# Patient Record
Sex: MALE | Race: WHITE | Employment: OTHER | URBAN - METROPOLITAN AREA
[De-identification: names, ages, dates, MRNs, and addresses within clinical notes are randomized per-mention and may not be internally consistent; named-entity substitution may affect disease eponyms.]

---

## 2017-01-04 RX ORDER — ATORVASTATIN CALCIUM 10 MG/1
TABLET, FILM COATED ORAL
Qty: 90 TAB | Refills: 3 | Status: SHIPPED | OUTPATIENT
Start: 2017-01-04 | End: 2018-02-19 | Stop reason: SDUPTHER

## 2017-01-16 RX ORDER — ALPRAZOLAM 0.5 MG/1
TABLET ORAL
Qty: 60 TAB | Refills: 0 | OUTPATIENT
Start: 2017-01-16 | End: 2017-03-27 | Stop reason: SDUPTHER

## 2017-01-16 NOTE — TELEPHONE ENCOUNTER
Reviewed report generated by the [facility]. Does not demonstrate aberrancies or inconsistencies with regard to the prescribing of controlled medications to this patient by other providers.   Last filled 11/11/16

## 2017-01-25 ENCOUNTER — LAB ONLY (OUTPATIENT)
Dept: INTERNAL MEDICINE CLINIC | Age: 77
End: 2017-01-25

## 2017-01-25 ENCOUNTER — HOSPITAL ENCOUNTER (OUTPATIENT)
Dept: LAB | Age: 77
Discharge: HOME | End: 2017-01-25
Payer: MEDICARE

## 2017-01-25 DIAGNOSIS — R73.03 PRE-DIABETES: ICD-10-CM

## 2017-01-25 DIAGNOSIS — I10 ESSENTIAL HYPERTENSION: ICD-10-CM

## 2017-01-25 DIAGNOSIS — E55.9 VITAMIN D DEFICIENCY: ICD-10-CM

## 2017-01-25 DIAGNOSIS — E78.5 HYPERLIPIDEMIA, UNSPECIFIED HYPERLIPIDEMIA TYPE: ICD-10-CM

## 2017-01-25 DIAGNOSIS — I10 ESSENTIAL HYPERTENSION: Primary | ICD-10-CM

## 2017-01-25 LAB
25(OH)D3 SERPL-MCNC: 30.9 NG/ML (ref 30–100)
ALBUMIN SERPL BCP-MCNC: 4.2 G/DL (ref 3.4–5)
ALBUMIN/GLOB SERPL: 1.8 {RATIO} (ref 0.8–1.7)
ALP SERPL-CCNC: 97 U/L (ref 45–117)
ALT SERPL-CCNC: 41 U/L (ref 16–61)
ANION GAP BLD CALC-SCNC: 6 MMOL/L (ref 3–18)
AST SERPL W P-5'-P-CCNC: 23 U/L (ref 15–37)
BASOPHILS # BLD AUTO: 0 K/UL (ref 0–0.06)
BASOPHILS # BLD: 0 % (ref 0–2)
BILIRUB SERPL-MCNC: 0.4 MG/DL (ref 0.2–1)
BUN SERPL-MCNC: 17 MG/DL (ref 7–18)
BUN/CREAT SERPL: 22 (ref 12–20)
CALCIUM SERPL-MCNC: 8.5 MG/DL (ref 8.5–10.1)
CHLORIDE SERPL-SCNC: 104 MMOL/L (ref 100–108)
CHOLEST SERPL-MCNC: 143 MG/DL
CO2 SERPL-SCNC: 31 MMOL/L (ref 21–32)
CREAT SERPL-MCNC: 0.76 MG/DL (ref 0.6–1.3)
DIFFERENTIAL METHOD BLD: ABNORMAL
EOSINOPHIL # BLD: 0.3 K/UL (ref 0–0.4)
EOSINOPHIL NFR BLD: 5 % (ref 0–5)
ERYTHROCYTE [DISTWIDTH] IN BLOOD BY AUTOMATED COUNT: 13.5 % (ref 11.6–14.5)
GLOBULIN SER CALC-MCNC: 2.4 G/DL (ref 2–4)
GLUCOSE SERPL-MCNC: 103 MG/DL (ref 74–99)
HCT VFR BLD AUTO: 46.3 % (ref 36–48)
HDLC SERPL-MCNC: 59 MG/DL (ref 40–60)
HDLC SERPL: 2.4 {RATIO} (ref 0–5)
HGB BLD-MCNC: 14.9 G/DL (ref 13–16)
LDLC SERPL CALC-MCNC: 69.2 MG/DL (ref 0–100)
LIPID PROFILE,FLP: NORMAL
LYMPHOCYTES # BLD AUTO: 32 % (ref 21–52)
LYMPHOCYTES # BLD: 1.8 K/UL (ref 0.9–3.6)
MCH RBC QN AUTO: 32 PG (ref 24–34)
MCHC RBC AUTO-ENTMCNC: 32.2 G/DL (ref 31–37)
MCV RBC AUTO: 99.4 FL (ref 74–97)
MONOCYTES # BLD: 0.7 K/UL (ref 0.05–1.2)
MONOCYTES NFR BLD AUTO: 12 % (ref 3–10)
NEUTS SEG # BLD: 2.8 K/UL (ref 1.8–8)
NEUTS SEG NFR BLD AUTO: 51 % (ref 40–73)
PLATELET # BLD AUTO: 191 K/UL (ref 135–420)
PMV BLD AUTO: 10.4 FL (ref 9.2–11.8)
POTASSIUM SERPL-SCNC: 3.9 MMOL/L (ref 3.5–5.5)
PROT SERPL-MCNC: 6.6 G/DL (ref 6.4–8.2)
RBC # BLD AUTO: 4.66 M/UL (ref 4.7–5.5)
SODIUM SERPL-SCNC: 141 MMOL/L (ref 136–145)
TRIGL SERPL-MCNC: 74 MG/DL (ref ?–150)
TSH SERPL DL<=0.05 MIU/L-ACNC: 1.81 UIU/ML (ref 0.36–3.74)
VLDLC SERPL CALC-MCNC: 14.8 MG/DL
WBC # BLD AUTO: 5.6 K/UL (ref 4.6–13.2)

## 2017-01-25 PROCEDURE — 84443 ASSAY THYROID STIM HORMONE: CPT | Performed by: INTERNAL MEDICINE

## 2017-01-25 PROCEDURE — 80061 LIPID PANEL: CPT | Performed by: INTERNAL MEDICINE

## 2017-01-25 PROCEDURE — 36415 COLL VENOUS BLD VENIPUNCTURE: CPT | Performed by: INTERNAL MEDICINE

## 2017-01-25 PROCEDURE — 85025 COMPLETE CBC W/AUTO DIFF WBC: CPT | Performed by: INTERNAL MEDICINE

## 2017-01-25 PROCEDURE — 82306 VITAMIN D 25 HYDROXY: CPT | Performed by: INTERNAL MEDICINE

## 2017-01-25 PROCEDURE — 80053 COMPREHEN METABOLIC PANEL: CPT | Performed by: INTERNAL MEDICINE

## 2017-02-01 ENCOUNTER — OFFICE VISIT (OUTPATIENT)
Dept: INTERNAL MEDICINE CLINIC | Age: 77
End: 2017-02-01

## 2017-02-01 ENCOUNTER — TELEPHONE (OUTPATIENT)
Dept: INTERNAL MEDICINE CLINIC | Age: 77
End: 2017-02-01

## 2017-02-01 VITALS
OXYGEN SATURATION: 96 % | TEMPERATURE: 97.9 F | WEIGHT: 209 LBS | HEIGHT: 67 IN | HEART RATE: 88 BPM | SYSTOLIC BLOOD PRESSURE: 128 MMHG | DIASTOLIC BLOOD PRESSURE: 78 MMHG | BODY MASS INDEX: 32.8 KG/M2

## 2017-02-01 DIAGNOSIS — M15.9 PRIMARY OSTEOARTHRITIS INVOLVING MULTIPLE JOINTS: ICD-10-CM

## 2017-02-01 DIAGNOSIS — Z02.89 ENCOUNTER FOR OCCUPATIONAL HEALTH EXAMINATION FOR SURVEILLANCE OF EXPOSURE TO LEAD: ICD-10-CM

## 2017-02-01 DIAGNOSIS — M06.041 RHEUMATOID ARTHRITIS INVOLVING BOTH HANDS WITH NEGATIVE RHEUMATOID FACTOR (HCC): ICD-10-CM

## 2017-02-01 DIAGNOSIS — E78.5 HYPERLIPIDEMIA, UNSPECIFIED HYPERLIPIDEMIA TYPE: ICD-10-CM

## 2017-02-01 DIAGNOSIS — M11.20 CPDD (CALCIUM PYROPHOSPHATE DEPOSITION DISEASE): ICD-10-CM

## 2017-02-01 DIAGNOSIS — N13.8 BPH WITH OBSTRUCTION/LOWER URINARY TRACT SYMPTOMS: ICD-10-CM

## 2017-02-01 DIAGNOSIS — N40.1 BPH WITH OBSTRUCTION/LOWER URINARY TRACT SYMPTOMS: ICD-10-CM

## 2017-02-01 DIAGNOSIS — K21.9 GASTROESOPHAGEAL REFLUX DISEASE WITHOUT ESOPHAGITIS: ICD-10-CM

## 2017-02-01 DIAGNOSIS — I10 ESSENTIAL HYPERTENSION: Primary | ICD-10-CM

## 2017-02-01 DIAGNOSIS — F32.A DEPRESSION, UNSPECIFIED DEPRESSION TYPE: ICD-10-CM

## 2017-02-01 DIAGNOSIS — R73.09 ABNORMAL GLUCOSE: ICD-10-CM

## 2017-02-01 DIAGNOSIS — M06.042 RHEUMATOID ARTHRITIS INVOLVING BOTH HANDS WITH NEGATIVE RHEUMATOID FACTOR (HCC): ICD-10-CM

## 2017-02-01 DIAGNOSIS — Z71.89 ACP (ADVANCE CARE PLANNING): ICD-10-CM

## 2017-02-01 DIAGNOSIS — R73.03 PRE-DIABETES: ICD-10-CM

## 2017-02-01 DIAGNOSIS — Z00.00 MEDICARE ANNUAL WELLNESS VISIT, INITIAL: ICD-10-CM

## 2017-02-01 DIAGNOSIS — Z77.011 ENCOUNTER FOR OCCUPATIONAL HEALTH EXAMINATION FOR SURVEILLANCE OF EXPOSURE TO LEAD: ICD-10-CM

## 2017-02-01 RX ORDER — COLCHICINE 0.6 MG/1
0.6 CAPSULE ORAL EVERY OTHER DAY
COMMUNITY

## 2017-02-01 NOTE — PROGRESS NOTES
This is an Initial Medicare Annual Wellness Exam (AWV) (Performed 12 months after IPPE or effective date of Medicare Part B enrollment, Once in a lifetime)    I have reviewed the patient's medical history in detail and updated the computerized patient record. History     Past Medical History   Diagnosis Date    Arthritis     GERD (gastroesophageal reflux disease)     Hypertension     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 and other lower urinary tract symptoms (LUTS)     UTI (urinary tract infection)       Past Surgical History   Procedure Laterality Date    Hx heent       sinus, tonsillectomy    Hx hernia repair      Hx carpal tunnel release      Hx orthopaedic       lef knee surgery, removed cartilage.  Hx mohs procedure       bilateral      Current Outpatient Prescriptions   Medication Sig Dispense Refill    ALPRAZolam (XANAX) 0.5 mg tablet Take 1 tablet by mouth at bedtime as needed 60 Tab 0    atorvastatin (LIPITOR) 10 mg tablet TAKE 1 TABLET BY MOUTH ONCE DAILY 90 Tab 3    potassium chloride (K-DUR, KLOR-CON) 20 mEq tablet TAKE 1 TABLET BY MOUTH 2 TIMES A DAY 60 Tab 11    PARoxetine (PAXIL) 20 mg tablet TAKE 1 TABLET BY MOUTH ONCE DAILY 30 Tab 11    furosemide (LASIX) 40 mg tablet TAKE 1 TABLET BY MOUTH ONCE DAILY 30 Tab 5    omeprazole (PRILOSEC) 20 mg capsule TAKE 1 CAPSULE BY MOUTH ONCE DAILY 90 Cap 1    amLODIPine (NORVASC) 10 mg tablet TAKE ONE TABLET BY MOUTH ONCE DAILY 30 Tab 11    lisinopril (PRINIVIL, ZESTRIL) 40 mg tablet TAKE 1 TABLET BY MOUTH 2 TIMES A DAY 60 Tab 11    cholecalciferol, vitamin D3, (VITAMIN D3) 2,000 unit tab Take 2,000 Units by mouth daily.  diclofenac (VOLTAREN) 1 % topical gel Apply 4 g to affected area four (4) times daily.  LUMIGAN 0.01 % ophthalmic drops       hydroxychloroquine (PLAQUENIL) 200 mg tablet Take 200 mg by mouth two (2) times a day.  aspirin delayed-release 81 mg tablet Take  by mouth daily.       MULTIVITS/IRON FUM/FA/D3/LYCOP (MULTI FOR HIM PO) Take  by mouth.  colchicine (MITIGARE) 0.6 mg capsule Take 0.6 mg by mouth daily. Allergies   Allergen Reactions    Tetanus Toxoid, Adsorbed Anaphylaxis    Aldactone [Spironolactone] Not Reported This Time     Breast tenderness    Codeine Not Reported This Time     \"Drives crazy\"    Tetanus Vaccines And Toxoid Anaphylaxis     Other reaction(s): anaphylaxis/angioedema    Tape  [Adhesive] Rash     Family History   Problem Relation Age of Onset    Cancer Other     Cancer Mother     Heart Disease Father     Alcohol abuse Father      Social History   Substance Use Topics    Smoking status: Former Smoker    Smokeless tobacco: Former User    Alcohol use Yes      Comment: rarely     Patient Active Problem List   Diagnosis Code    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 and other lower urinary tract symptoms (LUTS) N40.1, N13.8    Hypertension I10    Generalized osteoarthrosis, involving multiple sites M15.9    Hyperlipidemia E78.5    Reflux K21.9    Pre-diabetes R73.03    Rheumatoid arthritis(714.0)     Vitamin D deficiency E55.9    Colon polyps K63.5         Depression Risk Factor Screening:     PHQ 2 / 9, over the last two weeks 2/1/2017   Little interest or pleasure in doing things Not at all   Feeling down, depressed or hopeless Not at all   Total Score PHQ 2 0     Alcohol Risk Factor Screening: On any occasion during the past 3 months, have you had more than 4 drinks containing alcohol? No    Do you average more than 14 drinks per week? No    Functional Ability and Level of Safety:     Hearing Loss   none    Activities of Daily Living   Self-care. Requires assistance with: no ADLs    Fall Risk     Fall Risk Assessment, last 12 mths 2/1/2017   Able to walk? Yes   Fall in past 12 months?  No     Abuse Screen   Patient is not abused    Review of Systems   A comprehensive review of systems was negative except for that written in the HPI. Physical Examination     No exam data present    Evaluation of Cognitive Function:  Mood/affect:  neutral  Appearance: age appropriate and within normal Limits  Family member/caregiver input: none    Exam: see office note    Patient Care Team:  Ciro Collins MD as PCP - General (Internal Medicine)  Jerri Camp MD as Physician (Urology)  Jagruti Lange, RN as Ambulatory Care Navigator (Internal Medicine)  Marianne Sanchez MD (Rheumatology)  HERMELINDA Solomon (Physician Assistant)  Trini Miller, RN as Ambulatory Care Navigator (Internal Medicine)  Alise Thakur MD (Gastroenterology)  Sonia Nava OD (Ophthalmology)    Advice/Referrals/Counseling   Education and counseling provided:  Are appropriate based on today's review and evaluation  End-of-Life planning (with patient's consent)  Influenza Vaccine  Colorectal cancer screening tests  Screening for glaucoma  Discussed diabetic diet    Assessment/Plan       ICD-10-CM ICD-9-CM    1. Essential hypertension I10 401.9 CBC WITH AUTOMATED DIFF      METABOLIC PANEL, BASIC      URINALYSIS W/MICROSCOPIC   2. Hyperlipidemia, unspecified hyperlipidemia type E78.5 272.4    3. Pre-diabetes R73.03 790.29 HEMOGLOBIN A1C WITH EAG   4. Abnormal glucose R73.09 790.29 HEMOGLOBIN A1C WITH EAG   5. 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 (HCC) M06.041 714.0     M06.042     6. Primary osteoarthritis involving multiple joints M15.0 715.09    7. CPDD (calcium pyrophosphate deposition disease) M11.20 275.49      712.20    8. BPH with obstruction/lower urinary tract symptoms N40.1 600.01     N13.8 599.69    9. Gastroesophageal reflux disease without esophagitis K21.9 530.81    10. Depression, unspecified depression type F32.9 311    11. Encounter for occupational health examination for surveillance of exposure to lead Z02.89 V70.5 LEAD, ADULT, WHOLE BLOOD    Z77.011 V15.86    12.  Medicare annual wellness visit, initial Z00.00 V70.0    13. ACP (advance care planning) Z71.89 V65.49      current treatment plan is effective, no change in therapy  lab results and schedule of future lab studies reviewed with patient  reviewed diet, exercise and weight control  cardiovascular risk and specific lipid/LDL goals reviewed  reviewed medications and side effects in detail  specific diabetic recommendations: low cholesterol diet, weight control and daily exercise discussed, annual eye examinations at Ophthalmology discussed, long term diabetic complications discussed and labs immediately prior to next visit.

## 2017-02-01 NOTE — PROGRESS NOTES
Advance Care Planning (ACP) Provider Note - Comprehensive     Date of ACP Conversation: 02/01/17  Persons included in Conversation:  patient  Length of ACP Conversation in minutes:  16 minutes    Authorized Decision Maker (if patient is incapable of making informed decisions): wife  This person is:  Healthcare Agent/Medical Power of  under Advance Directive    General ACP for ALL Patients with Decision Making Capacity:   Importance of advance care planning, including choosing a healthcare agent to communicate patient's healthcare decisions if patient lost the ability to make decisions, such as after a sudden illness or accident  Understanding of the healthcare agent role was assessed and information provided  Exploration of values, goals, and preferences if recovery is not expected, even with continued medical treatment in the event of: Imminent death  Severe, permanent brain injury  \"In these circumstances, what matters most to you? \"  Care focused more on comfort or quality of life. Review of Existing Advance Directive:  Patient does not have an advance directive completed. He expresses the desire to not have life prolonging procedures. For Serious or Chronic Illness:  Understanding of medical condition      Interventions Provided:  Recommended completion of Advance Directive form after review of ACP materials and conversation with prospective healthcare agent   Recommended communicating the plan and making copies for the healthcare agent, personal physician, and others as appropriate (e.g., health system)  Recommended review of completed ACP document annually or upon change in health status   Recommended to complete advance directive and return completed form to office to be copied and scanned into chart. Paperwork provided.

## 2017-02-01 NOTE — TELEPHONE ENCOUNTER
Please request eye exam notes from Dr. Ezequiel Rice. Patient has been seen in the last 6 months. Also, patient had a colonoscopy by Dr. Kolton Marroquin in 8/2015. However, note not clear regarding follow-up for polyp. Please inquire when he needs another one. Thank you.

## 2017-02-01 NOTE — PATIENT INSTRUCTIONS
DASH Diet: Care Instructions  Your Care Instructions  The DASH diet is an eating plan that can help lower your blood pressure. DASH stands for Dietary Approaches to Stop Hypertension. Hypertension is high blood pressure. The DASH diet focuses on eating foods that are high in calcium, potassium, and magnesium. These nutrients can lower blood pressure. The foods that are highest in these nutrients are fruits, vegetables, low-fat dairy products, nuts, seeds, and legumes. But taking calcium, potassium, and magnesium supplements instead of eating foods that are high in those nutrients does not have the same effect. The DASH diet also includes whole grains, fish, and poultry. The DASH diet is one of several lifestyle changes your doctor may recommend to lower your high blood pressure. Your doctor may also want you to decrease the amount of sodium in your diet. Lowering sodium while following the DASH diet can lower blood pressure even further than just the DASH diet alone. Follow-up care is a key part of your treatment and safety. Be sure to make and go to all appointments, and call your doctor if you are having problems. It's also a good idea to know your test results and keep a list of the medicines you take. How can you care for yourself at home? Following the DASH diet  · Eat 4 to 5 servings of fruit each day. A serving is 1 medium-sized piece of fruit, ½ cup chopped or canned fruit, 1/4 cup dried fruit, or 4 ounces (½ cup) of fruit juice. Choose fruit more often than fruit juice. · Eat 4 to 5 servings of vegetables each day. A serving is 1 cup of lettuce or raw leafy vegetables, ½ cup of chopped or cooked vegetables, or 4 ounces (½ cup) of vegetable juice. Choose vegetables more often than vegetable juice. · Get 2 to 3 servings of low-fat and fat-free dairy each day. A serving is 8 ounces of milk, 1 cup of yogurt, or 1 ½ ounces of cheese. · Eat 6 to 8 servings of grains each day.  A serving is 1 slice of bread, 1 ounce of dry cereal, or ½ cup of cooked rice, pasta, or cooked cereal. Try to choose whole-grain products as much as possible. · Limit lean meat, poultry, and fish to 2 servings each day. A serving is 3 ounces, about the size of a deck of cards. · Eat 4 to 5 servings of nuts, seeds, and legumes (cooked dried beans, lentils, and split peas) each week. A serving is 1/3 cup of nuts, 2 tablespoons of seeds, or ½ cup of cooked beans or peas. · Limit fats and oils to 2 to 3 servings each day. A serving is 1 teaspoon of vegetable oil or 2 tablespoons of salad dressing. · Limit sweets and added sugars to 5 servings or less a week. A serving is 1 tablespoon jelly or jam, ½ cup sorbet, or 1 cup of lemonade. · Eat less than 2,300 milligrams (mg) of sodium a day. If you limit your sodium to 1,500 mg a day, you can lower your blood pressure even more. Tips for success  · Start small. Do not try to make dramatic changes to your diet all at once. You might feel that you are missing out on your favorite foods and then be more likely to not follow the plan. Make small changes, and stick with them. Once those changes become habit, add a few more changes. · Try some of the following:  ¨ Make it a goal to eat a fruit or vegetable at every meal and at snacks. This will make it easy to get the recommended amount of fruits and vegetables each day. ¨ Try yogurt topped with fruit and nuts for a snack or healthy dessert. ¨ Add lettuce, tomato, cucumber, and onion to sandwiches. ¨ Combine a ready-made pizza crust with low-fat mozzarella cheese and lots of vegetable toppings. Try using tomatoes, squash, spinach, broccoli, carrots, cauliflower, and onions. ¨ Have a variety of cut-up vegetables with a low-fat dip as an appetizer instead of chips and dip. ¨ Sprinkle sunflower seeds or chopped almonds over salads. Or try adding chopped walnuts or almonds to cooked vegetables. ¨ Try some vegetarian meals using beans and peas. Add garbanzo or kidney beans to salads. Make burritos and tacos with mashed new beans or black beans. Where can you learn more? Go to http://regina-carmen.info/. Enter L526 in the search box to learn more about \"DASH Diet: Care Instructions. \"  Current as of: March 23, 2016  Content Version: 11.1  © 2006-2016 [employer] Care instructions adapted under license by [employer] (which disclaims liability or warranty for this information). If you have questions about a medical condition or this instruction, always ask your healthcare professional. [place] [address] any warranty or liability for your use of this information. Noninsulin Medicines for Type 2 Diabetes: Care Instructions  Your Care Instructions  There are different types of noninsulin medicines for diabetes. Each works in a different way to help you control your blood sugar. Some types help your body make insulin to lower your blood sugar. Others lower how much insulin your body needs. Some can slow how quickly your body digests sugars. And some can remove extra glucose through your urine. Some of these medicines are:  · Alpha-glucosidase inhibitors. These keep starches from breaking down. This means that they lower the amount of glucose absorbed when you eat. They do not help your body make more insulin. So they will not cause low blood sugar unless they are used with other medicines for diabetes. They include acarbose and miglitol. · DPP-4 inhibitors. These help your body increase the level of insulin after eating. They also help your body make less of a hormone that raises blood sugar. They include linagliptin, saxagliptin, and sitagliptin. · Incretin hormones (GLP-1 receptor agonists). Your body makes a protein that can raise your insulin level, lower your blood sugar, and make you less hungry. You can inject hormone medicines that work the same way as this protein.  They include exenatide and liraglutide. · Meglitinides. These help your body release insulin. They also help slow how your body digests sugars. In this way, they prevent your blood sugar from rising too fast after you eat. They include nateglinide and repaglinide. · Metformin. This lowers how much glucose your liver makes. And it helps you respond better to insulin. It also lowers the amount of stored sugar that your liver releases when you are not eating. · Sodium glucose co-transporter 2 inhibitors (SGLT2 inhibitors). These help to remove extra glucose through your urine. They may also help some people lose weight. They include canagliflozin, dapagliflozin, and empagliflozin. · Sulfonylureas. These help your body release more insulin. Some work for many hours and can cause low blood sugar if you don't eat as you expected. They include glipizide and glyburide. · Thiazolidinediones. These reduce the amount of blood glucose. They also help you respond better to insulin. They include pioglitazone and rosiglitazone. You may need to take more than one medicine for diabetes. Two or more medicines may work better to lower your blood sugar level than just one medicine. Follow-up care is a key part of your treatment and safety. Be sure to make and go to all appointments, and call your doctor if you are having problems. It's also a good idea to know your test results and keep a list of the medicines you take. How can you care for yourself at home? · Eat a healthy diet. Get some exercise each day. This may help you to reduce how much medicine you need. · Do not take other prescription or over-the-counter medicines, vitamins, herbal products, or supplements without talking to your doctor first. Some medicines for type 2 diabetes can cause problems with other medicines or supplements. · Tell your doctor if you plan to get pregnant. Some of these drugs are not safe for pregnant women. · Be safe with medicines.  Take your medicines exactly as prescribed. Meglitinides or sulfonylureas can cause your blood sugar to drop very low. Call your doctor if you think you are having a problem with your medicine. · Check your blood sugar levels often. You can use a glucose monitor. Keeping track can help you know how certain foods, activities, and medicines affect your blood sugar. And it can help you keep your blood sugar from getting so low that it's not safe. When should you call for help? Call 911 anytime you think you may need emergency care. For example, call if:  · You passed out (lost consciousness), or you suddenly become very sleepy or confused. (You may have very low blood sugar.)  · You have symptoms of high blood sugar, such as:  ¨ Blurred vision. ¨ Trouble staying awake or being woken up. ¨ Fast, deep breathing. ¨ Breath that smells fruity. ¨ Belly pain, not feeling hungry, and vomiting. ¨ Feeling confused. Call your doctor now or seek immediate medical care if:  · You are sick and cannot control your blood sugar. · You have been vomiting or have had diarrhea for more than 6 hours. · Your blood sugar stays higher than the level your doctor has set for you. · You have symptoms of low blood sugar, such as:  ¨ Sweating. ¨ Feeling nervous, shaky, and weak. ¨ Extreme hunger and slight nausea. ¨ Dizziness and headache. ¨ Blurred vision. ¨ Confusion. Watch closely for changes in your health, and be sure to contact your doctor if:  · You have a hard time knowing when your blood sugar is low. · You have trouble keeping your blood sugar in the target range. · You often have problems controlling your blood sugar. · You have symptoms of long-term diabetes problems, such as:  ¨ New vision changes. ¨ New pain, numbness, or tingling in your hands or feet. ¨ Skin problems. Where can you learn more? Go to http://regina-carmen.info/.   Enter H153 in the search box to learn more about \"Noninsulin Medicines for Type 2 Diabetes: Care Instructions. \"  Current as of: August 3, 2016  Content Version: 11.1  © 3160-5537 [employer]. Care instructions adapted under license by [employer] (which disclaims liability or warranty for this information). If you have questions about a medical condition or this instruction, always ask your healthcare professional. [place] [address] any warranty or liability for your use of this information.

## 2017-02-01 NOTE — MR AVS SNAPSHOT
Visit Information Date & Time Provider Department Dept. Phone Encounter #  
 2/1/2017  8:00 AM Yan Giles MD Internist of [address] [place] [address] [phone] Follow-up Instructions Return in about 6 months (around 8/1/2017), or if symptoms worsen or fail to improve. Your Appointments 7/18/2017  9:00 AM  
Office Visit with Anita Richard MD  
[facility] [address] [place] [address]) Appt Note: PSA  
 [address] [facility] [address] [address]  
[phone] [address] [id number]  
  
    
 8/3/2017  7:45 AM  
LAB with [facility] NURSE VISIT Internist of [facility] ([address] [place] [address]) Appt Note: labs per sarris [address], [address] [id number] [address] [place]  
  
   
 [address], [address]  
  
    
 8/10/2017  8:00 AM  
Office Visit with Yan Giles MD  
Internist of [address]) Appt Note: ov 6mos. sarris [address], [address] [address] [address] [place]  
  
   
 [address], [address] Upcoming Health Maintenance Date Due DTaP/Tdap/Td series (1 - Tdap) 1/16/1961 MEDICARE YEARLY EXAM 1/16/2007 GLAUCOMA SCREENING Q2Y 1/1/2017 COLONOSCOPY 8/10/2020 Allergies as of 2/1/2017  Review Complete On: 7/14/2016 By: Anita Richard MD  
  
 Severity Noted Reaction Type Reactions Tetanus Toxoid, Adsorbed High 07/09/2014    Anaphylaxis Aldactone [Spironolactone]    Not Reported This Time Breast tenderness Codeine    Not Reported This Time \"Drives crazy\" Tetanus Vaccines And Toxoid    Anaphylaxis Other reaction(s): anaphylaxis/angioedema Tape  [Adhesive]  07/09/2014    Rash Current Immunizations  Reviewed on 1/27/2016 Name Date Influenza Vaccine Split 10/16/2012, 10/4/2011 Influenza Vaccine Whole 1/15/2010 Pneumococcal Conjugate (PCV-13) 1/19/2015  8:07 AM  
 Pneumococcal Vaccine (Unspecified Type) 1/1/2008 Varicella Virus Vaccine 10/1/2013 Zoster 10/16/2012 Not reviewed this visit Vitals BP Pulse Temp Height(growth percentile) Weight(growth percentile) SpO2  
 128/78 88 97.9 °F (36.6 °C) (Oral) 5' 7\" (1.702 m) 209 lb (94.8 kg) 96% BMI Smoking Status 32.73 kg/m2 Former Smoker Vitals History BMI and BSA Data Body Mass Index Body Surface Area 32.73 kg/m 2 2.12 m 2 Preferred Pharmacy Pharmacy Name Phone [address], [address] [place] [address] [phone] Your Updated Medication List  
  
   
This list is accurate as of: 2/1/17  8:53 AM.  Always use your most recent med list.  
  
  
  
  
 ALPRAZolam 0.5 mg tablet Commonly known as:  Rebbeca Lawn Take 1 tablet by mouth at bedtime as needed  
  
 amLODIPine 10 mg tablet Commonly known as:  Derek Ruths TAKE ONE TABLET BY MOUTH ONCE DAILY  
  
 aspirin delayed-release 81 mg tablet Take  by mouth daily. atorvastatin 10 mg tablet Commonly known as:  LIPITOR  
TAKE 1 TABLET BY MOUTH ONCE DAILY  
  
 colchicine 0.6 mg capsule Commonly known as:  Lynann Rumps Take 0.6 mg by mouth daily. furosemide 40 mg tablet Commonly known as:  LASIX TAKE 1 TABLET BY MOUTH ONCE DAILY  
  
 hydroxychloroquine 200 mg tablet Commonly known as:  PLAQUENIL Take 200 mg by mouth two (2) times a day. lisinopril 40 mg tablet Commonly known as:  PRINIVIL, ZESTRIL  
TAKE 1 TABLET BY MOUTH 2 TIMES A DAY  
  
 LUMIGAN 0.01 % ophthalmic drops Generic drug:  bimatoprost  
  
 MULTI FOR HIM PO Take  by mouth. omeprazole 20 mg capsule Commonly known as:  PRILOSEC  
TAKE 1 CAPSULE BY MOUTH ONCE DAILY PARoxetine 20 mg tablet Commonly known as:  PAXIL TAKE 1 TABLET BY MOUTH ONCE DAILY potassium chloride 20 mEq tablet Commonly known as:  K-ANN MARIE, KLOR-CON  
TAKE 1 TABLET BY MOUTH 2 TIMES A DAY  
  
 VITAMIN D3 2,000 unit Tab Generic drug:  cholecalciferol (vitamin D3) Take 2,000 Units by mouth daily. VOLTAREN 1 % Gel Generic drug:  diclofenac Apply 4 g to affected area four (4) times daily. Follow-up Instructions Return in about 6 months (around 8/1/2017), or if symptoms worsen or fail to improve. Patient Instructions DASH Diet: Care Instructions Your Care Instructions The DASH diet is an eating plan that can help lower your blood pressure. DASH stands for Dietary Approaches to Stop Hypertension. Hypertension is high blood pressure. The DASH diet focuses on eating foods that are high in calcium, potassium, and magnesium. These nutrients can lower blood pressure. The foods that are highest in these nutrients are fruits, vegetables, low-fat dairy products, nuts, seeds, and legumes. But taking calcium, potassium, and magnesium supplements instead of eating foods that are high in those nutrients does not have the same effect. The DASH diet also includes whole grains, fish, and poultry. The DASH diet is one of several lifestyle changes your doctor may recommend to lower your high blood pressure. Your doctor may also want you to decrease the amount of sodium in your diet. Lowering sodium while following the DASH diet can lower blood pressure even further than just the DASH diet alone. Follow-up care is a key part of your treatment and safety. Be sure to make and go to all appointments, and call your doctor if you are having problems. It's also a good idea to know your test results and keep a list of the medicines you take. How can you care for yourself at home? Following the DASH diet · Eat 4 to 5 servings of fruit each day.  A serving is 1 medium-sized piece of fruit, ½ cup chopped or canned fruit, 1/4 cup dried fruit, or 4 ounces (½ cup) of fruit juice. Choose fruit more often than fruit juice. · Eat 4 to 5 servings of vegetables each day. A serving is 1 cup of lettuce or raw leafy vegetables, ½ cup of chopped or cooked vegetables, or 4 ounces (½ cup) of vegetable juice. Choose vegetables more often than vegetable juice. · Get 2 to 3 servings of low-fat and fat-free dairy each day. A serving is 8 ounces of milk, 1 cup of yogurt, or 1 ½ ounces of cheese. · Eat 6 to 8 servings of grains each day. A serving is 1 slice of bread, 1 ounce of dry cereal, or ½ cup of cooked rice, pasta, or cooked cereal. Try to choose whole-grain products as much as possible. · Limit lean meat, poultry, and fish to 2 servings each day. A serving is 3 ounces, about the size of a deck of cards. · Eat 4 to 5 servings of nuts, seeds, and legumes (cooked dried beans, lentils, and split peas) each week. A serving is 1/3 cup of nuts, 2 tablespoons of seeds, or ½ cup of cooked beans or peas. · Limit fats and oils to 2 to 3 servings each day. A serving is 1 teaspoon of vegetable oil or 2 tablespoons of salad dressing. · Limit sweets and added sugars to 5 servings or less a week. A serving is 1 tablespoon jelly or jam, ½ cup sorbet, or 1 cup of lemonade. · Eat less than 2,300 milligrams (mg) of sodium a day. If you limit your sodium to 1,500 mg a day, you can lower your blood pressure even more. Tips for success · Start small. Do not try to make dramatic changes to your diet all at once. You might feel that you are missing out on your favorite foods and then be more likely to not follow the plan. Make small changes, and stick with them. Once those changes become habit, add a few more changes. · Try some of the following: ¨ Make it a goal to eat a fruit or vegetable at every meal and at snacks. This will make it easy to get the recommended amount of fruits and vegetables each day. ¨ Try yogurt topped with fruit and nuts for a snack or healthy dessert. ¨ Add lettuce, tomato, cucumber, and onion to sandwiches. ¨ Combine a ready-made pizza crust with low-fat mozzarella cheese and lots of vegetable toppings. Try using tomatoes, squash, spinach, broccoli, carrots, cauliflower, and onions. ¨ Have a variety of cut-up vegetables with a low-fat dip as an appetizer instead of chips and dip. ¨ Sprinkle sunflower seeds or chopped almonds over salads. Or try adding chopped walnuts or almonds to cooked vegetables. ¨ Try some vegetarian meals using beans and peas. Add garbanzo or kidney beans to salads. Make burritos and tacos with mashed new beans or black beans. Where can you learn more? Go to http://regina-carmen.info/. Enter D814 in the search box to learn more about \"DASH Diet: Care Instructions. \" Current as of: March 23, 2016 Content Version: 11.1 © 4044-9603 [employer]. Care instructions adapted under license by [employer] (which disclaims liability or warranty for this information). If you have questions about a medical condition or this instruction, always ask your healthcare professional. [address] any warranty or liability for your use of this information. Noninsulin Medicines for Type 2 Diabetes: Care Instructions Your Care Instructions There are different types of noninsulin medicines for diabetes. Each works in a different way to help you control your blood sugar. Some types help your body make insulin to lower your blood sugar. Others lower how much insulin your body needs. Some can slow how quickly your body digests sugars. And some can remove extra glucose through your urine. Some of these medicines are: · Alpha-glucosidase inhibitors. These keep starches from breaking down. This means that they lower the amount of glucose absorbed when you eat. They do not help your body make more insulin.  So they will not cause low blood sugar unless they are used with other medicines for diabetes. They include acarbose and miglitol. · DPP-4 inhibitors. These help your body increase the level of insulin after eating. They also help your body make less of a hormone that raises blood sugar. They include linagliptin, saxagliptin, and sitagliptin. · Incretin hormones (GLP-1 receptor agonists). Your body makes a protein that can raise your insulin level, lower your blood sugar, and make you less hungry. You can inject hormone medicines that work the same way as this protein. They include exenatide and liraglutide. · Meglitinides. These help your body release insulin. They also help slow how your body digests sugars. In this way, they prevent your blood sugar from rising too fast after you eat. They include nateglinide and repaglinide. · Metformin. This lowers how much glucose your liver makes. And it helps you respond better to insulin. It also lowers the amount of stored sugar that your liver releases when you are not eating. · Sodium glucose co-transporter 2 inhibitors (SGLT2 inhibitors). These help to remove extra glucose through your urine. They may also help some people lose weight. They include canagliflozin, dapagliflozin, and empagliflozin. · Sulfonylureas. These help your body release more insulin. Some work for many hours and can cause low blood sugar if you don't eat as you expected. They include glipizide and glyburide. · Thiazolidinediones. These reduce the amount of blood glucose. They also help you respond better to insulin. They include pioglitazone and rosiglitazone. You may need to take more than one medicine for diabetes. Two or more medicines may work better to lower your blood sugar level than just one medicine. Follow-up care is a key part of your treatment and safety.  Be sure to make and go to all appointments, and call your doctor if you are having problems. It's also a good idea to know your test results and keep a list of the medicines you take. How can you care for yourself at home? · Eat a healthy diet. Get some exercise each day. This may help you to reduce how much medicine you need. · Do not take other prescription or over-the-counter medicines, vitamins, herbal products, or supplements without talking to your doctor first. Some medicines for type 2 diabetes can cause problems with other medicines or supplements. · Tell your doctor if you plan to get pregnant. Some of these drugs are not safe for pregnant women. · Be safe with medicines. Take your medicines exactly as prescribed. Meglitinides or sulfonylureas can cause your blood sugar to drop very low. Call your doctor if you think you are having a problem with your medicine. · Check your blood sugar levels often. You can use a glucose monitor. Keeping track can help you know how certain foods, activities, and medicines affect your blood sugar. And it can help you keep your blood sugar from getting so low that it's not safe. When should you call for help? Call 911 anytime you think you may need emergency care. For example, call if: 
· You passed out (lost consciousness), or you suddenly become very sleepy or confused. (You may have very low blood sugar.) · You have symptoms of high blood sugar, such as: ¨ Blurred vision. ¨ Trouble staying awake or being woken up. ¨ Fast, deep breathing. ¨ Breath that smells fruity. ¨ Belly pain, not feeling hungry, and vomiting. ¨ Feeling confused. Call your doctor now or seek immediate medical care if: 
· You are sick and cannot control your blood sugar. · You have been vomiting or have had diarrhea for more than 6 hours. · Your blood sugar stays higher than the level your doctor has set for you. · You have symptoms of low blood sugar, such as: ¨ Sweating. ¨ Feeling nervous, shaky, and weak. ¨ Extreme hunger and slight nausea. ¨ Dizziness and headache. ¨ Blurred vision. ¨ Confusion. Watch closely for changes in your health, and be sure to contact your doctor if: 
· You have a hard time knowing when your blood sugar is low. · You have trouble keeping your blood sugar in the target range. · You often have problems controlling your blood sugar. · You have symptoms of long-term diabetes problems, such as: ¨ New vision changes. ¨ New pain, numbness, or tingling in your hands or feet. ¨ Skin problems. Where can you learn more? Go to http://regina-carmen.info/. Enter H153 in the search box to learn more about \"Noninsulin Medicines for Type 2 Diabetes: Care Instructions. \" Current as of: August 3, 2016 Content Version: 11.1 © 5136-5830 [employer]. Care instructions adapted under license by [employer] (which disclaims liability or warranty for this information). If you have questions about a medical condition or this instruction, always ask your healthcare professional. [place] [address] any warranty or liability for your use of this information. Introducing [facility]! [employer] introduces [employer] patient portal. Now you can access parts of your medical record, email your doctor's office, and request medication refills online. 1. In your internet browser, go to [email or web address] [email or web address] [email or web address] 2. Click on the First Time User? Click Here link in the Sign In box. You will see the New Member Sign Up page. 3. Enter your [employer] Access Code exactly as it appears below. You will not need to use this code after youve completed the sign-up process. If you do not sign up before the expiration date, you must request a new code. · [employer] Access Code: [email or web address] Expires: 2/20/2017  4:35 PM 
 
4. Enter the last four digits of your Social Security Number (xxxx) and Date of Birth (mm/dd/yyyy) as indicated and click Submit.  You will be taken to the next sign-up page. 5. Create a [employer] ID. This will be your [employer] login ID and cannot be changed, so think of one that is secure and easy to remember. 6. Create a [employer] password. You can change your password at any time. 7. Enter your Password Reset Question and Answer. This can be used at a later time if you forget your password. 8. Enter your e-mail address. You will receive e-mail notification when new information is available in [address] 9. Click Sign Up. You can now view and download portions of your medical record. 10. Click the Download Summary menu link to download a portable copy of your medical information. If you have questions, please visit the Frequently Asked Questions section of the [employer] website. Remember, [employer] is NOT to be used for urgent needs. For medical emergencies, dial 911. Now available from your iPhone and Android! Please provide this summary of care documentation to your next provider. Your primary care clinician is listed as Francine Milian. If you have any questions after today's visit, please call [phone].

## 2017-02-05 PROBLEM — F32.A DEPRESSION: Status: ACTIVE | Noted: 2017-02-05

## 2017-02-05 PROBLEM — R73.09 ABNORMAL GLUCOSE: Status: ACTIVE | Noted: 2017-02-05

## 2017-02-05 NOTE — PROGRESS NOTES
HPI:   Selwyn Zacarias is a 78y.o. year old male who presents today for evaluation of hypertension, hyperlipidemia, prediabetes, rheumatoid arthritis, osteoarthritis, calcium pyrophosphate deposition disease, BPH, GERD, and depression. He reports that he is doing relatively well. He describes difficulty with bilateral hand pain, as well as pain in his upper back, after working for several hours bent over his workbench. He describes improvement in his symptoms with rest. He also reports difficulties with bilateral lower extremity swelling. He states that it will increase throughout the day and improve overnight. In 6/2016, he underwent lower extremity arterial and venous duplex scans, both of which were negative. He is otherwise without complaints. He has a history of hypertension, treated with amlodipine, lisinopril, lasix (+ potassium). He does not monitor his blood pressure at home, but report that it usually is 130/80 when presenting for office visits. He remains active, hunting and walking for exercise. He denies any chest pain, shortness of breath at rest or with exertion, lightheadedness, palpitations, or edema. He also has a history of hyperlipidemia, treated with moderate intensity atorvastatin. He has a history of prediabetes, with HbA1c ranging from 5.9-6.2 since 2012. He denies any polyuria, polydipsia, nocturia, or blurry vision, and has no history of retinopathy, neuropathy, or nephropathy. He has regular eye exams with Dr. Dilcia Dennison. He has a history of bilateral hand pain with morning stiffness for several years, and in 3/2014, he was noted to have a positive anti-CCP antibody level although NIMCO, rheumatoid factor, and ESR were negative. He was referred for evaluation to Dr. Adolfo Ferro, and was diagnosed with rheumatoid arthritis in 6/2014. He was treated with hydroxychloroquine, which has been partially helpful in controlling his symptoms.  Bilateral hand x-rays also showed evidence of primary osteoarthritis with osteophytes present on the second and third MCP joints. In 1/2017, he was also noted to have evidence of possible chondrocalcinosis on x-ray, and was started on low dose colchicine in addition to hydroxychloroquine for concomitant calcium pyrophosphate deposition disease. He uses compounded cream and Voltaren gel for pain control. He has a history of symptomatic BPH, with complaints of decreased stream, hesitancy, and dribbling. He has refused treatment with medication. He is followed by Dr. Mathew Eaton. He denies any dysuria, gross hematuria, or flank pain. He has a history of GERD, treated with daily omeprazole with good control of symptoms. He had a screening colonoscopy and 8/2015 by Dr. Wang Nguyen, showing a 3 mm sessile cecal polyp (pathology: intra-mucosal lymphoid aggregate), two 4 mm sessile polyps in the transverse colon (pathology: serrated adenomas), and a 1 cm lipoma in the transverse colon. Follow-up recommended for five years. He denies any abdominal pain, nausea, vomiting, melena, hematochezia, or change in bowel movements. He has a history of depression, which is well controlled with Paxil. Past Medical History   Diagnosis Date    BPH without obstruction/lower urinary tract symptoms      Refusing treatment with medictions or TURBT. Dr. Mathew Eaton.  CPDD (calcium pyrophosphate deposition disease)     Depression     GERD (gastroesophageal reflux disease)     Hyperlipidemia     Hypertension     Prediabetes     Primary osteoarthritis involving multiple joints 9/6/2011    Rheumatoid arthritis ([facility] [address] 2013     negative RF; elevated anti-CCP. Dr. Adolfo Ferro. Past Surgical History   Procedure Laterality Date    Hx heent       sinus, tonsillectomy    Hx hernia repair      Hx carpal tunnel release      Hx orthopaedic       lef knee surgery, removed cartilage.     Hx mohs procedure       bilateral      Current Outpatient Prescriptions   Medication Sig    ALPRAZolam (XANAX) 0.5 mg tablet Take 1 tablet by mouth at bedtime as needed    atorvastatin (LIPITOR) 10 mg tablet TAKE 1 TABLET BY MOUTH ONCE DAILY    potassium chloride (K-DUR, KLOR-CON) 20 mEq tablet TAKE 1 TABLET BY MOUTH 2 TIMES A DAY    PARoxetine (PAXIL) 20 mg tablet TAKE 1 TABLET BY MOUTH ONCE DAILY    furosemide (LASIX) 40 mg tablet TAKE 1 TABLET BY MOUTH ONCE DAILY    omeprazole (PRILOSEC) 20 mg capsule TAKE 1 CAPSULE BY MOUTH ONCE DAILY    amLODIPine (NORVASC) 10 mg tablet TAKE ONE TABLET BY MOUTH ONCE DAILY    lisinopril (PRINIVIL, ZESTRIL) 40 mg tablet TAKE 1 TABLET BY MOUTH 2 TIMES A DAY    cholecalciferol, vitamin D3, (VITAMIN D3) 2,000 unit tab Take 2,000 Units by mouth daily.  diclofenac (VOLTAREN) 1 % topical gel Apply 4 g to affected area four (4) times daily.  LUMIGAN 0.01 % ophthalmic drops     hydroxychloroquine (PLAQUENIL) 200 mg tablet Take 200 mg by mouth two (2) times a day.  aspirin delayed-release 81 mg tablet Take  by mouth daily.  MULTIVITS/IRON FUM/FA/D3/LYCOP (MULTI FOR HIM PO) Take  by mouth.  colchicine (MITIGARE) 0.6 mg capsule Take 0.6 mg by mouth daily. No current facility-administered medications for this visit. Allergies and Intolerances: Allergies   Allergen Reactions    Tetanus Toxoid, Adsorbed Anaphylaxis    Aldactone [Spironolactone] Not Reported This Time     Breast tenderness    Codeine Not Reported This Time     \"Drives crazy\"    Tetanus Vaccines And Toxoid Anaphylaxis     Other reaction(s): anaphylaxis/angioedema    Tape  [Adhesive] Rash     Family History: He has no family history of colon or prostate cancer. Family History   Problem Relation Age of Onset    Cancer Mother     Heart Disease Father     Alcohol abuse Father     Cancer Other      Social History:   He  reports that he has quit smoking. He has quit using smokeless tobacco. He smoked 2 ppd for 50 years, stopping in 1974. He is  with two adult children.  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 History   Alcohol Use    Yes     Comment: rarely     Immunization History:  Immunization History   Administered Date(s) Administered    Influenza Vaccine Split 10/04/2011, 10/16/2012    Influenza Vaccine Whole 01/15/2010    Pneumococcal Conjugate (PCV-13) 01/19/2015    Pneumococcal Vaccine (Unspecified Type) 01/01/2008    Varicella Virus Vaccine 10/01/2013    Zoster 10/16/2012       Review of Systems:   As above included in HPI. Otherwise 11 point review of systems negative including constitutional, skin, HENT, eyes, respiratory, cardiovascular, gastrointestinal, genitourinary, musculoskeletal, endocrine, hematologic, allergy, and neurologic. Physical:   Vitals:   BP: 128/78  HR: 88  WT: 209 lb (94.8 kg)  BMI:  32.73 kg/m2    Exam:   Patient appears in no apparent distress. Affect is appropriate. HEENT --Anicteric sclerae, tympanic membranes normal,  ear canals normal.  PERRL, EOMI, conjunctiva and lids normal.   Sinuses were nontender, turbinates normal, hearing normal.  Oropharynx without  erythema, normal tongue, oral mucosa and tonsils. No cervical lymphadenopathy. No thyromegaly, JVD, or bruits. Carotid pulses 2+ with normal upstroke. Lungs --Clear to auscultation. No wheezing or rales. Heart --Regular rate and rhythm, no murmurs, rubs, gallops, or clicks. Chest wall --Nontender to palpation. PMI normal.  Abdomen -- Soft and nontender, no hepatosplenomegaly or masses. Extremities -- Without cyanosis, clubbing, edema. 2+ pulses equally and bilaterally. Normal looking digits, ROM intact. Bilateral hands with arthritic changes (Heberdon's nodes in DIP joints and some changes in MCP joints).    Neuro -- CN 2-12 intact, strength 5/5 with intact soft touch in all extremities, cerebellar  exam finger to nose test normal, 2+DTRs  Derm  no obvious abnormalities noted, no rash    Review of Data:  Labs:  Hospital Outpatient Visit on 01/25/2017   Component Date Value Ref Range Status    WBC 01/25/2017 5.6  4.6 - 13.2 K/uL Final    RBC 01/25/2017 4.66* 4.70 - 5.50 M/uL Final    HGB 01/25/2017 14.9  13.0 - 16.0 g/dL Final    HCT 01/25/2017 46.3  36.0 - 48.0 % Final    MCV 01/25/2017 99.4* 74.0 - 97.0 FL Final    MCH 01/25/2017 32.0  24.0 - 34.0 PG Final    MCHC 01/25/2017 32.2  31.0 - 37.0 g/dL Final    RDW 01/25/2017 13.5  11.6 - 14.5 % Final    PLATELET 37/35/1780 301  135 - 420 K/uL Final    MPV 01/25/2017 10.4  9.2 - 11.8 FL Final    NEUTROPHILS 01/25/2017 51  40 - 73 % Final    LYMPHOCYTES 01/25/2017 32  21 - 52 % Final    MONOCYTES 01/25/2017 12* 3 - 10 % Final    EOSINOPHILS 01/25/2017 5  0 - 5 % Final    BASOPHILS 01/25/2017 0  0 - 2 % Final    ABS. NEUTROPHILS 01/25/2017 2.8  1.8 - 8.0 K/UL Final    ABS. LYMPHOCYTES 01/25/2017 1.8  0.9 - 3.6 K/UL Final    ABS. MONOCYTES 01/25/2017 0.7  0.05 - 1.2 K/UL Final    ABS. EOSINOPHILS 01/25/2017 0.3  0.0 - 0.4 K/UL Final    ABS.  BASOPHILS 01/25/2017 0.0  0.0 - 0.06 K/UL Final    DF 01/25/2017 AUTOMATED    Final    LIPID PROFILE 01/25/2017        Final    Cholesterol, total 01/25/2017 143  <200 MG/DL Final    Triglyceride 01/25/2017 74  <150 MG/DL Final    HDL Cholesterol 01/25/2017 59  40 - 60 MG/DL Final    LDL, calculated 01/25/2017 69.2  0 - 100 MG/DL Final    VLDL, calculated 01/25/2017 14.8  MG/DL Final    CHOL/HDL Ratio 01/25/2017 2.4  0 - 5.0   Final    Sodium 01/25/2017 141  136 - 145 mmol/L Final    Potassium 01/25/2017 3.9  3.5 - 5.5 mmol/L Final    Chloride 01/25/2017 104  100 - 108 mmol/L Final    CO2 01/25/2017 31  21 - 32 mmol/L Final    Anion gap 01/25/2017 6  3.0 - 18 mmol/L Final    Glucose 01/25/2017 103* 74 - 99 mg/dL Final    BUN 01/25/2017 17  7.0 - 18 MG/DL Final    Creatinine 01/25/2017 0.76  0.6 - 1.3 MG/DL Final    BUN/Creatinine ratio 01/25/2017 22* 12 - 20   Final    GFR est AA 01/25/2017 >60  >60 ml/min/1.73m2 Final    GFR est non-AA 2017 >60  >60 ml/min/1.73m2 Final    Calcium 2017 8.5  8.5 - 10.1 MG/DL Final    Bilirubin, total 2017 0.4  0.2 - 1.0 MG/DL Final    ALT (SGPT) 2017 41  16 - 61 U/L Final    AST (SGOT) 2017 23  15 - 37 U/L Final    Alk. phosphatase 2017 97  45 - 117 U/L Final    Protein, total 2017 6.6  6.4 - 8.2 g/dL Final    Albumin 2017 4.2  3.4 - 5.0 g/dL Final    Globulin 2017 2.4  2.0 - 4.0 g/dL Final    A-G Ratio 2017 1.8* 0.8 - 1.7   Final    TSH 2017 1.81  0.36 - 3.74 uIU/mL Final    Vitamin D 25-Hydroxy 2017 30.9  30 - 100 ng/mL Final     Health Maintenance:  Screening:    Colorectal: colonoscopy (2015) serrated adenomas. Dr. Santino Burrows. Due . Depression: on Paxil   DM (HbA1c/FPG):  (2017)   Hepatitis C: N/A   Falls: none   DEXA: within normal limits (2016)   PSA/MARTÍNEZ: PSA 2.1. As per Dr. Talat Bacon   Glaucoma: regular eye exams with Dr. Tiny Person (last )   Smokin pack year. Distant past.   Vitamin D: 30.9 (2017)   Medicare Wellness: today    Impression:  Patient Active Problem List   Diagnosis Code    BPH with obstruction/lower urinary tract symptoms N40.1, N13.8    Hypertension I10    Primary osteoarthritis involving multiple joints M15.0    Hyperlipidemia E78.5    GERD (gastroesophageal reflux disease) K21.9    Pre-diabetes R73.03    Rheumatoid arthritis(714.0)     Vitamin D deficiency E55.9    Colon polyps K63.5    CPDD (calcium pyrophosphate deposition disease) M11.20       Plan:  1. Hypertension. Well controlled on current regimen of lisinopril, amlodipine, and lasix. Renal function normal with creatinine 0.76 / eGFR >60. Continue to follow. 2. Hyperlipidemia. On moderate intensity dose atorvastatin with LDL 69, indicative of excellent control in this patient. Continue to follow. 3. Prediabetes. Well controlled on diet control alone.  No evidence of microvascular complications. On Ace-I and statin. Continue follow-up with Dr. Belinda Alvarez for annual eye exams. Will perform foot exam and obtain urine microalbumin/ creatinine ratio at next visit. Emphasized importance of lifestyle modifications, including diet, exercise, and weight loss. 4. Rheumatoid arthritis. On Plaquenil with some response. Has regular eye exams with Dr. Belinda Alvarez, next visit scheduled for 2/2017. Difficult to gauge as appears to have evidence of osteoarthritis and CPDD occurring concurrently. Voltaren gel for pain control. Patient reports having a difficult time using topical medication while at work. Could use gloves after applying, but has difficulty at times doing intricate work as a gunsmith while wearing gloves. Followed by Dr. Jamal Connell. 5. Primary osteoarthritis. Evident in bilateral hands and knees. Using Voltaren gel for pain. 6. CPDD. Colchicine started on 1/19/2017 to help address chondrocalcinosis on x-rays. Follow for response. 7. BPH. Does have lower urinary tract symptoms, but refusing medications or surgical interventions. Followed by Dr. Javi Vasquez. 8. GERD. Good control of symptoms with omeprazole. 9. Depression. Good control with Paxil. Uses Xanax at night to help with sleep. Follow. 10. 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 Ongoing secondary to work as a [employer]. Monitored annually, last 7/2016 without evidence of toxicity. Follow. 11. Health maintenance. Already received influenza vaccine. Unable to receive Tdap due to allergy (anaphylaxis to Td). Other immunizations up to date. Colonoscopy due 2020. Continue regular eye exams with Dr. Belinda Alvarez. Vitamin D level low normal. Continue maintenance dose supplement. In addition, an annual Medicare wellness visit was done today. Patient understands recommendations and agrees with plan. Follow-up in 6 months.

## 2017-02-20 RX ORDER — FUROSEMIDE 40 MG/1
TABLET ORAL
Qty: 30 TAB | Refills: 5 | Status: SHIPPED | OUTPATIENT
Start: 2017-02-20 | End: 2017-09-03 | Stop reason: SDUPTHER

## 2017-02-20 RX ORDER — OMEPRAZOLE 20 MG/1
CAPSULE, DELAYED RELEASE ORAL
Qty: 90 CAP | Refills: 1 | Status: SHIPPED | OUTPATIENT
Start: 2017-02-20 | End: 2017-08-17 | Stop reason: SDUPTHER

## 2017-03-28 RX ORDER — ALPRAZOLAM 0.5 MG/1
TABLET ORAL
Qty: 60 TAB | Refills: 0 | OUTPATIENT
Start: 2017-03-28 | End: 2017-05-24 | Stop reason: SDUPTHER

## 2017-05-24 RX ORDER — ALPRAZOLAM 0.5 MG/1
TABLET ORAL
Qty: 60 TAB | Refills: 0 | Status: SHIPPED | OUTPATIENT
Start: 2017-05-24 | End: 2017-07-31 | Stop reason: SDUPTHER

## 2017-05-25 ENCOUNTER — TELEPHONE (OUTPATIENT)
Dept: INTERNAL MEDICINE CLINIC | Age: 77
End: 2017-05-25

## 2017-07-10 RX ORDER — LISINOPRIL 40 MG/1
TABLET ORAL
Qty: 60 TAB | Refills: 11 | Status: SHIPPED | OUTPATIENT
Start: 2017-07-10 | End: 2017-08-10 | Stop reason: SDUPTHER

## 2017-07-17 ENCOUNTER — HOSPITAL ENCOUNTER (OUTPATIENT)
Dept: LAB | Age: 77
Discharge: HOME | End: 2017-07-17
Payer: MEDICARE

## 2017-07-17 ENCOUNTER — OFFICE VISIT (OUTPATIENT)
Dept: UROLOGY | Age: 77
End: 2017-07-17

## 2017-07-17 VITALS
WEIGHT: 211 LBS | SYSTOLIC BLOOD PRESSURE: 130 MMHG | DIASTOLIC BLOOD PRESSURE: 80 MMHG | HEART RATE: 81 BPM | HEIGHT: 67 IN | BODY MASS INDEX: 33.12 KG/M2 | OXYGEN SATURATION: 97 % | TEMPERATURE: 97.9 F

## 2017-07-17 DIAGNOSIS — N40.1 BENIGN NON-NODULAR PROSTATIC HYPERPLASIA WITH LOWER URINARY TRACT SYMPTOMS: Primary | ICD-10-CM

## 2017-07-17 DIAGNOSIS — N40.1 BENIGN NON-NODULAR PROSTATIC HYPERPLASIA WITH LOWER URINARY TRACT SYMPTOMS: ICD-10-CM

## 2017-07-17 LAB
BILIRUB UR QL STRIP: NEGATIVE
GLUCOSE UR-MCNC: NEGATIVE MG/DL
KETONES P FAST UR STRIP-MCNC: NEGATIVE MG/DL
PH UR STRIP: 7 [PH] (ref 4.6–8)
PROT UR QL STRIP: NEGATIVE MG/DL
PSA SERPL-MCNC: 2.7 NG/ML (ref 0–4)
SP GR UR STRIP: 1.01 (ref 1–1.03)
UA UROBILINOGEN AMB POC: NORMAL (ref 0.2–1)
URINALYSIS CLARITY POC: CLEAR
URINALYSIS COLOR POC: YELLOW
URINE BLOOD POC: NEGATIVE
URINE LEUKOCYTES POC: NORMAL
URINE NITRITES POC: NEGATIVE

## 2017-07-17 PROCEDURE — 84153 ASSAY OF PSA TOTAL: CPT | Performed by: UROLOGY

## 2017-07-17 RX ORDER — LATANOPROST 50 UG/ML
1 SOLUTION/ DROPS OPHTHALMIC
COMMUNITY
End: 2018-06-29

## 2017-07-17 RX ORDER — CYCLOSPORINE 0.5 MG/ML
1 EMULSION OPHTHALMIC
COMMUNITY

## 2017-07-17 NOTE — PROGRESS NOTES
Mr. Maria Fernanda Sahu has a reminder for a \"due or due soon\" health maintenance. I have asked that he contact his primary care provider for follow-up on this health maintenance. RBV per Dr. Izabel Pathak blood drawn in office today for PSA for BPH.

## 2017-07-17 NOTE — PROGRESS NOTES
Angela West 78 y.o. male     Mr. Chuckie Easley seen today for symptomatic BPH follow-up  Patient is voiding well and has no complaints regarding urination at this time-voiding with a solid stream good control and no nocturia  Patient declined alpha-blocker treatment several years ago because of concern regarding side effects      Large prostate median lobe on ultrasound imaging of the bladder last year-PVR at that time 48 cc     PSA 3.8 in January 2013  PSA 2.6 in 2011  PSA 2.3 in January 2014  PSA 2.7 in July 2014  PSA 1.9 on 10 July 2016  PSA 2.1 on 14 July 2017    PVR 48 cc in 2015   cc in 2017              prostate volume 67.7 cc     Review of Systems:    CNS: no seizure syncope headaches or dizziness no visual changes/no changes- on Paxil Rx  Respiratory: no wheezing or shortness of breath  Cardiovascular:hypertension-chest pain or palpitations  Intestinal:GERD  Urinary: mild BPH symptoms  Skeletal: or joint arthritis  Endocrine:no diabetes or thyroid disease  Other:    Allergies: Allergies   Allergen Reactions    Tetanus Toxoid, Adsorbed Anaphylaxis    Adhesive Tape-Silicones Rash    Aldactone [Spironolactone] Not Reported This Time     Breast tenderness    Codeine Not Reported This Time     \"Drives crazy\"    Tetanus Vaccines And Toxoid Anaphylaxis     Other reaction(s): anaphylaxis/angioedema    Tape  [Adhesive] Rash      Medications:    Current Outpatient Prescriptions   Medication Sig Dispense Refill    cycloSPORINE (RESTASIS) 0.05 % ophthalmic emulsion Administer 1 Drop to both eyes two (2) times a day.  latanoprost (XALATAN) 0.005 % ophthalmic solution Administer 1 Drop to both eyes nightly.       lisinopril (PRINIVIL, ZESTRIL) 40 mg tablet TAKE 1 TABLET BY MOUTH 2 TIMES A DAY 60 Tab 11    ALPRAZolam (XANAX) 0.5 mg tablet TAKE 1 TABLET BY MOUTH AT BEDTIME AS NEEDED 60 Tab 0    furosemide (LASIX) 40 mg tablet TAKE 1 TABLET BY MOUTH ONCE DAILY 30 Tab 5    omeprazole (PRILOSEC) 20 mg capsule TAKE 1 CAPSULE BY MOUTH ONCE DAILY 90 Cap 1    atorvastatin (LIPITOR) 10 mg tablet TAKE 1 TABLET BY MOUTH ONCE DAILY 90 Tab 3    potassium chloride (K-DUR, KLOR-CON) 20 mEq tablet TAKE 1 TABLET BY MOUTH 2 TIMES A DAY 60 Tab 11    PARoxetine (PAXIL) 20 mg tablet TAKE 1 TABLET BY MOUTH ONCE DAILY 30 Tab 11    amLODIPine (NORVASC) 10 mg tablet TAKE ONE TABLET BY MOUTH ONCE DAILY 30 Tab 11    cholecalciferol, vitamin D3, (VITAMIN D3) 2,000 unit tab Take 2,000 Units by mouth daily.  diclofenac (VOLTAREN) 1 % topical gel Apply 4 g to affected area four (4) times daily.  aspirin delayed-release 81 mg tablet Take  by mouth daily.  MULTIVITS/IRON FUM/FA/D3/LYCOP (MULTI FOR HIM PO) Take  by mouth.  colchicine (MITIGARE) 0.6 mg capsule Take 0.6 mg by mouth daily.  LUMIGAN 0.01 % ophthalmic drops       hydroxychloroquine (PLAQUENIL) 200 mg tablet Take 200 mg by mouth two (2) times a day. 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 Dr. Jessy Back.  CPDD (calcium pyrophosphate deposition disease)     Depression     GERD (gastroesophageal reflux disease)     Hyperlipidemia     Hypertension     Prediabetes     Primary osteoarthritis involving multiple joints 9/6/2011    Rheumatoid arthritis ([facility] [address] 2013    negative RF; elevated anti-CCP. Dr. Carrero Cancer. Past Surgical History:   Procedure Laterality Date    HX CARPAL TUNNEL RELEASE      HX HEENT      sinus, tonsillectomy    HX HERNIA REPAIR      HX MOHS PROCEDURES      bilateral     HX ORTHOPAEDIC      lef knee surgery, removed cartilage.      Family History   Problem Relation Age of Onset    Cancer Mother     Heart Disease Father     Alcohol abuse Father     Cancer Other         Physical Examination: Well-nourished mature male in no apparent distress    Prostate by MARTÍNEZ is large rounded smooth benign consistency nontender-no induration no nodularity  No rectal masses induration or tenderness    Urinalysis: Negative dipstick/nitrite negative    PVR today 120 cc                      prostate volume 67.7 cc    Impression: Asymptomatic BPH/         Plan: PSA today/watch for signs of silent prostatism based on patient's very large prostate with protruding median lobe found ultrasonically    rtc 1 yr PSA MARTÍNEZ PVR      More than 1/2 of this 15 minute visit was spent in counselling and coordination of care, as described above. Marquise Mann MD  -electronically signed-    PLEASE NOTE:  This document has been produced using voice recognition software. Unrecognized errors in transcription may be present.

## 2017-07-17 NOTE — MR AVS SNAPSHOT
Visit Information Date & Time Provider Department Dept. Phone Encounter #  
 7/17/2017  9:00 AM Rosario Diaz, Magalys Castanedavd. S.W [id number] Your Appointments 8/3/2017  7:45 AM  
LAB with C NURSE VISIT [facility] ([address] [place] [address] Road) Appt Note: labs per sarris [address], [address] [address] [address]  
  
   
 [address], [address]  
  
    
 8/10/2017  8:00 AM  
Office Visit with Esme Martinez MD  
Internist of [address]) Appt Note: ov 6mos. sarris [address], [address] [address] [address]  
  
   
 [address], [address] Upcoming Health Maintenance Date Due INFLUENZA AGE 9 TO ADULT 8/1/2017 MEDICARE YEARLY EXAM 2/2/2018 GLAUCOMA SCREENING Q2Y 8/15/2018 COLONOSCOPY 8/10/2020 DTaP/Tdap/Td series (2 - Td) 2/1/2027 Allergies as of 7/17/2017  Review Complete On: 7/17/2017 By: Braulio Dillard Severity Noted Reaction Type Reactions Tetanus Toxoid, Adsorbed High 07/09/2014    Anaphylaxis Adhesive Tape-silicones  30/80/5072    Rash Aldactone [Spironolactone]    Not Reported This Time Breast tenderness Codeine    Not Reported This Time \"Drives crazy\" Tetanus Vaccines And Toxoid    Anaphylaxis Other reaction(s): anaphylaxis/angioedema Tape  [Adhesive]  07/09/2014    Rash Current Immunizations  Reviewed on 1/27/2016 Name Date Influenza Vaccine Split 10/16/2012, 10/4/2011 Influenza Vaccine Whole 1/15/2010 Pneumococcal Conjugate (PCV-13) 1/19/2015  8:07 AM  
 Pneumococcal Vaccine (Unspecified Type) 1/1/2008 Varicella Virus Vaccine 10/1/2013 Zoster 10/16/2012 Not reviewed this visit You Were Diagnosed With   
  
 Codes Comments  Benign non-nodular prostatic hyperplasia with lower urinary tract symptoms    -  Primary ICD-10-CM: N40.1 ICD-9-CM: 600.91 Vitals BP Pulse Temp Height(growth percentile) Weight(growth percentile) SpO2  
 130/80 (BP 1 Location: Left arm, BP Patient Position: Sitting) 81 97.9 °F (36.6 °C) (Oral) 5' 7\" (1.702 m) 211 lb (95.7 kg) 97% BMI Smoking Status 33.05 kg/m2 Former Smoker Vitals History BMI and BSA Data Body Mass Index Body Surface Area 33.05 kg/m 2 2.13 m 2 Preferred Pharmacy Pharmacy Name Phone [address] [place] [address], [address] [place] [address] [phone] Your Updated Medication List  
  
   
This list is accurate as of: 7/17/17  9:28 AM.  Always use your most recent med list.  
  
  
  
  
 ALPRAZolam 0.5 mg tablet Commonly known as:  XANAX  
TAKE 1 TABLET BY MOUTH AT BEDTIME AS NEEDED  
  
 amLODIPine 10 mg tablet Commonly known as:  Reji Yi TAKE ONE TABLET BY MOUTH ONCE DAILY  
  
 aspirin delayed-release 81 mg tablet Take  by mouth daily. atorvastatin 10 mg tablet Commonly known as:  LIPITOR  
TAKE 1 TABLET BY MOUTH ONCE DAILY  
  
 colchicine 0.6 mg capsule Commonly known as:  Renee Kj Take 0.6 mg by mouth daily. furosemide 40 mg tablet Commonly known as:  LASIX TAKE 1 TABLET BY MOUTH ONCE DAILY  
  
 hydroxychloroquine 200 mg tablet Commonly known as:  PLAQUENIL Take 200 mg by mouth two (2) times a day. lisinopril 40 mg tablet Commonly known as:  PRINIVIL, ZESTRIL  
TAKE 1 TABLET BY MOUTH 2 TIMES A DAY  
  
 LUMIGAN 0.01 % ophthalmic drops Generic drug:  bimatoprost  
  
 MULTI FOR HIM PO Take  by mouth. omeprazole 20 mg capsule Commonly known as:  PRILOSEC  
TAKE 1 CAPSULE BY MOUTH ONCE DAILY PARoxetine 20 mg tablet Commonly known as:  PAXIL TAKE 1 TABLET BY MOUTH ONCE DAILY potassium chloride 20 mEq tablet Commonly known as:  K-DUR, KLOR-CON  
TAKE 1 TABLET BY MOUTH 2 TIMES A DAY  
  
 RESTASIS 0.05 % ophthalmic emulsion Generic drug:  cycloSPORINE Administer 1 Drop to both eyes two (2) times a day. VITAMIN D3 2,000 unit Tab Generic drug:  cholecalciferol (vitamin D3) Take 2,000 Units by mouth daily. VOLTAREN 1 % Gel Generic drug:  diclofenac Apply 4 g to affected area four (4) times daily. XALATAN 0.005 % ophthalmic solution Generic drug:  latanoprost  
Administer 1 Drop to both eyes nightly. We Performed the Following AMB POC URINALYSIS DIP STICK AUTO W/O MICRO [85474 CPT(R)] DC COLLECTION VENOUS BLOOD,VENIPUNCTURE [id number] CPT(R)] To-Do List   
 07/17/2017 Lab:  PROSTATE SPECIFIC AG (PSA) Patient Instructions Benign Prostatic Hyperplasia: Care Instructions Your Care Instructions Benign prostatic hyperplasia, or BPH, is an enlarged prostate gland. The prostate is a small gland that makes some of the fluid in semen. Prostate enlargement happens to almost all men as they age. It is usually not serious. BPH does not cause prostate cancer. As the prostate gets bigger, it may partly block the flow of urine. You may have a hard time getting a urine stream started or completely stopped. BPH can cause dribbling. You may have a weak urine stream, or you may have to urinate more often than you used to, especially at night. Most men find these problems easy to manage. You do not need treatment unless your symptoms bother you a lot or you have other problems, such as bladder infections or stones. In these cases, medicines may help. Surgery is not needed unless the urine flow is blocked or the symptoms do not get better with medicine. Follow-up care is a key part of your treatment and safety. Be sure to make and go to all appointments, and call your doctor if you are having problems. It's also a good idea to know your test results and keep a list of the medicines you take. How can you care for yourself at home? · Take plenty of time to urinate. Try to relax. · Try \"double voiding. \" Urinate as much you can, relax for a few moments, and then try to urinate again. · Sit on the toilet to urinate. · Read or think of other things while you are waiting. · Turn on a faucet, or try to picture running water. Some men find that this helps get their urine flowing. · If dribbling is a problem, wash your penis daily to avoid skin irritation and infection. · Avoid caffeine and alcohol. These drinks will increase how often you need to urinate. Spread your fluid intake throughout the day. If the urge to urinate often wakes you at night, limit your fluid intake in the evening. Urinate right before you go to bed. · Many over-the-counter cold and allergy medicines can make the symptoms of BPH worse. Avoid antihistamines, decongestants, and allergy pills, if you can. Read the warnings on the package. · If you take any prescription medicines, especially tranquilizers or antidepressants, ask your doctor or pharmacist whether they can cause urination problems. There may be other medicines you can use that do not cause urinary problems. · Be safe with medicines. Take your medicines exactly as prescribed. Call your doctor if you think you are having a problem with your medicine. When should you call for help? Call your doctor now or seek immediate medical care if: 
· You cannot urinate at all. · You have symptoms of a urinary infection. For example: ¨ You have blood or pus in your urine. ¨ You have pain in your back just below your rib cage. This is called flank pain. ¨ You have a fever, chills, or body aches. ¨ It hurts to urinate. ¨ You have groin or belly pain. Watch closely for changes in your health, and be sure to contact your doctor if: 
· It hurts when you ejaculate. · Your urinary problems get a lot worse or bother you a lot. Where can you learn more? Go to http://alla.info/. Enter O644 in the search box to learn more about \"Benign Prostatic Hyperplasia: Care Instructions. \" Current as of: March 14, 2017 Content Version: 11.3 © 4578-1901 [employer]. Care instructions adapted under license by [employer] (which disclaims liability or warranty for this information). If you have questions about a medical condition or this instruction, always ask your healthcare professional. [address] any warranty or liability for your use of this information. Introducing [facility]! [facility] introduces EarlyShares patient portal. Now you can access parts of your medical record, email your doctor's office, and request medication refills online. 1. In your internet browser, go to [email or web address] [employer]/Envie [employer]ises 2. Click on the First Time User? Click Here link in the Sign In box. You will see the New Member Sign Up page. 3. Enter your EarlyShares Access Code exactly as it appears below. You will not need to use this code after youve completed the sign-up process. If you do not sign up before the expiration date, you must request a new code. · EarlyShares Access Code: [facility] Expires: 10/15/2017  9:10 AM 
 
4. Enter the last four digits of your Social Security Number (xxxx) and Date of Birth (mm/dd/yyyy) as indicated and click Submit. You will be taken to the next sign-up page. 5. Create a EarlyShares ID. This will be your EarlyShares login ID and cannot be changed, so think of one that is secure and easy to remember. 6. Create a EarlyShares password. You can change your password at any time. 7. Enter your Password Reset Question and Answer. This can be used at a later time if you forget your password. 8. Enter your e-mail address. You will receive e-mail notification when new information is available in [address] 9. Click Sign Up. You can now view and download portions of your medical record. 10. Click the Download Summary menu link to download a portable copy of your medical information. If you have questions, please visit the Frequently Asked Questions section of the iLogon website. Remember, iLogon is NOT to be used for urgent needs. For medical emergencies, dial 911. Now available from your iPhone and Android! Please provide this summary of care documentation to your next provider. Your primary care clinician is listed as Cyndi Henson. If you have any questions after today's visit, please call [phone].

## 2017-07-17 NOTE — PATIENT INSTRUCTIONS
Benign Prostatic Hyperplasia: Care Instructions  Your Care Instructions    Benign prostatic hyperplasia, or BPH, is an enlarged prostate gland. The prostate is a small gland that makes some of the fluid in semen. Prostate enlargement happens to almost all men as they age. It is usually not serious. BPH does not cause prostate cancer. As the prostate gets bigger, it may partly block the flow of urine. You may have a hard time getting a urine stream started or completely stopped. BPH can cause dribbling. You may have a weak urine stream, or you may have to urinate more often than you used to, especially at night. Most men find these problems easy to manage. You do not need treatment unless your symptoms bother you a lot or you have other problems, such as bladder infections or stones. In these cases, medicines may help. Surgery is not needed unless the urine flow is blocked or the symptoms do not get better with medicine. Follow-up care is a key part of your treatment and safety. Be sure to make and go to all appointments, and call your doctor if you are having problems. It's also a good idea to know your test results and keep a list of the medicines you take. How can you care for yourself at home? · Take plenty of time to urinate. Try to relax. · Try \"double voiding. \" Urinate as much you can, relax for a few moments, and then try to urinate again. · Sit on the toilet to urinate. · Read or think of other things while you are waiting. · Turn on a faucet, or try to picture running water. Some men find that this helps get their urine flowing. · If dribbling is a problem, wash your penis daily to avoid skin irritation and infection. · Avoid caffeine and alcohol. These drinks will increase how often you need to urinate. Spread your fluid intake throughout the day. If the urge to urinate often wakes you at night, limit your fluid intake in the evening. Urinate right before you go to bed.   · Many over-the-counter cold and allergy medicines can make the symptoms of BPH worse. Avoid antihistamines, decongestants, and allergy pills, if you can. Read the warnings on the package. · If you take any prescription medicines, especially tranquilizers or antidepressants, ask your doctor or pharmacist whether they can cause urination problems. There may be other medicines you can use that do not cause urinary problems. · Be safe with medicines. Take your medicines exactly as prescribed. Call your doctor if you think you are having a problem with your medicine. When should you call for help? Call your doctor now or seek immediate medical care if:  · You cannot urinate at all. · You have symptoms of a urinary infection. For example:  ¨ You have blood or pus in your urine. ¨ You have pain in your back just below your rib cage. This is called flank pain. ¨ You have a fever, chills, or body aches. ¨ It hurts to urinate. ¨ You have groin or belly pain. Watch closely for changes in your health, and be sure to contact your doctor if:  · It hurts when you ejaculate. · Your urinary problems get a lot worse or bother you a lot. Where can you learn more? Go to http://regina-carmen.info/. Enter C235 in the search box to learn more about \"Benign Prostatic Hyperplasia: Care Instructions. \"  Current as of: March 14, 2017  Content Version: 11.3  © 3457-8866 [employer]. Care instructions adapted under license by [employer] (which disclaims liability or warranty for this information). If you have questions about a medical condition or this instruction, always ask your healthcare professional. [place] [address] any warranty or liability for your use of this information.

## 2017-07-31 RX ORDER — ALPRAZOLAM 0.5 MG/1
TABLET ORAL
Qty: 60 TAB | Refills: 0 | OUTPATIENT
Start: 2017-07-31 | End: 2018-01-12

## 2017-07-31 NOTE — TELEPHONE ENCOUNTER
Reviewed report generated by the [facility]. Does not demonstrate aberrancies or inconsistencies with regard to the prescribing of controlled medications to this patient by other providers. Last filled Xanax 5/25/2017 per .

## 2017-08-03 ENCOUNTER — HOSPITAL ENCOUNTER (OUTPATIENT)
Dept: LAB | Age: 77
Discharge: HOME | End: 2017-08-03
Payer: MEDICARE

## 2017-08-03 DIAGNOSIS — R73.03 PRE-DIABETES: ICD-10-CM

## 2017-08-03 DIAGNOSIS — R73.09 OTHER ABNORMAL GLUCOSE: ICD-10-CM

## 2017-08-03 DIAGNOSIS — R73.03 DIABETES MELLITUS, LATENT: ICD-10-CM

## 2017-08-03 DIAGNOSIS — I10 ESSENTIAL HYPERTENSION: ICD-10-CM

## 2017-08-03 DIAGNOSIS — Z77.011 ENCOUNTER FOR OCCUPATIONAL HEALTH EXAMINATION FOR SURVEILLANCE OF EXPOSURE TO LEAD: ICD-10-CM

## 2017-08-03 DIAGNOSIS — R73.09 ABNORMAL GLUCOSE: ICD-10-CM

## 2017-08-03 DIAGNOSIS — Z02.89 ENCOUNTER FOR OCCUPATIONAL HEALTH EXAMINATION FOR SURVEILLANCE OF EXPOSURE TO LEAD: ICD-10-CM

## 2017-08-03 LAB
ANION GAP BLD CALC-SCNC: 9 MMOL/L (ref 3–18)
APPEARANCE UR: CLEAR
BACTERIA URNS QL MICRO: ABNORMAL /HPF
BASOPHILS # BLD AUTO: 0 K/UL (ref 0–0.06)
BASOPHILS # BLD: 0 % (ref 0–2)
BILIRUB UR QL: NEGATIVE
BUN SERPL-MCNC: 15 MG/DL (ref 7–18)
BUN/CREAT SERPL: 19 (ref 12–20)
CALCIUM SERPL-MCNC: 8.9 MG/DL (ref 8.5–10.1)
CHLORIDE SERPL-SCNC: 106 MMOL/L (ref 100–108)
CO2 SERPL-SCNC: 28 MMOL/L (ref 21–32)
COLOR UR: YELLOW
CREAT SERPL-MCNC: 0.78 MG/DL (ref 0.6–1.3)
DIFFERENTIAL METHOD BLD: ABNORMAL
EOSINOPHIL # BLD: 0.4 K/UL (ref 0–0.4)
EOSINOPHIL NFR BLD: 5 % (ref 0–5)
EPITH CASTS URNS QL MICRO: ABNORMAL /LPF (ref 0–5)
ERYTHROCYTE [DISTWIDTH] IN BLOOD BY AUTOMATED COUNT: 13.5 % (ref 11.6–14.5)
GLUCOSE SERPL-MCNC: 99 MG/DL (ref 74–99)
GLUCOSE UR STRIP.AUTO-MCNC: NEGATIVE MG/DL
HCT VFR BLD AUTO: 44.9 % (ref 36–48)
HGB BLD-MCNC: 14.9 G/DL (ref 13–16)
HGB UR QL STRIP: NEGATIVE
KETONES UR QL STRIP.AUTO: NEGATIVE MG/DL
LEUKOCYTE ESTERASE UR QL STRIP.AUTO: ABNORMAL
LYMPHOCYTES # BLD AUTO: 20 % (ref 21–52)
LYMPHOCYTES # BLD: 1.5 K/UL (ref 0.9–3.6)
MCH RBC QN AUTO: 32.5 PG (ref 24–34)
MCHC RBC AUTO-ENTMCNC: 33.2 G/DL (ref 31–37)
MCV RBC AUTO: 98 FL (ref 74–97)
MONOCYTES # BLD: 0.9 K/UL (ref 0.05–1.2)
MONOCYTES NFR BLD AUTO: 12 % (ref 3–10)
NEUTS SEG # BLD: 4.9 K/UL (ref 1.8–8)
NEUTS SEG NFR BLD AUTO: 63 % (ref 40–73)
NITRITE UR QL STRIP.AUTO: NEGATIVE
PH UR STRIP: 6 [PH] (ref 5–8)
PLATELET # BLD AUTO: 189 K/UL (ref 135–420)
PMV BLD AUTO: 10.5 FL (ref 9.2–11.8)
POTASSIUM SERPL-SCNC: 3.8 MMOL/L (ref 3.5–5.5)
PROT UR STRIP-MCNC: NEGATIVE MG/DL
RBC # BLD AUTO: 4.58 M/UL (ref 4.7–5.5)
RBC #/AREA URNS HPF: ABNORMAL /HPF (ref 0–5)
SODIUM SERPL-SCNC: 143 MMOL/L (ref 136–145)
SP GR UR REFRACTOMETRY: 1.02 (ref 1–1.03)
UROBILINOGEN UR QL STRIP.AUTO: 1 EU/DL (ref 0.2–1)
WBC # BLD AUTO: 7.7 K/UL (ref 4.6–13.2)
WBC URNS QL MICRO: ABNORMAL /HPF (ref 0–4)

## 2017-08-03 PROCEDURE — 36415 COLL VENOUS BLD VENIPUNCTURE: CPT | Performed by: INTERNAL MEDICINE

## 2017-08-03 PROCEDURE — 80048 BASIC METABOLIC PNL TOTAL CA: CPT | Performed by: INTERNAL MEDICINE

## 2017-08-03 PROCEDURE — 83036 HEMOGLOBIN GLYCOSYLATED A1C: CPT | Performed by: INTERNAL MEDICINE

## 2017-08-03 PROCEDURE — 81001 URINALYSIS AUTO W/SCOPE: CPT | Performed by: INTERNAL MEDICINE

## 2017-08-03 PROCEDURE — 85025 COMPLETE CBC W/AUTO DIFF WBC: CPT | Performed by: INTERNAL MEDICINE

## 2017-08-04 DIAGNOSIS — Z13.9 ENCOUNTER FOR MEDICAL SCREENING EXAMINATION: Primary | ICD-10-CM

## 2017-08-04 RX ORDER — AMLODIPINE BESYLATE 10 MG/1
TABLET ORAL
Qty: 30 TAB | Refills: 11 | Status: SHIPPED | OUTPATIENT
Start: 2017-08-04 | End: 2018-09-17 | Stop reason: SDUPTHER

## 2017-08-07 ENCOUNTER — HOSPITAL ENCOUNTER (OUTPATIENT)
Dept: LAB | Age: 77
Discharge: HOME | End: 2017-08-07
Payer: MEDICARE

## 2017-08-07 DIAGNOSIS — Z13.9 ENCOUNTER FOR MEDICAL SCREENING EXAMINATION: ICD-10-CM

## 2017-08-07 PROCEDURE — 36415 COLL VENOUS BLD VENIPUNCTURE: CPT | Performed by: INTERNAL MEDICINE

## 2017-08-07 PROCEDURE — 83655 ASSAY OF LEAD: CPT | Performed by: INTERNAL MEDICINE

## 2017-08-08 LAB
HISPANIC, LDP2T: NORMAL
LEAD BLD-MCNC: 10 UG/DL (ref 0–19)
RACE, 017371: NORMAL
SPECIMEN SOURCE: NORMAL
TEST PURPOSE, LDP4T: NORMAL

## 2017-08-10 ENCOUNTER — OFFICE VISIT (OUTPATIENT)
Dept: INTERNAL MEDICINE CLINIC | Age: 77
End: 2017-08-10

## 2017-08-10 ENCOUNTER — TELEPHONE (OUTPATIENT)
Dept: INTERNAL MEDICINE CLINIC | Age: 77
End: 2017-08-10

## 2017-08-10 VITALS
DIASTOLIC BLOOD PRESSURE: 88 MMHG | WEIGHT: 209 LBS | TEMPERATURE: 97.8 F | HEIGHT: 67 IN | BODY MASS INDEX: 32.8 KG/M2 | HEART RATE: 83 BPM | SYSTOLIC BLOOD PRESSURE: 138 MMHG | RESPIRATION RATE: 14 BRPM | OXYGEN SATURATION: 98 %

## 2017-08-10 DIAGNOSIS — E78.5 HYPERLIPIDEMIA, UNSPECIFIED HYPERLIPIDEMIA TYPE: ICD-10-CM

## 2017-08-10 DIAGNOSIS — E55.9 VITAMIN D DEFICIENCY: ICD-10-CM

## 2017-08-10 DIAGNOSIS — K62.5 RECTAL BLEEDING: ICD-10-CM

## 2017-08-10 DIAGNOSIS — R73.09 ABNORMAL GLUCOSE: ICD-10-CM

## 2017-08-10 DIAGNOSIS — N40.1 BPH WITH OBSTRUCTION/LOWER URINARY TRACT SYMPTOMS: ICD-10-CM

## 2017-08-10 DIAGNOSIS — R73.03 PRE-DIABETES: ICD-10-CM

## 2017-08-10 DIAGNOSIS — K21.9 GASTROESOPHAGEAL REFLUX DISEASE WITHOUT ESOPHAGITIS: ICD-10-CM

## 2017-08-10 DIAGNOSIS — N13.8 BPH WITH OBSTRUCTION/LOWER URINARY TRACT SYMPTOMS: ICD-10-CM

## 2017-08-10 DIAGNOSIS — F32.A DEPRESSION, UNSPECIFIED DEPRESSION TYPE: ICD-10-CM

## 2017-08-10 DIAGNOSIS — M11.20 CPDD (CALCIUM PYROPHOSPHATE DEPOSITION DISEASE): ICD-10-CM

## 2017-08-10 DIAGNOSIS — M06.041 RHEUMATOID ARTHRITIS INVOLVING BOTH HANDS WITH NEGATIVE RHEUMATOID FACTOR (HCC): ICD-10-CM

## 2017-08-10 DIAGNOSIS — M06.042 RHEUMATOID ARTHRITIS INVOLVING BOTH HANDS WITH NEGATIVE RHEUMATOID FACTOR (HCC): ICD-10-CM

## 2017-08-10 DIAGNOSIS — I10 ESSENTIAL HYPERTENSION: Primary | ICD-10-CM

## 2017-08-10 LAB
EST. AVERAGE GLUCOSE BLD GHB EST-MCNC: 120 MG/DL
HBA1C MFR BLD: 5.8 % (ref 4.2–5.6)

## 2017-08-10 RX ORDER — POTASSIUM CHLORIDE 20 MEQ/1
20 TABLET, EXTENDED RELEASE ORAL DAILY
Qty: 90 TAB | Refills: 2 | Status: SHIPPED | OUTPATIENT
Start: 2017-08-10 | End: 2021-04-08

## 2017-08-10 RX ORDER — LISINOPRIL 40 MG/1
40 TABLET ORAL DAILY
Qty: 90 TAB | Refills: 2 | Status: SHIPPED | OUTPATIENT
Start: 2017-08-10 | End: 2018-06-20 | Stop reason: SDUPTHER

## 2017-08-10 NOTE — TELEPHONE ENCOUNTER
Patient was seen by Dr. Wayne Baca one month ago for rectal bleeding. Please request note. Thank you.

## 2017-08-10 NOTE — TELEPHONE ENCOUNTER
I printed off a note that was in Media, and put in your box. Think that may be the one you need. If not, let me know.

## 2017-08-10 NOTE — MR AVS SNAPSHOT
Visit Information Date & Time Provider Department Dept. Phone Encounter #  
 8/10/2017  8:00 AM Yeison Rubio MD Internist of [address] [place] [address] Road [phone] [id number] Follow-up Instructions Return in about 6 months (around 2/10/2018), or if symptoms worsen or fail to improve. Your Appointments 2/19/2018  7:45 AM  
LAB with [facility] NURSE VISIT Internist of [facility] ([facility]) Appt Note: 6 month labs [address], [address] [address] [address]  
  
   
 [address], [address] 7/16/2018  9:00 AM  
ULTRASOUND with Mar Henry MD  
[facility]) Appt Note: PVR/PSA  
 [address] [place] A [address] [address]  
[phone] [address] [facility] [address] Upcoming Health Maintenance Date Due INFLUENZA AGE 9 TO ADULT 8/1/2017 MEDICARE YEARLY EXAM 2/2/2018 GLAUCOMA SCREENING Q2Y 8/15/2018 COLONOSCOPY 8/10/2020 DTaP/Tdap/Td series (2 - Td) 2/1/2027 Allergies as of 8/10/2017  Review Complete On: 8/10/2017 By: Katia Gann Severity Noted Reaction Type Reactions Tetanus Toxoid, Adsorbed High 07/09/2014    Anaphylaxis Adhesive Tape-silicones  52/23/2249    Rash Aldactone [Spironolactone]    Not Reported This Time Breast tenderness Codeine    Not Reported This Time \"Drives crazy\" Tetanus Vaccines And Toxoid    Anaphylaxis Other reaction(s): anaphylaxis/angioedema Tape  [Adhesive]  07/09/2014    Rash Current Immunizations  Reviewed on 1/27/2016 Name Date Influenza Vaccine Split 10/16/2012, 10/4/2011 Influenza Vaccine Whole 1/15/2010 Pneumococcal Conjugate (PCV-13) 1/19/2015  8:07 AM  
 Varicella Virus Vaccine 10/1/2013 ZZZ-RETIRED (DO NOT USE) Pneumococcal Vaccine (Unspecified Type) 1/1/2008 Zoster 10/16/2012 Not reviewed this visit Vitals BP Pulse Temp Resp Height(growth percentile) Weight(growth percentile) 138/88 (BP 1 Location: Left arm, BP Patient Position: Sitting) 83 97.8 °F (36.6 °C) (Oral) 14 5' 7\" (1.702 m) 209 lb (94.8 kg) SpO2 BMI Smoking Status 98% 32.73 kg/m2 Former Smoker Vitals History BMI and BSA Data Body Mass Index Body Surface Area 32.73 kg/m 2 2.12 m 2 Preferred Pharmacy Pharmacy Name Phone [address] [place] [address], [address] [place] [address] [phone] Your Updated Medication List  
  
   
This list is accurate as of: 8/10/17  8:34 AM.  Always use your most recent med list.  
  
  
  
  
 ALPRAZolam 0.5 mg tablet Commonly known as:  XANAX  
TAKE 1 TABLET BY MOUTH AT BEDTIME AS NEEDED  
  
 amLODIPine 10 mg tablet Commonly known as:  Dario Zechariah TAKE 1 TABLET BY MOUTH ONCE DAILY  
  
 aspirin delayed-release 81 mg tablet Take  by mouth daily. atorvastatin 10 mg tablet Commonly known as:  LIPITOR  
TAKE 1 TABLET BY MOUTH ONCE DAILY  
  
 colchicine 0.6 mg capsule Commonly known as:  [place] Minks Take 0.6 mg by mouth daily. furosemide 40 mg tablet Commonly known as:  LASIX TAKE 1 TABLET BY MOUTH ONCE DAILY  
  
 hydroxychloroquine 200 mg tablet Commonly known as:  PLAQUENIL Take 200 mg by mouth two (2) times a day. lisinopril 40 mg tablet Commonly known as:  Melany Golder Take 1 Tab by mouth daily. LUMIGAN 0.01 % ophthalmic drops Generic drug:  bimatoprost  
  
 MULTI FOR HIM PO Take  by mouth. omeprazole 20 mg capsule Commonly known as:  PRILOSEC  
TAKE 1 CAPSULE BY MOUTH ONCE DAILY PARoxetine 20 mg tablet Commonly known as:  PAXIL TAKE 1 TABLET BY MOUTH ONCE DAILY potassium chloride 20 mEq tablet Commonly known as:  K-DUR, KLOR-CON Take 1 Tab by mouth daily. RESTASIS 0.05 % ophthalmic emulsion Generic drug:  cycloSPORINE  
 Administer 1 Drop to both eyes two (2) times a day. VITAMIN D3 2,000 unit Tab Generic drug:  cholecalciferol (vitamin D3) Take 2,000 Units by mouth daily. VOLTAREN 1 % Gel Generic drug:  diclofenac Apply 4 g to affected area four (4) times daily. XALATAN 0.005 % ophthalmic solution Generic drug:  latanoprost  
Administer 1 Drop to both eyes nightly. Prescriptions Sent to Pharmacy Refills  
 potassium chloride (K-DUR, KLOR-CON) 20 mEq tablet 2 Sig: Take 1 Tab by mouth daily. Class: Normal  
 Pharmacy: [id number] [place] [address], [address] [place] [address] Ph #: [phone] Route: Oral  
 lisinopril (PRINIVIL, ZESTRIL) 40 mg tablet 2 Sig: Take 1 Tab by mouth daily. Class: Normal  
 Pharmacy: [id number] [place] [address], [address] [place] [address] Ph #: [phone] Route: Oral  
  
Follow-up Instructions Return in about 6 months (around 2/10/2018), or if symptoms worsen or fail to improve. Patient Instructions Please monitor blood pressure daily. Call office if greater than 150/90. DASH Diet: Care Instructions Your Care Instructions The DASH diet is an eating plan that can help lower your blood pressure. DASH stands for Dietary Approaches to Stop Hypertension. Hypertension is high blood pressure. The DASH diet focuses on eating foods that are high in calcium, potassium, and magnesium. These nutrients can lower blood pressure. The foods that are highest in these nutrients are fruits, vegetables, low-fat dairy products, nuts, seeds, and legumes. But taking calcium, potassium, and magnesium supplements instead of eating foods that are high in those nutrients does not have the same effect. The DASH diet also includes whole grains, fish, and poultry. The DASH diet is one of several lifestyle changes your doctor may recommend to lower your high blood pressure.  Your doctor may also want you to decrease the amount of sodium in your diet. Lowering sodium while following the DASH diet can lower blood pressure even further than just the DASH diet alone. Follow-up care is a key part of your treatment and safety. Be sure to make and go to all appointments, and call your doctor if you are having problems. It's also a good idea to know your test results and keep a list of the medicines you take. How can you care for yourself at home? Following the DASH diet · Eat 4 to 5 servings of fruit each day. A serving is 1 medium-sized piece of fruit, ½ cup chopped or canned fruit, 1/4 cup dried fruit, or 4 ounces (½ cup) of fruit juice. Choose fruit more often than fruit juice. · Eat 4 to 5 servings of vegetables each day. A serving is 1 cup of lettuce or raw leafy vegetables, ½ cup of chopped or cooked vegetables, or 4 ounces (½ cup) of vegetable juice. Choose vegetables more often than vegetable juice. · Get 2 to 3 servings of low-fat and fat-free dairy each day. A serving is 8 ounces of milk, 1 cup of yogurt, or 1 ½ ounces of cheese. · Eat 6 to 8 servings of grains each day. A serving is 1 slice of bread, 1 ounce of dry cereal, or ½ cup of cooked rice, pasta, or cooked cereal. Try to choose whole-grain products as much as possible. · Limit lean meat, poultry, and fish to 2 servings each day. A serving is 3 ounces, about the size of a deck of cards. · Eat 4 to 5 servings of nuts, seeds, and legumes (cooked dried beans, lentils, and split peas) each week. A serving is 1/3 cup of nuts, 2 tablespoons of seeds, or ½ cup of cooked beans or peas. · Limit fats and oils to 2 to 3 servings each day. A serving is 1 teaspoon of vegetable oil or 2 tablespoons of salad dressing. · Limit sweets and added sugars to 5 servings or less a week. A serving is 1 tablespoon jelly or jam, ½ cup sorbet, or 1 cup of lemonade. · Eat less than 2,300 milligrams (mg) of sodium a day.  If you limit your sodium to 1,500 mg a day, you can lower your blood pressure even more. Tips for success · Start small. Do not try to make dramatic changes to your diet all at once. You might feel that you are missing out on your favorite foods and then be more likely to not follow the plan. Make small changes, and stick with them. Once those changes become habit, add a few more changes. · Try some of the following: ¨ Make it a goal to eat a fruit or vegetable at every meal and at snacks. This will make it easy to get the recommended amount of fruits and vegetables each day. ¨ Try yogurt topped with fruit and nuts for a snack or healthy dessert. ¨ Add lettuce, tomato, cucumber, and onion to sandwiches. ¨ Combine a ready-made pizza crust with low-fat mozzarella cheese and lots of vegetable toppings. Try using tomatoes, squash, spinach, broccoli, carrots, cauliflower, and onions. ¨ Have a variety of cut-up vegetables with a low-fat dip as an appetizer instead of chips and dip. ¨ Sprinkle sunflower seeds or chopped almonds over salads. Or try adding chopped walnuts or almonds to cooked vegetables. ¨ Try some vegetarian meals using beans and peas. Add garbanzo or kidney beans to salads. Make burritos and tacos with mashed new beans or black beans. Where can you learn more? Go to http://regina-carmen.info/. Enter F521 in the search box to learn more about \"DASH Diet: Care Instructions. \" Current as of: April 3, 2017 Content Version: 11.3 © 6083-5701 [email or web address]. Care instructions adapted under license by [employer] (which disclaims liability or warranty for this information). If you have questions about a medical condition or this instruction, always ask your healthcare professional. [address]  any warranty or liability for your use of this information. Introducing [facility]! [address] [facility] introduces [employer] patient portal. Now you can access parts of your medical record, email your doctor's office, and request medication refills online. 1. In your internet browser, go to [email or web address] Connectt 2. Click on the First Time User? Click Here link in the Sign In box. You will see the New Member Sign Up page. 3. Enter your [employer] Access Code exactly as it appears below. You will not need to use this code after youve completed the sign-up process. If you do not sign up before the expiration date, you must request a new code. · [employer] Access Code: [facility] Expires: 10/15/2017  9:10 AM 
 
4. Enter the last four digits of your Social Security Number (xxxx) and Date of Birth (mm/dd/yyyy) as indicated and click Submit. You will be taken to the next sign-up page. 5. Create a [employer] ID. This will be your [employer] login ID and cannot be changed, so think of one that is secure and easy to remember. 6. Create a [employer] password. You can change your password at any time. 7. Enter your Password Reset Question and Answer. This can be used at a later time if you forget your password. 8. Enter your e-mail address. You will receive e-mail notification when new information is available in [address] 9. Click Sign Up. You can now view and download portions of your medical record. 10. Click the Download Summary menu link to download a portable copy of your medical information. If you have questions, please visit the Frequently Asked Questions section of the [employer] website. Remember, [employer] is NOT to be used for urgent needs. For medical emergencies, dial 911. Now available from your iPhone and Android! Please provide this summary of care documentation to your next provider. Your primary care clinician is listed as aMyte Kennedy. If you have any questions after today's visit, please call [phone].

## 2017-08-10 NOTE — PATIENT INSTRUCTIONS
Please monitor blood pressure daily. Call office if greater than 150/90. DASH Diet: Care Instructions  Your Care Instructions  The DASH diet is an eating plan that can help lower your blood pressure. DASH stands for Dietary Approaches to Stop Hypertension. Hypertension is high blood pressure. The DASH diet focuses on eating foods that are high in calcium, potassium, and magnesium. These nutrients can lower blood pressure. The foods that are highest in these nutrients are fruits, vegetables, low-fat dairy products, nuts, seeds, and legumes. But taking calcium, potassium, and magnesium supplements instead of eating foods that are high in those nutrients does not have the same effect. The DASH diet also includes whole grains, fish, and poultry. The DASH diet is one of several lifestyle changes your doctor may recommend to lower your high blood pressure. Your doctor may also want you to decrease the amount of sodium in your diet. Lowering sodium while following the DASH diet can lower blood pressure even further than just the DASH diet alone. Follow-up care is a key part of your treatment and safety. Be sure to make and go to all appointments, and call your doctor if you are having problems. It's also a good idea to know your test results and keep a list of the medicines you take. How can you care for yourself at home? Following the DASH diet  · Eat 4 to 5 servings of fruit each day. A serving is 1 medium-sized piece of fruit, ½ cup chopped or canned fruit, 1/4 cup dried fruit, or 4 ounces (½ cup) of fruit juice. Choose fruit more often than fruit juice. · Eat 4 to 5 servings of vegetables each day. A serving is 1 cup of lettuce or raw leafy vegetables, ½ cup of chopped or cooked vegetables, or 4 ounces (½ cup) of vegetable juice. Choose vegetables more often than vegetable juice. · Get 2 to 3 servings of low-fat and fat-free dairy each day.  A serving is 8 ounces of milk, 1 cup of yogurt, or 1 ½ ounces of cheese. · Eat 6 to 8 servings of grains each day. A serving is 1 slice of bread, 1 ounce of dry cereal, or ½ cup of cooked rice, pasta, or cooked cereal. Try to choose whole-grain products as much as possible. · Limit lean meat, poultry, and fish to 2 servings each day. A serving is 3 ounces, about the size of a deck of cards. · Eat 4 to 5 servings of nuts, seeds, and legumes (cooked dried beans, lentils, and split peas) each week. A serving is 1/3 cup of nuts, 2 tablespoons of seeds, or ½ cup of cooked beans or peas. · Limit fats and oils to 2 to 3 servings each day. A serving is 1 teaspoon of vegetable oil or 2 tablespoons of salad dressing. · Limit sweets and added sugars to 5 servings or less a week. A serving is 1 tablespoon jelly or jam, ½ cup sorbet, or 1 cup of lemonade. · Eat less than 2,300 milligrams (mg) of sodium a day. If you limit your sodium to 1,500 mg a day, you can lower your blood pressure even more. Tips for success  · Start small. Do not try to make dramatic changes to your diet all at once. You might feel that you are missing out on your favorite foods and then be more likely to not follow the plan. Make small changes, and stick with them. Once those changes become habit, add a few more changes. · Try some of the following:  ¨ Make it a goal to eat a fruit or vegetable at every meal and at snacks. This will make it easy to get the recommended amount of fruits and vegetables each day. ¨ Try yogurt topped with fruit and nuts for a snack or healthy dessert. ¨ Add lettuce, tomato, cucumber, and onion to sandwiches. ¨ Combine a ready-made pizza crust with low-fat mozzarella cheese and lots of vegetable toppings. Try using tomatoes, squash, spinach, broccoli, carrots, cauliflower, and onions. ¨ Have a variety of cut-up vegetables with a low-fat dip as an appetizer instead of chips and dip. ¨ Sprinkle sunflower seeds or chopped almonds over salads.  Or try adding chopped walnuts or almonds to cooked vegetables. ¨ Try some vegetarian meals using beans and peas. Add garbanzo or kidney beans to salads. Make burritos and tacos with mashed new beans or black beans. Where can you learn more? Go to http://regina-carmen.info/. Enter M800 in the search box to learn more about \"DASH Diet: Care Instructions. \"  Current as of: April 3, 2017  Content Version: 11.3  © 1373-6514 [employer] Care instructions adapted under license by [employer] (which disclaims liability or warranty for this information). If you have questions about a medical condition or this instruction, always ask your healthcare professional. [address] any warranty or liability for your use of this information.

## 2017-08-10 NOTE — PROGRESS NOTES
1. Have you been to the ER, urgent care clinic or hospitalized since your last visit? NO.     2. Have you seen or consulted any other health care providers outside of the [address] [place] [address] since your last visit (Include any pap smears or colon screening)? NO      Do you have an Advanced Directive? NO    Would you like information on Advanced Directives?  NO

## 2017-08-11 ENCOUNTER — TELEPHONE (OUTPATIENT)
Dept: INTERNAL MEDICINE CLINIC | Age: 77
End: 2017-08-11

## 2017-08-13 PROBLEM — K62.5 RECTAL BLEEDING: Status: ACTIVE | Noted: 2017-08-13

## 2017-08-13 NOTE — PROGRESS NOTES
HPI:   Seble Brown is a 78y.o. year old male who presents today for evaluation of hypertension, hyperlipidemia, prediabetes, rheumatoid arthritis, osteoarthritis, calcium pyrophosphate deposition disease, BPH, GERD, and depression. He reports that he is doing relatively well. He describes persistent difficulty with bilateral hand pain, as well as pain in his upper back, after working for several hours bent over his workbench. He describes improvement in his symptoms with rest. He is otherwise without complaints. He has a history of hypertension, treated with amlodipine, lisinopril, lasix (+ potassium). He states that his wife has been monitoring his blood pressure every morning, and reports that it usually with -140, with occasional readings as high as 150. He remains active, hunting and walking for exercise. He denies any chest pain, shortness of breath at rest or with exertion, lightheadedness, or palpitations. He does report bilateral lower extremity swelling that it will increase throughout the day and improve overnight. . In 6/2016, he underwent lower extremity arterial and venous duplex scans, both of which were negative. He also has a history of hyperlipidemia, treated with moderate intensity atorvastatin. He has a history of prediabetes, with HbA1c ranging from 5.9-6.2 since 2012. He denies any polyuria, polydipsia, nocturia, or blurry vision, and has no history of retinopathy, neuropathy, or nephropathy. He has regular eye exams with Dr. Clare Diamond. He has a history of bilateral hand pain with morning stiffness for several years, and in 3/2014, he was noted to have a positive anti-CCP antibody level although NIMCO, rheumatoid factor, and ESR were negative. He was referred for evaluation to Dr. Luis Alfredo Barnard, and was diagnosed with rheumatoid arthritis in 6/2014. He was treated with hydroxychloroquine, which has been partially helpful in controlling his symptoms.  Bilateral hand x-rays also showed evidence of primary osteoarthritis with osteophytes present on the second and third MCP joints. In 1/2017, he was also noted to have evidence of possible chondrocalcinosis on x-ray, and was started on low dose colchicine in addition to hydroxychloroquine for concomitant calcium pyrophosphate deposition disease. He uses compounding cream and Voltaren gel for pain control. He has a history of symptomatic BPH, with complaints of decreased stream, hesitancy, and dribbling. He has refused treatment with medication. He is followed by Dr. Izabel Pathak. He denies any dysuria, gross hematuria, or flank pain. He has a history of GERD, treated with daily omeprazole with good control of symptoms. He had a screening colonoscopy and 8/2015 by Dr. Evangelist Dailey, showing a 3 mm sessile cecal polyp (pathology: intra-mucosal lymphoid aggregate), two 4 mm sessile polyps in the transverse colon (pathology: serrated adenomas), and a 1 cm lipoma in the transverse colon. Follow-up recommended for five years. He states that he was having difficulty with rectal bleeding one month ago and returned for evaluation with Dr. Evangelist Dailey who felt it was most likely secondary to hemorrhoidal source. She recommended use of Citrucel. He states that the bleeding has improved with initiation of this therapy. He denies any abdominal pain, nausea, vomiting, melena, or change in bowel movements. He has a history of depression, which is well controlled with Paxil. 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 Dr. Izabel Pathak.  CPDD (calcium pyrophosphate deposition disease)     Depression     GERD (gastroesophageal reflux disease)     Hyperlipidemia     Hypertension     Prediabetes     Primary osteoarthritis involving multiple joints 9/6/2011    Rheumatoid arthritis ([facility] [address] 2013    negative RF; elevated anti-CCP. Dr. Misael Westbrook.      Past Surgical History: Procedure Laterality Date    HX CARPAL TUNNEL RELEASE      HX HEENT      sinus, tonsillectomy    HX HERNIA REPAIR      HX MOHS PROCEDURES      bilateral     HX ORTHOPAEDIC      lef knee surgery, removed cartilage. Current Outpatient Prescriptions   Medication Sig    potassium chloride (K-DUR, KLOR-CON) 20 mEq tablet Take 1 Tab by mouth daily.  lisinopril (PRINIVIL, ZESTRIL) 40 mg tablet Take 1 Tab by mouth daily.  amLODIPine (NORVASC) 10 mg tablet TAKE 1 TABLET BY MOUTH ONCE DAILY    ALPRAZolam (XANAX) 0.5 mg tablet TAKE 1 TABLET BY MOUTH AT BEDTIME AS NEEDED    cycloSPORINE (RESTASIS) 0.05 % ophthalmic emulsion Administer 1 Drop to both eyes two (2) times a day.  latanoprost (XALATAN) 0.005 % ophthalmic solution Administer 1 Drop to both eyes nightly.  furosemide (LASIX) 40 mg tablet TAKE 1 TABLET BY MOUTH ONCE DAILY    omeprazole (PRILOSEC) 20 mg capsule TAKE 1 CAPSULE BY MOUTH ONCE DAILY    colchicine (MITIGARE) 0.6 mg capsule Take 0.6 mg by mouth daily.  atorvastatin (LIPITOR) 10 mg tablet TAKE 1 TABLET BY MOUTH ONCE DAILY    PARoxetine (PAXIL) 20 mg tablet TAKE 1 TABLET BY MOUTH ONCE DAILY    cholecalciferol, vitamin D3, (VITAMIN D3) 2,000 unit tab Take 2,000 Units by mouth daily.  diclofenac (VOLTAREN) 1 % topical gel Apply 4 g to affected area four (4) times daily.  LUMIGAN 0.01 % ophthalmic drops     hydroxychloroquine (PLAQUENIL) 200 mg tablet Take 200 mg by mouth two (2) times a day.  aspirin delayed-release 81 mg tablet Take  by mouth daily.  MULTIVITS/IRON FUM/FA/D3/LYCOP (MULTI FOR HIM PO) Take  by mouth. No current facility-administered medications for this visit. Allergies and Intolerances:    Allergies   Allergen Reactions    Tetanus Toxoid, Adsorbed Anaphylaxis    Adhesive Tape-Silicones Rash    Aldactone [Spironolactone] Not Reported This Time     Breast tenderness    Codeine Not Reported This Time     \"Drives crazy\"    Tetanus Vaccines And Toxoid Anaphylaxis     Other reaction(s): anaphylaxis/angioedema    Tape  [Adhesive] Rash     Family History: He has no family history of colon or prostate cancer. Family History   Problem Relation Age of Onset    Cancer Mother     Heart Disease Father     Alcohol abuse Father     Cancer Other      Social History:   He  reports that he has quit smoking. He has quit using smokeless tobacco. He smoked 2 ppd for 50 years, stopping in 1974. He is  with two adult children. 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 History   Alcohol Use    Yes     Comment: rarely     Immunization History:  Immunization History   Administered Date(s) Administered    Influenza Vaccine Split 10/04/2011, 10/16/2012    Influenza Vaccine Whole 01/15/2010    Pneumococcal Conjugate (PCV-13) 01/19/2015    Varicella Virus Vaccine 10/01/2013    ZZZ-RETIRED (DO NOT USE) Pneumococcal Vaccine (Unspecified Type) 01/01/2008    Zoster 10/16/2012       Review of Systems:   As above included in HPI. Otherwise 11 point review of systems negative including constitutional, skin, HENT, eyes, respiratory, cardiovascular, gastrointestinal, genitourinary, musculoskeletal, endocrine, hematologic, allergy, and neurologic. Physical:   Vitals:   BP: 138/88  HR: 83  WT: 209 lb (94.8 kg)  BMI:  32.73 kg/m2    Exam:   Pt appears well; alert and oriented x 3; appropriate affect. HEENT: PERRLA, anicteric, oropharynx clear, no JVD, adenopathy or thyromegaly. No carotid bruits or radiated murmur. Lungs: clear to auscultation, no wheezes, rhonchi, or rales. Heart: regular rate and rhythm. No murmur, rubs, gallops  Abdomen: soft, nontender, nondistended, normal bowel sounds, no hepatosplenomegaly or masses. Extremities: without edema. Pulses 1-2+ bilaterally.     Review of Data:  Labs:  Hospital Outpatient Visit on 08/07/2017   Component Date Value Ref Range Status    Race 08/07/2017     Corrected     08/07/2017 NON    Corrected    Sample source 08/07/2017 BLOOD    Final    Test purpose 08/07/2017 ROUTINE   Final    Lead, blood 08/07/2017 10  0 - 19 ug/dL Final   Hospital Outpatient Visit on 08/03/2017   Component Date Value Ref Range Status    WBC 08/03/2017 7.7  4.6 - 13.2 K/uL Final    RBC 08/03/2017 4.58* 4.70 - 5.50 M/uL Final    HGB 08/03/2017 14.9  13.0 - 16.0 g/dL Final    HCT 08/03/2017 44.9  36.0 - 48.0 % Final    MCV 08/03/2017 98.0* 74.0 - 97.0 FL Final    MCH 08/03/2017 32.5  24.0 - 34.0 PG Final    MCHC 08/03/2017 33.2  31.0 - 37.0 g/dL Final    RDW 08/03/2017 13.5  11.6 - 14.5 % Final    PLATELET 35/27/1558 499  135 - 420 K/uL Final    MPV 08/03/2017 10.5  9.2 - 11.8 FL Final    NEUTROPHILS 08/03/2017 63  40 - 73 % Final    LYMPHOCYTES 08/03/2017 20* 21 - 52 % Final    MONOCYTES 08/03/2017 12* 3 - 10 % Final    EOSINOPHILS 08/03/2017 5  0 - 5 % Final    BASOPHILS 08/03/2017 0  0 - 2 % Final    ABS. NEUTROPHILS 08/03/2017 4.9  1.8 - 8.0 K/UL Final    ABS. LYMPHOCYTES 08/03/2017 1.5  0.9 - 3.6 K/UL Final    ABS. MONOCYTES 08/03/2017 0.9  0.05 - 1.2 K/UL Final    ABS. EOSINOPHILS 08/03/2017 0.4  0.0 - 0.4 K/UL Final    ABS.  BASOPHILS 08/03/2017 0.0  0.0 - 0.06 K/UL Final    DF 08/03/2017 AUTOMATED    Final    Sodium 08/03/2017 143  136 - 145 mmol/L Final    Potassium 08/03/2017 3.8  3.5 - 5.5 mmol/L Final    Chloride 08/03/2017 106  100 - 108 mmol/L Final    CO2 08/03/2017 28  21 - 32 mmol/L Final    Anion gap 08/03/2017 9  3.0 - 18 mmol/L Final    Glucose 08/03/2017 99  74 - 99 mg/dL Final    BUN 08/03/2017 15  7.0 - 18 MG/DL Final    Creatinine 08/03/2017 0.78  0.6 - 1.3 MG/DL Final    BUN/Creatinine ratio 08/03/2017 19  12 - 20   Final    GFR est AA 08/03/2017 >60  >60 ml/min/1.73m2 Final    GFR est non-AA 08/03/2017 >60  >60 ml/min/1.73m2 Final    Calcium 08/03/2017 8.9  8.5 - 10.1 MG/DL Final    Color 08/03/2017 YELLOW    Final    Appearance 08/03/2017 CLEAR    Final    Specific gravity 08/03/2017 1.020  1.005 - 1.030   Final    pH (UA) 08/03/2017 6.0  5.0 - 8.0   Final    Protein 08/03/2017 NEGATIVE   NEG mg/dL Final    Glucose 08/03/2017 NEGATIVE   NEG mg/dL Final    Ketone 08/03/2017 NEGATIVE   NEG mg/dL Final    Bilirubin 08/03/2017 NEGATIVE   NEG   Final    Blood 08/03/2017 NEGATIVE   NEG   Final    Urobilinogen 08/03/2017 1.0  0.2 - 1.0 EU/dL Final    Nitrites 08/03/2017 NEGATIVE   NEG   Final    Leukocyte Esterase 08/03/2017 SMALL* NEG   Final    WBC 08/03/2017 11 to 20  0 - 4 /hpf Final    RBC 08/03/2017 NONE  0 - 5 /hpf Final    Epithelial cells 08/03/2017 FEW  0 - 5 /lpf Final    Bacteria 08/03/2017 FEW* NEG /hpf Final   Hospital Outpatient Visit on 08/03/2017   Component Date Value Ref Range Status    Hemoglobin A1c 08/03/2017 5.8* 4.2 - 5.6 % Final    Est. average glucose 08/03/2017 120  mg/dL Final   Hospital Outpatient Visit on 07/17/2017   Component Date Value Ref Range Status    Prostate Specific Ag 07/17/2017 2.7  0.0 - 4.0 ng/mL Final   Office Visit on 07/17/2017   Component Date Value Ref Range Status    Color (UA POC) 07/17/2017 Yellow   Final    Clarity (UA POC) 07/17/2017 Clear   Final    Glucose (UA POC) 07/17/2017 Negative  Negative Final    Bilirubin (UA POC) 07/17/2017 Negative  Negative Final    Ketones (UA POC) 07/17/2017 Negative  Negative Final    Specific gravity (UA POC) 07/17/2017 1.015  1.001 - 1.035 Final    Blood (UA POC) 07/17/2017 Negative  Negative Final    pH (UA POC) 07/17/2017 7.0  4.6 - 8.0 Final    Protein (UA POC) 07/17/2017 Negative  Negative mg/dL Final    Urobilinogen (UA POC) 07/17/2017 0.2 mg/dL  0.2 - 1 Final    Nitrites (UA POC) 07/17/2017 Negative  Negative Final    Leukocyte esterase (UA POC) 07/17/2017 Trace  Negative Final     Health Maintenance:  Screening:    Colorectal: colonoscopy (8/2015) serrated adenomas. Dr. Anthony Pan. Due . Depression: on Paxil   DM (HbA1c/FPG): HbA1c 5.8 (2017)   Hepatitis C: N/A   Falls: none   DEXA: within normal limits (2016)   PSA/MARTÍNEZ: PSA 2.1. As per Dr. Bob Soni   Glaucoma: regular eye exams with Dr. Alexis Cabral (last 2016)   Smokin pack year. Distant past.   Vitamin D: 30.9 (2017)   Medicare Wellness: 2017    Impression:  Patient Active Problem List   Diagnosis Code    BPH with obstruction/lower urinary tract symptoms N40.1, N13.8    Hypertension I10    Primary osteoarthritis involving multiple joints M15.0    Hyperlipidemia E78.5    GERD (gastroesophageal reflux disease) K21.9    Pre-diabetes R73.03    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 ([facility] [address] M06.041, M06.042    Vitamin D deficiency E55.9    Colon polyps K63.5    CPDD (calcium pyrophosphate deposition disease) M11.20    Abnormal glucose R73.09    Depression F32.9       Plan:  1. Hypertension. Well controlled on current regimen of lisinopril, amlodipine, and lasix. Instructed to continue to monitor blood pressure at home and notify office if >150/90. Renal function normal with creatinine 0.78 / eGFR >60. Continue to follow. 2. Hyperlipidemia. On moderate intensity dose atorvastatin with LDL 69 (2017), indicative of excellent control in this patient. Continue to follow. 3. Prediabetes. Well controlled on diet control alone. No evidence of microvascular complications. On Ace-I and statin. Continue follow-up with Dr. Alexis Cabral for annual eye exams. Emphasized importance of lifestyle modifications, including diet, exercise, and weight loss. 4. Rheumatoid arthritis. On Plaquenil with some response. Has regular eye exams with Dr. Alexis Cabral, next visit scheduled for 2017. Difficult to gauge as appears to have evidence of osteoarthritis and CPDD occurring concurrently. Voltaren gel for pain control. Patient reports having a difficult time using topical medication while at work.  Could use gloves after applying, but has difficulty at times doing intricate work as a gunsmith while wearing gloves. Suggested using Tylenol while at work to help with pain control. Followed by Dr. Tio Marley. 5. Primary osteoarthritis. Evident in bilateral hands and knees. Using Voltaren gel for pain. 6. CPDD. Colchicine started on 1/19/2017 to help address chondrocalcinosis on x-rays. Follow for response. 7. Rectal bleeding. Colonoscopy 8/2015. Evaluated by Dr. Kinsey Navas and considered to be hemorrhoidal in source. Known internal and external hemorrhoids. Improved with Citrucel. No evidence of anemia. Follow. 8.  BPH. Does have lower urinary tract symptoms, but refusing medications or surgical interventions. Followed by Dr. Sabra Frausto. 9. GERD. Good control of symptoms with omeprazole. 10. Depression. Good control with Paxil. Uses Xanax at night to help with sleep. Follow. 11. 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 Monitored annually, last 7/2017 without evidence of toxicity. Follow. 12. Health maintenance. Unable to receive Tdap due to allergy (anaphylaxis to Td). Other immunizations up to date. Colonoscopy due 2020. Continue regular eye exams with Dr. Nita Gilbert. Vitamin D level low normal. Continue maintenance dose supplement. Medicare wellness visit up to date. Patient understands recommendations and agrees with plan. Follow-up in 6 months.

## 2017-08-17 RX ORDER — OMEPRAZOLE 20 MG/1
CAPSULE, DELAYED RELEASE ORAL
Qty: 90 CAP | Refills: 1 | Status: SHIPPED | OUTPATIENT
Start: 2017-08-17 | End: 2018-02-07 | Stop reason: SDUPTHER

## 2017-08-21 NOTE — TELEPHONE ENCOUNTER
Patient calling says Dr. Ayala Silence was going to prescribe him a different med in place of xanax.  Said that  Could go ahead and send in the prescription that she was putting in place of xanax

## 2017-08-22 NOTE — TELEPHONE ENCOUNTER
He is using the Xanax at night to help with sleep. He should not just stop it. He should taper it slowly if planning to discontinue. May wish to take half a tablet daily for 5 days, and then may increase interval between doses, such as taking it every other day etc.     I don't recall what medication he is referring to. I would usually suggest melatonin 5 mg qhs for sleep. If he decides to continue Xanax, he may take 1/2 tablet of Xanax with melatonin. Please inquire if it was in reference to a sleep aid. If so, I will send melatonin to [facility]. Thanks.

## 2017-08-22 NOTE — TELEPHONE ENCOUNTER
Spoke to Patient and he stated that he has already tried Xanax and Melatonin together and he falls asleep but he is still waking up. Patient stated Braxton Danielle is  having a jittery withdrawals from xanax\". Patient doesn't remember what he can try but stated that Dr. Hans Rubio told him that there is better \"medications\" that he can try.

## 2017-08-28 NOTE — TELEPHONE ENCOUNTER
Please find out how he is doing and if he has completely stopped taking Xanax. Would need for him to wean from this completely before beginning a different medication. Also, please find out if he is still taking Paxil. Thanks.

## 2017-08-30 NOTE — TELEPHONE ENCOUNTER
Spoke with Patients spouse Mrs. Robbie Collado and she stated that he has completely stopped taking Xanax since 08/22/2017 and still having a hard time falling asleep and staying asleep. She also stated that he is still taking the Paxil.

## 2017-09-01 RX ORDER — RAMELTEON 8 MG/1
8 TABLET ORAL
Qty: 30 TAB | Refills: 5 | Status: SHIPPED | OUTPATIENT
Start: 2017-09-01 | End: 2018-06-29

## 2017-09-01 NOTE — TELEPHONE ENCOUNTER
Options limited by use of Paxil due to drug-drug interactions. Would recommend that he begin ramelteon 8 mg qHS to help with insomnia. If not effective, may need to consider changing anti-depressant medication. Script sent to [facility].

## 2017-09-03 RX ORDER — FUROSEMIDE 40 MG/1
TABLET ORAL
Qty: 30 TAB | Refills: 5 | Status: SHIPPED | OUTPATIENT
Start: 2017-09-03 | End: 2018-03-27 | Stop reason: SDUPTHER

## 2017-10-02 ENCOUNTER — HOSPITAL ENCOUNTER (OUTPATIENT)
Dept: LAB | Age: 77
Discharge: HOME | End: 2017-10-02
Payer: MEDICARE

## 2017-10-02 PROCEDURE — 88304 TISSUE EXAM BY PATHOLOGIST: CPT | Performed by: PLASTIC SURGERY

## 2017-10-02 PROCEDURE — 88305 TISSUE EXAM BY PATHOLOGIST: CPT | Performed by: PLASTIC SURGERY

## 2017-10-09 RX ORDER — PAROXETINE HYDROCHLORIDE 20 MG/1
20 TABLET, FILM COATED ORAL DAILY
Qty: 30 TAB | Refills: 11 | Status: SHIPPED | OUTPATIENT
Start: 2017-10-09 | End: 2018-10-31 | Stop reason: SDUPTHER

## 2017-10-09 NOTE — TELEPHONE ENCOUNTER
Last Visit: 08/10/2017 with MD Adela Simmons    Next Appointment: 02/20/2018 with MD [place]   Previous Refill Encounters: 09/14/2016 per MD Adela Simmons #[phone] with 11 refills     Requested Prescriptions     Pending Prescriptions Disp Refills    PARoxetine (PAXIL) 20 mg tablet 30 Tab 11     Sig: Take 1 Tab by mouth daily.

## 2018-01-12 ENCOUNTER — OFFICE VISIT (OUTPATIENT)
Dept: INTERNAL MEDICINE CLINIC | Age: 78
End: 2018-01-12

## 2018-01-12 VITALS
HEART RATE: 76 BPM | SYSTOLIC BLOOD PRESSURE: 158 MMHG | WEIGHT: 208.6 LBS | RESPIRATION RATE: 16 BRPM | BODY MASS INDEX: 32.74 KG/M2 | TEMPERATURE: 99 F | HEIGHT: 67 IN | DIASTOLIC BLOOD PRESSURE: 86 MMHG | OXYGEN SATURATION: 98 %

## 2018-01-12 DIAGNOSIS — E55.9 VITAMIN D DEFICIENCY: ICD-10-CM

## 2018-01-12 DIAGNOSIS — I10 ESSENTIAL HYPERTENSION: Primary | ICD-10-CM

## 2018-01-12 DIAGNOSIS — M06.042 RHEUMATOID ARTHRITIS INVOLVING BOTH HANDS WITH NEGATIVE RHEUMATOID FACTOR (HCC): ICD-10-CM

## 2018-01-12 DIAGNOSIS — N13.8 BPH WITH OBSTRUCTION/LOWER URINARY TRACT SYMPTOMS: ICD-10-CM

## 2018-01-12 DIAGNOSIS — F32.A DEPRESSION, UNSPECIFIED DEPRESSION TYPE: ICD-10-CM

## 2018-01-12 DIAGNOSIS — N40.1 BPH WITH OBSTRUCTION/LOWER URINARY TRACT SYMPTOMS: ICD-10-CM

## 2018-01-12 DIAGNOSIS — M06.041 RHEUMATOID ARTHRITIS INVOLVING BOTH HANDS WITH NEGATIVE RHEUMATOID FACTOR (HCC): ICD-10-CM

## 2018-01-12 DIAGNOSIS — E78.5 HYPERLIPIDEMIA, UNSPECIFIED HYPERLIPIDEMIA TYPE: ICD-10-CM

## 2018-01-12 DIAGNOSIS — R73.03 PRE-DIABETES: ICD-10-CM

## 2018-01-12 DIAGNOSIS — Z01.818 PREOPERATIVE CLEARANCE: ICD-10-CM

## 2018-01-12 DIAGNOSIS — M11.20 CPDD (CALCIUM PYROPHOSPHATE DEPOSITION DISEASE): ICD-10-CM

## 2018-01-12 NOTE — PROGRESS NOTES
1. Have you been to the ER, urgent care clinic or hospitalized since your last visit? NO.     2. Have you seen or consulted any other health care providers outside of the [address] [place] [address] since your last visit (Include any pap smears or colon screening)? NO      Do you have an Advanced Directive? NO    Would you like information on Advanced Directives?  NO

## 2018-01-12 NOTE — MR AVS SNAPSHOT
Visit Information Date & Time Provider Department Dept. Phone Encounter #  
 1/12/2018 10:30 AM Sonny Deluna [facility] [id number] Your Appointments 2/12/2018  8:05 AM  
LAB with [facility] NURSE VISIT [facility] ([facility] Appt Note: lab  
 [address], [address] [place] [address] [place]  
  
   
 [address], [address]  
  
    
 2/19/2018  7:45 AM  
LAB with [facility] NURSE VISIT [facility] ([facility] Appt Note: 6 month labs [address], [address] [place] [address]  
[phone]  
  
    
 2/20/2018  8:00 AM  
Office Visit with Yareli Kaur MD  
[facility] Appt Note: 6 month follow up  
 [address], [address] [place] [address] [place]  
  
   
 [address], [address] 7/16/2018  9:00 AM  
ULTRASOUND with Tim Nolasco MD  
[facility]) Appt Note: PVR/PSA  
 [address] [place] [address] [address]  
[phone] [address] [place] [address] Upcoming Health Maintenance Date Due  
 MEDICARE YEARLY EXAM 2/2/2018 GLAUCOMA SCREENING Q2Y 8/15/2018 COLONOSCOPY 8/10/2020 DTaP/Tdap/Td series (2 - Td) 2/1/2027 Allergies as of 1/12/2018  Review Complete On: 1/12/2018 By: Therese Ennis LPN Severity Noted Reaction Type Reactions Tetanus Toxoid, Adsorbed High 07/09/2014    Anaphylaxis Adhesive Tape-silicones  52/69/8529    Rash Aldactone [Spironolactone]    Not Reported This Time Breast tenderness Codeine    Not Reported This Time \"Drives crazy\" Tetanus Vaccines And Toxoid    Anaphylaxis Other reaction(s): anaphylaxis/angioedema Tape  [Adhesive]  07/09/2014    Rash Current Immunizations  Reviewed on 1/27/2016 Name Date Influenza High Dose Vaccine PF 9/30/2017 Influenza Vaccine Split 10/16/2012, 10/4/2011 Influenza Vaccine Whole 1/15/2010 Pneumococcal Conjugate (PCV-13) 1/19/2015  8:07 AM  
 Varicella Virus Vaccine 10/1/2013 ZZZ-RETIRED (DO NOT USE) Pneumococcal Vaccine (Unspecified Type) 1/1/2008 Zoster 10/16/2012 Not reviewed this visit Vitals BP Pulse Temp Resp Height(growth percentile) Weight(growth percentile) 158/86 (BP 1 Location: Left arm, BP Patient Position: Sitting) 76 99 °F (37.2 °C) (Oral) 16 5' 7\" (1.702 m) 208 lb 9.6 oz (94.6 kg) SpO2 BMI Smoking Status 98% 32.67 kg/m2 Former Smoker Vitals History BMI and BSA Data Body Mass Index Body Surface Area  
 32.67 kg/m 2 2.11 m 2 Preferred Pharmacy Pharmacy Name Phone [address] [place] [address], [address] [place] [address] [phone] Your Updated Medication List  
  
   
This list is accurate as of: 1/12/18 10:56 AM.  Always use your most recent med list.  
  
  
  
  
 ALPRAZolam 0.5 mg tablet Commonly known as:  XANAX  
TAKE 1 TABLET BY MOUTH AT BEDTIME AS NEEDED  
  
 amLODIPine 10 mg tablet Commonly known as:  Neida Hair TAKE 1 TABLET BY MOUTH ONCE DAILY  
  
 aspirin delayed-release 81 mg tablet Take  by mouth daily. atorvastatin 10 mg tablet Commonly known as:  LIPITOR  
TAKE 1 TABLET BY MOUTH ONCE DAILY  
  
 colchicine 0.6 mg capsule Commonly known as:  Tressia Standard Take 0.6 mg by mouth daily. furosemide 40 mg tablet Commonly known as:  LASIX TAKE 1 TABLET BY MOUTH ONCE DAILY  
  
 hydroxychloroquine 200 mg tablet Commonly known as:  PLAQUENIL Take 200 mg by mouth two (2) times a day. lisinopril 40 mg tablet Commonly known as:  [employer] Take 1 Tab by mouth daily. LUMIGAN 0.01 % ophthalmic drops Generic drug:  bimatoprost  
  
 MULTI FOR HIM PO Take  by mouth. omeprazole 20 mg capsule Commonly known as:  PRILOSEC  
TAKE 1 CAPSULE BY MOUTH ONCE DAILY PARoxetine 20 mg tablet Commonly known as:  PAXIL Take 1 Tab by mouth daily. potassium chloride 20 mEq tablet Commonly known as:  K-DUR, KLOR-CON Take 1 Tab by mouth daily. ramelteon 8 mg tablet Commonly known as:  ROZEREM Take 1 Tab by mouth nightly as needed for Sleep. RESTASIS 0.05 % ophthalmic emulsion Generic drug:  cycloSPORINE Administer 1 Drop to both eyes two (2) times a day. VITAMIN D3 2,000 unit Tab Generic drug:  cholecalciferol (vitamin D3) Take 2,000 Units by mouth daily. VOLTAREN 1 % Gel Generic drug:  diclofenac Apply 4 g to affected area four (4) times daily. XALATAN 0.005 % ophthalmic solution Generic drug:  latanoprost  
Administer 1 Drop to both eyes nightly. Introducing [facility]! Jeffery Dubois introduces [employer] patient portal. Now you can access parts of your medical record, email your doctor's office, and request medication refills online. 1. In your internet browser, go to [email or web address]. [employer] 2. Click on the First Time User? Click Here link in the Sign In box. You will see the New Member Sign Up page. 3. Enter your [employer] Access Code exactly as it appears below. You will not need to use this code after youve completed the sign-up process. If you do not sign up before the expiration date, you must request a new code. · [employer] Access Code: [email or web address] Expires: 4/12/2018 10:21 AM 
 
4. Enter the last four digits of your Social Security Number (xxxx) and Date of Birth (mm/dd/yyyy) as indicated and click Submit. You will be taken to the next sign-up page. 5. Create a [employer] ID. This will be your [employer] login ID and cannot be changed, so think of one that is secure and easy to remember. 6. Create a [employer] password. You can change your password at any time. 7. Enter your Password Reset Question and Answer. This can be used at a later time if you forget your password. 8. Enter your e-mail address. You will receive e-mail notification when new information is available in [address] 9. Click Sign Up. You can now view and download portions of your medical record. 10. Click the Download Summary menu link to download a portable copy of your medical information. If you have questions, please visit the Frequently Asked Questions section of the [employer] website. Remember, [employer] is NOT to be used for urgent needs. For medical emergencies, dial 911. Now available from your iPhone and Android! Please provide this summary of care documentation to your next provider. Your primary care clinician is listed as [facility]. If you have any questions after today's visit, please call [phone].

## 2018-01-12 NOTE — PROGRESS NOTES
HPI/History  Sophia Maldonado is a 78 y.o.  male who presents for med clearance for cataract removal with Dr. Everardo Sen on 1/18. HTN treated with lasix, lisinopril, and norvasc. Usually controlled. Slightly elevated here today but just took PTA. No cardiopulmonary sxs. Sodium intake is questionable. HLD and taking lipitor and on 81mg ASA without issue. PreDM being managed by diet and PCP monitoring. RA and CPDD and on plaquenil and colchicine. Followed by Dr. Jared Kimball. Pt reportedly stable. Hx of depression. Pt denies any issues. Looks to be on paxil but no longer using xanax. Vit D defic and on OTC D.    BPH reportedly stable. Followed by Dr. Reba Kumar with next appt in a few months. Otherwise, pt states he is doing well with no complaints. Patient Active Problem List   Diagnosis Code    BPH with obstruction/lower urinary tract symptoms N40.1, N13.8    Hypertension I10    Primary osteoarthritis involving multiple joints M15.0    Hyperlipidemia E78.5    GERD (gastroesophageal reflux disease) K21.9    Pre-diabetes R73.03    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 ([facility] [address] M06.041, M06.042    Vitamin D deficiency E55.9    Colon polyps K63.5    CPDD (calcium pyrophosphate deposition disease) M11.20    Abnormal glucose R73.09    Depression F32.9    Rectal bleeding K62.5     Past Medical History:   Diagnosis Date    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 Dr. Reba Kumar.  CPDD (calcium pyrophosphate deposition disease)     Depression     GERD (gastroesophageal reflux disease)     Hyperlipidemia     Hypertension     Prediabetes     Primary osteoarthritis involving multiple joints 9/6/2011    Rheumatoid arthritis ([facility] [address] 2013    negative RF; elevated anti-CCP. Dr. Jared Kimball.      Past Surgical History:   Procedure Laterality Date    HX CARPAL TUNNEL RELEASE      HX HEENT      sinus, tonsillectomy    HX HERNIA REPAIR      HX MOHS PROCEDURES      bilateral     HX ORTHOPAEDIC      lef knee surgery, removed cartilage. Social History     Social History    Marital status:      Spouse name: N/A    Number of children: N/A    Years of education: N/A     Occupational History    Not on file. Social History Main Topics    Smoking status: Former Smoker    Smokeless tobacco: Former User    Alcohol use Yes      Comment: rarely    Drug use: No    Sexual activity: Not on file     Other Topics Concern    Not on file     Social History Narrative     Family History   Problem Relation Age of Onset    Cancer Mother     Heart Disease Father     Alcohol abuse Father     Cancer Other      Current Outpatient Prescriptions   Medication Sig    PARoxetine (PAXIL) 20 mg tablet Take 1 Tab by mouth daily.  furosemide (LASIX) 40 mg tablet TAKE 1 TABLET BY MOUTH ONCE DAILY    omeprazole (PRILOSEC) 20 mg capsule TAKE 1 CAPSULE BY MOUTH ONCE DAILY    potassium chloride (K-DUR, KLOR-CON) 20 mEq tablet Take 1 Tab by mouth daily.  lisinopril (PRINIVIL, ZESTRIL) 40 mg tablet Take 1 Tab by mouth daily.  amLODIPine (NORVASC) 10 mg tablet TAKE 1 TABLET BY MOUTH ONCE DAILY    cycloSPORINE (RESTASIS) 0.05 % ophthalmic emulsion Administer 1 Drop to both eyes two (2) times a day.  latanoprost (XALATAN) 0.005 % ophthalmic solution Administer 1 Drop to both eyes nightly.  colchicine (MITIGARE) 0.6 mg capsule Take 0.6 mg by mouth daily.  atorvastatin (LIPITOR) 10 mg tablet TAKE 1 TABLET BY MOUTH ONCE DAILY    cholecalciferol, vitamin D3, (VITAMIN D3) 2,000 unit tab Take 2,000 Units by mouth daily.  diclofenac (VOLTAREN) 1 % topical gel Apply 4 g to affected area four (4) times daily.  LUMIGAN 0.01 % ophthalmic drops     hydroxychloroquine (PLAQUENIL) 200 mg tablet Take 200 mg by mouth two (2) times a day.  aspirin delayed-release 81 mg tablet Take  by mouth daily.     MULTIVITS/IRON FUM/FA/D3/LYCOP (MULTI FOR HIM PO) Take  by mouth.  ramelteon (ROZEREM) 8 mg tablet Take 1 Tab by mouth nightly as needed for Sleep. No current facility-administered medications for this visit. Allergies   Allergen Reactions    Tetanus Toxoid, Adsorbed Anaphylaxis    Adhesive Tape-Silicones Rash    Aldactone [Spironolactone] Not Reported This Time     Breast tenderness    Codeine Not Reported This Time     \"Drives crazy\"    Tetanus Vaccines And Toxoid Anaphylaxis     Other reaction(s): anaphylaxis/angioedema    Tape  [Adhesive] Rash       Review of Systems  Aside from those included in HPI, remainder of complete ROS negative. Physical Examination  Visit Vitals    /86 (BP 1 Location: Left arm, BP Patient Position: Sitting)    Pulse 76    Temp 99 °F (37.2 °C) (Oral)    Resp 16    Ht 5' 7\" (1.702 m)    Wt 208 lb 9.6 oz (94.6 kg)    SpO2 98%    BMI 32.67 kg/m2       General - Alert and in no acute distress. Pt appears well, comfortable, and in good spirits. Pleasant, engaging. Nontoxic. Not anxious, non-diaphoretic. Mental status - Appropriate mood, behavior, speech content, dress, and thought processes. Eyes - Pupils equal and reactive, extraocular movements intact. No erythema or discharge. Ears - Auditory canals and TMs unremarkable. Nose - No erythema. No rhinorrhea. Mouth - Mucous membranes moist. Pharynx without lesions, swelling, erythema, or exudate. Neck - Supple without rigidity. Lymph - No periauricular, perimandibular, or cervical tenderness or swelling. Pulm - No tachypnea, retractions, or cyanosis. Good respiratory effort. Clear to auscultation bilat. No appreciable wheezes, rales, or rhonchi. Cardiovascular - Normal rate, regular rhythm. No appreciable murmurs or gallops. Trace LE edema bilat, no signs of thrombosis; reportedly chronic and unchanged, improves overnight. Distal pulses intact. Abdomen - Active bowel sounds. Soft, nontender.  No guarding, rigidity, or rebound. Neuromuscular - No overt focal findings. Assessment and Plan  1. HTN - Slightly elevated today but meds taken just PTA. Discussed TLC including sodium restriction. Continue current regimen. Monitor. 2. HLD - TLC, continue current, and monitor. 3. PreDM - Managed with diet. PCP following. 4. RA; CPDD - Reportedly stable. Regimen above. Continue f/u with Dr. Chichi Epperson. 5. Depression - Doing well. Continue current. 6. Vit D defic - Continue supplementation. 7. BPH - Reportedly stable. Continue f/u with Dr. Jak Kyle. 8. Med clearance for cataract removal - HTN considerations discussed, he will take BP meds at least a few hours prior to procedure. Given BP control at time of procedure, pt appears medically stable to undergo procedure as planned. Further planning as warranted. Pt happily agrees with plan. PLEASE NOTE:   This document has been produced using voice recognition software. Unrecognized errors in transcription may be present.     Beth Armenta [employer] of [address] [place] [address]  [phone]  1/12/2018

## 2018-02-07 RX ORDER — OMEPRAZOLE 20 MG/1
CAPSULE, DELAYED RELEASE ORAL
Qty: 90 CAP | Refills: 1 | Status: SHIPPED | OUTPATIENT
Start: 2018-02-07 | End: 2018-08-22 | Stop reason: SDUPTHER

## 2018-02-12 ENCOUNTER — HOSPITAL ENCOUNTER (OUTPATIENT)
Dept: LAB | Age: 78
Discharge: HOME | End: 2018-02-12
Payer: MEDICARE

## 2018-02-12 DIAGNOSIS — E55.9 VITAMIN D DEFICIENCY: ICD-10-CM

## 2018-02-12 DIAGNOSIS — E78.5 HYPERLIPIDEMIA, UNSPECIFIED HYPERLIPIDEMIA TYPE: ICD-10-CM

## 2018-02-12 DIAGNOSIS — R73.03 PRE-DIABETES: ICD-10-CM

## 2018-02-12 DIAGNOSIS — I10 ESSENTIAL HYPERTENSION: ICD-10-CM

## 2018-02-12 DIAGNOSIS — R73.09 ABNORMAL GLUCOSE: ICD-10-CM

## 2018-02-12 LAB
ALBUMIN SERPL-MCNC: 3.9 G/DL (ref 3.4–5)
ALBUMIN/GLOB SERPL: 1.4 {RATIO} (ref 0.8–1.7)
ALP SERPL-CCNC: 95 U/L (ref 45–117)
ALT SERPL-CCNC: 28 U/L (ref 16–61)
ANION GAP SERPL CALC-SCNC: 12 MMOL/L (ref 3–18)
APPEARANCE UR: CLEAR
AST SERPL-CCNC: 17 U/L (ref 15–37)
BACTERIA URNS QL MICRO: NEGATIVE /HPF
BASOPHILS # BLD: 0 K/UL (ref 0–0.06)
BASOPHILS NFR BLD: 0 % (ref 0–2)
BILIRUB SERPL-MCNC: 0.4 MG/DL (ref 0.2–1)
BILIRUB UR QL: NEGATIVE
BUN SERPL-MCNC: 15 MG/DL (ref 7–18)
BUN/CREAT SERPL: 19 (ref 12–20)
CALCIUM SERPL-MCNC: 9.2 MG/DL (ref 8.5–10.1)
CHLORIDE SERPL-SCNC: 108 MMOL/L (ref 100–108)
CHOLEST SERPL-MCNC: 118 MG/DL
CO2 SERPL-SCNC: 25 MMOL/L (ref 21–32)
COLOR UR: YELLOW
CREAT SERPL-MCNC: 0.77 MG/DL (ref 0.6–1.3)
DIFFERENTIAL METHOD BLD: ABNORMAL
EOSINOPHIL # BLD: 0.3 K/UL (ref 0–0.4)
EOSINOPHIL NFR BLD: 3 % (ref 0–5)
EPITH CASTS URNS QL MICRO: NORMAL /LPF (ref 0–5)
ERYTHROCYTE [DISTWIDTH] IN BLOOD BY AUTOMATED COUNT: 13.3 % (ref 11.6–14.5)
GLOBULIN SER CALC-MCNC: 2.8 G/DL (ref 2–4)
GLUCOSE SERPL-MCNC: 116 MG/DL (ref 74–99)
GLUCOSE UR STRIP.AUTO-MCNC: NEGATIVE MG/DL
HCT VFR BLD AUTO: 46.3 % (ref 36–48)
HDLC SERPL-MCNC: 60 MG/DL (ref 40–60)
HDLC SERPL: 2 {RATIO} (ref 0–5)
HGB BLD-MCNC: 15.4 G/DL (ref 13–16)
HGB UR QL STRIP: NEGATIVE
KETONES UR QL STRIP.AUTO: NEGATIVE MG/DL
LDLC SERPL CALC-MCNC: 47 MG/DL (ref 0–100)
LEUKOCYTE ESTERASE UR QL STRIP.AUTO: NEGATIVE
LIPID PROFILE,FLP: NORMAL
LYMPHOCYTES # BLD: 1.7 K/UL (ref 0.9–3.6)
LYMPHOCYTES NFR BLD: 24 % (ref 21–52)
MCH RBC QN AUTO: 32.8 PG (ref 24–34)
MCHC RBC AUTO-ENTMCNC: 33.3 G/DL (ref 31–37)
MCV RBC AUTO: 98.5 FL (ref 74–97)
MONOCYTES # BLD: 0.9 K/UL (ref 0.05–1.2)
MONOCYTES NFR BLD: 12 % (ref 3–10)
NEUTS SEG # BLD: 4.5 K/UL (ref 1.8–8)
NEUTS SEG NFR BLD: 61 % (ref 40–73)
NITRITE UR QL STRIP.AUTO: NEGATIVE
PH UR STRIP: 5.5 [PH] (ref 5–8)
PLATELET # BLD AUTO: 204 K/UL (ref 135–420)
PMV BLD AUTO: 10.5 FL (ref 9.2–11.8)
POTASSIUM SERPL-SCNC: 3.6 MMOL/L (ref 3.5–5.5)
PROT SERPL-MCNC: 6.7 G/DL (ref 6.4–8.2)
PROT UR STRIP-MCNC: NEGATIVE MG/DL
RBC # BLD AUTO: 4.7 M/UL (ref 4.7–5.5)
RBC #/AREA URNS HPF: 0 /HPF (ref 0–5)
SODIUM SERPL-SCNC: 145 MMOL/L (ref 136–145)
SP GR UR REFRACTOMETRY: 1.02 (ref 1–1.03)
TRIGL SERPL-MCNC: 55 MG/DL (ref ?–150)
TSH SERPL DL<=0.05 MIU/L-ACNC: 1.97 UIU/ML (ref 0.36–3.74)
UROBILINOGEN UR QL STRIP.AUTO: 0.2 EU/DL (ref 0.2–1)
VLDLC SERPL CALC-MCNC: 11 MG/DL
WBC # BLD AUTO: 7.3 K/UL (ref 4.6–13.2)
WBC URNS QL MICRO: NORMAL /HPF (ref 0–4)

## 2018-02-12 PROCEDURE — 80061 LIPID PANEL: CPT | Performed by: INTERNAL MEDICINE

## 2018-02-12 PROCEDURE — 84443 ASSAY THYROID STIM HORMONE: CPT | Performed by: INTERNAL MEDICINE

## 2018-02-12 PROCEDURE — 82306 VITAMIN D 25 HYDROXY: CPT | Performed by: INTERNAL MEDICINE

## 2018-02-12 PROCEDURE — 81001 URINALYSIS AUTO W/SCOPE: CPT | Performed by: INTERNAL MEDICINE

## 2018-02-12 PROCEDURE — 80053 COMPREHEN METABOLIC PANEL: CPT | Performed by: INTERNAL MEDICINE

## 2018-02-12 PROCEDURE — 83036 HEMOGLOBIN GLYCOSYLATED A1C: CPT | Performed by: INTERNAL MEDICINE

## 2018-02-12 PROCEDURE — 36415 COLL VENOUS BLD VENIPUNCTURE: CPT | Performed by: INTERNAL MEDICINE

## 2018-02-12 PROCEDURE — 85025 COMPLETE CBC W/AUTO DIFF WBC: CPT | Performed by: INTERNAL MEDICINE

## 2018-02-13 LAB
25(OH)D3 SERPL-MCNC: 29.6 NG/ML (ref 30–100)
EST. AVERAGE GLUCOSE BLD GHB EST-MCNC: 126 MG/DL
HBA1C MFR BLD: 6 % (ref 4.2–5.6)

## 2018-02-19 RX ORDER — ATORVASTATIN CALCIUM 10 MG/1
TABLET, FILM COATED ORAL
Qty: 90 TAB | Refills: 3 | Status: SHIPPED | OUTPATIENT
Start: 2018-02-19 | End: 2019-04-02 | Stop reason: SDUPTHER

## 2018-02-20 ENCOUNTER — OFFICE VISIT (OUTPATIENT)
Dept: INTERNAL MEDICINE CLINIC | Age: 78
End: 2018-02-20

## 2018-02-20 VITALS
WEIGHT: 208.6 LBS | TEMPERATURE: 98.3 F | DIASTOLIC BLOOD PRESSURE: 88 MMHG | HEART RATE: 84 BPM | HEIGHT: 67 IN | RESPIRATION RATE: 14 BRPM | BODY MASS INDEX: 32.74 KG/M2 | SYSTOLIC BLOOD PRESSURE: 154 MMHG | OXYGEN SATURATION: 97 %

## 2018-02-20 DIAGNOSIS — M15.9 PRIMARY OSTEOARTHRITIS INVOLVING MULTIPLE JOINTS: ICD-10-CM

## 2018-02-20 DIAGNOSIS — E55.9 VITAMIN D INSUFFICIENCY: ICD-10-CM

## 2018-02-20 DIAGNOSIS — R73.09 ABNORMAL GLUCOSE: ICD-10-CM

## 2018-02-20 DIAGNOSIS — N13.8 BPH WITH OBSTRUCTION/LOWER URINARY TRACT SYMPTOMS: ICD-10-CM

## 2018-02-20 DIAGNOSIS — M06.041 RHEUMATOID ARTHRITIS INVOLVING BOTH HANDS WITH NEGATIVE RHEUMATOID FACTOR (HCC): ICD-10-CM

## 2018-02-20 DIAGNOSIS — E66.09 CLASS 1 OBESITY DUE TO EXCESS CALORIES WITH SERIOUS COMORBIDITY AND BODY MASS INDEX (BMI) OF 32.0 TO 32.9 IN ADULT: ICD-10-CM

## 2018-02-20 DIAGNOSIS — M11.20 CPDD (CALCIUM PYROPHOSPHATE DEPOSITION DISEASE): ICD-10-CM

## 2018-02-20 DIAGNOSIS — I10 ESSENTIAL HYPERTENSION: Primary | ICD-10-CM

## 2018-02-20 DIAGNOSIS — R73.03 PRE-DIABETES: ICD-10-CM

## 2018-02-20 DIAGNOSIS — M06.042 RHEUMATOID ARTHRITIS INVOLVING BOTH HANDS WITH NEGATIVE RHEUMATOID FACTOR (HCC): ICD-10-CM

## 2018-02-20 DIAGNOSIS — E78.5 HYPERLIPIDEMIA, UNSPECIFIED HYPERLIPIDEMIA TYPE: ICD-10-CM

## 2018-02-20 DIAGNOSIS — N40.1 BPH WITH OBSTRUCTION/LOWER URINARY TRACT SYMPTOMS: ICD-10-CM

## 2018-02-20 DIAGNOSIS — F32.A DEPRESSION, UNSPECIFIED DEPRESSION TYPE: ICD-10-CM

## 2018-02-20 DIAGNOSIS — Z00.00 MEDICARE ANNUAL WELLNESS VISIT, SUBSEQUENT: ICD-10-CM

## 2018-02-20 DIAGNOSIS — Z71.89 ACP (ADVANCE CARE PLANNING): ICD-10-CM

## 2018-02-20 DIAGNOSIS — K21.9 GASTROESOPHAGEAL REFLUX DISEASE WITHOUT ESOPHAGITIS: ICD-10-CM

## 2018-02-20 NOTE — PROGRESS NOTES
This is a Subsequent Medicare Annual Wellness Exam (AWV) (Performed 12 months after IPPE or effective date of Medicare Part B enrollment)    I have reviewed the patient's medical history in detail and updated the computerized patient record. History     Past Medical History:   Diagnosis Date    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  [facility] [address]  CPDD (calcium pyrophosphate deposition disease)     Depression     GERD (gastroesophageal reflux disease)     Hyperlipidemia     Hypertension     Prediabetes     Primary osteoarthritis involving multiple joints 9/6/2011    Rheumatoid arthritis ([facility] [address] 2013    negative RF; elevated anti-CCP. Dr. Hayde Christiasnon. Past Surgical History:   Procedure Laterality Date    HX CARPAL TUNNEL RELEASE      HX CATARACT REMOVAL Left 01/2018    HX HEENT      sinus, tonsillectomy    HX HERNIA REPAIR      HX MOHS PROCEDURES      bilateral     HX ORTHOPAEDIC      lef knee surgery, removed cartilage. Current Outpatient Prescriptions   Medication Sig Dispense Refill    atorvastatin (LIPITOR) 10 mg tablet TAKE 1 TABLET BY MOUTH ONCE DAILY 90 Tab 3    omeprazole (PRILOSEC) 20 mg capsule TAKE 1 CAPSULE BY MOUTH ONCE DAILY 90 Cap 1    PARoxetine (PAXIL) 20 mg tablet Take 1 Tab by mouth daily. 30 Tab 11    furosemide (LASIX) 40 mg tablet TAKE 1 TABLET BY MOUTH ONCE DAILY 30 Tab 5    ramelteon (ROZEREM) 8 mg tablet Take 1 Tab by mouth nightly as needed for Sleep. 30 Tab 5    potassium chloride (K-DUR, KLOR-CON) 20 mEq tablet Take 1 Tab by mouth daily. 90 Tab 2    lisinopril (PRINIVIL, ZESTRIL) 40 mg tablet Take 1 Tab by mouth daily. 90 Tab 2    amLODIPine (NORVASC) 10 mg tablet TAKE 1 TABLET BY MOUTH ONCE DAILY 30 Tab 11    cycloSPORINE (RESTASIS) 0.05 % ophthalmic emulsion Administer 1 Drop to both eyes two (2) times a day.       latanoprost (XALATAN) 0.005 % ophthalmic solution Administer 1 Drop to both eyes nightly.  colchicine (MITIGARE) 0.6 mg capsule Take 0.6 mg by mouth daily.  cholecalciferol, vitamin D3, (VITAMIN D3) 2,000 unit tab Take 2,000 Units by mouth daily.  diclofenac (VOLTAREN) 1 % topical gel Apply 4 g to affected area four (4) times daily.  LUMIGAN 0.01 % ophthalmic drops       hydroxychloroquine (PLAQUENIL) 200 mg tablet Take 200 mg by mouth two (2) times a day.  aspirin delayed-release 81 mg tablet Take  by mouth daily.  MULTIVITS/IRON FUM/FA/D3/LYCOP (MULTI FOR HIM PO) Take  by mouth.        Allergies   Allergen Reactions    Tetanus Toxoid, Adsorbed Anaphylaxis    Adhesive Tape-Silicones Rash    Aldactone [Spironolactone] Not Reported This Time     Breast tenderness    Codeine Not Reported This Time     \"Drives crazy\"    Tetanus Vaccines And Toxoid Anaphylaxis     Other reaction(s): anaphylaxis/angioedema    Tape  [Adhesive] Rash     Family History   Problem Relation Age of Onset    Cancer Mother     Heart Disease Father     Alcohol abuse Father     Cancer Other      Social History   Substance Use Topics    Smoking status: Former Smoker    Smokeless tobacco: Former User    Alcohol use Yes      Comment: rarely     Patient Active Problem List   Diagnosis Code    BPH with obstruction/lower urinary tract symptoms N40.1, N13.8    Hypertension I10    Primary osteoarthritis involving multiple joints M15.0    Hyperlipidemia E78.5    GERD (gastroesophageal reflux disease) K21.9    Pre-diabetes R73.03    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 (HCC) M06.041, M06.042    Vitamin D deficiency E55.9    Colon polyps K63.5    CPDD (calcium pyrophosphate deposition disease) M11.20    Abnormal glucose R73.09    Depression F32.9    Rectal bleeding K62.5    Class 1 obesity due to excess calories with serious comorbidity and body mass index (BMI) of 32.0 to 32.9 in adult E66.09, Q66.80       Depression Risk Factor Screening: PHQ over the last two weeks 2/1/2017   Little interest or pleasure in doing things Not at all   Feeling down, depressed or hopeless Not at all   Total Score PHQ 2 0     Alcohol Risk Factor Screening: You do not drink alcohol or very rarely. Functional Ability and Level of Safety:   Hearing Loss  Hearing is good. Activities of Daily Living  The home contains: no safety equipment. Patient does total self care    Fall Risk  Fall Risk Assessment, last 12 mths 2/20/2018   Able to walk? Yes   Fall in past 12 months? No       Abuse Screen  Patient is not abused    Cognitive Screening   Evaluation of Cognitive Function:  Has your family/caregiver stated any concerns about your memory: no  Normal    Patient Care Team   Patient Care Team:  Sohail Fritz MD as PCP - General (Internal Medicine)  Tanvi Hein MD as Physician (Urology)  Roxann Alejo, RN as Ambulatory Care Navigator (Internal Medicine)  Elsie Mcdowell MD (Rheumatology)  HERMELINDA Anderson (Physician Assistant)  Dread Casillas, RN as Ambulatory Care Navigator (Internal Medicine)  Major Roger MD (Gastroenterology)  Luz Esquivel OD (Ophthalmology)    Assessment/Plan   Education and counseling provided:  Are appropriate based on today's review and evaluation  End-of-Life planning (with patient's consent)  Influenza Vaccine  Colorectal cancer screening tests  Cardiovascular screening blood test  Screening for glaucoma  Diabetes screening test  Medical nutrition therapy for individuals with diabetes or renal disease    Diagnoses and all orders for this visit:    1. Essential hypertension  -     METABOLIC PANEL, BASIC; Future    2. Pre-diabetes  -     HEMOGLOBIN A1C WITH EAG; Future  -     METABOLIC PANEL, BASIC; Future    3. 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 ([facility] [address]    4. Primary osteoarthritis involving multiple joints    5. CPDD (calcium pyrophosphate deposition disease)    6.  Abnormal glucose  -     HEMOGLOBIN A1C WITH EAG; Future  -     METABOLIC PANEL, BASIC; Future    7. Hyperlipidemia, unspecified hyperlipidemia type  -     LIPID PANEL; Future    8. Depression, unspecified depression type    9. Class 1 obesity due to excess calories with serious comorbidity and body mass index (BMI) of 32.0 to 32.9 in adult    10. BPH with obstruction/lower urinary tract symptoms    11. Gastroesophageal reflux disease without esophagitis    12. Medicare annual wellness visit, subsequent    13. ACP (advance care planning)    14. Vitamin D insufficiency  -     VITAMIN D, 25 HYDROXY; Future      There are no preventive care reminders to display for this patient.

## 2018-02-20 NOTE — PROGRESS NOTES
Chief Complaint   Patient presents with    Hypertension     6 month follow up with lab review. Health Maintenance Due   Topic Date Due    MEDICARE YEARLY EXAM  02/02/2018     1. Have you been to the ER, urgent care clinic or hospitalized since your last visit? NO.     2. Have you seen or consulted any other health care providers outside of the [facility] since your last visit (Include any pap smears or colon screening)? YES, Patient had cataract removal of the left eye Jan. 2018 with Dr. Tanner Hashimoto. Do you have an Advanced Directive?  YES

## 2018-02-20 NOTE — MR AVS SNAPSHOT
[facility] 
 
 
 [address], [address] 
[phone] Patient: Teresa Richards MRN: [id number] UMD:9/94/[id number] Visit Information Date & Time Provider Department Dept. Phone Encounter #  
 2/20/2018  8:00 AM aMrgo Quiroz MD [facility] [phone] [id number] Follow-up Instructions Return in about 6 months (around 8/20/2018), or if symptoms worsen or fail to improve. Your Appointments 7/16/2018  9:00 AM  
ULTRASOUND with Aliza Vigil MD  
[facility] Appt Note: PVR/PSA  
 [address] [address]  
[phone] [phone]  
  
    
 8/15/2018  8:05 AM  
LAB with [facility] NURSE VISIT [facility] ([facility]) Appt Note: lab  
 [address], [address] [id number] [address] [place]  
  
   
 [address], [address]  
  
    
 8/22/2018  8:00 AM  
Office Visit with Margo Quiroz MD  
[facility]) Appt Note: 6 month f/u  
 [address] [facility], Suite [facility] [address] [address] [place]  
  
   
 [address], [address] Upcoming Health Maintenance Date Due  
 GLAUCOMA SCREENING Q2Y 8/15/2018 MEDICARE YEARLY EXAM 2/21/2019 COLONOSCOPY 8/10/2020 DTaP/Tdap/Td series (2 - Td) 2/1/2027 Allergies as of 2/20/2018  Review Complete On: 2/20/2018 By: Yarely Aggarwal Severity Noted Reaction Type Reactions Tetanus Toxoid, Adsorbed High 07/09/2014    Anaphylaxis Adhesive Tape-silicones  23/10/9340    Rash Aldactone [Spironolactone]    Not Reported This Time Breast tenderness Codeine    Not Reported This Time \"Drives crazy\" Tetanus Vaccines And Toxoid    Anaphylaxis Other reaction(s): anaphylaxis/angioedema Tape  [Adhesive]  07/09/2014    Rash Current Immunizations  Reviewed on 1/27/2016 Name Date Influenza High Dose Vaccine PF 9/30/2017 Influenza Vaccine Split 10/16/2012, 10/4/2011 Influenza Vaccine Whole 1/15/2010 Pneumococcal Conjugate (PCV-13) 1/19/2015  8:07 AM  
 Varicella Virus Vaccine 10/1/2013 ZZZ-RETIRED (DO NOT USE) Pneumococcal Vaccine (Unspecified Type) 1/1/2008 Zoster 10/16/2012 Not reviewed this visit Vitals BP Pulse Temp Resp Height(growth percentile) Weight(growth percentile) 154/88 (BP 1 Location: Left arm, BP Patient Position: Sitting) 84 98.3 °F (36.8 °C) (Oral) 14 5' 7\" (1.702 m) 208 lb 9.6 oz (94.6 kg) SpO2 BMI Smoking Status 97% 32.67 kg/m2 Former Smoker Vitals History BMI and BSA Data Body Mass Index Body Surface Area  
 32.67 kg/m 2 2.11 m 2 Preferred Pharmacy Pharmacy Name Phone [address] [place] [address], [address] [place] [address] [phone] Your Updated Medication List  
  
   
This list is accurate as of: 2/20/18  8:33 AM.  Always use your most recent med list. amLODIPine 10 mg tablet Commonly known as:  Claudell Kanwal TAKE 1 TABLET BY MOUTH ONCE DAILY  
  
 aspirin delayed-release 81 mg tablet Take  by mouth daily. atorvastatin 10 mg tablet Commonly known as:  LIPITOR  
TAKE 1 TABLET BY MOUTH ONCE DAILY  
  
 colchicine 0.6 mg capsule Commonly known as:  Welsh [place] Take 0.6 mg by mouth daily. furosemide 40 mg tablet Commonly known as:  LASIX TAKE 1 TABLET BY MOUTH ONCE DAILY  
  
 hydroxychloroquine 200 mg tablet Commonly known as:  PLAQUENIL Take 200 mg by mouth two (2) times a day. lisinopril 40 mg tablet Commonly known as:  Nguyễn Giang Take 1 Tab by mouth daily. LUMIGAN 0.01 % ophthalmic drops Generic drug:  bimatoprost  
  
 MULTI FOR HIM PO Take  by mouth. omeprazole 20 mg capsule Commonly known as:  PRILOSEC  
TAKE 1 CAPSULE BY MOUTH ONCE DAILY PARoxetine 20 mg tablet Commonly known as:  PAXIL Take 1 Tab by mouth daily. potassium chloride 20 mEq tablet Commonly known as:  K-DUR, KLOR-CON Take 1 Tab by mouth daily. ramelteon 8 mg tablet Commonly known as:  ROZEREM Take 1 Tab by mouth nightly as needed for Sleep. RESTASIS 0.05 % ophthalmic emulsion Generic drug:  cycloSPORINE Administer 1 Drop to both eyes two (2) times a day. VITAMIN D3 2,000 unit Tab Generic drug:  cholecalciferol (vitamin D3) Take 2,000 Units by mouth daily. VOLTAREN 1 % Gel Generic drug:  diclofenac Apply 4 g to affected area four (4) times daily. XALATAN 0.005 % ophthalmic solution Generic drug:  latanoprost  
Administer 1 Drop to both eyes nightly. Follow-up Instructions Return in about 6 months (around 8/20/2018), or if symptoms worsen or fail to improve. Introducing [facility]! Nomi Stokes introduces [employer] patient portal. Now you can access parts of your medical record, email your doctor's office, and request medication refills online. 1. In your internet browser, go to [email or web address] 2. Click on the First Time User? Click Here link in the Sign In box. You will see the New Member Sign Up page. 3. Enter your [employer] Access Code exactly as it appears below. You will not need to use this code after youve completed the sign-up process. If you do not sign up before the expiration date, you must request a new code. · [employer] Access Code: [email or web address] Expires: 4/12/2018 10:21 AM 
 
4. Enter the last four digits of your Social Security Number (xxxx) and Date of Birth (mm/dd/yyyy) as indicated and click Submit. You will be taken to the next sign-up page. 5. Create a Expert [employer] ID. This will be your [employer] login ID and cannot be changed, so think of one that is secure and easy to remember. 6. Create a [employer] password. You can change your password at any time. 7. Enter your Password Reset Question and Answer. This can be used at a later time if you forget your password. 8. Enter your e-mail address. You will receive e-mail notification when new information is available in [address] 9. Click Sign Up. You can now view and download portions of your medical record. 10. Click the Download Summary menu link to download a portable copy of your medical information. If you have questions, please visit the Frequently Asked Questions section of the [employer] website. Remember, [employer] is NOT to be used for urgent needs. For medical emergencies, dial 911. Now available from your iPhone and Android! Please provide this summary of care documentation to your next provider. Your primary care clinician is listed as Heidy Augustin. If you have any questions after today's visit, please call [phone].

## 2018-02-20 NOTE — PROGRESS NOTES
HPI:   Laura Bennett is a 70y.o. year old male who presents today for evaluation of hypertension, hyperlipidemia, prediabetes, rheumatoid arthritis, osteoarthritis, calcium pyrophosphate deposition disease, BPH, GERD, and depression. He reports that he is doing relatively well. He underwent left cataract surgery with Dr. Rojelio Connors on 1/18/2018 without difficulty, and is scheduled for right cataract surgery next month. He is without complaints. He has a history of hypertension, treated with amlodipine, lisinopril, lasix (+ potassium). He states that his wife has been monitoring his blood pressure intermittently in the evening, and reports that it usually with -140. He remains active, hunting and walking for exercise. He denies any chest pain, shortness of breath at rest or with exertion, lightheadedness, or palpitations. He does report bilateral lower extremity swelling that it will increase throughout the day and improve overnight. . In 6/2016, he underwent lower extremity arterial and venous duplex scans, both of which were negative. He also has a history of hyperlipidemia, treated with moderate intensity atorvastatin. He has a history of prediabetes, with HbA1c ranging from 5.9-6.2 since 2012. He denies any polyuria, polydipsia, nocturia, or blurry vision, and has no history of retinopathy, neuropathy, or nephropathy. He has regular eye exams with Dr. Pina Patrick. He has a history of bilateral hand pain with morning stiffness for several years, and in 3/2014, he was noted to have a positive anti-CCP antibody level although NIMCO, rheumatoid factor, and ESR were negative. He was referred for evaluation to Dr. Jj Freire, and was diagnosed with rheumatoid arthritis in 6/2014. He was treated with hydroxychloroquine, which has been partially helpful in controlling his symptoms. Bilateral hand x-rays also showed evidence of primary osteoarthritis with osteophytes present on the second and third MCP joints.  In 1/2017, he was also noted to have evidence of possible chondrocalcinosis on x-ray, and was started on low dose colchicine in addition to hydroxychloroquine for concomitant calcium pyrophosphate deposition disease. He uses compounding cream and Voltaren gel for pain control. He has a history of symptomatic BPH, with complaints of decreased stream, hesitancy, and dribbling. He has refused treatment with medication. He is followed by Dr. Elham Valenzuela. He denies any dysuria, gross hematuria, or flank pain. He has a history of GERD, treated with daily omeprazole with good control of symptoms. He had a screening colonoscopy and 8/2015 by Dr. Jessica Skinner, showing a 3 mm sessile cecal polyp (pathology: intra-mucosal lymphoid aggregate), two 4 mm sessile polyps in the transverse colon (pathology: serrated adenomas), and a 1 cm lipoma in the transverse colon. Follow-up recommended for five years. He states that he was having difficulty with rectal bleeding one month ago and returned for evaluation with Dr. Jessica Skinner who felt it was most likely secondary to hemorrhoidal source. She recommended use of Citrucel. He states that the bleeding has improved with initiation of this therapy. He denies any abdominal pain, nausea, vomiting, melena, or change in bowel movements. He has a history of depression, which is well controlled with Paxil. 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 Dr. Elham Valenzuela.  CPDD (calcium pyrophosphate deposition disease)     Depression     GERD (gastroesophageal reflux disease)     Hyperlipidemia     Hypertension     Prediabetes     Primary osteoarthritis involving multiple joints 9/6/2011    Rheumatoid arthritis ([facility] [address] 2013    negative RF; elevated anti-CCP. Dr. Karin Keating.      Past Surgical History:   Procedure Laterality Date    HX CARPAL TUNNEL RELEASE      HX CATARACT REMOVAL Left 01/2018    HX HEENT sinus, tonsillectomy    HX HERNIA REPAIR      HX MOHS PROCEDURES      bilateral     HX ORTHOPAEDIC      lef knee surgery, removed cartilage. Current Outpatient Prescriptions   Medication Sig    atorvastatin (LIPITOR) 10 mg tablet TAKE 1 TABLET BY MOUTH ONCE DAILY    omeprazole (PRILOSEC) 20 mg capsule TAKE 1 CAPSULE BY MOUTH ONCE DAILY    PARoxetine (PAXIL) 20 mg tablet Take 1 Tab by mouth daily.  furosemide (LASIX) 40 mg tablet TAKE 1 TABLET BY MOUTH ONCE DAILY    ramelteon (ROZEREM) 8 mg tablet Take 1 Tab by mouth nightly as needed for Sleep.  potassium chloride (K-DUR, KLOR-CON) 20 mEq tablet Take 1 Tab by mouth daily.  lisinopril (PRINIVIL, ZESTRIL) 40 mg tablet Take 1 Tab by mouth daily.  amLODIPine (NORVASC) 10 mg tablet TAKE 1 TABLET BY MOUTH ONCE DAILY    cycloSPORINE (RESTASIS) 0.05 % ophthalmic emulsion Administer 1 Drop to both eyes two (2) times a day.  latanoprost (XALATAN) 0.005 % ophthalmic solution Administer 1 Drop to both eyes nightly.  colchicine (MITIGARE) 0.6 mg capsule Take 0.6 mg by mouth daily.  cholecalciferol, vitamin D3, (VITAMIN D3) 2,000 unit tab Take 2,000 Units by mouth daily.  diclofenac (VOLTAREN) 1 % topical gel Apply 4 g to affected area four (4) times daily.  LUMIGAN 0.01 % ophthalmic drops     hydroxychloroquine (PLAQUENIL) 200 mg tablet Take 200 mg by mouth two (2) times a day.  aspirin delayed-release 81 mg tablet Take  by mouth daily.  MULTIVITS/IRON FUM/FA/D3/LYCOP (MULTI FOR HIM PO) Take  by mouth. No current facility-administered medications for this visit. Allergies and Intolerances:    Allergies   Allergen Reactions    Tetanus Toxoid, Adsorbed Anaphylaxis    Adhesive Tape-Silicones Rash    Aldactone [Spironolactone] Not Reported This Time     Breast tenderness    Codeine Not Reported This Time     \"Drives crazy\"    Tetanus Vaccines And Toxoid Anaphylaxis     Other reaction(s): anaphylaxis/angioedema    Tape [Adhesive] Rash     Family History: He has no family history of colon or prostate cancer. Family History   Problem Relation Age of Onset    Cancer Mother     Heart Disease Father     Alcohol abuse Father     Cancer Other      Social History:   He  reports that he has quit smoking. He has quit using smokeless tobacco. He smoked 2 ppd for 50 years, stopping in 1974. He is  with two adult children. 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 History   Alcohol Use    Yes     Comment: rarely     Immunization History:  Immunization History   Administered Date(s) Administered    Influenza High Dose Vaccine PF 09/30/2017    Influenza Vaccine Split 10/04/2011, 10/16/2012    Influenza Vaccine Whole 01/15/2010    Pneumococcal Conjugate (PCV-13) 01/19/2015    Varicella Virus Vaccine 10/01/2013    ZZZ-RETIRED (DO NOT USE) Pneumococcal Vaccine (Unspecified Type) 01/01/2008    Zoster 10/16/2012       Review of Systems:   As above included in HPI. Otherwise 11 point review of systems negative including constitutional, skin, HENT, eyes, respiratory, cardiovascular, gastrointestinal, genitourinary, musculoskeletal, endocrine, hematologic, allergy, and neurologic. Physical:   Vitals:   BP: 154/88  HR: 84  WT: 208 lb 9.6 oz (94.6 kg)  BMI:  32.67 kg/m2    Exam:   Patient appears in no apparent distress. Affect is appropriate. HEENT --Anicteric sclerae, tympanic membranes normal,  ear canals normal.  PERRL, EOMI, conjunctiva and lids normal.   Sinuses were nontender, turbinates normal, hearing normal.  Oropharynx without  erythema, normal tongue, oral mucosa and tonsils. No cervical lymphadenopathy. No thyromegaly, JVD, or bruits. Carotid pulses 2+ with normal upstroke. Lungs --Clear to auscultation. No wheezing or rales. Heart --Regular rate and rhythm, no murmurs, rubs, gallops, or clicks.   Chest wall --Nontender to palpation. PMI normal.  Abdomen -- Soft and nontender, no hepatosplenomegaly or masses. Extremities -- Without cyanosis, clubbing, edema. 2+ pulses equally and bilaterally. Normal looking digits, ROM intact  Neuro -- CN 2-12 intact, strength 5/5 with intact soft touch in all extremities  Derm  no obvious abnormalities noted, no rash      Review of Data:  Labs:  Hospital Outpatient Visit on 02/12/2018   Component Date Value Ref Range Status    WBC 02/12/2018 7.3  4.6 - 13.2 K/uL Final    RBC 02/12/2018 4.70  4.70 - 5.50 M/uL Final    HGB 02/12/2018 15.4  13.0 - 16.0 g/dL Final    HCT 02/12/2018 46.3  36.0 - 48.0 % Final    MCV 02/12/2018 98.5* 74.0 - 97.0 FL Final    MCH 02/12/2018 32.8  24.0 - 34.0 PG Final    MCHC 02/12/2018 33.3  31.0 - 37.0 g/dL Final    RDW 02/12/2018 13.3  11.6 - 14.5 % Final    PLATELET 03/33/4675 681  135 - 420 K/uL Final    MPV 02/12/2018 10.5  9.2 - 11.8 FL Final    NEUTROPHILS 02/12/2018 61  40 - 73 % Final    LYMPHOCYTES 02/12/2018 24  21 - 52 % Final    MONOCYTES 02/12/2018 12* 3 - 10 % Final    EOSINOPHILS 02/12/2018 3  0 - 5 % Final    BASOPHILS 02/12/2018 0  0 - 2 % Final    ABS. NEUTROPHILS 02/12/2018 4.5  1.8 - 8.0 K/UL Final    ABS. LYMPHOCYTES 02/12/2018 1.7  0.9 - 3.6 K/UL Final    ABS. MONOCYTES 02/12/2018 0.9  0.05 - 1.2 K/UL Final    ABS. EOSINOPHILS 02/12/2018 0.3  0.0 - 0.4 K/UL Final    ABS.  BASOPHILS 02/12/2018 0.0  0.0 - 0.06 K/UL Final    DF 02/12/2018 AUTOMATED    Final    Hemoglobin A1c 02/12/2018 6.0* 4.2 - 5.6 % Final    Est. average glucose 02/12/2018 126  mg/dL Final    LIPID PROFILE 02/12/2018        Final    Cholesterol, total 02/12/2018 118  <200 MG/DL Final    Triglyceride 02/12/2018 55  <150 MG/DL Final    HDL Cholesterol 02/12/2018 60  40 - 60 MG/DL Final    LDL, calculated 02/12/2018 47  0 - 100 MG/DL Final    VLDL, calculated 02/12/2018 11  MG/DL Final    CHOL/HDL Ratio 02/12/2018 2.0  0 - 5.0   Final    Sodium 02/12/2018 145  136 - 145 mmol/L Final    Potassium 02/12/2018 3.6  3.5 - 5.5 mmol/L Final    Chloride 02/12/2018 108  100 - 108 mmol/L Final    CO2 02/12/2018 25  21 - 32 mmol/L Final    Anion gap 02/12/2018 12  3.0 - 18 mmol/L Final    Glucose 02/12/2018 116* 74 - 99 mg/dL Final    BUN 02/12/2018 15  7.0 - 18 MG/DL Final    Creatinine 02/12/2018 0.77  0.6 - 1.3 MG/DL Final    BUN/Creatinine ratio 02/12/2018 19  12 - 20   Final    GFR est AA 02/12/2018 >60  >60 ml/min/1.73m2 Final    GFR est non-AA 02/12/2018 >60  >60 ml/min/1.73m2 Final    Calcium 02/12/2018 9.2  8.5 - 10.1 MG/DL Final    Bilirubin, total 02/12/2018 0.4  0.2 - 1.0 MG/DL Final    ALT (SGPT) 02/12/2018 28  16 - 61 U/L Final    AST (SGOT) 02/12/2018 17  15 - 37 U/L Final    Alk.  phosphatase 02/12/2018 95  45 - 117 U/L Final    Protein, total 02/12/2018 6.7  6.4 - 8.2 g/dL Final    Albumin 02/12/2018 3.9  3.4 - 5.0 g/dL Final    Globulin 02/12/2018 2.8  2.0 - 4.0 g/dL Final    A-G Ratio 02/12/2018 1.4  0.8 - 1.7   Final    Color 02/12/2018 YELLOW    Final    Appearance 02/12/2018 CLEAR    Final    Specific gravity 02/12/2018 1.020  1.005 - 1.030   Final    pH (UA) 02/12/2018 5.5  5.0 - 8.0   Final    Protein 02/12/2018 NEGATIVE   NEG mg/dL Final    Glucose 02/12/2018 NEGATIVE   NEG mg/dL Final    Ketone 02/12/2018 NEGATIVE   NEG mg/dL Final    Bilirubin 02/12/2018 NEGATIVE   NEG   Final    Blood 02/12/2018 NEGATIVE   NEG   Final    Urobilinogen 02/12/2018 0.2  0.2 - 1.0 EU/dL Final    Nitrites 02/12/2018 NEGATIVE   NEG   Final    Leukocyte Esterase 02/12/2018 NEGATIVE   NEG   Final    WBC 02/12/2018 0 to 1  0 - 4 /hpf Final    RBC 02/12/2018 0  0 - 5 /hpf Final    Epithelial cells 02/12/2018 FEW  0 - 5 /lpf Final    Bacteria 02/12/2018 NEGATIVE   NEG /hpf Final    TSH 02/12/2018 1.97  0.36 - 3.74 uIU/mL Final    Vitamin D 25-Hydroxy 02/12/2018 29.6* 30 - 100 ng/mL Final     Health Maintenance:  Screening:    Colorectal: colonoscopy (2015) serrated adenomas. Dr. Bonifacio Baer. Due . Depression: on Paxil   DM (HbA1c/FPG): HbA1c 6.0 (2018)   Hepatitis C: N/A   Falls: none   DEXA: within normal limits (2016)   PSA/MARTÍNEZ: PSA 2.1. As per Dr. Norma Haywood   Glaucoma: regular eye exams with Dr. Cisse/ Dr. Sana Hartmann (last 2018)   Smokin pack year. Distant past.   Vitamin D: 29.6 (2018)   Medicare Wellness: today    Impression:  Patient Active Problem List   Diagnosis Code    BPH with obstruction/lower urinary tract symptoms N40.1, N13.8    Hypertension I10    Primary osteoarthritis involving multiple joints M15.0    Hyperlipidemia E78.5    GERD (gastroesophageal reflux disease) K21.9    Pre-diabetes R73.03    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 ([facility] [address] M06.041, M06.042    Vitamin D deficiency E55.9    Colon polyps K63.5    CPDD (calcium pyrophosphate deposition disease) M11.20    Abnormal glucose R73.09    Depression F32.9    Rectal bleeding K62.5       Plan:  1. Hypertension. Blood pressure somewhat elevated today, but patient reports that he took medication one hour ago. Current regimen includes lisinopril, amlodipine, and lasix. Instructed patient to monitor and record his blood pressure every day for the next 2 weeks and deliver record of readings to our office for review. Renal function normal with creatinine 0.77 / eGFR >60. Continue to follow. 2. Hyperlipidemia. On moderate intensity dose atorvastatin with LDL47, indicative of excellent control in this patient. Continue to follow. 3. Prediabetes. Has been controlled on diet alone. HbA1c slightly increased to 6.0. No evidence of microvascular complications. On Ace-I and statin. Continue follow-up with Dr. Beatrice Manzo for annual eye exams. Emphasized importance of lifestyle modifications, including diet, exercise, and weight loss. 4. Rheumatoid arthritis. On Plaquenil with some response. Has regular eye exams with Dr. Beatriec Manzo. Difficult to gauge as appears to have evidence of osteoarthritis and CPDD occurring concurrently. Voltaren gel for pain control. Tylenol while at work to help with pain control as needed. Followed by Dr. Shira Mahoney. 5. Primary osteoarthritis. Evident in bilateral hands and knees. Using Voltaren gel for pain. 6. CPDD. Colchicine started on 1/19/2017 to help address chondrocalcinosis on x-rays. Follow for response. 7. Rectal bleeding. Colonoscopy 8/2015. Evaluated by Dr. Jon Marrero and considered to be hemorrhoidal in source. Known internal and external hemorrhoids. Improved with Citrucel. No evidence of anemia. Follow. 8.  BPH. Does have lower urinary tract symptoms, but refusing medications or surgical interventions. Followed by Dr. Rosanne Reyes. 9. GERD. Good control of symptoms with omeprazole. 10. Depression. Good control with Paxil. Uses Xanax at night to help with sleep. Follow. 11. 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 Monitored annually, last 7/2017 without evidence of toxicity. Follow. 12. Obesity. Emphasized importance of lifestyle modifications, including diet, exercise, and weight loss. Discussed that would help control blood sugar. Will readdress at next visit. 13. Insomnia. Using melatonin agonist, ramelteon, to replace Xanax. Had good response and weaned from Xanax. 14. Health maintenance. Unable to receive Tdap due to allergy (anaphylaxis to Td). Other immunizations up to date. Colonoscopy due 2020. Continue regular eye exams with Dr. Zuly Laurent. Vitamin D level low normal. Continue maintenance dose supplement. In addition, an annual Medicare wellness visit was done today. Patient understands recommendations and agrees with plan. Follow-up in 6 months.

## 2018-02-20 NOTE — PATIENT INSTRUCTIONS
Please monitor and record your blood pressure every morning for the next 2 weeks. Please deliver record of readings to our office for review. Medicare Wellness Visit, Male    The best way to improve and maintain good health is to have a healthy lifestyle by eating a well-balanced diet, exercising regularly, limiting alcohol and stopping smoking. Regular visits with your physician or non-physician health care provider also support your good health. Preventive screening tests can find health problems before they become diseases or illnesses. Preventive services such as immunizations prevent serious infections. All people over age 72 should have a Pneumovax and a Prevnar-13 shot to prevent potentially life threatening infections with the pneumococcus bacteria, a common cause of pneumonia. These are once in a lifetime unless you and your provider decide differently. All people over 65 should have a yearly influenza vaccine or \"flu\" shot. This does not prevent infection with cold viruses but has been proven to prevent hospitalization and death from influenza. Although Medicare part B \"regular Medicare\" currently only covers tetanus vaccination in the context of an injury, a tetanus vaccine (Tdap or Td) is recommended every 10 years. A shingles vaccine is recommended once in a lifetime after age 61. The Shingles vaccine is also not covered by Medicare part B. Note, however, that both the Shingles vaccine and Tdap/Td are generally covered by secondary carriers. Please check your coverage and out of pocket expenses. Consider contacting your local health department because it may stock these vaccines for a reasonable charge.     We currently have documentation of the following immunization history for you:  Immunization History   Administered Date(s) Administered    Influenza High Dose Vaccine PF 09/30/2017    Influenza Vaccine Split 10/04/2011, 10/16/2012    Influenza Vaccine Whole 01/15/2010    Pneumococcal Conjugate (PCV-13) 01/19/2015    Varicella Virus Vaccine 10/01/2013    ZZZ-RETIRED (DO NOT USE) Pneumococcal Vaccine (Unspecified Type) 01/01/2008    Zoster 10/16/2012       Screening for infection with Hepatitis C is recommended for anyone born between [birth year] through 1963. The table at the bottom of this document indicates the status of this and other screening services. Screening for diabetes mellitus with a blood sugar test (glucose) should be done at least every 3 years until age 79. You and your health care provider may decide whether to continue screening after age 79. The most recent blood glucose we have on file for you is:   Lab Results   Component Value Date/Time    Glucose 116 (H) 02/12/2018 08:17 AM       Glaucoma is a disease of the eye due to increased ocular pressure that can lead to blindness. People with risk factors for glaucoma ( race, diabetes, family history) should be screened at least every 2 years by an eye professional. This may be covered annually if indicated as determined by you and your doctor. Cardiovascular screening tests that check for elevated lipids or cholesterol (fatty part of blood) which can lead to heart disease and strokes should be done every 4-6 years through age 79. You and your health care provider may decide whether to continue screening after age 79.  The most recent lipid panel we have on file for you is:   Lab Results   Component Value Date/Time    Cholesterol, total 118 02/12/2018 08:17 AM    HDL Cholesterol 60 02/12/2018 08:17 AM    LDL, calculated 47 02/12/2018 08:17 AM    VLDL, calculated 11 02/12/2018 08:17 AM    Triglyceride 55 02/12/2018 08:17 AM    CHOL/HDL Ratio 2.0 02/12/2018 08:17 AM       Colorectal cancer screening that evaluates for blood or polyps in your colon for people with average risk should be done yearly as a stool test, every five years as a flexible sigmoidoscope or every 10 years as a colonoscopy up to age 76. You and your health care provider may decide whether to continue screening after age 76. Men up to age 76 may elect to screen for prostate cancer with a blood test called a PSA at certain intervals, depending on their personal and family history. This decision is between the patient and his provider. The most recent PSA values we have on file for you are:  Lab Results   Component Value Date/Time    Prostate Specific Ag 2.7 07/17/2017 09:00 AM    Prostate Specific Ag 2.1 07/14/2016 11:00 AM    Prostate Specific Ag 1.9 07/09/2015 09:35 AM    Prostate Specific Ag 2.7 07/09/2014 10:53 AM    Prostate Specific Ag 2.3 01/08/2014 04:42 PM    Prostate Specific Ag 1.8 10/26/2010 12:00 PM       If you have been a smoker or had family history of abdominal aortic aneurysms, you and your provider may decide to schedule an ultrasound test of your aorta. Our records show this was done on:  N/A    People who have smoked the equivalent of 1 pack per day for 30 years or more may benefit from screening for lung cancer with a yearly low dose CT scan until they have been non smokers for 15 years or competing health conditions render this unlikely to be beneficial. Our records show: N/A    Your Medicare Wellness Exam is recommended annually. Here is a list of your current Health Maintenance items with a due date:  Health Maintenance   Topic Date Due    GLAUCOMA SCREENING Q2Y  08/15/2018    MEDICARE YEARLY EXAM  02/21/2019    COLONOSCOPY  08/10/2020    DTaP/Tdap/Td series (2 - Td) 02/01/2027    ZOSTER VACCINE AGE 60>  Completed    Pneumococcal 65+ Low/Medium Risk  Completed    Influenza Age 5 to Adult  Completed          Learning About Diabetes Food Guidelines  Your Care Instructions    Meal planning is important to manage diabetes. It helps keep your blood sugar at a target level (which you set with your doctor). You don't have to eat special foods. You can eat what your family eats, including sweets once in a while.  But you do have to pay attention to how often you eat and how much you eat of certain foods. You may want to work with a dietitian or a certified diabetes educator (CDE) to help you plan meals and snacks. A dietitian or CDE can also help you lose weight if that is one of your goals. What should you know about eating carbs? Managing the amount of carbohydrate (carbs) you eat is an important part of healthy meals when you have diabetes. Carbohydrate is found in many foods. · Learn which foods have carbs. And learn the amounts of carbs in different foods. ¨ Bread, cereal, pasta, and rice have about 15 grams of carbs in a serving. A serving is 1 slice of bread (1 ounce), ½ cup of cooked cereal, or 1/3 cup of cooked pasta or rice. ¨ Fruits have 15 grams of carbs in a serving. A serving is 1 small fresh fruit, such as an apple or orange; ½ of a banana; ½ cup of cooked or canned fruit; ½ cup of fruit juice; 1 cup of melon or raspberries; or 2 tablespoons of dried fruit. ¨ Milk and no-sugar-added yogurt have 15 grams of carbs in a serving. A serving is 1 cup of milk or 2/3 cup of no-sugar-added yogurt. ¨ Starchy vegetables have 15 grams of carbs in a serving. A serving is ½ cup of mashed potatoes or sweet potato; 1 cup winter squash; ½ of a small baked potato; ½ cup of cooked beans; or ½ cup cooked corn or green peas. · Learn how much carbs to eat each day and at each meal. A dietitian or CDE can teach you how to keep track of the amount of carbs you eat. This is called carbohydrate counting. · If you are not sure how to count carbohydrate grams, use the Plate Method to plan meals. It is a good, quick way to make sure that you have a balanced meal. It also helps you spread carbs throughout the day. ¨ Divide your plate by types of foods. Put non-starchy vegetables on half the plate, meat or other protein food on one-quarter of the plate, and a grain or starchy vegetable in the final quarter of the plate.  To this you can add a small piece of fruit and 1 cup of milk or yogurt, depending on how many carbs you are supposed to eat at a meal.  · Try to eat about the same amount of carbs at each meal. Do not \"save up\" your daily allowance of carbs to eat at one meal.  · Proteins have very little or no carbs per serving. Examples of proteins are beef, chicken, turkey, fish, eggs, tofu, cheese, cottage cheese, and peanut butter. A serving size of meat is 3 ounces, which is about the size of a deck of cards. Examples of meat substitute serving sizes (equal to 1 ounce of meat) are 1/4 cup of cottage cheese, 1 egg, 1 tablespoon of peanut butter, and ½ cup of tofu. How can you eat out and still eat healthy? · Learn to estimate the serving sizes of foods that have carbohydrate. If you measure food at home, it will be easier to estimate the amount in a serving of restaurant food. · If the meal you order has too much carbohydrate (such as potatoes, corn, or baked beans), ask to have a low-carbohydrate food instead. Ask for a salad or green vegetables. · If you use insulin, check your blood sugar before and after eating out to help you plan how much to eat in the future. · If you eat more carbohydrate at a meal than you had planned, take a walk or do other exercise. This will help lower your blood sugar. What else should you know? · Limit saturated fat, such as the fat from meat and dairy products. This is a healthy choice because people who have diabetes are at higher risk of heart disease. So choose lean cuts of meat and nonfat or low-fat dairy products. Use olive or canola oil instead of butter or shortening when cooking. · Don't skip meals. Your blood sugar may drop too low if you skip meals and take insulin or certain medicines for diabetes. · Check with your doctor before you drink alcohol. Alcohol can cause your blood sugar to drop too low. Alcohol can also cause a bad reaction if you take certain diabetes medicines.   Follow-up care is a key part of your treatment and safety. Be sure to make and go to all appointments, and call your doctor if you are having problems. It's also a good idea to know your test results and keep a list of the medicines you take. Where can you learn more? Go to http://regina-carmen.info/. Enter L697 in the search box to learn more about \"Learning About Diabetes Food Guidelines. \"  Current as of: March 13, 2017  Content Version: 11.4  © 2259-6000 [employer]. Care instructions adapted under license by [employer] (which disclaims liability or warranty for this information). If you have questions about a medical condition or this instruction, always ask your healthcare professional. [address] any warranty or liability for your use of this information.

## 2018-02-20 NOTE — ACP (ADVANCE CARE PLANNING)
Advance Care Planning (ACP) Provider Note - Comprehensive     Date of ACP Conversation: 02/20/18  Persons included in Conversation:  patient  Length of ACP Conversation in minutes:  16 minutes    Authorized Decision Maker (if patient is incapable of making informed decisions): wife  This person is:  Healthcare Agent/Medical Power of  under Advance Directive       General ACP for ALL Patients with Decision Making Capacity:   Importance of advance care planning, including choosing a healthcare agent to communicate patient's healthcare decisions if patient lost the ability to make decisions, such as after a sudden illness or accident  Understanding of the healthcare agent role was assessed and information provided  Exploration of values, goals, and preferences if recovery is not expected, even with continued medical treatment in the event of: Imminent death  Severe, permanent brain injury  \"In these circumstances, what matters most to you? \"  Care focused more on comfort or quality of life. Review of Existing Advance Directive:  Patient has not completed an advance directive previously. He states that he would designate his wife as his healthcare agent. He expresses that he does not wish life prolonging procedures for end of life care. For Serious or Chronic Illness:  Understanding of medical condition      Interventions Provided:  Recommended completion of Advance Directive form after review of ACP materials and conversation with prospective healthcare agent   Recommended communicating the plan and making copies for the healthcare agent, personal physician, and others as appropriate (e.g., health system)  Recommended review of completed ACP document annually or upon change in health status   Recommended to complete advance directive and return completed form to office to be copied and scanned into chart. Paperwork provided and reviewed.

## 2018-03-27 RX ORDER — FUROSEMIDE 40 MG/1
TABLET ORAL
Qty: 30 TAB | Refills: 5 | Status: SHIPPED | OUTPATIENT
Start: 2018-03-27 | End: 2018-10-23 | Stop reason: SDUPTHER

## 2018-04-19 ENCOUNTER — TELEPHONE (OUTPATIENT)
Dept: INTERNAL MEDICINE CLINIC | Age: 78
End: 2018-04-19

## 2018-04-24 NOTE — TELEPHONE ENCOUNTER
Reviewed blood pressure readings dropped off by patient. For period 2/21-3/8/2018, blood pressure ranging 140-161/ 78-89. Patient being treated with lisinopril 40 mg daily, lasix 40 mg daily, and amlodipine 10 mg daily. Will begin hydralazine 25 mg tid. Please let the patient know that his blood pressure appears elevated on readings that were dropped off. Would recommend that he begin hydralazine 25 mg tid in addition to his other medicine and continue to monitor his blood pressure daily. Please instruct him to send readings in 2 weeks to the office for review. Script sent to [facility].

## 2018-04-25 RX ORDER — HYDRALAZINE HYDROCHLORIDE 25 MG/1
25 TABLET, FILM COATED ORAL 3 TIMES DAILY
Qty: 90 TAB | Refills: 5 | Status: SHIPPED | OUTPATIENT
Start: 2018-04-25 | End: 2019-06-24 | Stop reason: SDUPTHER

## 2018-04-26 NOTE — TELEPHONE ENCOUNTER
Spoke with patients wife and gave message below. She verbalized understanding with no additional concerns. She will have [facility] drop off blood pressure readings for Dr. Himanshu Johnson to review in 2 weeks.

## 2018-06-20 RX ORDER — LISINOPRIL 40 MG/1
TABLET ORAL
Qty: 90 TAB | Refills: 2 | Status: SHIPPED | OUTPATIENT
Start: 2018-06-20 | End: 2019-04-15 | Stop reason: SDUPTHER

## 2018-06-29 ENCOUNTER — OFFICE VISIT (OUTPATIENT)
Dept: INTERNAL MEDICINE CLINIC | Age: 78
End: 2018-06-29

## 2018-06-29 VITALS
SYSTOLIC BLOOD PRESSURE: 154 MMHG | TEMPERATURE: 98 F | DIASTOLIC BLOOD PRESSURE: 86 MMHG | HEART RATE: 89 BPM | WEIGHT: 207 LBS | BODY MASS INDEX: 32.49 KG/M2 | RESPIRATION RATE: 14 BRPM | HEIGHT: 67 IN | OXYGEN SATURATION: 97 %

## 2018-06-29 DIAGNOSIS — M06.041 RHEUMATOID ARTHRITIS INVOLVING BOTH HANDS WITH NEGATIVE RHEUMATOID FACTOR (HCC): ICD-10-CM

## 2018-06-29 DIAGNOSIS — N13.8 BPH WITH OBSTRUCTION/LOWER URINARY TRACT SYMPTOMS: ICD-10-CM

## 2018-06-29 DIAGNOSIS — M06.042 RHEUMATOID ARTHRITIS INVOLVING BOTH HANDS WITH NEGATIVE RHEUMATOID FACTOR (HCC): ICD-10-CM

## 2018-06-29 DIAGNOSIS — K21.9 GASTROESOPHAGEAL REFLUX DISEASE, ESOPHAGITIS PRESENCE NOT SPECIFIED: ICD-10-CM

## 2018-06-29 DIAGNOSIS — N40.1 BPH WITH OBSTRUCTION/LOWER URINARY TRACT SYMPTOMS: ICD-10-CM

## 2018-06-29 DIAGNOSIS — S69.92XA INJURY OF LEFT INDEX FINGER, INITIAL ENCOUNTER: ICD-10-CM

## 2018-06-29 DIAGNOSIS — Z01.818 PREOPERATIVE CLEARANCE: ICD-10-CM

## 2018-06-29 DIAGNOSIS — I10 ESSENTIAL HYPERTENSION: Primary | ICD-10-CM

## 2018-06-29 DIAGNOSIS — R73.03 PRE-DIABETES: ICD-10-CM

## 2018-06-29 DIAGNOSIS — E78.5 HYPERLIPIDEMIA, UNSPECIFIED HYPERLIPIDEMIA TYPE: ICD-10-CM

## 2018-06-29 DIAGNOSIS — E55.9 VITAMIN D DEFICIENCY: ICD-10-CM

## 2018-06-29 NOTE — PROGRESS NOTES
1. Have you been to the ER, urgent care clinic or hospitalized since your last visit? YES. [facility]- 6/20/18    2. Have you seen or consulted any other health care providers outside of the [facility] since your last visit (Include any pap smears or colon screening)? YES  Dr. Feliciano Sharma    Do you have an Advanced Directive?  YES

## 2018-06-29 NOTE — PROGRESS NOTES
HPI/History  Shay Jensen is a 70 y.o.  male who presents for surgical med clearance. Surgeon ordered preop labs (CBC, BMP) through [facility] but unavailable currently, pt reports numerous labs at [facility] since 6/20. Does not look that he has gotten preop EKG yet but will in the near future as well as the labs if needed. He will sort this with surgeon. 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 Plans to undergo fusion of left index interphalangeal joint with revision laceration on 7/7 with Dr. Han Haro. Denies signs of local or systemic infection. Reports pain is tolerable and only takes tylenol if ever needed which works well. HTN treated with lisinopril, hydralazine, lasix with K, and norvasc. BP somewhat elevated today. Took meds about 1-1.5hrs ago. Sometimes misses 2nd dose of hydralazine. Dr. aRine Ro is actively following. Denies any cardiopulmonary sxs. HLD and taking lipitor. Has not had ASA since ~6/20 and is aware to avoid ASA and NSAIDs prior to procedure. PreDM with A1c 6.0 in 2/2018. GERD controlled with prilosec and without alarm sxs. RA and on plaquenil. Vit D defic and taking supplements. BPH followed by Dr. Eric Silverman. Stable. Otherwise, pt states he is doing well without other sxs/complaints.     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 Dr. Uri Pryor.  CPDD (calcium pyrophosphate deposition disease)     Depression     GERD (gastroesophageal reflux disease)     Hyperlipidemia     Hypertension     Prediabetes     Primary osteoarthritis involving multiple joints 9/6/2011    Rheumatoid arthritis ([address] 2013    negative RF; elevated anti-CCP. Dr. Maame Weaver. Past Surgical History:   Procedure Laterality Date    HX CARPAL TUNNEL RELEASE      HX CATARACT REMOVAL Left 01/2018    HX HEENT      sinus, tonsillectomy    HX HERNIA REPAIR      HX MOHS PROCEDURES      bilateral     HX ORTHOPAEDIC      lef knee surgery, removed cartilage. Social History     Social History    Marital status:      Spouse name: N/A    Number of children: N/A    Years of education: N/A     Occupational History    Not on file. Social History Main Topics    Smoking status: Former Smoker    Smokeless tobacco: Former User    Alcohol use Yes      Comment: rarely    Drug use: No    Sexual activity: Not on file     Other Topics Concern    Not on file     Social History Narrative     Family History   Problem Relation Age of Onset    Cancer Mother     Heart Disease Father     Alcohol abuse Father     Cancer Other      Current Outpatient Prescriptions   Medication Sig    lisinopril (PRINIVIL, ZESTRIL) 40 mg tablet TAKE 1 TABLET BY MOUTH ONCE DAILY    hydrALAZINE (APRESOLINE) 25 mg tablet Take 1 Tab by mouth three (3) times daily.  furosemide (LASIX) 40 mg tablet TAKE 1 TABLET BY MOUTH ONCE DAILY    atorvastatin (LIPITOR) 10 mg tablet TAKE 1 TABLET BY MOUTH ONCE DAILY    omeprazole (PRILOSEC) 20 mg capsule TAKE 1 CAPSULE BY MOUTH ONCE DAILY    PARoxetine (PAXIL) 20 mg tablet Take 1 Tab by mouth daily.     potassium chloride (K-DUR, KLOR-CON) 20 mEq tablet Take 1 Tab by mouth daily.  amLODIPine (NORVASC) 10 mg tablet TAKE 1 TABLET BY MOUTH ONCE DAILY    cycloSPORINE (RESTASIS) 0.05 % ophthalmic emulsion Administer 1 Drop to both eyes two (2) times a day.  colchicine (MITIGARE) 0.6 mg capsule Take 0.6 mg by mouth daily.  cholecalciferol, vitamin D3, (VITAMIN D3) 2,000 unit tab Take 2,000 Units by mouth daily.  diclofenac (VOLTAREN) 1 % topical gel Apply 4 g to affected area four (4) times daily.  LUMIGAN 0.01 % ophthalmic drops     hydroxychloroquine (PLAQUENIL) 200 mg tablet Take 200 mg by mouth two (2) times a day.  aspirin delayed-release 81 mg tablet Take  by mouth daily.  MULTIVITS/IRON FUM/FA/D3/LYCOP (MULTI FOR HIM PO) Take  by mouth. No current facility-administered medications for this visit. Allergies   Allergen Reactions    Tetanus Toxoid, Adsorbed Anaphylaxis    Adhesive Tape-Silicones Rash    Aldactone [Spironolactone] Not Reported This Time     Breast tenderness    Codeine Not Reported This Time     \"Drives crazy\"    Tetanus Vaccines And Toxoid Anaphylaxis     Other reaction(s): anaphylaxis/angioedema    Tape  [Adhesive] Rash       Review of Systems  Aside from those included in HPI, remainder of complete ROS negative. Physical Examination  Visit Vitals    /86 (BP 1 Location: Right arm, BP Patient Position: Sitting)    Pulse 89    Temp 98 °F (36.7 °C) (Oral)    Resp 14    Ht 5' 7\" (1.702 m)    Wt 207 lb (93.9 kg)    SpO2 97%    BMI 32.42 kg/m2       General - Alert and in no acute distress. Pt appears well, comfortable, and in good spirits. Pleasant, engaging. Nontoxic. Not anxious, non-diaphoretic. Mental status - Appropriate mood, behavior, speech content, dress, and thought processes. Eyes - Pupils equal and reactive, extraocular movements intact. No erythema or discharge. Ears - Auditory canals and TMs unremarkable. Nose - No erythema. No rhinorrhea.   Mouth - Mucous membranes moist. Pharynx without lesions, swelling, erythema, or exudate. Neck - Supple without rigidity. Lymph - No periauricular, perimandibular, or cervical tenderness or swelling. Pulm - No tachypnea, retractions, or cyanosis. Good respiratory effort. Clear to auscultation bilat. No appreciable wheezes, rales, or rhonchi. Cardiovascular - Normal rate, regular rhythm. No appreciable murmurs or gallops. Abdomen - Obese. Nondistended. Active bowel sounds. Soft, nontender. No guarding, rigidity, or rebound. No appreciable masses. Extremities - No peripheral/LE edema. Distal pulses intact. Left index finger deformity noted, currently dressed/bandaged; no concerning findings in surrounding area. Neuromuscular - CN 2-12 intact. No overt focal findings or movement disorder noted. Assessment and Plan  1. HTN - BP elevated today, recently took meds and probably some situational component but has been elevated for last few times per records. He will work on adherence (specifically hydralazine). He can take another tab of hydralazine prior to the procedure. Otherwise, actively being followed by Dr. Radha Andre for this. He will continue his BP log and provide to her as discussed. Further adjustments at Dr. Radha Andre' discretion. 2. HLD - continue statin. He is already holding ASA currently. 3. PreDM - TLC and monitor. 4. GERD - continue prilosec and preventive measures. 5. RA - on plaquenil. F/u rheum. 6. Vit D defic - continue supplementation. 7. BPH - f/u uro. 8. Med clearance - Pt holding ASA and aware to avoid ASA/NSAIDs prior to procedure. He will sort out issues concerning labs and will get EKG. Given there are no significant abnormalities with preop testing, pt appears medically stable to undergo procedure as planned. Further planning as warranted. Pt happily agrees with plan. PLEASE NOTE:   This document has been produced using voice recognition software.  Unrecognized errors in transcription may be present.     Binu Mayfield [employer] [facility] of [address] [place] [address]  [phone]  6/29/2018

## 2018-06-29 NOTE — MR AVS SNAPSHOT
[address] [facility] 
 
 
 [address], [address] [facility] 706 [facility] 
[phone] Patient: Alexey Muñoz MRN: [id number] KLV:4/18/[birth year] Visit Information Date & Time Provider Department Dept. Phone Encounter #  
 6/29/2018  9:00 AM Yenny Begum, [address] [facility] [phone] [id number] Your Appointments 7/16/2018  9:00 AM  
ULTRASOUND with Filomena Arrieta MD  
[facility] [address] [place] [address]) Appt Note: PVR/PSA  
 [address] [facility] [address] [address]  
[phone] [address] [id number]  
  
    
 8/15/2018  8:05 AM  
LAB with [facility] NURSE VISIT [facility] ([address] [facility]) Appt Note: lab  
 [address] [address] [address] [place]  
  
   
 [address], 700 [facility]  
  
    
 8/22/2018  8:00 AM  
Office Visit with Rosemarie Pretty MD  
[facility] [address]) Appt Note: 6 month f/u  
 [address] [facility], [address] [address] [address] [place]  
  
   
 [address], 700 [facility] Upcoming Health Maintenance Date Due  
 GLAUCOMA SCREENING Q2Y 8/15/2018 Influenza Age 5 to Adult 8/1/2018 MEDICARE YEARLY EXAM 2/21/2019 COLONOSCOPY 8/10/2020 DTaP/Tdap/Td series (2 - Td) 2/1/2027 Allergies as of 6/29/2018  Review Complete On: 6/29/2018 By: Herbert Kaiser LPN Severity Noted Reaction Type Reactions Tetanus Toxoid, Adsorbed High 07/09/2014    Anaphylaxis Adhesive Tape-silicones  53/74/2885    Rash Aldactone [Spironolactone]    Not Reported This Time Breast tenderness Codeine    Not Reported This Time \"Drives crazy\" Tetanus Vaccines And Toxoid    Anaphylaxis Other reaction(s): anaphylaxis/angioedema Tape  [Adhesive]  07/09/2014    Rash Current Immunizations  Reviewed on 1/27/2016 Name Date Influenza High Dose Vaccine PF 9/30/2017 Influenza Vaccine Split 10/16/2012, 10/4/2011 Influenza Vaccine Whole 1/15/2010 Pneumococcal Conjugate (PCV-13) 1/19/2015  8:07 AM  
 Varicella Virus Vaccine 10/1/2013 ZZZ-RETIRED (DO NOT USE) Pneumococcal Vaccine (Unspecified Type) 1/1/2008 Zoster 10/16/2012 Not reviewed this visit Vitals BP Pulse Temp Resp Height(growth percentile) Weight(growth percentile) 154/86 (BP 1 Location: Right arm, BP Patient Position: Sitting) 89 98 °F (36.7 °C) (Oral) 14 5' 7\" (1.702 m) 207 lb (93.9 kg) SpO2 BMI Smoking Status 97% 32.42 kg/m2 Former Smoker Vitals History BMI and BSA Data Body Mass Index Body Surface Area  
 32.42 kg/m 2 2.11 m 2 Preferred Pharmacy Pharmacy Name Phone [address] [place] [address], [address] [place] [address] [phone] Your Updated Medication List  
  
   
This list is accurate as of 6/29/18  9:27 AM.  Always use your most recent med list. amLODIPine 10 mg tablet Commonly known as:  Brandon Lute TAKE 1 TABLET BY MOUTH ONCE DAILY  
  
 aspirin delayed-release 81 mg tablet Take  by mouth daily. atorvastatin 10 mg tablet Commonly known as:  LIPITOR  
TAKE 1 TABLET BY MOUTH ONCE DAILY  
  
 colchicine 0.6 mg capsule Commonly known as:  Sullivan Reason Take 0.6 mg by mouth daily. furosemide 40 mg tablet Commonly known as:  LASIX TAKE 1 TABLET BY MOUTH ONCE DAILY  
  
 hydrALAZINE 25 mg tablet Commonly known as:  APRESOLINE Take 1 Tab by mouth three (3) times daily. hydroxychloroquine 200 mg tablet Commonly known as:  PLAQUENIL Take 200 mg by mouth two (2) times a day. lisinopril 40 mg tablet Commonly known as:  PRINIVIL, ZESTRIL  
TAKE 1 TABLET BY MOUTH ONCE DAILY  
  
 LUMIGAN 0.01 % ophthalmic drops Generic drug:  bimatoprost  
  
 MULTI FOR HIM PO Take  by mouth. omeprazole 20 mg capsule Commonly known as:  PRILOSEC  
TAKE 1 CAPSULE BY MOUTH ONCE DAILY PARoxetine 20 mg tablet Commonly known as:  PAXIL Take 1 Tab by mouth daily. potassium chloride 20 mEq tablet Commonly known as:  K-DUR, KLOR-CON Take 1 Tab by mouth daily. ramelteon 8 mg tablet Commonly known as:  ROZEREM Take 1 Tab by mouth nightly as needed for Sleep. RESTASIS 0.05 % ophthalmic emulsion Generic drug:  cycloSPORINE Administer 1 Drop to both eyes two (2) times a day. VITAMIN D3 2,000 unit Tab Generic drug:  cholecalciferol (vitamin D3) Take 2,000 Units by mouth daily. VOLTAREN 1 % Gel Generic drug:  diclofenac Apply 4 g to affected area four (4) times daily. XALATAN 0.005 % ophthalmic solution Generic drug:  latanoprost  
Administer 1 Drop to both eyes nightly. Introducing [facility] & HEALTH SERVICES! Hany Menon introduces [employer] patient portal. Now you can access parts of your medical record, email your doctor's office, and request medication refills online. 1. In your internet browser, go to [email or web address] 2. Click on the First Time User? Click Here link in the Sign In box. You will see the New Member Sign Up page. 3. Enter your [employer] Access Code exactly as it appears below. You will not need to use this code after youve completed the sign-up process. If you do not sign up before the expiration date, you must request a new code. · [employer] Access Code: [email or web address] Expires: 9/27/2018  8:41 AM 
 
4. Enter the last four digits of your Social Security Number (xxxx) and Date of Birth (mm/dd/yyyy) as indicated and click Submit. You will be taken to the next sign-up page. 5. Create a ConceptoMedt ID. This will be your TimZon login ID and cannot be changed, so think of one that is secure and easy to remember. 6. Create a [employer] password. You can change your password at any time. 7. Enter your Password Reset Question and Answer. This can be used at a later time if you forget your password. 8. Enter your e-mail address. You will receive e-mail notification when new information is available in [address] 9. Click Sign Up. You can now view and download portions of your medical record. 10. Click the Download Summary menu link to download a portable copy of your medical information. If you have questions, please visit the Frequently Asked Questions section of the "[employer] website. Remember, "[employer] is NOT to be used for urgent needs. For medical emergencies, dial 911. Now available from your iPhone and Android! Please provide this summary of care documentation to your next provider. Your primary care clinician is listed as Orinda Habermann. If you have any questions after today's visit, please call [phone].

## 2018-07-16 ENCOUNTER — HOSPITAL ENCOUNTER (OUTPATIENT)
Dept: LAB | Age: 78
Discharge: HOME | End: 2018-07-16
Payer: MEDICARE

## 2018-07-16 ENCOUNTER — OFFICE VISIT (OUTPATIENT)
Dept: UROLOGY | Age: 78
End: 2018-07-16

## 2018-07-16 VITALS
DIASTOLIC BLOOD PRESSURE: 80 MMHG | SYSTOLIC BLOOD PRESSURE: 140 MMHG | WEIGHT: 207 LBS | OXYGEN SATURATION: 98 % | BODY MASS INDEX: 32.49 KG/M2 | HEIGHT: 67 IN | HEART RATE: 94 BPM | TEMPERATURE: 97.4 F

## 2018-07-16 DIAGNOSIS — N40.1 BENIGN PROSTATIC HYPERPLASIA WITH LOWER URINARY TRACT SYMPTOMS, SYMPTOM DETAILS UNSPECIFIED: ICD-10-CM

## 2018-07-16 DIAGNOSIS — N40.1 BENIGN PROSTATIC HYPERPLASIA WITH LOWER URINARY TRACT SYMPTOMS, SYMPTOM DETAILS UNSPECIFIED: Primary | ICD-10-CM

## 2018-07-16 LAB
BILIRUB UR QL STRIP: NEGATIVE
GLUCOSE UR-MCNC: NEGATIVE MG/DL
KETONES P FAST UR STRIP-MCNC: NEGATIVE MG/DL
PH UR STRIP: 5.5 [PH] (ref 4.6–8)
PROT UR QL STRIP: NEGATIVE
SP GR UR STRIP: 1.02 (ref 1–1.03)
UA UROBILINOGEN AMB POC: NORMAL (ref 0.2–1)
URINALYSIS CLARITY POC: CLEAR
URINALYSIS COLOR POC: YELLOW
URINE BLOOD POC: NEGATIVE
URINE LEUKOCYTES POC: NEGATIVE
URINE NITRITES POC: NEGATIVE

## 2018-07-16 PROCEDURE — 84153 ASSAY OF PSA TOTAL: CPT | Performed by: UROLOGY

## 2018-07-16 NOTE — MR AVS SNAPSHOT
[address] [facility] [address] [address] 
[phone] Patient: Sherlyn Ortiz MRN: [id number] TKP:7/50/2722 Visit Information Date & Time Provider Department Dept. Phone Encounter #  
 7/16/2018  9:00 AM Leslie Lemus, [address] [facility] [id number] Your Appointments 8/15/2018  8:05 AM  
LAB with [facility] NURSE VISIT [facility]) Appt Note: lab  
 [address], [address] [facility] [address] [place]  
  
   
 [address] [facility]  
  
    
 8/22/2018  8:00 AM  
Office Visit with Tiana Nino MD  
[facility] Appt Note: 6 month f/u  
 [address] [facility], [address] [facility] [address] [place]  
  
   
 [address] [facility]  
  
    
 7/22/2019  9:00 AM  
ULTRASOUND with Leslie Lemus MD  
[facility]) Appt Note: PVR/PSA  
 [address] [address]  
[phone] [address] [place] [address] Upcoming Health Maintenance Date Due  
 GLAUCOMA SCREENING Q2Y 8/15/2018 Influenza Age 5 to Adult 8/1/2018 MEDICARE YEARLY EXAM 2/21/2019 COLONOSCOPY 8/10/2020 DTaP/Tdap/Td series (2 - Td) 2/1/2027 Allergies as of 7/16/2018  Review Complete On: 7/16/2018 By: Leslie Lemus MD  
  
 Severity Noted Reaction Type Reactions Tetanus Toxoid, Adsorbed High 07/09/2014    Anaphylaxis Adhesive Tape-silicones  54/43/7946    Rash Aldactone [Spironolactone]    Not Reported This Time Breast tenderness Codeine    Not Reported This Time \"Drives crazy\" Tetanus Vaccines And Toxoid    Anaphylaxis Other reaction(s): anaphylaxis/angioedema Tape  [Adhesive]  07/09/2014    Rash Current Immunizations  Reviewed on 1/27/2016 Name Date Influenza High Dose Vaccine PF 9/30/2017 Influenza Vaccine Split 10/16/2012, 10/4/2011 Influenza Vaccine Whole 1/15/2010 Pneumococcal Conjugate (PCV-13) 1/19/2015  8:07 AM  
 Varicella Virus Vaccine 10/1/2013 ZZZ-RETIRED (DO NOT USE) Pneumococcal Vaccine (Unspecified Type) 1/1/2008 Zoster 10/16/2012 Not reviewed this visit You Were Diagnosed With   
  
 Codes Comments Benign prostatic hyperplasia with lower urinary tract symptoms, symptom details unspecified    -  Primary ICD-10-CM: N40.1 ICD-9-CM: 600.01 Vitals BP Pulse Temp Height(growth percentile) Weight(growth percentile) SpO2  
 140/80 (BP 1 Location: Left arm, BP Patient Position: Sitting) 94 97.4 °F (36.3 °C) (Oral) 5' 7\" (1.702 m) 207 lb (93.9 kg) 98% BMI Smoking Status 32.42 kg/m2 Former Smoker BMI and BSA Data Body Mass Index Body Surface Area  
 32.42 kg/m 2 2.11 m 2 Preferred Pharmacy Pharmacy Name Phone [address] [place] [address], [address] [place] [address] [phone] Your Updated Medication List  
  
   
This list is accurate as of 7/16/18  9:45 AM.  Always use your most recent med list. amLODIPine 10 mg tablet Commonly known as:  Yolie Pace TAKE 1 TABLET BY MOUTH ONCE DAILY  
  
 aspirin delayed-release 81 mg tablet Take  by mouth daily. atorvastatin 10 mg tablet Commonly known as:  LIPITOR  
TAKE 1 TABLET BY MOUTH ONCE DAILY  
  
 colchicine 0.6 mg capsule Commonly known as:  Jania Pencil Take 0.6 mg by mouth daily. furosemide 40 mg tablet Commonly known as:  LASIX TAKE 1 TABLET BY MOUTH ONCE DAILY  
  
 hydrALAZINE 25 mg tablet Commonly known as:  APRESOLINE Take 1 Tab by mouth three (3) times daily. hydroxychloroquine 200 mg tablet Commonly known as:  PLAQUENIL Take 200 mg by mouth two (2) times a day. lisinopril 40 mg tablet Commonly known as:  Merida Suleiman  
 TAKE 1 TABLET BY MOUTH ONCE DAILY  
  
 LUMIGAN 0.01 % ophthalmic drops Generic drug:  bimatoprost  
  
 MULTI FOR HIM PO Take  by mouth. omeprazole 20 mg capsule Commonly known as:  PRILOSEC  
TAKE 1 CAPSULE BY MOUTH ONCE DAILY PARoxetine 20 mg tablet Commonly known as:  PAXIL Take 1 Tab by mouth daily. potassium chloride 20 mEq tablet Commonly known as:  K-DUR, KLOR-CON Take 1 Tab by mouth daily. RESTASIS 0.05 % ophthalmic emulsion Generic drug:  cycloSPORINE Administer 1 Drop to both eyes two (2) times a day. VITAMIN D3 2,000 unit Tab Generic drug:  cholecalciferol (vitamin D3) Take 2,000 Units by mouth daily. VOLTAREN 1 % Gel Generic drug:  diclofenac Apply 4 g to affected area four (4) times daily. We Performed the Following AMB POC URINALYSIS DIP STICK AUTO W/O MICRO [85126 CPT(R)] COLLECTION VENOUS BLOOD,VENIPUNCTURE [id number] CPT(R)] Introducing [facility] & HEALTH SERVICES! Peggy Rankin introduces [employer] patient portal. Now you can access parts of your medical record, email your doctor's office, and request medication refills online. 1. In your internet browser, go to [email or web address] 2. Click on the First Time User? Click Here link in the Sign In box. You will see the New Member Sign Up page. 3. Enter your [employer] Access Code exactly as it appears below. You will not need to use this code after youve completed the sign-up process. If you do not sign up before the expiration date, you must request a new code. · [employer] Access Code: [email or web address] Expires: 9/27/2018  8:41 AM 
 
4. Enter the last four digits of your Social Security Number (xxxx) and Date of Birth (mm/dd/yyyy) as indicated and click Submit. You will be taken to the next sign-up page. 5. Create a BigBarnt ID. This will be your [employer] login ID and cannot be changed, so think of one that is secure and easy to remember. 6. Create a [employer] password. You can change your password at any time. 7. Enter your Password Reset Question and Answer. This can be used at a later time if you forget your password. 8. Enter your e-mail address. You will receive e-mail notification when new information is available in [address] 9. Click Sign Up. You can now view and download portions of your medical record. 10. Click the Download Summary menu link to download a portable copy of your medical information. If you have questions, please visit the Frequently Asked Questions section of the [employer] website. Remember, [employer] is NOT to be used for urgent needs. For medical emergencies, dial 911. Now available from your iPhone and Android! Please provide this summary of care documentation to your next provider. Your primary care clinician is listed as Yolanda Bishop. If you have any questions after today's visit, please call [phone].

## 2018-07-16 NOTE — PROGRESS NOTES
Carlee Shihay Robleros 70 y.o. male     Mr. Nan Mirza seen today for annual BPH follow-up-patient has mild obstructive and irritative voiding symptoms previously relieved with alpha-blocker therapy but that that treatment was discontinued because of intolerable side effects of the medication-now voiding with a solid stream experiencing nocturia once per night overall not unhappy with urination at this time  Patient is voiding well and has no complaints regarding urination at this time-voiding with a solid stream good control and no nocturia  Patient declined alpha-blocker treatment several years ago because of concern regarding side effects        Large prostate median lobe on ultrasound imaging of the bladder last year-PVR at that time 48 cc      PSA 3.8 in January 2013  PSA 2.6 in 2011  PSA 2.3 in January 2014  PSA 2.7 in July 2014  PSA 1.9 on 10 July 2016  PSA 2.1 on 14 July 2017     PVR 48 cc in 2015   cc in 2017              prostate volume 67.7 cc  PVR 12 cc in July 2018      Review of Systems:    CNS: no seizure syncope headaches or dizziness no visual changes/no changes- on Paxil Rx  Respiratory: no wheezing or shortness of breath  Cardiovascular:hypertension-chest pain or palpitations  Intestinal:GERD  Urinary: mild BPH symptoms  Skeletal: or joint arthritis  Endocrine:no diabetes or thyroid disease  Other:    Allergies:    Allergies   Allergen Reactions    Tetanus Toxoid, Adsorbed Anaphylaxis    Adhesive Tape-Silicones Rash    Aldactone [Spironolactone] Not Reported This Time     Breast tenderness    Codeine Not Reported This Time     \"Drives crazy\"    Tetanus Vaccines And Toxoid Anaphylaxis     Other reaction(s): anaphylaxis/angioedema    Tape  [Adhesive] Rash      Medications:    Current Outpatient Prescriptions   Medication Sig Dispense Refill    lisinopril (PRINIVIL, ZESTRIL) 40 mg tablet TAKE 1 TABLET BY MOUTH ONCE DAILY 90 Tab 2    hydrALAZINE (APRESOLINE) 25 mg tablet Take 1 Tab by mouth three (3) times daily. 90 Tab 5    furosemide (LASIX) 40 mg tablet TAKE 1 TABLET BY MOUTH ONCE DAILY 30 Tab 5    atorvastatin (LIPITOR) 10 mg tablet TAKE 1 TABLET BY MOUTH ONCE DAILY 90 Tab 3    omeprazole (PRILOSEC) 20 mg capsule TAKE 1 CAPSULE BY MOUTH ONCE DAILY 90 Cap 1    PARoxetine (PAXIL) 20 mg tablet Take 1 Tab by mouth daily. 30 Tab 11    potassium chloride (K-DUR, KLOR-CON) 20 mEq tablet Take 1 Tab by mouth daily. 90 Tab 2    amLODIPine (NORVASC) 10 mg tablet TAKE 1 TABLET BY MOUTH ONCE DAILY 30 Tab 11    cycloSPORINE (RESTASIS) 0.05 % ophthalmic emulsion Administer 1 Drop to both eyes two (2) times a day.  colchicine (MITIGARE) 0.6 mg capsule Take 0.6 mg by mouth daily.  cholecalciferol, vitamin D3, (VITAMIN D3) 2,000 unit tab Take 2,000 Units by mouth daily.  diclofenac (VOLTAREN) 1 % topical gel Apply 4 g to affected area four (4) times daily.  LUMIGAN 0.01 % ophthalmic drops       hydroxychloroquine (PLAQUENIL) 200 mg tablet Take 200 mg by mouth two (2) times a day.  aspirin delayed-release 81 mg tablet Take  by mouth daily.  MULTIVITS/IRON FUM/FA/D3/LYCOP (MULTI FOR HIM PO) Take  by mouth. 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 Dr. Marybeth Talbot.  CPDD (calcium pyrophosphate deposition disease)     Depression     GERD (gastroesophageal reflux disease)     Hyperlipidemia     Hypertension     Prediabetes     Primary osteoarthritis involving multiple joints 9/6/2011    Rheumatoid arthritis ([facility] [address] 2013    negative RF; elevated anti-CCP. Dr. Dawson Sadler. Past Surgical History:   Procedure Laterality Date    HX CARPAL TUNNEL RELEASE      HX CATARACT REMOVAL Left 01/2018    HX HEENT      sinus, tonsillectomy    HX HERNIA REPAIR      HX MOHS PROCEDURES      bilateral     HX ORTHOPAEDIC      lef knee surgery, removed cartilage.      Family History   Problem Relation Age of Onset    Cancer Mother     Heart Disease Father     Alcohol abuse Father     Cancer Other         Physical Examination: Nourished mature male in no apparent distress    Prostate by MARTÍNEZ is large rounded smooth benign consistency and nontender-no induration no nodularity  No rectal masses induration or tenderness     Urinaly negative dipstick/nitrite negative is:     Impression: Asymptomatic BPH        Plan: RTC 1 year MARTÍNEZ PSA    PSA today      More than 1/2 of this 15 minute visit was spent in counselling and coordination of care, as described above. Gayle Harper MD  -electronically signed-    PLEASE NOTE:  This document has been produced using voice recognition software. Unrecognized errors in transcription may be present.

## 2018-07-17 LAB — PSA SERPL-MCNC: 3.5 NG/ML (ref 0–4)

## 2018-08-01 ENCOUNTER — OFFICE VISIT (OUTPATIENT)
Dept: INTERNAL MEDICINE CLINIC | Age: 78
End: 2018-08-01

## 2018-08-01 ENCOUNTER — HOSPITAL ENCOUNTER (OUTPATIENT)
Dept: GENERAL RADIOLOGY | Age: 78
Discharge: HOME | End: 2018-08-01
Payer: MEDICARE

## 2018-08-01 ENCOUNTER — TELEPHONE (OUTPATIENT)
Dept: INTERNAL MEDICINE CLINIC | Age: 78
End: 2018-08-01

## 2018-08-01 VITALS
BODY MASS INDEX: 33.43 KG/M2 | DIASTOLIC BLOOD PRESSURE: 82 MMHG | HEIGHT: 67 IN | TEMPERATURE: 98 F | SYSTOLIC BLOOD PRESSURE: 130 MMHG | HEART RATE: 91 BPM | RESPIRATION RATE: 14 BRPM | WEIGHT: 213 LBS | OXYGEN SATURATION: 97 %

## 2018-08-01 DIAGNOSIS — S68.111A TRAUMATIC AMPUTATION OF LEFT INDEX FINGER: Primary | ICD-10-CM

## 2018-08-01 DIAGNOSIS — L08.9 FINGER INFECTION: ICD-10-CM

## 2018-08-01 DIAGNOSIS — S68.111A TRAUMATIC AMPUTATION OF LEFT INDEX FINGER: ICD-10-CM

## 2018-08-01 PROCEDURE — 73130 X-RAY EXAM OF HAND: CPT

## 2018-08-01 RX ORDER — AMOXICILLIN AND CLAVULANATE POTASSIUM 875; 125 MG/1; MG/1
1 TABLET, FILM COATED ORAL 2 TIMES DAILY
Qty: 28 TAB | Refills: 0 | Status: SHIPPED | OUTPATIENT
Start: 2018-08-01 | End: 2018-08-13 | Stop reason: ALTCHOICE

## 2018-08-01 RX ORDER — SULFAMETHOXAZOLE AND TRIMETHOPRIM 800; 160 MG/1; MG/1
1 TABLET ORAL 2 TIMES DAILY
Qty: 28 TAB | Refills: 0 | Status: SHIPPED | OUTPATIENT
Start: 2018-08-01 | End: 2018-08-15

## 2018-08-01 NOTE — MR AVS SNAPSHOT
Mitra Phyllis 
 
 
 [address], [address] [facility] 
[phone] Patient: Deanne Stephens MRN: [id number] DSX:8/30/[id number] Visit Information Date & Time Provider Department Dept. Phone Encounter #  
 8/1/2018  4:00 PM Meghana Ayalama [facility] [id number] Follow-up Instructions Routing History Follow-up and Disposition History Your Appointments 9/25/2018  1:30 PM  
Office Visit with Shivam Bishop MD  
[facility] [address] [place] [address]) Appt Note: pt rescheduled; follow up  
 [address] [facility], [address] [place] [address] [address] [facility] [address] [facility]  
  
   
 [address], [address] [facility]  
  
    
 7/22/2019  9:00 AM  
ULTRASOUND with Armond Anderson MD  
[facility] [address] [facility]) Appt Note: PVR/PSA  
 [address] [facility] Jeovanny A [address] [address]  
[phone] [address] [facility] [address] [place] [address] Upcoming Health Maintenance Date Due Influenza Age 5 to Adult 8/1/2018 GLAUCOMA SCREENING Q2Y 8/15/2018 MEDICARE YEARLY EXAM 2/21/2019 COLONOSCOPY 8/10/2020 DTaP/Tdap/Td series (2 - Td) 2/1/2027 Allergies as of 8/1/2018  Review Complete On: 8/1/2018 By: HERMELINDA Ayala Severity Noted Reaction Type Reactions Tetanus Toxoid, Adsorbed High 07/09/2014    Anaphylaxis Adhesive Tape-silicones  50/37/5670    Rash Aldactone [Spironolactone]    Not Reported This Time Breast tenderness Codeine    Not Reported This Time \"Drives crazy\" Tetanus Vaccines And Toxoid    Anaphylaxis Other reaction(s): anaphylaxis/angioedema Tape  [Adhesive]  07/09/2014    Rash Current Immunizations  Reviewed on 1/27/2016 Name Date Influenza High Dose Vaccine PF 9/30/2017 Influenza Vaccine Split 10/16/2012, 10/4/2011 Influenza Vaccine Whole 1/15/2010 Pneumococcal Conjugate (PCV-13) 1/19/2015  8:07 AM  
 Varicella Virus Vaccine 10/1/2013 ZZZ-RETIRED (DO NOT USE) Pneumococcal Vaccine (Unspecified Type) 1/1/2008 Zoster 10/16/2012 Not reviewed this visit You Were Diagnosed With   
  
 Codes Comments Traumatic amputation of left index finger    -  Primary ICD-10-CM: I41.191X ICD-9-CM: 325. 0 Finger infection     ICD-10-CM: L08.9 ICD-9-CM: 223. 9 Vitals BP Pulse Temp Resp Height(growth percentile) Weight(growth percentile) 130/82 (BP 1 Location: Right arm, BP Patient Position: Sitting) 91 98 °F (36.7 °C) (Oral) 14 5' 7\" (1.702 m) 213 lb (96.6 kg) SpO2 BMI Smoking Status 97% 33.36 kg/m2 Former Smoker Vitals History BMI and BSA Data Body Mass Index Body Surface Area  
 33.36 kg/m 2 2.14 m 2 Preferred Pharmacy Pharmacy Name Phone [address] [place] [address], [address] [place] [address] [phone] Your Updated Medication List  
  
   
This list is accurate as of 8/1/18 11:59 PM.  Always use your most recent med list. amLODIPine 10 mg tablet Commonly known as:  [place] Salle TAKE 1 TABLET BY MOUTH ONCE DAILY  
  
 amoxicillin-clavulanate 875-125 mg per tablet Commonly known as:  AUGMENTIN Take 1 Tab by mouth two (2) times a day for 14 days. aspirin delayed-release 81 mg tablet Take  by mouth daily. atorvastatin 10 mg tablet Commonly known as:  LIPITOR  
TAKE 1 TABLET BY MOUTH ONCE DAILY  
  
 colchicine 0.6 mg capsule Commonly known as:  Lorence Rishabh Take 0.6 mg by mouth daily. furosemide 40 mg tablet Commonly known as:  LASIX TAKE 1 TABLET BY MOUTH ONCE DAILY  
  
 hydrALAZINE 25 mg tablet Commonly known as:  APRESOLINE Take 1 Tab by mouth three (3) times daily. hydroxychloroquine 200 mg tablet Commonly known as:  PLAQUENIL Take 200 mg by mouth two (2) times a day. lisinopril 40 mg tablet Commonly known as:  PRINIVIL, ZESTRIL  
TAKE 1 TABLET BY MOUTH ONCE DAILY  
  
 LUMIGAN 0.01 % ophthalmic drops Generic drug:  bimatoprost  
  
 MULTI FOR HIM PO Take  by mouth. omeprazole 20 mg capsule Commonly known as:  PRILOSEC  
TAKE 1 CAPSULE BY MOUTH ONCE DAILY PARoxetine 20 mg tablet Commonly known as:  PAXIL Take 1 Tab by mouth daily. potassium chloride 20 mEq tablet Commonly known as:  K-DUR, KLOR-CON Take 1 Tab by mouth daily. RESTASIS 0.05 % ophthalmic emulsion Generic drug:  cycloSPORINE Administer 1 Drop to both eyes two (2) times a day. trimethoprim-sulfamethoxazole 160-800 mg per tablet Commonly known as:  BACTRIM DS, SEPTRA DS Take 1 Tab by mouth two (2) times a day for 14 days. VITAMIN D3 2,000 unit Tab Generic drug:  cholecalciferol (vitamin D3) Take 2,000 Units by mouth daily. VOLTAREN 1 % Gel Generic drug:  diclofenac Apply 4 g to affected area four (4) times daily. Prescriptions Sent to Pharmacy Refills  
 trimethoprim-sulfamethoxazole (BACTRIM DS, SEPTRA DS) 160-800 mg per tablet 0 Sig: Take 1 Tab by mouth two (2) times a day for 14 days. Class: Normal  
 Pharmacy: [address] [place] [address] Ph #: [phone] Route: Oral  
 amoxicillin-clavulanate (AUGMENTIN) 875-125 mg per tablet (Discontinued) 0 Sig: Take 1 Tab by mouth two (2) times a day for 14 days. Class: Normal  
 Pharmacy: [address] [place] [address] Ph #: [phone] Route: Oral  
 Reason for Discontinue: Alternate Therapy To-Do List   
 Around 08/01/2018 Imaging:  XR HAND LT MIN 3 V Introducing [facility]! Siria Alvarez introduces Tumblr patient portal. Now you can access parts of your medical record, email your doctor's office, and request medication refills online.    
 
1. In your internet browser, go to [email or web address] 2. Click on the First Time User? Click Here link in the Sign In box. You will see the New Member Sign Up page. 3. Enter your [employer] Access Code exactly as it appears below. You will not need to use this code after youve completed the sign-up process. If you do not sign up before the expiration date, you must request a new code. · [employer] Access Code: [email or web address] Expires: 9/27/2018  8:41 AM 
 
4. Enter the last four digits of your Social Security Number (xxxx) and Date of Birth (mm/dd/yyyy) as indicated and click Submit. You will be taken to the next sign-up page. 5. Create a [email or web address] ID. This will be your MarketGid login ID and cannot be changed, so think of one that is secure and easy to remember. 6. Create a [employer] password. You can change your password at any time. 7. Enter your Password Reset Question and Answer. This can be used at a later time if you forget your password. 8. Enter your e-mail address. You will receive e-mail notification when new information is available in [address] 9. Click Sign Up. You can now view and download portions of your medical record. 10. Click the Download Summary menu link to download a portable copy of your medical information. If you have questions, please visit the Frequently Asked Questions section of the [employer] website. Remember, [employer] is NOT to be used for urgent needs. For medical emergencies, dial 911. Now available from your iPhone and Android! Please provide this summary of care documentation to your next provider. Your primary care clinician is listed as Noemy Hammond. If you have any questions after today's visit, please call [phone].

## 2018-08-01 NOTE — PROGRESS NOTES
1. Have you been to the ER, urgent care clinic or hospitalized since your last visit? YES 
 
2. Have you seen or consulted any other health care providers outside of the [facility] since your last visit (Include any pap smears or colon screening)? YES Do you have an Advanced Directive?  YES

## 2018-08-01 NOTE — TELEPHONE ENCOUNTER
Mary Souza wants Mr. Madelyn Monte to see you in 2 weeks for ID consult. The only opening you have are your two same day appts at the end of the day. Do you want me to use those?

## 2018-08-01 NOTE — PROGRESS NOTES
HPI/History Sita Fitzpatrick is a 70 y.o.  male who presents for evaluation. Pt with traumatic amputation of left index finger. Underwent surgical repair in July. Undergoing PT and has noticed some swelling, erythema, and mild increasing tenderness/discomfort over the last few days. Was at PT today and was instructed to see PCP for concerns of infection as his surgeon is out of the office. He has an appt with surgeon on 8/3. Pt denies any involvement/issues with rest of hand/extremity. Denies any malaise, fevers, or other constitutional sxs. No other sxs/complaints. Pt hx as noted below. Patient Active Problem List  
Diagnosis Code  BPH with obstruction/lower urinary tract symptoms N40.1, N13.8  Hypertension I10  
 Primary osteoarthritis involving multiple joints M15.0  Hyperlipidemia E78.5  GERD (gastroesophageal reflux disease) K21.9  Pre-diabetes R73.03  
 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 ([facility] [address] M06.041, E69.310  Vitamin D deficiency E55.9  Colon polyps K63.5  CPDD (calcium pyrophosphate deposition disease) M11.20  Abnormal glucose R73.09  
 Depression F32.9  Rectal bleeding K62.5  Class 1 obesity due to excess calories with serious comorbidity and body mass index (BMI) of 32.0 to 32.9 in adult E66.09, Z68.32 Past Medical History:  
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 Dr. Leticia Aly.  CPDD (calcium pyrophosphate deposition disease)  Depression  GERD (gastroesophageal reflux disease)  Hyperlipidemia  Hypertension  Prediabetes  Primary osteoarthritis involving multiple joints 9/6/2011  Rheumatoid arthritis ([facility] [address] 2013  
 negative RF; elevated anti-CCP. Dr. Kae Grimm. Past Surgical History:  
Procedure Laterality Date  HX CARPAL TUNNEL RELEASE  HX CATARACT REMOVAL Left 01/2018  HX HEENT    
 sinus, tonsillectomy  HX HERNIA REPAIR    
 HX MOHS PROCEDURES    
 bilateral   
 HX ORTHOPAEDIC    
 lef knee surgery, removed cartilage. Social History Social History  Marital status:  Spouse name: N/A  
 Number of children: N/A  
 Years of education: N/A Occupational History  Not on file. Social History Main Topics  Smoking status: Former Smoker  Smokeless tobacco: Former User  Alcohol use Yes Comment: rarely  Drug use: No  
 Sexual activity: Not on file Other Topics Concern  Not on file Social History Narrative Family History Problem Relation Age of Onset  Cancer Mother  Heart Disease Father  Alcohol abuse Father  Cancer Other Current Outpatient Prescriptions Medication Sig  
 trimethoprim-sulfamethoxazole (BACTRIM DS, SEPTRA DS) 160-800 mg per tablet Take 1 Tab by mouth two (2) times a day for 14 days.  amoxicillin-clavulanate (AUGMENTIN) 875-125 mg per tablet Take 1 Tab by mouth two (2) times a day for 14 days.  lisinopril (PRINIVIL, ZESTRIL) 40 mg tablet TAKE 1 TABLET BY MOUTH ONCE DAILY  hydrALAZINE (APRESOLINE) 25 mg tablet Take 1 Tab by mouth three (3) times daily.  furosemide (LASIX) 40 mg tablet TAKE 1 TABLET BY MOUTH ONCE DAILY  atorvastatin (LIPITOR) 10 mg tablet TAKE 1 TABLET BY MOUTH ONCE DAILY  omeprazole (PRILOSEC) 20 mg capsule TAKE 1 CAPSULE BY MOUTH ONCE DAILY  PARoxetine (PAXIL) 20 mg tablet Take 1 Tab by mouth daily.  potassium chloride (K-DUR, KLOR-CON) 20 mEq tablet Take 1 Tab by mouth daily.  amLODIPine (NORVASC) 10 mg tablet TAKE 1 TABLET BY MOUTH ONCE DAILY  cycloSPORINE (RESTASIS) 0.05 % ophthalmic emulsion Administer 1 Drop to both eyes two (2) times a day.  cholecalciferol, vitamin D3, (VITAMIN D3) 2,000 unit tab Take 2,000 Units by mouth daily.  diclofenac (VOLTAREN) 1 % topical gel Apply 4 g to affected area four (4) times daily.   
 LUMIGAN 0.01 % ophthalmic drops  hydroxychloroquine (PLAQUENIL) 200 mg tablet Take 200 mg by mouth two (2) times a day.  aspirin delayed-release 81 mg tablet Take  by mouth daily.  MULTIVITS/IRON FUM/FA/D3/LYCOP (MULTI FOR HIM PO) Take  by mouth.  colchicine (MITIGARE) 0.6 mg capsule Take 0.6 mg by mouth daily. No current facility-administered medications for this visit. Allergies Allergen Reactions  Tetanus Toxoid, Adsorbed Anaphylaxis  Adhesive Tape-Silicones Rash  Aldactone [Spironolactone] Not Reported This Time Breast tenderness  Codeine Not Reported This Time \"Drives crazy\"  Tetanus Vaccines And Toxoid Anaphylaxis Other reaction(s): anaphylaxis/angioedema  Tape  [Adhesive] Rash Review of Systems Aside from those included in HPI, remainder of complete ROS negative. Physical Examination Visit Vitals  /82 (BP 1 Location: Right arm, BP Patient Position: Sitting)  Pulse 91  Temp 98 °F (36.7 °C) (Oral)  Resp 14  
 Ht 5' 7\" (1.702 m)  Wt 213 lb (96.6 kg)  SpO2 97%  BMI 33.36 kg/m2 General - Alert and in no acute distress. Pt appears well, comfortable, and in good spirits. Pleasant, engaging. Nontoxic. Not anxious, non-diaphoretic. Mental status - Appropriate mood, behavior, speech content, dress, and thought processes. Pulm - No tachypnea, retractions, or cyanosis. Good respiratory effort. Cardiovascular - Normal rate. Abnormal post surgical changes noted of left index finger. Pins in place. There is some moderate swelling of the digit which is reported to be increased recently. Incisions without dehiscence. No active drainage. Mild pinkish discoloration without marked erythema. No streaking. Minimal warmth if any. Mild tenderness. Possible minimal swelling of metacarpal heads but not reported as new. Other digits and remainder of hands/UE unremarkable. Moving other digits and remainder of index finger well.  Neurovascularly intact. Assessment and Plan 1. 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 Underwent repair. Recent concern for developing infection. Discussed with Dr. Christine Venegas (ID/IM) who examined pt as well. Will order XRs to help identify/ruleout osteomyelitis or other. Will place on bactrim and augmentin x 14d. Pt will keep appt with surgeon on 8/3 but will promptly visit ED if any concerns, worsening, fevers, or developments as discussed at great length. Pt will schedule f/u with Dr. Christine Venegas in ~2wks. Return sooner if needed. Further planning as warranted. Pt happily agrees with plan. More than 40 mins spent during visit with more than 50% discussing above issues, potential causes/contributing factors, eval/tx, plan, and questions. PLEASE NOTE:  
This document has been produced using voice recognition software. Unrecognized errors in transcription may be present. Sophia Orosco PA-C [facility] 
[phone] 
8/1/2018

## 2018-08-01 NOTE — TELEPHONE ENCOUNTER
Patient is at PT now for his left hand, his left pointer finger red and swollen, he made to see uriel to see if he needs an antibiotic. Use pharmacy on file if need be. Call Ezequiel Sykes back please.

## 2018-08-02 ENCOUNTER — TELEPHONE (OUTPATIENT)
Dept: INTERNAL MEDICINE CLINIC | Age: 78
End: 2018-08-02

## 2018-08-02 NOTE — TELEPHONE ENCOUNTER
Appt scheduled for 8/15/18 at 2:30 pm . Asked patient to call us back to confirm the appt time is OK.

## 2018-08-02 NOTE — TELEPHONE ENCOUNTER
Discussed case and results with Dr. Alyssa Flores and Dr. Judy Torres. XR results warrant no change in current plan. Keep appt with surgeon 8/3. Keep plan to see Dr. Judy Torres, which looks to be on 8/15, but unsure if pt has confirmed this appt (see tele note from 8/1 regarding this).

## 2018-08-10 ENCOUNTER — OFFICE VISIT (OUTPATIENT)
Dept: INTERNAL MEDICINE CLINIC | Age: 78
End: 2018-08-10

## 2018-08-10 ENCOUNTER — HOSPITAL ENCOUNTER (OUTPATIENT)
Dept: LAB | Age: 78
Discharge: HOME | End: 2018-08-10
Payer: MEDICARE

## 2018-08-10 VITALS
SYSTOLIC BLOOD PRESSURE: 104 MMHG | OXYGEN SATURATION: 93 % | DIASTOLIC BLOOD PRESSURE: 70 MMHG | TEMPERATURE: 97.9 F | HEART RATE: 105 BPM | HEIGHT: 67 IN | RESPIRATION RATE: 14 BRPM | BODY MASS INDEX: 32.8 KG/M2 | WEIGHT: 209 LBS

## 2018-08-10 DIAGNOSIS — R19.7 DIARRHEA, UNSPECIFIED TYPE: ICD-10-CM

## 2018-08-10 DIAGNOSIS — M54.2 NECK PAIN: ICD-10-CM

## 2018-08-10 DIAGNOSIS — R52 BODY ACHES: ICD-10-CM

## 2018-08-10 DIAGNOSIS — E55.9 VITAMIN D INSUFFICIENCY: ICD-10-CM

## 2018-08-10 DIAGNOSIS — R50.9 FEVER, UNSPECIFIED FEVER CAUSE: ICD-10-CM

## 2018-08-10 DIAGNOSIS — E78.5 HYPERLIPIDEMIA, UNSPECIFIED HYPERLIPIDEMIA TYPE: ICD-10-CM

## 2018-08-10 DIAGNOSIS — R73.03 PRE-DIABETES: ICD-10-CM

## 2018-08-10 DIAGNOSIS — R50.9 FEVER, UNSPECIFIED FEVER CAUSE: Primary | ICD-10-CM

## 2018-08-10 DIAGNOSIS — R51.9 NONINTRACTABLE HEADACHE, UNSPECIFIED CHRONICITY PATTERN, UNSPECIFIED HEADACHE TYPE: ICD-10-CM

## 2018-08-10 DIAGNOSIS — R73.09 ABNORMAL GLUCOSE: ICD-10-CM

## 2018-08-10 LAB
ALBUMIN SERPL-MCNC: 4.2 G/DL (ref 3.4–5)
ALBUMIN/GLOB SERPL: 1.4 {RATIO} (ref 0.8–1.7)
ALP SERPL-CCNC: 126 U/L (ref 45–117)
ALT SERPL-CCNC: 44 U/L (ref 16–61)
ANION GAP SERPL CALC-SCNC: 8 MMOL/L (ref 3–18)
AST SERPL-CCNC: 32 U/L (ref 15–37)
BASOPHILS # BLD: 0 K/UL (ref 0–0.1)
BASOPHILS NFR BLD: 0 % (ref 0–2)
BILIRUB SERPL-MCNC: 0.7 MG/DL (ref 0.2–1)
BUN SERPL-MCNC: 19 MG/DL (ref 7–18)
BUN/CREAT SERPL: 14 (ref 12–20)
CALCIUM SERPL-MCNC: 9.1 MG/DL (ref 8.5–10.1)
CHLORIDE SERPL-SCNC: 107 MMOL/L (ref 100–108)
CHOLEST SERPL-MCNC: 130 MG/DL
CO2 SERPL-SCNC: 25 MMOL/L (ref 21–32)
CREAT SERPL-MCNC: 1.34 MG/DL (ref 0.6–1.3)
DIFFERENTIAL METHOD BLD: ABNORMAL
EOSINOPHIL # BLD: 0 K/UL (ref 0–0.4)
EOSINOPHIL NFR BLD: 0 % (ref 0–5)
ERYTHROCYTE [DISTWIDTH] IN BLOOD BY AUTOMATED COUNT: 13.5 % (ref 11.6–14.5)
EST. AVERAGE GLUCOSE BLD GHB EST-MCNC: 114 MG/DL
GLOBULIN SER CALC-MCNC: 3.1 G/DL (ref 2–4)
GLUCOSE SERPL-MCNC: 113 MG/DL (ref 74–99)
HBA1C MFR BLD: 5.6 % (ref 4.2–5.6)
HCT VFR BLD AUTO: 45.2 % (ref 36–48)
HDLC SERPL-MCNC: 58 MG/DL (ref 40–60)
HDLC SERPL: 2.2 {RATIO} (ref 0–5)
HGB BLD-MCNC: 15.1 G/DL (ref 13–16)
LDLC SERPL CALC-MCNC: 63 MG/DL (ref 0–100)
LIPID PROFILE,FLP: NORMAL
LYMPHOCYTES # BLD: 1.1 K/UL (ref 0.9–3.6)
LYMPHOCYTES NFR BLD: 6 % (ref 21–52)
MCH RBC QN AUTO: 32.8 PG (ref 24–34)
MCHC RBC AUTO-ENTMCNC: 33.4 G/DL (ref 31–37)
MCV RBC AUTO: 98.3 FL (ref 74–97)
MONOCYTES # BLD: 0.7 K/UL (ref 0.05–1.2)
MONOCYTES NFR BLD: 4 % (ref 3–10)
NEUTS SEG # BLD: 15.5 K/UL (ref 1.8–8)
NEUTS SEG NFR BLD: 90 % (ref 40–73)
PLATELET # BLD AUTO: 195 K/UL (ref 135–420)
PMV BLD AUTO: 10.8 FL (ref 9.2–11.8)
POTASSIUM SERPL-SCNC: 3.6 MMOL/L (ref 3.5–5.5)
PROT SERPL-MCNC: 7.3 G/DL (ref 6.4–8.2)
RBC # BLD AUTO: 4.6 M/UL (ref 4.7–5.5)
SODIUM SERPL-SCNC: 140 MMOL/L (ref 136–145)
TRIGL SERPL-MCNC: 45 MG/DL (ref ?–150)
VLDLC SERPL CALC-MCNC: 9 MG/DL
WBC # BLD AUTO: 17.3 K/UL (ref 4.6–13.2)

## 2018-08-10 PROCEDURE — 87040 BLOOD CULTURE FOR BACTERIA: CPT | Performed by: NURSE PRACTITIONER

## 2018-08-10 PROCEDURE — 80053 COMPREHEN METABOLIC PANEL: CPT | Performed by: INTERNAL MEDICINE

## 2018-08-10 PROCEDURE — 82306 VITAMIN D 25 HYDROXY: CPT | Performed by: INTERNAL MEDICINE

## 2018-08-10 PROCEDURE — 80061 LIPID PANEL: CPT | Performed by: INTERNAL MEDICINE

## 2018-08-10 PROCEDURE — 83036 HEMOGLOBIN GLYCOSYLATED A1C: CPT | Performed by: INTERNAL MEDICINE

## 2018-08-10 PROCEDURE — 36415 COLL VENOUS BLD VENIPUNCTURE: CPT | Performed by: INTERNAL MEDICINE

## 2018-08-10 PROCEDURE — 85025 COMPLETE CBC W/AUTO DIFF WBC: CPT | Performed by: INTERNAL MEDICINE

## 2018-08-10 NOTE — PROGRESS NOTES
1. Have you been to the ER, urgent care clinic or hospitalized since your last visit? NO.     2. Have you seen or consulted any other health care providers outside of the [facility] since your last visit (Include any pap smears or colon screening)? NO      Do you have an Advanced Directive? NO    Would you like information on Advanced Directives?  NO

## 2018-08-10 NOTE — PROGRESS NOTES
Veronica Robbins is a 70 y.o.  male and presents with    Chief Complaint   Patient presents with    Cold Symptoms     Patient here for body aches, headaches, fever and diarrhea. Patient reports taking temp at home last night at 101.5. Patient took temp this morning and it was 99.2. Patient denies cough, congestion, runny nose, or sore throat. Patient reports neighbor told him he has the flu. x  1 day        Subjective:  HPI   Mr. Victor Hugo Meléndez presents today with complaints of body aches, headache, fever/chills of 101.5 last night and this morning of 99.2 x 1 day. He denies cp, dyspnea, GI side effects, cough, sore throat, dysuria. He reports using Ibuprofen for headache/neck pain and fever relief. He took Ibuprofen last night with 101.5 temperature and this morning with 99.2 temperature. He is with tachycardia and decrease in BP compared to history. He is without fever/chills in office today however Ibuprofen on board. He is with recent surgical repair to a traumatic amputation. He recently underwent surgical revision of left pointer finger on 7/7. He was seen by HERMELINDA Lopez on 8/1/18 and at that time he denies constitutional symptoms. At that time started Augmentin and Bactrim (x 7 days)    Had Dr. Yan Atkinson look at his finger as she has seen in the past. At that time he did report diarrhea x 5 episodes yesterday and x 3 today, of watery consistency. He denied abdominal pain/cramping, n/v, able to tolerate solids and liquids. Razia, nurse, walked by after he came out of bathroom and stated foul odor. Additional Concerns: none     ROS   Review of Systems   Constitutional: Positive for chills, fever and malaise/fatigue. Negative for diaphoresis. Respiratory: Negative. Cardiovascular: Negative. Gastrointestinal: Positive for diarrhea. Negative for abdominal pain, blood in stool, melena, nausea and vomiting. Genitourinary: Negative. Musculoskeletal: Positive for neck pain.    Skin: Negative for itching and rash. Neurological: Positive for headaches. Negative for weakness. Lightheaded       Allergies   Allergen Reactions    Tetanus Toxoid, Adsorbed Anaphylaxis    Adhesive Tape-Silicones Rash    Aldactone [Spironolactone] Not Reported This Time     Breast tenderness    Codeine Not Reported This Time     \"Drives crazy\"    Tetanus Vaccines And Toxoid Anaphylaxis     Other reaction(s): anaphylaxis/angioedema    Tape  [Adhesive] Rash       Current Outpatient Prescriptions   Medication Sig Dispense Refill    trimethoprim-sulfamethoxazole (BACTRIM DS, SEPTRA DS) 160-800 mg per tablet Take 1 Tab by mouth two (2) times a day for 14 days. 28 Tab 0    amoxicillin-clavulanate (AUGMENTIN) 875-125 mg per tablet Take 1 Tab by mouth two (2) times a day for 14 days. 28 Tab 0    lisinopril (PRINIVIL, ZESTRIL) 40 mg tablet TAKE 1 TABLET BY MOUTH ONCE DAILY 90 Tab 2    hydrALAZINE (APRESOLINE) 25 mg tablet Take 1 Tab by mouth three (3) times daily. 90 Tab 5    furosemide (LASIX) 40 mg tablet TAKE 1 TABLET BY MOUTH ONCE DAILY 30 Tab 5    atorvastatin (LIPITOR) 10 mg tablet TAKE 1 TABLET BY MOUTH ONCE DAILY 90 Tab 3    omeprazole (PRILOSEC) 20 mg capsule TAKE 1 CAPSULE BY MOUTH ONCE DAILY 90 Cap 1    PARoxetine (PAXIL) 20 mg tablet Take 1 Tab by mouth daily. 30 Tab 11    potassium chloride (K-DUR, KLOR-CON) 20 mEq tablet Take 1 Tab by mouth daily. 90 Tab 2    amLODIPine (NORVASC) 10 mg tablet TAKE 1 TABLET BY MOUTH ONCE DAILY 30 Tab 11    cycloSPORINE (RESTASIS) 0.05 % ophthalmic emulsion Administer 1 Drop to both eyes two (2) times a day.  colchicine (MITIGARE) 0.6 mg capsule Take 0.6 mg by mouth daily.  cholecalciferol, vitamin D3, (VITAMIN D3) 2,000 unit tab Take 2,000 Units by mouth daily.  diclofenac (VOLTAREN) 1 % topical gel Apply 4 g to affected area four (4) times daily.       LUMIGAN 0.01 % ophthalmic drops       hydroxychloroquine (PLAQUENIL) 200 mg tablet Take 200 mg by mouth two (2) times a day.  MULTIVITS/IRON FUM/FA/D3/LYCOP (MULTI FOR HIM PO) Take  by mouth.  aspirin delayed-release 81 mg tablet Take  by mouth daily. Social History     Social History    Marital status:      Spouse name: N/A    Number of children: N/A    Years of education: N/A     Occupational History    Not on file. Social History Main Topics    Smoking status: Former Smoker    Smokeless tobacco: Former User    Alcohol use Yes      Comment: rarely    Drug use: No    Sexual activity: Not on file     Other Topics Concern    Not on file     Social History Narrative       Past Medical History:   Diagnosis Date    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 Dr. Shelby Valencia.  CPDD (calcium pyrophosphate deposition disease)     Depression     GERD (gastroesophageal reflux disease)     Hyperlipidemia     Hypertension     Prediabetes     Primary osteoarthritis involving multiple joints 9/6/2011    Rheumatoid arthritis ([facility] [address] 2013    negative RF; elevated anti-CCP. Dr. Thaddeus Wang. Past Surgical History:   Procedure Laterality Date    HX CARPAL TUNNEL RELEASE      HX CATARACT REMOVAL Left 01/2018    HX HEENT      sinus, tonsillectomy    HX HERNIA REPAIR      HX MOHS PROCEDURES      bilateral     HX ORTHOPAEDIC      lef knee surgery, removed cartilage. Family History   Problem Relation Age of Onset    Cancer Mother     Heart Disease Father     Alcohol abuse Father     Cancer Other        Objective:  Vitals:    08/10/18 1450   BP: 104/70   Pulse: (!) 105   Resp: 14   Temp: 97.9 °F (36.6 °C)   TempSrc: Oral   SpO2: 93%   Weight: 209 lb (94.8 kg)   Height: 5' 7\" (1.702 m)   PainSc:   5   PainLoc: Head       LABS   None    TESTS  None    PE  Physical Exam   Constitutional: He is oriented to person, place, and time. He appears well-developed and well-nourished. No distress.    HENT:   Head: Normocephalic and atraumatic. Right Ear: External ear normal.   Left Ear: External ear normal.   Nose: Nose normal.   Mouth/Throat: Oropharynx is clear and moist. No oropharyngeal exudate. Eyes: Conjunctivae and EOM are normal. Pupils are equal, round, and reactive to light. Neck: Normal range of motion. Cardiovascular: Normal rate, regular rhythm, normal heart sounds and intact distal pulses. No murmur heard. Pulmonary/Chest: Effort normal and breath sounds normal. No respiratory distress. He has no wheezes. He has no rales. He exhibits no tenderness. Abdominal: Soft. Bowel sounds are normal. He exhibits no distension. There is no tenderness. Musculoskeletal: Normal range of motion. Lymphadenopathy:     He has no cervical adenopathy. Neurological: He is alert and oriented to person, place, and time. Skin: Skin is warm and dry. No rash noted. He is not diaphoretic. There is erythema. No pallor. Psychiatric: He has a normal mood and affect. His behavior is normal. Judgment normal.   Vitals reviewed. Assessment/Plan:    1. Fever, body aches, headache, watery diarrhea on x 2 PO antibiotics for recent traumatic amputation of left pointer finger- He prefers not to go to the ED. He did not contact Dr. Ankit Peraza? I had Dr. Perico Brink look at the finger as she has seen the surgical site in the past, she reports looks improved since last seen. We both stressed need to go to the ED if symptoms persist or worsen in any way as well as our 24 hour oncall and contact Dr. Ankit Peraza?, hand surgeon. Labs today as below, stool kit given and explained, also had routine labs drawn today. He is on Augmentin and Bactrim taken x 7 days currently. Scheduled to see Dr. Perico Brink on 8/15/18. Will contact him on Monday to check up. Lab review: orders written for new lab studies as appropriate; see orders    Today's Visit:   Diagnoses and all orders for this visit:    1.  Fever, unspecified fever cause  -     CULTURE, BLOOD; Future  -     CULTURE, BLOOD; Future  -     CBC WITH AUTOMATED DIFF; Future  -     METABOLIC PANEL, COMPREHENSIVE; Future  -     C. DIFFICILE/EPI PCR ([facility] Only); Future  -     CULTURE, STOOL; Future  -     OVA & PARASITES, STOOL; Future    2. Body aches  -     CULTURE, BLOOD; Future  -     CULTURE, BLOOD; Future  -     CBC WITH AUTOMATED DIFF; Future  -     METABOLIC PANEL, COMPREHENSIVE; Future    3. Nonintractable headache, unspecified chronicity pattern, unspecified headache type  -     CULTURE, BLOOD; Future  -     CULTURE, BLOOD; Future  -     CBC WITH AUTOMATED DIFF; Future  -     METABOLIC PANEL, COMPREHENSIVE; Future    4. Diarrhea, unspecified type  -     C. DIFFICILE/EPI PCR ([facility] Only); Future  -     CULTURE, STOOL; Future  -     OVA & PARASITES, STOOL; Future    5. Neck pain      Health Maintenance: Deferred to PCP. I have discussed the diagnosis with the patient and the intended plan as seen in the above orders. The patient has received an after-visit summary and questions were answered concerning future plans. I have discussed medication side effects and warnings with the patient as well. I have reviewed the plan of care with the patient, accepted their input and they are in agreement with the treatment goals. Follow-up Disposition: Not on File   More than 1/2 of this 25 minute visit was spent in counseling and coordination of care, as described above.     KENYA Freire  Internist of [address], [facility]  Phone: [phone]  Fax: [phone]

## 2018-08-11 LAB — 25(OH)D3 SERPL-MCNC: 33.7 NG/ML (ref 30–100)

## 2018-08-13 ENCOUNTER — TELEPHONE (OUTPATIENT)
Dept: INTERNAL MEDICINE CLINIC | Age: 78
End: 2018-08-13

## 2018-08-13 ENCOUNTER — HOSPITAL ENCOUNTER (OUTPATIENT)
Dept: LAB | Age: 78
Discharge: HOME | End: 2018-08-13
Payer: MEDICARE

## 2018-08-13 LAB
BACTERIA SPEC CULT: NORMAL
SERVICE CMNT-IMP: NORMAL

## 2018-08-13 PROCEDURE — 87177 OVA AND PARASITES SMEARS: CPT | Performed by: NURSE PRACTITIONER

## 2018-08-13 PROCEDURE — 87046 STOOL CULTR AEROBIC BACT EA: CPT | Performed by: NURSE PRACTITIONER

## 2018-08-13 PROCEDURE — 87493 C DIFF AMPLIFIED PROBE: CPT | Performed by: NURSE PRACTITIONER

## 2018-08-13 RX ORDER — METRONIDAZOLE 500 MG/1
500 TABLET ORAL 3 TIMES DAILY
Qty: 30 TAB | Refills: 0 | Status: SHIPPED | OUTPATIENT
Start: 2018-08-13 | End: 2018-08-23

## 2018-08-13 NOTE — TELEPHONE ENCOUNTER
He needs to reschedule his 8/22 appt. To se Dr. Tan Palma. Since it is for a physical-6 month f/u. When would you like to see him?       Please advise the patient-He was advised to give it 24-48 hours to process his request.

## 2018-08-13 NOTE — TELEPHONE ENCOUNTER
Spoke with Mr. Prieto Kegolden regarding his labs. He reports feels diarrhea is improved, stating more formed, but greenish, and denies cramping/abdominal pain. He does state 5-8 bouts yesterday. I did consult with Dr. Shelley Miller today as WBC 17.3, blood cultures to date are negative. She recommends to stop Augmentin and Bactrim for now and start Flagyl to cover for C-Dif. She will decide on further treatment on 8/15/18. Also I recommended increase in hydration as creatinine is elevated at 1.37, prior was normal range, most likely related to dehydration due to excess watery diarrhea. He verbalized understanding of plan of care.

## 2018-08-15 ENCOUNTER — HOSPITAL ENCOUNTER (OUTPATIENT)
Dept: LAB | Age: 78
Discharge: HOME | End: 2018-08-15
Payer: MEDICARE

## 2018-08-15 ENCOUNTER — OFFICE VISIT (OUTPATIENT)
Dept: INTERNAL MEDICINE CLINIC | Age: 78
End: 2018-08-15

## 2018-08-15 VITALS
DIASTOLIC BLOOD PRESSURE: 62 MMHG | SYSTOLIC BLOOD PRESSURE: 122 MMHG | BODY MASS INDEX: 32.8 KG/M2 | RESPIRATION RATE: 16 BRPM | TEMPERATURE: 98.1 F | OXYGEN SATURATION: 96 % | HEART RATE: 97 BPM | WEIGHT: 209 LBS | HEIGHT: 67 IN

## 2018-08-15 DIAGNOSIS — R19.7 DIARRHEA, UNSPECIFIED TYPE: ICD-10-CM

## 2018-08-15 DIAGNOSIS — I49.9 IRREGULAR HEART RATE: ICD-10-CM

## 2018-08-15 PROBLEM — I49.1 APC (ATRIAL PREMATURE CONTRACTIONS): Status: ACTIVE | Noted: 2018-08-15

## 2018-08-15 LAB
BACTERIA SPEC CULT: NORMAL
BACTERIA SPEC CULT: NORMAL
BASOPHILS # BLD: 0 K/UL (ref 0–0.1)
BASOPHILS NFR BLD: 0 % (ref 0–2)
CRP SERPL-MCNC: 0.9 MG/DL (ref 0–0.3)
DIFFERENTIAL METHOD BLD: ABNORMAL
EOSINOPHIL # BLD: 0.5 K/UL (ref 0–0.4)
EOSINOPHIL NFR BLD: 5 % (ref 0–5)
ERYTHROCYTE [DISTWIDTH] IN BLOOD BY AUTOMATED COUNT: 13.3 % (ref 11.6–14.5)
ERYTHROCYTE [SEDIMENTATION RATE] IN BLOOD: 6 MM/HR (ref 0–20)
HCT VFR BLD AUTO: 41.3 % (ref 36–48)
HGB BLD-MCNC: 14.2 G/DL (ref 13–16)
LYMPHOCYTES # BLD: 2.1 K/UL (ref 0.9–3.6)
LYMPHOCYTES NFR BLD: 25 % (ref 21–52)
MCH RBC QN AUTO: 33.1 PG (ref 24–34)
MCHC RBC AUTO-ENTMCNC: 34.4 G/DL (ref 31–37)
MCV RBC AUTO: 96.3 FL (ref 74–97)
MONOCYTES # BLD: 0.9 K/UL (ref 0.05–1.2)
MONOCYTES NFR BLD: 11 % (ref 3–10)
NEUTS SEG # BLD: 5.1 K/UL (ref 1.8–8)
NEUTS SEG NFR BLD: 59 % (ref 40–73)
PLATELET # BLD AUTO: 244 K/UL (ref 135–420)
PMV BLD AUTO: 10.6 FL (ref 9.2–11.8)
RBC # BLD AUTO: 4.29 M/UL (ref 4.7–5.5)
SERVICE CMNT-IMP: NORMAL
WBC # BLD AUTO: 8.6 K/UL (ref 4.6–13.2)

## 2018-08-15 PROCEDURE — 85652 RBC SED RATE AUTOMATED: CPT | Performed by: INTERNAL MEDICINE

## 2018-08-15 PROCEDURE — 86140 C-REACTIVE PROTEIN: CPT | Performed by: INTERNAL MEDICINE

## 2018-08-15 PROCEDURE — 85025 COMPLETE CBC W/AUTO DIFF WBC: CPT | Performed by: INTERNAL MEDICINE

## 2018-08-15 NOTE — MR AVS SNAPSHOT
Wydusty Nicky 
 
 
 [address], [address] [facility] 
[phone] Patient: Uli Perdomo MRN: [id number] Visit Information Date & Time Provider Department Dept. Phone Encounter #  
 8/15/2018  2:30 Gael Boyle MD [facility] [address] [place] [address] Road [phone] [id number] Follow-up Instructions Return in about 2 weeks (around 8/29/2018). Follow-up and Disposition History Your Appointments 9/25/2018  1:30 PM  
Office Visit with Rosy Lu MD  
[facility] Appt Note: pt rescheduled; follow up  
 [address] [facility], [address]  
  
   
 [address], [address]  
  
    
 7/22/2019  9:00 AM  
ULTRASOUND with Monica Laurent MD  
[facility] Appt Note: PVR/PSA  
 [address] [place] A [address] [address]  
[phone] [address] [facility] [address] Upcoming Health Maintenance Date Due Influenza Age 5 to Adult 8/1/2018 GLAUCOMA SCREENING Q2Y 8/15/2018 MEDICARE YEARLY EXAM 2/21/2019 COLONOSCOPY 8/10/2020 DTaP/Tdap/Td series (2 - Td) 2/1/2027 Allergies as of 8/15/2018  Review Complete On: 8/10/2018 By: Niki Salazar Severity Noted Reaction Type Reactions Tetanus Toxoid, Adsorbed High 07/09/2014    Anaphylaxis Adhesive Tape-silicones  28/96/0419    Rash Aldactone [Spironolactone]    Not Reported This Time Breast tenderness Codeine    Not Reported This Time \"Drives crazy\" Tetanus Vaccines And Toxoid    Anaphylaxis Other reaction(s): anaphylaxis/angioedema Tape  [Adhesive]  07/09/2014    Rash Current Immunizations  Reviewed on 1/27/2016 Name Date Influenza High Dose Vaccine PF 9/30/2017 Influenza Vaccine Split 10/16/2012, 10/4/2011 Influenza Vaccine Whole 1/15/2010 Pneumococcal Conjugate (PCV-13) 1/19/2015  8:07 AM  
 Varicella Virus Vaccine 10/1/2013 ZZZ-RETIRED (DO NOT USE) Pneumococcal Vaccine (Unspecified Type) 1/1/2008 Zoster 10/16/2012 Not reviewed this visit You Were Diagnosed With   
  
 Codes Comments Surgical wound infection, initial encounter    -  Primary ICD-10-CM: T81. 4XXA ICD-9-CM: 998.59 Irregular heart rate     ICD-10-CM: I49.9 ICD-9-CM: 427.9 Diarrhea, unspecified type     ICD-10-CM: R19.7 ICD-9-CM: 787.91 Vitals BP Pulse Temp Resp Height(growth percentile) Weight(growth percentile) 122/62 (BP 1 Location: Left arm, BP Patient Position: Sitting) 97 98.1 °F (36.7 °C) (Oral) 16 5' 7\" (1.702 m) 209 lb (94.8 kg) SpO2 BMI Smoking Status 96% 32.73 kg/m2 Former Smoker Vitals History BMI and BSA Data Body Mass Index Body Surface Area 32.73 kg/m 2 2.12 m 2 Preferred Pharmacy Pharmacy Name Phone [address], [address] [place] [address] [phone] Your Updated Medication List  
  
   
This list is accurate as of 8/15/18 11:59 PM.  Always use your most recent med list. amLODIPine 10 mg tablet Commonly known as:  Ramya Riley TAKE 1 TABLET BY MOUTH ONCE DAILY  
  
 aspirin delayed-release 81 mg tablet Take  by mouth daily. atorvastatin 10 mg tablet Commonly known as:  LIPITOR  
TAKE 1 TABLET BY MOUTH ONCE DAILY  
  
 colchicine 0.6 mg capsule Commonly known as:  Ignacia Gals Take 0.6 mg by mouth daily. furosemide 40 mg tablet Commonly known as:  LASIX TAKE 1 TABLET BY MOUTH ONCE DAILY  
  
 hydrALAZINE 25 mg tablet Commonly known as:  APRESOLINE Take 1 Tab by mouth three (3) times daily. hydroxychloroquine 200 mg tablet Commonly known as:  PLAQUENIL Take 200 mg by mouth two (2) times a day. lisinopril 40 mg tablet Commonly known as:  Severiano Dozier  
 TAKE 1 TABLET BY MOUTH ONCE DAILY  
  
 LUMIGAN 0.01 % ophthalmic drops Generic drug:  bimatoprost  
  
 metroNIDAZOLE 500 mg tablet Commonly known as:  FLAGYL Take 1 Tab by mouth three (3) times daily for 10 days. MULTI FOR HIM PO Take  by mouth. omeprazole 20 mg capsule Commonly known as:  PRILOSEC  
TAKE 1 CAPSULE BY MOUTH ONCE DAILY PARoxetine 20 mg tablet Commonly known as:  PAXIL Take 1 Tab by mouth daily. potassium chloride 20 mEq tablet Commonly known as:  K-DUR, KLOR-CON Take 1 Tab by mouth daily. RESTASIS 0.05 % ophthalmic emulsion Generic drug:  cycloSPORINE Administer 1 Drop to both eyes two (2) times a day. trimethoprim-sulfamethoxazole 160-800 mg per tablet Commonly known as:  BACTRIM DS, SEPTRA DS Take 1 Tab by mouth two (2) times a day for 14 days. VITAMIN D3 2,000 unit Tab Generic drug:  cholecalciferol (vitamin D3) Take 2,000 Units by mouth daily. VOLTAREN 1 % Gel Generic drug:  diclofenac Apply 4 g to affected area four (4) times daily. We Performed the Following AMB POC EKG ROUTINE W/ 12 LEADS, INTER & REP [88425 CPT(R)] Follow-up Instructions Return in about 2 weeks (around 8/29/2018). Introducing [facility]! [employer] introduces [facility] patient portal. Now you can access parts of your medical record, email your doctor's office, and request medication refills online. 1. In your internet browser, go to [email or web address] 2. Click on the First Time User? Click Here link in the Sign In box. You will see the New Member Sign Up page. 3. Enter your [facility] Access Code exactly as it appears below. You will not need to use this code after youve completed the sign-up process. If you do not sign up before the expiration date, you must request a new code. · [facility] Access Code: [email or web address] Expires: 9/27/2018  8:41 AM 
 
 4. Enter the last four digits of your Social Security Number (xxxx) and Date of Birth (mm/dd/yyyy) as indicated and click Submit. You will be taken to the next sign-up page. 5. Create a [employer] ID. This will be your [employer] login ID and cannot be changed, so think of one that is secure and easy to remember. 6. Create a [employer] password. You can change your password at any time. 7. Enter your Password Reset Question and Answer. This can be used at a later time if you forget your password. 8. Enter your e-mail address. You will receive e-mail notification when new information is available in [address] 9. Click Sign Up. You can now view and download portions of your medical record. 10. Click the Download Summary menu link to download a portable copy of your medical information. If you have questions, please visit the Frequently Asked Questions section of the [employer] website. Remember, [employer] is NOT to be used for urgent needs. For medical emergencies, dial 911. Now available from your iPhone and Android! Please provide this summary of care documentation to your next provider. Your primary care clinician is listed as Shivam Bishop. If you have any questions after today's visit, please call [phone].

## 2018-08-16 LAB
BACTERIA SPEC CULT: NORMAL
SERVICE CMNT-IMP: NORMAL
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
## 2018-08-22 LAB
O+P SPEC MICRO: NORMAL
O+P STL CONC: NORMAL
SPECIMEN SOURCE: NORMAL

## 2018-08-22 RX ORDER — OMEPRAZOLE 20 MG/1
CAPSULE, DELAYED RELEASE ORAL
Qty: 90 CAP | Refills: 1 | Status: SHIPPED | OUTPATIENT
Start: 2018-08-22 | End: 2019-02-21 | Stop reason: SDUPTHER

## 2018-08-30 ENCOUNTER — OFFICE VISIT (OUTPATIENT)
Dept: INTERNAL MEDICINE CLINIC | Age: 78
End: 2018-08-30

## 2018-08-30 VITALS
SYSTOLIC BLOOD PRESSURE: 124 MMHG | OXYGEN SATURATION: 97 % | WEIGHT: 209 LBS | HEART RATE: 94 BPM | BODY MASS INDEX: 32.8 KG/M2 | TEMPERATURE: 98.7 F | HEIGHT: 67 IN | RESPIRATION RATE: 17 BRPM | DIASTOLIC BLOOD PRESSURE: 82 MMHG
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
## 2018-08-30 NOTE — MR AVS SNAPSHOT
Ciara Strickland 
 
 
 [address], [address] [facility] 
[phone] Patient: Champ Maradiaga MRN: [id number] CYW:6/43/2031 Visit Information Date & Time Provider Department Dept. Phone Encounter #  
 8/30/2018  1:30 PM Rain Wyatt MD [facility] [address] [place], [place] [phone] Your Appointments 9/25/2018  1:30 PM  
Office Visit with Radha Rae MD  
[facility] Silvia Talbot) Appt Note: pt rescheduled; follow up  
 [address] [facility], [address] [place] [address] [address]  
  
   
 [address], [address]  
  
    
 7/22/2019  9:00 AM  
ULTRASOUND with Jina Bence, MD  
[facility] Silvia Talbot) Appt Note: PVR/PSA  
 [address] [facility] [address] A [address] [address]  
[phone] [address] [facility] [address] [facility] [address] Upcoming Health Maintenance Date Due Influenza Age 5 to Adult 8/1/2018 GLAUCOMA SCREENING Q2Y 8/15/2018 MEDICARE YEARLY EXAM 2/21/2019 COLONOSCOPY 8/10/2020 DTaP/Tdap/Td series (2 - Td) 2/1/2027 Allergies as of 8/30/2018  Review Complete On: 8/30/2018 By: Rain Wyatt MD  
  
 Severity Noted Reaction Type Reactions Tetanus Toxoid, Adsorbed High 07/09/2014    Anaphylaxis Adhesive Tape-silicones  46/64/1601    Rash Aldactone [Spironolactone]    Not Reported This Time Breast tenderness Codeine    Not Reported This Time \"Drives crazy\" Tetanus Vaccines And Toxoid    Anaphylaxis Other reaction(s): anaphylaxis/angioedema Tape  [Adhesive]  07/09/2014    Rash Current Immunizations  Reviewed on 1/27/2016 Name Date Influenza High Dose Vaccine PF 9/30/2017 Influenza Vaccine Split 10/16/2012, 10/4/2011 Influenza Vaccine Whole 1/15/2010  Pneumococcal Conjugate (PCV-13) 1/19/2015  8:07 AM  
 Varicella Virus Vaccine 10/1/2013 ZZZ-RETIRED (DO NOT USE) Pneumococcal Vaccine (Unspecified Type) 1/1/2008 Zoster 10/16/2012 Not reviewed this visit Vitals BP Pulse Temp Resp Height(growth percentile) Weight(growth percentile) 124/82 (BP 1 Location: Left arm, BP Patient Position: Sitting) 94 98.7 °F (37.1 °C) (Oral) 17 5' 7\" (1.702 m) 209 lb (94.8 kg) SpO2 BMI Smoking Status 97% 32.73 kg/m2 Former Smoker Vitals History BMI and BSA Data Body Mass Index Body Surface Area 32.73 kg/m 2 2.12 m 2 Preferred Pharmacy Pharmacy Name Phone [address] [place] [address], [address] [place] [address] [phone] Your Updated Medication List  
  
   
This list is accurate as of 8/30/18  2:07 PM.  Always use your most recent med list. amLODIPine 10 mg tablet Commonly known as:  [place] TAKE 1 TABLET BY MOUTH ONCE DAILY  
  
 aspirin delayed-release 81 mg tablet Take  by mouth daily. atorvastatin 10 mg tablet Commonly known as:  LIPITOR  
TAKE 1 TABLET BY MOUTH ONCE DAILY  
  
 colchicine 0.6 mg capsule Commonly known as:  Jodene Willie Take 0.6 mg by mouth daily. furosemide 40 mg tablet Commonly known as:  LASIX TAKE 1 TABLET BY MOUTH ONCE DAILY  
  
 hydrALAZINE 25 mg tablet Commonly known as:  APRESOLINE Take 1 Tab by mouth three (3) times daily. hydroxychloroquine 200 mg tablet Commonly known as:  PLAQUENIL Take 200 mg by mouth two (2) times a day. lisinopril 40 mg tablet Commonly known as:  PRINIVIL, ZESTRIL  
TAKE 1 TABLET BY MOUTH ONCE DAILY  
  
 LUMIGAN 0.01 % ophthalmic drops Generic drug:  bimatoprost  
  
 MULTI FOR HIM PO Take  by mouth. omeprazole 20 mg capsule Commonly known as:  PRILOSEC  
TAKE 1 CAPSULE BY MOUTH ONCE DAILY PARoxetine 20 mg tablet Commonly known as:  PAXIL Take 1 Tab by mouth daily. potassium chloride 20 mEq tablet Commonly known as:  K-DUR, KLOR-CON Take 1 Tab by mouth daily. RESTASIS 0.05 % ophthalmic emulsion Generic drug:  cycloSPORINE Administer 1 Drop to both eyes two (2) times a day. VITAMIN D3 2,000 unit Tab Generic drug:  cholecalciferol (vitamin D3) Take 2,000 Units by mouth daily. VOLTAREN 1 % Gel Generic drug:  diclofenac Apply 4 g to affected area four (4) times daily. Introducing [facility]! Vladimir Ledesma introduces [employer]Roll patient portal. Now you can access parts of your medical record, email your doctor's office, and request medication refills online. 1. In your internet browser, go to [email or web address]. "[employer] 2. Click on the First Time User? Click Here link in the Sign In box. You will see the New Member Sign Up page. 3. Enter your BrightRoll Access Code exactly as it appears below. You will not need to use this code after youve completed the sign-up process. If you do not sign up before the expiration date, you must request a new code. · BrightRoll Access Code: [email or web address] Expires: 9/27/2018  8:41 AM 
 
4. Enter the last four digits of your Social Security Number (xxxx) and Date of Birth (mm/dd/yyyy) as indicated and click Submit. You will be taken to the next sign-up page. 5. Create a BrightRoll ID. This will be your BrightRoll login ID and cannot be changed, so think of one that is secure and easy to remember. 6. Create a BrightRoll password. You can change your password at any time. 7. Enter your Password Reset Question and Answer. This can be used at a later time if you forget your password. 8. Enter your e-mail address. You will receive e-mail notification when new information is available in [address] 9. Click Sign Up. You can now view and download portions of your medical record. 10. Click the Download Summary menu link to download a portable copy of your medical information. If you have questions, please visit the Frequently Asked Questions section of the [employer] website. Remember, [employer] is NOT to be used for urgent needs. For medical emergencies, dial 911. Now available from your iPhone and Android! Please provide this summary of care documentation to your next provider. Your primary care clinician is listed as Kadie Ortiz. If you have any questions after today's visit, please call [phone].

## 2018-09-17 RX ORDER — AMLODIPINE BESYLATE 10 MG/1
TABLET ORAL
Qty: 30 TAB | Refills: 11 | Status: SHIPPED | OUTPATIENT
Start: 2018-09-17 | End: 2019-10-10 | Stop reason: SDUPTHER

## 2018-09-25 ENCOUNTER — HOSPITAL ENCOUNTER (OUTPATIENT)
Dept: LAB | Age: 78
Discharge: HOME | End: 2018-09-25
Payer: MEDICARE

## 2018-09-25 ENCOUNTER — OFFICE VISIT (OUTPATIENT)
Dept: INTERNAL MEDICINE CLINIC | Age: 78
End: 2018-09-25

## 2018-09-25 VITALS
HEIGHT: 67 IN | HEART RATE: 77 BPM | OXYGEN SATURATION: 96 % | RESPIRATION RATE: 16 BRPM | WEIGHT: 208 LBS | DIASTOLIC BLOOD PRESSURE: 86 MMHG | TEMPERATURE: 98.5 F | BODY MASS INDEX: 32.65 KG/M2 | SYSTOLIC BLOOD PRESSURE: 154 MMHG

## 2018-09-25 DIAGNOSIS — M11.20 CPDD (CALCIUM PYROPHOSPHATE DEPOSITION DISEASE): ICD-10-CM

## 2018-09-25 DIAGNOSIS — M06.042 RHEUMATOID ARTHRITIS INVOLVING BOTH HANDS WITH NEGATIVE RHEUMATOID FACTOR (HCC): ICD-10-CM

## 2018-09-25 DIAGNOSIS — M15.9 PRIMARY OSTEOARTHRITIS INVOLVING MULTIPLE JOINTS: ICD-10-CM

## 2018-09-25 DIAGNOSIS — I10 ESSENTIAL HYPERTENSION: ICD-10-CM

## 2018-09-25 DIAGNOSIS — Z23 ENCOUNTER FOR IMMUNIZATION: ICD-10-CM

## 2018-09-25 DIAGNOSIS — S68.111A: ICD-10-CM

## 2018-09-25 DIAGNOSIS — R73.09 ABNORMAL GLUCOSE: ICD-10-CM

## 2018-09-25 DIAGNOSIS — Z77.011 LEAD EXPOSURE RISK ASSESSMENT, HIGH RISK: ICD-10-CM

## 2018-09-25 DIAGNOSIS — R73.03 PRE-DIABETES: ICD-10-CM

## 2018-09-25 DIAGNOSIS — E78.5 HYPERLIPIDEMIA, UNSPECIFIED HYPERLIPIDEMIA TYPE: ICD-10-CM

## 2018-09-25 DIAGNOSIS — N13.8 BPH WITH OBSTRUCTION/LOWER URINARY TRACT SYMPTOMS: ICD-10-CM

## 2018-09-25 DIAGNOSIS — I10 ESSENTIAL HYPERTENSION: Primary | ICD-10-CM

## 2018-09-25 DIAGNOSIS — M06.041 RHEUMATOID ARTHRITIS INVOLVING BOTH HANDS WITH NEGATIVE RHEUMATOID FACTOR (HCC): ICD-10-CM

## 2018-09-25 DIAGNOSIS — N40.1 BPH WITH OBSTRUCTION/LOWER URINARY TRACT SYMPTOMS: ICD-10-CM

## 2018-09-25 DIAGNOSIS — K21.9 GASTROESOPHAGEAL REFLUX DISEASE, ESOPHAGITIS PRESENCE NOT SPECIFIED: ICD-10-CM

## 2018-09-25 DIAGNOSIS — E66.09 CLASS 1 OBESITY DUE TO EXCESS CALORIES WITH SERIOUS COMORBIDITY AND BODY MASS INDEX (BMI) OF 32.0 TO 32.9 IN ADULT: ICD-10-CM

## 2018-09-25 DIAGNOSIS — F32.A DEPRESSION, UNSPECIFIED DEPRESSION TYPE: ICD-10-CM

## 2018-09-25 DIAGNOSIS — B37.2 CANDIDAL INTERTRIGO: ICD-10-CM

## 2018-09-25 LAB
ALBUMIN SERPL-MCNC: 4.3 G/DL (ref 3.4–5)
ANION GAP SERPL CALC-SCNC: 8 MMOL/L (ref 3–18)
BUN SERPL-MCNC: 17 MG/DL (ref 7–18)
BUN/CREAT SERPL: 20 (ref 12–20)
CALCIUM SERPL-MCNC: 9.4 MG/DL (ref 8.5–10.1)
CHLORIDE SERPL-SCNC: 105 MMOL/L (ref 100–108)
CO2 SERPL-SCNC: 30 MMOL/L (ref 21–32)
CREAT SERPL-MCNC: 0.85 MG/DL (ref 0.6–1.3)
GLUCOSE SERPL-MCNC: 95 MG/DL (ref 74–99)
MAGNESIUM SERPL-MCNC: 2.4 MG/DL (ref 1.6–2.6)
PHOSPHATE SERPL-MCNC: 4.3 MG/DL (ref 2.5–4.9)
POTASSIUM SERPL-SCNC: 4.3 MMOL/L (ref 3.5–5.5)
SODIUM SERPL-SCNC: 143 MMOL/L (ref 136–145)

## 2018-09-25 PROCEDURE — 80069 RENAL FUNCTION PANEL: CPT | Performed by: INTERNAL MEDICINE

## 2018-09-25 PROCEDURE — 83735 ASSAY OF MAGNESIUM: CPT | Performed by: INTERNAL MEDICINE

## 2018-09-25 PROCEDURE — 36415 COLL VENOUS BLD VENIPUNCTURE: CPT | Performed by: INTERNAL MEDICINE

## 2018-09-25 PROCEDURE — 83655 ASSAY OF LEAD: CPT | Performed by: INTERNAL MEDICINE

## 2018-09-25 RX ORDER — NYSTATIN 100000 [USP'U]/G
POWDER TOPICAL 4 TIMES DAILY
Qty: 60 G | Refills: 1 | Status: SHIPPED | OUTPATIENT
Start: 2018-09-25 | End: 2019-09-25 | Stop reason: ALTCHOICE

## 2018-09-25 RX ORDER — NYSTATIN AND TRIAMCINOLONE ACETONIDE 100000; 1 [USP'U]/G; MG/G
CREAM TOPICAL 2 TIMES DAILY
Qty: 30 G | Refills: 2 | Status: SHIPPED | OUTPATIENT
Start: 2018-09-25 | End: 2019-09-25 | Stop reason: ALTCHOICE

## 2018-09-25 NOTE — PROGRESS NOTES
Chief Complaint Patient presents with  Hypertension 6 month follow up with labs. Health Maintenance Due Topic Date Due  Shingrix Vaccine Age 50> (1 of 2) 01/16/1992  Influenza Age 5 to Adult  08/01/2018  GLAUCOMA SCREENING Q2Y  08/15/2018 Patient given influenza vaccine, Adjuvanted FLUAD, in left deltoid, per verbal order from Dr. Simeon Ravi. Instructed patient to sit and wait 10-20 minutes before leaving the premises so that we can watch for any complications or adverse reactions. Patient given vaccine information statement handout before vaccine was given. Patient tolerated well without adverse reactions or complications. 1. Have you been to the ER, urgent care clinic or hospitalized since your last visit? NO.  
 
2. Have you seen or consulted any other health care providers outside of the [address] [place] [address] since your last visit (Include any pap smears or colon screening)? NO Do you have an Advanced Directive?  YES

## 2018-09-25 NOTE — PATIENT INSTRUCTIONS

## 2018-09-25 NOTE — MR AVS SNAPSHOT
[address] [facility] 
 
 
 [address], [address] [place] [address] [facility] 
[phone] Patient: Rosario Ac MRN: [id number] Visit Information Date & Time Provider Department Dept. Phone Encounter #  
 9/25/2018  1:30 PM Yan Giles MD [facility] [phone] [id number] Follow-up Instructions Return in about 3 months (around 12/25/2018), or if symptoms worsen or fail to improve. Your Appointments 1/10/2019  9:30 AM  
Office Visit with Yan Giles MD  
[facility]) Appt Note: 3 month follow up  
 [address], [address] [facility] [address]  
  
   
 [address], [address]  
  
    
 7/22/2019  9:00 AM  
ULTRASOUND with Anita Richard MD  
[facility] Appt Note: PVR/PSA  
 [address] [facility] [address]  
[phone] [address] [place] [address] Upcoming Health Maintenance Date Due Shingrix Vaccine Age 50> (1 of 2) 1/16/1992 Influenza Age 5 to Adult 8/1/2018 GLAUCOMA SCREENING Q2Y 8/15/2018 MEDICARE YEARLY EXAM 2/21/2019 COLONOSCOPY 8/10/2020 DTaP/Tdap/Td series (2 - Td) 2/1/2027 Allergies as of 9/25/2018  Review Complete On: 9/25/2018 By: Marah Kyle Severity Noted Reaction Type Reactions Tetanus Toxoid, Adsorbed High 07/09/2014    Anaphylaxis Adhesive Tape-silicones  36/32/1827    Rash Aldactone [Spironolactone]    Not Reported This Time Breast tenderness Codeine    Not Reported This Time \"Drives crazy\" Tetanus Vaccines And Toxoid    Anaphylaxis Other reaction(s): anaphylaxis/angioedema Tape  [Adhesive]  07/09/2014    Rash Current Immunizations  Reviewed on 9/25/2018 Name Date Influenza High Dose Vaccine PF 9/30/2017 Influenza Vaccine (Tri) Adjuvanted 9/25/2018  1:54 PM  
 Influenza Vaccine Split 10/16/2012, 10/4/2011 Influenza Vaccine Whole 1/15/2010 Pneumococcal Conjugate (PCV-13) 1/19/2015  8:07 AM  
 Varicella Virus Vaccine 10/1/2013 ZZZ-RETIRED (DO NOT USE) Pneumococcal Vaccine (Unspecified Type) 1/1/2008 Zoster 10/16/2012 Reviewed by Mildred Evans on 9/25/2018 at  2:46 PM  
You Were Diagnosed With   
  
 Codes Comments Encounter for immunization    -  Primary ICD-10-CM: L76 ICD-9-CM: V03.89 Essential hypertension     ICD-10-CM: I10 
ICD-9-CM: 401.9 Lead exposure risk assessment, high risk     ICD-10-CM: Z77.011 
ICD-9-CM: V15.86 Vitals BP Pulse Temp Resp Height(growth percentile) Weight(growth percentile) 154/86 (BP 1 Location: Left arm, BP Patient Position: Sitting) 77 98.5 °F (36.9 °C) (Oral) 16 5' 7\" (1.702 m) 208 lb (94.3 kg) SpO2 BMI Smoking Status 96% 32.58 kg/m2 Former Smoker Vitals History BMI and BSA Data Body Mass Index Body Surface Area 32.58 kg/m 2 2.11 m 2 Preferred Pharmacy Pharmacy Name Phone [address] [place] [address], [address] [place] [address] [phone] Your Updated Medication List  
  
   
This list is accurate as of 9/25/18  2:54 PM.  Always use your most recent med list. amLODIPine 10 mg tablet Commonly known as:  Oliverio Presser TAKE 1 TABLET BY MOUTH ONCE DAILY  
  
 aspirin delayed-release 81 mg tablet Take  by mouth daily. atorvastatin 10 mg tablet Commonly known as:  LIPITOR  
TAKE 1 TABLET BY MOUTH ONCE DAILY  
  
 colchicine 0.6 mg capsule Commonly known as:  Author Cashing Take 0.6 mg by mouth daily. furosemide 40 mg tablet Commonly known as:  LASIX TAKE 1 TABLET BY MOUTH ONCE DAILY  
  
 hydrALAZINE 25 mg tablet Commonly known as:  APRESOLINE Take 1 Tab by mouth three (3) times daily. hydroxychloroquine 200 mg tablet Commonly known as:  PLAQUENIL Take 200 mg by mouth two (2) times a day. lisinopril 40 mg tablet Commonly known as:  PRINIVIL, ZESTRIL  
TAKE 1 TABLET BY MOUTH ONCE DAILY  
  
 LUMIGAN 0.01 % ophthalmic drops Generic drug:  bimatoprost  
  
 MULTI FOR HIM PO Take  by mouth. nystatin powder Commonly known as:  MYCOSTATIN Apply  to affected area four (4) times daily. nystatin-triamcinolone topical cream  
Commonly known as:  MYCOLOG II Apply  to affected area two (2) times a day. omeprazole 20 mg capsule Commonly known as:  PRILOSEC  
TAKE 1 CAPSULE BY MOUTH ONCE DAILY PARoxetine 20 mg tablet Commonly known as:  PAXIL Take 1 Tab by mouth daily. potassium chloride 20 mEq tablet Commonly known as:  K-DUR, KLOR-CON Take 1 Tab by mouth daily. RESTASIS 0.05 % ophthalmic emulsion Generic drug:  cycloSPORINE Administer 1 Drop to both eyes two (2) times a day. VITAMIN D3 2,000 unit Tab Generic drug:  cholecalciferol (vitamin D3) Take 2,000 Units by mouth daily. VOLTAREN 1 % Gel Generic drug:  diclofenac Apply 4 g to affected area four (4) times daily. Prescriptions Sent to Pharmacy Refills  
 nystatin-triamcinolone (MYCOLOG II) topical cream 2 Sig: Apply  to affected area two (2) times a day. Class: Normal  
 Pharmacy: [address] [place] [address] Ph #: [phone] Route: Topical  
 nystatin (MYCOSTATIN) powder 1 Sig: Apply  to affected area four (4) times daily. Class: Normal  
 Pharmacy: [address] [place] [address] Ph #: [phone] Route: Topical  
  
We Performed the Following INFLUENZA VACCINE INACTIVATED (IIV), SUBUNIT, ADJUVANTED, IM [id number] CPT(R)] Follow-up Instructions Return in about 3 months (around 12/25/2018), or if symptoms worsen or fail to improve. Patient Instructions DASH Diet: Care Instructions Your Care Instructions The DASH diet is an eating plan that can help lower your blood pressure. DASH stands for Dietary Approaches to Stop Hypertension. Hypertension is high blood pressure. The DASH diet focuses on eating foods that are high in calcium, potassium, and magnesium. These nutrients can lower blood pressure. The foods that are highest in these nutrients are fruits, vegetables, low-fat dairy products, nuts, seeds, and legumes. But taking calcium, potassium, and magnesium supplements instead of eating foods that are high in those nutrients does not have the same effect. The DASH diet also includes whole grains, fish, and poultry. The DASH diet is one of several lifestyle changes your doctor may recommend to lower your high blood pressure. Your doctor may also want you to decrease the amount of sodium in your diet. Lowering sodium while following the DASH diet can lower blood pressure even further than just the DASH diet alone. Follow-up care is a key part of your treatment and safety. Be sure to make and go to all appointments, and call your doctor if you are having problems. It's also a good idea to know your test results and keep a list of the medicines you take. How can you care for yourself at home? Following the DASH diet · Eat 4 to 5 servings of fruit each day. A serving is 1 medium-sized piece of fruit, ½ cup chopped or canned fruit, 1/4 cup dried fruit, or 4 ounces (½ cup) of fruit juice. Choose fruit more often than fruit juice. · Eat 4 to 5 servings of vegetables each day. A serving is 1 cup of lettuce or raw leafy vegetables, ½ cup of chopped or cooked vegetables, or 4 ounces (½ cup) of vegetable juice. Choose vegetables more often than vegetable juice. · Get 2 to 3 servings of low-fat and fat-free dairy each day. A serving is 8 ounces of milk, 1 cup of yogurt, or 1 ½ ounces of cheese. · Eat 6 to 8 servings of grains each day. A serving is 1 slice of bread, 1 ounce of dry cereal, or ½ cup of cooked rice, pasta, or cooked cereal. Try to choose whole-grain products as much as possible. · Limit lean meat, poultry, and fish to 2 servings each day. A serving is 3 ounces, about the size of a deck of cards. · Eat 4 to 5 servings of nuts, seeds, and legumes (cooked dried beans, lentils, and split peas) each week. A serving is 1/3 cup of nuts, 2 tablespoons of seeds, or ½ cup of cooked beans or peas. · Limit fats and oils to 2 to 3 servings each day. A serving is 1 teaspoon of vegetable oil or 2 tablespoons of salad dressing. · Limit sweets and added sugars to 5 servings or less a week. A serving is 1 tablespoon jelly or jam, ½ cup sorbet, or 1 cup of lemonade. · Eat less than 2,300 milligrams (mg) of sodium a day. If you limit your sodium to 1,500 mg a day, you can lower your blood pressure even more. Tips for success · Start small. Do not try to make dramatic changes to your diet all at once. You might feel that you are missing out on your favorite foods and then be more likely to not follow the plan. Make small changes, and stick with them. Once those changes become habit, add a few more changes. · Try some of the following: ¨ Make it a goal to eat a fruit or vegetable at every meal and at snacks. This will make it easy to get the recommended amount of fruits and vegetables each day. ¨ Try yogurt topped with fruit and nuts for a snack or healthy dessert. ¨ Add lettuce, tomato, cucumber, and onion to sandwiches. ¨ Combine a ready-made pizza crust with low-fat mozzarella cheese and lots of vegetable toppings. Try using tomatoes, squash, spinach, broccoli, carrots, cauliflower, and onions. ¨ Have a variety of cut-up vegetables with a low-fat dip as an appetizer instead of chips and dip. ¨ Sprinkle sunflower seeds or chopped almonds over salads.  Or try adding chopped walnuts or almonds to cooked vegetables. ¨ Try some vegetarian meals using beans and peas. Add garbanzo or kidney beans to salads. Make burritos and tacos with mashed new beans or black beans. Where can you learn more? Go to http://regina-carmen.info/. Enter S563 in the search box to learn more about \"DASH Diet: Care Instructions. \" Current as of: December 6, 2017 Content Version: 11.7 © 3495-7252 [employer]. Care instructions adapted under license by [employer] (which disclaims liability or warranty for this information). If you have questions about a medical condition or this instruction, always ask your healthcare professional. [address] any warranty or liability for your use of this information. Introducing [facility]! [employer] introduces [employer] patient portal. Now you can access parts of your medical record, email your doctor's office, and request medication refills online. 1. In your internet browser, go to [email or web address] 2. Click on the First Time User? Click Here link in the Sign In box. You will see the New Member Sign Up page. 3. Enter your [employer] Access Code exactly as it appears below. You will not need to use this code after youve completed the sign-up process. If you do not sign up before the expiration date, you must request a new code. · [employer] Access Code: [email or web address] Expires: 9/27/2018  8:41 AM 
 
4. Enter the last four digits of your Social Security Number (xxxx) and Date of Birth (mm/dd/yyyy) as indicated and click Submit. You will be taken to the next sign-up page. 5. Create a [employer] ID. This will be your [employer] login ID and cannot be changed, so think of one that is secure and easy to remember. 6. Create a [employer] password. You can change your password at any time. 7. Enter your Password Reset Question and Answer.  This can be used at a later time if you forget your password. 8. Enter your e-mail address. You will receive e-mail notification when new information is available in [address] 9. Click Sign Up. You can now view and download portions of your medical record. 10. Click the Download Summary menu link to download a portable copy of your medical information. If you have questions, please visit the Frequently Asked Questions section of the [employer] website. Remember, [employer] is NOT to be used for urgent needs. For medical emergencies, dial 911. Now available from your iPhone and Android! Please provide this summary of care documentation to your next provider. Your primary care clinician is listed as Santiago Alvarado. If you have any questions after today's visit, please call [phone].

## 2018-09-28 ENCOUNTER — TELEPHONE (OUTPATIENT)
Dept: INTERNAL MEDICINE CLINIC | Age: 78
End: 2018-09-28

## 2018-09-28 LAB
HISPANIC, LDP2T: NO
LEAD BLD-MCNC: 8 UG/DL (ref 0–4)
RACE, 017371: ABNORMAL
SPECIMEN SOURCE: ABNORMAL
TEST PURPOSE, LDP4T: ABNORMAL

## 2018-09-28 NOTE — TELEPHONE ENCOUNTER
Reviewed blood work from visit. Please let the patient know that his kidney function has returned to normal (creatinine 0.85/ eGFR >60. Potassium and magnesium levels are normal. Also, his lead level is elevated at 8 ug/dl, but this is actually improved from the last several years where his level ranged from 10-15. Please see if he needs a copy of the level for his employment. Thanks.

## 2018-09-30 ENCOUNTER — TELEPHONE (OUTPATIENT)
Dept: INTERNAL MEDICINE CLINIC | Age: 78
End: 2018-09-30

## 2018-09-30 PROBLEM — B37.2 CANDIDAL INTERTRIGO: Status: ACTIVE | Noted: 2018-09-30

## 2018-09-30 PROBLEM — S68.111A: Status: ACTIVE | Noted: 2018-09-30
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
## 2018-10-05 ENCOUNTER — DOCUMENTATION ONLY (OUTPATIENT)
Dept: INTERNAL MEDICINE CLINIC | Age: 78
End: 2018-10-05

## 2018-10-29 ENCOUNTER — OFFICE VISIT (OUTPATIENT)
Dept: UROLOGY | Age: 78
End: 2018-10-29

## 2018-10-29 VITALS
HEART RATE: 79 BPM | OXYGEN SATURATION: 97 % | DIASTOLIC BLOOD PRESSURE: 80 MMHG | SYSTOLIC BLOOD PRESSURE: 140 MMHG | WEIGHT: 208 LBS | HEIGHT: 67 IN | BODY MASS INDEX: 32.65 KG/M2

## 2018-10-29 DIAGNOSIS — B35.6 TINEA CRURIS: ICD-10-CM

## 2018-10-29 DIAGNOSIS — N50.819 TESTICLE PAIN: Primary | ICD-10-CM

## 2018-10-29 DIAGNOSIS — A63.0 CONDYLOMA: ICD-10-CM

## 2018-10-29 LAB
BILIRUB UR QL STRIP: NEGATIVE
GLUCOSE UR-MCNC: NEGATIVE MG/DL
KETONES P FAST UR STRIP-MCNC: NEGATIVE MG/DL
PH UR STRIP: 5.5 [PH] (ref 4.6–8)
PROT UR QL STRIP: NORMAL
SP GR UR STRIP: 1.02 (ref 1–1.03)
UA UROBILINOGEN AMB POC: NORMAL (ref 0.2–1)
URINALYSIS CLARITY POC: CLEAR
URINALYSIS COLOR POC: YELLOW
URINE BLOOD POC: NEGATIVE
URINE LEUKOCYTES POC: NEGATIVE
URINE NITRITES POC: NEGATIVE

## 2018-10-29 NOTE — PROGRESS NOTES
Shady Griffin Brett 70 y.o. male     Mr. Rik Lozano seen today for groin rash and right scrotal skin lesion    Followed in neurology office for mild obstructive and irritative voiding symptoms previously relieved with alpha-blocker therapy but that that treatment was discontinued because of intolerable side effects of the medication-now voiding with a solid stream experiencing nocturia once per night overall not unhappy with urination at this time  Patient is voiding well and has no complaints regarding urination at this time-voiding with a solid stream good control and no nocturia  Patient declined alpha-blocker treatment several years ago because of concern regarding side effects        Large prostate median lobe on ultrasound imaging of the bladder last year-PVR at that time 48 cc      PSA 3.8 in January 2013  PSA 2.6 in 2011  PSA 2.3 in January 2014  PSA 2.7 in July 2014  PSA 1.9 on 10 July 2016  PSA 2.1 on 14 July 2017     PVR 48 cc in 2015   cc in 2017              prostate volume 67.7 cc  PVR 12 cc in July 2018      Review of Systems:    CNS: no seizure syncope headaches or dizziness no visual changes/no changes- on Paxil Rx  Respiratory: no wheezing or shortness of breath  Cardiovascular:hypertension-chest pain or palpitations  Intestinal:GERD  Urinary: mild BPH symptoms  Skeletal: or joint arthritis  Endocrine:no diabetes or thyroid disease  Other:      Allergies:    Allergies   Allergen Reactions    Tetanus Toxoid, Adsorbed Anaphylaxis    Adhesive Tape-Silicones Rash    Aldactone [Spironolactone] Not Reported This Time     Breast tenderness    Codeine Not Reported This Time     \"Drives crazy\"    Tape  [Adhesive] Rash    Tetanus Vaccines And Toxoid Anaphylaxis     Other reaction(s): anaphylaxis/angioedema      Medications:    Current Outpatient Medications   Medication Sig Dispense Refill    furosemide (LASIX) 40 mg tablet TAKE 1 TABLET BY MOUTH ONCE DAILY 90 Tab 1    nystatin-triamcinolone (MYCOLOG II) topical cream Apply  to affected area two (2) times a day. 30 g 2    nystatin (MYCOSTATIN) powder Apply  to affected area four (4) times daily. 60 g 1    amLODIPine (NORVASC) 10 mg tablet TAKE 1 TABLET BY MOUTH ONCE DAILY 30 Tab 11    omeprazole (PRILOSEC) 20 mg capsule TAKE 1 CAPSULE BY MOUTH ONCE DAILY 90 Cap 1    lisinopril (PRINIVIL, ZESTRIL) 40 mg tablet TAKE 1 TABLET BY MOUTH ONCE DAILY 90 Tab 2    hydrALAZINE (APRESOLINE) 25 mg tablet Take 1 Tab by mouth three (3) times daily. 90 Tab 5    atorvastatin (LIPITOR) 10 mg tablet TAKE 1 TABLET BY MOUTH ONCE DAILY 90 Tab 3    PARoxetine (PAXIL) 20 mg tablet Take 1 Tab by mouth daily. 30 Tab 11    potassium chloride (K-DUR, KLOR-CON) 20 mEq tablet Take 1 Tab by mouth daily. 90 Tab 2    cycloSPORINE (RESTASIS) 0.05 % ophthalmic emulsion Administer 1 Drop to both eyes two (2) times a day.  colchicine (MITIGARE) 0.6 mg capsule Take 0.6 mg by mouth daily.  cholecalciferol, vitamin D3, (VITAMIN D3) 2,000 unit tab Take 2,000 Units by mouth daily.  LUMIGAN 0.01 % ophthalmic drops       hydroxychloroquine (PLAQUENIL) 200 mg tablet Take 200 mg by mouth two (2) times a day.  aspirin delayed-release 81 mg tablet Take  by mouth daily.  MULTIVITS/IRON FUM/FA/D3/LYCOP (MULTI FOR HIM PO) Take  by mouth.  diclofenac (VOLTAREN) 1 % topical gel Apply 4 g to affected area four (4) times daily. 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 Dr. Robe Chaparro.  CPDD (calcium pyrophosphate deposition disease)     Depression     GERD (gastroesophageal reflux disease)     Hyperlipidemia     Hypertension     Prediabetes     Primary osteoarthritis involving multiple joints 9/6/2011    Rheumatoid arthritis ([facility] [address] 2013    negative RF; elevated anti-CCP. Dr. Driscoll Po.       Past Surgical History:   Procedure Laterality Date    HX AMPUTATION FINGER Left     HX CARPAL TUNNEL RELEASE      HX CATARACT REMOVAL Left 01/2018    HX CATARACT REMOVAL Bilateral 2018    HX HEENT      sinus, tonsillectomy    HX HERNIA REPAIR      HX MOHS PROCEDURES      bilateral     HX ORTHOPAEDIC      lef knee surgery, removed cartilage. Social History     Socioeconomic History    Marital status:      Spouse name: Not on file    Number of children: Not on file    Years of education: Not on file    Highest education level: Not on file   Social Needs    Financial resource strain: Not on file    Food insecurity - worry: Not on file    Food insecurity - inability: Not on file    Transportation needs - medical: Not on file   [employer] needs - non-medical: Not on file   Occupational History    Not on file   Tobacco Use    Smoking status: Former Smoker    Smokeless tobacco: Former User   Substance and Sexual Activity    Alcohol use: Yes     Comment: rarely    Drug use: No    Sexual activity: Not Currently   Other Topics Concern    Not on file   Social History Narrative    Not on file      Family History   Problem Relation Age of Onset    Cancer Mother     Heart Disease Father     Alcohol abuse Father     Cancer Other         Physical Examination: Well-nourished mature male in no apparent distress    5 mm wart lateral aspect right scrotal  Tinea cruris inguinal rash    Urinalysis: Negative dipstick/nitrite negative    Impression: Tinea cruris inguinal rash                        Scrotal condyloma      Plan: Tinactin cream 3 times daily/soap and water cleansing twice daily            RTC for excision of scrotal lesion          More than 1/2 of this 15 minute visit was spent in counselling and coordination of care, as described above. Maged Lopez MD  -electronically signed-    PLEASE NOTE:  This document has been produced using voice recognition software. Unrecognized errors in transcription may be present.

## 2018-10-29 NOTE — PROGRESS NOTES
Mr. Emmanuel Patton has a reminder for a \"due or due soon\" health maintenance. I have asked that he contact his primary care provider for follow-up on this health maintenance.

## 2018-10-29 NOTE — PATIENT INSTRUCTIONS
Testicular Pain: Care Instructions  Your Care Instructions    Pain in the testicles can be caused by many things. These include an injury to your testicles, an infection, and testicular torsion. Injuries and genital problems most often happen during sports or recreational activities, at work, or in a fall. Pain caused by an injury usually goes away quickly. There is usually no long-term harm to your testicles. Infections that may cause pain include:  · An infection of the testicles. This is called orchitis. · An abscess in the scrotum or testicles. · Some sexually transmitted infections (STIs). · A swelling of the tube attached to a testicle. This swelling is called epididymitis. It can cause pain and is sometimes caused by an infection. Testicular torsion happens when a testicle twists on the spermatic cord. This cuts off the blood supply to the testicle. This is a serious condition that requires surgery. Follow-up care is a key part of your treatment and safety. Be sure to make and go to all appointments, and call your doctor if you are having problems. It's also a good idea to know your test results and keep a list of the medicines you take. How can you care for yourself at home? · Rest and protect your testicles and groin. Stop, change, or take a break from any activity that may be causing your pain or soreness. · Put ice or a cold pack on the area for 10 to 20 minutes at a time. Put a thin cloth between the ice and your skin. · Wear briefs, not boxers. Briefs help support the injured area. You can use a jock strap if it helps relieve your pain. · If your doctor prescribed antibiotics, take them as directed. Do not stop taking them just because you feel better. You need to take the full course of antibiotics. · Ask your doctor if you can take an over-the-counter pain medicine, such as acetaminophen (Tylenol), ibuprofen (Advil, Motrin), or naproxen (Aleve). Be safe with medicines.  Read and follow all instructions on the label. · If the doctor gave you a prescription medicine for pain, take it as prescribed. When should you call for help? Call your doctor now or seek immediate medical care if:    · You have severe or increasing pain.     · You notice a change in how your testicles look or are positioned in your scrotum.     · You notice new or worse swelling in your scrotum.     · You have symptoms of a urinary problem, such as a urinary tract infection. These may include:  ? Pain or burning when you urinate. ? A frequent need to urinate without being able to pass much urine. ? Pain in the flank, which is just below the rib cage and above the waist on either side of the back. ? Blood in your urine. ? A fever.    Watch closely for changes in your health, and be sure to contact your doctor if:    · You do not get better as expected. Where can you learn more? Go to http://regina-carmen.info/. Enter O739 in the search box to learn more about \"Testicular Pain: Care Instructions. \"  Current as of: March 21, 2018  Content Version: 11.8  © 0822-0715 [employer]. Care instructions adapted under license by [employer] (which disclaims liability or warranty for this information). If you have questions about a medical condition or this instruction, always ask your healthcare professional. [address] any warranty or liability for your use of this information.

## 2018-10-31 RX ORDER — PAROXETINE HYDROCHLORIDE 20 MG/1
TABLET, FILM COATED ORAL
Qty: 30 TAB | Refills: 11 | Status: SHIPPED | OUTPATIENT
Start: 2018-10-31 | End: 2019-08-13 | Stop reason: ALTCHOICE

## 2019-01-10 ENCOUNTER — OFFICE VISIT (OUTPATIENT)
Dept: INTERNAL MEDICINE CLINIC | Age: 79
End: 2019-01-10

## 2019-01-10 ENCOUNTER — HOSPITAL ENCOUNTER (OUTPATIENT)
Dept: GENERAL RADIOLOGY | Age: 79
Discharge: HOME | End: 2019-01-10
Payer: MEDICARE

## 2019-01-10 VITALS
SYSTOLIC BLOOD PRESSURE: 136 MMHG | BODY MASS INDEX: 33.27 KG/M2 | WEIGHT: 212 LBS | HEART RATE: 87 BPM | TEMPERATURE: 98 F | OXYGEN SATURATION: 98 % | HEIGHT: 67 IN | DIASTOLIC BLOOD PRESSURE: 78 MMHG | RESPIRATION RATE: 16 BRPM

## 2019-01-10 DIAGNOSIS — M25.512 CHRONIC PAIN OF BOTH SHOULDERS: ICD-10-CM

## 2019-01-10 DIAGNOSIS — M25.511 CHRONIC PAIN OF BOTH SHOULDERS: ICD-10-CM

## 2019-01-10 DIAGNOSIS — I10 ESSENTIAL HYPERTENSION: ICD-10-CM

## 2019-01-10 DIAGNOSIS — K21.9 GASTROESOPHAGEAL REFLUX DISEASE, ESOPHAGITIS PRESENCE NOT SPECIFIED: ICD-10-CM

## 2019-01-10 DIAGNOSIS — G89.29 CHRONIC PAIN OF BOTH SHOULDERS: ICD-10-CM

## 2019-01-10 DIAGNOSIS — M11.20 CPDD (CALCIUM PYROPHOSPHATE DEPOSITION DISEASE): ICD-10-CM

## 2019-01-10 DIAGNOSIS — E55.9 VITAMIN D DEFICIENCY: ICD-10-CM

## 2019-01-10 DIAGNOSIS — M15.9 PRIMARY OSTEOARTHRITIS INVOLVING MULTIPLE JOINTS: ICD-10-CM

## 2019-01-10 DIAGNOSIS — G47.30 SLEEP APNEA, UNSPECIFIED TYPE: ICD-10-CM

## 2019-01-10 DIAGNOSIS — R40.0 HAS DAYTIME DROWSINESS: ICD-10-CM

## 2019-01-10 DIAGNOSIS — M54.2 NECK PAIN: ICD-10-CM

## 2019-01-10 DIAGNOSIS — M19.012 PRIMARY OSTEOARTHRITIS OF BOTH SHOULDERS: ICD-10-CM

## 2019-01-10 DIAGNOSIS — M19.011 PRIMARY OSTEOARTHRITIS OF BOTH SHOULDERS: ICD-10-CM

## 2019-01-10 DIAGNOSIS — M06.041 RHEUMATOID ARTHRITIS INVOLVING BOTH HANDS WITH NEGATIVE RHEUMATOID FACTOR (HCC): ICD-10-CM

## 2019-01-10 DIAGNOSIS — S68.111A: ICD-10-CM

## 2019-01-10 DIAGNOSIS — R06.83 SNORING: ICD-10-CM

## 2019-01-10 DIAGNOSIS — M06.042 RHEUMATOID ARTHRITIS INVOLVING BOTH HANDS WITH NEGATIVE RHEUMATOID FACTOR (HCC): ICD-10-CM

## 2019-01-10 DIAGNOSIS — B37.2 CANDIDAL INTERTRIGO: ICD-10-CM

## 2019-01-10 DIAGNOSIS — R73.03 PRE-DIABETES: ICD-10-CM

## 2019-01-10 DIAGNOSIS — R06.00 DYSPNEA, PAROXYSMAL NOCTURNAL: Primary | ICD-10-CM

## 2019-01-10 DIAGNOSIS — R73.09 ABNORMAL GLUCOSE: ICD-10-CM

## 2019-01-10 DIAGNOSIS — E78.5 HYPERLIPIDEMIA, UNSPECIFIED HYPERLIPIDEMIA TYPE: ICD-10-CM

## 2019-01-10 PROCEDURE — 72040 X-RAY EXAM NECK SPINE 2-3 VW: CPT

## 2019-01-10 PROCEDURE — 73030 X-RAY EXAM OF SHOULDER: CPT

## 2019-01-10 NOTE — PROGRESS NOTES
Chief Complaint Patient presents with  Hypertension 3 month follow up with lab results. Health Maintenance Due Topic Date Due  Shingrix Vaccine Age 50> (1 of 2) 01/16/1990 1. Have you been to the ER, urgent care clinic or hospitalized since your last visit? NO.  
 
2. Have you seen or consulted any other health care providers outside of the [address] [place] [address] since your last visit (Include any pap smears or colon screening)?  NO

## 2019-01-10 NOTE — PATIENT INSTRUCTIONS
DASH Diet: Care Instructions Your Care Instructions The DASH diet is an eating plan that can help lower your blood pressure. DASH stands for Dietary Approaches to Stop Hypertension. Hypertension is high blood pressure. The DASH diet focuses on eating foods that are high in calcium, potassium, and magnesium. These nutrients can lower blood pressure. The foods that are highest in these nutrients are fruits, vegetables, low-fat dairy products, nuts, seeds, and legumes. But taking calcium, potassium, and magnesium supplements instead of eating foods that are high in those nutrients does not have the same effect. The DASH diet also includes whole grains, fish, and poultry. The DASH diet is one of several lifestyle changes your doctor may recommend to lower your high blood pressure. Your doctor may also want you to decrease the amount of sodium in your diet. Lowering sodium while following the DASH diet can lower blood pressure even further than just the DASH diet alone. Follow-up care is a key part of your treatment and safety. Be sure to make and go to all appointments, and call your doctor if you are having problems. It's also a good idea to know your test results and keep a list of the medicines you take. How can you care for yourself at home? Following the DASH diet · Eat 4 to 5 servings of fruit each day. A serving is 1 medium-sized piece of fruit, ½ cup chopped or canned fruit, 1/4 cup dried fruit, or 4 ounces (½ cup) of fruit juice. Choose fruit more often than fruit juice. · Eat 4 to 5 servings of vegetables each day. A serving is 1 cup of lettuce or raw leafy vegetables, ½ cup of chopped or cooked vegetables, or 4 ounces (½ cup) of vegetable juice. Choose vegetables more often than vegetable juice. · Get 2 to 3 servings of low-fat and fat-free dairy each day. A serving is 8 ounces of milk, 1 cup of yogurt, or 1 ½ ounces of cheese. · Eat 6 to 8 servings of grains each day. A serving is 1 slice of bread, 1 ounce of dry cereal, or ½ cup of cooked rice, pasta, or cooked cereal. Try to choose whole-grain products as much as possible. · Limit lean meat, poultry, and fish to 2 servings each day. A serving is 3 ounces, about the size of a deck of cards. · Eat 4 to 5 servings of nuts, seeds, and legumes (cooked dried beans, lentils, and split peas) each week. A serving is 1/3 cup of nuts, 2 tablespoons of seeds, or ½ cup of cooked beans or peas. · Limit fats and oils to 2 to 3 servings each day. A serving is 1 teaspoon of vegetable oil or 2 tablespoons of salad dressing. · Limit sweets and added sugars to 5 servings or less a week. A serving is 1 tablespoon jelly or jam, ½ cup sorbet, or 1 cup of lemonade. · Eat less than 2,300 milligrams (mg) of sodium a day. If you limit your sodium to 1,500 mg a day, you can lower your blood pressure even more. Tips for success · Start small. Do not try to make dramatic changes to your diet all at once. You might feel that you are missing out on your favorite foods and then be more likely to not follow the plan. Make small changes, and stick with them. Once those changes become habit, add a few more changes. · Try some of the following: ? Make it a goal to eat a fruit or vegetable at every meal and at snacks. This will make it easy to get the recommended amount of fruits and vegetables each day. ? Try yogurt topped with fruit and nuts for a snack or healthy dessert. ? Add lettuce, tomato, cucumber, and onion to sandwiches. ? Combine a ready-made pizza crust with low-fat mozzarella cheese and lots of vegetable toppings. Try using tomatoes, squash, spinach, broccoli, carrots, cauliflower, and onions. ? Have a variety of cut-up vegetables with a low-fat dip as an appetizer instead of chips and dip. ? Sprinkle sunflower seeds or chopped almonds over salads.  Or try adding chopped walnuts or almonds to cooked vegetables. ? Try some vegetarian meals using beans and peas. Add garbanzo or kidney beans to salads. Make burritos and tacos with mashed new beans or black beans. Where can you learn more? Go to http://regina-carmen.info/. Enter N660 in the search box to learn more about \"DASH Diet: Care Instructions. \" Current as of: December 6, 2017 Content Version: 11.8 © 4883-0334 [employer]. Care instructions adapted under license by [employer] (which disclaims liability or warranty for this information). If you have questions about a medical condition or this instruction, always ask your healthcare professional. [address] any warranty or liability for your use of this information.

## 2019-01-11 NOTE — PROGRESS NOTES
Called and discussed results of shoulder x-rays with patient. Will proceed with referral to Dr. Mayda Casas for evaluation.

## 2019-01-11 NOTE — PROGRESS NOTES
Called and discussed result of cervical spine x-ray. Discussed that most of his pain is secondary to osteoarthritis and degenerative changes. Discussed proceeding with physical therapy, but wishing to hold off for now. He reports that he did schedule an appointment with Dr. Radha Hunt for follow-up next week.

## 2019-01-11 NOTE — PROGRESS NOTES
Called and discussed results of shoulder x-rays with patient. Will proceed with referral to Dr. Libra Durán for evaluation.

## 2019-01-14 NOTE — PROGRESS NOTES
HPI:  
Phoenix Carney is a 70y.o. year old male who presents today for evaluation of hypertension, hyperlipidemia, prediabetes, rheumatoid arthritis, osteoarthritis, calcium pyrophosphate deposition disease, BPH, GERD, and depression. 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 He states that he has returned to work following his accident. He states that he continues to have limited movement of his left index finger, but has completed physical therapy. He complains today of worsening neck and bilateral shoulder pain. He states that he was previously followed by Dr. Carlos Alberto Montano, and would occasionally receive cortisone injections into his shoulders. He states that the neck pain has become particularly bothersome, and he is having difficulty turning his head. He denies any upper extremity weakness or paresthesias. He is using ibuprofen 400 mg tid and Tylenol for pain. He also reports that he has been awakening from sleep at night gasping for air. He states that these episodes occur approximately 2-3 times per week, and he finds that he must sit up immediately and take deep breathes until resolved. He does admit to snoring and states that he does experience daytime drowsiness particularly when driving. He states that when on long trips, he must pull off the road and take a nap before resuming driving. He remains active, and denies any chest pain, shortness of breath with exertion, palpitations, lightheadedness, or orthopnea. He does develop some edema throughout the day that resolves overnight. He is otherwise without complaints and feeling generally well. On 6/20/2018, he suffered an accidental gun shot wound while working resulting in traumatic injury to his left index finger with near loss of the middle phalanx and fracture of the distal phalanx.  He was taken to [facility] and initially had irrigation and closure of the lacerations performed. He presented to the [facility] ED on 6/24/2018 with increased pain and swelling, and was started on doxycycline for a wound infection. On 7/7/2018, due to loss of stability and angulation of the index finger, he underwent stabilization with fusion of the proximal and distal phalanx with bone grafting by Dr. Bruna Espana. He did well and was started on physical therapy. On 8/1/2018, he presented to [address] [place] [address] for evaluation of increasing erythema, swelling and tenderness with concern for a possible infection. He underwent left hand x-ray (8/1/2018) showing severe soft tissue swelling of the left second finger worrisome for cellulitis, traumatic amputation of the middle phalanx with marked osteopenia and irregularity of the base of the distal phalanx and flattening and irregularity of the head of the proximal phalanx. Unclear if related to prior trauma/surgery or osteomyelitis with osseous destruction and no films available for comparison. He was started empirically on Bactrim and Augmentin for 14 days. On 8/10/2018, he presented for evaluation by GOMEZ Alcantar for complaints of fever, malaise, headache, fatigue, and diarrhea. Lab evaluation showed WBC 17 (90% neutrophils), creatinine 1.34/ eGFR 52, blood culture negative. Stool cultures (8/13/2018) routine, O/P, and C.diff negative. Bactrim and Augmentin were discontinued on 8/13/2018, and he was started on metronidazole for 10 days for treatment of presumed C.diff with improvement. He was evaluated by Dr. Chio Jimenez on 8/15/2018 and repeat WBC 8.6 (59% neutrophils), ESR 6, and CRP 0.9. He was seen by Dr. Chio Jimenez in follow-up on 8/30/2018 and left index finger wound noted to be significantly improved and no further difficulty with diarrhea. He has a history of hypertension, treated with amlodipine, lisinopril, lasix (+ potassium), and hydralazine was added in 4/2018. He states that his wife has been monitoring his blood pressure intermittently.  He denies any chest pain, shortness of breath at rest or with exertion, lightheadedness, or palpitations. He does report bilateral lower extremity swelling that it will increase throughout the day and improve overnight. . In 6/2016, he underwent lower extremity arterial and venous duplex scans, both of which were negative. He also has a history of hyperlipidemia, treated with moderate intensity atorvastatin. He has a history of prediabetes, with HbA1c ranging from 5.9-6.2 since 2012. He denies any polyuria, polydipsia, nocturia, or blurry vision, and has no history of retinopathy, neuropathy, or nephropathy. He has regular eye exams with Dr. Roque Bloom. He has a history of bilateral hand pain with morning stiffness for several years, and in 3/2014, he was noted to have a positive anti-CCP antibody level although NIMCO, rheumatoid factor, and ESR were negative. He was referred for evaluation to Dr. Arielle Samano, and was diagnosed with rheumatoid arthritis in 6/2014. He was treated with hydroxychloroquine, which has been partially helpful in controlling his symptoms. Bilateral hand x-rays also showed evidence of primary osteoarthritis with osteophytes present on the second and third MCP joints. In 1/2017, he was also noted to have evidence of possible chondrocalcinosis on x-ray, and was started on low dose colchicine in addition to hydroxychloroquine for concomitant calcium pyrophosphate deposition disease. He states that since starting on colchicine, he has had significant improvement in his hand pain. He also uses compounding cream and Voltaren gel for pain control. He has a history of symptomatic BPH, with complaints of decreased stream, hesitancy, and dribbling. He has refused treatment with medication. He is followed by Dr. Billie Jones. He denies any dysuria, gross hematuria, or flank pain.  
 
He has a history of GERD, treated with daily omeprazole with good control of symptoms. He had a screening colonoscopy and 8/2015 by Dr. Fabian Talbot, showing a 3 mm sessile cecal polyp (pathology: intra-mucosal lymphoid aggregate), two 4 mm sessile polyps in the transverse colon (pathology: serrated adenomas), and a 1 cm lipoma in the transverse colon. Follow-up recommended for five years. He was having difficulty with rectal bleeding in 1/2018 and returned for evaluation with Dr. Fabian Talbot who felt it was most likely secondary to a hemorrhoidal source. She recommended use of Citrucel. He states that the bleeding has improved with initiation of this therapy. He denies any abdominal pain, nausea, vomiting, melena, or change in bowel movements. He has a history of depression, which is well controlled with Paxil. Past Medical History:  
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 Dr. Edwige Branch.  CPDD (calcium pyrophosphate deposition disease)  Depression  GERD (gastroesophageal reflux disease)  Hyperlipidemia  Hypertension  Prediabetes  Primary osteoarthritis involving multiple joints 9/6/2011  Rheumatoid arthritis ([facility] [address] 2013  
 negative RF; elevated anti-CCP. Dr. Schuyler Perkins. Past Surgical History:  
Procedure Laterality Date  HX AMPUTATION FINGER Left  HX CARPAL TUNNEL RELEASE  HX CATARACT REMOVAL Left 01/2018  HX CATARACT REMOVAL Bilateral 2018  HX HEENT    
 sinus, tonsillectomy  HX HERNIA REPAIR    
 HX MOHS PROCEDURES    
 bilateral   
 HX ORTHOPAEDIC    
 lef knee surgery, removed cartilage. Current Outpatient Medications Medication Sig  PARoxetine (PAXIL) 20 mg tablet TAKE 1 TABLET BY MOUTH ONCE DAILY  furosemide (LASIX) 40 mg tablet TAKE 1 TABLET BY MOUTH ONCE DAILY  amLODIPine (NORVASC) 10 mg tablet TAKE 1 TABLET BY MOUTH ONCE DAILY  omeprazole (PRILOSEC) 20 mg capsule TAKE 1 CAPSULE BY MOUTH ONCE DAILY  lisinopril (PRINIVIL, ZESTRIL) 40 mg tablet TAKE 1 TABLET BY MOUTH ONCE DAILY  hydrALAZINE (APRESOLINE) 25 mg tablet Take 1 Tab by mouth three (3) times daily.  atorvastatin (LIPITOR) 10 mg tablet TAKE 1 TABLET BY MOUTH ONCE DAILY  potassium chloride (K-DUR, KLOR-CON) 20 mEq tablet Take 1 Tab by mouth daily.  cycloSPORINE (RESTASIS) 0.05 % ophthalmic emulsion Administer 1 Drop to both eyes two (2) times a day.  colchicine (MITIGARE) 0.6 mg capsule Take 0.6 mg by mouth daily.  cholecalciferol, vitamin D3, (VITAMIN D3) 2,000 unit tab Take 2,000 Units by mouth daily.  diclofenac (VOLTAREN) 1 % topical gel Apply 4 g to affected area four (4) times daily.  LUMIGAN 0.01 % ophthalmic drops  hydroxychloroquine (PLAQUENIL) 200 mg tablet Take 200 mg by mouth two (2) times a day.  aspirin delayed-release 81 mg tablet Take  by mouth daily.  MULTIVITS/IRON FUM/FA/D3/LYCOP (MULTI FOR HIM PO) Take  by mouth.  nystatin-triamcinolone (MYCOLOG II) topical cream Apply  to affected area two (2) times a day.  nystatin (MYCOSTATIN) powder Apply  to affected area four (4) times daily. No current facility-administered medications for this visit. Allergies and Intolerances: Allergies Allergen Reactions  Tetanus Toxoid, Adsorbed Anaphylaxis  Adhesive Tape-Silicones Rash  Aldactone [Spironolactone] Not Reported This Time Breast tenderness  Codeine Not Reported This Time \"Drives crazy\"  Tape  [Adhesive] Rash  Tetanus Vaccines And Toxoid Anaphylaxis Other reaction(s): anaphylaxis/angioedema Family History: He has no family history of colon or prostate cancer. Family History Problem Relation Age of Onset  Cancer Mother  Heart Disease Father  Alcohol abuse Father  Cancer Other Social History: He  reports that he has quit smoking.  He has quit using smokeless tobacco. He smoked 2 ppd for 50 years, stopping in 1974. He is  with two adult children. 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 Social History Substance and Sexual Activity Alcohol Use Yes Comment: rarely Immunization History: 
Immunization History Administered Date(s) Administered  Influenza High Dose Vaccine PF 09/30/2017  Influenza Vaccine (Tri) Adjuvanted 09/25/2018  Influenza Vaccine Split 10/04/2011, 10/16/2012  Influenza Vaccine Whole 01/15/2010  Pneumococcal Conjugate (PCV-13) 01/19/2015  Varicella Virus Vaccine 10/01/2013  ZZZ-RETIRED (DO NOT USE) Pneumococcal Vaccine (Unspecified Type) 01/01/2008  Zoster 10/16/2012 Review of Systems: As above included in HPI. Otherwise 11 point review of systems negative including constitutional, skin, HENT, eyes, respiratory, cardiovascular, gastrointestinal, genitourinary, musculoskeletal, endocrine, hematologic, allergy, and neurologic. Physical:  
Vitals:  
BP: 136/78 HR: 87 
WT: 212 lb (96.2 kg) BMI:  32.67 kg/m2 Exam:  
Patient appears in no apparent distress. Affect is appropriate. HEENT: PERRLA, anicteric, oropharynx clear, no JVD, adenopathy or thyromegaly. No carotid bruits or radiated murmur. Lungs: clear to auscultation, no wheezes, rhonchi, or rales. Heart: regular rate and rhythm. No murmur, rubs, gallops Abdomen: soft, nontender, nondistended, normal bowel sounds, no hepatosplenomegaly or masses. Extremities: without edema. Pulses 1-2+ bilaterally. Left index finger with well healed scar, s/p amputation of middle phalanx with shortening of finger and medial deviation. No tenderness to palpation over cervical spine; range of motion of neck and bilateral shoulders limited by pain; no erythema, warmth, or swelling noted. Muscle strength 5/5 throughout. Review of Data: 
Labs: No visits with results within 1 Month(s) from this visit. Latest known visit with results is:  
Office Visit on 10/29/2018 Component Date Value Ref Range Status  Color (UA POC) 10/29/2018 Yellow   Final  
 Clarity (UA POC) 10/29/2018 Clear   Final  
 Glucose (UA POC) 10/29/2018 Negative  Negative Final  
 Bilirubin (UA POC) 10/29/2018 Negative  Negative Final  
 Ketones (UA POC) 10/29/2018 Negative  Negative Final  
 Specific gravity (UA POC) 10/29/2018 1.025  1.001 - 1.035 Final  
 Blood (UA POC) 10/29/2018 Negative  Negative Final  
 pH (UA POC) 10/29/2018 5.5  4.6 - 8.0 Final  
 Protein (UA POC) 10/29/2018 1+  Negative Final  
 Urobilinogen (UA POC) 10/29/2018 0.2 mg/dL  0.2 - 1 Final  
 Nitrites (UA POC) 10/29/2018 Negative  Negative Final  
 Leukocyte esterase (UA POC) 10/29/2018 Negative  Negative Final  
 
EKG (1/10/2019) sinus rhythm at 70 bpm, nonspecific T wave abnormality; no change from 2018. Health Maintenance: 
Screening:  
 Colorectal: colonoscopy (2015) serrated adenomas. Dr. Wayne Baca. Due . Depression: on Paxil DM (HbA1c/FPG): HbA1c 5.6 (2018) Hepatitis C: N/A Falls: none DEXA: within normal limits (2016) PSA/MARTÍNEZ: PSA 2.1. As per Dr. Delmer Murillo Glaucoma: regular eye exams with Dr. Cisse/ Dr. Monica Kaur (last 2018) Smokin pack year. Distant past. 
 Vitamin D: 33.7 (2018) Medicare Wellness: 2018 Impression: 
Patient Active Problem List  
Diagnosis Code  BPH with obstruction/lower urinary tract symptoms N40.1, N13.8  Hypertension I10  
 Primary osteoarthritis involving multiple joints M15.0  Hyperlipidemia E78.5  GERD (gastroesophageal reflux disease) K21.9  Pre-diabetes R73.03  
 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 ([facility] [address] M06.041, U46.657  Vitamin D deficiency E55.9  Colon polyps K63.5  CPDD (calcium pyrophosphate deposition disease) M11.20  Abnormal glucose R73.09  
  Depression F32.9  Rectal bleeding K62.5  Class 1 obesity due to excess calories with serious comorbidity and body mass index (BMI) of 32.0 to 32.9 in adult E66.09, Z68.32  
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 Hypertension. Blood pressure generally well controlled on current regimen of lisinopril 40 mg daily, amlodipine 10 mg daily, lasix 40 mg daily (potassium 20 meq daily) and hydralazine 25 mg bid. Renal function normalized at last check with creatinine 0.85 / eGFR >60. Continue to follow. 2. Hyperlipidemia. On moderate intensity dose atorvastatin with LDL 63 (8/2018), indicative of excellent control in this patient. Continue to follow. 3. Prediabetes. Has been controlled on diet alone. HbA1c improved to 5.6 in 8/2018. No evidence of microvascular complications. On Ace-I and statin. Continue follow-up with Dr. Trinity Sullivan for annual eye exams. Emphasized importance of lifestyle modifications, including diet, exercise, and weight loss. 4. Rheumatoid arthritis. On Plaquenil. Has regular eye exams with Dr. Trinity Sullivan. Difficult to gauge response as appears to have evidence of osteoarthritis and CPPD occurring concurrently, but noted significant improvement since initiating colchicine. Voltaren gel for pain control. Tylenol while at work to help with pain control as needed. Followed by Dr. Amarjit Meyers and overdue for appointment. Will schedule. 5. Primary osteoarthritis. Evident in bilateral hands and knees. Using Voltaren gel for pain. 6. Neck and shoulder pain. Most likely secondary to osteoarthritis. Will obtain cervical and bilateral shoulder x-rays to evaluate. Discussed possible referral to orthopedics and will await x-ray results. Continue ibuprofen and Tylenol. Also suggested using Voltaren gel. 7. CPDD.  Colchicine started on 1/19/2017 to help address chondrocalcinosis on x-rays. Reports significant improvement. Now taking every other day with good control. 8. Paroxysmal nocturnal dyspnea. Patient awakening gasping for air several times per week. No overt evidence of heart failure and EKG without change from baseline. Patient with known snoring and daytime drowsiness, suggesting that episodes may be secondary to sleep apnea. Will refer to Dr. Sean Nieto for evaluation. 9. Rectal bleeding. Colonoscopy 8/2015. Evaluated by Dr. Deena Collins and considered to be hemorrhoidal in source. Known internal and external hemorrhoids. Improved with Citrucel. No evidence of anemia. Follow. 10. BPH. Does have lower urinary tract symptoms, but refusing medications or surgical interventions. Followed by Dr. Braden López. 11. GERD. Good control of symptoms with omeprazole. No issues today. 12. Depression. Good control with Paxil. No longer Xanax for sleep. Follow. 13. 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 Ongoing secondary to work as a [employer]. Monitored annually, last 9/2018 without evidence of toxicity. 14. Obesity. Emphasized importance of lifestyle modifications, including diet, exercise, and weight loss. Discussed that would help control blood sugar. Will readdress at next visit. 15. Insomnia. Using melatonin agonist, ramelteon, to replace Xanax. Had good response and weaned from Xanax. 16. Candidal intertrigo. Treated with Mycolog cream and discussed preventative measures with drying agents with improvement. Follow. 17. Health maintenance. Already received influenza vaccine. Unable to receive Tdap due to allergy (anaphylaxis to Td). Unable to give Shingrix vaccine due to arthritis and treatment with Plaquenil. Other immunizations up to date. Colonoscopy due 2020. Continue regular eye exams with Dr. Maki Hernandez. Will request record. Vitamin D level normal. Continue maintenance dose supplement. Medicare wellness visit up to date.  
 
Total time: 40 minutes spent with the patient in face-to-face consultation of which greater than 50% was spent on counseling, answering questions and/or coordination of care. Complex medical review and management performed. Patient understands recommendations and agrees with plan. Follow-up in 8 weeks. Addendum XR Results (most recent): 
Results from Hospital Encounter encounter on 01/10/19 XR SHOULDER RT AP/LAT MIN 2 V Narrative EXAMINATION: Right shoulder 2 views INDICATION: Right shoulder pain COMPARISON: None FINDINGS: Internal and external rotation frontal views of the right shoulder 
obtained. No acute fracture or subluxation identified. Moderate 
acromioclavicular degenerative change and mild glenohumeral degenerative 
changes. Mild acromiohumeral interval narrowing and spurring. Impression IMPRESSION: 
 
No acute radiographic findings. Degenerative changes as above. Secondary 
findings suggestive of rotator cuff pathology noted. EXAMINATION: Left shoulder 2 views 
  INDICATION: Left shoulder pain 
  
COMPARISON: None 
  
FINDINGS: Internal and external rotation frontal views of the left shoulder 
obtained. No acute fracture or subluxation identified. Moderate 
acromioclavicular and glenohumeral degenerative changes characterized by 
prominent osteophytes. Acromiohumeral interval spurring and narrowing. 
  
IMPRESSION: 
  
No acute radiographic findings. Degenerative changes and secondary findings of 
rotator cuff pathology as above. EXAMINATION: Cervical spine 4 views 
  INDICATION: Neck pain, rheumatoid arthritis 
  
COMPARISON: None 
  
FINDINGS: 4 views of the cervical spine obtained. C7-T1 not well evaluated on 
lateral view due to superimposed structures. Otherwise vertebral body heights 
preserved. Multilevel mild disc space loss and chronic appearing endplate 
changes. No focal listhesis. Cervical lordosis maintained. Multilevel advanced 
facet arthropathy. Atlantodental degenerative changes with interval maintained. No significant prevertebral soft tissue swelling. No acute fracture identified 
radiographically. 
  
IMPRESSION: 
  
No clearly acute findings. Advanced degenerative changes as above. Exam 
limitations described above. Reviewed x-ray results and discussed with patient. Will proceed with referral to Dr. Mayda Casas for evaluation of bilateral shoulder pain. Discussed that his cervical spine films show that most of his pain is secondary to osteoarthritis and degenerative changes. Discussed proceeding with physical therapy, but wishing to hold off for now. He reports that he did schedule an appointment with Dr. Alise Andre for follow-up next week.

## 2019-01-22 ENCOUNTER — OFFICE VISIT (OUTPATIENT)
Dept: ORTHOPEDIC SURGERY | Facility: CLINIC | Age: 79
End: 2019-01-22

## 2019-01-22 VITALS
WEIGHT: 212 LBS | TEMPERATURE: 96.8 F | RESPIRATION RATE: 18 BRPM | HEART RATE: 82 BPM | BODY MASS INDEX: 33.27 KG/M2 | OXYGEN SATURATION: 99 % | SYSTOLIC BLOOD PRESSURE: 162 MMHG | HEIGHT: 67 IN | DIASTOLIC BLOOD PRESSURE: 88 MMHG

## 2019-01-22 DIAGNOSIS — M75.101 RIGHT ROTATOR CUFF TEAR ARTHROPATHY: Primary | ICD-10-CM

## 2019-01-22 DIAGNOSIS — M12.811 RIGHT ROTATOR CUFF TEAR ARTHROPATHY: Primary | ICD-10-CM

## 2019-01-22 RX ORDER — TRIAMCINOLONE ACETONIDE 40 MG/ML
40 INJECTION, SUSPENSION INTRA-ARTICULAR; INTRAMUSCULAR ONCE
Qty: 1 ML | Refills: 0
Start: 2019-01-22 | End: 2019-01-22

## 2019-01-22 NOTE — PROGRESS NOTES
Patient: Radha Dockery                MRN: [id number]       SSN: [id number] YOB: 1940        AGE: 70 y.o. SEX: male Body mass index is 33.2 kg/m². PCP: Jackie Alvarenga MD 
01/22/19 Chief Complaint: Right shoulder pain HISTORY OF PRESENT ILLNESS:   Danielle Evangelista is a 70year-old male who comes to the office today for right shoulder pain. He recently had an increase in his pain while at work. He has had pain in his shoulder for some time. He has had a rotator cuff repair done over 20 years ago by Dr. Ella Boyle. He has been seeing Dr. Benjaman Kocher for shots in his shoulder, which have helped some in the past.  He says his right shoulder and his left shoulder are \"worn out\". The right shoulder is causing worsening pain after at work last week he fired a weapon as part of his job. He has had pain in the right shoulder ever since. It has been debilitating. He has difficulty with raising his arm above his head due to the pain. He also notices some catching and clicking at times. Past Medical History:  
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 Dr. Nancy Cano.  CPDD (calcium pyrophosphate deposition disease)  Depression  GERD (gastroesophageal reflux disease)  Hyperlipidemia  Hypertension  Prediabetes  Primary osteoarthritis involving multiple joints 9/6/2011  Rheumatoid arthritis ([facility] [address] 2013  
 negative RF; elevated anti-CCP. Dr. Hoang Nolasco. Family History Problem Relation Age of Onset  Cancer Mother  Heart Disease Father  Alcohol abuse Father  Cancer Other Current Outpatient Medications Medication Sig Dispense Refill  triamcinolone acetonide (KENALOG) 40 mg/mL injection 1 mL by Intra artICUlar route once for 1 dose.  1 mL 0  
 PARoxetine (PAXIL) 20 mg tablet TAKE 1 TABLET BY MOUTH ONCE DAILY 30 Tab 11  
  furosemide (LASIX) 40 mg tablet TAKE 1 TABLET BY MOUTH ONCE DAILY 90 Tab 1  
 amLODIPine (NORVASC) 10 mg tablet TAKE 1 TABLET BY MOUTH ONCE DAILY 30 Tab 11  
 omeprazole (PRILOSEC) 20 mg capsule TAKE 1 CAPSULE BY MOUTH ONCE DAILY 90 Cap 1  
 lisinopril (PRINIVIL, ZESTRIL) 40 mg tablet TAKE 1 TABLET BY MOUTH ONCE DAILY 90 Tab 2  
 hydrALAZINE (APRESOLINE) 25 mg tablet Take 1 Tab by mouth three (3) times daily. 90 Tab 5  
 atorvastatin (LIPITOR) 10 mg tablet TAKE 1 TABLET BY MOUTH ONCE DAILY 90 Tab 3  potassium chloride (K-DUR, KLOR-CON) 20 mEq tablet Take 1 Tab by mouth daily. 90 Tab 2  cycloSPORINE (RESTASIS) 0.05 % ophthalmic emulsion Administer 1 Drop to both eyes two (2) times a day.  colchicine (MITIGARE) 0.6 mg capsule Take 0.6 mg by mouth daily.  cholecalciferol, vitamin D3, (VITAMIN D3) 2,000 unit tab Take 2,000 Units by mouth daily.  diclofenac (VOLTAREN) 1 % topical gel Apply 4 g to affected area four (4) times daily.  LUMIGAN 0.01 % ophthalmic drops  hydroxychloroquine (PLAQUENIL) 200 mg tablet Take 200 mg by mouth two (2) times a day.  aspirin delayed-release 81 mg tablet Take  by mouth daily.  MULTIVITS/IRON FUM/FA/D3/LYCOP (MULTI FOR HIM PO) Take  by mouth.  nystatin-triamcinolone (MYCOLOG II) topical cream Apply  to affected area two (2) times a day. 30 g 2  
 nystatin (MYCOSTATIN) powder Apply  to affected area four (4) times daily. 60 g 1 Allergies Allergen Reactions  Tetanus Toxoid, Adsorbed Anaphylaxis  Adhesive Tape-Silicones Rash  Aldactone [Spironolactone] Not Reported This Time Breast tenderness  Codeine Not Reported This Time \"Drives crazy\"  Tape  [Adhesive] Rash  Tetanus Vaccines And Toxoid Anaphylaxis Other reaction(s): anaphylaxis/angioedema Past Surgical History:  
Procedure Laterality Date  HX AMPUTATION FINGER Left  HX CARPAL TUNNEL RELEASE  HX CATARACT REMOVAL Left 01/2018  HX CATARACT REMOVAL Bilateral 2018  HX HEENT    
 sinus, tonsillectomy  HX HERNIA REPAIR    
 HX MOHS PROCEDURES    
 bilateral   
 HX ORTHOPAEDIC    
 lef knee surgery, removed cartilage. Social History Socioeconomic History  Marital status:  Spouse name: Not on file  Number of children: Not on file  Years of education: Not on file  Highest education level: Not on file Social Needs  Financial resource strain: Not on file  Food insecurity - worry: Not on file  Food insecurity - inability: Not on file  Transportation needs - medical: Not on file  Transportation needs - non-medical: Not on file Occupational History  Not on file Tobacco Use  Smoking status: Former Smoker  Smokeless tobacco: Former User Substance and Sexual Activity  Alcohol use: Yes Comment: rarely  Drug use: No  
 Sexual activity: Not Currently Other Topics Concern  Not on file Social History Narrative  Not on file REVIEW OF SYSTEMS:   
 
CON: negative for recent weight loss/gain, fever, or chills EYE: negative for double or blurry vision ENT: negative for hoarseness RS:   negative for cough, URI, SOB 
CV:  negative for chest pain, palpitations GI:    negative for blood in stool, nausea/vomiting :  negative for blood in urine MS: As per HPI Other systems reviewed and noted below. PHYSICAL EXAMINATION: 
Visit Vitals /88 Pulse 82 Temp 96.8 °F (36 °C) (Oral) Resp 18 Ht 5' 7\" (1.702 m) Wt 212 lb (96.2 kg) SpO2 99% BMI 33.20 kg/m² Body mass index is 33.2 kg/m². GENERAL: Alert and oriented x3, in no acute distress, well-developed, well-nourished. HEENT: Normocephalic, atraumatic. RESP: Non labored breathing with equal chest rise on inspiration. CV: Well perfused extremities. No cyanosis or clubbing noted. ABDOMEN: Soft, non-tender, non-distended. PHYSICAL EXAMINATION:   Physical exam of the right shoulder with healed incisions both anteriorly and superolaterally. There is no ecchymosis, effusion, warmth or erythema. His shoulder range of motion is relatively preserved, but he has weakness and pain with supraspinatus and infraspinatus, as well as belly press testing. He is mildly tender over the [facility] joint. There is mild crepitus with shoulder range of motion. He is neurovascularly intact distally. IMAGING:   An x-ray of the right shoulder was reviewed in the office today. This shows rotator cuff arthropathy. An x-ray of the left shoulder was also reviewed, which also showed rotator cuff arthropathy. ASSESSMENT/PLAN:   Eulalia Irby is a 70year-old male with right shoulder rotator cuff arthropathy and an exacerbation of his underlying symptoms with an on the job injury last week. I discussed with him at length his treatment options. For relief today, he would like to try a cortisone injection, which I gave him in the office. Long term, his best option would be for a shoulder replacement, which would be a reverse shoulder replacement. I do think this would help with his pain and function. I discussed the risks and benefits and alternatives to surgery. He would like to give this some thought. I will see him back as needed if he decides to move forward with surgery.    
 
 
 
 
 
 
Electronically signed by: Ronak Cristina MD

## 2019-02-11 ENCOUNTER — APPOINTMENT (OUTPATIENT)
Dept: INTERNAL MEDICINE CLINIC | Age: 79
End: 2019-02-11

## 2019-02-11 ENCOUNTER — HOSPITAL ENCOUNTER (OUTPATIENT)
Dept: LAB | Age: 79
Discharge: HOME | End: 2019-02-11
Payer: MEDICARE

## 2019-02-11 DIAGNOSIS — E55.9 VITAMIN D DEFICIENCY: ICD-10-CM

## 2019-02-11 DIAGNOSIS — M06.042 RHEUMATOID ARTHRITIS INVOLVING BOTH HANDS WITH NEGATIVE RHEUMATOID FACTOR (HCC): ICD-10-CM

## 2019-02-11 DIAGNOSIS — R40.0 HAS DAYTIME DROWSINESS: ICD-10-CM

## 2019-02-11 DIAGNOSIS — R06.00 DYSPNEA, PAROXYSMAL NOCTURNAL: ICD-10-CM

## 2019-02-11 DIAGNOSIS — M19.012 PRIMARY OSTEOARTHRITIS OF BOTH SHOULDERS: ICD-10-CM

## 2019-02-11 DIAGNOSIS — R73.03 PRE-DIABETES: ICD-10-CM

## 2019-02-11 DIAGNOSIS — M15.9 PRIMARY OSTEOARTHRITIS INVOLVING MULTIPLE JOINTS: ICD-10-CM

## 2019-02-11 DIAGNOSIS — M54.2 NECK PAIN: ICD-10-CM

## 2019-02-11 DIAGNOSIS — M25.511 CHRONIC PAIN OF BOTH SHOULDERS: ICD-10-CM

## 2019-02-11 DIAGNOSIS — M25.512 CHRONIC PAIN OF BOTH SHOULDERS: ICD-10-CM

## 2019-02-11 DIAGNOSIS — M19.011 PRIMARY OSTEOARTHRITIS OF BOTH SHOULDERS: ICD-10-CM

## 2019-02-11 DIAGNOSIS — M06.041 RHEUMATOID ARTHRITIS INVOLVING BOTH HANDS WITH NEGATIVE RHEUMATOID FACTOR (HCC): ICD-10-CM

## 2019-02-11 DIAGNOSIS — I10 ESSENTIAL HYPERTENSION: ICD-10-CM

## 2019-02-11 DIAGNOSIS — K21.9 GASTROESOPHAGEAL REFLUX DISEASE, ESOPHAGITIS PRESENCE NOT SPECIFIED: ICD-10-CM

## 2019-02-11 DIAGNOSIS — G47.30 SLEEP APNEA, UNSPECIFIED TYPE: ICD-10-CM

## 2019-02-11 DIAGNOSIS — G89.29 CHRONIC PAIN OF BOTH SHOULDERS: ICD-10-CM

## 2019-02-11 DIAGNOSIS — E78.5 HYPERLIPIDEMIA, UNSPECIFIED HYPERLIPIDEMIA TYPE: ICD-10-CM

## 2019-02-11 DIAGNOSIS — S68.111A: ICD-10-CM

## 2019-02-11 DIAGNOSIS — R06.83 SNORING: ICD-10-CM

## 2019-02-11 DIAGNOSIS — B37.2 CANDIDAL INTERTRIGO: ICD-10-CM

## 2019-02-11 DIAGNOSIS — R73.09 ABNORMAL GLUCOSE: ICD-10-CM

## 2019-02-11 DIAGNOSIS — M11.20 CPDD (CALCIUM PYROPHOSPHATE DEPOSITION DISEASE): ICD-10-CM

## 2019-02-11 LAB
ALBUMIN SERPL-MCNC: 4.2 G/DL (ref 3.4–5)
ALBUMIN/GLOB SERPL: 1.6 {RATIO} (ref 0.8–1.7)
ALP SERPL-CCNC: 92 U/L (ref 45–117)
ALT SERPL-CCNC: 33 U/L (ref 16–61)
ANION GAP SERPL CALC-SCNC: 8 MMOL/L (ref 3–18)
APPEARANCE UR: CLEAR
AST SERPL-CCNC: 16 U/L (ref 15–37)
BACTERIA URNS QL MICRO: NEGATIVE /HPF
BASOPHILS # BLD: 0 K/UL (ref 0–0.1)
BASOPHILS NFR BLD: 0 % (ref 0–2)
BILIRUB SERPL-MCNC: 0.7 MG/DL (ref 0.2–1)
BILIRUB UR QL: NEGATIVE
BUN SERPL-MCNC: 16 MG/DL (ref 7–18)
BUN/CREAT SERPL: 18 (ref 12–20)
CALCIUM SERPL-MCNC: 9.3 MG/DL (ref 8.5–10.1)
CHLORIDE SERPL-SCNC: 106 MMOL/L (ref 100–108)
CHOLEST SERPL-MCNC: 142 MG/DL
CO2 SERPL-SCNC: 29 MMOL/L (ref 21–32)
COLOR UR: YELLOW
CREAT SERPL-MCNC: 0.87 MG/DL (ref 0.6–1.3)
DIFFERENTIAL METHOD BLD: ABNORMAL
EOSINOPHIL # BLD: 0.3 K/UL (ref 0–0.4)
EOSINOPHIL NFR BLD: 4 % (ref 0–5)
EPITH CASTS URNS QL MICRO: NORMAL /LPF (ref 0–5)
ERYTHROCYTE [DISTWIDTH] IN BLOOD BY AUTOMATED COUNT: 13.5 % (ref 11.6–14.5)
EST. AVERAGE GLUCOSE BLD GHB EST-MCNC: 120 MG/DL
GLOBULIN SER CALC-MCNC: 2.7 G/DL (ref 2–4)
GLUCOSE SERPL-MCNC: 107 MG/DL (ref 74–99)
GLUCOSE UR STRIP.AUTO-MCNC: NEGATIVE MG/DL
HBA1C MFR BLD: 5.8 % (ref 4.2–5.6)
HCT VFR BLD AUTO: 48.4 % (ref 36–48)
HDLC SERPL-MCNC: 69 MG/DL (ref 40–60)
HDLC SERPL: 2.1 {RATIO} (ref 0–5)
HGB BLD-MCNC: 16.1 G/DL (ref 13–16)
HGB UR QL STRIP: NEGATIVE
KETONES UR QL STRIP.AUTO: NEGATIVE MG/DL
LDLC SERPL CALC-MCNC: 63.8 MG/DL (ref 0–100)
LEUKOCYTE ESTERASE UR QL STRIP.AUTO: NEGATIVE
LIPID PROFILE,FLP: ABNORMAL
LYMPHOCYTES # BLD: 1.9 K/UL (ref 0.9–3.6)
LYMPHOCYTES NFR BLD: 25 % (ref 21–52)
MCH RBC QN AUTO: 33.7 PG (ref 24–34)
MCHC RBC AUTO-ENTMCNC: 33.3 G/DL (ref 31–37)
MCV RBC AUTO: 101.3 FL (ref 74–97)
MONOCYTES # BLD: 0.7 K/UL (ref 0.05–1.2)
MONOCYTES NFR BLD: 9 % (ref 3–10)
NEUTS SEG # BLD: 4.5 K/UL (ref 1.8–8)
NEUTS SEG NFR BLD: 62 % (ref 40–73)
NITRITE UR QL STRIP.AUTO: NEGATIVE
PH UR STRIP: 5 [PH] (ref 5–8)
PLATELET # BLD AUTO: 207 K/UL (ref 135–420)
PMV BLD AUTO: 10.4 FL (ref 9.2–11.8)
POTASSIUM SERPL-SCNC: 3.7 MMOL/L (ref 3.5–5.5)
PROT SERPL-MCNC: 6.9 G/DL (ref 6.4–8.2)
PROT UR STRIP-MCNC: NEGATIVE MG/DL
RBC # BLD AUTO: 4.78 M/UL (ref 4.7–5.5)
RBC #/AREA URNS HPF: 0 /HPF (ref 0–5)
SODIUM SERPL-SCNC: 143 MMOL/L (ref 136–145)
SP GR UR REFRACTOMETRY: 1.01 (ref 1–1.03)
TRIGL SERPL-MCNC: 46 MG/DL (ref ?–150)
TSH SERPL DL<=0.05 MIU/L-ACNC: 1.6 UIU/ML (ref 0.36–3.74)
UROBILINOGEN UR QL STRIP.AUTO: 0.2 EU/DL (ref 0.2–1)
VLDLC SERPL CALC-MCNC: 9.2 MG/DL
WBC # BLD AUTO: 7.4 K/UL (ref 4.6–13.2)
WBC URNS QL MICRO: NORMAL /HPF (ref 0–4)

## 2019-02-11 PROCEDURE — 84443 ASSAY THYROID STIM HORMONE: CPT

## 2019-02-11 PROCEDURE — 82306 VITAMIN D 25 HYDROXY: CPT

## 2019-02-11 PROCEDURE — 83036 HEMOGLOBIN GLYCOSYLATED A1C: CPT

## 2019-02-11 PROCEDURE — 36415 COLL VENOUS BLD VENIPUNCTURE: CPT

## 2019-02-11 PROCEDURE — 80053 COMPREHEN METABOLIC PANEL: CPT

## 2019-02-11 PROCEDURE — 82043 UR ALBUMIN QUANTITATIVE: CPT

## 2019-02-11 PROCEDURE — 85025 COMPLETE CBC W/AUTO DIFF WBC: CPT

## 2019-02-11 PROCEDURE — 80061 LIPID PANEL: CPT

## 2019-02-11 PROCEDURE — 81001 URINALYSIS AUTO W/SCOPE: CPT

## 2019-02-12 LAB
25(OH)D3 SERPL-MCNC: 43.5 NG/ML (ref 30–100)
CREAT UR-MCNC: <13 MG/DL (ref 30–125)
MICROALBUMIN UR-MCNC: <0.5 MG/DL (ref 0–3)
MICROALBUMIN/CREAT UR-RTO: ABNORMAL MG/G (ref 0–30)

## 2019-02-19 ENCOUNTER — OFFICE VISIT (OUTPATIENT)
Dept: INTERNAL MEDICINE CLINIC | Age: 79
End: 2019-02-19

## 2019-02-19 DIAGNOSIS — R40.0 HAS DAYTIME DROWSINESS: ICD-10-CM

## 2019-02-19 DIAGNOSIS — M15.9 PRIMARY OSTEOARTHRITIS INVOLVING MULTIPLE JOINTS: ICD-10-CM

## 2019-02-19 DIAGNOSIS — M06.041 RHEUMATOID ARTHRITIS INVOLVING BOTH HANDS WITH NEGATIVE RHEUMATOID FACTOR (HCC): ICD-10-CM

## 2019-02-19 DIAGNOSIS — R06.83 SNORING: ICD-10-CM

## 2019-02-19 DIAGNOSIS — Z71.89 ACP (ADVANCE CARE PLANNING): ICD-10-CM

## 2019-02-19 DIAGNOSIS — D75.89 MACROCYTOSIS WITHOUT ANEMIA: ICD-10-CM

## 2019-02-19 DIAGNOSIS — N40.1 BPH WITH OBSTRUCTION/LOWER URINARY TRACT SYMPTOMS: ICD-10-CM

## 2019-02-19 DIAGNOSIS — R73.03 PRE-DIABETES: ICD-10-CM

## 2019-02-19 DIAGNOSIS — M06.042 RHEUMATOID ARTHRITIS INVOLVING BOTH HANDS WITH NEGATIVE RHEUMATOID FACTOR (HCC): ICD-10-CM

## 2019-02-19 DIAGNOSIS — R73.09 ABNORMAL GLUCOSE: ICD-10-CM

## 2019-02-19 DIAGNOSIS — K21.9 GASTROESOPHAGEAL REFLUX DISEASE, ESOPHAGITIS PRESENCE NOT SPECIFIED: ICD-10-CM

## 2019-02-19 DIAGNOSIS — M11.20 CPDD (CALCIUM PYROPHOSPHATE DEPOSITION DISEASE): ICD-10-CM

## 2019-02-19 DIAGNOSIS — E78.5 HYPERLIPIDEMIA, UNSPECIFIED HYPERLIPIDEMIA TYPE: ICD-10-CM

## 2019-02-19 DIAGNOSIS — S68.111A: ICD-10-CM

## 2019-02-19 DIAGNOSIS — Z00.00 MEDICARE ANNUAL WELLNESS VISIT, SUBSEQUENT: ICD-10-CM

## 2019-02-19 DIAGNOSIS — I10 ESSENTIAL HYPERTENSION: Primary | ICD-10-CM

## 2019-02-19 DIAGNOSIS — N13.8 BPH WITH OBSTRUCTION/LOWER URINARY TRACT SYMPTOMS: ICD-10-CM

## 2019-02-19 DIAGNOSIS — E66.09 CLASS 1 OBESITY DUE TO EXCESS CALORIES WITH SERIOUS COMORBIDITY AND BODY MASS INDEX (BMI) OF 32.0 TO 32.9 IN ADULT: ICD-10-CM

## 2019-02-19 RX ORDER — CELECOXIB 200 MG/1
200 CAPSULE ORAL DAILY
Qty: 30 CAP | Refills: 2 | Status: SHIPPED | OUTPATIENT
Start: 2019-02-19 | End: 2019-03-01

## 2019-02-19 NOTE — PATIENT INSTRUCTIONS
Medicare Wellness Visit, Female The best way to improve and maintain good health is to have a healthy lifestyle by eating a well-balanced diet, exercising regularly, limiting alcohol and stopping smoking. Regular visits with your physician or non-physician health care provider also support your good health. Preventive screening tests can find health problems before they become diseases or illnesses. Preventive services such as immunizations prevent serious infections. All people over age 72 should have a Pneumovax and a Prevnar-13 shot to prevent potentially life threatening infections with the pneumococcus bacteria, a common cause of pneumonia. These are once in a lifetime unless you and your provider decide differently. All people over 65 should have a yearly influenza vaccine or \"flu\" shot. This does not prevent infection with cold viruses but has been proven to prevent hospitalization and death from influenza. Although Medicare part B \"regular Medicare\" currently only covers tetanus vaccination in the context of an injury, a tetanus vaccine (Tdap or Td) is recommended every 10 years. A shingles vaccine is recommended once in a lifetime after age 61. The Shingles vaccine is also not covered by Medicare part B. Note, however, that both the Shingles vaccine and Tdap/Td are generally covered by secondary carriers. Please check your coverage and out of pocket expenses. Consider contacting your local health department because it may stock these vaccines for a reasonable charge. We currently have documentation of the following immunization history for you: 
Immunization History Administered Date(s) Administered  Influenza High Dose Vaccine PF 09/30/2017  Influenza Vaccine (Tri) Adjuvanted 09/25/2018  Influenza Vaccine Split 10/04/2011, 10/16/2012  Influenza Vaccine Whole 01/15/2010  Pneumococcal Conjugate (PCV-13) 01/19/2015  Varicella Virus Vaccine 10/01/2013  ZZZ-RETIRED (DO NOT USE) Pneumococcal Vaccine (Unspecified Type) 01/01/2008  Zoster 10/16/2012 Screening for infection with Hepatitis C is recommended for anyone born between [birth year] through [address]. The table at the bottom of this document indicates the status of this and other preventive services. A bone mass density test (DEXA) to screen for osteoporosis or thinning of the bones should be done at least once after age 72 and may be done up to every 2 years as determined by you and your health care provider. The most recent DEXA we have on file for you is: DEXA Results (most recent): No results found for this or any previous visit. Screening for diabetes mellitus with a blood sugar test (glucose) should be done at least every 3 years until age 79. You and your health care provider may decide whether to continue screening after age 79. The most recent blood glucose we have on file for you is:  
Lab Results Component Value Date/Time Glucose 107 (H) 02/11/2019 09:48 AM  
 
 
 
Glaucoma is a disease of the eye due to increased ocular pressure that can lead to blindness. People with risk factors for glaucoma ( race, diabetes, family history) should be screened at least every 2 years by an eye professional.  
 
Cardiovascular screening tests that check for elevated lipids or cholesterol (fatty part of blood) which can lead to heart disease and strokes should be done every 4-6 years through age 79. You and your health care provider may decide whether to continue screening after age 79. The most recent lipid panel we have on file for you is:  
Lab Results Component Value Date/Time  Cholesterol, total 142 02/11/2019 09:48 AM  
 HDL Cholesterol 69 (H) 02/11/2019 09:48 AM  
 LDL, calculated 63.8 02/11/2019 09:48 AM  
 VLDL, calculated 9.2 02/11/2019 09:48 AM  
 Triglyceride 46 02/11/2019 09:48 AM  
 CHOL/HDL Ratio 2.1 02/11/2019 09:48 AM  
 
 
 Colorectal cancer screening that evaluates for blood or polyps in your colon for people with average risk should be done yearly as a stool test, every five years as a flexible sigmoidoscope or every 10 years as a colonoscopy up to age 76. You and your health care provider may decide whether to continue screening after age 76. Breast cancer screening with a mammogram is recommended at least once every 2 years  for women age 54-69. You and your health care provider may decide whether to continue screening after age 76. The most recent mammogram we have on file for you is: [facility] Results (most recent): No results found for this or any previous visit. Screening for cervical cancer with a pap smear is recommended for all women with a cervix until age 72. The frequency of this test is based on the details of her prior pap smear testing. You and your health care provider may decide whether to continue screening after age 72. People who have smoked the equivalent of 1 pack per day for 30 years or more may benefit from screening for lung cancer with a yearly low dose CT scan until they have been non smokers for 15 years or competing health conditions render this unlikely to be beneficial. Our records show: n/a Your Medicare Wellness Exam is recommended annually. Here is a list of your current Health Maintenance items with a due date: 
Health Maintenance Topic Date Due  Shingrix Vaccine Age 50> (1 of 2) 02/01/2021 (Originally 1/16/1992)  MEDICARE YEARLY EXAM  02/20/2020  GLAUCOMA SCREENING Q2Y  06/25/2020  COLONOSCOPY  08/10/2020  
 DTaP/Tdap/Td series (2 - Td) 02/01/2027  Pneumococcal 65+ Low/Medium Risk  Completed  Influenza Age 5 to Adult  Completed

## 2019-02-19 NOTE — ACP (ADVANCE CARE PLANNING)
Advance Care Planning (ACP) Provider Note - Comprehensive     Date of ACP Conversation: 02/19/19  Persons included in Conversation:  patient  Length of ACP Conversation in minutes:  16 minutes    Authorized Decision Maker (if patient is incapable of making informed decisions): wife and son  This person is:  Healthcare Agent/Medical Power of  under Advance Directive          General ACP for ALL Patients with Decision Making Capacity:   Importance of advance care planning, including choosing a healthcare agent to communicate patient's healthcare decisions if patient lost the ability to make decisions, such as after a sudden illness or accident  Understanding of the healthcare agent role was assessed and information provided  Exploration of values, goals, and preferences if recovery is not expected, even with continued medical treatment in the event of: Imminent death  Severe, permanent brain injury  \"In these circumstances, what matters most to you? \"  Care focused more on comfort or quality of life. Review of Existing Advance Directive:  Patient has not completed an advance directive previously. He states that he would designate his wife and son as his healthcare agent. He expresses that he does not wish life prolonging procedures for end of life care. For Serious or Chronic Illness:  Understanding of medical condition      Interventions Provided:  Recommended completion of Advance Directive form after review of ACP materials and conversation with prospective healthcare agent   Recommended communicating the plan and making copies for the healthcare agent, personal physician, and others as appropriate (e.g., health system)  Recommended review of completed ACP document annually or upon change in health status   Recommended to complete advance directive and return completed form to office to be copied and scanned into chart. Paperwork provided and reviewed.

## 2019-02-19 NOTE — PROGRESS NOTES
Chief Complaint Patient presents with  Hypertension  
  follow up  Labs  
  done 02-11-19 1. Have you been to the ER, urgent care clinic since your last visit? Hospitalized since your last visit? No 
 
2. Have you seen or consulted any other health care providers outside of the [address] [place] [address] since your last visit? Include any pap smears or colon screening. No 
 
Patient was given a copy of the Advanced Directive and understands to bring it in once completed. There are no preventive care reminders to display for this patient.

## 2019-02-19 NOTE — PROGRESS NOTES
This is the Subsequent Medicare Annual Wellness Exam, performed 12 months or more after the Initial AWV or the last Subsequent AWV I have reviewed the patient's medical history in detail and updated the computerized patient record. History Past Medical History:  
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 Dr. Jessy Back.  CPDD (calcium pyrophosphate deposition disease)  Depression  GERD (gastroesophageal reflux disease)  Hyperlipidemia  Hypertension  Prediabetes  Primary osteoarthritis involving multiple joints 9/6/2011  Rheumatoid arthritis ([facility] [address] 2013  
 negative RF; elevated anti-CCP. Dr. Carrero Cancer. Past Surgical History:  
Procedure Laterality Date  HX AMPUTATION FINGER Left  HX CARPAL TUNNEL RELEASE  HX CATARACT REMOVAL Left 01/2018  HX CATARACT REMOVAL Bilateral 2018  HX HEENT    
 sinus, tonsillectomy  HX HERNIA REPAIR    
 HX MOHS PROCEDURES    
 bilateral   
 HX ORTHOPAEDIC    
 lef knee surgery, removed cartilage. Current Outpatient Medications Medication Sig Dispense Refill  celecoxib (CELEBREX) 200 mg capsule Take 1 Cap by mouth daily. 30 Cap 2  
 PARoxetine (PAXIL) 20 mg tablet TAKE 1 TABLET BY MOUTH ONCE DAILY 30 Tab 11  
 furosemide (LASIX) 40 mg tablet TAKE 1 TABLET BY MOUTH ONCE DAILY 90 Tab 1  
 nystatin-triamcinolone (MYCOLOG II) topical cream Apply  to affected area two (2) times a day. 30 g 2  
 nystatin (MYCOSTATIN) powder Apply  to affected area four (4) times daily. 60 g 1  
 amLODIPine (NORVASC) 10 mg tablet TAKE 1 TABLET BY MOUTH ONCE DAILY 30 Tab 11  
 lisinopril (PRINIVIL, ZESTRIL) 40 mg tablet TAKE 1 TABLET BY MOUTH ONCE DAILY 90 Tab 2  
 hydrALAZINE (APRESOLINE) 25 mg tablet Take 1 Tab by mouth three (3) times daily.  90 Tab 5  
 atorvastatin (LIPITOR) 10 mg tablet TAKE 1 TABLET BY MOUTH ONCE DAILY 90 Tab 3  
  potassium chloride (K-DUR, KLOR-CON) 20 mEq tablet Take 1 Tab by mouth daily. 90 Tab 2  cycloSPORINE (RESTASIS) 0.05 % ophthalmic emulsion Administer 1 Drop to both eyes two (2) times a day.  colchicine (MITIGARE) 0.6 mg capsule Take 0.6 mg by mouth daily.  cholecalciferol, vitamin D3, (VITAMIN D3) 2,000 unit tab Take 2,000 Units by mouth daily.  diclofenac (VOLTAREN) 1 % topical gel Apply 4 g to affected area four (4) times daily.  LUMIGAN 0.01 % ophthalmic drops Administer 1 Drop to both eyes nightly.  hydroxychloroquine (PLAQUENIL) 200 mg tablet Take 200 mg by mouth two (2) times a day.  aspirin delayed-release 81 mg tablet Take 81 mg by mouth daily.  MULTIVITS/IRON FUM/FA/D3/LYCOP (MULTI FOR HIM PO) Take  by mouth daily.  omeprazole (PRILOSEC) 20 mg capsule TAKE 1 CAPSULE BY MOUTH ONCE DAILY 90 Cap 1 Allergies Allergen Reactions  Latex, Natural Rubber Swelling  Adhesive Tape-Silicones Rash and Itching  Aldactone [Spironolactone] Not Reported This Time Breast tenderness  Codeine Not Reported This Time \"Drives crazy\"  Tape [Adhesive] Rash  Tetanus Toxoid, Adsorbed Anaphylaxis  Tetanus Vaccines And Toxoid Anaphylaxis Other reaction(s): anaphylaxis/angioedema Family History Problem Relation Age of Onset  Cancer Mother  Heart Disease Father  Alcohol abuse Father  Cancer Other Social History Tobacco Use  Smoking status: Former Smoker Types: Cigarettes, Pipe, Cigars  Smokeless tobacco: Former User Types: Chew Substance Use Topics  Alcohol use: Yes Comment: rarely Patient Active Problem List  
Diagnosis Code  BPH with obstruction/lower urinary tract symptoms N40.1, N13.8  Hypertension I10  
 Primary osteoarthritis involving multiple joints M15.0  Hyperlipidemia E78.5  GERD (gastroesophageal reflux disease) K21.9  Pre-diabetes R73.03  
  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 ([facility] [address] M06.041, N20.401  Vitamin D deficiency E55.9  Colon polyps K63.5  CPDD (calcium pyrophosphate deposition disease) M11.20  Abnormal glucose R73.09  
 Depression F32.9  Rectal bleeding K62.5  Class 1 obesity due to excess calories with serious comorbidity and body mass index (BMI) of 32.0 to 32.9 in adult E66.09, Z68.32  
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  
 Has daytime drowsiness R40.0  Macrocytosis without anemia D75.89 Depression Risk Factor Screening:  
 
3 most recent PHQ Screens 2/19/2019 Little interest or pleasure in doing things Not at all Feeling down, depressed, irritable, or hopeless Not at all Total Score PHQ 2 0 Alcohol Risk Factor Screening: You do not drink alcohol or very rarely. Functional Ability and Level of Safety:  
Hearing Loss Hearing is good. Activities of Daily Living The home contains: no safety equipment. Patient does total self care Fall Risk Fall Risk Assessment, last 12 mths 2/19/2019 Able to walk? Yes Fall in past 12 months? No  
 
 
Abuse Screen Patient is not abused Cognitive Screening Evaluation of Cognitive Function: 
Has your family/caregiver stated any concerns about your memory: no 
Normal 
 
Patient Care Team  
Patient Care Team: 
Milagro Burk MD as PCP - General (Internal Medicine) Radha Kamara MD as Physician (Urology) Margi Dawson, RN as Ambulatory Care Navigator (Internal Medicine) Morenita Rubin MD (Rheumatology) HERMELINDA Zee (Physician Assistant) Johana Mcconnell, RN as Ambulatory Care Navigator (Internal Medicine) Jcarlos Gentile MD (Gastroenterology) Jack Zavala OD (Ophthalmology) Sangita Pantoja MD (Pulmonary Disease) Assessment/Plan Education and counseling provided: 
Are appropriate based on today's review and evaluation End-of-Life planning (with patient's consent) Influenza Vaccine Colorectal cancer screening tests Cardiovascular screening blood test 
Screening for glaucoma Diabetes screening test 
  
 
Diagnoses and all orders for this visit: 1. Essential hypertension 
-     CBC WITH AUTOMATED DIFF; Future 
-     HEMOGLOBIN A1C WITH EAG; Future -     LIPID PANEL; Future -     METABOLIC PANEL, COMPREHENSIVE; Future 2. Hyperlipidemia, unspecified hyperlipidemia type 
-     CBC WITH AUTOMATED DIFF; Future 
-     HEMOGLOBIN A1C WITH EAG; Future -     LIPID PANEL; Future -     METABOLIC PANEL, COMPREHENSIVE; Future 3. Abnormal glucose 
-     CBC WITH AUTOMATED DIFF; Future 
-     HEMOGLOBIN A1C WITH EAG; Future -     LIPID PANEL; Future -     METABOLIC PANEL, COMPREHENSIVE; Future 4. Pre-diabetes 
-     CBC WITH AUTOMATED DIFF; Future 
-     HEMOGLOBIN A1C WITH EAG; Future -     LIPID PANEL; Future -     METABOLIC PANEL, COMPREHENSIVE; Future 5. Primary osteoarthritis involving multiple joints 
-     CBC WITH AUTOMATED DIFF; Future 
-     HEMOGLOBIN A1C WITH EAG; Future -     LIPID PANEL; Future -     METABOLIC PANEL, COMPREHENSIVE; Future 6. 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 (HCC) 
-     CBC WITH AUTOMATED DIFF; Future 
-     HEMOGLOBIN A1C WITH EAG; Future -     LIPID PANEL; Future -     METABOLIC PANEL, COMPREHENSIVE; Future 7. 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 
-     CBC WITH AUTOMATED DIFF; Future 
-     HEMOGLOBIN A1C WITH EAG; Future -     LIPID PANEL; Future -     METABOLIC PANEL, COMPREHENSIVE; Future 8. CPDD (calcium pyrophosphate deposition disease) -     CBC WITH AUTOMATED DIFF; Future 
-     HEMOGLOBIN A1C WITH EAG; Future -     LIPID PANEL; Future -     METABOLIC PANEL, COMPREHENSIVE; Future 9. Gastroesophageal reflux disease, esophagitis presence not specified 
-     CBC WITH AUTOMATED DIFF; Future 
-     HEMOGLOBIN A1C WITH EAG; Future -     LIPID PANEL; Future -     METABOLIC PANEL, COMPREHENSIVE; Future 10. Snoring 
-     CBC WITH AUTOMATED DIFF; Future 
-     HEMOGLOBIN A1C WITH EAG; Future -     LIPID PANEL; Future -     METABOLIC PANEL, COMPREHENSIVE; Future 11. Has daytime drowsiness 
-     CBC WITH AUTOMATED DIFF; Future 
-     HEMOGLOBIN A1C WITH EAG; Future -     LIPID PANEL; Future -     METABOLIC PANEL, COMPREHENSIVE; Future 12. BPH with obstruction/lower urinary tract symptoms 
-     CBC WITH AUTOMATED DIFF; Future 
-     HEMOGLOBIN A1C WITH EAG; Future -     LIPID PANEL; Future -     METABOLIC PANEL, COMPREHENSIVE; Future 13. Macrocytosis without anemia 
-     CBC WITH AUTOMATED DIFF; Future 
-     HEMOGLOBIN A1C WITH EAG; Future -     LIPID PANEL; Future -     METABOLIC PANEL, COMPREHENSIVE; Future 14. Class 1 obesity due to excess calories with serious comorbidity and body mass index (BMI) of 32.0 to 32.9 in adult 
-     CBC WITH AUTOMATED DIFF; Future 
-     HEMOGLOBIN A1C WITH EAG; Future -     LIPID PANEL; Future -     METABOLIC PANEL, COMPREHENSIVE; Future 15. Medicare annual wellness visit, subsequent 16. ACP (advance care planning) Other orders 
-     celecoxib (CELEBREX) 200 mg capsule; Take 1 Cap by mouth daily. There are no preventive care reminders to display for this patient.

## 2019-02-21 RX ORDER — OMEPRAZOLE 20 MG/1
CAPSULE, DELAYED RELEASE ORAL
Qty: 90 CAP | Refills: 1 | Status: SHIPPED | OUTPATIENT
Start: 2019-02-21 | End: 2019-08-22 | Stop reason: SDUPTHER

## 2019-02-21 NOTE — TELEPHONE ENCOUNTER
Last Visit: 2/19/19  Next Appt: 8/27/19  Previous Refill Encounter: 8/22-90+1    Requested Prescriptions     Pending Prescriptions Disp Refills    omeprazole (PRILOSEC) 20 mg capsule 90 Cap 1     Sig: TAKE 1 CAPSULE BY MOUTH ONCE DAILY

## 2019-02-23 ENCOUNTER — TELEPHONE (OUTPATIENT)
Dept: INTERNAL MEDICINE CLINIC | Age: 79
End: 2019-02-23

## 2019-02-23 VITALS
DIASTOLIC BLOOD PRESSURE: 68 MMHG | RESPIRATION RATE: 12 BRPM | TEMPERATURE: 97.5 F | SYSTOLIC BLOOD PRESSURE: 124 MMHG | HEIGHT: 67 IN | WEIGHT: 210.6 LBS | BODY MASS INDEX: 33.06 KG/M2 | OXYGEN SATURATION: 99 % | HEART RATE: 80 BPM

## 2019-02-23 PROBLEM — R06.83 SNORING: Status: ACTIVE | Noted: 2019-02-23

## 2019-02-23 PROBLEM — D75.89 MACROCYTOSIS WITHOUT ANEMIA: Status: ACTIVE | Noted: 2019-02-23

## 2019-02-23 PROBLEM — R40.0 HAS DAYTIME DROWSINESS: Status: ACTIVE | Noted: 2019-02-23

## 2019-02-23 NOTE — TELEPHONE ENCOUNTER
Please call patient and inquire if he has noticed any improvement in his joint pains since starting Celebrex. Also, please ask if he has been contacted and scheduled with Dr. Masood Slaughter for evaluation for possible sleep apnea. Thanks.

## 2019-02-23 NOTE — TELEPHONE ENCOUNTER
Please request recent eye exam from Dr. Jack Cason . Patient reports being seen in 1/2019 . Thank you.

## 2019-02-23 NOTE — PROGRESS NOTES
HPI:  
Britt Dwyer is a 70y.o. year old male who presents today for a physical exam and reevaluation of bilateral shoulder and neck pain. He has a history of hypertension, hyperlipidemia, prediabetes, rheumatoid arthritis, osteoarthritis, calcium pyrophosphate deposition disease, BPH, GERD, and depression. 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 He was last seen on 1/10/2019 and was complaining of worsening neck and bilateral shoulder pain (R>L). He stated that he was previously followed by Dr. Renard Man, and would occasionally receive cortisone injections into his shoulders. He also described neck pain with difficulty turning his head. He denied any upper extremity weakness or paresthesias. He underwent imaging, and bilateral shoulder x-rays (1/10/2019) showed degenerative changes and secondary findings of rotator cuff pathology. Cervical spine x-rays (1/10/2019) showed advanced degenerative changes with multilevel facet arthropathy. He was evaluated by Dr. Markell Gallardo who gave him a cortisone injection in his right shoulder, and he reports some improvement today. He also recommended a reverse shoulder replacement, but he states that he is hesitant to proceed currently. He states that he is continuing to take ibuprofen 400 mg every 6 hours (qid) and Tylenol for pain. He states that his neck pain seems to have improved to his baseline level. He did see Dr. Gagan Cerna in follow-up who recommended using Voltaren gel. He is otherwise without new complaints. On 6/20/2018, he suffered an accidental gun shot wound while working resulting in traumatic injury to his left index finger with near loss of the middle phalanx and fracture of the distal phalanx. He was taken to [facility] and initially had irrigation and closure of the lacerations performed. He presented to the [facility] ED on 6/24/2018 with increased pain and swelling, and was started on doxycycline for a wound infection. On 7/7/2018, due to loss of stability and angulation of the index finger, he underwent stabilization with fusion of the proximal and distal phalanx with bone grafting by Dr. Zenon Helm. He did well and was started on physical therapy. On 8/1/2018, he presented to [address] [place] [address] for evaluation of increasing erythema, swelling and tenderness with concern for a possible infection. He underwent left hand x-ray (8/1/2018) showing severe soft tissue swelling of the left second finger worrisome for cellulitis, traumatic amputation of the middle phalanx with marked osteopenia and irregularity of the base of the distal phalanx and flattening and irregularity of the head of the proximal phalanx. Unclear if related to prior trauma/surgery or osteomyelitis with osseous destruction and no films available for comparison. He was started empirically on Bactrim and Augmentin for 14 days. On 8/10/2018, he presented for evaluation by GOMEZ Collado for complaints of fever, malaise, headache, fatigue, and diarrhea. Lab evaluation showed WBC 17 (90% neutrophils), creatinine 1.34/ eGFR 52, blood culture negative. Stool cultures (8/13/2018) routine, O/P, and C.diff negative. Bactrim and Augmentin were discontinued on 8/13/2018, and he was started on metronidazole for 10 days for treatment of presumed C.diff with improvement. He was evaluated by Dr. Katya Haddad on 8/15/2018 and repeat WBC 8.6 (59% neutrophils), ESR 6, and CRP 0.9. He was seen by Dr. Katya Haddad in follow-up on 8/30/2018 and left index finger wound noted to be significantly improved and no further difficulty with diarrhea. He has a history of hypertension, treated with amlodipine, lisinopril, lasix (+ potassium), and hydralazine was added in 4/2018. He states that his wife has been monitoring his blood pressure intermittently. He denies any chest pain, shortness of breath at rest or with exertion, lightheadedness, or palpitations.  He does report bilateral lower extremity swelling that it will increase throughout the day and improve overnight. . In 6/2016, he underwent lower extremity arterial and venous duplex scans, both of which were negative. He also has a history of hyperlipidemia, treated with moderate intensity atorvastatin. He has a history of prediabetes, with HbA1c ranging from 5.9-6.2 since 2012. He denies any polyuria, polydipsia, nocturia, or blurry vision, and has no history of retinopathy, neuropathy, or nephropathy. He has regular eye exams with Dr. Jackelyn Ojeda. He has a history of bilateral hand pain with morning stiffness for several years, and in 3/2014, he was noted to have a positive anti-CCP antibody level although NIMCO, rheumatoid factor, and ESR were negative. He was referred for evaluation to Dr. Estee Arellano, and was diagnosed with rheumatoid arthritis in 6/2014. He was treated with hydroxychloroquine, which has been partially helpful in controlling his symptoms. Bilateral hand x-rays also showed evidence of primary osteoarthritis with osteophytes present on the second and third MCP joints. In 1/2017, he was also noted to have evidence of possible chondrocalcinosis on x-ray, and was started on low dose colchicine in addition to hydroxychloroquine for concomitant calcium pyrophosphate deposition disease. He states that since starting on colchicine, he has had significant improvement in his hand pain. He also uses compounding cream and Voltaren gel for pain control. He has a history of symptomatic BPH, with complaints of decreased stream, hesitancy, and dribbling. He has refused treatment with medication. He is followed by Dr. Eric Silverman. He denies any dysuria, gross hematuria, or flank pain. He has a history of GERD, treated with daily omeprazole with good control of symptoms.  He had a screening colonoscopy and 8/2015 by Dr. Zechariah Chakraborty, showing a 3 mm sessile cecal polyp (pathology: intra-mucosal lymphoid aggregate), two 4 mm sessile polyps in the transverse colon (pathology: serrated adenomas), and a 1 cm lipoma in the transverse colon. Follow-up recommended for five years. He was having difficulty with rectal bleeding in 1/2018 and returned for evaluation with Dr. Rogerio Wilkes who felt it was most likely secondary to a hemorrhoidal source. She recommended use of Citrucel. He states that the bleeding has improved with initiation of this therapy. He denies any abdominal pain, nausea, vomiting, melena, or change in bowel movements. He has a history of depression, which is well controlled with Paxil. Past Medical History:  
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 Dr. Jalyn Ross.  CPDD (calcium pyrophosphate deposition disease)  Depression  GERD (gastroesophageal reflux disease)  Hyperlipidemia  Hypertension  Prediabetes  Primary osteoarthritis involving multiple joints 9/6/2011  Rheumatoid arthritis ([facility] [address] 2013  
 negative RF; elevated anti-CCP. Dr. Camilo Parmar. Past Surgical History:  
Procedure Laterality Date  HX AMPUTATION FINGER Left  HX CARPAL TUNNEL RELEASE  HX CATARACT REMOVAL Left 01/2018  HX CATARACT REMOVAL Bilateral 2018  HX HEENT    
 sinus, tonsillectomy  HX HERNIA REPAIR    
 HX MOHS PROCEDURES    
 bilateral   
 HX ORTHOPAEDIC    
 lef knee surgery, removed cartilage. Current Outpatient Medications Medication Sig  celecoxib (CELEBREX) 200 mg capsule Take 1 Cap by mouth daily.  PARoxetine (PAXIL) 20 mg tablet TAKE 1 TABLET BY MOUTH ONCE DAILY  furosemide (LASIX) 40 mg tablet TAKE 1 TABLET BY MOUTH ONCE DAILY  nystatin-triamcinolone (MYCOLOG II) topical cream Apply  to affected area two (2) times a day.  nystatin (MYCOSTATIN) powder Apply  to affected area four (4) times daily.  amLODIPine (NORVASC) 10 mg tablet TAKE 1 TABLET BY MOUTH ONCE DAILY  lisinopril (PRINIVIL, ZESTRIL) 40 mg tablet TAKE 1 TABLET BY MOUTH ONCE DAILY  hydrALAZINE (APRESOLINE) 25 mg tablet Take 1 Tab by mouth three (3) times daily.  atorvastatin (LIPITOR) 10 mg tablet TAKE 1 TABLET BY MOUTH ONCE DAILY  potassium chloride (K-DUR, KLOR-CON) 20 mEq tablet Take 1 Tab by mouth daily.  cycloSPORINE (RESTASIS) 0.05 % ophthalmic emulsion Administer 1 Drop to both eyes two (2) times a day.  colchicine (MITIGARE) 0.6 mg capsule Take 0.6 mg by mouth daily.  cholecalciferol, vitamin D3, (VITAMIN D3) 2,000 unit tab Take 2,000 Units by mouth daily.  diclofenac (VOLTAREN) 1 % topical gel Apply 4 g to affected area four (4) times daily.  LUMIGAN 0.01 % ophthalmic drops Administer 1 Drop to both eyes nightly.  hydroxychloroquine (PLAQUENIL) 200 mg tablet Take 200 mg by mouth two (2) times a day.  aspirin delayed-release 81 mg tablet Take 81 mg by mouth daily.  MULTIVITS/IRON FUM/FA/D3/LYCOP (MULTI FOR HIM PO) Take  by mouth daily.  omeprazole (PRILOSEC) 20 mg capsule TAKE 1 CAPSULE BY MOUTH ONCE DAILY No current facility-administered medications for this visit. Allergies and Intolerances: Allergies Allergen Reactions  Latex, Natural Rubber Swelling  Adhesive Tape-Silicones Rash and Itching  Aldactone [Spironolactone] Not Reported This Time Breast tenderness  Codeine Not Reported This Time \"Drives crazy\"  Tape [Adhesive] Rash  Tetanus Toxoid, Adsorbed Anaphylaxis  Tetanus Vaccines And Toxoid Anaphylaxis Other reaction(s): anaphylaxis/angioedema Family History: He has no family history of colon or prostate cancer. Family History Problem Relation Age of Onset  Cancer Mother  Heart Disease Father  Alcohol abuse Father  Cancer Other Social History: He  reports that he has quit smoking. His smoking use included cigarettes, pipe, and cigars.  He has quit using smokeless tobacco. His smokeless tobacco use included chew. He smoked 2 ppd for 50 years, stopping in 1974. He is  with two adult children. 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 Social History Substance and Sexual Activity Alcohol Use Yes Comment: rarely Immunization History: 
Immunization History Administered Date(s) Administered  Influenza High Dose Vaccine PF 09/30/2017  Influenza Vaccine (Tri) Adjuvanted 09/25/2018  Influenza Vaccine Split 10/04/2011, 10/16/2012  Influenza Vaccine Whole 01/15/2010  Pneumococcal Conjugate (PCV-13) 01/19/2015  Varicella Virus Vaccine 10/01/2013  ZZZ-RETIRED (DO NOT USE) Pneumococcal Vaccine (Unspecified Type) 01/01/2008  Zoster 10/16/2012 Review of Systems: As above included in HPI. Otherwise 11 point review of systems negative including constitutional, skin, HENT, eyes, respiratory, cardiovascular, gastrointestinal, genitourinary, musculoskeletal, endocrine, hematologic, allergy, and neurologic. Physical:  
Vitals:  
BP: 124/68 HR: 80 
WT: 210 lb 9.6 oz (95.5 kg) BMI:  32.67 kg/m2 Exam:  
Patient appears in no apparent distress. Affect is appropriate. HEENT --Anicteric sclerae, tympanic membranes normal,  ear canals normal. 
PERRL, EOMI, conjunctiva and lids normal.  
Sinuses were nontender, turbinates normal, hearing normal.  Oropharynx without 
erythema, normal tongue, oral mucosa and tonsils. No cervical lymphadenopathy. No thyromegaly, JVD, or bruits. Carotid pulses 2+ with normal upstroke. Lungs --Clear to auscultation. No wheezing or rales. Heart --Regular rate and rhythm, no murmurs, rubs, gallops, or clicks. Chest wall --Nontender to palpation. PMI normal. 
Abdomen -- Soft and nontender, no hepatosplenomegaly or masses. Prostate  -- no asymmetry, nodularity, tenderness or enlargement Rectal  -- normal tone, guaiac negative brown stool Extremities -- Without cyanosis, clubbing, edema. 2+ pulses equally and bilaterally. Left index finger with well healed scar, s/p amputation of middle phalanx with shortening of finger and medial deviation. Neuro -- CN 2-12 intact, strength 5/5 with intact soft touch in all extremities Derm  no obvious abnormalities noted, no rash Neck -- No tenderness to palpation over cervical spine; range of motion of neck and bilateral shoulders limited by pain; no erythema, warmth, or swelling noted. Muscle strength 5/5 throughout. Review of Data: 
Labs: Hospital Outpatient Visit on 02/11/2019 Component Date Value Ref Range Status  WBC 02/11/2019 7.4  4.6 - 13.2 K/uL Final  
 RBC 02/11/2019 4.78  4.70 - 5.50 M/uL Final  
 HGB 02/11/2019 16.1* 13.0 - 16.0 g/dL Final  
 HCT 02/11/2019 48.4* 36.0 - 48.0 % Final  
 MCV 02/11/2019 101.3* 74.0 - 97.0 FL Final  
 MCH 02/11/2019 33.7  24.0 - 34.0 PG Final  
 MCHC 02/11/2019 33.3  31.0 - 37.0 g/dL Final  
 RDW 02/11/2019 13.5  11.6 - 14.5 % Final  
 PLATELET 78/50/7704 292  135 - 420 K/uL Final  
 MPV 02/11/2019 10.4  9.2 - 11.8 FL Final  
 NEUTROPHILS 02/11/2019 62  40 - 73 % Final  
 LYMPHOCYTES 02/11/2019 25  21 - 52 % Final  
 MONOCYTES 02/11/2019 9  3 - 10 % Final  
 EOSINOPHILS 02/11/2019 4  0 - 5 % Final  
 BASOPHILS 02/11/2019 0  0 - 2 % Final  
 ABS. NEUTROPHILS 02/11/2019 4.5  1.8 - 8.0 K/UL Final  
 ABS. LYMPHOCYTES 02/11/2019 1.9  0.9 - 3.6 K/UL Final  
 ABS. MONOCYTES 02/11/2019 0.7  0.05 - 1.2 K/UL Final  
 ABS. EOSINOPHILS 02/11/2019 0.3  0.0 - 0.4 K/UL Final  
 ABS.  BASOPHILS 02/11/2019 0.0  0.0 - 0.1 K/UL Final  
 DF 02/11/2019 AUTOMATED    Final  
 Hemoglobin A1c 02/11/2019 5.8* 4.2 - 5.6 % Final  
 Est. average glucose 02/11/2019 120  mg/dL Final  
 LIPID PROFILE 02/11/2019        Final  
 Cholesterol, total 02/11/2019 142  <200 MG/DL Final  
 Triglyceride 02/11/2019 46  <150 MG/DL Final  
  HDL Cholesterol 02/11/2019 69* 40 - 60 MG/DL Final  
 LDL, calculated 02/11/2019 63.8  0 - 100 MG/DL Final  
 VLDL, calculated 02/11/2019 9.2  MG/DL Final  
 CHOL/HDL Ratio 02/11/2019 2.1  0 - 5.0   Final  
 Sodium 02/11/2019 143  136 - 145 mmol/L Final  
 Potassium 02/11/2019 3.7  3.5 - 5.5 mmol/L Final  
 Chloride 02/11/2019 106  100 - 108 mmol/L Final  
 CO2 02/11/2019 29  21 - 32 mmol/L Final  
 Anion gap 02/11/2019 8  3.0 - 18 mmol/L Final  
 Glucose 02/11/2019 107* 74 - 99 mg/dL Final  
 BUN 02/11/2019 16  7.0 - 18 MG/DL Final  
 Creatinine 02/11/2019 0.87  0.6 - 1.3 MG/DL Final  
 BUN/Creatinine ratio 02/11/2019 18  12 - 20   Final  
 GFR est AA 02/11/2019 >60  >60 ml/min/1.73m2 Final  
 GFR est non-AA 02/11/2019 >60  >60 ml/min/1.73m2 Final  
 Calcium 02/11/2019 9.3  8.5 - 10.1 MG/DL Final  
 Bilirubin, total 02/11/2019 0.7  0.2 - 1.0 MG/DL Final  
 ALT (SGPT) 02/11/2019 33  16 - 61 U/L Final  
 AST (SGOT) 02/11/2019 16  15 - 37 U/L Final  
 Alk.  phosphatase 02/11/2019 92  45 - 117 U/L Final  
 Protein, total 02/11/2019 6.9  6.4 - 8.2 g/dL Final  
 Albumin 02/11/2019 4.2  3.4 - 5.0 g/dL Final  
 Globulin 02/11/2019 2.7  2.0 - 4.0 g/dL Final  
 A-G Ratio 02/11/2019 1.6  0.8 - 1.7   Final  
 Microalbumin,urine random 02/11/2019 <0.50  0 - 3.0 MG/DL Final  
 Creatinine, urine 02/11/2019 <13.00* 30 - 125 mg/dL Final  
 Microalbumin/Creat ratio (mg/g cre* 02/11/2019 Cannot be calculated  0 - 30 mg/g Final  
 TSH 02/11/2019 1.60  0.36 - 3.74 uIU/mL Final  
 Color 02/11/2019 YELLOW    Final  
 Appearance 02/11/2019 CLEAR    Final  
 Specific gravity 02/11/2019 1.012  1.005 - 1.030   Final  
 pH (UA) 02/11/2019 5.0  5.0 - 8.0   Final  
 Protein 02/11/2019 NEGATIVE   NEG mg/dL Final  
 Glucose 02/11/2019 NEGATIVE   NEG mg/dL Final  
 Ketone 02/11/2019 NEGATIVE   NEG mg/dL Final  
 Bilirubin 02/11/2019 NEGATIVE   NEG   Final  
 Blood 02/11/2019 NEGATIVE   NEG   Final  
  Urobilinogen 2019 0.2  0.2 - 1.0 EU/dL Final  
 Nitrites 2019 NEGATIVE   NEG   Final  
 Leukocyte Esterase 2019 NEGATIVE   NEG   Final  
 WBC 2019 0 to 1  0 - 4 /hpf Final  
 RBC 2019 0  0 - 5 /hpf Final  
 Epithelial cells 2019 FEW  0 - 5 /lpf Final  
 Bacteria 2019 NEGATIVE   NEG /hpf Final  
 Vitamin D 25-Hydroxy 2019 43.5  30 - 100 ng/mL Final  
 
EKG (1/10/2019) sinus rhythm at 70 bpm, nonspecific T wave abnormality; no change from 2018. Health Maintenance: 
Screening:  
 Colorectal: colonoscopy (2015) serrated adenomas. Dr. Brendon Childress. Due . Depression: on Paxil DM (HbA1c/FPG): / HbA1c 5.8 (2019) Hepatitis C: N/A Falls: none DEXA: within normal limits (2016) PSA/MARTÍNEZ: PSA 2.1. As per Dr. Minda Hunter Glaucoma: regular eye exams with Dr. Cisse/ Dr. Guzman Rater (last 2019) Smokin pack year. Distant past. 
 Vitamin D: 43.5 (2019) Medicare Wellness: today Impression: 
Patient Active Problem List  
Diagnosis Code  BPH with obstruction/lower urinary tract symptoms N40.1, N13.8  Hypertension I10  
 Primary osteoarthritis involving multiple joints M15.0  Hyperlipidemia E78.5  GERD (gastroesophageal reflux disease) K21.9  Pre-diabetes R73.03  
 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 M06.041, X30.030  Vitamin D deficiency E55.9  Colon polyps K63.5  CPDD (calcium pyrophosphate deposition disease) M11.20  Abnormal glucose R73.09  
 Depression F32.9  Rectal bleeding K62.5  Class 1 obesity due to excess calories with serious comorbidity and body mass index (BMI) of 32.0 to 32.9 in adult E66.09, Z68.32  
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  
 Has daytime drowsiness R40.0  Macrocytosis without anemia D75.89 Plan: 1. Hypertension. Blood pressure well controlled on current regimen of lisinopril 40 mg daily, amlodipine 10 mg daily, lasix 40 mg daily (potassium 20 meq daily) and hydralazine 25 mg bid. Renal function normal with creatinine 0.87 / eGFR >60. Continue to follow. 2. Hyperlipidemia. On moderate intensity dose atorvastatin with LDL 63  And HDL 69, indicative of excellent control in this patient. Continue to follow. 3. Prediabetes. Has been controlled on diet alone. HbA1c remains controlled at 5.8. No evidence of microvascular complications. On Ace-I and statin. Continue follow-up with Dr. Lincoln Perez for annual eye exams. Emphasized importance of lifestyle modifications, including diet, exercise, and weight loss. 4. Rheumatoid arthritis. On Plaquenil. Has regular eye exams with Dr. Lincoln Perez. Difficult to gauge response as appears to have evidence of osteoarthritis and CPPD occurring concurrently, but noted significant improvement since initiating colchicine. Voltaren gel for pain control. Tylenol while at work to help with pain control as needed. Followed by Dr. Piyush Lew. 5. Primary osteoarthritis. Evident in bilateral hands and knees, bilateral shoulders, and cervical spine. Neck pain now returned to baseline. Using Voltaren gel for pain. 6. Shoulder pain (R>L). X-ray with evidence of osteoarthritis and rotator cuff pathology. Evaluated by Dr. Alessia Delgado and received cortisone injection to right shoulder with some improvement. Surgery for reverse shoulder replacement recommended and patient considering. Using ibuprofen 400 mg qid and Tylenol. Will prescribe Celebrex 200 mg daily to see if provides better pain control than intermittent ibuprofen. Patient will call with update next week. Also suggested using Voltaren gel. 7. CPDD. Colchicine started on 1/19/2017 to help address chondrocalcinosis on x-rays. Reports significant improvement. Now taking every other day with good control. 8. Paroxysmal nocturnal dyspnea. Patient awakening gasping for air several times per week. No overt evidence of heart failure and EKG without change from baseline. He states that these episodes occur approximately 2-3 times per week, and he finds that he must sit up immediately and take deep breathes until resolved. He does admit to snoring and states that he does experience daytime drowsiness particularly when driving. He states that when on long trips, he must pull off the road and take a nap before resuming driving. Current symptoms suggestive of sleep apnea. Referred to Dr. Wallace Closs for sleep evaluation. 9. Macrocytosis. Mild increase in Hb/Hct and evidence of macrocytosis on recent blood work. Patient describing classic symptoms of sleep apnea. Will reevaluate next visit and obtain additional blood work, including erythropoietin level, if persists. 10. Rectal bleeding. Colonoscopy 8/2015. Evaluated by Dr. Zechariah Chakraborty and considered to be hemorrhoidal in source. Known internal and external hemorrhoids. Improved with Citrucel. No evidence of anemia. Follow. 10. BPH. Does have lower urinary tract symptoms, but refusing medications or surgical interventions. Followed by Dr. Eric Silverman. 11. GERD. Good control of symptoms with omeprazole. No issues today. 12. Depression. Good control with Paxil. No longer Xanax for sleep. Follow. 13. 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 Monitored annually, last 9/2018 without evidence of toxicity. 14. Obesity. Emphasized importance of lifestyle modifications, including diet, exercise, and weight loss. Discussed that would help control blood sugar. Will readdress at next visit. 15. Insomnia. Using melatonin agonist, ramelteon, to replace Xanax. Had good response and weaned from Xanax. 16. Candidal intertrigo. Treated with Mycolog cream and discussed preventative measures with drying agents with improvement. Follow. 17. Health maintenance. Already received influenza vaccine. Unable to receive Tdap due to allergy (anaphylaxis to Td). Will address Shingrix vaccine with Dr. Chau Mcbride. Other immunizations up to date. Colonoscopy due 2020. Continue regular eye exams with Dr. Lien Sterling. Vitamin D level normal. Continue maintenance dose supplement. In addition, an annual Medicare wellness visit was done today. Patient understands recommendations and agrees with plan. Follow-up in 6 months.

## 2019-02-25 NOTE — TELEPHONE ENCOUNTER
Called and left a message on patient's cell phone to give us a call back regarding Dr. Antoinette Gifford message.

## 2019-02-26 NOTE — TELEPHONE ENCOUNTER
Spoke with patient, he stated that the Celebrex worked great initially, but now it's starting to wear off everyday at 12 noon like clockwork. He takes it at 7am and by 12 noon his pain has returned. Please advise on what he should do next? Also, he cancelled the evaluate for sleep apnea because he started using breathe right strips at night time and said they [facility] like a charm\" and he's no longer having any problems.

## 2019-02-28 NOTE — TELEPHONE ENCOUNTER
Called and spoke with patient. Reports finding Celebrex to be more effective than ibuprofen, but noticing increasing pain midday. Taking Tylenol at that point and finding it to be helpful when pain increases. He states that he is pleased with the change to Celebrex. Will increase Celebrex dosing to every 12 hours and advised to continue Tylenol for breakthrough pain. Script sent to [employer].

## 2019-03-01 RX ORDER — CELECOXIB 200 MG/1
200 CAPSULE ORAL 2 TIMES DAILY
Qty: 180 CAP | Refills: 1 | Status: SHIPPED | OUTPATIENT
Start: 2019-03-01 | End: 2019-09-25 | Stop reason: SDUPTHER

## 2019-03-06 ENCOUNTER — OFFICE VISIT (OUTPATIENT)
Dept: INTERNAL MEDICINE CLINIC | Age: 79
End: 2019-03-06

## 2019-03-06 VITALS
RESPIRATION RATE: 12 BRPM | HEART RATE: 86 BPM | HEIGHT: 67 IN | DIASTOLIC BLOOD PRESSURE: 68 MMHG | SYSTOLIC BLOOD PRESSURE: 116 MMHG | TEMPERATURE: 97.9 F | BODY MASS INDEX: 32.02 KG/M2 | WEIGHT: 204 LBS | OXYGEN SATURATION: 97 %

## 2019-03-06 DIAGNOSIS — J06.9 UPPER RESPIRATORY TRACT INFECTION, UNSPECIFIED TYPE: Primary | ICD-10-CM

## 2019-03-06 RX ORDER — AZITHROMYCIN 250 MG/1
TABLET, FILM COATED ORAL
Qty: 6 TAB | Refills: 0 | Status: SHIPPED | OUTPATIENT
Start: 2019-03-06 | End: 2019-03-11

## 2019-03-06 NOTE — PROGRESS NOTES
Health Maintenance Summary     Topic Due On Due Status Completed On Postpone Until Reason    IMMUNIZATION - DTaP/Tdap/Td May 3, 2008 Postponed May 2, 2008 Jul 5, 2017 Test ordered & Appt scheduled to perform    Immunization-Influenza Sep 1, 2017 Not Due             Patient is due for topics as listed above, he wishes to proceed at this time, order (s) placed and patient given information .       Siva Gregory 1940, is a 70 y.o. male, who is seen today for upper respiratory symptoms. 3 evenings ago he developed a sore throat and the next day had had congestion and coughing. He continues to have head congestion and coughing is bringing up some thick yellowish secretions at times. He has had no chills or fever. No symptoms in his ears but he notes that he feels lightheaded and off-balance last 2 mornings though little better now. 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 Dr. Uri Pryor.  CPDD (calcium pyrophosphate deposition disease)     Depression     GERD (gastroesophageal reflux disease)     Hyperlipidemia     Hypertension     Prediabetes     Primary osteoarthritis involving multiple joints 9/6/2011    [facility] 2013    negative RF; elevated anti-CCP. Dr. Maame Weaver. Current Outpatient Medications   Medication Sig Dispense Refill    celecoxib (CELEBREX) 200 mg capsule Take 1 Cap by mouth two (2) times a day. 180 Cap 1    omeprazole (PRILOSEC) 20 mg capsule TAKE 1 CAPSULE BY MOUTH ONCE DAILY 90 Cap 1    PARoxetine (PAXIL) 20 mg tablet TAKE 1 TABLET BY MOUTH ONCE DAILY 30 Tab 11    furosemide (LASIX) 40 mg tablet TAKE 1 TABLET BY MOUTH ONCE DAILY 90 Tab 1    nystatin-triamcinolone (MYCOLOG II) topical cream Apply  to affected area two (2) times a day. 30 g 2    nystatin (MYCOSTATIN) powder Apply  to affected area four (4) times daily. 60 g 1    amLODIPine (NORVASC) 10 mg tablet TAKE 1 TABLET BY MOUTH ONCE DAILY 30 Tab 11    lisinopril (PRINIVIL, ZESTRIL) 40 mg tablet TAKE 1 TABLET BY MOUTH ONCE DAILY 90 Tab 2    hydrALAZINE (APRESOLINE) 25 mg tablet Take 1 Tab by mouth three (3) times daily.  90 Tab 5    atorvastatin (LIPITOR) 10 mg tablet TAKE 1 TABLET BY MOUTH ONCE DAILY 90 Tab 3    potassium chloride (K-DUR, KLOR-CON) 20 mEq tablet Take 1 Tab by mouth daily. 90 Tab 2    cycloSPORINE (RESTASIS) 0.05 % ophthalmic emulsion Administer 1 Drop to both eyes two (2) times a day.  colchicine (MITIGARE) 0.6 mg capsule Take 0.6 mg by mouth daily.  cholecalciferol, vitamin D3, (VITAMIN D3) 2,000 unit tab Take 2,000 Units by mouth daily.  diclofenac (VOLTAREN) 1 % topical gel Apply 4 g to affected area four (4) times daily.  LUMIGAN 0.01 % ophthalmic drops Administer 1 Drop to both eyes nightly.  hydroxychloroquine (PLAQUENIL) 200 mg tablet Take 200 mg by mouth two (2) times a day.  aspirin delayed-release 81 mg tablet Take 81 mg by mouth daily.  MULTIVITS/IRON FUM/FA/D3/LYCOP (MULTI FOR HIM PO) Take  by mouth daily. Visit Vitals  /68 (BP 1 Location: Left arm, BP Patient Position: Sitting)   Pulse 86   Temp 97.9 °F (36.6 °C) (Oral)   Resp 12   Ht 5' 7\" (1.702 m)   Wt 204 lb (92.5 kg)   SpO2 97%   BMI 31.95 kg/m²     Nasal passages are clear. Sinuses are nontender. Neck reveals no adenopathy or thyromegaly. Lungs are clear to percussion. Good breath sounds with no wheezing or crackles. Assessment: Upper respiratory infection with some purulent secretions but also some off balance, probably inner ear related. His blood pressure is not too low. He will be treated with azithromycin and call if not improving. Kash Mesa MD FACP    Please note: This document has been produced using voice recognition software. Unrecognized errors in transcription may be present.

## 2019-03-12 ENCOUNTER — HOSPITAL ENCOUNTER (OUTPATIENT)
Dept: LAB | Age: 79
Discharge: HOME | End: 2019-03-12
Payer: MEDICARE

## 2019-03-12 PROCEDURE — 88305 TISSUE EXAM BY PATHOLOGIST: CPT

## 2019-04-02 RX ORDER — ATORVASTATIN CALCIUM 10 MG/1
TABLET, FILM COATED ORAL
Qty: 90 TAB | Refills: 3 | Status: SHIPPED | OUTPATIENT
Start: 2019-04-02 | End: 2020-04-24

## 2019-04-15 RX ORDER — LISINOPRIL 40 MG/1
40 TABLET ORAL DAILY
Qty: 90 TAB | Refills: 3 | Status: SHIPPED | OUTPATIENT
Start: 2019-04-15 | End: 2020-02-17

## 2019-04-15 NOTE — TELEPHONE ENCOUNTER
Last Visit: 3/6/19 with MD Marck Grajeda  Next Appointment: 8/27/19 with MD Stockton  Previous Refill Encounter(s): 6/20/18 #90 with 2 refills    Requested Prescriptions     Pending Prescriptions Disp Refills    lisinopril (PRINIVIL, ZESTRIL) 40 mg tablet 90 Tab 3     Sig: Take 1 Tab by mouth daily.

## 2019-05-06 RX ORDER — FUROSEMIDE 40 MG/1
40 TABLET ORAL DAILY
Qty: 90 TAB | Refills: 3 | Status: SHIPPED | OUTPATIENT
Start: 2019-05-06 | End: 2021-02-15 | Stop reason: ALTCHOICE

## 2019-05-06 NOTE — TELEPHONE ENCOUNTER
Last Visit: 3/6/19 with MD Joy Lombardo  Next Appointment: 8/27/19 with MD Stockton  Previous Refill Encounter(s): 10/23/18 #90 with 1 refill    Requested Prescriptions     Pending Prescriptions Disp Refills    furosemide (LASIX) 40 mg tablet 90 Tab 3     Sig: Take 1 Tab by mouth daily.

## 2019-06-17 ENCOUNTER — OFFICE VISIT (OUTPATIENT)
Dept: UROLOGY | Age: 79
End: 2019-06-17

## 2019-06-17 ENCOUNTER — HOSPITAL ENCOUNTER (OUTPATIENT)
Dept: LAB | Age: 79
Discharge: HOME | End: 2019-06-17
Payer: MEDICARE

## 2019-06-17 VITALS
OXYGEN SATURATION: 96 % | SYSTOLIC BLOOD PRESSURE: 117 MMHG | DIASTOLIC BLOOD PRESSURE: 64 MMHG | HEIGHT: 67 IN | HEART RATE: 91 BPM | WEIGHT: 205 LBS | BODY MASS INDEX: 32.18 KG/M2

## 2019-06-17 DIAGNOSIS — N50.89 TESTICLE SWELLING: ICD-10-CM

## 2019-06-17 DIAGNOSIS — N40.1 BPH WITH URINARY OBSTRUCTION: ICD-10-CM

## 2019-06-17 DIAGNOSIS — N13.8 BPH WITH URINARY OBSTRUCTION: ICD-10-CM

## 2019-06-17 DIAGNOSIS — N43.40 SPERMATOCELE: Primary | ICD-10-CM

## 2019-06-17 LAB
BILIRUB UR QL STRIP: NEGATIVE
GLUCOSE UR-MCNC: NEGATIVE MG/DL
KETONES P FAST UR STRIP-MCNC: NORMAL MG/DL
PH UR STRIP: 5.5 [PH] (ref 4.6–8)
PROT UR QL STRIP: NEGATIVE
PSA SERPL-MCNC: 4.1 NG/ML (ref 0–4)
SP GR UR STRIP: 1.03 (ref 1–1.03)
UA UROBILINOGEN AMB POC: NORMAL (ref 0.2–1)
URINALYSIS CLARITY POC: CLEAR
URINALYSIS COLOR POC: YELLOW
URINE BLOOD POC: NEGATIVE
URINE LEUKOCYTES POC: NORMAL
URINE NITRITES POC: NEGATIVE

## 2019-06-17 PROCEDURE — 84153 ASSAY OF PSA TOTAL: CPT

## 2019-06-17 NOTE — PROGRESS NOTES
Aditi Muñoz Brett 70 y.o. male     Mr. Roberto Shah seen today for evaluation of a mildly tender mass discovered in the right scrotum while showering several days ago  Patient has no irritative or obstructive voiding symptoms no history of  tract disease, trauma, or surgery  Patient is voiding well and has no complaints regarding urination at this time-voiding with a solid stream good control and no nocturia  Patient declined alpha-blocker treatment several years ago because of concern regarding side effects        Large prostate median lobe on ultrasound imaging of the bladder last year-PVR at that time 48 cc      PSA 3.8 in January 2013  PSA 2.6 in 2011  PSA 2.3 in January 2014  PSA 2.7 in July 2014  PSA 1.9 on 10 July 2016  PSA 2.1 on 14 July 2017  PSA 3.5 in July 2018     PVR 48 cc in 2015   cc in 2017              prostate volume 67.7 cc  PVR 12 cc in July 2018      Review of Systems:    CNS: no seizure syncope headaches or dizziness no visual changes/no changes- on Paxil Rx  Respiratory: no wheezing or shortness of breath  Cardiovascular:hypertension-chest pain or palpitations  Intestinal:GERD  Urinary: mild BPH symptoms  Skeletal: or joint arthritis  Endocrine:no diabetes or thyroid disease  Other:           Allergies: Allergies   Allergen Reactions    Latex, Natural Rubber Swelling    Adhesive Tape-Silicones Rash and Itching    Aldactone [Spironolactone] Not Reported This Time     Breast tenderness    Codeine Not Reported This Time     \"Drives crazy\"    Tape [Adhesive] Rash    Tetanus Toxoid, Adsorbed Anaphylaxis    Tetanus Vaccines And Toxoid Anaphylaxis     Other reaction(s): anaphylaxis/angioedema  Other reaction(s): anaphylaxis/angioedema      Medications:    Current Outpatient Medications   Medication Sig Dispense Refill    furosemide (LASIX) 40 mg tablet Take 1 Tab by mouth daily. 90 Tab 3    lisinopril (PRINIVIL, ZESTRIL) 40 mg tablet Take 1 Tab by mouth daily.  90 Tab 3    atorvastatin (LIPITOR) 10 mg tablet TAKE 1 TABLET BY MOUTH ONCE DAILY 90 Tab 3    celecoxib (CELEBREX) 200 mg capsule Take 1 Cap by mouth two (2) times a day. 180 Cap 1    omeprazole (PRILOSEC) 20 mg capsule TAKE 1 CAPSULE BY MOUTH ONCE DAILY 90 Cap 1    nystatin-triamcinolone (MYCOLOG II) topical cream Apply  to affected area two (2) times a day. 30 g 2    nystatin (MYCOSTATIN) powder Apply  to affected area four (4) times daily. 60 g 1    amLODIPine (NORVASC) 10 mg tablet TAKE 1 TABLET BY MOUTH ONCE DAILY 30 Tab 11    hydrALAZINE (APRESOLINE) 25 mg tablet Take 1 Tab by mouth three (3) times daily. 90 Tab 5    potassium chloride (K-DUR, KLOR-CON) 20 mEq tablet Take 1 Tab by mouth daily. 90 Tab 2    cycloSPORINE (RESTASIS) 0.05 % ophthalmic emulsion Administer 1 Drop to both eyes two (2) times a day.  colchicine (MITIGARE) 0.6 mg capsule Take 0.6 mg by mouth daily.  cholecalciferol, vitamin D3, (VITAMIN D3) 2,000 unit tab Take 2,000 Units by mouth daily.  LUMIGAN 0.01 % ophthalmic drops Administer 1 Drop to both eyes nightly.  hydroxychloroquine (PLAQUENIL) 200 mg tablet Take 200 mg by mouth two (2) times a day.  aspirin delayed-release 81 mg tablet Take 81 mg by mouth daily.  MULTIVITS/IRON FUM/FA/D3/LYCOP (MULTI FOR HIM PO) Take  by mouth daily.  PARoxetine (PAXIL) 20 mg tablet TAKE 1 TABLET BY MOUTH ONCE DAILY 30 Tab 11    diclofenac (VOLTAREN) 1 % topical gel Apply 4 g to affected area four (4) times daily. 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 Dr. Shirley Ng.  CPDD (calcium pyrophosphate deposition disease)     Depression     GERD (gastroesophageal reflux disease)     Hyperlipidemia     Hypertension     Prediabetes     Primary osteoarthritis involving multiple joints 9/6/2011    [facility] arthritis ([facility] 2013    negative RF; elevated anti-CCP. Dr. Marylee Handler. Past Surgical History:   Procedure Laterality Date    HX AMPUTATION FINGER Left     HX CARPAL TUNNEL RELEASE      HX CATARACT REMOVAL Left 01/2018    HX CATARACT REMOVAL Bilateral 2018    HX HEENT      sinus, tonsillectomy    HX HERNIA REPAIR      HX MOHS PROCEDURES      bilateral     HX ORTHOPAEDIC      lef knee surgery, removed cartilage.      Social History     Socioeconomic History    Marital status:      Spouse name: Not on file    Number of children: Not on file    Years of education: Not on file    Highest education level: Not on file   Occupational History    Not on file   Social Needs    Financial resource strain: Not on file    Food insecurity:     Worry: Not on file     Inability: Not on file    Transportation needs:     Medical: Not on file     Non-medical: Not on file   Tobacco Use    Smoking status: Former Smoker     Types: Cigarettes, Pipe, Cigars    Smokeless tobacco: Former User     Types: Chew   Substance and Sexual Activity    Alcohol use: Yes     Comment: rarely    Drug use: No    Sexual activity: Not Currently   Lifestyle    Physical activity:     Days per week: Not on file     Minutes per session: Not on file    Stress: Not on file   Relationships    Social connections:     Talks on phone: Not on file     Gets together: Not on file     Attends Anabaptist service: Not on file     Active member of club or organization: Not on file     Attends meetings of clubs or organizations: Not on file     Relationship status: Not on file    Intimate partner violence:     Fear of current or ex partner: Not on file     Emotionally abused: Not on file     Physically abused: Not on file     Forced sexual activity: Not on file   Other Topics Concern    Not on file   Social History Narrative    Not on file      Family History   Problem Relation Age of Onset    Cancer Mother     Heart Disease Father     Alcohol abuse Father     Cancer Other         Physical Examination: Well-nourished mature male in no apparent distress    Penis is normal  Prostate by MARTÍNEZ is large rounded smooth benign consistency and nontender-no induration no nodularity  2 x 3 x 3 cm cystic mildly tender mass superior aspect right epididymis  Normal testes by palpation bilaterally  Spermatic cords are normal bilaterally  No inguinal hernias on either side    Urinalysis: Negative dipstick/nitrite negative/heme-negative    Impression: Asymptomatic BPH                        2 x 3 x 3 cm right spermatocele        Plan: Scrotal ultrasound imaging    PSA today    Described and discussed indications for spermatocelectomy    RTC 3 weeks scrotal ultrasound imaging        More than 1/2 of this 25 minute visit was spent in counselling and coordination of care, as described above. Alice Henson MD  -electronically signed-    PLEASE NOTE:  This document has been produced using voice recognition software. Unrecognized errors in transcription may be present.

## 2019-06-17 NOTE — PATIENT INSTRUCTIONS
Learning About the Male Reproductive System  What does the male reproductive system do? The male reproductive system makes sperm and delivers it out of the body. It allows a man to have sex and father children. This system is made up of:  · The penis. · Two testicles (one on each side). This is where sperm is made. · The scrotum. This is a sac at the base of the penis that holds the testicles. · The epididymis, where the sperm mature. There is one on each side. · Two seminal vesicles and the prostate gland. They make the liquid semen that carries the sperm. · Two vas deferens tubes, one on each side of the body. They carry semen to the urethra. That's where it leaves the penis during sex. What problems can happen with the reproductive system? Problems may include:  · Injuries to the genitals. This could happen when you play sports, do other activities, or take a fall. · Infections of the testicle (orchitis), epididymis (epididymitis), prostate gland (prostatitis), or other areas. · Sexually transmitted infections (STIs). These include chlamydia, genital herpes, genital warts, and gonorrhea. · Sperm problems. They could cause you to be infertile. · Erection problems (impotence or erectile dysfunction). · Torsion of a testicle. This emergency happens when a testicle twists in the scrotum. The twisting can cut off its blood supply. · Cancer of the testicle or prostate gland. How can you prevent reproductive system problems? · If you play contact sports, make sure to wear a cup or other protection for your genitals. · Having one sex partner (who does not have STIs and does not have sex with anyone else) is a good way to avoid STIs. · Do not smoke. Smoking can lower your overall sexual health. If you need help quitting, talk to your doctor about stop-smoking programs and medicines. These can increase your chances of quitting for good. Where can you learn more?   Go to http://regina-carmen.info/. Enter T249 in the search box to learn more about \"Learning About the Male Reproductive System. \"  Current as of: September 26, 2018  Content Version: 11.9  © 8725-2626 [employer]. Care instructions adapted under license by [employer] (which disclaims liability or warranty for this information). If you have questions about a medical condition or this instruction, always ask your healthcare professional. [place] [address] any warranty or liability for your use of this information.

## 2019-06-17 NOTE — PROGRESS NOTES
Mr. Sedrick Rosas has a reminder for a \"due or due soon\" health maintenance. I have asked that he contact his primary care provider for follow-up on this health maintenance.

## 2019-06-18 NOTE — PROGRESS NOTES
PSA 4.1 on 17 June 2019        PSA 3.8 in January 2013  PSA 2.6 in 2011  PSA 2.3 in January 2014  PSA 2.7 in July 2014  PSA 1.9 on 10 July 2016  PSA 2.1 on 14 July 2017  PSA 3.5 in July 2018  PSA 4.1 on 17 June 2019      Impression elevated and rising PSA    N: Prostate biopsy is indicated and recommended      Gaudencio Limon MD

## 2019-06-19 ENCOUNTER — DOCUMENTATION ONLY (OUTPATIENT)
Dept: UROLOGY | Age: 79
End: 2019-06-19

## 2019-06-19 NOTE — PROGRESS NOTES
Left two vm messages to the patient to call so we can give him his PSA reading of 4.1. Patient has an appointment on July 8, 2019 for scrotal ultrasound. Left additional message to make sure to keep this appointment.

## 2019-06-24 RX ORDER — HYDRALAZINE HYDROCHLORIDE 25 MG/1
25 TABLET, FILM COATED ORAL 3 TIMES DAILY
Qty: 270 TAB | Refills: 2 | Status: SHIPPED | OUTPATIENT
Start: 2019-06-24 | End: 2019-09-30

## 2019-06-24 NOTE — TELEPHONE ENCOUNTER
Last Visit: 3/6/19 with MD Karen Morton  Next Appointment: 8/27/19 with MD Karen Morton  Previous Refill Encounter(s): 4/25/18 #90 with 5 refills    Requested Prescriptions     Pending Prescriptions Disp Refills    hydrALAZINE (APRESOLINE) 25 mg tablet 90 Tab 5     Sig: Take 1 Tab by mouth three (3) times daily.

## 2019-07-08 ENCOUNTER — TELEPHONE (OUTPATIENT)
Dept: UROLOGY | Age: 79
End: 2019-07-08

## 2019-07-08 ENCOUNTER — OFFICE VISIT (OUTPATIENT)
Dept: UROLOGY | Age: 79
End: 2019-07-08

## 2019-07-08 VITALS
OXYGEN SATURATION: 96 % | SYSTOLIC BLOOD PRESSURE: 133 MMHG | HEIGHT: 67 IN | HEART RATE: 90 BPM | BODY MASS INDEX: 31.71 KG/M2 | DIASTOLIC BLOOD PRESSURE: 79 MMHG | WEIGHT: 202 LBS

## 2019-07-08 DIAGNOSIS — N43.40 SPERMATOCELE: ICD-10-CM

## 2019-07-08 DIAGNOSIS — R97.20 ELEVATED PSA: ICD-10-CM

## 2019-07-08 DIAGNOSIS — N40.1 BPH ASSOCIATED WITH NOCTURIA: ICD-10-CM

## 2019-07-08 DIAGNOSIS — N50.819 TESTES PAIN: Primary | ICD-10-CM

## 2019-07-08 DIAGNOSIS — R35.1 BPH ASSOCIATED WITH NOCTURIA: ICD-10-CM

## 2019-07-08 LAB
BILIRUB UR QL STRIP: NEGATIVE
GLUCOSE UR-MCNC: NEGATIVE MG/DL
KETONES P FAST UR STRIP-MCNC: NEGATIVE MG/DL
PH UR STRIP: 7 [PH] (ref 4.6–8)
PROT UR QL STRIP: NORMAL
SP GR UR STRIP: 1.02 (ref 1–1.03)
UA UROBILINOGEN AMB POC: NORMAL (ref 0.2–1)
URINALYSIS CLARITY POC: CLEAR
URINALYSIS COLOR POC: YELLOW
URINE BLOOD POC: NEGATIVE
URINE LEUKOCYTES POC: NORMAL
URINE NITRITES POC: NEGATIVE

## 2019-07-08 RX ORDER — TAMSULOSIN HYDROCHLORIDE 0.4 MG/1
0.4 CAPSULE ORAL DAILY
Qty: 90 CAP | Refills: 3 | Status: SHIPPED | OUTPATIENT
Start: 2019-07-08 | End: 2020-06-26 | Stop reason: SDUPTHER

## 2019-07-08 NOTE — TELEPHONE ENCOUNTER
Patient notified of appointment with Dr. Vega Rodriguez on July 22, 2019 @ 1:00pm check in at 12:30pm.  Patient given address and expresses understanding.

## 2019-07-08 NOTE — PROGRESS NOTES
Mr. Dorien Dandy has a reminder for a \"due or due soon\" health maintenance. I have asked that he contact his primary care provider for follow-up on this health maintenance.

## 2019-07-08 NOTE — PATIENT INSTRUCTIONS
Prostate Cancer Screening: Care Instructions  Your Care Instructions    The prostate gland is an organ found just below a man's bladder. It is the size and shape of a walnut. It surrounds the tube that carries urine from the bladder out of the body through the penis. This tube is called the urethra. Prostate cancer is the abnormal growth of cells in the prostate. It is the second most common type of cancer in men. (Skin cancer is the most common.)  Most cases of prostate cancer occur in men older than 72. The disease runs in families. And it's more common in -American men. When it's found and treated early, prostate cancer may be cured. But it is not always treated. This is because prostate cancer may not shorten your life, especially if you are older and the cancer is growing slowly. Follow-up care is a key part of your treatment and safety. Be sure to make and go to all appointments, and call your doctor if you are having problems. It's also a good idea to know your test results and keep a list of the medicines you take. What are the screening tests for prostate cancer? The main screening test for prostate cancer is the prostate-specific antigen (PSA) test. This is a blood test that measures how much PSA is in your blood. A high level may mean that you have an enlargement, an infection, or cancer. Along with the PSA test, you may have a digital rectal exam. The digital (finger) rectal exam checks for anything abnormal in your prostate. To do the exam, the doctor puts a lubricated, gloved finger into your rectum. If these tests suggest cancer, you may need a prostate biopsy. How is prostate cancer diagnosed? In a biopsy, the doctor takes small tissue samples from your prostate gland. Another doctor then looks at the tissue under a microscope to see if there are cancer cells, signs of infection, or other problems. The results help diagnose prostate cancer.   What are the pros and cons of screening? Neither a PSA test nor a digital rectal exam can tell you for sure that you do or do not have cancer. But they can help you decide if you need more tests, such as a prostate biopsy. Screening tests may be useful because most men with prostate cancer don't have symptoms. It can be hard to know if you have cancer until it is more advanced. And then it's harder to treat. But having a PSA test can also cause harm. The test may show high levels of PSA that aren't caused by cancer. So you could have a prostate biopsy you didn't need. Or the PSA test might be normal when there is cancer, so a cancer might not be found early. The test can also find cancers that would never have caused a problem during your lifetime. So you might have treatment that was not needed. Prostate cancer usually develops late in life and grows slowly. For many men, it does not shorten their lives. Some experts advise screening only for men who are at high risk. Talk with your doctor to see if screening is right for you. Where can you learn more? Go to http://regina-carmen.info/. Enter R550 in the search box to learn more about \"Prostate Cancer Screening: Care Instructions. \"  Current as of: March 27, 2018  Content Version: 11.9  © 3165-8429 [employer]. Care instructions adapted under license by [employer] (which disclaims liability or warranty for this information). If you have questions about a medical condition or this instruction, always ask your healthcare professional. [place] [address] any warranty or liability for your use of this information.

## 2019-07-09 NOTE — PROGRESS NOTES
Sally Durán Brett 70 y.o. male     Mr. Wilmar Arthur seen today for scrotal ultrasound imaging assessing tender right scrotal mass    mildly tender mass discovered in the right scrotum while showering several days ago  Patient has no irritative or obstructive voiding symptoms no history of  tract disease, trauma, or surgery  Patient is voiding well and has no complaints regarding urination at this time-voiding with a solid stream good control and no nocturia  Patient declined alpha-blocker treatment several years ago because of concern regarding side effects        Large prostate median lobe on ultrasound imaging of the bladder last year-PVR at that time 48 cc      PSA 3.8 in January 2013  PSA 2.6 in 2011  PSA 2.3 in January 2014  PSA 2.7 in July 2014  PSA 1.9 on 10 July 2016  PSA 2.1 on 14 July 2017  PSA 3.5 in July 2018  PSA 4.1 in June 2019    PVR 48 cc in 2015   cc in 2017              prostate volume 67.7 cc  PVR 12 cc in July 2018      Review of Systems:    CNS: no seizure syncope headaches or dizziness no visual changes/no changes- on Paxil Rx  Respiratory: no wheezing or shortness of breath  Cardiovascular:hypertension-chest pain or palpitations  Intestinal:GERD  Urinary: mild BPH symptoms  Skeletal: or joint arthritis  Endocrine:no diabetes or thyroid disease  Other:      Allergies: Allergies   Allergen Reactions    Latex, Natural Rubber Swelling    Adhesive Tape-Silicones Rash and Itching    Aldactone [Spironolactone] Not Reported This Time     Breast tenderness    Codeine Not Reported This Time     \"Drives crazy\"    Tape [Adhesive] Rash    Tetanus Toxoid, Adsorbed Anaphylaxis    Tetanus Vaccines And Toxoid Anaphylaxis     Other reaction(s): anaphylaxis/angioedema  Other reaction(s): anaphylaxis/angioedema      Medications:    Current Outpatient Medications   Medication Sig Dispense Refill    tamsulosin (FLOMAX) 0.4 mg capsule Take 1 Cap by mouth daily.  Indications: enlarged prostate with urination problem 90 Cap 3    hydrALAZINE (APRESOLINE) 25 mg tablet Take 1 Tab by mouth three (3) times daily. 270 Tab 2    furosemide (LASIX) 40 mg tablet Take 1 Tab by mouth daily. 90 Tab 3    lisinopril (PRINIVIL, ZESTRIL) 40 mg tablet Take 1 Tab by mouth daily. 90 Tab 3    atorvastatin (LIPITOR) 10 mg tablet TAKE 1 TABLET BY MOUTH ONCE DAILY 90 Tab 3    celecoxib (CELEBREX) 200 mg capsule Take 1 Cap by mouth two (2) times a day. 180 Cap 1    omeprazole (PRILOSEC) 20 mg capsule TAKE 1 CAPSULE BY MOUTH ONCE DAILY 90 Cap 1    PARoxetine (PAXIL) 20 mg tablet TAKE 1 TABLET BY MOUTH ONCE DAILY 30 Tab 11    amLODIPine (NORVASC) 10 mg tablet TAKE 1 TABLET BY MOUTH ONCE DAILY 30 Tab 11    potassium chloride (K-DUR, KLOR-CON) 20 mEq tablet Take 1 Tab by mouth daily. 90 Tab 2    cycloSPORINE (RESTASIS) 0.05 % ophthalmic emulsion Administer 1 Drop to both eyes two (2) times a day.  colchicine (MITIGARE) 0.6 mg capsule Take 0.6 mg by mouth daily.  cholecalciferol, vitamin D3, (VITAMIN D3) 2,000 unit tab Take 2,000 Units by mouth daily.  diclofenac (VOLTAREN) 1 % topical gel Apply 4 g to affected area four (4) times daily.  LUMIGAN 0.01 % ophthalmic drops Administer 1 Drop to both eyes nightly.  hydroxychloroquine (PLAQUENIL) 200 mg tablet Take 200 mg by mouth two (2) times a day.  aspirin delayed-release 81 mg tablet Take 81 mg by mouth daily.  MULTIVITS/IRON FUM/FA/D3/LYCOP (MULTI FOR HIM PO) Take  by mouth daily.  nystatin-triamcinolone (MYCOLOG II) topical cream Apply  to affected area two (2) times a day. 30 g 2    nystatin (MYCOSTATIN) powder Apply  to affected area four (4) times daily. 60 g 1       Past Medical History:   Diagnosis Date    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 Dr. Teddy Harding.     CPDD (calcium pyrophosphate deposition disease)     Depression     GERD (gastroesophageal reflux disease)     Hyperlipidemia     Hypertension     Prediabetes     Primary osteoarthritis involving multiple joints 9/6/2011    Rheumatoid arthritis ([facility] [address] 2013    negative RF; elevated anti-CCP. Dr. Jb Vargas. Past Surgical History:   Procedure Laterality Date    HX AMPUTATION FINGER Left     HX CARPAL TUNNEL RELEASE      HX CATARACT REMOVAL Left 01/2018    HX CATARACT REMOVAL Bilateral 2018    HX HEENT      sinus, tonsillectomy    HX HERNIA REPAIR      HX MOHS PROCEDURES      bilateral     HX ORTHOPAEDIC      lef knee surgery, removed cartilage.      Social History     Socioeconomic History    Marital status:      Spouse name: Not on file    Number of children: Not on file    Years of education: Not on file    Highest education level: Not on file   Occupational History    Not on file   Social Needs    Financial resource strain: Not on file    Food insecurity:     Worry: Not on file     Inability: Not on file    Transportation needs:     Medical: Not on file     Non-medical: Not on file   Tobacco Use    Smoking status: Former Smoker     Types: Cigarettes, Pipe, Cigars    Smokeless tobacco: Former User     Types: Chew   Substance and Sexual Activity    Alcohol use: Yes     Comment: rarely    Drug use: No    Sexual activity: Not Currently   Lifestyle    Physical activity:     Days per week: Not on file     Minutes per session: Not on file    Stress: Not on file   Relationships    Social connections:     Talks on phone: Not on file     Gets together: Not on file     Attends Congregation service: Not on file     Active member of club or organization: Not on file     Attends meetings of clubs or organizations: Not on file     Relationship status: Not on file    Intimate partner violence:     Fear of current or ex partner: Not on file     Emotionally abused: Not on file     Physically abused: Not on file     Forced sexual activity: Not on file   Other Topics Concern    Not on file   Social History Narrative    Not on file      Family History   Problem Relation Age of Onset    Cancer Mother     Heart Disease Father     Alcohol abuse Father     Cancer Other         Physical Examination: Well-nourished mature male in no apparent distress  Penis is normal  Prostate by MART[facility]NEZ is large rounded smooth benign consistency and nontender-no induration no nodularity  2 x 3 x 3 cm cystic mildly tender mass superior aspect right epididymis  Normal testes by palpation bilaterally  Spermatic cords are normal bilaterally  No inguinal hernias on either side    Urinalysis: Negative nitrite/negative heme    Impression: Symptomatic right spermatocele                        Borderline PSA elevation        Plan: Continue Flomax 0.4 mg daily    Described discussed indications for an prospect of right epididymectomy can also perform transperineal prostate biopsy under same anesthesia/    RTC 3 months PSA testing-we will pursue prostate biopsy if PSA testing shows rising PSA        More than 1/2 of this 25 minute visit was spent in counselling and coordination of care, as described above. Lavonia Mortimer, MD  -electronically signed-    PLEASE NOTE:  This document has been produced using voice recognition software. Unrecognized errors in transcription may be present.

## 2019-08-05 ENCOUNTER — HOSPITAL ENCOUNTER (OUTPATIENT)
Age: 79
Discharge: HOME | End: 2019-08-05
Attending: ORTHOPAEDIC SURGERY
Payer: MEDICARE

## 2019-08-05 DIAGNOSIS — M47.816 LUMBAR SPONDYLOSIS: ICD-10-CM

## 2019-08-05 DIAGNOSIS — M54.31 RIGHT SIDED SCIATICA: ICD-10-CM

## 2019-08-05 PROCEDURE — 72148 MRI LUMBAR SPINE W/O DYE: CPT

## 2019-08-09 ENCOUNTER — TELEPHONE (OUTPATIENT)
Dept: INTERNAL MEDICINE CLINIC | Age: 79
End: 2019-08-09

## 2019-08-09 NOTE — TELEPHONE ENCOUNTER
Pt called at 7:25am with c/o orthopnea last night, intermittent CP, SOB, pleuritic CP off/on, and dizziness. Because of his orthopnea, he has been standing up and walking all night. Pt advised to go to the ED for urgent evaluation via EMS. CHF? PNA? CAD? Electrolyte/CBC abnormalities? Forwarding this message to .      Dr. Damián Saldana  [facility] [address], [address] [place] [address] Str.  Phone: [phone]  Fax: [phone]

## 2019-08-13 ENCOUNTER — OFFICE VISIT (OUTPATIENT)
Dept: INTERNAL MEDICINE CLINIC | Age: 79
End: 2019-08-13

## 2019-08-13 VITALS
DIASTOLIC BLOOD PRESSURE: 70 MMHG | BODY MASS INDEX: 30.77 KG/M2 | TEMPERATURE: 97.4 F | HEART RATE: 93 BPM | RESPIRATION RATE: 16 BRPM | HEIGHT: 68 IN | WEIGHT: 203 LBS | SYSTOLIC BLOOD PRESSURE: 142 MMHG | OXYGEN SATURATION: 98 %

## 2019-08-13 DIAGNOSIS — E66.09 CLASS 1 OBESITY DUE TO EXCESS CALORIES WITH SERIOUS COMORBIDITY AND BODY MASS INDEX (BMI) OF 32.0 TO 32.9 IN ADULT: ICD-10-CM

## 2019-08-13 DIAGNOSIS — I10 ESSENTIAL HYPERTENSION: ICD-10-CM

## 2019-08-13 DIAGNOSIS — M06.042 RHEUMATOID ARTHRITIS INVOLVING BOTH HANDS WITH NEGATIVE RHEUMATOID FACTOR (HCC): ICD-10-CM

## 2019-08-13 DIAGNOSIS — F33.1 MODERATE EPISODE OF RECURRENT MAJOR DEPRESSIVE DISORDER (HCC): ICD-10-CM

## 2019-08-13 DIAGNOSIS — M15.9 PRIMARY OSTEOARTHRITIS INVOLVING MULTIPLE JOINTS: ICD-10-CM

## 2019-08-13 DIAGNOSIS — E78.5 HYPERLIPIDEMIA, UNSPECIFIED HYPERLIPIDEMIA TYPE: ICD-10-CM

## 2019-08-13 DIAGNOSIS — N40.1 BPH WITH OBSTRUCTION/LOWER URINARY TRACT SYMPTOMS: ICD-10-CM

## 2019-08-13 DIAGNOSIS — M11.20 CPDD (CALCIUM PYROPHOSPHATE DEPOSITION DISEASE): ICD-10-CM

## 2019-08-13 DIAGNOSIS — D75.89 MACROCYTOSIS WITHOUT ANEMIA: ICD-10-CM

## 2019-08-13 DIAGNOSIS — G47.30 SLEEP APNEA, UNSPECIFIED TYPE: Primary | ICD-10-CM

## 2019-08-13 DIAGNOSIS — M47.816 LUMBAR FACET ARTHROPATHY: ICD-10-CM

## 2019-08-13 DIAGNOSIS — R73.03 PRE-DIABETES: ICD-10-CM

## 2019-08-13 DIAGNOSIS — R06.00 PND (PAROXYSMAL NOCTURNAL DYSPNEA): ICD-10-CM

## 2019-08-13 DIAGNOSIS — S68.111A: ICD-10-CM

## 2019-08-13 DIAGNOSIS — N13.8 BPH WITH OBSTRUCTION/LOWER URINARY TRACT SYMPTOMS: ICD-10-CM

## 2019-08-13 DIAGNOSIS — M06.041 RHEUMATOID ARTHRITIS INVOLVING BOTH HANDS WITH NEGATIVE RHEUMATOID FACTOR (HCC): ICD-10-CM

## 2019-08-13 DIAGNOSIS — M54.31 RIGHT SIDED SCIATICA: ICD-10-CM

## 2019-08-13 RX ORDER — TRAMADOL HYDROCHLORIDE 50 MG/1
TABLET ORAL
Refills: 0 | COMMUNITY
Start: 2019-07-31 | End: 2019-08-17

## 2019-08-13 RX ORDER — GABAPENTIN 100 MG/1
CAPSULE ORAL
Refills: 0 | COMMUNITY
Start: 2019-07-19 | End: 2019-09-04 | Stop reason: SDUPTHER

## 2019-08-13 RX ORDER — SERTRALINE HYDROCHLORIDE 50 MG/1
50 TABLET, FILM COATED ORAL DAILY
Qty: 90 TAB | Refills: 2 | Status: SHIPPED | OUTPATIENT
Start: 2019-08-13 | End: 2019-10-17

## 2019-08-13 NOTE — PROGRESS NOTES
"S: Blanca is a 24 y.o.  at 37w5d. She is doing well. Feels pressure on and off.     O:   Blood pressure (!) 104/52, pulse (!) 120, temperature 97.9 °F (36.6 °C), temperature source Core, resp. rate 18, height 5' 4" (1.626 m), weight 82 kg (180 lb 12.4 oz), last menstrual period 2016, SpO2 98 %, not currently breastfeeding.     FHT: Category 1, moderate variability, (+) accelerations, reactive for gestational age  [place]/[facility]: q3 mins  SVE: complete/+1      ASSESSMENT: 37w5d   Active Hospital Problems    Diagnosis  POA    *Normal pregnancy [Z34.90]  Not Applicable      Resolved Hospital Problems    Diagnosis Date Resolved POA   No resolved problems to display.       PLAN:  Continue Close Maternal/Fetal Monitoring  Set up to push.     Guerda Canas MD    " Sharath Alatorre presents today for   Chief Complaint   Patient presents with   [facility] ED Follow-up     seen at [facility] on 8/9/19 for chest pain/SOB              Depression Screening:  3 most recent PHQ Screens 2/19/2019   Little interest or pleasure in doing things Not at all   Feeling down, depressed, irritable, or hopeless Not at all   Total Score PHQ 2 0       Learning Assessment:  Learning Assessment 2/19/2019   PRIMARY LEARNER Patient   HIGHEST LEVEL OF EDUCATION - PRIMARY LEARNER  [address] [place] [address] PRIMARY LEARNER NONE   CO-LEARNER CAREGIVER No   PRIMARY LANGUAGE ENGLISH   LEARNER PREFERENCE PRIMARY DEMONSTRATION     -   ANSWERED BY patient   RELATIONSHIP SELF       Abuse Screening:  Abuse Screening Questionnaire 2/19/2019   Do you ever feel afraid of your partner? N   Are you in a relationship with someone who physically or mentally threatens you? N   Is it safe for you to go home? Y       Fall Risk  Fall Risk Assessment, last 12 mths 3/6/2019   Able to walk? Yes   Fall in past 12 months? No           Coordination of Care:  1. Have you been to the ER, urgent care clinic since your last visit? YES 8/9/19 [facility]  Hospitalized since your last visit? no    2. Have you seen or consulted any other health care providers outside of the [address] [place] [address] since your last visit? Include any pap smears or colon screening.  no

## 2019-08-17 PROBLEM — G47.30 SLEEP APNEA: Status: ACTIVE | Noted: 2019-08-17

## 2019-08-17 PROBLEM — M54.31 RIGHT SIDED SCIATICA: Status: ACTIVE | Noted: 2019-08-17

## 2019-08-17 PROBLEM — R06.00 PND (PAROXYSMAL NOCTURNAL DYSPNEA): Status: ACTIVE | Noted: 2019-08-17

## 2019-08-17 PROBLEM — M47.816 LUMBAR FACET ARTHROPATHY: Status: ACTIVE | Noted: 2019-08-17

## 2019-08-17 NOTE — PROGRESS NOTES
HPI:   Rozina Bishop is a 70y.o. year old male who presents today for post-ED follow-up. He has a history of hypertension, hyperlipidemia, prediabetes, rheumatoid arthritis, osteoarthritis, calcium pyrophosphate deposition disease, BPH, GERD, and depression. 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 He reports that he has been having increasing difficulty with right sided sciatica and has been seeing Dr. Sheree Quick. He states that he was given a Medrol dose pack, but reports no improvement. He states that he has been receiving physical therapy, and was also prescribed Norco and Tramadol, but he states that he finds them too sedating and of no benefit. Due to persistent symptoms, he states that he underwent a lumbar MRI (8/5/2019) showing degenerative changes most notable at L4-L5 resulting in moderate spinal canal stenosis as well as moderate to severe right greater than left foraminal stenoses; advanced facet arthropathy with small facet effusions and suspected small right facet joint ventral synovial cyst; notable degenerative changes also L3-L4; L1 mild anterior compression deformity, chronic appearing; overall general worsening when compared to prior study in 2007. He states that he has an appointment tomorrow with Dr. Sheree Quick to discuss, but remains frustrated that his pain is persisting. He also reports that he has been noticing an increase in depression symptoms and significant increase in anxiety over the last several weeks. He states that he just realized that he inadvertently stopped taking his Paxil about 3 weeks ago. He states that he thinks that his wife lost the prescription since the pharmacy stated that it was picked up and would not refill it early. He states that he is not sleeping well at night and even will get out of bed and go for a walk outside due to his restlessness and inability to sleep.  He states that when he does fall asleep, he has been having increased episodes of waking up gasping for air. He states that he must get out of bed and walk around until his breathing normalizes. He states that he is also afraid to go to sleep due to this issue. He was referred to Dr. Sally Ramos in 1/2019 for evaluation of possible sleep apnea, but never scheduled. On 8/9/2019, he had a particularly severe episode where after falling asleep, he awoke gasping for air and spent most of the night walking around due to fear that it would occur again. He denies any chest pain, shortness of breath at rest or with exertion, palpitations, lightheadedness, or orthopnea. He contacted the on call physician and was advised to go to the ED. He was evaluated at [facility] and testing included WBC 5.7, Hb 14.2/ Hct 41.6, creatinine 0.9/ eGFR>60, troponin I x 1 negative, NT-pro BNP 45, EKG sinus rhythm at 83 bpm and no ischemic changes, and chest x-ray without acute changes, but an ill defined 15 mm density in the lateral left mid zone was noted. He received lasix 40 mg IV and was discharged. He does report having some mild lower extremity edema, which is a chronic problem and has not worsened recently. He is otherwise without complaints. On 6/20/2018, he suffered an accidental gun shot wound while working resulting in traumatic injury to his left index finger with near loss of the middle phalanx and fracture of the distal phalanx. He was taken to [facility] and initially had irrigation and closure of the lacerations performed. He presented to the [facility] ED on 6/24/2018 with increased pain and swelling, and was started on doxycycline for a wound infection. On 7/7/2018, due to loss of stability and angulation of the index finger, he underwent stabilization with fusion of the proximal and distal phalanx with bone grafting by Dr. Stanley Martinez. He did well and was started on physical therapy.  On 8/1/2018, he presented to [address] [place] [address] for evaluation of increasing erythema, swelling and tenderness with concern for a possible infection. He underwent left hand x-ray (8/1/2018) showing severe soft tissue swelling of the left second finger worrisome for cellulitis, traumatic amputation of the middle phalanx with marked osteopenia and irregularity of the base of the distal phalanx and flattening and irregularity of the head of the proximal phalanx. Unclear if related to prior trauma/surgery or osteomyelitis with osseous destruction and no films available for comparison. He was started empirically on Bactrim and Augmentin for 14 days. On 8/10/2018, he presented for evaluation by GOMEZ Lopez for complaints of fever, malaise, headache, fatigue, and diarrhea. Lab evaluation showed WBC 17 (90% neutrophils), creatinine 1.34/ eGFR 52, blood culture negative. Stool cultures (8/13/2018) routine, O/P, and C.diff negative. Bactrim and Augmentin were discontinued on 8/13/2018, and he was started on metronidazole for 10 days for treatment of presumed C.diff with improvement. He was evaluated by Dr. Mike Alejandro on 8/15/2018 and repeat WBC 8.6 (59% neutrophils), ESR 6, and CRP 0.9. He was seen by Dr. Mike Alejandro in follow-up on 8/30/2018 and left index finger wound noted to be significantly improved and no further difficulty with diarrhea. He has a history of hypertension, treated with amlodipine, lisinopril, lasix (+ potassium), and hydralazine was added in 4/2018. He states that his wife has been monitoring his blood pressure intermittently. He denies any chest pain, shortness of breath at rest or with exertion, lightheadedness, or palpitations. He does report bilateral lower extremity swelling that it will increase throughout the day and improve overnight. . In 6/2016, he underwent lower extremity arterial and venous duplex scans, both of which were negative. He also has a history of hyperlipidemia, treated with moderate intensity atorvastatin. He has a history of prediabetes, with HbA1c ranging from 5.9-6.2 since 2012.  He denies any polyuria, polydipsia, nocturia, or blurry vision, and has no history of retinopathy, neuropathy, or nephropathy. He has regular eye exams with Dr. Debbi Jacobs. He has a history of bilateral hand pain with morning stiffness for several years, and in 3/2014, he was noted to have a positive anti-CCP antibody level although NIMCO, rheumatoid factor, and ESR were negative. He was referred for evaluation to Dr. Lanette Poe, and was diagnosed with rheumatoid arthritis in 6/2014. He was treated with hydroxychloroquine, which has been partially helpful in controlling his symptoms. Bilateral hand x-rays also showed evidence of primary osteoarthritis with osteophytes present on the second and third MCP joints. In 1/2017, he was also noted to have evidence of possible chondrocalcinosis on x-ray, and was started on low dose colchicine in addition to hydroxychloroquine for concomitant calcium pyrophosphate deposition disease. He states that since starting on colchicine, he has had significant improvement in his hand pain. He also uses compounding cream and Voltaren gel for pain control. In 1/2019, he was complaining of worsening neck and bilateral shoulder pain (R>L). He stated that he was previously followed by Dr. Tamara Hernandez, and would occasionally receive cortisone injections into his shoulders. He also described neck pain with difficulty turning his head. He denied any upper extremity weakness or paresthesias. He underwent imaging, and bilateral shoulder x-rays (1/10/2019) showed degenerative changes and secondary findings of rotator cuff pathology. Cervical spine x-rays (1/10/2019) showed advanced degenerative changes with multilevel facet arthropathy. He was evaluated by Dr. Татьяна Thakkar who gave him a cortisone injection in his right shoulder with some improvement. He also recommended a reverse shoulder replacement, but he remains hesitant to proceed. He was started on Celebrex 200 mg bid in 2/2019, and reports significant improvement.  He continues to also use Tylenol as needed for pain. He states that his neck pain seems to have improved to his baseline level. He has a history of symptomatic BPH, with complaints of decreased stream, hesitancy, and dribbling. He has refused treatment with medication. He is followed by Dr. Armando Dodson. He denies any dysuria, gross hematuria, or flank pain. He has a history of GERD, treated with daily omeprazole with good control of symptoms. He had a screening colonoscopy and 8/2015 by Dr. Richar Lee, showing a 3 mm sessile cecal polyp (pathology: intra-mucosal lymphoid aggregate), two 4 mm sessile polyps in the transverse colon (pathology: serrated adenomas), and a 1 cm lipoma in the transverse colon. Follow-up recommended for five years. He was having difficulty with rectal bleeding in 1/2018 and returned for evaluation with Dr. Richar Lee who felt it was most likely secondary to a hemorrhoidal source. She recommended use of Citrucel. He states that the bleeding has improved with initiation of this therapy. He denies any abdominal pain, nausea, vomiting, melena, or change in bowel movements. He has a history of depression, which had been well controlled with Paxil although medication inadvertently stopped as discussed. 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 Dr. Armando Dodson.  CPDD (calcium pyrophosphate deposition disease)     Depression     GERD (gastroesophageal reflux disease)     Hyperlipidemia     Hypertension     Prediabetes     Primary osteoarthritis involving multiple joints 9/6/2011    Rheumatoid arthritis ([facility] [address] 2013    negative RF; elevated anti-CCP. Dr. Alexandra Pendleton.      Past Surgical History:   Procedure Laterality Date    HX AMPUTATION FINGER Left     HX CARPAL TUNNEL RELEASE      HX CATARACT REMOVAL Left 01/2018    HX CATARACT REMOVAL Bilateral 2018    HX HEENT      sinus, tonsillectomy    HX HERNIA REPAIR      HX MOHS PROCEDURES      bilateral     HX ORTHOPAEDIC      lef knee surgery, removed cartilage. Current Outpatient Medications   Medication Sig    sertraline (ZOLOFT) 50 mg tablet Take 1 Tab by mouth daily.  tamsulosin (FLOMAX) 0.4 mg capsule Take 1 Cap by mouth daily. Indications: enlarged prostate with urination problem    hydrALAZINE (APRESOLINE) 25 mg tablet Take 1 Tab by mouth three (3) times daily.  furosemide (LASIX) 40 mg tablet Take 1 Tab by mouth daily.  lisinopril (PRINIVIL, ZESTRIL) 40 mg tablet Take 1 Tab by mouth daily.  atorvastatin (LIPITOR) 10 mg tablet TAKE 1 TABLET BY MOUTH ONCE DAILY    celecoxib (CELEBREX) 200 mg capsule Take 1 Cap by mouth two (2) times a day.  omeprazole (PRILOSEC) 20 mg capsule TAKE 1 CAPSULE BY MOUTH ONCE DAILY    amLODIPine (NORVASC) 10 mg tablet TAKE 1 TABLET BY MOUTH ONCE DAILY    potassium chloride (K-DUR, KLOR-CON) 20 mEq tablet Take 1 Tab by mouth daily.  cycloSPORINE (RESTASIS) 0.05 % ophthalmic emulsion Administer 1 Drop to both eyes two (2) times a day.  colchicine (MITIGARE) 0.6 mg capsule Take 0.6 mg by mouth every other day.  cholecalciferol, vitamin D3, (VITAMIN D3) 2,000 unit tab Take 2,000 Units by mouth daily.  diclofenac (VOLTAREN) 1 % topical gel Apply 4 g to affected area four (4) times daily.  LUMIGAN 0.01 % ophthalmic drops Administer 1 Drop to both eyes nightly.  hydroxychloroquine (PLAQUENIL) 200 mg tablet Take 400 mg by mouth daily.  aspirin delayed-release 81 mg tablet Take 81 mg by mouth daily.  MULTIVITS/IRON FUM/FA/D3/LYCOP (MULTI FOR HIM PO) Take  by mouth daily.  traMADol (ULTRAM) 50 mg tablet TAKE 1 TABLET BY MOUTH EVERY SIX HOURS FOR PAIN    gabapentin (NEURONTIN) 100 mg capsule TAKE 1 CAPSULE BY MOUTH 3 TIMES A DAY    nystatin-triamcinolone (MYCOLOG II) topical cream Apply  to affected area two (2) times a day.     nystatin (MYCOSTATIN) powder Apply  to affected area four (4) times daily. No current facility-administered medications for this visit. Allergies and Intolerances: Allergies   Allergen Reactions    Latex, Natural Rubber Swelling    Adhesive Tape-Silicones Rash and Itching    Aldactone [Spironolactone] Not Reported This Time     Breast tenderness    Codeine Not Reported This Time     \"Drives crazy\"    Tape [Adhesive] Rash    Tetanus Toxoid, Adsorbed Anaphylaxis    Tetanus Vaccines And Toxoid Anaphylaxis     Other reaction(s): anaphylaxis/angioedema  Other reaction(s): anaphylaxis/angioedema     Family History: He has no family history of colon or prostate cancer. Family History   Problem Relation Age of Onset    Cancer Mother     Heart Disease Father     Alcohol abuse Father     Cancer Other      Social History:   He  reports that he has quit smoking. His smoking use included cigarettes, pipe, and cigars. He has quit using smokeless tobacco.  His smokeless tobacco use included chew. He smoked 2 ppd for 50 years, stopping in 1974. He is  with two adult children. 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 Social History     Substance and Sexual Activity   Alcohol Use Yes    Comment: rarely     Immunization History:  Immunization History   Administered Date(s) Administered    (RETIRED) Pneumococcal Vaccine (Unspecified Type) 01/01/2008    Influenza High Dose Vaccine PF 09/30/2017    Influenza Vaccine (Tri) Adjuvanted 09/25/2018    Influenza Vaccine Split 10/04/2011, 10/16/2012    Influenza Vaccine Whole 01/15/2010    Pneumococcal Conjugate (PCV-13) 01/19/2015    Varicella Virus Vaccine 10/01/2013    Zoster 10/16/2012       Review of Systems:   As above included in HPI.   Otherwise 11 point review of systems negative including constitutional, skin, HENT, eyes, respiratory, cardiovascular, gastrointestinal, genitourinary, musculoskeletal, endocrine, hematologic, allergy, and neurologic. Physical:   Vitals:   BP: 142/70   HR: 93  WT: 203 lb (92.1 kg)  BMI:  30.87 kg/m2  O2: 98%    Exam:   Patient appears in no apparent distress. Affect is appropriate. HEENT: PERRLA, anicteric, oropharynx clear, no JVD, adenopathy or thyromegaly. No carotid bruits or radiated murmur. Lungs: clear to auscultation, no wheezes, rhonchi, or rales. Heart: regular rate and rhythm. No murmur, rubs, gallops  Abdomen: soft, nontender, nondistended, normal bowel sounds, no hepatosplenomegaly or masses. Extremities: with trace edema. Pulses 1-2+ bilaterally.       Review of Data:  Labs:  Admission on 08/09/2019, Discharged on 08/09/2019   Component Date Value Ref Range Status    Ventricular Rate 08/09/2019 83  BPM Final    Atrial Rate 08/09/2019 83  BPM Final    P-R Interval 08/09/2019 162  ms Final    QRS Duration 08/09/2019 108  ms Final    Q-T Interval 08/09/2019 372  ms Final    QTC Calculation (Bezet) 08/09/2019 437  ms Final    Calculated P Axis 08/09/2019 70  degrees Final    Calculated R Axis 08/09/2019 -50  degrees Final    Calculated T Axis 08/09/2019 72  degrees Final    Diagnosis 08/09/2019    Final                    Value:Sinus rhythm with premature atrial complexes  Left axis deviation  Septal infarct , age undetermined  Abnormal ECG    Confirmed by Sai Hansen M.D., Rosey Quiver ([id number]) on 8/10/2019 2:12:20 PM      WBC 08/09/2019 5.7  4.0 - 11.0 1000/mm3 Final    RBC 08/09/2019 4.24  3.80 - 5.70 M/uL Final    HGB 08/09/2019 14.2  12.4 - 17.2 gm/dl Final    HCT 08/09/2019 41.6  37.0 - 50.0 % Final    MCV 08/09/2019 98.1* 80.0 - 98.0 fL Final    MCH 08/09/2019 33.5  23.0 - 34.6 pg Final    MCHC 08/09/2019 34.1  30.0 - 36.0 gm/dl Final    PLATELET 94/14/6046 232  140 - 450 1000/mm3 Final    MPV 08/09/2019 9.7  6.0 - 10.0 fL Final    RDW-SD 08/09/2019 46.9* 35.1 - 43.9   Final    NRBC 08/09/2019 0  0 - 0   Final    IMMATURE GRANULOCYTES 08/09/2019 0.2  0.0 - 3.0 % Final    NEUTROPHILS 2019 62.3  34 - 64 % Final    LYMPHOCYTES 2019 22.0* 28 - 48 % Final    MONOCYTES 2019 10.8  1 - 13 % Final    EOSINOPHILS 2019 4.2  0 - 5 % Final    BASOPHILS 2019 0.5  0 - 3 % Final    Sodium 2019 140  136 - 145 mEq/L Final    Potassium 2019 3.9  3.5 - 5.1 mEq/L Final    Chloride 2019 107  98 - 107 mEq/L Final    CO2 2019 24  21 - 32 mEq/L Final    Glucose 2019 106  74 - 106 mg/dl Final    BUN 2019 19  7 - 25 mg/dl Final    Creatinine 2019 0.9  0.6 - 1.3 mg/dl Final    GFR est AA 2019 >60    Final    GFR est non-AA 2019 >60    Final    Calcium 2019 9.3  8.5 - 10.1 mg/dl Final    Anion gap 2019 8  5 - 15 mmol/L Final    Troponin-I 2019 <0.015  0.000 - 0.045 ng/ml Final    NT pro-BNP 2019 45.0  0.0 - 450.0 pg/ml Final     Health Maintenance:  Screening:    Colorectal: colonoscopy (2015) serrated adenomas. Dr. Alejandra Sanabria. Due 2020. Depression: on Paxil   DM (HbA1c/FPG): / HbA1c 5.8 (2019)   Hepatitis C: N/A   Falls: none   DEXA: within normal limits (2016)   PSA/MARTÍNEZ: PSA 2.1. As per Dr. Polly Cao   Glaucoma: regular eye exams with Dr. Cisse/ Dr. Huong Foster (last 2019)   Smokin pack year.  Distant past.   Vitamin D: 43.5 (2019)   Medicare Wellness: 2019    Impression:  Patient Active Problem List   Diagnosis Code    BPH with obstruction/lower urinary tract symptoms N40.1, N13.8    Hypertension I10    Primary osteoarthritis involving multiple joints M15.0    Hyperlipidemia E78.5    GERD (gastroesophageal reflux disease) K21.9    Pre-diabetes R73.03    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 (HCC) M06.041, M06.042    Vitamin D deficiency E55.9    Colon polyps K63.5    CPDD (calcium pyrophosphate deposition disease) M11.20    Abnormal glucose R73.09    Depression F32.9    Rectal bleeding K62.5    Class 1 obesity due to excess calories with serious comorbidity and body mass index (BMI) of 32.0 to 32.9 in adult E66.09, Z68.32    APC (atrial premature contractions) I49.1    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 Paroxysmal nocturnal dyspnea. Patient reported in 1/2019 awakening gasping for air several times per week. No overt evidence of heart failure and EKG was without change from baseline at that time. He reported that he would need to sit up immediately and take deep breathes until resolved. He also admitted to snoring and experiencing significant daytime drowsiness particularly when driving. He reported that when he was on long trips, he would need to pull off the road and take a nap before he could resume driving. Given high suspicion for sleep apnea, he was referred to Dr. Daiana Mullins for evaluation, but he never scheduled. Now with worsening symptoms over the last several weeks, possibly exacerbated by the inadvertent abrupt cessation of Paxil during this time. Severe episode on 8/9/2019 prompted ED evaluation. EKG without change, troponin negative and NT-pro BNP normal. Discussed at length with patient today and agreeable now to schedule with Dr. Daiana Mullins. Referral again placed and provided with phone number to schedule. Will also proceed with echocardiogram to evaluate pulmonary pressures given chronic lower extremity edema and no prior echocardiogram. However, normal NT-pro BNP reassuring that not secondary to heart failure. Will continue to follow. 2. Depression. Prior reasonable control with Paxil and weaned from benzodiazepine use for anxiety and insomnia.  However, will change to Zoloft 50 mg daily so that may begin therapy immediately and also to see if will offer better control of symptoms. 3. Right sided sciatica. Unresponsive to Medrol dose pack, physical therapy, and unwilling to take narcotics as not effective. Lumbar MRI with multilevel degenerative changes and facet arthropathy with R>L facet stenosis at L4-L5 which may be responsible for symptoms. May benefit from [facility]. Patient currently being followed by Dr. Jef Vitale, but is getting frustrated with lack of improvement. Advised to follow-up with already scheduled appointment tomorrow to review MRI, and if wishing to change providers, to contact the office. 4. Abnormal chest x-ray. Ill defined density in left mid zone noted on chest x-ray in ED on 8/9/2019. Will discuss with patient at upcoming visit and proceed with chest CT scan to further evaluate. Other issues:   1. Hypertension. Blood pressure well controlled on current regimen of lisinopril 40 mg daily, amlodipine 10 mg daily, lasix 40 mg daily (potassium 20 meq daily) and hydralazine 25 mg bid. Renal function normal with creatinine 0.9/ eGFR >60. Continue to follow. 2. Hyperlipidemia. On moderate intensity dose atorvastatin with LDL 63 and HDL 69, indicative of excellent control in this patient. Continue to follow. 3. Prediabetes. Has been controlled on diet alone. HbA1c remains controlled at 5.8. No evidence of microvascular complications. On Ace-I and statin. Continue follow-up with Dr. Caesar Blas for annual eye exams. Emphasized importance of lifestyle modifications, including diet, exercise, and weight loss. To be reassessed at next visit. 4. Rheumatoid arthritis. On Plaquenil. Has regular eye exams with Dr. Caesar Blas. Difficult to gauge response as appears to have evidence of osteoarthritis and CPPD occurring concurrently, but noted significant improvement since initiating colchicine. Voltaren gel for pain control. Tylenol while at work to help with pain control as needed. Followed by Dr. Edin Morgan. 5. Primary osteoarthritis.   Evident in bilateral hands and knees, bilateral shoulders, and cervical spine. Neck pain now returned to baseline. Using Voltaren gel for pain. 6. Shoulder pain (R>L). X-ray with evidence of osteoarthritis and rotator cuff pathology. Evaluated by Dr. Consuelo Shah and received cortisone injection to right shoulder with some improvement. Surgery for reverse shoulder replacement recommended. Changed from ibuprofen 400 mg qid and Tylenol to Celebrex 200 mg bid at last visit with significant improvement. Also using Voltaren gel. 7. CPDD. Colchicine started on 1/19/2017 to help address chondrocalcinosis on x-rays. Reports significant improvement. Now taking every other day with good control. 8. Macrocytosis. Mild increase in Hb/Hct and evidence of macrocytosis on recent blood work. Patient describing classic symptoms of sleep apnea. Will reevaluate next visit and obtain additional blood work, including erythropoietin level, if persists. 9. Rectal bleeding. Colonoscopy 8/2015. Evaluated by Dr. Jamie Christensen and considered to be hemorrhoidal in source. Known internal and external hemorrhoids. Improved with Citrucel. No evidence of anemia. Follow. 10. BPH. Does have lower urinary tract symptoms, but refusing medications or surgical interventions. Followed by Dr. Germania Funez. 11. GERD. Good control of symptoms with omeprazole. No issues today. 12. 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 Monitored annually, last 9/2018 without evidence of toxicity. 13. Obesity. Emphasized importance of lifestyle modifications, including diet, exercise, and weight loss. Discussed that would help control blood sugar. Will readdress at next visit. 14. Insomnia. Using melatonin agonist, ramelteon, to replace Xanax. Had good response and weaned from Xanax. 15. Candidal intertrigo. Treated with Mycolog cream and discussed preventative measures with drying agents with improvement. Follow. 16. Health maintenance. Unable to receive Tdap due to allergy (anaphylaxis to Td). Will address Shingrix vaccine at next visit. Other immunizations up to date. Colonoscopy due 2020. Continue regular eye exams with Dr. Lilia Montemayor. Vitamin D level normal. Continue maintenance dose supplement. Medicare wellness visit up to date. Total time: 40 minutes spent with the patient in face-to-face consultation of which greater than 50% was spent on counseling, answering questions and/or coordination of care. Complex medical review and management performed. Patient understands recommendations and agrees with plan. Follow-up as previously scheduled in 2 weeks.

## 2019-08-20 ENCOUNTER — APPOINTMENT (OUTPATIENT)
Dept: INTERNAL MEDICINE CLINIC | Age: 79
End: 2019-08-20

## 2019-08-20 ENCOUNTER — HOSPITAL ENCOUNTER (OUTPATIENT)
Dept: LAB | Age: 79
Discharge: HOME | End: 2019-08-20
Payer: MEDICARE

## 2019-08-20 DIAGNOSIS — R73.09 ABNORMAL GLUCOSE: ICD-10-CM

## 2019-08-20 DIAGNOSIS — M06.041 RHEUMATOID ARTHRITIS INVOLVING BOTH HANDS WITH NEGATIVE RHEUMATOID FACTOR (HCC): ICD-10-CM

## 2019-08-20 DIAGNOSIS — M15.9 PRIMARY OSTEOARTHRITIS INVOLVING MULTIPLE JOINTS: ICD-10-CM

## 2019-08-20 DIAGNOSIS — S68.111A: ICD-10-CM

## 2019-08-20 DIAGNOSIS — D75.89 MACROCYTOSIS WITHOUT ANEMIA: ICD-10-CM

## 2019-08-20 DIAGNOSIS — R73.03 PRE-DIABETES: ICD-10-CM

## 2019-08-20 DIAGNOSIS — N40.1 BPH WITH OBSTRUCTION/LOWER URINARY TRACT SYMPTOMS: ICD-10-CM

## 2019-08-20 DIAGNOSIS — I10 ESSENTIAL HYPERTENSION: ICD-10-CM

## 2019-08-20 DIAGNOSIS — E78.5 HYPERLIPIDEMIA, UNSPECIFIED HYPERLIPIDEMIA TYPE: ICD-10-CM

## 2019-08-20 DIAGNOSIS — E66.09 CLASS 1 OBESITY DUE TO EXCESS CALORIES WITH SERIOUS COMORBIDITY AND BODY MASS INDEX (BMI) OF 32.0 TO 32.9 IN ADULT: ICD-10-CM

## 2019-08-20 DIAGNOSIS — R40.0 HAS DAYTIME DROWSINESS: ICD-10-CM

## 2019-08-20 DIAGNOSIS — M06.042 RHEUMATOID ARTHRITIS INVOLVING BOTH HANDS WITH NEGATIVE RHEUMATOID FACTOR (HCC): ICD-10-CM

## 2019-08-20 DIAGNOSIS — K21.9 GASTROESOPHAGEAL REFLUX DISEASE, ESOPHAGITIS PRESENCE NOT SPECIFIED: ICD-10-CM

## 2019-08-20 DIAGNOSIS — M11.20 CPDD (CALCIUM PYROPHOSPHATE DEPOSITION DISEASE): ICD-10-CM

## 2019-08-20 DIAGNOSIS — N13.8 BPH WITH OBSTRUCTION/LOWER URINARY TRACT SYMPTOMS: ICD-10-CM

## 2019-08-20 DIAGNOSIS — R06.83 SNORING: ICD-10-CM

## 2019-08-20 LAB
ALBUMIN SERPL-MCNC: 3.8 G/DL (ref 3.4–5)
ALBUMIN/GLOB SERPL: 1.6 {RATIO} (ref 0.8–1.7)
ALP SERPL-CCNC: 88 U/L (ref 45–117)
ALT SERPL-CCNC: 28 U/L (ref 16–61)
ANION GAP SERPL CALC-SCNC: 9 MMOL/L (ref 3–18)
AST SERPL-CCNC: 10 U/L (ref 10–38)
BASOPHILS # BLD: 0 K/UL (ref 0–0.1)
BASOPHILS NFR BLD: 0 % (ref 0–2)
BILIRUB SERPL-MCNC: 0.3 MG/DL (ref 0.2–1)
BUN SERPL-MCNC: 20 MG/DL (ref 7–18)
BUN/CREAT SERPL: 23 (ref 12–20)
CALCIUM SERPL-MCNC: 8.9 MG/DL (ref 8.5–10.1)
CHLORIDE SERPL-SCNC: 106 MMOL/L (ref 100–111)
CO2 SERPL-SCNC: 27 MMOL/L (ref 21–32)
CREAT SERPL-MCNC: 0.87 MG/DL (ref 0.6–1.3)
DIFFERENTIAL METHOD BLD: ABNORMAL
EOSINOPHIL # BLD: 0.2 K/UL (ref 0–0.4)
EOSINOPHIL NFR BLD: 3 % (ref 0–5)
ERYTHROCYTE [DISTWIDTH] IN BLOOD BY AUTOMATED COUNT: 13.7 % (ref 11.6–14.5)
GLOBULIN SER CALC-MCNC: 2.4 G/DL (ref 2–4)
GLUCOSE SERPL-MCNC: 96 MG/DL (ref 74–99)
HCT VFR BLD AUTO: 44.2 % (ref 36–48)
HGB BLD-MCNC: 14.7 G/DL (ref 13–16)
LYMPHOCYTES # BLD: 2 K/UL (ref 0.9–3.6)
LYMPHOCYTES NFR BLD: 27 % (ref 21–52)
MCH RBC QN AUTO: 33 PG (ref 24–34)
MCHC RBC AUTO-ENTMCNC: 33.3 G/DL (ref 31–37)
MCV RBC AUTO: 99.3 FL (ref 74–97)
MONOCYTES # BLD: 0.8 K/UL (ref 0.05–1.2)
MONOCYTES NFR BLD: 10 % (ref 3–10)
NEUTS SEG # BLD: 4.4 K/UL (ref 1.8–8)
NEUTS SEG NFR BLD: 60 % (ref 40–73)
PLATELET # BLD AUTO: 239 K/UL (ref 135–420)
PMV BLD AUTO: 10.5 FL (ref 9.2–11.8)
POTASSIUM SERPL-SCNC: 3.6 MMOL/L (ref 3.5–5.5)
PROT SERPL-MCNC: 6.2 G/DL (ref 6.4–8.2)
RBC # BLD AUTO: 4.45 M/UL (ref 4.7–5.5)
SODIUM SERPL-SCNC: 142 MMOL/L (ref 136–145)
WBC # BLD AUTO: 7.3 K/UL (ref 4.6–13.2)

## 2019-08-20 PROCEDURE — 85025 COMPLETE CBC W/AUTO DIFF WBC: CPT

## 2019-08-20 PROCEDURE — 36415 COLL VENOUS BLD VENIPUNCTURE: CPT

## 2019-08-20 PROCEDURE — 80061 LIPID PANEL: CPT

## 2019-08-20 PROCEDURE — 80053 COMPREHEN METABOLIC PANEL: CPT

## 2019-08-20 PROCEDURE — 83036 HEMOGLOBIN GLYCOSYLATED A1C: CPT

## 2019-08-21 LAB
CHOLEST SERPL-MCNC: 131 MG/DL
EST. AVERAGE GLUCOSE BLD GHB EST-MCNC: 120 MG/DL
HBA1C MFR BLD: 5.8 % (ref 4.2–5.6)
HDLC SERPL-MCNC: 65 MG/DL (ref 40–60)
HDLC SERPL: 2 {RATIO} (ref 0–5)
LDLC SERPL CALC-MCNC: 58.6 MG/DL (ref 0–100)
LIPID PROFILE,FLP: ABNORMAL
TRIGL SERPL-MCNC: 37 MG/DL (ref ?–150)
VLDLC SERPL CALC-MCNC: 7.4 MG/DL

## 2019-08-22 RX ORDER — OMEPRAZOLE 20 MG/1
CAPSULE, DELAYED RELEASE ORAL
Qty: 90 CAP | Refills: 1 | Status: SHIPPED | OUTPATIENT
Start: 2019-08-22 | End: 2019-11-25 | Stop reason: SDUPTHER

## 2019-08-26 ENCOUNTER — HOSPITAL ENCOUNTER (OUTPATIENT)
Dept: NON INVASIVE DIAGNOSTICS | Age: 79
Discharge: HOME | End: 2019-08-26
Attending: INTERNAL MEDICINE
Payer: MEDICARE

## 2019-08-26 VITALS
WEIGHT: 203 LBS | SYSTOLIC BLOOD PRESSURE: 142 MMHG | DIASTOLIC BLOOD PRESSURE: 70 MMHG | HEIGHT: 68 IN | BODY MASS INDEX: 30.77 KG/M2

## 2019-08-26 DIAGNOSIS — R06.00 PND (PAROXYSMAL NOCTURNAL DYSPNEA): ICD-10-CM

## 2019-08-26 DIAGNOSIS — G47.30 SLEEP APNEA, UNSPECIFIED TYPE: ICD-10-CM

## 2019-08-26 LAB
ECHO AO ROOT DIAM: 2.97 CM
ECHO LA AREA 4C: 22 CM2
ECHO LA VOL 2C: 32.2 ML (ref 18–58)
ECHO LA VOL 4C: 59.63 ML (ref 18–58)
ECHO LA VOL BP: 50.32 ML (ref 18–58)
ECHO LA VOL/BSA BIPLANE: 24.46 ML/M2 (ref 16–28)
ECHO LA VOLUME INDEX A2C: 15.65 ML/M2 (ref 16–28)
ECHO LA VOLUME INDEX A4C: 28.99 ML/M2 (ref 16–28)
ECHO LV E' LATERAL VELOCITY: 8.66 CM/S
ECHO LV E' SEPTAL VELOCITY: 7.85 CM/S
ECHO LV INTERNAL DIMENSION DIASTOLIC: 4.73 CM (ref 4.2–5.9)
ECHO LV INTERNAL DIMENSION SYSTOLIC: 2.79 CM
ECHO LV IVSD: 0.72 CM (ref 0.6–1)
ECHO LV MASS 2D: 160.2 G (ref 88–224)
ECHO LV MASS INDEX 2D: 77.9 G/M2 (ref 49–115)
ECHO LV POSTERIOR WALL DIASTOLIC: 1.04 CM (ref 0.6–1)
ECHO LVOT DIAM: 1.89 CM
ECHO LVOT PEAK GRADIENT: 4.9 MMHG
ECHO LVOT PEAK VELOCITY: 111.19 CM/S
ECHO LVOT VTI: 19.72 CM
ECHO MV A VELOCITY: 121.74 CM/S
ECHO MV E DECELERATION TIME (DT): 233.5 MS
ECHO MV E VELOCITY: 74.1 CM/S
ECHO MV E/A RATIO: 0.61
ECHO MV E/E' LATERAL: 8.56
ECHO MV E/E' RATIO (AVERAGED): 9
ECHO MV E/E' SEPTAL: 9.44
ECHO RV TAPSE: 2.26 CM (ref 1.5–2)

## 2019-08-26 PROCEDURE — 93306 TTE W/DOPPLER COMPLETE: CPT

## 2019-08-27 ENCOUNTER — OFFICE VISIT (OUTPATIENT)
Dept: INTERNAL MEDICINE CLINIC | Age: 79
End: 2019-08-27

## 2019-08-27 VITALS
BODY MASS INDEX: 32.62 KG/M2 | HEART RATE: 52 BPM | HEIGHT: 66 IN | TEMPERATURE: 98 F | OXYGEN SATURATION: 94 % | SYSTOLIC BLOOD PRESSURE: 138 MMHG | DIASTOLIC BLOOD PRESSURE: 68 MMHG | WEIGHT: 203 LBS | RESPIRATION RATE: 16 BRPM

## 2019-08-27 DIAGNOSIS — N13.8 BPH WITH OBSTRUCTION/LOWER URINARY TRACT SYMPTOMS: ICD-10-CM

## 2019-08-27 DIAGNOSIS — D75.89 MACROCYTOSIS WITHOUT ANEMIA: ICD-10-CM

## 2019-08-27 DIAGNOSIS — N40.1 BPH WITH OBSTRUCTION/LOWER URINARY TRACT SYMPTOMS: ICD-10-CM

## 2019-08-27 DIAGNOSIS — S68.111A: ICD-10-CM

## 2019-08-27 DIAGNOSIS — R91.1 LUNG NODULE SEEN ON IMAGING STUDY: ICD-10-CM

## 2019-08-27 DIAGNOSIS — R06.00 PND (PAROXYSMAL NOCTURNAL DYSPNEA): Primary | ICD-10-CM

## 2019-08-27 DIAGNOSIS — E78.5 HYPERLIPIDEMIA, UNSPECIFIED HYPERLIPIDEMIA TYPE: ICD-10-CM

## 2019-08-27 DIAGNOSIS — G47.30 SLEEP APNEA, UNSPECIFIED TYPE: ICD-10-CM

## 2019-08-27 DIAGNOSIS — M11.20 CPDD (CALCIUM PYROPHOSPHATE DEPOSITION DISEASE): ICD-10-CM

## 2019-08-27 DIAGNOSIS — I10 ESSENTIAL HYPERTENSION: ICD-10-CM

## 2019-08-27 DIAGNOSIS — M54.41 ACUTE RIGHT-SIDED LOW BACK PAIN WITH RIGHT-SIDED SCIATICA: ICD-10-CM

## 2019-08-27 DIAGNOSIS — R93.89 ABNORMAL CHEST X-RAY: ICD-10-CM

## 2019-08-27 DIAGNOSIS — M06.041 RHEUMATOID ARTHRITIS INVOLVING BOTH HANDS WITH NEGATIVE RHEUMATOID FACTOR (HCC): ICD-10-CM

## 2019-08-27 DIAGNOSIS — F33.1 MODERATE EPISODE OF RECURRENT MAJOR DEPRESSIVE DISORDER (HCC): ICD-10-CM

## 2019-08-27 DIAGNOSIS — M47.816 LUMBAR FACET ARTHROPATHY: ICD-10-CM

## 2019-08-27 DIAGNOSIS — E66.09 CLASS 1 OBESITY DUE TO EXCESS CALORIES WITH SERIOUS COMORBIDITY AND BODY MASS INDEX (BMI) OF 32.0 TO 32.9 IN ADULT: ICD-10-CM

## 2019-08-27 DIAGNOSIS — M15.9 PRIMARY OSTEOARTHRITIS INVOLVING MULTIPLE JOINTS: ICD-10-CM

## 2019-08-27 DIAGNOSIS — K21.9 GASTROESOPHAGEAL REFLUX DISEASE, ESOPHAGITIS PRESENCE NOT SPECIFIED: ICD-10-CM

## 2019-08-27 DIAGNOSIS — M06.042 RHEUMATOID ARTHRITIS INVOLVING BOTH HANDS WITH NEGATIVE RHEUMATOID FACTOR (HCC): ICD-10-CM

## 2019-08-27 DIAGNOSIS — R73.03 PRE-DIABETES: ICD-10-CM

## 2019-08-27 DIAGNOSIS — R73.09 ABNORMAL GLUCOSE: ICD-10-CM

## 2019-08-27 DIAGNOSIS — I49.1 APC (ATRIAL PREMATURE CONTRACTIONS): ICD-10-CM

## 2019-08-27 NOTE — PATIENT INSTRUCTIONS
Sciatica: Care Instructions  Your Care Instructions    Sciatica (say \"pvx-EV-rz-kuh\") is an irritation of one of the sciatic nerves, which come from the spinal cord in the lower back. The sciatic nerves and their branches extend down through the buttock to the foot. Sciatica can develop when an injured disc in the back presses against a spinal nerve root. Its main symptom is pain, numbness, or weakness that is often worse in the leg or foot than in the back. Sciatica often will improve and go away with time. Early treatment usually includes medicines and exercises to relieve pain. Follow-up care is a key part of your treatment and safety. Be sure to make and go to all appointments, and call your doctor if you are having problems. It's also a good idea to know your test results and keep a list of the medicines you take. How can you care for yourself at home? · Take pain medicines exactly as directed. ? If the doctor gave you a prescription medicine for pain, take it as prescribed. ? If you are not taking a prescription pain medicine, ask your doctor if you can take an over-the-counter medicine. · Use heat or ice to relieve pain. ? To apply heat, put a warm water bottle, heating pad set on low, or warm cloth on your back. Do not go to sleep with a heating pad on your skin. ? To use ice, put ice or a cold pack on the area for 10 to 20 minutes at a time. Put a thin cloth between the ice and your skin. · Avoid sitting if possible, unless it feels better than standing. · Alternate lying down with short walks. Increase your walking distance as you are able to without making your symptoms worse. · Do not do anything that makes your symptoms worse. When should you call for help? Call 911 anytime you think you may need emergency care.  For example, call if:    · You are unable to move a leg at all.   [facility] your doctor now or seek immediate medical care if:    · You have new or worse symptoms in your legs or buttocks. Symptoms may include:  ? Numbness or tingling. ? Weakness. ? Pain.     · You lose bladder or bowel control.    Watch closely for changes in your health, and be sure to contact your doctor if:    · You are not getting better as expected. Where can you learn more? Go to http://regina-carmen.info/. Enter [phone] in the search box to learn more about \"Sciatica: Care Instructions. \"  Current as of: September 20, 2018  Content Version: 12.1  © 4560-0277 [employer]. Care instructions adapted under license by [employer] (which disclaims liability or warranty for this information). If you have questions about a medical condition or this instruction, always ask your healthcare professional. [address] any warranty or liability for your use of this information. Sciatica: Exercises  Introduction  Here are some examples of typical rehabilitation exercises for your condition. Start each exercise slowly. Ease off the exercise if you start to have pain. Your doctor or physical therapist will tell you when you can start these exercises and which ones will work best for you. When you are not being active, find a comfortable position for rest. Some people are comfortable on the floor or a medium-firm bed with a small pillow under their head and another under their knees. Some people prefer to lie on their side with a pillow between their knees. Don't stay in one position for too long. Take short walks (10 to 20 minutes) every 2 to 3 hours. Avoid slopes, hills, and stairs until you feel better. Walk only distances you can manage without pain, especially leg pain. How to do the exercises  Back stretches    1. Get down on your hands and knees on the floor. 2. Relax your head and allow it to droop. Round your back up toward the ceiling until you feel a nice stretch in your upper, middle, and lower back.  Hold this stretch for as long as it feels comfortable, or about 15 to 30 seconds. 3. Return to the starting position with a flat back while you are on your hands and knees. 4. Let your back sway by pressing your stomach toward the floor. Lift your buttocks toward the ceiling. 5. Hold this position for 15 to 30 seconds. 6. Repeat 2 to 4 times. Follow-up care is a key part of your treatment and safety. Be sure to make and go to all appointments, and call your doctor if you are having problems. It's also a good idea to know your test results and keep a list of the medicines you take. Where can you learn more? Go to http://regina-carmen.info/. Enter T402 in the search box to learn more about \"Sciatica: Exercises. \"  Current as of: September 20, 2018  Content Version: 12.1  © 6246-3720 [employer]. Care instructions adapted under license by [employer] (which disclaims liability or warranty for this information). If you have questions about a medical condition or this instruction, always ask your healthcare professional. [address] any warranty or liability for your use of this information.

## 2019-08-27 NOTE — PROGRESS NOTES
Chief Complaint   Patient presents with    Hypertension     6 month follow up with lab results     Health Maintenance Due   Topic Date Due    Pneumococcal 65+ years (2 of 2 - PPSV23) 01/19/2016    Influenza Age 5 to Adult  08/01/2019     1. Have you been to the ER, urgent care clinic or hospitalized since your last visit? YES. [facility] for withdrawal 2-3 weeks ago. 2. Have you seen or consulted any other health care providers outside of the [address] [place] [address] since your last visit (Include any pap smears or colon screening)? NO      Do you have an Advanced Directive? NO    Would you like information on Advanced Directives? NO    Learning Assessment 2/19/2019   PRIMARY LEARNER Patient   HIGHEST LEVEL OF EDUCATION - PRIMARY LEARNER  SOME COLLEGE   BARRIERS PRIMARY LEARNER NONE   CO-LEARNER CAREGIVER No   PRIMARY LANGUAGE ENGLISH   LEARNER PREFERENCE PRIMARY DEMONSTRATION     -   ANSWERED BY patient   RELATIONSHIP SELF     Abuse Screening Questionnaire 2/19/2019   Do you ever feel afraid of your partner? N   Are you in a relationship with someone who physically or mentally threatens you? N   Is it safe for you to go home?  Y     3 most recent PHQ Screens 8/27/2019   Little interest or pleasure in doing things Nearly every day   Feeling down, depressed, irritable, or hopeless Nearly every day   Total Score PHQ 2 6   Trouble falling or staying asleep, or sleeping too much Nearly every day   Feeling tired or having little energy Nearly every day   Poor appetite, weight loss, or overeating Not at all   Feeling bad about yourself - or that you are a failure or have let yourself or your family down Not at all   Trouble concentrating on things such as school, work, reading, or watching TV Several days   Moving or speaking so slowly that other people could have noticed; or the opposite being so fidgety that others notice Not at all   Thoughts of being better off dead, or hurting yourself in some way Not at all   PHQ 9 Score 13   How difficult have these problems made it for you to do your work, take care of your home and get along with others Somewhat difficult       Fall Risk Assessment, last 12 mths 3/6/2019   Able to walk? Yes   Fall in past 12 months?  No

## 2019-08-31 ENCOUNTER — HOSPITAL ENCOUNTER (OUTPATIENT)
Dept: CT IMAGING | Age: 79
Discharge: HOME | End: 2019-08-31
Attending: INTERNAL MEDICINE
Payer: MEDICARE

## 2019-08-31 DIAGNOSIS — R93.89 ABNORMAL CHEST X-RAY: ICD-10-CM

## 2019-08-31 DIAGNOSIS — R91.1 LUNG NODULE SEEN ON IMAGING STUDY: ICD-10-CM

## 2019-08-31 PROCEDURE — 71260 CT THORAX DX C+: CPT

## 2019-08-31 PROCEDURE — 74011636320 HC RX REV CODE- 636/320: Performed by: INTERNAL MEDICINE

## 2019-08-31 RX ADMIN — IOPAMIDOL 80 ML: 612 INJECTION, SOLUTION INTRAVENOUS at 10:59

## 2019-08-31 NOTE — PROGRESS NOTES
HPI:   Elizabeth Junior is a 70y.o. year old male who presents today for a routine visit and for evaluation of hypertension, hyperlipidemia, prediabetes, rheumatoid arthritis, osteoarthritis, calcium pyrophosphate deposition disease, BPH, GERD, and depression. 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 He was last seen on 8/13/2019 and reported that he had been seeing Dr. Sheila Landa for increasing difficulty with right sided sciatica. He reported being treated with a Medrol dose pack, physical therapy, and was also prescribed Norco and Tramadol, but he states that he finds them too sedating and of no benefit. Due to persistent symptoms, he underwent a lumbar MRI (8/5/2019) which showed degenerative changes most notable at L4-L5 resulting in moderate spinal canal stenosis as well as moderate to severe right greater than left foraminal stenoses; advanced facet arthropathy with small facet effusions and suspected small right facet joint ventral synovial cyst; notable degenerative changes also L3-L4; L1 mild anterior compression deformity, chronic appearing; overall general worsening when compared to prior study in 2007. He states today that he continues to have significant pain, and has been taking ibuprofen 600 mg every 4-6 hours to help control it. He is frustrated that his pain persists. He was started on Zoloft to help control his symptoms of depression and anxiety, after inadvertently stopping Paxil, and reports improvement today. He states that continues to have episodes while sleeping of waking up gasping for air forcing him to get out of bed and walk around until his breathing normalizes.   On 8/9/2019, he had a particularly severe episode and was evaluated at [facility], and testing included WBC 5.7, Hb 14.2/ Hct 41.6, creatinine 0.9/ eGFR>60, troponin I x 1 negative, NT-pro BNP 45, EKG sinus rhythm at 83 bpm and no ischemic changes, and chest x-ray without acute changes, but an ill defined 15 mm density in the lateral left mid zone was noted. He received lasix 40 mg IV and was discharged. He states that he is now scheduled for evaluation by Dr. Steven Simms on 9/12/2019. He is otherwise without new complaints. On 6/20/2018, he suffered an accidental gun shot wound while working resulting in traumatic injury to his left index finger with near loss of the middle phalanx and fracture of the distal phalanx. He was taken to [facility] and initially had irrigation and closure of the lacerations performed. He presented to the [facility] ED on 6/24/2018 with increased pain and swelling, and was started on doxycycline for a wound infection. On 7/7/2018, due to loss of stability and angulation of the index finger, he underwent stabilization with fusion of the proximal and distal phalanx with bone grafting by Dr. Zuly Zurita. He did well and was started on physical therapy. On 8/1/2018, he presented to [address] [place] [address] for evaluation of increasing erythema, swelling and tenderness with concern for a possible infection. He underwent left hand x-ray (8/1/2018) showing severe soft tissue swelling of the left second finger worrisome for cellulitis, traumatic amputation of the middle phalanx with marked osteopenia and irregularity of the base of the distal phalanx and flattening and irregularity of the head of the proximal phalanx. Unclear if related to prior trauma/surgery or osteomyelitis with osseous destruction and no films available for comparison. He was started empirically on Bactrim and Augmentin for 14 days. On 8/10/2018, he presented for evaluation by GOMEZ Weston for complaints of fever, malaise, headache, fatigue, and diarrhea. Lab evaluation showed WBC 17 (90% neutrophils), creatinine 1.34/ eGFR 52, blood culture negative. Stool cultures (8/13/2018) routine, O/P, and C.diff negative.  Bactrim and Augmentin were discontinued on 8/13/2018, and he was started on metronidazole for 10 days for treatment of presumed C.diff with improvement. He was evaluated by Dr. Skye Mccarty on 8/15/2018 and repeat WBC 8.6 (59% neutrophils), ESR 6, and CRP 0.9. He was seen by Dr. Skye Mccarty in follow-up on 8/30/2018 and left index finger wound noted to be significantly improved and no further difficulty with diarrhea. He has a history of hypertension, treated with amlodipine, lisinopril, lasix (+ potassium), and hydralazine was added in 4/2018. He states that his wife has been monitoring his blood pressure intermittently. He denies any chest pain, shortness of breath at rest or with exertion, lightheadedness, or palpitations. He does report bilateral lower extremity swelling that it will increase throughout the day and improve overnight. . In 6/2016, he underwent lower extremity arterial and venous duplex scans, both of which were negative. He also has a history of hyperlipidemia, treated with moderate intensity atorvastatin. He has a history of prediabetes, with HbA1c ranging from 5.9-6.2 since 2012. He denies any polyuria, polydipsia, nocturia, or blurry vision, and has no history of retinopathy, neuropathy, or nephropathy. He has regular eye exams with Dr. Ezequiel Ortega. He has a history of bilateral hand pain with morning stiffness for several years, and in 3/2014, he was noted to have a positive anti-CCP antibody level although NIMCO, rheumatoid factor, and ESR were negative. He was referred for evaluation to Dr. Donna Serra, and was diagnosed with rheumatoid arthritis in 6/2014. He was treated with hydroxychloroquine, which has been partially helpful in controlling his symptoms. Bilateral hand x-rays also showed evidence of primary osteoarthritis with osteophytes present on the second and third MCP joints. In 1/2017, he was also noted to have evidence of possible chondrocalcinosis on x-ray, and was started on low dose colchicine in addition to hydroxychloroquine for concomitant calcium pyrophosphate deposition disease.  He states that since starting on colchicine, he has had significant improvement in his hand pain. He also uses compounding cream and Voltaren gel for pain control. In 1/2019, he was complaining of worsening neck and bilateral shoulder pain (R>L). He stated that he was previously followed by Dr. Catalino Meeahn, and would occasionally receive cortisone injections into his shoulders. He also described neck pain with difficulty turning his head. He denied any upper extremity weakness or paresthesias. He underwent imaging, and bilateral shoulder x-rays (1/10/2019) showed degenerative changes and secondary findings of rotator cuff pathology. Cervical spine x-rays (1/10/2019) showed advanced degenerative changes with multilevel facet arthropathy. He was evaluated by Dr. Shelli Kehr who gave him a cortisone injection in his right shoulder with some improvement. He also recommended a reverse shoulder replacement, but he remains hesitant to proceed. He was started on Celebrex 200 mg bid in 2/2019, and reports significant improvement. He continues to also use Tylenol as needed for pain. He states that his neck pain seems to have improved to his baseline level. He has a history of symptomatic BPH, with complaints of decreased stream, hesitancy, and dribbling. He has refused treatment with medication. He is followed by Dr. Aleksey Duarte. He denies any dysuria, gross hematuria, or flank pain. He has a history of GERD, treated with daily omeprazole with good control of symptoms. He had a screening colonoscopy and 8/2015 by Dr. Regina Malone, showing a 3 mm sessile cecal polyp (pathology: intra-mucosal lymphoid aggregate), two 4 mm sessile polyps in the transverse colon (pathology: serrated adenomas), and a 1 cm lipoma in the transverse colon. Follow-up recommended for five years. He was having difficulty with rectal bleeding in 1/2018 and returned for evaluation with Dr. Regina Malone who felt it was most likely secondary to a hemorrhoidal source. She recommended use of Citrucel.  He states that the bleeding has improved with initiation of this therapy. He denies any abdominal pain, nausea, vomiting, melena, or change in bowel movements. He has a history of depression and anxiety, which had been well controlled with Paxil although inadvertently stopped. Now on Zoloft with good control of symptoms. 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 Dr. Abby Engel.  CPDD (calcium pyrophosphate deposition disease)     Depression     GERD (gastroesophageal reflux disease)     Hyperlipidemia     Hypertension     Prediabetes     Primary osteoarthritis involving multiple joints 9/6/2011    Rheumatoid arthritis ([facility] [address] 2013    negative RF; elevated anti-CCP. Dr. Johana Birch. Past Surgical History:   Procedure Laterality Date    HX AMPUTATION FINGER Left     HX CARPAL TUNNEL RELEASE      HX CATARACT REMOVAL Left 01/2018    HX CATARACT REMOVAL Bilateral 2018    HX HEENT      sinus, tonsillectomy    HX HERNIA REPAIR      HX MOHS PROCEDURES      bilateral     HX ORTHOPAEDIC      lef knee surgery, removed cartilage. Current Outpatient Medications   Medication Sig    omeprazole (PRILOSEC) 20 mg capsule TAKE 1 CAPSULE BY MOUTH ONCE DAILY    sertraline (ZOLOFT) 50 mg tablet Take 1 Tab by mouth daily.  tamsulosin (FLOMAX) 0.4 mg capsule Take 1 Cap by mouth daily. Indications: enlarged prostate with urination problem    hydrALAZINE (APRESOLINE) 25 mg tablet Take 1 Tab by mouth three (3) times daily. (Patient taking differently: Take 25 mg by mouth two (2) times a day.)    furosemide (LASIX) 40 mg tablet Take 1 Tab by mouth daily.  lisinopril (PRINIVIL, ZESTRIL) 40 mg tablet Take 1 Tab by mouth daily.  atorvastatin (LIPITOR) 10 mg tablet TAKE 1 TABLET BY MOUTH ONCE DAILY    celecoxib (CELEBREX) 200 mg capsule Take 1 Cap by mouth two (2) times a day.     amLODIPine (NORVASC) 10 mg tablet TAKE 1 TABLET BY MOUTH ONCE DAILY    potassium chloride (K-DUR, KLOR-CON) 20 mEq tablet Take 1 Tab by mouth daily.  cycloSPORINE (RESTASIS) 0.05 % ophthalmic emulsion Administer 1 Drop to both eyes two (2) times a day.  colchicine (MITIGARE) 0.6 mg capsule Take 0.6 mg by mouth every other day.  LUMIGAN 0.01 % ophthalmic drops Administer 1 Drop to both eyes nightly.  hydroxychloroquine (PLAQUENIL) 200 mg tablet Take 400 mg by mouth daily.  aspirin delayed-release 81 mg tablet Take 81 mg by mouth daily.  MULTIVITS/IRON FUM/FA/D3/LYCOP (MULTI FOR HIM PO) Take  by mouth daily.  gabapentin (NEURONTIN) 100 mg capsule TAKE 1 CAPSULE BY MOUTH 3 TIMES A DAY    nystatin-triamcinolone (MYCOLOG II) topical cream Apply  to affected area two (2) times a day.  nystatin (MYCOSTATIN) powder Apply  to affected area four (4) times daily.  cholecalciferol, vitamin D3, (VITAMIN D3) 2,000 unit tab Take 2,000 Units by mouth daily.  diclofenac (VOLTAREN) 1 % topical gel Apply 4 g to affected area four (4) times daily. No current facility-administered medications for this visit. Allergies and Intolerances: Allergies   Allergen Reactions    Latex, Natural Rubber Swelling    Adhesive Tape-Silicones Rash and Itching    Aldactone [Spironolactone] Not Reported This Time     Breast tenderness    Codeine Not Reported This Time     \"Drives crazy\"    Tape [Adhesive] Rash    Tetanus Toxoid, Adsorbed Anaphylaxis    Tetanus Vaccines And Toxoid Anaphylaxis     Other reaction(s): anaphylaxis/angioedema  Other reaction(s): anaphylaxis/angioedema     Family History: He has no family history of colon or prostate cancer. Family History   Problem Relation Age of Onset    Cancer Mother     Heart Disease Father     Alcohol abuse Father     Cancer Other      Social History:   He  reports that he has quit smoking. His smoking use included cigarettes, pipe, and cigars.  He has quit using smokeless tobacco.  His smokeless tobacco use included chew. He smoked 2 ppd for 50 years, stopping in 1974. He is  with two adult children. 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 Social History     Substance and Sexual Activity   Alcohol Use Yes    Comment: rarely     Immunization History:  Immunization History   Administered Date(s) Administered    (RETIRED) Pneumococcal Vaccine (Unspecified Type) 01/01/2008    Influenza High Dose Vaccine PF 09/30/2017    Influenza Vaccine (Tri) Adjuvanted 09/25/2018    Influenza Vaccine Split 10/04/2011, 10/16/2012    Influenza Vaccine Whole 01/15/2010    Pneumococcal Conjugate (PCV-13) 01/19/2015    Pneumococcal Polysaccharide (PPSV-23) 01/01/2008    Varicella Virus Vaccine 10/01/2013    Zoster 10/16/2012       Review of Systems:   As above included in HPI. Otherwise 11 point review of systems negative including constitutional, skin, HENT, eyes, respiratory, cardiovascular, gastrointestinal, genitourinary, musculoskeletal, endocrine, hematologic, allergy, and neurologic. Physical:   Vitals:   BP: 138/68   HR: (!) 52  WT: 203 lb (92.1 kg)  BMI:  33.02 kg/m2  O2: 94%    Exam:   Patient appears in no apparent distress. Affect is appropriate. HEENT: PERRLA, anicteric, oropharynx clear, no JVD, adenopathy or thyromegaly. No carotid bruits or radiated murmur. Lungs: clear to auscultation, no wheezes, rhonchi, or rales. Heart: regular rate and rhythm. No murmur, rubs, gallops  Abdomen: soft, nontender, nondistended, normal bowel sounds, no hepatosplenomegaly or masses. Extremities: with trace edema. Pulses 1-2+ bilaterally.       Review of Data:  Labs:  Hospital Outpatient Visit on 08/26/2019   Component Date Value Ref Range Status    LA Volume 08/26/2019 50.32  18 - 58 mL Final    LV E' Lateral Velocity 08/26/2019 8.66  cm/s Final    LV E' Septal Velocity 08/26/2019 7.85  cm/s Final    Tapse 08/26/2019 2.26* 1.5 - 2.0 cm Final    Ao Root D 08/26/2019 2.97  cm Final    LVIDd 08/26/2019 4.73  4.2 - 5.9 cm Final    LVPWd 08/26/2019 1.04* 0.6 - 1.0 cm Final    LVIDs 08/26/2019 2.79  cm Final    IVSd 08/26/2019 0.72  0.6 - 1.0 cm Final    LVOT d 08/26/2019 1.89  cm Final    LVOT Peak Velocity 08/26/2019 111.19  cm/s Final    LVOT Peak Gradient 08/26/2019 4.9  mmHg Final    LVOT VTI 08/26/2019 19.72  cm Final    MV A Eliot 08/26/2019 121.74  cm/s Final    MV E Eliot 08/26/2019 74.10  cm/s Final    MV E/A 08/26/2019 0.61   Final    LA Vol 4C 08/26/2019 59.63* 18 - 58 mL Final    LA Vol 2C 08/26/2019 32.20  18 - 58 mL Final    LA Area 4C 08/26/2019 22.0  cm2 Final    LV Mass AL 08/26/2019 160.2  88 - 224 g Final    LV Mass AL Index 08/26/2019 77.9  49 - 115 g/m2 Final    E/E' lateral 08/26/2019 8.56   Final    E/E' septal 08/26/2019 9.44   Final    E/E' ratio (averaged) 08/26/2019 9.00   Final    Mitral Valve E Wave Deceleration T* 08/26/2019 233.5  ms Final    LA Vol Index 08/26/2019 24.46  16 - 28 ml/m2 Final    LA Vol Index 08/26/2019 15.65  16 - 28 ml/m2 Final    LA Vol Index 08/26/2019 28.99  16 - 28 ml/m2 Final   Hospital Outpatient Visit on 08/20/2019   Component Date Value Ref Range Status    WBC 08/20/2019 7.3  4.6 - 13.2 K/uL Final    RBC 08/20/2019 4.45* 4.70 - 5.50 M/uL Final    HGB 08/20/2019 14.7  13.0 - 16.0 g/dL Final    HCT 08/20/2019 44.2  36.0 - 48.0 % Final    MCV 08/20/2019 99.3* 74.0 - 97.0 FL Final    MCH 08/20/2019 33.0  24.0 - 34.0 PG Final    MCHC 08/20/2019 33.3  31.0 - 37.0 g/dL Final    RDW 08/20/2019 13.7  11.6 - 14.5 % Final    PLATELET 20/33/3715 459  135 - 420 K/uL Final    MPV 08/20/2019 10.5  9.2 - 11.8 FL Final    NEUTROPHILS 08/20/2019 60  40 - 73 % Final    LYMPHOCYTES 08/20/2019 27  21 - 52 % Final    MONOCYTES 08/20/2019 10  3 - 10 % Final    EOSINOPHILS 08/20/2019 3  0 - 5 % Final    BASOPHILS 08/20/2019 0  0 - 2 % Final    ABS. NEUTROPHILS 08/20/2019 4.4  1.8 - 8.0 K/UL Final    ABS. LYMPHOCYTES 08/20/2019 2.0  0.9 - 3.6 K/UL Final    ABS. MONOCYTES 08/20/2019 0.8  0.05 - 1.2 K/UL Final    ABS. EOSINOPHILS 08/20/2019 0.2  0.0 - 0.4 K/UL Final    ABS. BASOPHILS 08/20/2019 0.0  0.0 - 0.1 K/UL Final    DF 08/20/2019 AUTOMATED    Final    Hemoglobin A1c 08/20/2019 5.8* 4.2 - 5.6 % Final    Est. average glucose 08/20/2019 120  mg/dL Final    LIPID PROFILE 08/20/2019        Final    Cholesterol, total 08/20/2019 131  <200 MG/DL Final    Triglyceride 08/20/2019 37  <150 MG/DL Final    HDL Cholesterol 08/20/2019 65* 40 - 60 MG/DL Final    LDL, calculated 08/20/2019 58.6  0 - 100 MG/DL Final    VLDL, calculated 08/20/2019 7.4  MG/DL Final    CHOL/HDL Ratio 08/20/2019 2.0  0 - 5.0   Final    Sodium 08/20/2019 142  136 - 145 mmol/L Final    Potassium 08/20/2019 3.6  3.5 - 5.5 mmol/L Final    Chloride 08/20/2019 106  100 - 111 mmol/L Final    CO2 08/20/2019 27  21 - 32 mmol/L Final    Anion gap 08/20/2019 9  3.0 - 18 mmol/L Final    Glucose 08/20/2019 96  74 - 99 mg/dL Final    BUN 08/20/2019 20* 7.0 - 18 MG/DL Final    Creatinine 08/20/2019 0.87  0.6 - 1.3 MG/DL Final    BUN/Creatinine ratio 08/20/2019 23* 12 - 20   Final    GFR est AA 08/20/2019 >60  >60 ml/min/1.73m2 Final    GFR est non-AA 08/20/2019 >60  >60 ml/min/1.73m2 Final    Calcium 08/20/2019 8.9  8.5 - 10.1 MG/DL Final    Bilirubin, total 08/20/2019 0.3  0.2 - 1.0 MG/DL Final    ALT (SGPT) 08/20/2019 28  16 - 61 U/L Final    AST (SGOT) 08/20/2019 10  10 - 38 U/L Final    Alk.  phosphatase 08/20/2019 88  45 - 117 U/L Final    Protein, total 08/20/2019 6.2* 6.4 - 8.2 g/dL Final    Albumin 08/20/2019 3.8  3.4 - 5.0 g/dL Final    Globulin 08/20/2019 2.4  2.0 - 4.0 g/dL Final    A-G Ratio 08/20/2019 1.6  0.8 - 1.7   Final   Admission on 08/09/2019, Discharged on 08/09/2019   Component Date Value Ref Range Status    Ventricular Rate 08/09/2019 83  BPM Final    Atrial Rate 08/09/2019 83  BPM Final    P-R Interval 08/09/2019 162  ms Final    QRS Duration 08/09/2019 108  ms Final    Q-T Interval 08/09/2019 372  ms Final    QTC Calculation (Bezet) 08/09/2019 437  ms Final    Calculated P Axis 08/09/2019 70  degrees Final    Calculated R Axis 08/09/2019 -50  degrees Final    Calculated T Axis 08/09/2019 72  degrees Final    Diagnosis 08/09/2019    Final                    Value:Sinus rhythm with premature atrial complexes  Left axis deviation  Septal infarct , age undetermined  Abnormal ECG    Confirmed by Sai Hansen M.D., Rosey Quiver ([id number]) on 8/10/2019 2:12:20 PM      WBC 08/09/2019 5.7  4.0 - 11.0 1000/mm3 Final    RBC 08/09/2019 4.24  3.80 - 5.70 M/uL Final    HGB 08/09/2019 14.2  12.4 - 17.2 gm/dl Final    HCT 08/09/2019 41.6  37.0 - 50.0 % Final    MCV 08/09/2019 98.1* 80.0 - 98.0 fL Final    MCH 08/09/2019 33.5  23.0 - 34.6 pg Final    MCHC 08/09/2019 34.1  30.0 - 36.0 gm/dl Final    PLATELET 96/34/9772 895  140 - 450 1000/mm3 Final    MPV 08/09/2019 9.7  6.0 - 10.0 fL Final    RDW-SD 08/09/2019 46.9* 35.1 - 43.9   Final    NRBC 08/09/2019 0  0 - 0   Final    IMMATURE GRANULOCYTES 08/09/2019 0.2  0.0 - 3.0 % Final    NEUTROPHILS 08/09/2019 62.3  34 - 64 % Final    LYMPHOCYTES 08/09/2019 22.0* 28 - 48 % Final    MONOCYTES 08/09/2019 10.8  1 - 13 % Final    EOSINOPHILS 08/09/2019 4.2  0 - 5 % Final    BASOPHILS 08/09/2019 0.5  0 - 3 % Final    Sodium 08/09/2019 140  136 - 145 mEq/L Final    Potassium 08/09/2019 3.9  3.5 - 5.1 mEq/L Final    Chloride 08/09/2019 107  98 - 107 mEq/L Final    CO2 08/09/2019 24  21 - 32 mEq/L Final    Glucose 08/09/2019 106  74 - 106 mg/dl Final    BUN 08/09/2019 19  7 - 25 mg/dl Final    Creatinine 08/09/2019 0.9  0.6 - 1.3 mg/dl Final    GFR est AA 08/09/2019 >60    Final    GFR est non-AA 08/09/2019 >60    Final    Calcium 08/09/2019 9.3  8.5 - 10.1 mg/dl Final    Anion gap 08/09/2019 8  5 - 15 mmol/L Final    Troponin-I 2019 <0.015  0.000 - 0.045 ng/ml Final    NT pro-BNP 2019 45.0  0.0 - 450.0 pg/ml Final     Health Maintenance:  Screening:    Colorectal: colonoscopy (2015) serrated adenomas. Dr. Mabel Jimenez. Due 2020. Depression: on Paxil   DM (HbA1c/FPG): / HbA1c 5.8 (2019)   Hepatitis C: N/A   Falls: none   DEXA: within normal limits (2016)   PSA/MARTÍNEZ: PSA 2.1. As per Dr. Felipe Rachel   Glaucoma: regular eye exams with Dr. Cisse/ Dr. Fanny Fuchs (last 2019)   Smokin pack year. Distant past.   Vitamin D: 43.5 (2019)   Medicare Wellness: 2019    Impression:  Patient Active Problem List   Diagnosis Code    BPH with obstruction/lower urinary tract symptoms N40.1, N13.8    Hypertension I10    Primary osteoarthritis involving multiple joints M15.0    Hyperlipidemia E78.5    GERD (gastroesophageal reflux disease) K21.9    Pre-diabetes R73.03    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 (HCC) M06.041, M06.042    Vitamin D deficiency E55.9    Colon polyps K63.5    CPDD (calcium pyrophosphate deposition disease) M11.20    Abnormal glucose R73.09    Depression F32.9    Rectal bleeding K62.5    Class 1 obesity due to excess calories with serious comorbidity and body mass index (BMI) of 32.0 to 32.9 in adult E66.09, Z68.32    APC (atrial premature contractions) I49.1    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 Paroxysmal nocturnal dyspnea. Patient reported in 2019 awakening gasping for air several times per week. No overt evidence of heart failure and EKG was without change from baseline at that time. He reported that he would need to sit up immediately and take deep breathes until resolved.  He also admitted to snoring and experiencing significant daytime drowsiness particularly when driving. He reported that when he was on long trips, he would need to pull off the road and take a nap before he could resume driving. Given high suspicion for sleep apnea, he was referred to Dr. Jl Nelson for evaluation, but he never scheduled. Presented with worsening symptoms over several weeks, possibly exacerbated by the inadvertent abrupt cessation of Paxil during this time. Severe episode on 8/9/2019 prompted ED evaluation. EKG without change, troponin negative and NT-pro BNP normal. Now scheduled with Dr. Jl Nelson. Echocardiogram (8/26/2019) with normal LV size and function (EF 56-60%), no RWMA, normal valves. Will continue to follow. 2. Depression. Prior reasonable control with Paxil and weaned from benzodiazepine use for anxiety and insomnia. Now on Zoloft 50 mg daily with improvement after inadvertently stopping Paxil. Will address again at next visit and determine if dose needs adjustment. 3. Right sided sciatica. Unresponsive to Medrol dose pack, physical therapy, and unwilling to take narcotics as not effective. Lumbar MRI with multilevel degenerative changes and facet arthropathy with R>L facet stenosis at L4-L5 which may be responsible for symptoms. May benefit from [facility]. Has been following with Dr. Brenda Yousif, but wishing to change providers given lack of improvement. Will place referral to Dr. Ankur Mullen for evaluation. Advised patient to stop taking ibuprofen while on Celebrex, and to use Tylenol instead. Also has a prescription for gabapentin as prescribed by Dr. Brenda Yousif but has not yet taken. Advised to begin 100 mg tid to help with sciatica pain. Follow. 4. Abnormal chest x-ray. Ill defined density in left mid zone noted on chest x-ray in ED on 8/9/2019. Agreeable to proceed with chest CT scan to further evaluate. Will place order. 5. Hypertension.  Blood pressure well controlled on current regimen of lisinopril 40 mg daily, amlodipine 10 mg daily, lasix 40 mg daily (potassium 20 meq daily) and hydralazine 25 mg bid. Renal function normal with creatinine 0.87/ eGFR >60. Normal echocardiogram (8/2019). Continue to follow. 6. Hyperlipidemia. On moderate intensity dose atorvastatin with LDL 58 and HDL 65, indicative of excellent control in this patient. Continue to follow. 7. Prediabetes. Has been controlled on diet alone. HbA1c remains controlled at 5.8. No evidence of microvascular complications. On Ace-I and statin. Continue follow-up with Dr. Ezequiel Ortega for annual eye exams. Emphasized importance of lifestyle modifications, including diet, exercise, and weight loss. To be reassessed at next visit. 8. Rheumatoid arthritis. On Plaquenil. Has regular eye exams with Dr. Ezequiel Ortega. Difficult to gauge response as appears to have evidence of osteoarthritis and CPPD occurring concurrently, but noted significant improvement since initiating colchicine. Voltaren gel for pain control. Tylenol while at work to help with pain control as needed. Followed by Dr. Donna Serra. 9. Primary osteoarthritis. Evident in bilateral hands and knees, bilateral shoulders, and cervical spine. Neck pain now returned to baseline. Shoulder pain (R>L). X-ray with evidence of osteoarthritis and rotator cuff pathology. Evaluated by Dr. Marie Parker and received cortisone injection to right shoulder with some improvement. Surgery for reverse shoulder replacement recommended. Changed from ibuprofen 400 mg qid and Tylenol to Celebrex 200 mg bid with significant improvement. Also using Voltaren gel. 10. CPPD. Colchicine started on 1/19/2017 to help address chondrocalcinosis on x-rays. Reports significant improvement. Now taking every other day with good control. 11. Macrocytosis. Mild evidence of macrocytosis on recent blood work. Hb/Hct normalized today. Will check B12 and folate at next blood draw. 12. Rectal bleeding. Colonoscopy 8/2015.  Evaluated by Dr. Claudine Tamayo and considered to be hemorrhoidal in source. Known internal and external hemorrhoids. Improved with Citrucel. No evidence of anemia. Follow. 13. BPH. Does have lower urinary tract symptoms, but refusing medications or surgical interventions. Followed by Dr. Vickie Jo. 14. GERD. Good control of symptoms with omeprazole. No issues today. 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 Monitored annually, last 9/2018 without evidence of toxicity. 16. Obesity. Emphasized importance of lifestyle modifications, including diet, exercise, and weight loss. Discussed that would help control blood sugar. Will readdress at next visit. 17. Insomnia. Using melatonin agonist, ramelteon, to replace Xanax. Had good response and weaned from Xanax. 18. Health maintenance. Unable to receive Tdap due to allergy (anaphylaxis to Td). Will address Shingrix vaccine at next visit. Other immunizations up to date. Colonoscopy due 2020. Continue regular eye exams with Dr. Zoe Mckenzie. Vitamin D level normal. Continue maintenance dose supplement. Medicare wellness visit up to date. Will discuss aspirin use at next visit. Patient understands recommendations and agrees with plan. Follow-up in 4 weeks.

## 2019-09-04 ENCOUNTER — OFFICE VISIT (OUTPATIENT)
Dept: ORTHOPEDIC SURGERY | Age: 79
End: 2019-09-04

## 2019-09-04 VITALS
DIASTOLIC BLOOD PRESSURE: 74 MMHG | RESPIRATION RATE: 18 BRPM | BODY MASS INDEX: 33.08 KG/M2 | WEIGHT: 203.4 LBS | HEART RATE: 63 BPM | OXYGEN SATURATION: 97 % | TEMPERATURE: 97.9 F | SYSTOLIC BLOOD PRESSURE: 134 MMHG

## 2019-09-04 DIAGNOSIS — M48.061 SPINAL STENOSIS OF LUMBAR REGION WITH RADICULOPATHY: Primary | ICD-10-CM

## 2019-09-04 DIAGNOSIS — M54.16 SPINAL STENOSIS OF LUMBAR REGION WITH RADICULOPATHY: Primary | ICD-10-CM

## 2019-09-04 RX ORDER — GABAPENTIN 100 MG/1
200 CAPSULE ORAL 3 TIMES DAILY
Qty: 180 CAP | Refills: 1 | Status: SHIPPED | OUTPATIENT
Start: 2019-09-04 | End: 2019-11-06 | Stop reason: ALTCHOICE

## 2019-09-04 NOTE — PROGRESS NOTES
[address]  [address], [address]  [place], Formerly named [facility] [address]  Phone: [phone]  Fax: [phone]        Evaristo Tay  : 1940  PCP: Devera Moritz, MD      NEW PATIENT      ASSESSMENT AND PLAN     Diagnoses and all orders for this visit:    1. Spinal stenosis of lumbar region with radiculopathy  -     gabapentin (NEURONTIN) 100 mg capsule; Take 2 Caps by mouth three (3) times daily. Max Daily Amount: 600 mg.       1. 78yo w/ RA, still works at [employer], 2.5 months of radicular symptoms unresponsive to meds/PT/injections. 2. Increase Gabapentin to 200mg TID  3. Discussed life style modification, PT, medication, spinal injection, and surgery as treatment options   4. Please note that Gabapentin is a controlled substance in [place] as of 19. This is not a narcotic medication, but has some abuse and addiction potential, especially when combined with opioids. 5. Given information on lumbar stenosis and deciding on surgery    F/U 1 month with Dr. Leatha Swann for surgical eval      CHIEF COMPLAINT  Clementina Betts is seen today in consultation at the request of Dr. Lizeth Van for complaints of RLE pain. HISTORY OF PRESENT ILLNESS  Clementina Betts is a 70 y.o. male. Today pt c/o RLE pain of 10 weeks duration. Pt denies any specific incident or injury that caused their pain. Pt had MDP, no benefit. Had PT through Dr. January Rice office. Pt tried Norco and Tramadol with somnolence, no benefit. Pt c/o low back pain into R buttock, thigh, groin, and lower leg. He states he woke up with this pain, did nothing to prompt his pain. Pt reports having an epidural injection with Dr. Sharron Valentino with no benefit. Pt states he has relief with laying flat. He notes he needs to lift RLE into his truck at times. He favors his RLE while walking most the time. Location of pain: low back R>L  Does pain radiate into extremities: RLE to ankle, sharp.  R groin  Does patient have weakness: RLE drags some   Pt denies saddle paresthesias. Medications pt is on: Gabapentin 100mg TID with benefit, feels it wear off. Denies grogginess. Tylenol PRN. Celebrex 200mg BID. Voltaren gel PRN. Denies persistent fevers, chills, weight changes, neurogenic bowel or bladder symptoms. Treatments patient has tried:  Physical therapy:Yes no benefit  Doing HEP: Unknown  Non-opioid medications: Yes Failed Norco, Tramadol, prednisone  Spinal injections: Yes TOBY no benefit  Spinal surgery- No.   Last L MRI 2019: mod stenosis L4-5 R>L. Cyst on R at L4-5.  reviewed. PMHx of BPH, CPDD, GERD, RA, sleep apnea. Pt works as a [employer] at [employer] in [place]. Works 50hr weeks as part-time. Has freedom to work when he pleases. Wife had lumbar fusion. ED 8/9/19 for SOB. Pain Assessment  9/4/2019   Location of Pain Back; Other (comment)   Pain Location Comment Sciatica   Location Modifiers Right   Severity of Pain 6   Quality of Pain Sharp   Duration of Pain Persistent   Frequency of Pain Constant   Aggravating Factors Bending;Standing; Other (Comment)   Aggravating Factors Comment sitting too long   Limiting Behavior -   Relieving Factors Other (Comment)   Relieving Factors Comment meds help   Result of Injury -         MRI Results (most recent):  Results from Hospital Encounter encounter on 08/05/19   MRI LUMB SPINE WO CONT    Narrative EXAMINATION: MRI lumbar spine without contrast    INDICATION: Right-sided sciatica    COMPARISON: 6/18/2007    TECHNIQUE: Sagittal and axial multiecho MRI sequences of the lumbar spine  performed without contrast.    FINDINGS:    General: L1 mild anterior compression deformity, chronic appearing. Otherwise  vertebral body heights preserved. No significant disc space loss. Multilevel  mild chronic appearing endplate changes. No focal listhesis. Lumbar lordosis  maintained. Conus ends at level L1. No abnormal signal in the distal cord.  No  abnormal epidural collection identified. No suspicious marrow signal.    Miscellaneous: No incidental acute or suspicious findings identified outside of  the lumbar spine region. Levels:    T12-L1: No significant disc disease or stenosis. Mild facet arthropathy. L1-L2: No significant degenerative change or stenosis. L2-L3: Very minimal disc bulge. Mild facet arthropathy. Spinal canal patent. Mild left greater than right foraminal stenoses. L3-L4: Mild eccentric to the left disc bulge. Mild facet arthropathy. Minimal  spinal canal narrowing. Moderate left greater than right foraminal stenoses. L4-L5: Mild disc bulge. Moderate facet arthropathy with small facet joint  effusions and a probable small right ventral facet synovial cyst.. Moderate  spinal canal stenosis with possible abutment of the crossing L5 nerves. Severe  right and moderate left foraminal stenoses. L5-S1: Tiny posterior central disc protrusion. Moderate facet arthropathy. Spinal canal patent. Minimal foraminal narrowing. Imaged sacrum: Unremarkable. Impression IMPRESSION:    Degenerative changes most notable at L4-L5 resulting in moderate spinal canal  stenosis as well as moderate to severe right greater than left foraminal  stenoses. -Advanced facet arthropathy with small facet effusions and suspected small right  facet joint ventral synovial cyst.    Notable degenerative changes also L3-L4. See level by level details above. Compared to 2007 MRI, degenerative changes  have generally worsened. Chronic mild L1 compression deformity. 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 Dr. Aleksey Duarte.     CPDD (calcium pyrophosphate deposition disease)     Depression     GERD (gastroesophageal reflux disease)     Hyperlipidemia     Hypertension     Prediabetes     Primary osteoarthritis involving multiple joints 9/6/2011    Rheumatoid arthritis ([facility] [address] 2013    negative RF; elevated anti-CCP. Dr. Shruti Sanchez. Past Surgical History:   Procedure Laterality Date    HX AMPUTATION FINGER Left     HX CARPAL TUNNEL RELEASE      HX CATARACT REMOVAL Left 01/2018    HX CATARACT REMOVAL Bilateral 2018    HX HEENT      sinus, tonsillectomy    HX HERNIA REPAIR      HX MOHS PROCEDURES      bilateral     HX ORTHOPAEDIC      lef knee surgery, removed cartilage. MEDICATIONS      Current Outpatient Medications   Medication Sig Dispense Refill    gabapentin (NEURONTIN) 100 mg capsule Take 2 Caps by mouth three (3) times daily. Max Daily Amount: 600 mg. 180 Cap 1    omeprazole (PRILOSEC) 20 mg capsule TAKE 1 CAPSULE BY MOUTH ONCE DAILY 90 Cap 1    sertraline (ZOLOFT) 50 mg tablet Take 1 Tab by mouth daily. 90 Tab 2    tamsulosin (FLOMAX) 0.4 mg capsule Take 1 Cap by mouth daily. Indications: enlarged prostate with urination problem 90 Cap 3    hydrALAZINE (APRESOLINE) 25 mg tablet Take 1 Tab by mouth three (3) times daily. (Patient taking differently: Take 25 mg by mouth two (2) times a day.) 270 Tab 2    furosemide (LASIX) 40 mg tablet Take 1 Tab by mouth daily. 90 Tab 3    lisinopril (PRINIVIL, ZESTRIL) 40 mg tablet Take 1 Tab by mouth daily. 90 Tab 3    atorvastatin (LIPITOR) 10 mg tablet TAKE 1 TABLET BY MOUTH ONCE DAILY 90 Tab 3    celecoxib (CELEBREX) 200 mg capsule Take 1 Cap by mouth two (2) times a day. 180 Cap 1    amLODIPine (NORVASC) 10 mg tablet TAKE 1 TABLET BY MOUTH ONCE DAILY 30 Tab 11    potassium chloride (K-DUR, KLOR-CON) 20 mEq tablet Take 1 Tab by mouth daily. 90 Tab 2    cycloSPORINE (RESTASIS) 0.05 % ophthalmic emulsion Administer 1 Drop to both eyes two (2) times a day.  colchicine (MITIGARE) 0.6 mg capsule Take 0.6 mg by mouth every other day.  cholecalciferol, vitamin D3, (VITAMIN D3) 2,000 unit tab Take 2,000 Units by mouth daily.       diclofenac (VOLTAREN) 1 % topical gel Apply 4 g to affected area four (4) times daily.  LUMIGAN 0.01 % ophthalmic drops Administer 1 Drop to both eyes nightly.  hydroxychloroquine (PLAQUENIL) 200 mg tablet Take 400 mg by mouth daily.  aspirin delayed-release 81 mg tablet Take 81 mg by mouth daily.  MULTIVITS/IRON FUM/FA/D3/LYCOP (MULTI FOR HIM PO) Take  by mouth daily.  nystatin-triamcinolone (MYCOLOG II) topical cream Apply  to affected area two (2) times a day. 30 g 2    nystatin (MYCOSTATIN) powder Apply  to affected area four (4) times daily.  60 g 1       ALLERGIES    Allergies   Allergen Reactions    Latex, Natural Rubber Swelling    Adhesive Tape-Silicones Rash and Itching    Aldactone [Spironolactone] Not Reported This Time     Breast tenderness    Codeine Not Reported This Time     \"Drives crazy\"    Tape [Adhesive] Rash    Tetanus Toxoid, Adsorbed Anaphylaxis    Tetanus Vaccines And Toxoid Anaphylaxis     Other reaction(s): anaphylaxis/angioedema  Other reaction(s): anaphylaxis/angioedema          SOCIAL HISTORY    Social History     Socioeconomic History    Marital status:      Spouse name: Not on file    Number of children: Not on file    Years of education: Not on file    Highest education level: Not on file   Occupational History    Not on file   Social Needs    Financial resource strain: Not on file    Food insecurity:     Worry: Not on file     Inability: Not on file    Transportation needs:     Medical: Not on file     Non-medical: Not on file   Tobacco Use    Smoking status: Former Smoker     Types: Cigarettes, Pipe, Cigars    Smokeless tobacco: Former User     Types: Chew   Substance and Sexual Activity    Alcohol use: Yes     Comment: rarely    Drug use: No    Sexual activity: Not Currently   Lifestyle    Physical activity:     Days per week: Not on file     Minutes per session: Not on file    Stress: Not on file   Relationships    Social connections:     Talks on phone: Not on file Gets together: Not on file     Attends Yazdanism service: Not on file     Active member of club or organization: Not on file     Attends meetings of clubs or organizations: Not on file     Relationship status: Not on file    Intimate partner violence:     Fear of current or ex partner: Not on file     Emotionally abused: Not on file     Physically abused: Not on file     Forced sexual activity: Not on file   Other Topics Concern    Not on file   Social History Narrative    Not on file       FAMILY HISTORY    Family History   Problem Relation Age of Onset    Cancer Mother     Heart Disease Father     Alcohol abuse Father     Cancer Other          REVIEW OF SYSTEMS  Review of Systems   Constitutional: Negative for chills, fever and weight loss. Respiratory: Negative for shortness of breath. Cardiovascular: Negative for chest pain. Gastrointestinal: Negative for constipation. Negative for fecal incontinence   Genitourinary: Negative for dysuria. Negative for urinary incontinence   Musculoskeletal:        Per HPI   Skin: Negative for rash. Neurological: Negative for dizziness, tingling, tremors, focal weakness and headaches. Endo/Heme/Allergies: Does not bruise/bleed easily. Psychiatric/Behavioral: The patient does not have insomnia. PHYSICAL EXAMINATION  Visit Vitals  /74   Pulse 63   Temp 97.9 °F (36.6 °C)   Resp 18   Wt 203 lb 6.4 oz (92.3 kg)   SpO2 97%   BMI 33.08 kg/m²          Accompanied by self. Constitutional:  Well developed, well nourished, in no acute distress. Psychiatric: Affect and mood are appropriate. Integumentary: No rashes or abrasions noted on exposed areas. Cardiovascular/Peripheral Vascular: No peripheral edema is noted BLE. SPINE/MUSCULOSKELETAL EXAM      Lumbar spine:  No rash, ecchymosis, or gross obliquity. No fasciculations. No focal atrophy is noted. Tenderness to palpation R sciatic notch. SI joints non-tender.  Trochanters non tender. MOTOR:         Hip Flex  Quads Hamstrings Ankle DF EHL Ankle PF   Right +4/5 +4/5 +4/5 +4/5 +4/5 +4/5   Left +4/5 +4/5 +4/5 +4/5 +4/5 +4/5     Straight Leg raise negative. Good ROM of hips. Ambulation without assistive device. FWB. Written by Courtney Riddle, as dictated by Jordy Martínez MD.    I, Dr. Jordy Martínez MD, confirm that all documentation is accurate. Mr. Artemio Santos may have a reminder for a \"due or due soon\" health maintenance. I have asked that he contact his primary care provider for follow-up on this health maintenance.

## 2019-09-04 NOTE — PROGRESS NOTES
Castro Connelly presents today for   Chief Complaint   Patient presents with    Back Pain     Right Sciatica Pain       Is someone accompanying this pt? No    Is the patient using any DME equipment during OV? No    Depression Screening:  3 most recent PHQ Screens 9/4/2019   Little interest or pleasure in doing things Not at all   Feeling down, depressed, irritable, or hopeless Not at all   Total Score PHQ 2 0   Trouble falling or staying asleep, or sleeping too much -   Feeling tired or having little energy -   Poor appetite, weight loss, or overeating -   Feeling bad about yourself - or that you are a failure or have let yourself or your family down -   Trouble concentrating on things such as school, work, reading, or watching TV -   Moving or speaking so slowly that other people could have noticed; or the opposite being so fidgety that others notice -   Thoughts of being better off dead, or hurting yourself in some way -   PHQ 9 Score -   How difficult have these problems made it for you to do your work, take care of your home and get along with others -       Learning Assessment:  Learning Assessment 9/4/2019   PRIMARY LEARNER Patient   HIGHEST LEVEL OF EDUCATION - PRIMARY LEARNER  -   BARRIERS PRIMARY LEARNER -   CO-LEARNER CAREGIVER -   PRIMARY LANGUAGE ENGLISH   LEARNER PREFERENCE PRIMARY DEMONSTRATION     -   ANSWERED BY Patient   RELATIONSHIP SELF           Fall Risk  Fall Risk Assessment, last 12 mths 9/4/2019   Able to walk? Yes   Fall in past 12 months? No       Coordination of Care:  1. Have you been to the ER, urgent care clinic since your last visit? No  Hospitalized since your last visit? No    2. Have you seen or consulted any other health care providers outside of the [address] [place] [address] since your last visit? No Include any pap smears or colon screening.  No    Last  Checked Today

## 2019-09-04 NOTE — PATIENT INSTRUCTIONS
Lumbar Spinal Stenosis: Care Instructions  Your Care Instructions    Stenosis in the spine is a narrowing of the canal that is around the spinal cord and nerve roots in your back. It can happen as part of aging. Sometimes bone and other tissue grow into this canal and press on the nerves that branch out from the spinal cord. This can cause pain, numbness, and weakness. When it happens in the lower part of your back, it is called lumbar spinal stenosis. It can cause problems in the legs, feet, and rear end (buttocks). You may be able to get relief from the symptoms of spinal stenosis by taking pain medicine. Your doctor may suggest physical therapy and exercises to keep your spine strong and flexible. Some people try steroid shots to reduce swelling. If pain and numbness in your legs are still so bad that you cannot do your normal activities, you may need surgery. Follow-up care is a key part of your treatment and safety. Be sure to make and go to all appointments, and call your doctor if you are having problems. It's also a good idea to know your test results and keep a list of the medicines you take. How can you care for yourself at home? · Take an over-the-counter pain medicine. Nonsteroidal anti-inflammatory drugs (NSAIDs) such as ibuprofen or naproxen seem to work best. But if you can't take NSAIDs, you can try acetaminophen. Be safe with medicines. Read and follow all instructions on the label. · Do not take two or more pain medicines at the same time unless the doctor told you to. Many pain medicines have acetaminophen, which is Tylenol. Too much acetaminophen (Tylenol) can be harmful. · Stay at a healthy weight. Being overweight puts extra strain on your spine. · Change positions often when you sit or stand. This can ease pain. It may also reduce pressure on the spinal cord and its nerves. · Avoid doing things that make your symptoms worse.  Walking downhill and standing for a long time may cause pain.  · Stretch and strengthen your back muscles as your doctor or physical therapist recommends. If your doctor says it is okay to do them, these exercises may help. ? Lie on your back with your knees bent. Gently pull one bent knee to your chest. Put that foot back on the floor, and then pull the other knee to your chest.  ? Do pelvic tilts. Lie on your back with your knees bent. Tighten your stomach muscles. Pull your belly button (navel) in and up toward your ribs. You should feel like your back is pressing to the floor and your hips and pelvis are slightly lifting off the floor. Hold for 6 seconds while breathing smoothly. ? Stand with your back flat against a wall. Slowly slide down until your knees are slightly bent. Hold for 10 seconds, then slide back up the wall. · Remove or change anything in your house that may cause you to fall. Keep walkways clear of clutter, electrical cords, and throw rugs. When should you call for help? Call 911 anytime you think you may need emergency care. For example, call if:    · You are unable to move a leg at all.   [facility] your doctor now or seek immediate medical care if:    · You have new or worse symptoms in your legs, belly, or buttocks. Symptoms may include:  ? Numbness or tingling. ? Weakness. ? Pain.     · You lose bladder or bowel control.    Watch closely for changes in your health, and be sure to contact your doctor if:    · You have a fever, lose weight, or don't feel well.     · You are not getting better as expected. Where can you learn more? Go to http://regina-carmen.info/. Juanjose Yates in the search box to learn more about \"Lumbar Spinal Stenosis: Care Instructions. \"  Current as of: September 20, 2018  Content Version: 12.1  © 0676-2636 [employer]. Care instructions adapted under license by [employer] (which disclaims liability or warranty for this information).  If you have questions about a medical condition or this instruction, always ask your healthcare professional. [address] any warranty or liability for your use of this information. Deciding About Surgery for Lumbar Spinal Stenosis  How can you decide about surgery for lumbar spinal stenosis? What is lumbar spinal stenosis? Lumbar spinal stenosis is the narrowing of the spinal canal in the lower back. This occurs when bone and other tissues grow inside the openings in the spinal bones. This can squeeze the nerves that branch out from the spinal cord. The squeezing can cause pain, numbness, or weakness. It happens most often in the legs, feet, or buttocks. You may choose to have surgery to ease your symptoms. Or you may try other treatments instead. These include medicines, exercise, and physical therapy. What are key points about this decision? · If your symptoms are mild to moderate, then medicine, physical therapy, and exercise may be all you need. · You may want surgery if you have tried other treatment for a while and your symptoms are still so bad that you can't do your normal activities. · Your symptoms may come back after a few years. You may need surgery again. · Surgery will most likely help leg pain. But it may not help back pain as much. Why might you choose to have surgery? · Surgery can relieve pain. It can also improve how well you can walk. · You have tried other treatment for a while and your symptoms are still so bad that you can't do your normal activities. · Without surgery, your symptoms may still bother you. If symptoms are very painful, they most often will not improve on their own. · Your doctor may advise surgery if you can't control your bladder or bowels as well as you used to. Surgery is also an option if you notice sudden changes in how well you can walk or you are more clumsy than before. Why might you choose not to have surgery?   · You may try other treatments to help your symptoms. These include medicine, exercise, and physical therapy. These other treatments work best for people with mild to moderate symptoms. · Risks from surgery include nerve injury and tissue tears. And you could have chronic pain, trouble passing urine, and a spine that is not stable. · All surgery has some risks. These include bleeding, infection, and risks from anesthesia. · You may not be able to return to all of your normal activities for many months. · Your symptoms may come back in a few years. You may need surgery again. Your decision  Thinking about the facts and your feelings can help you make a decision that is right for you. Be sure you understand the benefits and risks of your options, and think about what else you need to do before you make the decision. Where can you learn more? Go to http://regina-carmen.info/. Enter V241 in the search box to learn more about \"Deciding About Surgery for Lumbar Spinal Stenosis. \"  Current as of: September 20, 2018  Content Version: 12.1  © 7672-8882 [employer]. Care instructions adapted under license by [employer] (which disclaims liability or warranty for this information). If you have questions about a medical condition or this instruction, always ask your healthcare professional. [place] [address] any warranty or liability for your use of this information.

## 2019-09-06 ENCOUNTER — TELEPHONE (OUTPATIENT)
Dept: INTERNAL MEDICINE CLINIC | Age: 79
End: 2019-09-06

## 2019-09-06 NOTE — TELEPHONE ENCOUNTER
CT Results (most recent):  Results from Hospital Encounter encounter on 08/31/19   CT CHEST W CONT    Narrative CT CHEST WITH ENHANCEMENT    INDICATION: Lung nodule seen on imaging study, abnormal chest x-ray. TECHNIQUE: CT images obtained from the thoracic inlet to the level of the  diaphragm following uneventful administration of 80 mL Isovue 300 nonionic  intravenous contrast. Axial, coronal and sagittal reformats were obtained. All CT scans at this facility are performed using dose optimization technique as  appropriate to the performed exam, to include automated exposure control,  adjustment of the mA and/or kV according to patient's size (Including  appropriate matching for site-specific examinations), or use of iterative  reconstruction technique. COMPARISON: CT chest 2/28/2007. CHEST FINDINGS:     Thyroid/Base Of Neck:  Unremarkable  . Lungs:   No acute infiltrates are evident. .   No mass lesions are seen. .  Trachea and central bronchi are clear. .  Stable 4 mm chronic pleural based nodule left posterior right upper lobe (17). Also stable 2 mm nodule posterior right upper lobe (21). Stable 2-3 mm nodule  subpleural left upper lobe (14) and 2 mm nodule anterior inferior left upper  lobe (23)    Pleural Spaces: There is no pneumothorax or pleural fluid evident. No pleural plaques are seen    Lymph Nodes:   Axillae: No enlargement. Mediastinum / Janina: No enlargement. Mediastinum, Great Vessels And Heart: The heart is normal in size. No pericardial effusion. No aortic aneurysm. Moderate calcified plaques in the aortic arch and descending aorta. .    Abdomen Structures Included: The included portions of the liver and spleen are unremarkable. .    Osseous Structures:   No destructive osseous process. Degenerative changes in the shoulders. Thoracic  spondylosis. Impression IMPRESSION:     1. No evidence of pulmonary mass.  Several stable tiny bilateral pulmonary  nodules as discussed.  -The reported abnormal chest x-ray is not available for direct comparison. 2. Atherosclerosis. 3. Degenerative changes in the spine and shoulders. Reviewed chest CT scan results. Please let the patient know that several tiny bilateral nodules were seen, but they were stable when compared to a prior chest CT scan from 2007. Because of this stability over such a long time, they are not of concern. Otherwise, no abnormal nodules or masses are present.

## 2019-09-23 ENCOUNTER — OFFICE VISIT (OUTPATIENT)
Dept: UROLOGY | Age: 79
End: 2019-09-23

## 2019-09-23 VITALS
SYSTOLIC BLOOD PRESSURE: 135 MMHG | HEART RATE: 78 BPM | DIASTOLIC BLOOD PRESSURE: 78 MMHG | WEIGHT: 208 LBS | BODY MASS INDEX: 33.43 KG/M2 | HEIGHT: 66 IN | OXYGEN SATURATION: 96 %

## 2019-09-23 DIAGNOSIS — R97.20 ELEVATED PSA: ICD-10-CM

## 2019-09-23 DIAGNOSIS — N43.41 SPERMATOCELE OF EPIDIDYMIS, SINGLE: Primary | ICD-10-CM

## 2019-09-23 LAB
BILIRUB UR QL STRIP: NEGATIVE
GLUCOSE UR-MCNC: NEGATIVE MG/DL
KETONES P FAST UR STRIP-MCNC: NEGATIVE MG/DL
PH UR STRIP: 6 [PH] (ref 4.6–8)
PROT UR QL STRIP: NEGATIVE
SP GR UR STRIP: 1.01 (ref 1–1.03)
UA UROBILINOGEN AMB POC: NORMAL (ref 0.2–1)
URINALYSIS CLARITY POC: CLEAR
URINALYSIS COLOR POC: YELLOW
URINE BLOOD POC: NEGATIVE
URINE LEUKOCYTES POC: NEGATIVE
URINE NITRITES POC: NEGATIVE

## 2019-09-23 RX ORDER — IBUPROFEN 600 MG/1
TABLET ORAL
COMMUNITY
Start: 2018-07-07 | End: 2019-09-25 | Stop reason: ALTCHOICE

## 2019-09-23 NOTE — PATIENT INSTRUCTIONS
Prostate Cancer Screening: Care Instructions  Your Care Instructions    The prostate gland is an organ found just below a man's bladder. It is the size and shape of a walnut. It surrounds the tube that carries urine from the bladder out of the body through the penis. This tube is called the urethra. Prostate cancer is the abnormal growth of cells in the prostate. It is the second most common type of cancer in men. (Skin cancer is the most common.)  Most cases of prostate cancer occur in men older than 72. The disease runs in families. And it's more common in -American men. When it's found and treated early, prostate cancer may be cured. But it is not always treated. This is because prostate cancer may not shorten your life, especially if you are older and the cancer is growing slowly. Follow-up care is a key part of your treatment and safety. Be sure to make and go to all appointments, and call your doctor if you are having problems. It's also a good idea to know your test results and keep a list of the medicines you take. What are the screening tests for prostate cancer? The main screening test for prostate cancer is the prostate-specific antigen (PSA) test. This is a blood test that measures how much PSA is in your blood. A high level may mean that you have an enlargement, an infection, or cancer. Along with the PSA test, you may have a digital rectal exam. The digital (finger) rectal exam checks for anything abnormal in your prostate. To do the exam, the doctor puts a lubricated, gloved finger into your rectum. If these tests suggest cancer, you may need a prostate biopsy. How is prostate cancer diagnosed? In a biopsy, the doctor takes small tissue samples from your prostate gland. Another doctor then looks at the tissue under a microscope to see if there are cancer cells, signs of infection, or other problems. The results help diagnose prostate cancer.   What are the pros and cons of screening? Neither a PSA test nor a digital rectal exam can tell you for sure that you do or do not have cancer. But they can help you decide if you need more tests, such as a prostate biopsy. Screening tests may be useful because most men with prostate cancer don't have symptoms. It can be hard to know if you have cancer until it is more advanced. And then it's harder to treat. But having a PSA test can also cause harm. The test may show high levels of PSA that aren't caused by cancer. So you could have a prostate biopsy you didn't need. Or the PSA test might be normal when there is cancer, so a cancer might not be found early. The test can also find cancers that would never have caused a problem during your lifetime. So you might have treatment that was not needed. Prostate cancer usually develops late in life and grows slowly. For many men, it does not shorten their lives. Some experts advise screening only for men who are at high risk. Talk with your doctor to see if screening is right for you. Where can you learn more? Go to http://regina-carmen.info/. Enter R550 in the search box to learn more about \"Prostate Cancer Screening: Care Instructions. \"  Current as of: December 19, 2018  Content Version: 12.2  © 3844-5596 [email or web address]. Care instructions adapted under license by [employer] (which disclaims liability or warranty for this information). If you have questions about a medical condition or this instruction, always ask your healthcare professional. [place] [address] any warranty or liability for your use of this information.

## 2019-09-23 NOTE — PROGRESS NOTES
Mr. Payal Arana has a reminder for a \"due or due soon\" health maintenance. I have asked that he contact his primary care provider for follow-up on this health maintenance.

## 2019-09-24 ENCOUNTER — HOSPITAL ENCOUNTER (OUTPATIENT)
Dept: LAB | Age: 79
Discharge: HOME | End: 2019-09-24
Payer: MEDICARE

## 2019-09-24 DIAGNOSIS — R97.20 ELEVATED PSA: ICD-10-CM

## 2019-09-24 LAB — PSA SERPL-MCNC: 3.3 NG/ML (ref 0–4)

## 2019-09-24 PROCEDURE — 84153 ASSAY OF PSA TOTAL: CPT

## 2019-09-24 PROCEDURE — 36415 COLL VENOUS BLD VENIPUNCTURE: CPT

## 2019-09-24 NOTE — PROGRESS NOTES
Didi Dayudent Brett 70 y.o. male     MrLuca Huff seen today for symptomatic BPH follow-up with history of right spermatocele and undulating PSA elevation  Patient's clinical status is tenuous with severely symptomatic spinal stenosis with intractable left sciatica-orthopedic evaluation pending      mildly tender mass discovered in the right scrotum while showering several days ago  Patient has no irritative or obstructive voiding symptoms no history of  tract disease, trauma, or surgery  Patient is voiding well and has no complaints regarding urination at this time-voiding with a solid stream good control and no nocturia  Patient declined alpha-blocker treatment several years ago because of concern regarding side effects        Large prostate median lobe on ultrasound imaging of the bladder last year-PVR at that time 48 cc      PSA 3.8 in January 2013  PSA 2.6 in 2011  PSA 2.3 in January 2014  PSA 2.7 in July 2014  PSA 1.9 on 10 July 2016  PSA 2.1 on 14 July 2017  PSA 3.5 in July 2018  PSA 4.1 in June 2019     PVR 48 cc in 2015   cc in 2017              prostate volume 67.7 cc  PVR 12 cc in July 2018  PVR 61 cc in September 2019      Review of Systems:    CNS: no seizure syncope headaches or dizziness no visual changes/no changes- on Paxil Rx  Respiratory: no wheezing or shortness of breath  Cardiovascular:hypertension-chest pain or palpitations  Intestinal:GERD  Urinary: mild BPH symptoms  Skeletal: or joint arthritis  Endocrine:no diabetes or thyroid disease  Other:     Allergies:    Allergies   Allergen Reactions    Latex, Natural Rubber Swelling    Adhesive Tape-Silicones Rash and Itching    Aldactone [Spironolactone] Not Reported This Time     Breast tenderness    Codeine Not Reported This Time     \"Drives crazy\"    Tape [Adhesive] Rash    Tetanus Toxoid, Adsorbed Anaphylaxis    Tetanus Vaccines And Toxoid Anaphylaxis     Other reaction(s): anaphylaxis/angioedema  Other reaction(s): anaphylaxis/angioedema      Medications:    Current Outpatient Medications   Medication Sig Dispense Refill    ibuprofen (MOTRIN) 600 mg tablet Take 600 mg by mouth every 12 hours scheduled for 3 days, then take 600 mg by mouth every 12 hours as needed for pain.  gabapentin (NEURONTIN) 100 mg capsule Take 2 Caps by mouth three (3) times daily. Max Daily Amount: 600 mg. 180 Cap 1    omeprazole (PRILOSEC) 20 mg capsule TAKE 1 CAPSULE BY MOUTH ONCE DAILY 90 Cap 1    sertraline (ZOLOFT) 50 mg tablet Take 1 Tab by mouth daily. 90 Tab 2    tamsulosin (FLOMAX) 0.4 mg capsule Take 1 Cap by mouth daily. Indications: enlarged prostate with urination problem 90 Cap 3    hydrALAZINE (APRESOLINE) 25 mg tablet Take 1 Tab by mouth three (3) times daily. (Patient taking differently: Take 25 mg by mouth two (2) times a day.) 270 Tab 2    furosemide (LASIX) 40 mg tablet Take 1 Tab by mouth daily. 90 Tab 3    lisinopril (PRINIVIL, ZESTRIL) 40 mg tablet Take 1 Tab by mouth daily. 90 Tab 3    atorvastatin (LIPITOR) 10 mg tablet TAKE 1 TABLET BY MOUTH ONCE DAILY 90 Tab 3    celecoxib (CELEBREX) 200 mg capsule Take 1 Cap by mouth two (2) times a day. 180 Cap 1    amLODIPine (NORVASC) 10 mg tablet TAKE 1 TABLET BY MOUTH ONCE DAILY 30 Tab 11    potassium chloride (K-DUR, KLOR-CON) 20 mEq tablet Take 1 Tab by mouth daily. 90 Tab 2    cycloSPORINE (RESTASIS) 0.05 % ophthalmic emulsion Administer 1 Drop to both eyes two (2) times a day.  colchicine (MITIGARE) 0.6 mg capsule Take 0.6 mg by mouth every other day.  cholecalciferol, vitamin D3, (VITAMIN D3) 2,000 unit tab Take 2,000 Units by mouth daily.  LUMIGAN 0.01 % ophthalmic drops Administer 1 Drop to both eyes nightly.  hydroxychloroquine (PLAQUENIL) 200 mg tablet Take 400 mg by mouth daily.  aspirin delayed-release 81 mg tablet Take 81 mg by mouth daily.  MULTIVITS/IRON FUM/FA/D3/LYCOP (MULTI FOR HIM PO) Take  by mouth daily.       nystatin-triamcinolone (MYCOLOG II) topical cream Apply  to affected area two (2) times a day. 30 g 2    nystatin (MYCOSTATIN) powder Apply  to affected area four (4) times daily. 60 g 1    diclofenac (VOLTAREN) 1 % topical gel Apply 4 g to affected area four (4) times daily. 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 Dr. Wes Sharma.  CPDD (calcium pyrophosphate deposition disease)     Depression     GERD (gastroesophageal reflux disease)     Hyperlipidemia     Hypertension     Prediabetes     Primary osteoarthritis involving multiple joints 9/6/2011    Rheumatoid arthritis ([facility] [address] 2013    negative RF; elevated anti-CCP. Dr. Lalo Wilkins. Past Surgical History:   Procedure Laterality Date    HX AMPUTATION FINGER Left     HX CARPAL TUNNEL RELEASE      HX CATARACT REMOVAL Left 01/2018    HX CATARACT REMOVAL Bilateral 2018    HX HEENT      sinus, tonsillectomy    HX HERNIA REPAIR      HX MOHS PROCEDURES      bilateral     HX ORTHOPAEDIC      lef knee surgery, removed cartilage.      Social History     Socioeconomic History    Marital status:      Spouse name: Not on file    Number of children: Not on file    Years of education: Not on file    Highest education level: Not on file   Occupational History    Not on file   Social Needs    Financial resource strain: Not on file    Food insecurity:     Worry: Not on file     Inability: Not on file    Transportation needs:     Medical: Not on file     Non-medical: Not on file   Tobacco Use    Smoking status: Former Smoker     Types: Cigarettes, Pipe, Cigars    Smokeless tobacco: Former User     Types: Chew   Substance and Sexual Activity    Alcohol use: Yes     Comment: rarely    Drug use: No    Sexual activity: Not Currently   Lifestyle    Physical activity:     Days per week: Not on file     Minutes per session: Not on file    Stress: Not on file   Relationships    Social connections:     Talks on phone: Not on file     Gets together: Not on file     Attends Mu-ism service: Not on file     Active member of club or organization: Not on file     Attends meetings of clubs or organizations: Not on file     Relationship status: Not on file    Intimate partner violence:     Fear of current or ex partner: Not on file     Emotionally abused: Not on file     Physically abused: Not on file     Forced sexual activity: Not on file   Other Topics Concern    Not on file   Social History Narrative    Not on file      Family History   Problem Relation Age of Onset    Cancer Mother     Heart Disease Father     Alcohol abuse Father     Cancer Other         Physical Examination: Well-nourished mature male in no apparent distress    3 cm fluctuant right spermatocele nontender      Urinalysis: Negative dipstick/nitrite negative/heme-negative    PVR today 61 cc      Impression: Intermittently symptomatic right spermatocele                        Symptomatic BPH                        Elevated PSA      Plan: PSA testing today    Right spermatocelectomy is indicated but patient's intractable back pain with sciatica will take precedence over this issue  Elevated PSA with borderline prostate biopsy indications-again, back pain issue takes precedence at this time  Described discussed prospect of right spermatocelectomy with transperineal prostate biopsy under same anesthesia at some time in the future following successful resolution of back pain issue    Continue Flomax 0.4 mg daily    RTC 6 months      More than 1/2 of this 25 minute visit was spent in counselling and coordination of care, as described above. Edelmira Thakkar MD  -electronically signed-    PLEASE NOTE:  This document has been produced using voice recognition software. Unrecognized errors in transcription may be present.

## 2019-09-25 ENCOUNTER — OFFICE VISIT (OUTPATIENT)
Dept: INTERNAL MEDICINE CLINIC | Age: 79
End: 2019-09-25

## 2019-09-25 DIAGNOSIS — M11.20 CPDD (CALCIUM PYROPHOSPHATE DEPOSITION DISEASE): ICD-10-CM

## 2019-09-25 DIAGNOSIS — M06.041 RHEUMATOID ARTHRITIS INVOLVING BOTH HANDS WITH NEGATIVE RHEUMATOID FACTOR (HCC): ICD-10-CM

## 2019-09-25 DIAGNOSIS — M54.31 RIGHT SIDED SCIATICA: ICD-10-CM

## 2019-09-25 DIAGNOSIS — Z23 ENCOUNTER FOR IMMUNIZATION: ICD-10-CM

## 2019-09-25 DIAGNOSIS — E66.09 CLASS 1 OBESITY DUE TO EXCESS CALORIES WITH SERIOUS COMORBIDITY AND BODY MASS INDEX (BMI) OF 32.0 TO 32.9 IN ADULT: ICD-10-CM

## 2019-09-25 DIAGNOSIS — G47.30 SLEEP APNEA, UNSPECIFIED TYPE: ICD-10-CM

## 2019-09-25 DIAGNOSIS — I10 ESSENTIAL HYPERTENSION: ICD-10-CM

## 2019-09-25 DIAGNOSIS — R06.00 PND (PAROXYSMAL NOCTURNAL DYSPNEA): Primary | ICD-10-CM

## 2019-09-25 DIAGNOSIS — F33.1 MODERATE EPISODE OF RECURRENT MAJOR DEPRESSIVE DISORDER (HCC): ICD-10-CM

## 2019-09-25 DIAGNOSIS — R68.89 OTHER GENERAL SYMPTOMS AND SIGNS: ICD-10-CM

## 2019-09-25 DIAGNOSIS — S68.111A: ICD-10-CM

## 2019-09-25 DIAGNOSIS — R73.03 PRE-DIABETES: ICD-10-CM

## 2019-09-25 DIAGNOSIS — M06.042 RHEUMATOID ARTHRITIS INVOLVING BOTH HANDS WITH NEGATIVE RHEUMATOID FACTOR (HCC): ICD-10-CM

## 2019-09-25 DIAGNOSIS — M47.816 LUMBAR FACET ARTHROPATHY: ICD-10-CM

## 2019-09-25 DIAGNOSIS — K21.9 GASTROESOPHAGEAL REFLUX DISEASE, ESOPHAGITIS PRESENCE NOT SPECIFIED: ICD-10-CM

## 2019-09-25 DIAGNOSIS — M15.9 PRIMARY OSTEOARTHRITIS INVOLVING MULTIPLE JOINTS: ICD-10-CM

## 2019-09-25 DIAGNOSIS — E78.5 HYPERLIPIDEMIA, UNSPECIFIED HYPERLIPIDEMIA TYPE: ICD-10-CM

## 2019-09-25 RX ORDER — CELECOXIB 200 MG/1
200 CAPSULE ORAL 2 TIMES DAILY
Qty: 180 CAP | Refills: 1 | Status: SHIPPED | OUTPATIENT
Start: 2019-09-25 | End: 2020-03-02

## 2019-09-25 NOTE — PROGRESS NOTES
Chief Complaint   Patient presents with    Hypertension     follow up ROOM 5       1. Have you been to the ER, urgent care clinic since your last visit? Hospitalized since your last visit? No    2. Have you seen or consulted any other health care providers outside of the [address] [place] [address] since your last visit? Include any pap smears or colon screening. No    Patient was given a copy of the Advanced Directive and understands to bring it in once completed.

## 2019-09-25 NOTE — PROGRESS NOTES
PSA 3.3 on 23 September 2019    PSA 3.8 in January 2013  PSA 2.6 in 2011  PSA 2.3 in January 2014  PSA 2.7 in July 2014  PSA 1.9 on 10 July 2016  PSA 2.1 on 14 July 2017  PSA 3.5 in July 2018  PSA 4.1 in June 2019  PSA 3.3 on 23 September 2019      Impression normal PSA level    Don Elena MD

## 2019-09-25 NOTE — PROGRESS NOTES
Patient given influenza vaccine, FLUAD, in left deltoid, per verbal order from Dr. Renee Garduno with read back. Instructed patient to sit and wait 10-20 minutes before leaving the premises so that we can watch for any complications or adverse reactions. Patient given vaccine information statement handout before vaccine was given. Patient tolerated well without adverse reactions or complications.

## 2019-09-25 NOTE — PATIENT INSTRUCTIONS
Increase Zoloft to 75 mg daily. Vaccine Information Statement    Influenza (Flu) Vaccine (Inactivated or Recombinant): What You Need to Know    Many Vaccine Information Statements are available in Citizen of [place] and other languages. See www.immunize.org/vis  Hojas de información sobre vacunas están disponibles en español y en muchos otros idiomas. Visite www.immunize.org/vis    1. Why get vaccinated? Influenza vaccine can prevent influenza (flu). Flu is a contagious disease that spreads around the [facility] every year, usually between October and May. Anyone can get the flu, but it is more dangerous for some people. Infants and young children, people 72years of age and older, pregnant women, and people with certain health conditions or a weakened immune system are at greatest risk of flu complications. Pneumonia, bronchitis, sinus infections and ear infections are examples of flu-related complications. If you have a medical condition, such as heart disease, cancer or diabetes, flu can make it worse. Flu can cause fever and chills, sore throat, muscle aches, fatigue, cough, headache, and runny or stuffy nose. Some people may have vomiting and diarrhea, though this is more common in children than adults. Each year thousands of people in the [facility] die from flu, and many more are hospitalized. Flu vaccine prevents millions of illnesses and flu-related visits to the doctor each year. 2. Influenza vaccines     CDC recommends everyone 10months of age and older get vaccinated every flu season. Children 6 months through 6years of age may need 2 doses during a single flu season. Everyone else needs only 1 dose each flu season. It takes about 2 weeks for protection to develop after vaccination. There are many flu viruses, and they are always changing. Each year a new flu vaccine is made to protect against three or four viruses that are likely to cause disease in the upcoming flu season. Even when the vaccine doesnt exactly match these viruses, it may still provide some protection. Influenza vaccine does not cause flu. Influenza vaccine may be given at the same time as other vaccines. 3. Talk with your health care provider    Tell your vaccine provider if the person getting the vaccine:   Has had an allergic reaction after a previous dose of influenza vaccine, or has any severe, life-threatening allergies.  Has ever had Guillain-Barré Syndrome (also called GBS). In some cases, your health care provider may decide to postpone influenza vaccination to a future visit. People with minor illnesses, such as a cold, may be vaccinated. People who are moderately or severely ill should usually wait until they recover before getting influenza vaccine. Your health care provider can give you more information. 4. Risks of a reaction     Soreness, redness, and swelling where shot is given, fever, muscle aches, and headache can happen after influenza vaccine.  There may be a very small increased risk of Guillain-Barré Syndrome (GBS) after inactivated influenza vaccine (the flu shot). [place] children who get the flu shot along with pneumococcal vaccine (PCV13), and/or DTaP vaccine at the same time might be slightly more likely to have a seizure caused by fever. Tell your health care provider if a child who is getting flu vaccine has ever had a seizure. People sometimes faint after medical procedures, including vaccination. Tell your provider if you feel dizzy or have vision changes or ringing in the ears. As with any medicine, there is a very remote chance of a vaccine causing a severe allergic reaction, other serious injury, or death. 5. What if there is a serious problem? An allergic reaction could occur after the vaccinated person leaves the clinic.  If you see signs of a severe allergic reaction (hives, swelling of the face and throat, difficulty breathing, a fast heartbeat, dizziness, or weakness), call 9-1-1 and get the person to the nearest hospital.    For other signs that concern you, call your health care provider. Adverse reactions should be reported to the Vaccine Adverse Event Reporting System (VAERS). Your health care provider will usually file this report, or you can do it yourself. Visit the VAERS website at www.vaers. hhs.gov or call 2-569.903.1077. VAERS is only for reporting reactions, and VAERS staff do not give medical advice. 6. The National Vaccine Injury Compensation Program    The [facility] Vaccine Injury Compensation Program (VICP) is a federal program that was created to compensate people who may have been injured by certain vaccines. Visit the VICP website at www.hrsa.gov/vaccinecompensation or call 9-597.794.2862 to learn about the program and about filing a claim. There is a time limit to file a claim for compensation. 7. How can I learn more?  Ask your health care provider.  Call your local or state health department.  Contact the Centers for Disease Control and Prevention (CDC):  - Call 2-672.776.2714 (1-800-CDC-INFO) or  - Visit CDCs influenza website at www.cdc.gov/flu    Vaccine Information Statement (Interim)  Inactivated Influenza Vaccine   8/15/2019  [address] PEGGY Gonzales [id number]   Department of Health and Human Services  Centers for Disease Control and Prevention    Office Use Only

## 2019-09-30 VITALS
OXYGEN SATURATION: 97 % | HEART RATE: 88 BPM | TEMPERATURE: 97.2 F | HEIGHT: 66 IN | DIASTOLIC BLOOD PRESSURE: 68 MMHG | WEIGHT: 210 LBS | RESPIRATION RATE: 14 BRPM | BODY MASS INDEX: 33.75 KG/M2 | SYSTOLIC BLOOD PRESSURE: 128 MMHG

## 2019-09-30 RX ORDER — HYDRALAZINE HYDROCHLORIDE 25 MG/1
25 TABLET, FILM COATED ORAL 2 TIMES DAILY
Qty: 1 TAB | Refills: 0
Start: 2019-09-30 | End: 2020-07-18

## 2019-09-30 NOTE — PROGRESS NOTES
HPI:   Arvind Panda is a 70y.o. year old male who presents today for a routine visit and for evaluation of hypertension, hyperlipidemia, prediabetes, rheumatoid arthritis, osteoarthritis, calcium pyrophosphate deposition disease, BPH, GERD, and depression. 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 On 8/13/2019, he reported that he had been seeing Dr. Shanti Stephens for increasing difficulty with right sided sciatica. He reported being treated with a Medrol dose pack, physical therapy, and spinal injections without improvement, and was also prescribed Norco and Tramadol, but he stated that he found them too sedating and of no benefit. Due to persistent symptoms, he underwent a lumbar MRI (8/5/2019) which showed degenerative changes most notable at L4-L5 resulting in moderate spinal canal stenosis as well as moderate to severe right greater than left foraminal stenoses; advanced facet arthropathy with small facet effusions and suspected small right facet joint ventral synovial cyst; notable degenerative changes also L3-L4; L1 mild anterior compression deformity, chronic appearing; overall general worsening when compared to prior study in 2007. He was having continued significant pain, and was started on gabapentin 100 mg tid. He was referred to Dr. Micah Taylor and she increased his dose of gabapentin to 200 mg tid. She also referred him to Dr. Cristela Aguayo for evaluation given his lack of response to conservative management. He stats that he has an upcoming appointment on 10/11/2019. He states that he continues to have right leg pain, but is noting some improvement with gabapentin. He also reports that he has noted improvement in his depression and anxiety symptoms since starting Zoloft, but is continuing to experience anxiety particularly at night which is interfering with sleep. He is otherwise without new complaints.      On 8/9/2019, he had an episode of waking up gasping for air forcing him to get out of bed and walk around until his breathing normalized. He has been having frequent similar episodes over that last year, but had been resistent to evaluation for sleep apnea. However, he reports that this episode was especially severe. When his symptoms persisted, he was evaluated at [facility], and testing included WBC 5.7, Hb 14.2/ Hct 41.6, creatinine 0.9/ eGFR>60, troponin I x 1 negative, NT-pro BNP 45, EKG sinus rhythm at 83 bpm and no ischemic changes, and chest x-ray without acute changes, but an ill defined 15 mm density in the lateral left mid zone was noted. He received lasix 40 mg IV and was discharged. He was referred to Dr. Jo Miller for probable sleep apnea, and is scheduled for a home sleep study on 10/25/2019. He had an echocardiogram (8/26/2019) showing normal LV function (EF 56-60%), no RWMA, unable to estimate RVSP due to inadequate TR, but TV/PV appear normal.     On 6/20/2018, he suffered an accidental gun shot wound while working resulting in traumatic injury to his left index finger with near loss of the middle phalanx and fracture of the distal phalanx. He was taken to [facility] and initially had irrigation and closure of the lacerations performed. He presented to the [facility] ED on 6/24/2018 with increased pain and swelling, and was started on doxycycline for a wound infection. On 7/7/2018, due to loss of stability and angulation of the index finger, he underwent stabilization with fusion of the proximal and distal phalanx with bone grafting by Dr. Magnolia Glass. He did well and was started on physical therapy. On 8/1/2018, he presented to [address] [place] [address] for evaluation of increasing erythema, swelling and tenderness with concern for a possible infection.  He underwent left hand x-ray (8/1/2018) showing severe soft tissue swelling of the left second finger worrisome for cellulitis, traumatic amputation of the middle phalanx with marked osteopenia and irregularity of the base of the distal phalanx and flattening and irregularity of the head of the proximal phalanx. Unclear if related to prior trauma/surgery or osteomyelitis with osseous destruction and no films available for comparison. He was started empirically on Bactrim and Augmentin for 14 days. On 8/10/2018, he presented for evaluation by GOMEZ Jenkins for complaints of fever, malaise, headache, fatigue, and diarrhea. Lab evaluation showed WBC 17 (90% neutrophils), creatinine 1.34/ eGFR 52, blood culture negative. Stool cultures (8/13/2018) routine, O/P, and C.diff negative. Bactrim and Augmentin were discontinued on 8/13/2018, and he was started on metronidazole for 10 days for treatment of presumed C.diff with improvement. He was evaluated by Dr. Isaías Connors on 8/15/2018 and repeat WBC 8.6 (59% neutrophils), ESR 6, and CRP 0.9. He was seen by Dr. Isaías Connors in follow-up on 8/30/2018 and left index finger wound noted to be significantly improved and no further difficulty with diarrhea. He has a history of hypertension, treated with amlodipine, lisinopril, lasix (+ potassium), and hydralazine was added in 4/2018. He states that his wife has been monitoring his blood pressure intermittently. He denies any chest pain, shortness of breath at rest or with exertion, lightheadedness, or palpitations. He does report bilateral lower extremity swelling that it will increase throughout the day and improve overnight. . In 6/2016, he underwent lower extremity arterial and venous duplex scans, both of which were negative. He also has a history of hyperlipidemia, treated with moderate intensity atorvastatin. He has a history of prediabetes, with HbA1c ranging from 5.9-6.2 since 2012. He denies any polyuria, polydipsia, nocturia, or blurry vision, and has no history of retinopathy, neuropathy, or nephropathy. He has regular eye exams with Dr. Paula Hamilton.     He has a history of bilateral hand pain with morning stiffness for several years, and in 3/2014, he was noted to have a positive anti-CCP antibody level although NIMCO, rheumatoid factor, and ESR were negative. He was referred for evaluation to Dr. Dustin Johnson, and was diagnosed with rheumatoid arthritis in 6/2014. He was treated with hydroxychloroquine, which has been partially helpful in controlling his symptoms. Bilateral hand x-rays also showed evidence of primary osteoarthritis with osteophytes present on the second and third MCP joints. In 1/2017, he was also noted to have evidence of possible chondrocalcinosis on x-ray, and was started on low dose colchicine in addition to hydroxychloroquine for concomitant calcium pyrophosphate deposition disease. He states that since starting on colchicine, he has had significant improvement in his hand pain. He also uses compounding cream and Voltaren gel for pain control. In 1/2019, he was complaining of worsening neck and bilateral shoulder pain (R>L). He stated that he was previously followed by Dr. Rojelio Monroy, and would occasionally receive cortisone injections into his shoulders. He also described neck pain with difficulty turning his head. He denied any upper extremity weakness or paresthesias. He underwent imaging, and bilateral shoulder x-rays (1/10/2019) showed degenerative changes and secondary findings of rotator cuff pathology. Cervical spine x-rays (1/10/2019) showed advanced degenerative changes with multilevel facet arthropathy. He was evaluated by Dr. Abbie Croft who gave him a cortisone injection in his right shoulder with some improvement. He also recommended a reverse shoulder replacement, but he remains hesitant to proceed. He was started on Celebrex 200 mg bid in 2/2019, and reports significant improvement. He continues to also use Tylenol as needed for pain. He states that his neck pain seems to have improved to his baseline level. He has a history of symptomatic BPH, with complaints of decreased stream, hesitancy, and dribbling. He has refused treatment with medication.  He is followed by Dr. Cherelle Thompson. He denies any dysuria, gross hematuria, or flank pain. He has a history of GERD, treated with daily omeprazole with good control of symptoms. He had a screening colonoscopy and 8/2015 by Dr. Amador Wood, showing a 3 mm sessile cecal polyp (pathology: intra-mucosal lymphoid aggregate), two 4 mm sessile polyps in the transverse colon (pathology: serrated adenomas), and a 1 cm lipoma in the transverse colon. Follow-up recommended for five years. He was having difficulty with rectal bleeding in 1/2018 and returned for evaluation with Dr. Amador Wood who felt it was most likely secondary to a hemorrhoidal source. She recommended use of Citrucel. He states that the bleeding has improved with initiation of this therapy. He denies any abdominal pain, nausea, vomiting, melena, or change in bowel movements. He has a history of depression and anxiety, which had been well controlled with Paxil although inadvertently stopped. Now on Zoloft with some increasing symptoms as discussed. 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 Dr. Cherelle Thompson.  CPDD (calcium pyrophosphate deposition disease)     Depression     GERD (gastroesophageal reflux disease)     Hyperlipidemia     Hypertension     Prediabetes     Primary osteoarthritis involving multiple joints 9/6/2011    Rheumatoid arthritis ([facility] [address] 2013    negative RF; elevated anti-CCP. Dr. Dustin Johnson. Past Surgical History:   Procedure Laterality Date    HX AMPUTATION FINGER Left     HX CARPAL TUNNEL RELEASE      HX CATARACT REMOVAL Left 01/2018    HX CATARACT REMOVAL Bilateral 2018    HX HEENT      sinus, tonsillectomy    HX HERNIA REPAIR      HX MOHS PROCEDURES      bilateral     HX ORTHOPAEDIC      lef knee surgery, removed cartilage.      Current Outpatient Medications   Medication Sig    hydrALAZINE (APRESOLINE) 25 mg tablet Take 1 Tab by mouth two (2) times a day.  celecoxib (CELEBREX) 200 mg capsule Take 1 Cap by mouth two (2) times a day.  gabapentin (NEURONTIN) 100 mg capsule Take 2 Caps by mouth three (3) times daily. Max Daily Amount: 600 mg.    omeprazole (PRILOSEC) 20 mg capsule TAKE 1 CAPSULE BY MOUTH ONCE DAILY    sertraline (ZOLOFT) 50 mg tablet Take 1 Tab by mouth daily.  tamsulosin (FLOMAX) 0.4 mg capsule Take 1 Cap by mouth daily. Indications: enlarged prostate with urination problem    furosemide (LASIX) 40 mg tablet Take 1 Tab by mouth daily.  lisinopril (PRINIVIL, ZESTRIL) 40 mg tablet Take 1 Tab by mouth daily.  atorvastatin (LIPITOR) 10 mg tablet TAKE 1 TABLET BY MOUTH ONCE DAILY    amLODIPine (NORVASC) 10 mg tablet TAKE 1 TABLET BY MOUTH ONCE DAILY    potassium chloride (K-DUR, KLOR-CON) 20 mEq tablet Take 1 Tab by mouth daily.  cycloSPORINE (RESTASIS) 0.05 % ophthalmic emulsion Administer 1 Drop to both eyes two (2) times a day.  colchicine (MITIGARE) 0.6 mg capsule Take 0.6 mg by mouth every other day.  cholecalciferol, vitamin D3, (VITAMIN D3) 2,000 unit tab Take 2,000 Units by mouth daily.  diclofenac (VOLTAREN) 1 % topical gel Apply 4 g to affected area four (4) times daily.  LUMIGAN 0.01 % ophthalmic drops Administer 1 Drop to both eyes nightly.  hydroxychloroquine (PLAQUENIL) 200 mg tablet Take 400 mg by mouth daily.  MULTIVITS/IRON FUM/FA/D3/LYCOP (MULTI FOR HIM PO) Take  by mouth daily. No current facility-administered medications for this visit. Allergies and Intolerances:    Allergies   Allergen Reactions    Latex, Natural Rubber Swelling    Adhesive Tape-Silicones Rash and Itching    Aldactone [Spironolactone] Not Reported This Time     Breast tenderness    Codeine Not Reported This Time     \"Drives crazy\"    Tape [Adhesive] Rash    Tetanus Toxoid, Adsorbed Anaphylaxis    Tetanus Vaccines And Toxoid Anaphylaxis     Other reaction(s): anaphylaxis/angioedema  Other reaction(s): anaphylaxis/angioedema     Family History: He has no family history of colon or prostate cancer. Family History   Problem Relation Age of Onset    Cancer Mother     Heart Disease Father     Alcohol abuse Father     Cancer Other      Social History:   He  reports that he has quit smoking. His smoking use included cigars, pipe, and cigarettes. He quit after 12.00 years of use. He has quit using smokeless tobacco.  His smokeless tobacco use included chew. He smoked 2 ppd for 50 years, stopping in 1974. He is  with two adult children. He previously worked on high voltage electric lines, and now works as a [employer] with significant occupational lead exposure. Social History     Substance and Sexual Activity   Alcohol Use Yes    Comment: rarely     Immunization History:  Immunization History   Administered Date(s) Administered    (RETIRED) Pneumococcal Vaccine (Unspecified Type) 01/01/2008    Influenza High Dose Vaccine PF 09/30/2017    Influenza Vaccine (Tri) Adjuvanted 09/25/2018, 09/25/2019    Influenza Vaccine Split 10/04/2011, 10/16/2012    Influenza Vaccine Whole 01/15/2010    Pneumococcal Conjugate (PCV-13) 01/19/2015    Pneumococcal Polysaccharide (PPSV-23) 01/01/2008    Varicella Virus Vaccine 10/01/2013    Zoster 10/16/2012       Review of Systems:   As above included in HPI. Otherwise 11 point review of systems negative including constitutional, skin, HENT, eyes, respiratory, cardiovascular, gastrointestinal, genitourinary, musculoskeletal, endocrine, hematologic, allergy, and neurologic. Physical:   Vitals:   BP: 128/68   HR: 88  WT: 210 lb (95.3 kg)  BMI:  33.02 kg/m2  O2: 94%    Exam:   Patient appears in no apparent distress. Affect is appropriate. HEENT: PERRLA, anicteric, oropharynx clear, no JVD, adenopathy or thyromegaly. No carotid bruits or radiated murmur. Lungs: clear to auscultation, no wheezes, rhonchi, or rales. Heart: regular rate and rhythm.  No murmur, rubs, gallops  Abdomen: soft, nontender, nondistended, normal bowel sounds, no hepatosplenomegaly or masses. Extremities: with trace edema. Pulses 1-2+ bilaterally.       Review of Data:  Labs:  Hospital Outpatient Visit on 09/24/2019   Component Date Value Ref Range Status    Prostate Specific Ag 09/24/2019 3.3  0.0 - 4.0 ng/mL Final   Office Visit on 09/23/2019   Component Date Value Ref Range Status    Color (UA POC) 09/23/2019 Yellow   Final    Clarity (UA POC) 09/23/2019 Clear   Final    Glucose (UA POC) 09/23/2019 Negative  Negative Final    Bilirubin (UA POC) 09/23/2019 Negative  Negative Final    Ketones (UA POC) 09/23/2019 Negative  Negative Final    Specific gravity (UA POC) 09/23/2019 1.010  1.001 - 1.035 Final    Blood (UA POC) 09/23/2019 Negative  Negative Final    pH (UA POC) 09/23/2019 6.0  4.6 - 8.0 Final    Protein (UA POC) 09/23/2019 Negative  Negative Final    Urobilinogen (UA POC) 09/23/2019 0.2 mg/dL  0.2 - 1 Final    Nitrites (UA POC) 09/23/2019 Negative  Negative Final    Leukocyte esterase (UA POC) 09/23/2019 Negative  Negative Final   Hospital Outpatient Visit on 08/26/2019   Component Date Value Ref Range Status    LA Volume 08/26/2019 50.32  18 - 58 mL Final    LV E' Lateral Velocity 08/26/2019 8.66  cm/s Final    LV E' Septal Velocity 08/26/2019 7.85  cm/s Final    Tapse 08/26/2019 2.26* 1.5 - 2.0 cm Final    Ao Root D 08/26/2019 2.97  cm Final    LVIDd 08/26/2019 4.73  4.2 - 5.9 cm Final    LVPWd 08/26/2019 1.04* 0.6 - 1.0 cm Final    LVIDs 08/26/2019 2.79  cm Final    IVSd 08/26/2019 0.72  0.6 - 1.0 cm Final    LVOT d 08/26/2019 1.89  cm Final    LVOT Peak Velocity 08/26/2019 111.19  cm/s Final    LVOT Peak Gradient 08/26/2019 4.9  mmHg Final    LVOT VTI 08/26/2019 19.72  cm Final    MV A Eliot 08/26/2019 121.74  cm/s Final    MV E Eliot 08/26/2019 74.10  cm/s Final    MV E/A 08/26/2019 0.61   Final    LA Vol 4C 08/26/2019 59.63* 18 - 58 mL Final    LA Vol 2C 2019 32.20  18 - 58 mL Final    LA Area 4C 2019 22.0  cm2 Final    LV Mass AL 2019 160.2  88 - 224 g Final    LV Mass AL Index 2019 77.9  49 - 115 g/m2 Final    E/E' lateral 2019 8.56   Final    E/E' septal 2019 9.44   Final    E/E' ratio (averaged) 2019 9.00   Final    Mitral Valve E Wave Deceleration T* 2019 233.5  ms Final    LA Vol Index 2019 24.46  16 - 28 ml/m2 Final    LA Vol Index 2019 15.65  16 - 28 ml/m2 Final    LA Vol Index 2019 28.99  16 - 28 ml/m2 Final     Health Maintenance:  Screening:    Colorectal: colonoscopy (2015) serrated adenomas. Dr. Martha Ochoa. Due 2020. Depression: on Paxil   DM (HbA1c/FPG): / HbA1c 5.8 (2019)   Hepatitis C: N/A   Falls: none   DEXA: within normal limits (2016)   PSA/MARTÍNEZ: PSA 2.1. As per Dr. Rianna Crockett   Glaucoma: regular eye exams with Dr. Cisse/ Dr. Nan Randall (last 2019)   Smokin pack year.  Distant past.   Vitamin D: 43.5 (2019)   Medicare Wellness: 2019    Impression:  Patient Active Problem List   Diagnosis Code    BPH with obstruction/lower urinary tract symptoms N40.1, N13.8    Hypertension I10    Primary osteoarthritis involving multiple joints M15.0    Hyperlipidemia E78.5    GERD (gastroesophageal reflux disease) K21.9    Pre-diabetes R73.03    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 (HCC) M06.041, M06.042    Vitamin D deficiency E55.9    Colon polyps K63.5    CPDD (calcium pyrophosphate deposition disease) M11.20    Abnormal glucose R73.09    Depression F32.9    Rectal bleeding K62.5    Class 1 obesity due to excess calories with serious comorbidity and body mass index (BMI) of 32.0 to 32.9 in adult E66.09, Z68.32    APC (atrial premature contractions) I49.1    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 Paroxysmal nocturnal dyspnea. Patient reported in 1/2019 awakening gasping for air several times per week. No overt evidence of heart failure and EKG was without change from baseline at that time. He reported that he would need to sit up immediately and take deep breathes until resolved. He also admitted to snoring and experiencing significant daytime drowsiness particularly when driving. He reported that when he was on long trips, he would need to pull off the road and take a nap before he could resume driving. Given high suspicion for sleep apnea, he was referred to Dr. Jose Manuel Beaver for evaluation, but he never scheduled. Presented with worsening symptoms over several weeks, possibly exacerbated by the inadvertent abrupt cessation of Paxil during this time. Severe episode on 8/9/2019 prompted ED evaluation. EKG without change, troponin negative and NT-pro BNP normal. Echocardiogram (8/26/2019) with normal LV size and function (EF 56-60%), no RWMA, normal valves. Evaluated by Dr. Jose Manuel Beaver and scheduled for home sleep study on 10/25/2019. Will continue to follow. 2. Depression. Prior reasonable control with Paxil and weaned from benzodiazepine use for anxiety and insomnia. On Zoloft 50 mg daily with improvement after inadvertently stopping Paxil. Describing some persistent anxiety particularly at night, and advised to increase Zoloft to 75 mg daily. Will reevaluate at next visit. 3. Right sided sciatica. Unresponsive to Medrol dose pack, physical therapy, steroid injections, and unwilling to take narcotics as not effective. Lumbar MRI with multilevel degenerative changes and facet arthropathy with R>L facet stenosis at L4-L5 which may be responsible for symptoms. Started on gabapentin 100 mg tid.  Had been following with Dr. Parker Joe, but given lack of improvement, referred to Dr. Antonio Pearl for evaluation. She recommended increasing gabapentin to 200 mg tid, and given his lack of response to conservative measures, she referred him to Dr. Emperatriz Colby for evaluation. Upcoming appointment on 10/11/2019.  4. Abnormal chest x-ray. Ill defined density in left mid zone noted on chest x-ray in ED on 8/9/2019. Underwent chest CT scan (8/31/2019) which showed several tiny bilateral pulmonary nodules which were stable when compared with prior chest CT scan from 2007. No further evaluation needed. 5. Hypertension. Blood pressure well controlled on current regimen of lisinopril 40 mg daily, amlodipine 10 mg daily, lasix 40 mg daily (potassium 20 meq daily) and hydralazine 25 mg bid. Renal function normal with creatinine 0.87/ eGFR >60. Normal echocardiogram (8/2019). Continue to follow. 6. Hyperlipidemia. On moderate intensity dose atorvastatin with LDL 58 and HDL 65, indicative of excellent control in this patient. Continue to follow. 7. Prediabetes. Has been controlled on diet alone. HbA1c remains controlled at 5.8. No evidence of microvascular complications. On Ace-I and statin. Continue follow-up with Dr. Maldonado Failing for annual eye exams. Emphasized importance of lifestyle modifications, including diet, exercise, and weight loss. To be reassessed at next visit. 8. Rheumatoid arthritis. On Plaquenil. Has regular eye exams with Dr. Maldonado Failing. Difficult to gauge response as appears to have evidence of osteoarthritis and CPPD occurring concurrently, but noted significant improvement since initiating colchicine. Voltaren gel for pain control. Tylenol while at work to help with pain control as needed. Followed by Dr. Tierra Najera. 9. Primary osteoarthritis. Evident in bilateral hands and knees, bilateral shoulders, and cervical spine. Neck pain now returned to baseline. Shoulder pain (R>L). X-ray with evidence of osteoarthritis and rotator cuff pathology.  Evaluated by Dr. Tu Duarte and received cortisone injection to right shoulder with some improvement. Surgery for reverse shoulder replacement recommended. Changed from ibuprofen 400 mg qid and Tylenol to Celebrex 200 mg bid with significant improvement. Also using Voltaren gel. 10. CPPD. Colchicine started on 1/19/2017 to help address chondrocalcinosis on x-rays. Reports significant improvement. Now taking every other day with good control. 11. Macrocytosis. Mild evidence of macrocytosis on recent blood work. Hb/Hct normalized today. Will check B12 and folate at next blood draw. 12. Rectal bleeding. Colonoscopy 8/2015. Evaluated by Dr. Tacos Ross and considered to be hemorrhoidal in source. Known internal and external hemorrhoids. Improved with Citrucel. No evidence of anemia. Follow. 13. BPH. Does have lower urinary tract symptoms, but refusing medications or surgical interventions. Followed by Dr. Tio Murguia. 14. GERD. Good control of symptoms with omeprazole. No issues today. 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 Ongoing secondary to work as a [employer]. Monitored annually, last 9/2018 without evidence of toxicity. 16. Obesity. Emphasized importance of lifestyle modifications, including diet, exercise, and weight loss. Discussed that would help control blood sugar. Will readdress at next visit. 17. Insomnia. Using melatonin agonist, ramelteon, to replace Xanax. Had good response and weaned from Xanax. 18. Health maintenance. Will give influenza vaccine today. Unable to receive Tdap due to allergy (anaphylaxis to Td). Will address Shingrix vaccine at next visit. Other immunizations up to date. Colonoscopy due 2020. Continue regular eye exams with Dr. Natanael Mccullough. Vitamin D level normal. Continue maintenance dose supplement. Medicare wellness visit up to date. Discussed recommendations regarding aspirin use and primary prevention, particularly for age >74, and patient wishing to discontinue. Patient understands recommendations and agrees with plan. Follow-up in 8 weeks.

## 2019-10-11 ENCOUNTER — OFFICE VISIT (OUTPATIENT)
Dept: ORTHOPEDIC SURGERY | Age: 79
End: 2019-10-11

## 2019-10-11 ENCOUNTER — TELEPHONE (OUTPATIENT)
Dept: INTERNAL MEDICINE CLINIC | Age: 79
End: 2019-10-11

## 2019-10-11 VITALS
SYSTOLIC BLOOD PRESSURE: 123 MMHG | BODY MASS INDEX: 33.2 KG/M2 | WEIGHT: 206.6 LBS | OXYGEN SATURATION: 98 % | DIASTOLIC BLOOD PRESSURE: 73 MMHG | TEMPERATURE: 98.2 F | HEIGHT: 66 IN | HEART RATE: 80 BPM | RESPIRATION RATE: 16 BRPM

## 2019-10-11 DIAGNOSIS — M71.38 SYNOVIAL CYST OF LUMBAR SPINE: Primary | ICD-10-CM

## 2019-10-11 DIAGNOSIS — M48.062 LUMBAR STENOSIS WITH NEUROGENIC CLAUDICATION: ICD-10-CM

## 2019-10-11 NOTE — TELEPHONE ENCOUNTER
Pt needs back surgery (laminectomy) ASAP with Dr Brendan Rogers and no appts available. Please advise Chani at Dr Shivam Casas office with appt date/time at [phone].

## 2019-10-11 NOTE — TELEPHONE ENCOUNTER
Please find out time frame for surgery. If needed, I could add him on Tuesday 10/15/2019 at 4:30 pm since I am off for the rest of next week.  Otherwise, 10/22/2019 at 4 pm.

## 2019-10-11 NOTE — PROGRESS NOTES
[address] [facility] [facility]   Pre-Surgical Worksheet    Patient: Yuli López                         MRN: [id number]     Age:  70 y.o.,      Sex: male    YOB: 1940           SIMÓN: October 11, 2019  PCP: Charisse Call MD    Allergies   Allergen Reactions    Latex, Natural Rubber Swelling    Adhesive Tape-Silicones Rash and Itching    Aldactone [Spironolactone] Not Reported This Time     Breast tenderness    Codeine Not Reported This Time     \"Drives crazy\"    Tape [Adhesive] Rash    Tetanus Toxoid, Adsorbed Anaphylaxis    Tetanus Vaccines And Toxoid Anaphylaxis     Other reaction(s): anaphylaxis/angioedema  Other reaction(s): anaphylaxis/angioedema         ICD-10-CM ICD-9-CM    1. Synovial cyst of lumbar spine M71.38 727.40    2. Lumbar stenosis with neurogenic claudication M48.062 724.03 AMB POC XRAY, SPINE, LUMBOSACRAL; 4+       Surgery: L4/L5 Right Lami. Pain Assessment   Pain Assessment  10/11/2019   Location of Pain Back;Leg   Pain Location Comment -   Location Modifiers Right   Severity of Pain 9   Quality of Pain Throbbing; Jerelyn Spotted; Aching;Burning; Other (Comment)   Quality of Pain Comment numbnes, tingling, weakness, stabbing   Duration of Pain Persistent   Frequency of Pain Constant   Aggravating Factors Bending;Standing;Walking;Stretching;Stairs;Straightening;Exercise;Kneeling;Squatting   Aggravating Factors Comment -   Limiting Behavior Some   Relieving Factors Other (Comment)   Relieving Factors Comment Gabapenitn   Result of Injury No       Visit Vitals  /73 (BP 1 Location: Right arm, BP Patient Position: Sitting)   Pulse 80   Temp 98.2 °F (36.8 °C) (Oral)   Resp 16   Ht 5' 5.75\" (1.67 m)   Wt 206 lb 9.6 oz (93.7 kg)   SpO2 98%   BMI 33.60 kg/m²       ADL Limits: Patient stated that his pain is constant. Limiting his household capabilities. Spine Surgery?: No:  When ? [facility] Where? .    Spinal Injections?: Yes  When ? [facility] Where? .    Physical Therapy?: Yes  When over the years. Where? .    NSAID's?: Yes    Pain Medications?: No:   Type: ? [facility]: NO, Where: ?    Current Outpatient Medications   Medication Sig    amLODIPine (NORVASC) 10 mg tablet TAKE 1 TABLET BY MOUTH ONCE DAILY    hydrALAZINE (APRESOLINE) 25 mg tablet Take 1 Tab by mouth two (2) times a day.  celecoxib (CELEBREX) 200 mg capsule Take 1 Cap by mouth two (2) times a day.  gabapentin (NEURONTIN) 100 mg capsule Take 2 Caps by mouth three (3) times daily. Max Daily Amount: 600 mg.    omeprazole (PRILOSEC) 20 mg capsule TAKE 1 CAPSULE BY MOUTH ONCE DAILY    sertraline (ZOLOFT) 50 mg tablet Take 1 Tab by mouth daily.  tamsulosin (FLOMAX) 0.4 mg capsule Take 1 Cap by mouth daily. Indications: enlarged prostate with urination problem    furosemide (LASIX) 40 mg tablet Take 1 Tab by mouth daily.  lisinopril (PRINIVIL, ZESTRIL) 40 mg tablet Take 1 Tab by mouth daily.  atorvastatin (LIPITOR) 10 mg tablet TAKE 1 TABLET BY MOUTH ONCE DAILY    potassium chloride (K-DUR, KLOR-CON) 20 mEq tablet Take 1 Tab by mouth daily.  cycloSPORINE (RESTASIS) 0.05 % ophthalmic emulsion Administer 1 Drop to both eyes two (2) times a day.  colchicine (MITIGARE) 0.6 mg capsule Take 0.6 mg by mouth every other day.  cholecalciferol, vitamin D3, (VITAMIN D3) 2,000 unit tab Take 2,000 Units by mouth daily.  LUMIGAN 0.01 % ophthalmic drops Administer 1 Drop to both eyes nightly.  hydroxychloroquine (PLAQUENIL) 200 mg tablet Take 400 mg by mouth daily.  MULTIVITS/IRON FUM/FA/D3/LYCOP (MULTI FOR HIM PO) Take  by mouth daily.  diclofenac (VOLTAREN) 1 % topical gel Apply 4 g to affected area four (4) times daily. No current facility-administered medications for this visit. 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 Dr. Fox Villa.     CPDD (calcium pyrophosphate deposition disease)  Depression     GERD (gastroesophageal reflux disease)     Hyperlipidemia     Hypertension     Prediabetes     Primary osteoarthritis involving multiple joints 9/6/2011    Rheumatoid arthritis ([facility] [address] 2013    negative RF; elevated anti-CCP. Dr. Gracie Bhat. Past Surgical History:   Procedure Laterality Date    HX AMPUTATION FINGER Left     HX CARPAL TUNNEL RELEASE      HX CATARACT REMOVAL Left 01/2018    HX CATARACT REMOVAL Bilateral 2018    HX HEENT      sinus, tonsillectomy    HX HERNIA REPAIR      HX MOHS PROCEDURES      bilateral     HX ORTHOPAEDIC      lef knee surgery, removed cartilage.        Social History     Socioeconomic History    Marital status:      Spouse name: Not on file    Number of children: Not on file    Years of education: Not on file    Highest education level: Not on file   Tobacco Use    Smoking status: Former Smoker     Years: 12.00     Types: Cigars, Pipe, Cigarettes    Smokeless tobacco: Former User     Types: Chew   Substance and Sexual Activity    Alcohol use: Yes     Comment: rarely    Drug use: No    Sexual activity: Not Currently

## 2019-10-11 NOTE — PROGRESS NOTES
[address]  [address], [address]  [address]  Phone: [phone]  Fax: [phone]  INITIAL CONSULTATION  Patient: Jodie Guallpa                MRN: [id number]       SSN: [id number]  YOB: 1940        AGE: 70 y.o. SEX: male  Body mass index is 33.6 kg/m². PCP: Rell Bryan MD  10/11/19    Chief Complaint   Patient presents with    Back Pain     SC per Dr. Telly Nicolas Leg Pain         HISTORY OF PRESENT ILLNESS, RADIOGRAPHS, and PLAN:         HISTORY OF PRESENT ILLNESS:  Mr. Deneen Rivera is seen today at the request of Dr. Cally Palumbo, Dr. Drake Naylor, and Dr. Kathy Ervin. Mr. Deneen Rivera is a pleasant, 68-year-old male who still works as a gunsmith. He has been having a progressive pain syndrome in his right leg, a radiculopathy down to his foot with activity and motion. it is a burning, searing pain. It is worse with activity. It is better when just rests after a while. It resolved for a day or two with selective injection. Gabapentin has improved it. Physical therapy was of no help. He denies bowel or bladder dysfunction, fever, chills, or night sweats, weight loss or weight gain. PHYSICAL EXAMINATION:  His physical exam demonstrates normal strength, sensation, and reflexes. RADIOGRAPHS:  MRI demonstrates L4-5 synovitis with synovial cyst formation and synovitis bilaterally and lateral recess foraminal stenosis. ASSESSMENT/PLAN: I discussed the matter at length with him. I believe he is having radiculopathy from spinal stenosis at L4-5. He has no gross instability looking at his pictures and has minimal back pain. it is primarily buttock and leg pain. Given his age and his isolated radiculopathy, my recommendation would be a right-sided hemilaminotomy, medial facetectomy to decompress his neural elements there.   I explained to him that he does have significant arthritis contralaterally, but I would leave it alone because he is asymptomatic. I want to try to avoid having to consider him a candidate for a fusion. He is otherwise functional.  He is eager to try to get back in time for hunting season. He is literally writhing around in the chair as I am talking to him. We discussed the risks, benefits, complications, and alternatives at length and in detail. He wishes to proceed. We will proceed with a lumbar decompression once the appropriate approvals and clearances take place. cc: Micheline Funk M.D. 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 Dr. Nestor Jensen.  CPDD (calcium pyrophosphate deposition disease)     Depression     GERD (gastroesophageal reflux disease)     Hyperlipidemia     Hypertension     Prediabetes     Primary osteoarthritis involving multiple joints 9/6/2011    Rheumatoid arthritis ([facility] [address] 2013    negative RF; elevated anti-CCP. Dr. Nicole Daniels. Family History   Problem Relation Age of Onset    Cancer Mother     Heart Disease Father     Alcohol abuse Father     Cancer Other        Current Outpatient Medications   Medication Sig Dispense Refill    amLODIPine (NORVASC) 10 mg tablet TAKE 1 TABLET BY MOUTH ONCE DAILY 90 Tab 3    hydrALAZINE (APRESOLINE) 25 mg tablet Take 1 Tab by mouth two (2) times a day. 1 Tab 0    celecoxib (CELEBREX) 200 mg capsule Take 1 Cap by mouth two (2) times a day. 180 Cap 1    gabapentin (NEURONTIN) 100 mg capsule Take 2 Caps by mouth three (3) times daily. Max Daily Amount: 600 mg. 180 Cap 1    omeprazole (PRILOSEC) 20 mg capsule TAKE 1 CAPSULE BY MOUTH ONCE DAILY 90 Cap 1    sertraline (ZOLOFT) 50 mg tablet Take 1 Tab by mouth daily. 90 Tab 2    tamsulosin (FLOMAX) 0.4 mg capsule Take 1 Cap by mouth daily. Indications: enlarged prostate with urination problem 90 Cap 3    furosemide (LASIX) 40 mg tablet Take 1 Tab by mouth daily.  90 Tab 3  lisinopril (PRINIVIL, ZESTRIL) 40 mg tablet Take 1 Tab by mouth daily. 90 Tab 3    atorvastatin (LIPITOR) 10 mg tablet TAKE 1 TABLET BY MOUTH ONCE DAILY 90 Tab 3    potassium chloride (K-DUR, KLOR-CON) 20 mEq tablet Take 1 Tab by mouth daily. 90 Tab 2    cycloSPORINE (RESTASIS) 0.05 % ophthalmic emulsion Administer 1 Drop to both eyes two (2) times a day.  colchicine (MITIGARE) 0.6 mg capsule Take 0.6 mg by mouth every other day.  cholecalciferol, vitamin D3, (VITAMIN D3) 2,000 unit tab Take 2,000 Units by mouth daily.  LUMIGAN 0.01 % ophthalmic drops Administer 1 Drop to both eyes nightly.  hydroxychloroquine (PLAQUENIL) 200 mg tablet Take 400 mg by mouth daily.  MULTIVITS/IRON FUM/FA/D3/LYCOP (MULTI FOR HIM PO) Take  by mouth daily.  diclofenac (VOLTAREN) 1 % topical gel Apply 4 g to affected area four (4) times daily. Allergies   Allergen Reactions    Latex, Natural Rubber Swelling    Adhesive Tape-Silicones Rash and Itching    Aldactone [Spironolactone] Not Reported This Time     Breast tenderness    Codeine Not Reported This Time     \"Drives crazy\"    Tape [Adhesive] Rash    Tetanus Toxoid, Adsorbed Anaphylaxis    Tetanus Vaccines And Toxoid Anaphylaxis     Other reaction(s): anaphylaxis/angioedema  Other reaction(s): anaphylaxis/angioedema       Past Surgical History:   Procedure Laterality Date    HX AMPUTATION FINGER Left     HX CARPAL TUNNEL RELEASE      HX CATARACT REMOVAL Left 01/2018    HX CATARACT REMOVAL Bilateral 2018    HX HEENT      sinus, tonsillectomy    HX HERNIA REPAIR      HX MOHS PROCEDURES      bilateral     HX ORTHOPAEDIC      lef knee surgery, removed cartilage. 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 Dr. Randa Moncada.     CPDD (calcium pyrophosphate deposition disease)     Depression     GERD (gastroesophageal reflux disease)  Hyperlipidemia     Hypertension     Prediabetes     Primary osteoarthritis involving multiple joints 9/6/2011    Rheumatoid arthritis ([facility] [address] 2013    negative RF; elevated anti-CCP. Dr. Jan Alcaraz. Social History     Socioeconomic History    Marital status:      Spouse name: Not on file    Number of children: Not on file    Years of education: Not on file    Highest education level: Not on file   Occupational History    Not on file   Social Needs    Financial resource strain: Not on file    Food insecurity:     Worry: Not on file     Inability: Not on file    Transportation needs:     Medical: Not on file     Non-medical: Not on file   Tobacco Use    Smoking status: Former Smoker     Years: 12.00     Types: Cigars, Pipe, Cigarettes    Smokeless tobacco: Former User     Types: Chew   Substance and Sexual Activity    Alcohol use: Yes     Comment: rarely    Drug use: No    Sexual activity: Not Currently   Lifestyle    Physical activity:     Days per week: Not on file     Minutes per session: Not on file    Stress: Not on file   Relationships    Social connections:     Talks on phone: Not on file     Gets together: Not on file     Attends [employer] service: Not on file     Active member of club or organization: Not on file     Attends meetings of clubs or organizations: Not on file     Relationship status: Not on file    Intimate partner violence:     Fear of current or ex partner: Not on file     Emotionally abused: Not on file     Physically abused: Not on file     Forced sexual activity: Not on file   Other Topics Concern    Not on file   Social History Narrative    Not on file           REVIEW OF SYSTEMS:   CONSTITUTIONAL SYMPTOMS:  Negative. EYES:  Negative. EARS, NOSE, THROAT AND MOUTH:  Negative. CARDIOVASCULAR:  Negative. RESPIRATORY:  Negative. GENITOURINARY: Per HPI. GASTROINTESTINAL:  Per HPI. INTEGUMENTARY (SKIN AND/OR BREAST):  Negative.    MUSCULOSKELETAL: Per HPI.   ENDOCRINE/RHEUMATOLOGIC:  Negative. NEUROLOGICAL:  Per HPI. HEMATOLOGIC/LYMPHATIC:  Negative. ALLERGIC/IMMUNOLOGIC:  Negative. PSYCHIATRIC:  Negative. PHYSICAL EXAMINATION:   Visit Vitals  /73 (BP 1 Location: Right arm, BP Patient Position: Sitting)   Pulse 80   Temp 98.2 °F (36.8 °C) (Oral)   Resp 16   Ht 5' 5.75\" (1.67 m)   Wt 206 lb 9.6 oz (93.7 kg)   SpO2 98%   BMI 33.60 kg/m²    PAIN SCALE: 9/10    CONSTITUTIONAL: The patient is in no apparent distress and is alert and oriented x 3. HEENT: Normocephalic. Hearing grossly intact. NECK: Supple and symmetric. no tenderness, or masses were felt. RESPIRATORY: No labored breathing. CARDIOVASCULAR: The carotid pulses were normal. Peripheral pulses were 2+. CHEST: Normal AP diameter and normal contour without any kyphoscoliosis. LYMPHATIC: No lymphadenopathy was appreciated in the neck, axillae or groin. SKIN:  Negative for scars, rashes, lesions, or ulcers on the right upper, right lower, left upper, left lower and trunk. NEUROLOGICAL: Alert and oriented x 3. Ambulation without assistive device. FWB. EXTREMITIES:  See musculoskeletal.  MUSCULOSKELETAL:   Head and Neck:  Negative for misalignment, asymmetry, crepitation, defects, tenderness masses or effusions.  Left Upper Extremity: Inspection, percussion and palpation performed. Mcwilliamss sign is negative.  Right Upper Extremity: Inspection, percussion and palpation performed. Mcwilliamss sign is negative.  Spine, Ribs and Pelvis: Low back pain with R buttock pain. Inspection, percussion and palpation performed. Negative for misalignment, asymmetry, crepitation, defects, tenderness masses or effusions.  Left Lower Extremity: Inspection, percussion and palpation performed. Negative straight leg raise.  Right Lower Extremity: Radicular pain. Occasional numbness. Inspection, percussion and palpation performed. Negative straight leg raise.       SPINE EXAM: Cervical spine: Neck is midline. Normal muscle tone. No focal atrophy is noted. Lumbar spine: No rash, ecchymosis, or gross obliquity. No focal atrophy is noted. ASSESSMENT    ICD-10-CM ICD-9-CM    1. Synovial cyst of lumbar spine M71.38 727.40    2. Lumbar stenosis with neurogenic claudication M48.062 724.03        Written by Pankaj Belle, as dictated by Artur Anthony MD.    I, Dr. Artur Anthony MD, confirm that all documentation is accurate.

## 2019-10-15 ENCOUNTER — OFFICE VISIT (OUTPATIENT)
Dept: INTERNAL MEDICINE CLINIC | Age: 79
End: 2019-10-15

## 2019-10-15 VITALS
BODY MASS INDEX: 33.59 KG/M2 | HEIGHT: 66 IN | OXYGEN SATURATION: 98 % | WEIGHT: 209 LBS | HEART RATE: 89 BPM | RESPIRATION RATE: 16 BRPM | DIASTOLIC BLOOD PRESSURE: 62 MMHG | TEMPERATURE: 97.9 F | SYSTOLIC BLOOD PRESSURE: 104 MMHG

## 2019-10-15 DIAGNOSIS — M06.042 RHEUMATOID ARTHRITIS INVOLVING BOTH HANDS WITH NEGATIVE RHEUMATOID FACTOR (HCC): ICD-10-CM

## 2019-10-15 DIAGNOSIS — I10 ESSENTIAL HYPERTENSION: ICD-10-CM

## 2019-10-15 DIAGNOSIS — Z01.818 PREOPERATIVE EVALUATION TO RULE OUT SURGICAL CONTRAINDICATION: Primary | ICD-10-CM

## 2019-10-15 DIAGNOSIS — E66.09 CLASS 1 OBESITY DUE TO EXCESS CALORIES WITH SERIOUS COMORBIDITY AND BODY MASS INDEX (BMI) OF 33.0 TO 33.9 IN ADULT: ICD-10-CM

## 2019-10-15 DIAGNOSIS — F33.0 MILD EPISODE OF RECURRENT MAJOR DEPRESSIVE DISORDER (HCC): ICD-10-CM

## 2019-10-15 DIAGNOSIS — I49.1 APC (ATRIAL PREMATURE CONTRACTIONS): ICD-10-CM

## 2019-10-15 DIAGNOSIS — G47.30 SLEEP APNEA, UNSPECIFIED TYPE: ICD-10-CM

## 2019-10-15 DIAGNOSIS — M47.816 LUMBAR FACET ARTHROPATHY: ICD-10-CM

## 2019-10-15 DIAGNOSIS — E78.5 HYPERLIPIDEMIA, UNSPECIFIED HYPERLIPIDEMIA TYPE: ICD-10-CM

## 2019-10-15 DIAGNOSIS — M54.31 RIGHT SIDED SCIATICA: ICD-10-CM

## 2019-10-15 DIAGNOSIS — M06.041 RHEUMATOID ARTHRITIS INVOLVING BOTH HANDS WITH NEGATIVE RHEUMATOID FACTOR (HCC): ICD-10-CM

## 2019-10-15 DIAGNOSIS — M11.20 CPDD (CALCIUM PYROPHOSPHATE DEPOSITION DISEASE): ICD-10-CM

## 2019-10-15 DIAGNOSIS — Z51.89 ENCOUNTER FOR OTHER SPECIFIED AFTERCARE: ICD-10-CM

## 2019-10-15 DIAGNOSIS — R73.03 PRE-DIABETES: ICD-10-CM

## 2019-10-15 DIAGNOSIS — M15.9 PRIMARY OSTEOARTHRITIS INVOLVING MULTIPLE JOINTS: ICD-10-CM

## 2019-10-15 NOTE — PROGRESS NOTES
Ganesh Palomino presents today for   Chief Complaint   Patient presents with    Pre-op Exam     med clearance Dr. Ander Hurtado              Depression Screening:  3 most recent [facility] 36 Screens 9/4/2019   Little interest or pleasure in doing things Not at all   Feeling down, depressed, irritable, or hopeless Not at all   Total Score PHQ 2 0   Trouble falling or staying asleep, or sleeping too much -   Feeling tired or having little energy -   Poor appetite, weight loss, or overeating -   Feeling bad about yourself - or that you are a failure or have let yourself or your family down -   Trouble concentrating on things such as school, work, reading, or watching TV -   Moving or speaking so slowly that other people could have noticed; or the opposite being so fidgety that others notice -   Thoughts of being better off dead, or hurting yourself in some way -   PHQ 9 Score -   How difficult have these problems made it for you to do your work, take care of your home and get along with others -       Learning Assessment:  Learning Assessment 9/4/2019   PRIMARY LEARNER Patient   HIGHEST LEVEL OF EDUCATION - PRIMARY LEARNER  -   BARRIERS PRIMARY LEARNER -   CO-LEARNER CAREGIVER -   PRIMARY LANGUAGE ENGLISH   LEARNER PREFERENCE PRIMARY DEMONSTRATION     -   ANSWERED BY Patient   RELATIONSHIP SELF       Abuse Screening:  Abuse Screening Questionnaire 8/27/2019   Do you ever feel afraid of your partner? N   Are you in a relationship with someone who physically or mentally threatens you? N   Is it safe for you to go home? Y       Fall Risk  Fall Risk Assessment, last 12 mths 9/4/2019   Able to walk? Yes   Fall in past 12 months? No           Coordination of Care:  1. Have you been to the ER, urgent care clinic since your last visit? Hospitalized since your last visit? No    2. Have you seen or consulted any other health care providers outside of the [address] [place] [address] since your last visit? Include any pap smears or colon screening.  no

## 2019-10-16 ENCOUNTER — HOSPITAL ENCOUNTER (OUTPATIENT)
Dept: LAB | Age: 79
Discharge: HOME | End: 2019-10-16
Payer: MEDICARE

## 2019-10-16 DIAGNOSIS — Z51.89 ENCOUNTER FOR OTHER SPECIFIED AFTERCARE: ICD-10-CM

## 2019-10-16 DIAGNOSIS — Z01.818 PREOPERATIVE EVALUATION TO RULE OUT SURGICAL CONTRAINDICATION: ICD-10-CM

## 2019-10-16 LAB
ALBUMIN SERPL-MCNC: 4 G/DL (ref 3.4–5)
ALBUMIN/GLOB SERPL: 1.6 {RATIO} (ref 0.8–1.7)
ALP SERPL-CCNC: 101 U/L (ref 45–117)
ALT SERPL-CCNC: 30 U/L (ref 16–61)
ANION GAP SERPL CALC-SCNC: 3 MMOL/L (ref 3–18)
APPEARANCE UR: CLEAR
APTT PPP: 27.6 SEC (ref 23–36.4)
AST SERPL-CCNC: 19 U/L (ref 10–38)
BACTERIA URNS QL MICRO: ABNORMAL /HPF
BASOPHILS # BLD: 0 K/UL (ref 0–0.1)
BASOPHILS NFR BLD: 1 % (ref 0–2)
BILIRUB SERPL-MCNC: 0.8 MG/DL (ref 0.2–1)
BILIRUB UR QL: NEGATIVE
BUN SERPL-MCNC: 14 MG/DL (ref 7–18)
BUN/CREAT SERPL: 17 (ref 12–20)
CALCIUM SERPL-MCNC: 9.3 MG/DL (ref 8.5–10.1)
CHLORIDE SERPL-SCNC: 110 MMOL/L (ref 100–111)
CO2 SERPL-SCNC: 31 MMOL/L (ref 21–32)
COLOR UR: YELLOW
CREAT SERPL-MCNC: 0.81 MG/DL (ref 0.6–1.3)
DIFFERENTIAL METHOD BLD: ABNORMAL
EOSINOPHIL # BLD: 0.5 K/UL (ref 0–0.4)
EOSINOPHIL NFR BLD: 8 % (ref 0–5)
EPITH CASTS URNS QL MICRO: NEGATIVE /LPF (ref 0–5)
ERYTHROCYTE [DISTWIDTH] IN BLOOD BY AUTOMATED COUNT: 13.3 % (ref 11.6–14.5)
GLOBULIN SER CALC-MCNC: 2.5 G/DL (ref 2–4)
GLUCOSE SERPL-MCNC: 110 MG/DL (ref 74–99)
GLUCOSE UR STRIP.AUTO-MCNC: NEGATIVE MG/DL
HCT VFR BLD AUTO: 41.5 % (ref 36–48)
HGB BLD-MCNC: 14.1 G/DL (ref 13–16)
HGB UR QL STRIP: NEGATIVE
INR PPP: 1 (ref 0.8–1.2)
KETONES UR QL STRIP.AUTO: NEGATIVE MG/DL
LEUKOCYTE ESTERASE UR QL STRIP.AUTO: NEGATIVE
LYMPHOCYTES # BLD: 1.5 K/UL (ref 0.9–3.6)
LYMPHOCYTES NFR BLD: 27 % (ref 21–52)
MCH RBC QN AUTO: 33.3 PG (ref 24–34)
MCHC RBC AUTO-ENTMCNC: 34 G/DL (ref 31–37)
MCV RBC AUTO: 97.9 FL (ref 74–97)
MONOCYTES # BLD: 0.8 K/UL (ref 0.05–1.2)
MONOCYTES NFR BLD: 14 % (ref 3–10)
NEUTS SEG # BLD: 2.8 K/UL (ref 1.8–8)
NEUTS SEG NFR BLD: 50 % (ref 40–73)
NITRITE UR QL STRIP.AUTO: NEGATIVE
PH UR STRIP: 6.5 [PH] (ref 5–8)
PLATELET # BLD AUTO: 178 K/UL (ref 135–420)
PMV BLD AUTO: 10.9 FL (ref 9.2–11.8)
POTASSIUM SERPL-SCNC: 4.2 MMOL/L (ref 3.5–5.5)
PROT SERPL-MCNC: 6.5 G/DL (ref 6.4–8.2)
PROT UR STRIP-MCNC: NEGATIVE MG/DL
PROTHROMBIN TIME: 13.2 SEC (ref 11.5–15.2)
RBC # BLD AUTO: 4.24 M/UL (ref 4.7–5.5)
RBC #/AREA URNS HPF: NEGATIVE /HPF (ref 0–5)
SODIUM SERPL-SCNC: 144 MMOL/L (ref 136–145)
SP GR UR REFRACTOMETRY: 1.01 (ref 1–1.03)
UROBILINOGEN UR QL STRIP.AUTO: 1 EU/DL (ref 0.2–1)
WBC # BLD AUTO: 5.6 K/UL (ref 4.6–13.2)
WBC URNS QL MICRO: ABNORMAL /HPF (ref 0–4)

## 2019-10-16 PROCEDURE — 85610 PROTHROMBIN TIME: CPT

## 2019-10-16 PROCEDURE — 85730 THROMBOPLASTIN TIME PARTIAL: CPT

## 2019-10-16 PROCEDURE — 36415 COLL VENOUS BLD VENIPUNCTURE: CPT

## 2019-10-16 PROCEDURE — 81001 URINALYSIS AUTO W/SCOPE: CPT

## 2019-10-16 PROCEDURE — 80053 COMPREHEN METABOLIC PANEL: CPT

## 2019-10-16 PROCEDURE — 85025 COMPLETE CBC W/AUTO DIFF WBC: CPT

## 2019-10-17 ENCOUNTER — TELEPHONE (OUTPATIENT)
Dept: INTERNAL MEDICINE CLINIC | Age: 79
End: 2019-10-17

## 2019-10-17 RX ORDER — SERTRALINE HYDROCHLORIDE 50 MG/1
75 TABLET, FILM COATED ORAL DAILY
Qty: 1 TAB | Refills: 0
Start: 2019-10-17 | End: 2020-03-01

## 2019-10-17 NOTE — PROGRESS NOTES
HPI:   Leah Lazcano is a 70y.o. year old male who presents today for preoperative evaluation. He has a history of hypertension, hyperlipidemia, prediabetes, rheumatoid arthritis, osteoarthritis, calcium pyrophosphate deposition disease, BPH, GERD, and depression. 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 On 8/13/2019, he reported that he had been seeing Dr. Conrado Nguyen for increasing difficulty with right sided sciatica. He reported being treated with a Medrol dose pack, physical therapy, and spinal injections without improvement, and was also prescribed Norco and Tramadol, but he stated that he found them too sedating and of no benefit. Due to persistent symptoms, he underwent a lumbar MRI (8/5/2019) which showed degenerative changes most notable at L4-L5 resulting in moderate spinal canal stenosis as well as moderate to severe right greater than left foraminal stenoses; advanced facet arthropathy with small facet effusions and suspected small right facet joint ventral synovial cyst; notable degenerative changes also L3-L4; L1 mild anterior compression deformity, chronic appearing; overall general worsening when compared to prior study in 2007. He was having continued significant pain, and was started on gabapentin 100 mg tid. He was referred to Dr. Mejia Lombardi and she increased his dose of gabapentin to 200 mg tid. She also referred him to Dr. Mukund Pineda for evaluation given his lack of response to conservative management. He recommended proceeding with decompression by performing a L4/5 right-sided hemilaminotomy and medial facetectomy. He states that he continues significant right leg pain, with any ambulation or standing. He is noting some improvement with gabapentin. He also reports that he has moved up his home sleep study and is scheduled to perform it tonight. He states that he continues to remain active, although limited by his right leg pain.  He denies any chest pain, shortness of breath at rest or with exertion, palpitations, lightheadedness, orthopnea, or edema. He states that he is no longer having episodes of PND, and feels that they were related to anxiety. He states that they have improved since increasing Zoloft. He is otherwise without new complaints. On 8/9/2019, he had an episode of waking up gasping for air forcing him to get out of bed and walk around until his breathing normalized. He has been having frequent similar episodes over that last year, but had been resistent to evaluation for sleep apnea. However, he reports that this episode was especially severe. When his symptoms persisted, he was evaluated at [facility], and testing included WBC 5.7, Hb 14.2/ Hct 41.6, creatinine 0.9/ eGFR>60, troponin I x 1 negative, NT-pro BNP 45, EKG sinus rhythm at 83 bpm and no ischemic changes, and chest x-ray without acute changes, but an ill defined 15 mm density in the lateral left mid zone was noted. He received lasix 40 mg IV and was discharged. He was referred to Dr. Heath Kennedy for probable sleep apnea, and is scheduled for a home sleep study on 10/25/2019. He had an echocardiogram (8/26/2019) showing normal LV function (EF 56-60%), no RWMA, unable to estimate RVSP due to inadequate TR, but TV/PV appear normal.     On 6/20/2018, he suffered an accidental gun shot wound while working resulting in traumatic injury to his left index finger with near loss of the middle phalanx and fracture of the distal phalanx. He was taken to [facility] and initially had irrigation and closure of the lacerations performed. He presented to the [facility] ED on 6/24/2018 with increased pain and swelling, and was started on doxycycline for a wound infection. On 7/7/2018, due to loss of stability and angulation of the index finger, he underwent stabilization with fusion of the proximal and distal phalanx with bone grafting by Dr. Brooklyn Hammond. He did well and was started on physical therapy.  On 8/1/2018, he presented to [facility] for evaluation of increasing erythema, swelling and tenderness with concern for a possible infection. He underwent left hand x-ray (8/1/2018) showing severe soft tissue swelling of the left second finger worrisome for cellulitis, traumatic amputation of the middle phalanx with marked osteopenia and irregularity of the base of the distal phalanx and flattening and irregularity of the head of the proximal phalanx. Unclear if related to prior trauma/surgery or osteomyelitis with osseous destruction and no films available for comparison. He was started empirically on Bactrim and Augmentin for 14 days. On 8/10/2018, he presented for evaluation by [facility] Gutierrez for complaints of fever, malaise, headache, fatigue, and diarrhea. Lab evaluation showed WBC 17 (90% neutrophils), creatinine 1.34/ eGFR 52, blood culture negative. Stool cultures (8/13/2018) routine, O/P, and C.diff negative. Bactrim and Augmentin were discontinued on 8/13/2018, and he was started on metronidazole for 10 days for treatment of presumed C.diff with improvement. He was evaluated by Dr. Gale Khan on 8/15/2018 and repeat WBC 8.6 (59% neutrophils), ESR 6, and CRP 0.9. He was seen by Dr. Gale Khan in follow-up on 8/30/2018 and left index finger wound noted to be significantly improved and no further difficulty with diarrhea. He has a history of hypertension, treated with amlodipine, lisinopril, lasix (+ potassium), and hydralazine was added in 4/2018. He states that his wife has been monitoring his blood pressure intermittently. He denies any chest pain, shortness of breath at rest or with exertion, lightheadedness, or palpitations. He does report bilateral lower extremity swelling that it will increase throughout the day and improve overnight. . In 6/2016, he underwent lower extremity arterial and venous duplex scans, both of which were negative. He also has a history of hyperlipidemia, treated with moderate intensity atorvastatin.     He has a history of prediabetes, with HbA1c ranging from 5.9-6.2 since 2012. He denies any polyuria, polydipsia, nocturia, or blurry vision, and has no history of retinopathy, neuropathy, or nephropathy. He has regular eye exams with Dr. Gregg Haddad. He has a history of bilateral hand pain with morning stiffness for several years, and in 3/2014, he was noted to have a positive anti-CCP antibody level although NIMCO, rheumatoid factor, and ESR were negative. He was referred for evaluation to Dr. Deepa Mir, and was diagnosed with rheumatoid arthritis in 6/2014. He was treated with hydroxychloroquine, which has been partially helpful in controlling his symptoms. Bilateral hand x-rays also showed evidence of primary osteoarthritis with osteophytes present on the second and third MCP joints. In 1/2017, he was also noted to have evidence of possible chondrocalcinosis on x-ray, and was started on low dose colchicine in addition to hydroxychloroquine for concomitant calcium pyrophosphate deposition disease. He states that since starting on colchicine, he has had significant improvement in his hand pain. He also uses compounding cream and Voltaren gel for pain control. In 1/2019, he was complaining of worsening neck and bilateral shoulder pain (R>L). He stated that he was previously followed by Dr. Amira Llanos, and would occasionally receive cortisone injections into his shoulders. He also described neck pain with difficulty turning his head. He denied any upper extremity weakness or paresthesias. He underwent imaging, and bilateral shoulder x-rays (1/10/2019) showed degenerative changes and secondary findings of rotator cuff pathology. Cervical spine x-rays (1/10/2019) showed advanced degenerative changes with multilevel facet arthropathy. He was evaluated by Dr. Kobe Sherwood who gave him a cortisone injection in his right shoulder with some improvement. He also recommended a reverse shoulder replacement, but he remains hesitant to proceed.  He was started on Celebrex 200 mg bid in 2/2019, and reports significant improvement. He continues to also use Tylenol as needed for pain. He states that his neck pain seems to have improved to his baseline level. He has a history of symptomatic BPH, with complaints of decreased stream, hesitancy, and dribbling. He has refused treatment with medication. He is followed by Dr. Jamie Vasquez. He denies any dysuria, gross hematuria, or flank pain. He has a history of GERD, treated with daily omeprazole with good control of symptoms. He had a screening colonoscopy and 8/2015 by Dr. Marnie Posey, showing a 3 mm sessile cecal polyp (pathology: intra-mucosal lymphoid aggregate), two 4 mm sessile polyps in the transverse colon (pathology: serrated adenomas), and a 1 cm lipoma in the transverse colon. Follow-up recommended for five years. He was having difficulty with rectal bleeding in 1/2018 and returned for evaluation with Dr. Marnie Posey who felt it was most likely secondary to a hemorrhoidal source. She recommended use of Citrucel. He states that the bleeding has improved with initiation of this therapy. He denies any abdominal pain, nausea, vomiting, melena, or change in bowel movements. He has a history of depression and anxiety, which had been well controlled with Paxil although inadvertently stopped. Now on Zoloft with some increasing symptoms as discussed. 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 Dr. Jamie Vasquez.  CPDD (calcium pyrophosphate deposition disease)     Depression     GERD (gastroesophageal reflux disease)     Hyperlipidemia     Hypertension     Prediabetes     Primary osteoarthritis involving multiple joints 9/6/2011    Rheumatoid arthritis ([facility] [address] 2013    negative RF; elevated anti-CCP. Dr. Tierra Najera.      Past Surgical History:   Procedure Laterality Date    HX AMPUTATION FINGER Left     HX CARPAL TUNNEL RELEASE      HX CATARACT REMOVAL Left 01/2018    HX CATARACT REMOVAL Bilateral 2018    HX HEENT      sinus, tonsillectomy    HX HERNIA REPAIR      HX MOHS PROCEDURES      bilateral     HX ORTHOPAEDIC      lef knee surgery, removed cartilage. Current Outpatient Medications   Medication Sig    sertraline (ZOLOFT) 50 mg tablet Take 1.5 Tabs by mouth daily.  amLODIPine (NORVASC) 10 mg tablet TAKE 1 TABLET BY MOUTH ONCE DAILY    hydrALAZINE (APRESOLINE) 25 mg tablet Take 1 Tab by mouth two (2) times a day.  celecoxib (CELEBREX) 200 mg capsule Take 1 Cap by mouth two (2) times a day.  gabapentin (NEURONTIN) 100 mg capsule Take 2 Caps by mouth three (3) times daily. Max Daily Amount: 600 mg.    omeprazole (PRILOSEC) 20 mg capsule TAKE 1 CAPSULE BY MOUTH ONCE DAILY    tamsulosin (FLOMAX) 0.4 mg capsule Take 1 Cap by mouth daily. Indications: enlarged prostate with urination problem    furosemide (LASIX) 40 mg tablet Take 1 Tab by mouth daily.  lisinopril (PRINIVIL, ZESTRIL) 40 mg tablet Take 1 Tab by mouth daily.  atorvastatin (LIPITOR) 10 mg tablet TAKE 1 TABLET BY MOUTH ONCE DAILY    potassium chloride (K-DUR, KLOR-CON) 20 mEq tablet Take 1 Tab by mouth daily.  cycloSPORINE (RESTASIS) 0.05 % ophthalmic emulsion Administer 1 Drop to both eyes two (2) times a day.  colchicine (MITIGARE) 0.6 mg capsule Take 0.6 mg by mouth every other day.  cholecalciferol, vitamin D3, (VITAMIN D3) 2,000 unit tab Take 2,000 Units by mouth daily.  diclofenac (VOLTAREN) 1 % topical gel Apply 4 g to affected area four (4) times daily.  LUMIGAN 0.01 % ophthalmic drops Administer 1 Drop to both eyes nightly.  hydroxychloroquine (PLAQUENIL) 200 mg tablet Take 400 mg by mouth daily.  MULTIVITS/IRON FUM/FA/D3/LYCOP (MULTI FOR HIM PO) Take  by mouth daily. No current facility-administered medications for this visit. Allergies and Intolerances:    Allergies   Allergen Reactions    Latex, Natural Rubber Swelling    Adhesive Tape-Silicones Rash and Itching    Aldactone [Spironolactone] Not Reported This Time     Breast tenderness    Codeine Not Reported This Time     \"Drives crazy\"    Tape [Adhesive] Rash    Tetanus Toxoid, Adsorbed Anaphylaxis    Tetanus Vaccines And Toxoid Anaphylaxis     Other reaction(s): anaphylaxis/angioedema  Other reaction(s): anaphylaxis/angioedema     Family History: He has no family history of colon or prostate cancer. Family History   Problem Relation Age of Onset    Cancer Mother     Heart Disease Father     Alcohol abuse Father     Cancer Other      Social History:   He  reports that he has quit smoking. His smoking use included cigars, pipe, and cigarettes. He quit after 12.00 years of use. He has quit using smokeless tobacco.  His smokeless tobacco use included chew. He smoked 2 ppd for 50 years, stopping in 1974. He is  with two adult children. 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 Social History     Substance and Sexual Activity   Alcohol Use Yes    Comment: rarely     Immunization History:  Immunization History   Administered Date(s) Administered    (RETIRED) Pneumococcal Vaccine (Unspecified Type) 01/01/2008    Influenza High Dose Vaccine PF 09/30/2017    Influenza Vaccine (Tri) Adjuvanted 09/25/2018, 09/25/2019    Influenza Vaccine Split 10/04/2011, 10/16/2012    Influenza Vaccine Whole 01/15/2010    Pneumococcal Conjugate (PCV-13) 01/19/2015    Pneumococcal Polysaccharide (PPSV-23) 01/01/2008    Varicella Virus Vaccine 10/01/2013    Zoster 10/16/2012       Review of Systems:   As above included in HPI. Otherwise 11 point review of systems negative including constitutional, skin, HENT, eyes, respiratory, cardiovascular, gastrointestinal, genitourinary, musculoskeletal, endocrine, hematologic, allergy, and neurologic.     Physical:   Vitals: BP: 104/62   HR: 89  WT: 209 lb (94.8 kg)  BMI:  33.02 kg/m2  O2: 98%    Exam:   Patient appears in no apparent distress. Does show discomfort due to pain in his right leg with any movement. Affect is appropriate. HEENT: PERRLA, anicteric, oropharynx clear, no JVD, adenopathy or thyromegaly. No carotid bruits or radiated murmur. Lungs: clear to auscultation, no wheezes, rhonchi, or rales. Heart: regular rate and rhythm. No murmur, rubs, gallops  Abdomen: soft, nontender, nondistended, normal bowel sounds, no hepatosplenomegaly or masses. Extremities: with trace edema. Pulses 1-2+ bilaterally. Review of Data:  Labs:    Hospital Outpatient Visit on 09/24/2019   Component Date Value Ref Range Status    Prostate Specific Ag 09/24/2019 3.3  0.0 - 4.0 ng/mL Final   Office Visit on 09/23/2019   Component Date Value Ref Range Status    Color (UA POC) 09/23/2019 Yellow   Final    Clarity (UA POC) 09/23/2019 Clear   Final    Glucose (UA POC) 09/23/2019 Negative  Negative Final    Bilirubin (UA POC) 09/23/2019 Negative  Negative Final    Ketones (UA POC) 09/23/2019 Negative  Negative Final    Specific gravity (UA POC) 09/23/2019 1.010  1.001 - 1.035 Final    Blood (UA POC) 09/23/2019 Negative  Negative Final    pH (UA POC) 09/23/2019 6.0  4.6 - 8.0 Final    Protein (UA POC) 09/23/2019 Negative  Negative Final    Urobilinogen (UA POC) 09/23/2019 0.2 mg/dL  0.2 - 1 Final    Nitrites (UA POC) 09/23/2019 Negative  Negative Final    Leukocyte esterase (UA POC) 09/23/2019 Negative  Negative Final     EKG (10/15/2019) sinus rhythm at 78 bpm, normal intervals, poor R wave progression throughout anterior leads likely due to body habitus; no change when compared to prior EKGs from 8/9/2019 and 1/10/2019. Health Maintenance:  Screening:    Colorectal: colonoscopy (8/2015) serrated adenomas. Dr. Norberta Dakin. Due 8/2020.    Depression: on Paxil   DM (HbA1c/FPG): / HbA1c 5.8 (8/2019)   Hepatitis C: N/A   Falls: none   DEXA: within normal limits (2016)   PSA/MARTÍNEZ: PSA 2.1. As per Dr. Jeanette Garcia   Glaucoma: regular eye exams with Dr. Cisse/ Dr. Trever Robles (last 2019)   Smokin pack year. Distant past.   Vitamin D: 43.5 (2019)   Medicare Wellness: 2019    Impression:  Patient Active Problem List   Diagnosis Code    BPH with obstruction/lower urinary tract symptoms N40.1, N13.8    Hypertension I10    Primary osteoarthritis involving multiple joints M15.0    Hyperlipidemia E78.5    GERD (gastroesophageal reflux disease) K21.9    Pre-diabetes R73.03    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 (HCC) M06.041, M06.042    Vitamin D deficiency E55.9    Colon polyps K63.5    CPDD (calcium pyrophosphate deposition disease) M11.20    Abnormal glucose R73.09    Depression F32.9    Rectal bleeding K62.5    Class 1 obesity due to excess calories with serious comorbidity and body mass index (BMI) of 32.0 to 32.9 in adult E66.09, Z68.32    APC (atrial premature contractions) I49.1    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 Right sided sciatica. Unresponsive to Medrol dose pack, physical therapy, steroid injections, and unwilling to take narcotics as not effective. Lumbar MRI with multilevel degenerative changes and facet arthropathy with R>L facet stenosis at L4-L5 likely responsible for symptoms. Had been following with Dr. Shikha Osorio, but given lack of improvement, started on gabapentin 100 mg tid and referred to Dr. Matteo Hartmann for evaluation. She recommended increasing gabapentin to 200 mg tid, and given his lack of response to conservative measures, referred him to Dr. ADKINS [facility] for evaluation.  He recommended decompression with L4/5 right-sided hemilaminotomy and medial facetectomy. Presenting for preoperative evaluation, and EKG performed today and shows no change from baseline. Will obtain preoperative labs today to complete evaluation. 2. Paroxysmal nocturnal dyspnea. Patient reported in 1/2019 awakening gasping for air several times per week. No overt evidence of heart failure and EKG was without change from baseline at that time. He reported that he would need to sit up immediately and take deep breathes until resolved. He also admitted to snoring and experiencing significant daytime drowsiness particularly when driving. He reported that when he was on long trips, he would need to pull off the road and take a nap before he could resume driving. Given high suspicion for sleep apnea, he was referred to Dr. Oneyda Quinonez for evaluation, but he never scheduled. Presented with worsening symptoms over several weeks, possibly exacerbated by the inadvertent abrupt cessation of Paxil during this time. Severe episode on 8/9/2019 prompted ED evaluation. EKG without change, troponin negative and NT-pro BNP normal. Echocardiogram (8/26/2019) with normal LV size and function (EF 56-60%), no RWMA, normal valves. Evaluated by Dr. Oneyda Quinonez and scheduled for home sleep study. States has moved up study and will be performing tonight. Will continue to follow. 3. Depression. Prior reasonable control with Paxil and weaned from benzodiazepine use for anxiety and insomnia. On Zoloft 50 mg daily with improvement after inadvertently stopping Paxil. Describing some persistent anxiety particularly at night, and advised to increase Zoloft to 75 mg daily. Reports today that he has noted improvement. 4. Abnormal chest x-ray. Ill defined density in left mid zone noted on chest x-ray in ED on 8/9/2019. Underwent chest CT scan (8/31/2019) which showed several tiny bilateral pulmonary nodules which were stable when compared with prior chest CT scan from 2007.  No further evaluation needed. 5. Hypertension. Blood pressure well controlled on current regimen of lisinopril 40 mg daily, amlodipine 10 mg daily, lasix 40 mg daily (potassium 20 meq daily) and hydralazine 25 mg bid. Renal function normal with creatinine 0.87/ eGFR >60. Normal echocardiogram (8/2019). Continue to follow. 6. Hyperlipidemia. On moderate intensity dose atorvastatin with LDL 58 and HDL 65, indicative of excellent control in this patient. Continue to follow. 7. Prediabetes. Has been controlled on diet alone. HbA1c remains controlled at 5.8. No evidence of microvascular complications. On Ace-I and statin. Continue follow-up with Dr. Ventura Schneider for annual eye exams. Emphasized importance of lifestyle modifications, including diet, exercise, and weight loss. To be reassessed at next visit. 8. Rheumatoid arthritis. On Plaquenil. Has regular eye exams with Dr. Ventura Schneider. Difficult to gauge response as appears to have evidence of osteoarthritis and CPPD occurring concurrently, but noted significant improvement since initiating colchicine. Voltaren gel for pain control. Tylenol while at work to help with pain control as needed. Followed by Dr. Fawn Boroke. 9. Primary osteoarthritis. Evident in bilateral hands and knees, bilateral shoulders, and cervical spine. Neck pain now returned to baseline. Shoulder pain (R>L). X-ray with evidence of osteoarthritis and rotator cuff pathology. Evaluated by Dr. Sonny Santiago and received cortisone injection to right shoulder with some improvement. Surgery for reverse shoulder replacement recommended. Changed from ibuprofen 400 mg qid and Tylenol to Celebrex 200 mg bid with significant improvement. Also using Voltaren gel. 10. CPPD. Colchicine started on 1/19/2017 to help address chondrocalcinosis on x-rays. Reports significant improvement. Now taking every other day with good control. 11. Macrocytosis. Mild evidence of macrocytosis on recent blood work. Hb/Hct normalized today.  Will check B12 and folate at next blood draw. 12. Rectal bleeding. Colonoscopy 8/2015. Evaluated by Dr. Niall Banda and considered to be hemorrhoidal in source. Known internal and external hemorrhoids. Improved with Citrucel. No evidence of anemia. Follow. 13. BPH. Does have lower urinary tract symptoms, but refusing medications or surgical interventions. Followed by Dr. Valentina Dennison. 14. GERD. Good control of symptoms with omeprazole. No issues today. 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 Ongoing secondary to work as a [employer] Monitored annually. 16. Obesity. Emphasized importance of lifestyle modifications, including diet, exercise, and weight loss. Discussed that would help control blood sugar. Will readdress at next visit. 17. Insomnia. Using melatonin agonist, ramelteon, to replace Xanax. Had good response and weaned from Xanax. 18. Health maintenance. Already received influenza vaccine. Unable to receive Tdap due to allergy (anaphylaxis to Td). Will address Shingrix vaccine at next visit. Other immunizations up to date. Colonoscopy due 2020. Continue regular eye exams with Dr. Howard Mcbride. Vitamin D level normal. Continue maintenance dose supplement. Medicare wellness visit up to date. Discussed recommendations regarding aspirin use and primary prevention, particularly for age >74, and patient agreed to discontinue. Total time: 40 minutes spent with the patient in face-to-face consultation of which greater than 50% was spent on counseling, answering questions and/or coordination of care. Complex medical review and management performed. Patient understands recommendations and agrees with plan. Follow-up as previously scheduled.       Addendum    10/16/2019 11:42 AM - [place]     Component Value Flag Ref Range Units Status   WBC 5.6   4.6 - 13.2 K/uL Final   RBC 4.24  Low   4.70 - 5.50 M/uL Final   HGB 14.1   13.0 - 16.0 g/dL Final   HCT 41.5   36.0 - 48.0 % Final   MCV 97.9  High   74.0 - 97.0 FL Final   MCH 33.3   24.0 - 34.0 PG Final   MCHC 34.0   31.0 - 37.0 g/dL Final   RDW 13.3   11.6 - 14.5 % Final   PLATELET 461   110 - 420 K/uL Final   MPV 10.9   9.2 - 11.8 FL Final   NEUTROPHILS 50   40 - 73 % Final   LYMPHOCYTES 27   21 - 52 % Final   MONOCYTES 14  High   3 - 10 % Final   EOSINOPHILS 8  High   0 - 5 % Final   BASOPHILS 1   0 - 2 % Final   ABS. NEUTROPHILS 2.8   1.8 - 8.0 K/UL Final   ABS. LYMPHOCYTES 1.5   0.9 - 3.6 K/UL Final   ABS. MONOCYTES 0.8   0.05 - 1.2 K/UL Final   ABS. EOSINOPHILS 0.5  High   0.0 - 0.4 K/UL Final   ABS. BASOPHILS 0.0   0.0 - 0.1 K/UL Final   DF AUTOMATED      Final     10/16/2019 11:49 AM - [place], Lab In [employer]     Component Value Flag Ref Range Units Status   Sodium 144   136 - 145 mmol/L Final   Potassium 4.2   3.5 - 5.5 mmol/L Final   Chloride 110   100 - 111 mmol/L Final   CO2 31   21 - 32 mmol/L Final   Anion gap 3   3.0 - 18 mmol/L Final   Glucose 110  High   74 - 99 mg/dL Final   BUN 14   7.0 - 18 MG/DL Final   Creatinine 0.81   0.6 - 1.3 MG/DL Final   BUN/Creatinine ratio 17   12 - 20   Final   GFR est AA >60   >60 ml/min/1.73m2 Final   GFR est non-AA >60   >60 ml/min/1.73m2 Final   Comment:   (NOTE)   Estimated GFR is calculated using the Modification of Diet in Renal   Disease (MDRD) Study equation, reported for both  Americans   (GFRAA) and non- Americans (GFRNA), and normalized to 1.73m2   body surface area. The physician must decide which value applies to   the patient. The MDRD study equation should only be used in   individuals age 25 or older. It has not been validated for the   following: pregnant women, patients with serious comorbid conditions,   or on certain medications, or persons with extremes of body size,   muscle mass, or nutritional status. Calcium 9.3   8.5 - 10.1 MG/DL Final   Bilirubin, total 0.8   0.2 - 1.0 MG/DL Final   ALT (SGPT) 30   16 - 61 U/L Final   AST (SGOT) 19   10 - 38 U/L Final   Alk.  phosphatase 101   45 - 117 U/L Final   Protein, total 6.5 6.4 - 8.2 g/dL Final   Albumin 4.0   3.4 - 5.0 g/dL Final   Globulin 2.5   2.0 - 4.0 g/dL Final   A-G Ratio 1.6   0.8 - 1.7   Final     10/16/2019 12:01 PM - [place], Lab In [employer]     Component Value Flag Ref Range Units Status   Color YELLOW      Final   Appearance CLEAR      Final   Specific gravity 1.011   1.005 - 1.030   Final   pH (UA) 6.5   5.0 - 8.0   Final   Protein NEGATIVE    NEG mg/dL Final   Glucose NEGATIVE    NEG mg/dL Final   Ketone NEGATIVE    NEG mg/dL Final   Bilirubin NEGATIVE    NEG   Final   Blood NEGATIVE    NEG   Final   Urobilinogen 1.0   0.2 - 1.0 EU/dL Final   Nitrites NEGATIVE    NEG   Final   Leukocyte Esterase NEGATIVE    NEG   Final   WBC 2 to 4   0 - 4 /hpf Final   RBC NEGATIVE    0 - 5 /hpf Final   Epithelial cells NEGATIVE    0 - 5 /lpf Final   Bacteria FEW  Abnormal   NEG /hpf Final     10/16/2019 11:59 AM - [place], Lab In [employer]     Component Value Flag Ref Range Units Status   Prothrombin time 13.2   11.5 - 15.2 sec Final   INR 1.0   0.8 - 1.2   Final   Comment:              INR Therapeutic Ranges          (on stable oral anticoagulant):      INDICATION                INR   DVT/PE/Atrial Fib          2.0-3.0   MI/Mechanical Heart Valve  2.5-3.5      Component Value Flag Ref Range Units Status   aPTT 27.6   23.0 - 36.4 SEC Final         Preoperative risk stratification:  Patient is being scheduled to undergo L4/5 right-sided hemilaminotomy and medial facetectomy. He currently has no symptoms suggestive of angina or decompensated heart failure. He maintains an active lifestyle and has no functional limitations. Preoperative labs and ECG are stable and unchanged from baseline. He is currently clinically stable and without absolute medical contraindication for surgery. He is considered low risk (0.2%) for a jewels-procedural cardiac event. Surgery may proceed as planned at the discretion of Dr. Mukund Pineda.

## 2019-10-21 ENCOUNTER — TELEPHONE (OUTPATIENT)
Dept: INTERNAL MEDICINE CLINIC | Age: 79
End: 2019-10-21

## 2019-10-21 NOTE — TELEPHONE ENCOUNTER
Called and spoke to Deepa Clarke from Dr. Sylvie Albarado office and informed her of Dr. Ezequiel Chaparro' message and she verbalized understanding also stating that she will let anesthesia know.

## 2019-10-21 NOTE — TELEPHONE ENCOUNTER
Dr. Josefina Sykes office calling says they got the surgery clearance. Anesthesiologist says the urine shows bacteria but pt is not on antibiotic. Is this ok? Does pt need med or is it ok?

## 2019-10-21 NOTE — TELEPHONE ENCOUNTER
Urinalysis is not consistent with an infection, and patient is asymptomatic. No need for antibiotics.

## 2019-10-21 NOTE — H&P
Pre-Admission History and Physical    Patient: Zena Pickard   MRN: [id number]   SSN: [id number]   YOB: 1940   Age: 70 y.o. Sex: male     Patient scheduled for: L4/5 R Lami. Date of surgery: 10/23/19. [facility] Surgeon: Erik Sullivan MD    HPI:  Zena Pickard is a 70 y.o. 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 He has been having a progressive pain syndrome in his right leg, a radiculopathy down to his foot with activity and motion. it is a burning, searing pain. It is worse with activity. It is better when just rests after a while. It resolved for a day or two with selective injection. Gabapentin has improved it. Physical therapy was of no help. He denies bowel or bladder dysfunction, fever, chills, or night sweats, weight loss or weight gain. He reports a pain level of 9/10. MRI demonstrates L4-5 synovitis with synovial cyst formation and synovitis bilaterally and lateral recess foraminal stenosis. . Pain has impacted the patient's functional ability to perform household chores and work and he is being admitted for surgical intervention. 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 Dr. Randa Moncada.  CPDD (calcium pyrophosphate deposition disease)     Depression     GERD (gastroesophageal reflux disease)     Hyperlipidemia     Hypertension     Prediabetes     Primary osteoarthritis involving multiple joints 9/6/2011    Rheumatoid arthritis ([facility] [address] 2013    negative RF; elevated anti-CCP. Dr. Therese Yao.      Social History     Socioeconomic History    Marital status:      Spouse name: Not on file    Number of children: Not on file    Years of education: Not on file    Highest education level: Not on file   Tobacco Use    Smoking status: Former Smoker     Years: 12.00     Types: Cigars, Pipe, Cigarettes    Smokeless tobacco: Former User     Types: Chew   Substance and Sexual Activity    Alcohol use: Yes     Comment: rarely    Drug use: No    Sexual activity: Not Currently     Past Surgical History:   Procedure Laterality Date    HX AMPUTATION FINGER Left     index    HX CARPAL TUNNEL RELEASE Bilateral     HX CATARACT REMOVAL Left 01/2018    HX CATARACT REMOVAL Bilateral 2018    HX COLONOSCOPY      HX HEENT      sinus, tonsillectomy    HX HERNIA REPAIR      HX MOHS PROCEDURES      bilateral     HX ORTHOPAEDIC      lef knee surgery, removed cartilage.  HX ROTATOR CUFF REPAIR Right      Family History   Problem Relation Age of Onset    Cancer Mother     Heart Disease Father     Alcohol abuse Father     Cancer Other      Allergies   Allergen Reactions    Latex, Natural Rubber Swelling    Adhesive Tape-Silicones Rash and Itching    Aldactone [Spironolactone] Not Reported This Time     Breast tenderness    Codeine Not Reported This Time     \"Drives crazy\"    Tape [Adhesive] Rash    Tetanus Toxoid, Adsorbed Anaphylaxis    Tetanus Vaccines And Toxoid Anaphylaxis     Other reaction(s): anaphylaxis/angioedema  Other reaction(s): anaphylaxis/angioedema     Current Outpatient Medications   Medication Sig Dispense Refill    sertraline (ZOLOFT) 50 mg tablet Take 1.5 Tabs by mouth daily. 1 Tab 0    amLODIPine (NORVASC) 10 mg tablet TAKE 1 TABLET BY MOUTH ONCE DAILY 90 Tab 3    hydrALAZINE (APRESOLINE) 25 mg tablet Take 1 Tab by mouth two (2) times a day. 1 Tab 0    gabapentin (NEURONTIN) 100 mg capsule Take 2 Caps by mouth three (3) times daily. Max Daily Amount: 600 mg. 180 Cap 1    omeprazole (PRILOSEC) 20 mg capsule TAKE 1 CAPSULE BY MOUTH ONCE DAILY 90 Cap 1    tamsulosin (FLOMAX) 0.4 mg capsule Take 1 Cap by mouth daily. Indications: enlarged prostate with urination problem 90 Cap 3    furosemide (LASIX) 40 mg tablet Take 1 Tab by mouth daily.  90 Tab 3    lisinopril (PRINIVIL, ZESTRIL) 40 mg tablet Take 1 Tab by mouth daily. 90 Tab 3    atorvastatin (LIPITOR) 10 mg tablet TAKE 1 TABLET BY MOUTH ONCE DAILY 90 Tab 3    potassium chloride (K-DUR, KLOR-CON) 20 mEq tablet Take 1 Tab by mouth daily. 90 Tab 2    cycloSPORINE (RESTASIS) 0.05 % ophthalmic emulsion Administer 1 Drop to both eyes two (2) times a day.  colchicine (MITIGARE) 0.6 mg capsule Take 0.6 mg by mouth every other day.  cholecalciferol, vitamin D3, (VITAMIN D3) 2,000 unit tab Take 2,000 Units by mouth daily.  diclofenac (VOLTAREN) 1 % topical gel Apply 4 g to affected area four (4) times daily.  hydroxychloroquine (PLAQUENIL) 200 mg tablet Take 400 mg by mouth daily.  MULTIVITS/IRON FUM/FA/D3/LYCOP (MULTI FOR HIM PO) Take  by mouth daily.  celecoxib (CELEBREX) 200 mg capsule Take 1 Cap by mouth two (2) times a day. 180 Cap 1    LUMIGAN 0.01 % ophthalmic drops Administer 1 Drop to both eyes nightly. ROS:  Denies chills, fever,night sweats,  bowel or bladder dysfunction, unexplained weight loss/weight gain, chest pain, sob or anxiety. Physical Examination    Gen: Well developed, well nourished 70 y.o. male Visit Vitals  /73 (BP 1 Location: Right arm, BP Patient Position: Sitting)   Pulse 80   Temp 98.2 °F (36.8 °C) (Oral)   Resp 16   Ht 5' 5.75\" (1.67 m)   Wt 206 lb 9.6 oz (93.7 kg)   SpO2 98%   BMI 33.60 kg/m²    PAIN SCALE: 9/10     CONSTITUTIONAL: The patient is in no apparent distress and is alert and oriented x 3. HEENT: Normocephalic. Hearing grossly intact. NECK: Supple and symmetric. no tenderness, or masses were felt. RESPIRATORY: No labored breathing. CARDIOVASCULAR: The carotid pulses were normal. Peripheral pulses were 2+. CHEST: Normal AP diameter and normal contour without any kyphoscoliosis. LYMPHATIC: No lymphadenopathy was appreciated in the neck, axillae or groin. SKIN:  Negative for scars, rashes, lesions, or ulcers on the right upper, right lower, left upper, left lower and trunk. NEUROLOGICAL: Alert and oriented x 3. Ambulation without assistive device. FWB. EXTREMITIES:  See musculoskeletal.  MUSCULOSKELETAL:  · Head and Neck:  Negative for misalignment, asymmetry, crepitation, defects, tenderness masses or effusions. · Left Upper Extremity: Inspection, percussion and palpation performed. Mcwilliamss sign is negative. · Right Upper Extremity: Inspection, percussion and palpation performed. Mcwilliamss sign is negative. · Spine, Ribs and Pelvis: Low back pain with R buttock pain. Inspection, percussion and palpation performed. Negative for misalignment, asymmetry, crepitation, defects, tenderness masses or effusions. · Left Lower Extremity: Inspection, percussion and palpation performed. Negative straight leg raise. · Right Lower Extremity: Radicular pain. Occasional numbness. Inspection, percussion and palpation performed. Negative straight leg raise.        SPINE EXAM:      Cervical spine: Neck is midline. Normal muscle tone. No focal atrophy is noted.     Lumbar spine: No rash, ecchymosis, or gross obliquity. No focal atrophy is noted. Assessment and Plan    Due to the pt's persistent symptoms unrelieved by conservative measure Juan Antonio Lee is being [facility] to undergo surgical intervention. The post-operative plan of care consists of physical therapy, home health and a 2 week f/u office visit. We are pending medical clearance by Dr. Doyle Brooks. The risks, benefits, complications and alternatives to surgery have been discussed in detail with the patient. The patient understands and agrees to proceed.      Eulalio Alejandra NP-BC dictating for Jessenia White MD

## 2019-10-22 ENCOUNTER — ANESTHESIA EVENT (OUTPATIENT)
Dept: SURGERY | Age: 79
DRG: 517 | End: 2019-10-22
Payer: MEDICARE

## 2019-10-23 ENCOUNTER — ANESTHESIA (OUTPATIENT)
Dept: SURGERY | Age: 79
DRG: 517 | End: 2019-10-23
Payer: MEDICARE

## 2019-10-23 ENCOUNTER — HOSPITAL ENCOUNTER (INPATIENT)
Age: 79
LOS: 1 days | Discharge: HOME | DRG: 517 | End: 2019-10-24
Attending: ORTHOPAEDIC SURGERY | Admitting: ORTHOPAEDIC SURGERY
Payer: MEDICARE

## 2019-10-23 ENCOUNTER — APPOINTMENT (OUTPATIENT)
Dept: GENERAL RADIOLOGY | Age: 79
DRG: 517 | End: 2019-10-23
Attending: ORTHOPAEDIC SURGERY
Payer: MEDICARE

## 2019-10-23 DIAGNOSIS — Z98.890 S/P LUMBAR LAMINECTOMY: Primary | ICD-10-CM

## 2019-10-23 PROBLEM — M71.30 SYNOVIAL CYST: Status: ACTIVE | Noted: 2019-10-23

## 2019-10-23 PROBLEM — M48.00 SPINAL STENOSIS: Status: ACTIVE | Noted: 2019-10-23

## 2019-10-23 PROBLEM — M71.38 SYNOVIAL CYST OF LUMBAR FACET JOINT: Status: ACTIVE | Noted: 2019-10-23

## 2019-10-23 PROCEDURE — 74011000250 HC RX REV CODE- 250: Performed by: ORTHOPAEDIC SURGERY

## 2019-10-23 PROCEDURE — 97116 GAIT TRAINING THERAPY: CPT

## 2019-10-23 PROCEDURE — 77030004402 HC BUR NEUR STRY -C: Performed by: ORTHOPAEDIC SURGERY

## 2019-10-23 PROCEDURE — 77030040361 HC SLV COMPR DVT MDII -B: Performed by: ORTHOPAEDIC SURGERY

## 2019-10-23 PROCEDURE — 77030018836 HC SOL IRR NACL ICUM -A: Performed by: ORTHOPAEDIC SURGERY

## 2019-10-23 PROCEDURE — 74011250636 HC RX REV CODE- 250/636: Performed by: NURSE ANESTHETIST, CERTIFIED REGISTERED

## 2019-10-23 PROCEDURE — 77030030163 HC BN WAX J&J -A: Performed by: ORTHOPAEDIC SURGERY

## 2019-10-23 PROCEDURE — 74011000250 HC RX REV CODE- 250: Performed by: NURSE ANESTHETIST, CERTIFIED REGISTERED

## 2019-10-23 PROCEDURE — 77030019908 HC STETH ESOPH SIMS -A: Performed by: ANESTHESIOLOGY

## 2019-10-23 PROCEDURE — 77030020061 HC IV BLD WRMR ADMIN SET 3M -B: Performed by: ANESTHESIOLOGY

## 2019-10-23 PROCEDURE — 77030037134 HC WRAP COMPR BACK THER SOLM -B: Performed by: ORTHOPAEDIC SURGERY

## 2019-10-23 PROCEDURE — 74011000272 HC RX REV CODE- 272: Performed by: ORTHOPAEDIC SURGERY

## 2019-10-23 PROCEDURE — 74011250637 HC RX REV CODE- 250/637: Performed by: ORTHOPAEDIC SURGERY

## 2019-10-23 PROCEDURE — 77030027138 HC INCENT SPIROMETER -A

## 2019-10-23 PROCEDURE — 77030026438 HC STYL ET INTUB CARD -A: Performed by: ANESTHESIOLOGY

## 2019-10-23 PROCEDURE — 77030003029 HC SUT VCRL J&J -B: Performed by: ORTHOPAEDIC SURGERY

## 2019-10-23 PROCEDURE — 97161 PT EVAL LOW COMPLEX 20 MIN: CPT

## 2019-10-23 PROCEDURE — 77030040830 HC CATH URETH FOL MDII -A

## 2019-10-23 PROCEDURE — 77030013079 HC BLNKT BAIR HGGR 3M -A: Performed by: ANESTHESIOLOGY

## 2019-10-23 PROCEDURE — 76010000153 HC OR TIME 1.5 TO 2 HR: Performed by: ORTHOPAEDIC SURGERY

## 2019-10-23 PROCEDURE — 77030002933 HC SUT MCRYL J&J -A: Performed by: ORTHOPAEDIC SURGERY

## 2019-10-23 PROCEDURE — 74011250637 HC RX REV CODE- 250/637: Performed by: NURSE ANESTHETIST, CERTIFIED REGISTERED

## 2019-10-23 PROCEDURE — 01NB0ZZ RELEASE LUMBAR NERVE, OPEN APPROACH: ICD-10-PCS | Performed by: ORTHOPAEDIC SURGERY

## 2019-10-23 PROCEDURE — 77030012893: Performed by: ORTHOPAEDIC SURGERY

## 2019-10-23 PROCEDURE — 76060000034 HC ANESTHESIA 1.5 TO 2 HR: Performed by: ORTHOPAEDIC SURGERY

## 2019-10-23 PROCEDURE — 77030027138 HC INCENT SPIROMETER -A: Performed by: ORTHOPAEDIC SURGERY

## 2019-10-23 PROCEDURE — 77030008684 HC TU ET CUF COVD -B: Performed by: ANESTHESIOLOGY

## 2019-10-23 PROCEDURE — 74011250636 HC RX REV CODE- 250/636: Performed by: ANESTHESIOLOGY

## 2019-10-23 PROCEDURE — 77030040922 HC BLNKT HYPOTHRM STRY -A: Performed by: ORTHOPAEDIC SURGERY

## 2019-10-23 PROCEDURE — 74011250636 HC RX REV CODE- 250/636: Performed by: NURSE PRACTITIONER

## 2019-10-23 PROCEDURE — 74011250636 HC RX REV CODE- 250/636: Performed by: ORTHOPAEDIC SURGERY

## 2019-10-23 PROCEDURE — 76210000016 HC OR PH I REC 1 TO 1.5 HR: Performed by: ORTHOPAEDIC SURGERY

## 2019-10-23 PROCEDURE — 65270000029 HC RM PRIVATE

## 2019-10-23 RX ORDER — DIPHENHYDRAMINE HYDROCHLORIDE 50 MG/ML
12.5 INJECTION, SOLUTION INTRAMUSCULAR; INTRAVENOUS
Status: DISCONTINUED | OUTPATIENT
Start: 2019-10-23 | End: 2019-10-24 | Stop reason: HOSPADM

## 2019-10-23 RX ORDER — FENTANYL CITRATE 50 UG/ML
50 INJECTION, SOLUTION INTRAMUSCULAR; INTRAVENOUS AS NEEDED
Status: DISCONTINUED | OUTPATIENT
Start: 2019-10-23 | End: 2019-10-23 | Stop reason: HOSPADM

## 2019-10-23 RX ORDER — GLYCOPYRROLATE 0.2 MG/ML
INJECTION INTRAMUSCULAR; INTRAVENOUS AS NEEDED
Status: DISCONTINUED | OUTPATIENT
Start: 2019-10-23 | End: 2019-10-23 | Stop reason: HOSPADM

## 2019-10-23 RX ORDER — KETAMINE HYDROCHLORIDE 50 MG/ML
INJECTION, SOLUTION INTRAMUSCULAR; INTRAVENOUS AS NEEDED
Status: DISCONTINUED | OUTPATIENT
Start: 2019-10-23 | End: 2019-10-23 | Stop reason: HOSPADM

## 2019-10-23 RX ORDER — DIPHENHYDRAMINE HYDROCHLORIDE 50 MG/ML
12.5 INJECTION, SOLUTION INTRAMUSCULAR; INTRAVENOUS
Status: DISCONTINUED | OUTPATIENT
Start: 2019-10-23 | End: 2019-10-23 | Stop reason: HOSPADM

## 2019-10-23 RX ORDER — FENTANYL CITRATE 50 UG/ML
INJECTION, SOLUTION INTRAMUSCULAR; INTRAVENOUS AS NEEDED
Status: DISCONTINUED | OUTPATIENT
Start: 2019-10-23 | End: 2019-10-23 | Stop reason: HOSPADM

## 2019-10-23 RX ORDER — SODIUM CHLORIDE, SODIUM LACTATE, POTASSIUM CHLORIDE, CALCIUM CHLORIDE 600; 310; 30; 20 MG/100ML; MG/100ML; MG/100ML; MG/100ML
INJECTION, SOLUTION INTRAVENOUS
Status: DISCONTINUED | OUTPATIENT
Start: 2019-10-23 | End: 2019-10-23 | Stop reason: HOSPADM

## 2019-10-23 RX ORDER — NALOXONE HYDROCHLORIDE 0.4 MG/ML
0.4 INJECTION, SOLUTION INTRAMUSCULAR; INTRAVENOUS; SUBCUTANEOUS AS NEEDED
Status: DISCONTINUED | OUTPATIENT
Start: 2019-10-23 | End: 2019-10-24 | Stop reason: HOSPADM

## 2019-10-23 RX ORDER — ONDANSETRON 2 MG/ML
4 INJECTION INTRAMUSCULAR; INTRAVENOUS
Status: DISCONTINUED | OUTPATIENT
Start: 2019-10-23 | End: 2019-10-24 | Stop reason: HOSPADM

## 2019-10-23 RX ORDER — NALOXONE HYDROCHLORIDE 0.4 MG/ML
0.1 INJECTION, SOLUTION INTRAMUSCULAR; INTRAVENOUS; SUBCUTANEOUS AS NEEDED
Status: DISCONTINUED | OUTPATIENT
Start: 2019-10-23 | End: 2019-10-23 | Stop reason: HOSPADM

## 2019-10-23 RX ORDER — SODIUM CHLORIDE, SODIUM LACTATE, POTASSIUM CHLORIDE, CALCIUM CHLORIDE 600; 310; 30; 20 MG/100ML; MG/100ML; MG/100ML; MG/100ML
50 INJECTION, SOLUTION INTRAVENOUS CONTINUOUS
Status: DISCONTINUED | OUTPATIENT
Start: 2019-10-23 | End: 2019-10-23 | Stop reason: HOSPADM

## 2019-10-23 RX ORDER — VANCOMYCIN HYDROCHLORIDE 1 G/20ML
INJECTION, POWDER, LYOPHILIZED, FOR SOLUTION INTRAVENOUS AS NEEDED
Status: DISCONTINUED | OUTPATIENT
Start: 2019-10-23 | End: 2019-10-23 | Stop reason: HOSPADM

## 2019-10-23 RX ORDER — PROPOFOL 10 MG/ML
INJECTION, EMULSION INTRAVENOUS AS NEEDED
Status: DISCONTINUED | OUTPATIENT
Start: 2019-10-23 | End: 2019-10-23 | Stop reason: HOSPADM

## 2019-10-23 RX ORDER — CEFAZOLIN SODIUM 2 G/50ML
2 SOLUTION INTRAVENOUS EVERY 8 HOURS
Status: DISCONTINUED | OUTPATIENT
Start: 2019-10-23 | End: 2019-10-24 | Stop reason: HOSPADM

## 2019-10-23 RX ORDER — SODIUM CHLORIDE 0.9 % (FLUSH) 0.9 %
5-40 SYRINGE (ML) INJECTION EVERY 8 HOURS
Status: DISCONTINUED | OUTPATIENT
Start: 2019-10-23 | End: 2019-10-23 | Stop reason: HOSPADM

## 2019-10-23 RX ORDER — SUCCINYLCHOLINE CHLORIDE 20 MG/ML
INJECTION INTRAMUSCULAR; INTRAVENOUS AS NEEDED
Status: DISCONTINUED | OUTPATIENT
Start: 2019-10-23 | End: 2019-10-23 | Stop reason: HOSPADM

## 2019-10-23 RX ORDER — LIDOCAINE HYDROCHLORIDE 20 MG/ML
INJECTION, SOLUTION EPIDURAL; INFILTRATION; INTRACAUDAL; PERINEURAL AS NEEDED
Status: DISCONTINUED | OUTPATIENT
Start: 2019-10-23 | End: 2019-10-23 | Stop reason: HOSPADM

## 2019-10-23 RX ORDER — AMLODIPINE BESYLATE 10 MG/1
10 TABLET ORAL DAILY
Status: DISCONTINUED | OUTPATIENT
Start: 2019-10-24 | End: 2019-10-24 | Stop reason: HOSPADM

## 2019-10-23 RX ORDER — EPHEDRINE SULFATE/0.9% NACL/PF 50 MG/5 ML
SYRINGE (ML) INTRAVENOUS AS NEEDED
Status: DISCONTINUED | OUTPATIENT
Start: 2019-10-23 | End: 2019-10-23 | Stop reason: HOSPADM

## 2019-10-23 RX ORDER — HYDROMORPHONE HYDROCHLORIDE 2 MG/ML
0.5 INJECTION, SOLUTION INTRAMUSCULAR; INTRAVENOUS; SUBCUTANEOUS
Status: DISCONTINUED | OUTPATIENT
Start: 2019-10-23 | End: 2019-10-23 | Stop reason: HOSPADM

## 2019-10-23 RX ORDER — TAMSULOSIN HYDROCHLORIDE 0.4 MG/1
0.4 CAPSULE ORAL ONCE
Status: COMPLETED | OUTPATIENT
Start: 2019-10-23 | End: 2019-10-23

## 2019-10-23 RX ORDER — SODIUM CHLORIDE 0.9 % (FLUSH) 0.9 %
5-40 SYRINGE (ML) INJECTION AS NEEDED
Status: DISCONTINUED | OUTPATIENT
Start: 2019-10-23 | End: 2019-10-23 | Stop reason: HOSPADM

## 2019-10-23 RX ORDER — NEOSTIGMINE METHYLSULFATE 1 MG/ML
INJECTION INTRAVENOUS AS NEEDED
Status: DISCONTINUED | OUTPATIENT
Start: 2019-10-23 | End: 2019-10-23 | Stop reason: HOSPADM

## 2019-10-23 RX ORDER — ACETAMINOPHEN 500 MG
1000 TABLET ORAL EVERY 6 HOURS
Status: DISCONTINUED | OUTPATIENT
Start: 2019-10-23 | End: 2019-10-24 | Stop reason: HOSPADM

## 2019-10-23 RX ORDER — HYDROXYCHLOROQUINE SULFATE 200 MG/1
400 TABLET, FILM COATED ORAL DAILY
Status: DISCONTINUED | OUTPATIENT
Start: 2019-10-24 | End: 2019-10-24 | Stop reason: HOSPADM

## 2019-10-23 RX ORDER — OXYCODONE HYDROCHLORIDE 5 MG/1
5-10 TABLET ORAL
Status: DISCONTINUED | OUTPATIENT
Start: 2019-10-23 | End: 2019-10-24 | Stop reason: HOSPADM

## 2019-10-23 RX ORDER — CELECOXIB 400 MG/1
400 CAPSULE ORAL ONCE
Status: COMPLETED | OUTPATIENT
Start: 2019-10-23 | End: 2019-10-23

## 2019-10-23 RX ORDER — ROCURONIUM BROMIDE 10 MG/ML
INJECTION, SOLUTION INTRAVENOUS AS NEEDED
Status: DISCONTINUED | OUTPATIENT
Start: 2019-10-23 | End: 2019-10-23 | Stop reason: HOSPADM

## 2019-10-23 RX ORDER — PANTOPRAZOLE SODIUM 40 MG/1
40 TABLET, DELAYED RELEASE ORAL
Status: DISCONTINUED | OUTPATIENT
Start: 2019-10-23 | End: 2019-10-24 | Stop reason: HOSPADM

## 2019-10-23 RX ORDER — TAMSULOSIN HYDROCHLORIDE 0.4 MG/1
0.4 CAPSULE ORAL DAILY
Status: DISCONTINUED | OUTPATIENT
Start: 2019-10-24 | End: 2019-10-24 | Stop reason: HOSPADM

## 2019-10-23 RX ORDER — CEFAZOLIN SODIUM 2 G/50ML
2 SOLUTION INTRAVENOUS ONCE
Status: COMPLETED | OUTPATIENT
Start: 2019-10-23 | End: 2019-10-23

## 2019-10-23 RX ORDER — SODIUM CHLORIDE 0.9 % (FLUSH) 0.9 %
5-40 SYRINGE (ML) INJECTION AS NEEDED
Status: DISCONTINUED | OUTPATIENT
Start: 2019-10-23 | End: 2019-10-24 | Stop reason: HOSPADM

## 2019-10-23 RX ORDER — SODIUM CHLORIDE, SODIUM LACTATE, POTASSIUM CHLORIDE, CALCIUM CHLORIDE 600; 310; 30; 20 MG/100ML; MG/100ML; MG/100ML; MG/100ML
75 INJECTION, SOLUTION INTRAVENOUS CONTINUOUS
Status: DISCONTINUED | OUTPATIENT
Start: 2019-10-23 | End: 2019-10-23 | Stop reason: HOSPADM

## 2019-10-23 RX ORDER — PREGABALIN 75 MG/1
75 CAPSULE ORAL ONCE
Status: COMPLETED | OUTPATIENT
Start: 2019-10-23 | End: 2019-10-23

## 2019-10-23 RX ORDER — GABAPENTIN 100 MG/1
200 CAPSULE ORAL 3 TIMES DAILY
Status: DISCONTINUED | OUTPATIENT
Start: 2019-10-23 | End: 2019-10-24 | Stop reason: HOSPADM

## 2019-10-23 RX ORDER — LORAZEPAM 2 MG/ML
1 INJECTION INTRAMUSCULAR
Status: DISCONTINUED | OUTPATIENT
Start: 2019-10-23 | End: 2019-10-24 | Stop reason: HOSPADM

## 2019-10-23 RX ORDER — LISINOPRIL 40 MG/1
40 TABLET ORAL DAILY
Status: DISCONTINUED | OUTPATIENT
Start: 2019-10-24 | End: 2019-10-24 | Stop reason: HOSPADM

## 2019-10-23 RX ORDER — CYCLOSPORINE 0.5 MG/ML
1 EMULSION OPHTHALMIC
Status: DISCONTINUED | OUTPATIENT
Start: 2019-10-23 | End: 2019-10-24 | Stop reason: HOSPADM

## 2019-10-23 RX ORDER — SODIUM CHLORIDE 0.9 % (FLUSH) 0.9 %
5-40 SYRINGE (ML) INJECTION EVERY 8 HOURS
Status: DISCONTINUED | OUTPATIENT
Start: 2019-10-23 | End: 2019-10-24 | Stop reason: HOSPADM

## 2019-10-23 RX ORDER — LIDOCAINE HYDROCHLORIDE 10 MG/ML
0.1 INJECTION, SOLUTION EPIDURAL; INFILTRATION; INTRACAUDAL; PERINEURAL AS NEEDED
Status: DISCONTINUED | OUTPATIENT
Start: 2019-10-23 | End: 2019-10-23 | Stop reason: HOSPADM

## 2019-10-23 RX ORDER — FAMOTIDINE 20 MG/1
20 TABLET, FILM COATED ORAL ONCE
Status: COMPLETED | OUTPATIENT
Start: 2019-10-23 | End: 2019-10-23

## 2019-10-23 RX ORDER — ONDANSETRON 2 MG/ML
4 INJECTION INTRAMUSCULAR; INTRAVENOUS ONCE
Status: COMPLETED | OUTPATIENT
Start: 2019-10-23 | End: 2019-10-23

## 2019-10-23 RX ORDER — ATORVASTATIN CALCIUM 10 MG/1
10 TABLET, FILM COATED ORAL DAILY
Status: DISCONTINUED | OUTPATIENT
Start: 2019-10-24 | End: 2019-10-24 | Stop reason: HOSPADM

## 2019-10-23 RX ORDER — DIPHENHYDRAMINE HCL 25 MG
25 CAPSULE ORAL
Status: DISCONTINUED | OUTPATIENT
Start: 2019-10-23 | End: 2019-10-24 | Stop reason: HOSPADM

## 2019-10-23 RX ORDER — HYDROMORPHONE HYDROCHLORIDE 1 MG/ML
1 INJECTION, SOLUTION INTRAMUSCULAR; INTRAVENOUS; SUBCUTANEOUS
Status: DISCONTINUED | OUTPATIENT
Start: 2019-10-23 | End: 2019-10-24 | Stop reason: HOSPADM

## 2019-10-23 RX ORDER — HYDRALAZINE HYDROCHLORIDE 25 MG/1
25 TABLET, FILM COATED ORAL 3 TIMES DAILY
Status: DISCONTINUED | OUTPATIENT
Start: 2019-10-23 | End: 2019-10-24 | Stop reason: HOSPADM

## 2019-10-23 RX ORDER — BUPIVACAINE HYDROCHLORIDE 5 MG/ML
INJECTION, SOLUTION EPIDURAL; INTRACAUDAL AS NEEDED
Status: DISCONTINUED | OUTPATIENT
Start: 2019-10-23 | End: 2019-10-23 | Stop reason: HOSPADM

## 2019-10-23 RX ORDER — SODIUM CHLORIDE 9 MG/ML
75 INJECTION, SOLUTION INTRAVENOUS ONCE
Status: DISPENSED | OUTPATIENT
Start: 2019-10-23 | End: 2019-10-24

## 2019-10-23 RX ORDER — DIAZEPAM 5 MG/1
5 TABLET ORAL
Status: DISCONTINUED | OUTPATIENT
Start: 2019-10-23 | End: 2019-10-24 | Stop reason: HOSPADM

## 2019-10-23 RX ORDER — ONDANSETRON 2 MG/ML
INJECTION INTRAMUSCULAR; INTRAVENOUS AS NEEDED
Status: DISCONTINUED | OUTPATIENT
Start: 2019-10-23 | End: 2019-10-23 | Stop reason: HOSPADM

## 2019-10-23 RX ADMIN — SUCCINYLCHOLINE CHLORIDE 160 MG: 20 INJECTION, SOLUTION INTRAMUSCULAR; INTRAVENOUS at 12:33

## 2019-10-23 RX ADMIN — CYCLOSPORINE 1 DROP: 0.5 EMULSION OPHTHALMIC at 21:03

## 2019-10-23 RX ADMIN — HYDRALAZINE HYDROCHLORIDE 25 MG: 25 TABLET, FILM COATED ORAL at 21:02

## 2019-10-23 RX ADMIN — ROCURONIUM BROMIDE 5 MG: 10 INJECTION, SOLUTION INTRAVENOUS at 12:33

## 2019-10-23 RX ADMIN — PROPOFOL 150 MG: 10 INJECTION, EMULSION INTRAVENOUS at 12:33

## 2019-10-23 RX ADMIN — SODIUM CHLORIDE, SODIUM LACTATE, POTASSIUM CHLORIDE, AND CALCIUM CHLORIDE 75 ML/HR: 600; 310; 30; 20 INJECTION, SOLUTION INTRAVENOUS at 10:47

## 2019-10-23 RX ADMIN — GLYCOPYRROLATE 0.2 MG: 0.2 INJECTION INTRAMUSCULAR; INTRAVENOUS at 13:41

## 2019-10-23 RX ADMIN — SODIUM CHLORIDE, SODIUM LACTATE, POTASSIUM CHLORIDE, AND CALCIUM CHLORIDE: 600; 310; 30; 20 INJECTION, SOLUTION INTRAVENOUS at 12:24

## 2019-10-23 RX ADMIN — KETAMINE HYDROCHLORIDE 50 MG: 50 INJECTION INTRAMUSCULAR; INTRAVENOUS at 12:47

## 2019-10-23 RX ADMIN — CEFAZOLIN 2 G: 10 INJECTION, POWDER, FOR SOLUTION INTRAVENOUS at 20:02

## 2019-10-23 RX ADMIN — FENTANYL CITRATE 100 MCG: 50 INJECTION INTRAMUSCULAR; INTRAVENOUS at 12:28

## 2019-10-23 RX ADMIN — Medication 10 MG: at 12:49

## 2019-10-23 RX ADMIN — NEOSTIGMINE METHYLSULFATE 2 MG: 1 INJECTION, SOLUTION INTRAVENOUS at 13:41

## 2019-10-23 RX ADMIN — PANTOPRAZOLE SODIUM 40 MG: 40 TABLET, DELAYED RELEASE ORAL at 16:58

## 2019-10-23 RX ADMIN — GABAPENTIN 200 MG: 100 CAPSULE ORAL at 21:02

## 2019-10-23 RX ADMIN — ROCURONIUM BROMIDE 15 MG: 10 INJECTION, SOLUTION INTRAVENOUS at 12:46

## 2019-10-23 RX ADMIN — PREGABALIN 75 MG: 75 CAPSULE ORAL at 10:47

## 2019-10-23 RX ADMIN — ACETAMINOPHEN 1000 MG: 500 TABLET ORAL at 16:58

## 2019-10-23 RX ADMIN — LIDOCAINE HYDROCHLORIDE 100 MG: 20 INJECTION, SOLUTION EPIDURAL; INFILTRATION; INTRACAUDAL; PERINEURAL at 12:33

## 2019-10-23 RX ADMIN — CEFAZOLIN 2 G: 10 INJECTION, POWDER, FOR SOLUTION INTRAVENOUS at 12:39

## 2019-10-23 RX ADMIN — TAMSULOSIN HYDROCHLORIDE 0.4 MG: 0.4 CAPSULE ORAL at 17:13

## 2019-10-23 RX ADMIN — ONDANSETRON 4 MG: 2 INJECTION INTRAMUSCULAR; INTRAVENOUS at 14:55

## 2019-10-23 RX ADMIN — Medication 10 ML: at 21:04

## 2019-10-23 RX ADMIN — ONDANSETRON 4 MG: 2 INJECTION INTRAMUSCULAR; INTRAVENOUS at 13:41

## 2019-10-23 RX ADMIN — FAMOTIDINE 20 MG: 20 TABLET, FILM COATED ORAL at 10:47

## 2019-10-23 RX ADMIN — HYDROMORPHONE HYDROCHLORIDE 0.5 MG: 2 INJECTION, SOLUTION INTRAMUSCULAR; INTRAVENOUS; SUBCUTANEOUS at 14:45

## 2019-10-23 RX ADMIN — GABAPENTIN 200 MG: 100 CAPSULE ORAL at 16:58

## 2019-10-23 RX ADMIN — ACETAMINOPHEN 1000 MG: 500 TABLET ORAL at 23:32

## 2019-10-23 RX ADMIN — CELECOXIB 400 MG: 400 CAPSULE ORAL at 10:47

## 2019-10-23 RX ADMIN — Medication 20 MG: at 12:52

## 2019-10-23 RX ADMIN — HYDRALAZINE HYDROCHLORIDE 25 MG: 25 TABLET, FILM COATED ORAL at 16:58

## 2019-10-23 NOTE — INTERVAL H&P NOTE
H&P Update: 
Hannah Chris was seen and examined. History and physical has been reviewed. The patient has been examined.  There have been no significant clinical changes since the completion of the originally dated History and Physical.

## 2019-10-23 NOTE — PROGRESS NOTES
Bedside shift change report given to *** (oncoming nurse) by Karlee Adams (offgoing nurse). Report included the following information SBAR, Kardex, Procedure Summary, Intake/Output and MAR.

## 2019-10-23 NOTE — BRIEF OP NOTE
BRIEF OPERATIVE NOTE    Date of Procedure: 10/23/2019   Preoperative Diagnosis: SYNOVIAL CYST, SPINAL STENOSIS  Postoperative Diagnosis: SYNOVIAL CYST, SPINAL STENOSIS    Procedure(s):  L4/5 RIGHT SPINE LUMBAR LAMINECTOMY/ C-ARM  Surgeon(s) and Role:     * Trisha Gray MD - Primary         Surgical Assistant: 0    Surgical Staff:  Circ-1: Danielle Giraldo RN  Circ-2: Monica Esquivel RN  Radiology Technician: Noelle Wan  Scrub Tech-1: Jessica Montelongo  Surg Asst-1: Mikey Rai  Event Time In Time Out   Incision Start 1249    Incision Close       Anesthesia: General   Estimated Blood Loss: 10  Specimens: * No specimens in log *   Findings: senosis   Complications: 0  Implants: * No implants in log *

## 2019-10-23 NOTE — ANESTHESIA POSTPROCEDURE EVALUATION
Procedure(s):  L4/5 RIGHT SPINE LUMBAR LAMINECTOMY/ C-ARM. general    Anesthesia Post Evaluation      Multimodal analgesia: multimodal analgesia used between 6 hours prior to anesthesia start to PACU discharge  Patient location during evaluation: bedside  Patient participation: complete - patient participated  Level of consciousness: awake  Pain score: 2  Pain management: adequate  Airway patency: patent  Anesthetic complications: no  Cardiovascular status: stable  Respiratory status: acceptable  Hydration status: acceptable  Post anesthesia nausea and vomiting:  controlled      Vitals Value Taken Time   /75 10/23/2019  3:03 PM   Temp 37 °C (98.6 °F) 10/23/2019  2:06 PM   Pulse 70 10/23/2019  3:10 PM   Resp 12 10/23/2019  3:10 PM   SpO2 99 % 10/23/2019  3:10 PM   Vitals shown include unvalidated device data.

## 2019-10-23 NOTE — PROGRESS NOTES
Problem: Mobility Impaired (Adult and Pediatric)  Goal: *Acute Goals and Plan of Care (Insert Text)  Description  Physical Therapy Goals   Initiated 10/23/2019 and to be accomplished within 7 days. 1.  Patient will complete all bed mobility with independence in order to prepare for EOB/OOB activity. 2.  Patient will perform sit <> stand with independence in order to prepare for OOB/gait activity. 3.  Patient will perform bed to chair transfers with independence in order to promote mobility and encourage seated activity to progress towards their prior level of function. 4.  Patient will ambulate 300 feet with independence using LRAD in order to prepare for safe negotiation of their environment. 5.  Patient will ascend/descend 20 steps with S handrail use with independence in order to prepare for safe exit/entry into their home environment. PLOF: Patient lives with family in a 2 story home with 20 steps to the 2nd floor. Independent for mobility       Outcome: Progressing Towards Goal   PHYSICAL THERAPY EVALUATION    Patient: Maco Patricia (70 y.o. 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 Supervision/SBA for supine > sit with instruction in log roll to reduce pain with bed mobility. Patient demo'ing good seated/standing balance with activity. Supervision for sit > stand with walker for support. Supervision for ambulation x 150 ft; mimimally increased trunk sway with initial gait which improves with time/distance. Patient returned to room. Patient independent with toileting/pericare. Patient returned to bed with supervision; able to ambulate back to bed without AD and steady gait.  Left supine in bed with all needs. Patient presenting with deficits in: Bed Mobility, Transfers, Gait and Stairs    Patient educated on role of PT, PT POC, bed mobility, transfers, gait, and safety with mobility as indicated. Recommend d/c home likely without need for skilled PT. Patient will benefit from skilled intervention to address the above impairments. Patient's rehabilitation potential is considered to be Excellent  Factors which may influence rehabilitation potential include:   ? None noted  ? Mental ability/status  ? Medical condition  ? Home/family situation and support systems  ? Safety awareness  ? Pain tolerance/management  ? Other:      PLAN :  Recommendations and Planned Interventions:   ?           Bed Mobility Training             ? Neuromuscular Re-Education  ? Transfer Training                   ? Orthotic/Prosthetic Training  ? Gait Training                          ? Modalities  ? Therapeutic Exercises           ? Edema Management/Control  ? Therapeutic Activities            ? Family Training/Education  ? Patient Education  ? Other (comment):    Frequency/Duration: Patient will be followed by physical therapy 1 - 2 more visits to address goals. Discharge Recommendations: None  Further Equipment Recommendations for Discharge: N/A     SUBJECTIVE:   Patient stated I had pain but it didn't limit me. 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 Dr. Maryam Hastings. CPDD (calcium pyrophosphate deposition disease)     Depression     GERD (gastroesophageal reflux disease)     Hyperlipidemia     Hypertension     Prediabetes     Primary osteoarthritis involving multiple joints 9/6/2011    Rheumatoid arthritis ([facility] [address] 2013    negative RF; elevated anti-CCP. Dr. Gracie Bhat. Past Surgical History:   Procedure Laterality Date    HX AMPUTATION FINGER Left     index    HX CARPAL TUNNEL RELEASE Bilateral     HX CATARACT REMOVAL Left 01/2018    HX CATARACT REMOVAL Bilateral 2018    HX COLONOSCOPY      HX HEENT      sinus, tonsillectomy    HX HERNIA REPAIR      HX MOHS PROCEDURES      bilateral     HX ORTHOPAEDIC      lef knee surgery, removed cartilage. HX ROTATOR CUFF REPAIR Right      Barriers to Learning/Limitations: None  Compensate with: N/A  Home Situation:  Home Situation  Home Environment: Private residence  # Steps to Enter: 0  One/Two Story Residence: Two story  # of Interior Steps: 21  Interior Rails: Both  Living Alone: No  Support Systems: Spouse/Significant Other/Partner, Family member(s)  Patient Expects to be Discharged to[de-identified] Private residence  Current DME Used/Available at Home: None  Critical Behavior:  Neurologic State: Alert  Orientation Level: Oriented X4  Cognition: Follows commands     Psychosocial  Patient Behaviors: Calm; Cooperative  Purposeful Interaction: Yes    Strength:    Strength: Within functional limits    Tone & Sensation:   Tone: Normal    Range Of Motion:  AROM: Within functional limits    Functional Mobility:  Bed Mobility:     Supine to Sit: Supervision  Sit to Supine: Supervision     Transfers:  Sit to Stand: Supervision  Stand to Sit: Supervision    Balance:   Sitting: Intact  Standing: Intact; With support; Without support  Ambulation/Gait Training:  Distance (ft): 150 Feet (ft)  Assistive Device: Walker  Ambulation - Level of Assistance: Supervision     Gait Description (WDL): Exceptions to WDL    Pain:  Incisional back pain  Pain level pre-treatment: 5/10   Pain level post-treatment: 5/10   Pain Intervention(s) : Rest, ice    Activity Tolerance:   Good    Please refer to the flowsheet for vital signs taken during this treatment. After treatment:   ?         Patient left in no apparent distress sitting up in chair  ?          Patient left in no apparent distress in bed  ? Call bell left within reach  ? Nursing notified  ? Caregiver present  ? Bed alarm activated  ? SCDs applied    COMMUNICATION/EDUCATION:   ?         Role of Physical Therapy in the acute care setting. ?         Fall prevention education was provided and the patient/caregiver indicated understanding. ? Patient/family have participated as able in goal setting and plan of care. ?         Patient/family agree to work toward stated goals and plan of care. ?         Patient understands intent and goals of therapy, but is neutral about his/her participation. ? Patient is unable to participate in goal setting/plan of care: ongoing with therapy staff. ?         Other:     Thank you for this referral.  Liz Burns   Time Calculation: 24 mins      Eval Complexity: History: MEDIUM  Complexity : 1-2 comorbidities / personal factors will impact the outcome/ POC Exam:MEDIUM Complexity : 3 Standardized tests and measures addressing body structure, function, activity limitation and / or participation in recreation  Presentation: LOW Complexity : Stable, uncomplicated  Clinical Decision Making:Low Complexity Supervision for mobility  Overall Complexity:LOW

## 2019-10-23 NOTE — ANESTHESIA PREPROCEDURE EVALUATION
Relevant Problems   No relevant active problems       Anesthetic History   No history of anesthetic complications            Review of Systems / Medical History  Patient summary reviewed and pertinent labs reviewed    Pulmonary        Sleep apnea: No treatment           Neuro/Psych         Psychiatric history (Depression)     Cardiovascular    Hypertension: well controlled              Exercise tolerance: >4 METS     GI/Hepatic/Renal     GERD: well controlled           Endo/Other        Arthritis (Rheumatoid )     Other Findings              Physical Exam    Airway  Mallampati: II  TM Distance: 4 - 6 cm  Neck ROM: normal range of motion   Mouth opening: Normal     Cardiovascular  Regular rate and rhythm,  S1 and S2 normal,  no murmur, click, rub, or gallop             Dental  No notable dental hx       Pulmonary  Breath sounds clear to auscultation               Abdominal  GI exam deferred       Other Findings            Anesthetic Plan    ASA: 2  Anesthesia type: general          Induction: Intravenous  Anesthetic plan and risks discussed with: Patient

## 2019-10-23 NOTE — PERIOP NOTES
TRANSFER - OUT REPORT:    Verbal report given to [facility] RN on Juliann Brewster  being transferred to  for routine post - op       Report consisted of patients Situation, Background, Assessment and   Recommendations(SBAR). Information from the following report(s) SBAR, Kardex and MAR was reviewed with the receiving nurse. Lines:   Peripheral IV 10/23/19 Left Hand (Active)   Site Assessment Clean, dry, & intact 10/23/2019  2:06 PM   Phlebitis Assessment 0 10/23/2019  2:06 PM   Infiltration Assessment 0 10/23/2019  2:06 PM   Dressing Status Clean, dry, & intact 10/23/2019  2:06 PM   Dressing Type Transparent;Tape 10/23/2019  2:06 PM   Hub Color/Line Status Pink 10/23/2019  2:06 PM   Alcohol Cap Used No 10/23/2019 10:43 AM        Opportunity for questions and clarification was provided.       Patient transported with:   O2 @ 3 liters  Registered Nurse  [facility]

## 2019-10-23 NOTE — PROGRESS NOTES
1713: Pt given Flomax for urinary retention. Pt reports feeling very full and needing to urinate but not able to.     1835: Pt still unable to void and feeling discomfort due to full bladder. Pt straight cathed for 850mL of urine.

## 2019-10-23 NOTE — PROGRESS NOTES
OR Today  R L4/5 Lami  Hx: HTN    VSS, LS clear, Apical pulse regular  Dressing clean, dry and intact  UA: Due to void  PT:due to ambulate  Neuro  Intact reports RLE pain is resolved. Just came up from PACU, doing well. Moving all extremities. Good strength to BLE.      Harmony Saini, NP

## 2019-10-24 VITALS
HEIGHT: 68 IN | SYSTOLIC BLOOD PRESSURE: 146 MMHG | TEMPERATURE: 97.5 F | WEIGHT: 200 LBS | RESPIRATION RATE: 18 BRPM | DIASTOLIC BLOOD PRESSURE: 73 MMHG | HEART RATE: 65 BPM | OXYGEN SATURATION: 97 % | BODY MASS INDEX: 30.31 KG/M2

## 2019-10-24 LAB
APPEARANCE UR: CLEAR
BACTERIA URNS QL MICRO: ABNORMAL /HPF
BILIRUB UR QL: NEGATIVE
COLOR UR: YELLOW
EPITH CASTS URNS QL MICRO: ABNORMAL /LPF (ref 0–5)
GLUCOSE UR STRIP.AUTO-MCNC: NEGATIVE MG/DL
HGB UR QL STRIP: ABNORMAL
KETONES UR QL STRIP.AUTO: NEGATIVE MG/DL
LEUKOCYTE ESTERASE UR QL STRIP.AUTO: ABNORMAL
NITRITE UR QL STRIP.AUTO: NEGATIVE
PH UR STRIP: 6.5 [PH] (ref 5–8)
PROT UR STRIP-MCNC: NEGATIVE MG/DL
RBC #/AREA URNS HPF: ABNORMAL /HPF (ref 0–5)
SP GR UR REFRACTOMETRY: 1 (ref 1–1.03)
UROBILINOGEN UR QL STRIP.AUTO: 0.2 EU/DL (ref 0.2–1)
WBC URNS QL MICRO: ABNORMAL /HPF (ref 0–4)

## 2019-10-24 PROCEDURE — 87086 URINE CULTURE/COLONY COUNT: CPT

## 2019-10-24 PROCEDURE — 74011250637 HC RX REV CODE- 250/637: Performed by: ORTHOPAEDIC SURGERY

## 2019-10-24 PROCEDURE — 74011250636 HC RX REV CODE- 250/636: Performed by: ORTHOPAEDIC SURGERY

## 2019-10-24 PROCEDURE — 77030035230

## 2019-10-24 PROCEDURE — 81001 URINALYSIS AUTO W/SCOPE: CPT

## 2019-10-24 PROCEDURE — 97165 OT EVAL LOW COMPLEX 30 MIN: CPT

## 2019-10-24 PROCEDURE — 97530 THERAPEUTIC ACTIVITIES: CPT

## 2019-10-24 PROCEDURE — 97116 GAIT TRAINING THERAPY: CPT

## 2019-10-24 RX ORDER — TRAMADOL HYDROCHLORIDE 50 MG/1
50 TABLET ORAL
Qty: 20 TAB | Refills: 0 | Status: SHIPPED | OUTPATIENT
Start: 2019-10-24 | End: 2019-10-27

## 2019-10-24 RX ADMIN — LISINOPRIL 40 MG: 40 TABLET ORAL at 08:52

## 2019-10-24 RX ADMIN — ACETAMINOPHEN 1000 MG: 500 TABLET ORAL at 05:55

## 2019-10-24 RX ADMIN — Medication 10 ML: at 06:00

## 2019-10-24 RX ADMIN — TAMSULOSIN HYDROCHLORIDE 0.4 MG: 0.4 CAPSULE ORAL at 08:53

## 2019-10-24 RX ADMIN — HYDROXYCHLOROQUINE SULFATE 400 MG: 200 TABLET, FILM COATED ORAL at 08:52

## 2019-10-24 RX ADMIN — GABAPENTIN 200 MG: 100 CAPSULE ORAL at 08:53

## 2019-10-24 RX ADMIN — HYDRALAZINE HYDROCHLORIDE 25 MG: 25 TABLET, FILM COATED ORAL at 08:52

## 2019-10-24 RX ADMIN — CEFAZOLIN 2 G: 10 INJECTION, POWDER, FOR SOLUTION INTRAVENOUS at 04:41

## 2019-10-24 RX ADMIN — SERTRALINE HYDROCHLORIDE 75 MG: 50 TABLET ORAL at 08:52

## 2019-10-24 RX ADMIN — AMLODIPINE BESYLATE 10 MG: 10 TABLET ORAL at 08:53

## 2019-10-24 RX ADMIN — ATORVASTATIN CALCIUM 10 MG: 10 TABLET, FILM COATED ORAL at 08:52

## 2019-10-24 RX ADMIN — PANTOPRAZOLE SODIUM 40 MG: 40 TABLET, DELAYED RELEASE ORAL at 08:53

## 2019-10-24 NOTE — PROGRESS NOTES
Problem: Mobility Impaired (Adult and Pediatric)  Goal: *Acute Goals and Plan of Care (Insert Text)  Description  Physical Therapy Goals   Initiated 10/23/2019 and to be accomplished within 7 days. 1.  Patient will complete all bed mobility with independence in order to prepare for EOB/OOB activity. 2.  Patient will perform sit <> stand with independence in order to prepare for OOB/gait activity. 3.  Patient will perform bed to chair transfers with independence in order to promote mobility and encourage seated activity to progress towards their prior level of function. 4.  Patient will ambulate 300 feet with independence using LRAD in order to prepare for safe negotiation of their environment. 5.  Patient will ascend/descend 20 steps with S handrail use with independence in order to prepare for safe exit/entry into their home environment. PLOF: Patient lives with family in a 2 story home with 20 steps to the 2nd floor. Independent for mobility       Outcome: Progressing Towards Goal     PHYSICAL THERAPY TREATMENT    Patient: Davied Armenta (17 y.o. 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 Pt voiced good recall of precautions from previous OT tx and was able to maintain t/o tx w/ some cueing, especially during bed mobility utilizing log roll technique. Pt was able to display good balance and stability t/o tx in sitting and standing w/o support, even during sharp turns. Pt amb w/o AD for a total of 300' this visit only displaying decreased pace. Pt able to safely perform 8 steps at a SBA to sup level using both hand rails.  Pt returned to room to EOB, deferring sitting in recliner at this time, provided further education on importance to change of position and upright sitting posture, importance of maintaining precautions for healing and to mitigate pain, and left in room w/ all needs in reach. From a PT standpoint, pt is safe for home mobility and would benefit from home health PT for safety assessment and to ensure of maintenance of precautions. Pt left in room w/ all needs in reach and nurse notified. Progression toward goals: good   ? Improving appropriately and progressing toward goals  ? Improving slowly and progressing toward goals  ? Not making progress toward goals and plan of care will be adjusted     PLAN:  Patient continues to benefit from skilled intervention to address the above impairments. Continue treatment per established plan of care. Discharge Recommendations:  Home Health  Further Equipment Recommendations for Discharge:  none      SUBJECTIVE:   Patient stated I don't need that walker.     OBJECTIVE DATA SUMMARY:   Critical Behavior:  Neurologic State: Alert  Orientation Level: Oriented X4  Cognition: Follows commands, Appropriate decision making  Safety/Judgement: Fall prevention, Awareness of environment  Functional Mobility Training:  Bed Mobility:  Supine to Sit: Supervision  Sit to Supine: Supervision  Scooting: Supervision  Transfers:  Sit to Stand: Supervision  Stand to Sit: Supervision  Balance:  Sitting: Intact  Standing: Intact; Without support  Standing - Static: Good  Standing - Dynamic : Good   Range Of Motion:   AROM: Within functional limits  Ambulation/Gait Training:  Distance (ft): 300 Feet (ft)  Assistive Device: (none)  Ambulation - Level of Assistance: Stand-by assistance;Supervision  Gait Abnormalities: Decreased step clearance  Base of Support: Center of gravity altered  Speed/Ronda: Pace decreased (<100 feet/min)  Step Length: Right shortened;Left shortened  Stairs:  Number of Stairs Trained: 8  Stairs - Level of Assistance: Stand-by assistance;Supervision  Rail Use: Both      Pain:  Pain level pre-treatment: 0/10  Pain level post-treatment: 0/10       Activity Tolerance:   Good   Please refer to the flowsheet for vital signs taken during this treatment. After treatment:   ? Patient left in no apparent distress sitting up in chair  ? Patient left in no apparent distress in bed  ? Call bell left within reach  ? Nursing notified  ? Caregiver present  ? Bed alarm activated  ? SCDs applied      COMMUNICATION/EDUCATION:   ?         Role of Physical Therapy in the acute care setting. ?         Fall prevention education was provided and the patient/caregiver indicated understanding. ? Patient/family have participated as able in working toward goals and plan of care. ?         Patient/family agree to work toward stated goals and plan of care. ?         Patient understands intent and goals of therapy, but is neutral about his/her participation. ? Patient is unable to participate in stated goals/plan of care: ongoing with therapy staff.   ?         Other:        Molly Arambula, EVELIN   Time Calculation: 23 mins

## 2019-10-24 NOTE — OP NOTES
[address] [place] [address]   OPERATIVE REPORT    Name:  Radha King  MR#:   [id number]  :  1940  ACCOUNT #:  [de-identified]  DATE OF SERVICE:  10/23/2019    PREOPERATIVE DIAGNOSES:  Spinal stenosis, synovial cyst, L4-5 on right. POSTOPERATIVE DIAGNOSES:  Spinal stenosis, synovial cyst, L4-5 on right. PROCEDURE PERFORMED:  Right L4-5 hemilaminectomy, medial facetectomy, resection of synovial cyst.    SURGEON:  Jose Washington MD.    ASSISTANT:  None. ANESTHESIA:  General endotracheal.    COMPLICATIONS:  None. SPECIMENS REMOVED:  None. IMPLANTS:  None. ESTIMATED BLOOD LOSS:  5 to 10 mL. FINDINGS:  The patient had severe spinal stenosis, inflammatory changes, and synovial cyst causing compression to the L5 root on the right. PROCEDURE:  Following induction of general endotracheal anesthesia, the patient was turned to prone position on a spinal frame. The patient was prepped and draped in usual fashion. Midline incision was made. A paramedian incision was made at L4-5 on the right and C-arm image verified our surgical level. A hemilaminotomy was done with resection of anterior ligamentum flavum. Slow progressive decompression, the dura was defined and this large inflammatory ligamentous and synovial mass was resected along with the overlying medial facet. The nerve root was clearly irritable. This progressed to decompression from pedicle to pedicle. The mass was resected and the nerve root freed and mobilized. The worst area of stenosis was in the midportion of the facet. Following a completion, one could palpate out the foramina along the lateral recess without stenosis, the disk space itself felt firm and relatively flat and I did not do a discectomy. Wound was copiously irrigated. A pledgeted Gelfoam and thrombin placed over the laminectomy site. Vancomycin powder instilled for infection prophylaxis.   Lumbodorsal fascia was closed with #1 Vicryl, subcutaneous tissue was closed with 2-0 Vicryl, skin closed with a 4-0 Monocryl subcuticular suture and Dermabond. A sterile occlusive dressing was placed upon the wound. All counts were correct.       Luis Alberto Armstrong MD      [id number]  D:  10/23/2019 14:01  T:  10/23/2019 23:36  JOB #:  [id number]  CC:

## 2019-10-24 NOTE — HOME CARE
Rounded on this ACO pt - discussed HC - left brochure with pt - he is d/c today- no d/c needs -  [facility] will be available if needed - XIOMY Villalobos RN

## 2019-10-24 NOTE — PROGRESS NOTES
conducted an initial consultation and Spiritual Assessment for Li Ballesteros, who is a 70 y. o.,male. Patients Primary Language is: [place]. According to the patients EMR Caodaism Affiliation is: [facility].     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 [address] 07/28/2014    Hypertension 09/06/2011    Primary osteoarthritis involving multiple joints 09/06/2011    Hyperlipidemia 09/06/2011    GERD (gastroesophageal reflux disease) 09/06/2011    Pre-diabetes 09/06/2011    BPH with obstruction/lower urinary tract symptoms         The  provided the following Interventions:  Initiated a relationship of care and support. Patient was standing by his bed upon my visit. Explored issues of cinda, belief, spirituality and Orthodox/ritual needs while hospitalized. Listened empathically as patient shared that Kamila Mccormick is well with me. I'm ready to go home tomorrow. \" His wife will be picking him up and bringing him home.   Provided information about Spiritual Care Services. Offered assurance of continued prayers on patient's behalf. Chart reviewed. The following outcomes where achieved:  Patient shared limited information about both his medical narrative and spiritual journey/beliefs.  confirmed Patient's Mandaeism Affiliation with Damian tradition. Patient processed feeling about current hospitalization. Patient expressed gratitude for 's visit. Assessment:  Patient denies any spiritual needs or any concerns at the moment. Patient does not have any Hinduism/cultural needs that will affect patients preferences in health care. There are no spiritual or Hinduism issues which require intervention at this time. Plan:  Chaplains will continue to follow and will provide pastoral care on an as needed/requested basis.  recommends bedside caregivers page  on duty if patient shows signs of acute spiritual or emotional distress.     [address] [facility], [place]   [phone]

## 2019-10-24 NOTE — PROGRESS NOTES
Patient refused home health, educated on benefits. Pt has caregiver at home for wife, who will help him out as well. Reason for Admission:  Synovial cyst of lumbar facet joint [M71.38]  Spinal stenosis [M48.00]  Synovial cyst [M71.30]                 RRAT Score:    18            Plan for utilizing home health:    REFUSED                      Likelihood of Readmission:   Moderate                         Do you (patient/family) have any concerns for transition/discharge?  no    Transition of Care Plan:       Initial assessment completed with patient. Cognitive status of patient: oriented to time, place, person and situation. Face sheet information confirmed:  yes. The patient designates wife and caregiver to participate in his discharge plan and to receive any needed information. This patient lives in a single family home with wife and wife's caregiver. Patient is able to navigate steps as needed. Prior to hospitalization, patient was considered to be independent with ADLs/IADLS : yes . Patient has a current ACP document on file: yes  The wife's caregiver will be available to transport patient home upon discharge. The patient uses NO medical equipment available in the home. Patient is not currently active with home health. Patient has not stayed in a skilled nursing facility or rehab. This patient is on dialysis :no       Currently, the discharge plan is Home. The patient states that he can obtain his medications from the pharmacy, and take his medications as directed. Patient's current insurance is Medicare and [address] [place] [address] Management Interventions  PCP Verified by CM:  Yes  Palliative Care Criteria Met (RRAT>21 & CHF Dx)?: No  Mode of Transport at Discharge: Self  Transition of Care Consult (CM Consult): Discharge Planning  Physical Therapy Consult: Yes  Occupational Therapy Consult: Yes  Current Support Network: Lives with Spouse, Has Personal Caregivers  Confirm Follow Up Transport: Self  Plan discussed with Pt/Family/Caregiver: Yes  Discharge Location  Discharge Placement: Home        RAVEN Bello  Case Management  [phone]

## 2019-10-24 NOTE — ROUTINE PROCESS
2000 Patient in bed AAOx4, watching tv. Patient denies pain at this time,dressing to back dry and clean ,neuro intact move all extremities, denies numbness or tingling. Patient ambulate to bathroom. 2330 Patient attempted to void several times but unsuccesful, bladder scanned 750 ml. [address] cath. 0030 Patient ST cath 1150 ml of yellow urine. Tolerated procedure. 0600 Patient voiding small amounts of urine x3, post void bladder scanned 650, patient refused hidalgo cath atthis time.

## 2019-10-24 NOTE — DISCHARGE INSTRUCTIONS
Post-Operative Discharge Instructions [facility]  [phone]      At your 2-week post-operative visit we will remove any skin staples or sutures, if present. (Appointment was made at the time you scheduled your surgery)    Call office or physician on-call for any of the following:  · Fever greater than 100.5  · Uncontrollable chills  · Drainage from incision  · Pain not controlled by pain medication  · New pain  · New weakness or progressive weakness  · Food sticking in throat or excessive coughing when swallowing    Stop all anti-inflammatories (arthritis medication) such as Ibuprofen, Motrin, Aleve, Voltaren, Relafen, Naproxen, Arthrotec, etc. for 12 weeks ONLY if you had a neck or back Fusion. You may take Tylenol or acetaminophen over the counter. Take pain medication as ordered. Use ice to your back to help with pain. May remove MAYCO stockings when discharged from the hospital.    May shower on the third day after surgery. Leave dressing on while you shower; the dressing is waterproof as long as the edges are sealed. Change dressing after 1 week, or sooner if saturated with drainage. Replace with a gauze dressing. No driving until your first post-operative visit. If you must drive, do not take pain medications that may alter your abilities. Lumbar braces and neck collars are for comfort only. Walking is the best therapy after back surgery. Avoid extremes of bending, twisting, or lifting. All post-operative surgical patients should function within the range of the pain. \"If it hurts - do not do it. \"    Follow your back precautions: No bending, lifting or twisting. Make sure to log roll in and out of bed. Best of luck with your back recovery. [address] YOU for choosing the [facility] for you Spine Surgery.

## 2019-10-24 NOTE — PROGRESS NOTES
vss afeb  Neuro intact  Leg pain gone  Had retention last night  Hx of prostate issues - needs turp  On flomax  Has voided since cath last night  Will dc home  Fu with me  Sees Dr Drake Delgado of urology. Spoke to Drake Delgado- rec.  Leg bag with fu on Monday with urologist

## 2019-10-24 NOTE — PROGRESS NOTES
Problem: Pain  Goal: *Control of Pain  Outcome: Progressing Towards Goal  Goal: *PALLIATIVE CARE:  Alleviation of Pain  Outcome: Progressing Towards Goal     Problem: Patient Education: Go to Patient Education Activity  Goal: Patient/Family Education  Outcome: Progressing Towards Goal     Problem: Patient Education: Go to Patient Education Activity  Goal: Patient/Family Education  Outcome: Progressing Towards Goal     Problem: Falls - Risk of  Goal: *Absence of Falls  Description  Document [place] Fall Risk and appropriate interventions in the flowsheet.   Outcome: Progressing Towards Goal  Note:   Fall Risk Interventions:  Mobility Interventions: Patient to call before getting OOB                             Problem: Patient Education: Go to Patient Education Activity  Goal: Patient/Family Education  Outcome: Progressing Towards Goal

## 2019-10-24 NOTE — PROGRESS NOTES
Problem: Self Care Deficits Care Plan (Adult)  Goal: *Acute Goals and Plan of Care (Insert Text)  Outcome: Resolved/Met     OCCUPATIONAL THERAPY EVALUATION/DISCHARGE    Patient: Steve Mitchell (44 y.o. 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 ASSESSMENT AND RECOMMENDATIONS:  Upon entering the room, the pt was supine in bed, alert, and agreeable to participate in OT evaluation with min encouragement. Patient required education on the role of OT and the evaluation process after surgery as pt initially stating \"I'm good, I can walk and do everything\". Back precautions reviewed and handout given to patient; patient  verbalized understanding but needed reinforcement during LBD. Patient performed bed mobility with Supervision rolling to L side for comfort and was able to sit EOB for objective assessment with G balance. Patient able to doff BLE socks mod (I) but was Supervision for donning as he began to bend forward and needed reinforcement to tailor sit instead. Patient educated on AE (adaptive equipment) for LBD such as reacher, sock aid; patient declined. Patient agreeable and appreciative of long handled sponge given for bathing. The pt presents with good static standing and fair+ dynamic standing balance, however will defer to PT for functional balance and functional mobility tasks. At this time, pt with no deficits that impede pt function with self-care, functional mobility, or functional transfers in preporation for ADL tasks. Patient is safe to d/c home with family supervision. No further skilled OT needed. OT to d/c from caseload. Skilled occupational therapy is not indicated at this time.   Discharge Recommendations: None  Further Equipment Recommendations for Discharge: N/A      SUBJECTIVE:   Patient stated I dont have to logroll    OBJECTIVE DATA SUMMARY:     Past Medical History:   Diagnosis Date    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 Dr. Paul Murphy. CPDD (calcium pyrophosphate deposition disease)     Depression     GERD (gastroesophageal reflux disease)     Hyperlipidemia     Hypertension     Prediabetes     Primary osteoarthritis involving multiple joints 9/6/2011    Rheumatoid arthritis ([facility] [address] 2013    negative RF; elevated anti-CCP. Dr. Catalina Foote. Past Surgical History:   Procedure Laterality Date    HX AMPUTATION FINGER Left     index    HX CARPAL TUNNEL RELEASE Bilateral     HX CATARACT REMOVAL Left 01/2018    HX CATARACT REMOVAL Bilateral 2018    HX COLONOSCOPY      HX HEENT      sinus, tonsillectomy    HX HERNIA REPAIR      HX MOHS PROCEDURES      bilateral     HX ORTHOPAEDIC      lef knee surgery, removed cartilage. HX ROTATOR CUFF REPAIR Right      Barriers to Learning/Limitations: None  Compensate with: visual, verbal, tactile, kinesthetic cues/model    Home Situation:   Home Situation  Home Environment: Private residence  # Steps to Enter: 0  One/Two Story Residence: Two story  # of Interior Steps: 21  Interior Rails: Both  Living Alone: No  Support Systems: Spouse/Significant Other/Partner, Family member(s)  Patient Expects to be Discharged to[de-identified] Private residence  Current DME Used/Available at Home: None  Tub or Shower Type: Tub/Shower combination  ? Right hand dominant   ? Left hand dominant    Cognitive/Behavioral Status:  Neurologic State: Alert  Orientation Level: Oriented X4  Cognition: Follows commands;Poor safety awareness(precautions)  Safety/Judgement: Fall prevention; Awareness of environment    Skin: Intact  Edema: None noted    Vision/Perceptual:    Acuity: Within Defined Limits      Coordination: BUE  Fine Motor Skills-Upper: Left Intact; Right Intact    Gross Motor Skills-Upper: Left Intact; Right Intact    Balance:  Sitting: Intact  Standing: Impaired; Without support  Standing - Static: Good  Standing - Dynamic : Fair    Strength: BUE  Strength: Within functional limits    Tone & Sensation: BUE  Tone: Normal  Sensation: Intact    Range of Motion: BUE  AROM: Within functional limits      Functional Mobility and Transfers for ADLs:  Bed Mobility:  Supine to Sit: Supervision  Sit to Supine: Supervision  Scooting: Supervision    Transfers:  Sit to Stand: Supervision  Stand to Sit: Supervision      ADL Assessment:  Feeding: Independent    Oral Facial Hygiene/Grooming: Independent    Bathing: Modified independent; Additional time    Upper Body Dressing: Independent    Lower Body Dressing: Additional time;Supervision    Toileting: Independent(cath)      ADL Intervention:  Upper Body Dressing Assistance  Dressing Assistance: Independent  Shirt simulation with hospital gown: Independent    Lower Body Dressing Assistance  Dressing Assistance: Supervision  Socks: Supervision  Position Performed: Seated edge of bed    Cognitive Retraining  Safety/Judgement: Fall prevention; Awareness of environment    Pain:  Pain level pre-treatment: 2/10, catheter   Pain level post-treatment: 2/10, catheter   Pain Intervention(s): Medication (see MAR); Response to intervention: Nurse notified, See doc flow    Activity Tolerance:   Patient demonstrated good activity tolerance during OT evaluation. Please refer to the flowsheet for vital signs taken during this treatment. After treatment:   ?  Patient left in no apparent distress sitting up in chair  ? Patient left in no apparent distress in bed  ? Call bell left within reach  ? Nursing notified  ? Caregiver present  ? Bed alarm activated    COMMUNICATION/EDUCATION:   ?      Role of Occupational Therapy in the acute care setting  ?       Home safety education was provided and the patient/caregiver indicated understanding. ? Patient/family have participated as able and agree with findings and recommendations. ?      Patient is unable to participate in plan of care at this time. Thank you for this referral.  Melissa Sumner OTR/L  Time Calculation: 10 mins      Eval Complexity: History: LOW Complexity : Brief history review ; Examination: LOW Complexity : 1-3 performance deficits relating to physical, cognitive , or psychosocial skils that result in activity limitations and / or participation restrictions ;    Decision Making:LOW Complexity : No comorbidities that affect functional and no verbal or physical assistance needed to complete eval tasks

## 2019-10-24 NOTE — PROGRESS NOTES
Face to Face Encounter    Patients Name: Landy West  YOB: 1940    Primary Diagnosis: s/p lami    Date of Face to Face:   10/24/2019                                   Face to Face Encounter findings are related to primary reason for home care:   yes    1. I certify that the patient needs intermittent skilled nursing care, physical therapy and/or speech therapy. I will not be following this patient in the Community and Dr. Stefany Robbins will be responsible for signing the [address] 2. Initial Orders for Care: Must be completed only if Face to Face MD will not be signing the [address] Physical Therapy    3. I certify that this patient is homebound for the following reason(s): Requires considerable and taxing effort to leave the home     4. I certify that this patient is under my care and that I, or a nurse practitioner or [facility] working with me, had a Face-to-Face Encounter that meets the physician Face-to-Face Encounter requirements. Document the physical findings from the Face-to-Face Encounter that support the need for skilled services:  Has wound that requires skilled nursing assessment and treatment  and Has new diagnosis that requires skilled nursing teaching and intervention     Sergey Gonsalves NP  10/24/2019

## 2019-10-24 NOTE — DISCHARGE SUMMARY
Discharge  Summary     Patient: Caitlyn Barros MRN: [id number]  SSN: [id number]    YOB: 1940  Age: 70 y.o.   Sex: male       Admit Date: 10/23/2019    Discharge Date: 10/24/2019      Admission Diagnoses: Synovial cyst of lumbar facet joint [M71.38]  Spinal stenosis [M48.00]  Synovial cyst [M71.30]    Discharge Diagnoses:   Problem List as of 10/24/2019 Date Reviewed: 10/17/2019          Codes Class Noted - Resolved    Synovial cyst of lumbar facet joint ICD-10-CM: M71.38  ICD-9-CM: 727.40  10/23/2019 - Present        Spinal stenosis ICD-10-CM: M48.00  ICD-9-CM: 724.00  10/23/2019 - Present        Synovial cyst ICD-10-CM: M71.30  ICD-9-CM: 727.40  10/23/2019 - Present        Sleep apnea ICD-10-CM: G47.30  ICD-9-CM: 780.57  8/17/2019 - Present        PND (paroxysmal nocturnal dyspnea) ICD-10-CM: R06.00  ICD-9-CM: 786.09  8/17/2019 - Present        Right sided sciatica ICD-10-CM: M54.31  ICD-9-CM: 724.3  8/17/2019 - Present        Lumbar facet arthropathy ICD-10-CM: M47.816  ICD-9-CM: 721.3  8/17/2019 - Present        Macrocytosis without anemia ICD-10-CM: D75.89  ICD-9-CM: 289.89  2/23/2019 - Present        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 [id number]: A03.872W  ICD-9-CM: 886.1  9/30/2018 - Present        Candidal intertrigo ICD-10-CM: B37.2  ICD-9-CM: 112.3  9/30/2018 - Present        APC (atrial premature contractions) ICD-10-CM: I49.1  ICD-9-CM: 427.61  8/15/2018 - Present        Class 1 obesity due to excess calories with serious comorbidity and body mass index (BMI) of 33.0 to 33.9 in adult ICD-10-CM: E66.09, Z68.33  ICD-9-CM: 278.00, V85.33  2/20/2018 - Present        Rectal bleeding ICD-10-CM: K62.5  ICD-9-CM: 569.3  8/13/2017 - Present        Abnormal glucose ICD-10-CM: R73.09  ICD-9-CM: 790.29  2/5/2017 - Present        Depression ICD-10-CM: F32.9  ICD-9-CM: 213  2/5/2017 - Present        CPDD (calcium pyrophosphate deposition disease) ICD-10-CM: M11.20  ICD-9-CM: 275.49, 712.20  Unknown - Present        Colon polyps ICD-10-CM: K63.5  ICD-9-CM: 211.3  1/27/2016 - Present        Vitamin D deficiency ICD-10-CM: E55.9  ICD-9-CM: 268.9  1/19/2015 - Present        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 ([facility] ICD-10-CM: M06.041, M06.042  ICD-9-CM: 714.0  7/28/2014 - Present        Hypertension ICD-10-CM: I10  ICD-9-CM: 401.9  9/6/2011 - Present        Primary osteoarthritis involving multiple joints ICD-10-CM: M15.0  ICD-9-CM: 715.09  9/6/2011 - Present        Hyperlipidemia ICD-10-CM: E78.5  ICD-9-CM: 272.4  9/6/2011 - Present        GERD (gastroesophageal reflux disease) ICD-10-CM: K21.9  ICD-9-CM: 530.81  9/6/2011 - Present        Pre-diabetes ICD-10-CM: R73.03  ICD-9-CM: 790.29  9/6/2011 - Present        BPH with obstruction/lower urinary tract symptoms ICD-10-CM: N40.1, N13.8  ICD-9-CM: 600.01, 599.69  Unknown - Present        RESOLVED: Positive anti-CCP test ICD-10-CM: R76.8  ICD-9-CM: 795.79  3/19/2014 - 1/19/2015               Discharge Condition: Good  Procedure: R L4/5 [facility] Course: Normal hospital course for this procedure. Tolerated surgical intervention well. VSS Throughout. Neuro intact. Hx of prostate issues - needs turp. On flomax  Has voided since cath last night. Sees Dr Rosalia Gregory of urology. Spoke to Rsoalia Gregory- rec. Leg bag with fu on Monday with urologist Incision dry and intact, tolerating PO intake. Ambulatory. Disposition: home    Discharge Medications:   Current Discharge Medication List      START taking these medications    Details   traMADol (ULTRAM) 50 mg tablet Take 1 Tab by mouth every six (6) hours as needed for Pain for up to 3 days. Max Daily Amount: 200 mg. Qty: 20 Tab, Refills: 0    Associated Diagnoses: S/P lumbar laminectomy         CONTINUE these medications which have NOT CHANGED    Details   sertraline (ZOLOFT) 50 mg tablet Take 1.5 Tabs by mouth daily.   Qty: 1 Tab, Refills: 0 amLODIPine (NORVASC) 10 mg tablet TAKE 1 TABLET BY MOUTH ONCE DAILY  Qty: 90 Tab, Refills: 3      hydrALAZINE (APRESOLINE) 25 mg tablet Take 1 Tab by mouth two (2) times a day. Qty: 1 Tab, Refills: 0      gabapentin (NEURONTIN) 100 mg capsule Take 2 Caps by mouth three (3) times daily. Max Daily Amount: 600 mg. Qty: 180 Cap, Refills: 1    Associated Diagnoses: Spinal stenosis of lumbar region with radiculopathy      omeprazole (PRILOSEC) 20 mg capsule TAKE 1 CAPSULE BY MOUTH ONCE DAILY  Qty: 90 Cap, Refills: 1      tamsulosin (FLOMAX) 0.4 mg capsule Take 1 Cap by mouth daily. Indications: enlarged prostate with urination problem  Qty: 90 Cap, Refills: 3    Associated Diagnoses: BPH associated with nocturia      furosemide (LASIX) 40 mg tablet Take 1 Tab by mouth daily. Qty: 90 Tab, Refills: 3      lisinopril (PRINIVIL, ZESTRIL) 40 mg tablet Take 1 Tab by mouth daily. Qty: 90 Tab, Refills: 3      atorvastatin (LIPITOR) 10 mg tablet TAKE 1 TABLET BY MOUTH ONCE DAILY  Qty: 90 Tab, Refills: 3      potassium chloride (K-DUR, KLOR-CON) 20 mEq tablet Take 1 Tab by mouth daily. Qty: 90 Tab, Refills: 2      cycloSPORINE (RESTASIS) 0.05 % ophthalmic emulsion Administer 1 Drop to both eyes nightly. colchicine (MITIGARE) 0.6 mg capsule Take 0.6 mg by mouth every other day. cholecalciferol, vitamin D3, (VITAMIN D3) 2,000 unit tab Take 2,000 Units by mouth daily. diclofenac (VOLTAREN) 1 % topical gel Apply 4 g to affected area four (4) times daily. LUMIGAN 0.01 % ophthalmic drops Administer 1 Drop to both eyes nightly. hydroxychloroquine (PLAQUENIL) 200 mg tablet Take 400 mg by mouth daily. MULTIVITS/IRON FUM/FA/D3/LYCOP (MULTI FOR HIM PO) Take  by mouth daily. celecoxib (CELEBREX) 200 mg capsule Take 1 Cap by mouth two (2) times a day.   Qty: 180 Cap, Refills: 1               Follow-up Appointments   Procedures    FOLLOW UP VISIT Appointment in: Two Weeks     Standing Status: Standing     Number of Occurrences:   1     Order Specific Question:   Appointment in     Answer:    Two Weeks       Signed By: Chichi Salvador NP     October 24, 2019

## 2019-10-24 NOTE — PROGRESS NOTES
Problem: Pain  Goal: *Control of Pain  Outcome: Progressing Towards Goal  Goal: *PALLIATIVE CARE:  Alleviation of Pain  Outcome: Progressing Towards Goal     Problem: Patient Education: Go to Patient Education Activity  Goal: Patient/Family Education  Outcome: Progressing Towards Goal     Problem: Patient Education: Go to Patient Education Activity  Goal: Patient/Family Education  Outcome: Progressing Towards Goal     Problem: Falls - Risk of  Goal: *Absence of Falls  Description  Document Jr Marcio Fall Risk and appropriate interventions in the flowsheet.   Outcome: Progressing Towards Goal  Note:   Fall Risk Interventions:  Mobility Interventions: Patient to call before getting OOB                             Problem: Patient Education: Go to Patient Education Activity  Goal: Patient/Family Education  Outcome: Progressing Towards Goal     Problem: Infection - Risk of, Surgical Site Infection  Goal: *Absence of surgical site infection signs and symptoms  Outcome: Progressing Towards Goal     Problem: Patient Education: Go to Patient Education Activity  Goal: Patient/Family Education  Outcome: Progressing Towards Goal

## 2019-10-25 ENCOUNTER — PATIENT OUTREACH (OUTPATIENT)
Dept: INTERNAL MEDICINE CLINIC | Age: 79
End: 2019-10-25

## 2019-10-25 RX ORDER — MINERAL OIL
30 OIL (ML) ORAL DAILY PRN
COMMUNITY

## 2019-10-25 NOTE — PROGRESS NOTES
Hospital Discharge Follow-Up      Date/Time:  10/25/2019 4:36 PM    Patient was admitted to [facility] on 10/23/19 and discharged on 10/24/19 for L4/5 right lumbar laminectomy sp synovial cyst of lumbar facet joint. The physician discharge summary was available at the time of outreach. Patient was contacted within 1 business days of discharge. Top Challenges reviewed with the provider      Advance Care Planning:   Does patient have an Advance Directive:  not on file        Method of communication with provider :none    Inpatient RRAT score: 25  Was this a readmission? no   Patient stated reason for the readmission: N/A    Care Transition Nurse (CTN) contacted the patient by telephone to perform post hospital discharge assessment. Verified name and  with patient as identifiers. Provided introduction to self, and explanation of the CTN role. Patient reported 5/10 back pain. Applying ice pack. Reported numbness/tingling that was ging down his leg is gone. Deleon draining yellow urine. Reported it leaks a little when sometimes when standing or sitting up. No leaks when laying down. Patient received hospital discharge instructions. CTN reviewed discharge instructions and red flags with patient who verbalized understanding. Patient given an opportunity to ask questions and does not have any further questions or concerns at this time. The patient agrees to contact the PCP office for questions related to their healthcare. CTN provided contact information for future reference.     Disease Specific:   N/A    Patients top risk factors for readmission:  None at this time    Home Health orders at discharge: patient refused  [address]: N/A  Date of initial visit: [facility] ordered at discharge: none  [facility] received: N/A    Medication(s):   New Medications at Discharge: Ultram  Changed Medications at Discharge: None  Discontinued Medications at Discharge: None    Medication reconciliation was performed with patient, who verbalizes understanding of administration of home medications. There were no barriers to obtaining medications identified at this time. Referral to Pharm D needed: no     Current Outpatient Medications   Medication Sig    mineral oil liquid Take 30 mL by mouth daily as needed for Constipation.  traMADol (ULTRAM) 50 mg tablet Take 1 Tab by mouth every six (6) hours as needed for Pain for up to 3 days. Max Daily Amount: 200 mg.  sertraline (ZOLOFT) 50 mg tablet Take 1.5 Tabs by mouth daily.  amLODIPine (NORVASC) 10 mg tablet TAKE 1 TABLET BY MOUTH ONCE DAILY    hydrALAZINE (APRESOLINE) 25 mg tablet Take 1 Tab by mouth two (2) times a day. (Patient taking differently: Take 25 mg by mouth three (3) times daily.)    celecoxib (CELEBREX) 200 mg capsule Take 1 Cap by mouth two (2) times a day.  gabapentin (NEURONTIN) 100 mg capsule Take 2 Caps by mouth three (3) times daily. Max Daily Amount: 600 mg.    omeprazole (PRILOSEC) 20 mg capsule TAKE 1 CAPSULE BY MOUTH ONCE DAILY    tamsulosin (FLOMAX) 0.4 mg capsule Take 1 Cap by mouth daily. Indications: enlarged prostate with urination problem    furosemide (LASIX) 40 mg tablet Take 1 Tab by mouth daily.  lisinopril (PRINIVIL, ZESTRIL) 40 mg tablet Take 1 Tab by mouth daily.  atorvastatin (LIPITOR) 10 mg tablet TAKE 1 TABLET BY MOUTH ONCE DAILY    potassium chloride (K-DUR, KLOR-CON) 20 mEq tablet Take 1 Tab by mouth daily.  cycloSPORINE (RESTASIS) 0.05 % ophthalmic emulsion Administer 1 Drop to both eyes nightly.  colchicine (MITIGARE) 0.6 mg capsule Take 0.6 mg by mouth every other day.  cholecalciferol, vitamin D3, (VITAMIN D3) 2,000 unit tab Take 2,000 Units by mouth daily.  diclofenac (VOLTAREN) 1 % topical gel Apply 4 g to affected area four (4) times daily.     LUMIGAN 0.01 % ophthalmic drops Administer 1 Drop to both eyes nightly.  hydroxychloroquine (PLAQUENIL) 200 mg tablet Take 400 mg by mouth daily.  MULTIVITS/IRON FUM/FA/D3/LYCOP (MULTI FOR HIM PO) Take  by mouth daily. No current facility-administered medications for this visit. There are no discontinued medications. [facility] follow up appointment(s):   Future Appointments   Date Time Provider Kaleigh Madelin   10/28/2019  2:15 PM Salvador Small MD [address] [place] [address]   11/6/2019 10:50 AM Dedrick Chaves  [address]   11/11/2019  8:30 AM [facility] NURSE VISIT [facility] GLENN SCHED   11/19/2019  2:30 PM Emil Roth MD [address]   12/19/2019 10:00 AM Salvador Small MD [address] [place] [address]      Non-[facility] follow up appointment(s): None  [facility]:  out of service area       Goals      Attends follow-up appointments as directed. Ensure provider appt is scheduled within 7 days post-discharge; 10/25: Reviewed upcoming appts with patient. Confirm patient attended post-discharge provider appt   Complete post-visit call to confirm attendance and update care needs            Prevent complications post hospitalization.       Plan of Care:   CTN will monitor X 4 weeks   Review/educate common or potential \"red flags\" of condition worsening; 10/25: Educated on s/s to monitor and report: redness, warmth at site(s), swelling, bleeding or pus-like drainage, chills, fever (> 100.5), low body temperature (<97.2), nausea/vomiting that prevents eating/drinking/taking medications, SOB, chest pain/pressure, palpitations, increased respirations, decreased urine output  Coordinate and maintain acceptable pain management; 10/25: Patient reported 5/10 pain; goal <5   Discuss and provide resources for ACP

## 2019-10-26 LAB
BACTERIA SPEC CULT: NORMAL
SERVICE CMNT-IMP: NORMAL

## 2019-10-28 ENCOUNTER — OFFICE VISIT (OUTPATIENT)
Dept: UROLOGY | Age: 79
End: 2019-10-28

## 2019-10-28 VITALS
WEIGHT: 205 LBS | OXYGEN SATURATION: 97 % | HEART RATE: 94 BPM | BODY MASS INDEX: 31.07 KG/M2 | SYSTOLIC BLOOD PRESSURE: 120 MMHG | HEIGHT: 68 IN | DIASTOLIC BLOOD PRESSURE: 60 MMHG

## 2019-10-28 DIAGNOSIS — N13.8 BPH WITH OBSTRUCTION/LOWER URINARY TRACT SYMPTOMS: Primary | ICD-10-CM

## 2019-10-28 DIAGNOSIS — R33.9 URINARY RETENTION: ICD-10-CM

## 2019-10-28 DIAGNOSIS — N40.1 BPH WITH OBSTRUCTION/LOWER URINARY TRACT SYMPTOMS: Primary | ICD-10-CM

## 2019-10-28 NOTE — PATIENT INSTRUCTIONS
Urinary Retention: Care Instructions  Your Care Instructions    Urinary retention means that you aren't able to urinate. In men, it is often caused by a blockage of the urinary tract from an enlarged prostate gland. In men and women, it can also be caused by an infection or nerve damage. Or it may be a side effect of a medicine. The doctor may have drained the urine from your bladder. If you still have problems passing urine, you may need to use a catheter at home. This is used to empty your bladder until the problem can be fixed. Your doctor may put a catheter in your bladder before you go home. If so, he or she will tell you when to come back to have the catheter removed. The doctor has checked you closely. But problems can develop later. If you notice any problems or new symptoms, get medical treatment right away. Follow-up care is a key part of your treatment and safety. Be sure to make and go to all appointments, and call your doctor if you are having problems. It's also a good idea to know your test results and keep a list of the medicines you take. How can you care for yourself at home? · Take your medicines exactly as prescribed. Call your doctor if you think you are having a problem with your medicine. You will get more details on the specific medicines your doctor prescribes. · Check with your doctor before you use any over-the-counter medicines. Many cold and allergy medicines, for example, can make this problem worse. Make sure your doctor knows all of the medicines, vitamins, supplements, and herbal remedies you take. · Spread out through the day the amount of fluid you drink. Do not drink a lot at bedtime. · Avoid alcohol and caffeine. · If you have been given a catheter, or if one is already in place, follow the instructions you were given. Always wash your hands before and after you handle the catheter. When should you call for help?   Call your doctor now or seek immediate medical care if:    · You cannot urinate at all, or it is getting harder to urinate.     · You have symptoms of a urinary tract infection. These may include:  ? Pain or burning when you urinate. ? A frequent need to urinate without being able to pass much urine. ? Pain in the flank, which is just below the rib cage and above the waist on either side of the back. ? Blood in your urine. ? A fever.    Watch closely for changes in your health, and be sure to contact your doctor if:    · You have any problems with your catheter.     · You do not get better as expected. Where can you learn more? Go to http://regina-carmen.info/. Enter M244 in the search box to learn more about \"Urinary Retention: Care Instructions. \"  Current as of: December 19, 2018  Content Version: 12.2  © 9919-3094 [employer]. Care instructions adapted under license by [employer] (which disclaims liability or warranty for this information). If you have questions about a medical condition or this instruction, always ask your healthcare professional. [place] [address] any warranty or liability for your use of this information.

## 2019-10-28 NOTE — PROGRESS NOTES
Mr. Brothers Click has a reminder for a \"due or due soon\" health maintenance. I have asked that he contact his primary care provider for follow-up on this health maintenance.

## 2019-10-29 ENCOUNTER — OFFICE VISIT (OUTPATIENT)
Dept: UROLOGY | Age: 79
End: 2019-10-29

## 2019-10-29 VITALS
DIASTOLIC BLOOD PRESSURE: 61 MMHG | BODY MASS INDEX: 31.17 KG/M2 | HEIGHT: 68 IN | SYSTOLIC BLOOD PRESSURE: 117 MMHG | HEART RATE: 88 BPM | OXYGEN SATURATION: 95 %

## 2019-10-29 DIAGNOSIS — N13.8 BPH WITH OBSTRUCTION/LOWER URINARY TRACT SYMPTOMS: ICD-10-CM

## 2019-10-29 DIAGNOSIS — N40.1 BPH WITH OBSTRUCTION/LOWER URINARY TRACT SYMPTOMS: ICD-10-CM

## 2019-10-29 DIAGNOSIS — R33.9 RETENTION OF URINE: Primary | ICD-10-CM

## 2019-10-29 LAB
BILIRUB UR QL STRIP: NEGATIVE
GLUCOSE UR-MCNC: NEGATIVE MG/DL
KETONES P FAST UR STRIP-MCNC: NEGATIVE MG/DL
PH UR STRIP: 5.5 [PH] (ref 4.6–8)
PROT UR QL STRIP: NORMAL
SP GR UR STRIP: 1.02 (ref 1–1.03)
UA UROBILINOGEN AMB POC: NORMAL (ref 0.2–1)
URINALYSIS CLARITY POC: CLEAR
URINALYSIS COLOR POC: YELLOW
URINE BLOOD POC: NORMAL
URINE LEUKOCYTES POC: NORMAL
URINE NITRITES POC: NEGATIVE

## 2019-10-29 NOTE — PROGRESS NOTES
MrLuca Chaparro has a reminder for a \"due or due soon\" health maintenance. I have asked that he contact his primary care provider for follow-up on this health maintenance.

## 2019-10-29 NOTE — PROGRESS NOTES
Citlali West 70 y.o. male     Mr. Corina Diana seen today for follow-up urinary retention developing postoperatively following L4 disc surgery on 24 October 2019-discharged with Deleon catheter in place  Patient has history of symptomatic BPH on alpha-blocker Rx with Flomax   symptomatic BPH follow-up with history of right spermatocele and undulating PSA elevation  Patient's clinical status is tenuous with severely symptomatic spinal stenosis with intractable left sciatica-orthopedic evaluation pending        mildly tender mass discovered in the right scrotum while showering several days ago  Patient has no irritative or obstructive voiding symptoms no history of  tract disease, trauma, or surgery  Patient is voiding well and has no complaints regarding urination at this time-voiding with a solid stream good control and no nocturia  Patient declined alpha-blocker treatment several years ago because of concern regarding side effects        Large prostate median lobe on ultrasound imaging of the bladder last year-PVR at that time 48 cc      PSA 3.8 in January 2013  PSA 2.6 in 2011  PSA 2.3 in January 2014  PSA 2.7 in July 2014  PSA 1.9 on 10 July 2016  PSA 2.1 on 14 July 2017  PSA 3.5 in July 2018  PSA 4.1 in June 2019  PSA 3.3 on 23 September 2019      PVR 48 cc in 2015   cc in 2017              prostate volume 67.7 cc  PVR 12 cc in July 2018  PVR 61 cc in September 2019      Review of Systems:    CNS: no seizure syncope headaches or dizziness no visual changes/no changes- on Paxil Rx  Respiratory: no wheezing or shortness of breath  Cardiovascular:hypertension-chest pain or palpitations  Intestinal:GERD  Urinary: mild BPH symptoms  Skeletal: or joint arthritis  Endocrine:no diabetes or thyroid disease  Other:    Allergies:    Allergies   Allergen Reactions    Latex, Natural Rubber Swelling    Adhesive Tape-Silicones Rash and Itching    Aldactone [Spironolactone] Not Reported This Time     Breast tenderness    Codeine Not Reported This Time     \"Drives crazy\"    Tape [Adhesive] Rash    Tetanus Toxoid, Adsorbed Anaphylaxis    Tetanus Vaccines And Toxoid Anaphylaxis     Other reaction(s): anaphylaxis/angioedema  Other reaction(s): anaphylaxis/angioedema      Medications:    Current Outpatient Medications   Medication Sig Dispense Refill    mineral oil liquid Take 30 mL by mouth daily as needed for Constipation.  sertraline (ZOLOFT) 50 mg tablet Take 1.5 Tabs by mouth daily. 1 Tab 0    amLODIPine (NORVASC) 10 mg tablet TAKE 1 TABLET BY MOUTH ONCE DAILY 90 Tab 3    hydrALAZINE (APRESOLINE) 25 mg tablet Take 1 Tab by mouth two (2) times a day. (Patient taking differently: Take 25 mg by mouth three (3) times daily. ) 1 Tab 0    celecoxib (CELEBREX) 200 mg capsule Take 1 Cap by mouth two (2) times a day. 180 Cap 1    gabapentin (NEURONTIN) 100 mg capsule Take 2 Caps by mouth three (3) times daily. Max Daily Amount: 600 mg. 180 Cap 1    omeprazole (PRILOSEC) 20 mg capsule TAKE 1 CAPSULE BY MOUTH ONCE DAILY 90 Cap 1    tamsulosin (FLOMAX) 0.4 mg capsule Take 1 Cap by mouth daily. Indications: enlarged prostate with urination problem 90 Cap 3    furosemide (LASIX) 40 mg tablet Take 1 Tab by mouth daily. 90 Tab 3    lisinopril (PRINIVIL, ZESTRIL) 40 mg tablet Take 1 Tab by mouth daily. 90 Tab 3    atorvastatin (LIPITOR) 10 mg tablet TAKE 1 TABLET BY MOUTH ONCE DAILY 90 Tab 3    potassium chloride (K-DUR, KLOR-CON) 20 mEq tablet Take 1 Tab by mouth daily. 90 Tab 2    cycloSPORINE (RESTASIS) 0.05 % ophthalmic emulsion Administer 1 Drop to both eyes nightly.  colchicine (MITIGARE) 0.6 mg capsule Take 0.6 mg by mouth every other day.  cholecalciferol, vitamin D3, (VITAMIN D3) 2,000 unit tab Take 2,000 Units by mouth daily.  diclofenac (VOLTAREN) 1 % topical gel Apply 4 g to affected area four (4) times daily.  LUMIGAN 0.01 % ophthalmic drops Administer 1 Drop to both eyes nightly.  hydroxychloroquine (PLAQUENIL) 200 mg tablet Take 400 mg by mouth daily.  MULTIVITS/IRON FUM/FA/D3/LYCOP (MULTI FOR HIM PO) Take  by mouth daily. 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 Dr. Tio Murguia.  CPDD (calcium pyrophosphate deposition disease)     Depression     GERD (gastroesophageal reflux disease)     Hyperlipidemia     Hypertension     Prediabetes     Primary osteoarthritis involving multiple joints 9/6/2011    Rheumatoid arthritis ([facility] [address] 2013    negative RF; elevated anti-CCP. Dr. Dony Mayberry. Past Surgical History:   Procedure Laterality Date    HX AMPUTATION FINGER Left     index    HX CARPAL TUNNEL RELEASE Bilateral     HX CATARACT REMOVAL Left 01/2018    HX CATARACT REMOVAL Bilateral 2018    HX COLONOSCOPY      HX HEENT      sinus, tonsillectomy    HX HERNIA REPAIR      HX MOHS PROCEDURES      bilateral     HX ORTHOPAEDIC      lef knee surgery, removed cartilage.     HX ROTATOR CUFF REPAIR Right      Social History     Socioeconomic History    Marital status:      Spouse name: Not on file    Number of children: Not on file    Years of education: Not on file    Highest education level: Not on file   Occupational History    Not on file   Social Needs    Financial resource strain: Not on file    Food insecurity:     Worry: Not on file     Inability: Not on file    Transportation needs:     Medical: Not on file     Non-medical: Not on file   Tobacco Use    Smoking status: Former Smoker     Years: 12.00     Types: Cigars, Pipe, Cigarettes    Smokeless tobacco: Former User     Types: Chew   Substance and Sexual Activity    Alcohol use: Yes     Comment: rarely    Drug use: No    Sexual activity: Not Currently   Lifestyle    Physical activity:     Days per week: Not on file     Minutes per session: Not on file    Stress: Not on file   Relationships    Social connections:     Talks on phone: Not on file     Gets together: Not on file     Attends Islam service: Not on file     Active member of club or organization: Not on file     Attends meetings of clubs or organizations: Not on file     Relationship status: Not on file    Intimate partner violence:     Fear of current or ex partner: Not on file     Emotionally abused: Not on file     Physically abused: Not on file     Forced sexual activity: Not on file   Other Topics Concern    Not on file   Social History Narrative    Not on file      Family History   Problem Relation Age of Onset    Cancer Mother     Heart Disease Father     Alcohol abuse Father     Cancer Other         Physical Examination: Well-nourished mature male with Deleon catheter indwelling draining karen clear urine into leg bag    For first time in several years patient is not experiencing back pain-left sciatica has resolved following surgery 4 days ago    Mid lumbar surgical incision inspected at patient's request today wound is clean dry and shows no signs of inflammation    Patient was instructed today in technique of Deleon catheter removal and was advised to remove Deleon catheter upon awakening tomorrow morning and come back to the office late in the afternoon for PVR assessment of voiding efficiency-continue Flomax 0.4 mg daily      Impression: Symptomatic BPH with mila urinary retention developing postoperatively following lumbar disks disc surgery    Plan: Voiding trial tomorrow-PVR      More than 1/2 of this 15 minute visit was spent in counselling and coordination of care, as described above. Glen Bravo MD  -electronically signed-    PLEASE NOTE:  This document has been produced using voice recognition software. Unrecognized errors in transcription may be present.

## 2019-10-29 NOTE — PATIENT INSTRUCTIONS
Urinary Retention: Care Instructions  Your Care Instructions    Urinary retention means that you aren't able to urinate. In men, it is often caused by a blockage of the urinary tract from an enlarged prostate gland. In men and women, it can also be caused by an infection or nerve damage. Or it may be a side effect of a medicine. The doctor may have drained the urine from your bladder. If you still have problems passing urine, you may need to use a catheter at home. This is used to empty your bladder until the problem can be fixed. Your doctor may put a catheter in your bladder before you go home. If so, he or she will tell you when to come back to have the catheter removed. The doctor has checked you closely. But problems can develop later. If you notice any problems or new symptoms, get medical treatment right away. Follow-up care is a key part of your treatment and safety. Be sure to make and go to all appointments, and call your doctor if you are having problems. It's also a good idea to know your test results and keep a list of the medicines you take. How can you care for yourself at home? · Take your medicines exactly as prescribed. Call your doctor if you think you are having a problem with your medicine. You will get more details on the specific medicines your doctor prescribes. · Check with your doctor before you use any over-the-counter medicines. Many cold and allergy medicines, for example, can make this problem worse. Make sure your doctor knows all of the medicines, vitamins, supplements, and herbal remedies you take. · Spread out through the day the amount of fluid you drink. Do not drink a lot at bedtime. · Avoid alcohol and caffeine. · If you have been given a catheter, or if one is already in place, follow the instructions you were given. Always wash your hands before and after you handle the catheter. When should you call for help?   Call your doctor now or seek immediate medical care if:    · You cannot urinate at all, or it is getting harder to urinate.     · You have symptoms of a urinary tract infection. These may include:  ? Pain or burning when you urinate. ? A frequent need to urinate without being able to pass much urine. ? Pain in the flank, which is just below the rib cage and above the waist on either side of the back. ? Blood in your urine. ? A fever.    Watch closely for changes in your health, and be sure to contact your doctor if:    · You have any problems with your catheter.     · You do not get better as expected. Where can you learn more? Go to http://regina-carmen.info/. Enter M244 in the search box to learn more about \"Urinary Retention: Care Instructions. \"  Current as of: December 19, 2018  Content Version: 12.2  © 7740-6775 [employer]. Care instructions adapted under license by [employer] (which disclaims liability or warranty for this information). If you have questions about a medical condition or this instruction, always ask your healthcare professional. [place] [address] any warranty or liability for your use of this information.

## 2019-10-30 NOTE — PROGRESS NOTES
Regina Yonis West 70 y.o. male     Mr. Aurelio Littlejohn seen today for follow-up urinary retention occurring after lumbar disc surgery-patient removed Deleon catheter 8 hours ago and is voided spontaneously several times since then-here today for PVR assessment  urinary retention developing postoperatively following L4 disc surgery on 24 October 2019-discharged with Deleon catheter in place  Patient has history of symptomatic BPH on alpha-blocker Rx with Flomax   symptomatic BPH follow-up with history of right spermatocele and undulating PSA elevation  Patient's clinical status is tenuous with severely symptomatic spinal stenosis with intractable left sciatica-orthopedic evaluation pending        mildly tender mass discovered in the right scrotum while showering several days ago  Patient has no irritative or obstructive voiding symptoms no history of  tract disease, trauma, or surgery  Patient is voiding well and has no complaints regarding urination at this time-voiding with a solid stream good control and no nocturia  Patient declined alpha-blocker treatment several years ago because of concern regarding side effects        Large prostate median lobe on ultrasound imaging of the bladder last year-PVR at that time 48 cc      PSA 3.8 in January 2013  PSA 2.6 in 2011  PSA 2.3 in January 2014  PSA 2.7 in July 2014  PSA 1.9 on 10 July 2016  PSA 2.1 on 14 July 2017  PSA 3.5 in July 2018  PSA 4.1 in June 2019  PSA 3.3 on 23 September 2019        PVR 48 cc in 2015   cc in 2017              prostate volume 67.7 cc  PVR 12 cc in July 2018  PVR 61 cc in September 2019  PVR 62 cc in October 2019      Review of Systems:    CNS: no seizure syncope headaches or dizziness no visual changes/no changes- on Paxil Rx  Respiratory: no wheezing or shortness of breath  Cardiovascular:hypertension-chest pain or palpitations  Intestinal:GERD  Urinary: mild BPH symptoms  Skeletal: or joint arthritis  Endocrine:no diabetes or thyroid disease  Other:       Allergies: Allergies   Allergen Reactions    Latex, Natural Rubber Swelling    Adhesive Tape-Silicones Rash and Itching    Aldactone [Spironolactone] Not Reported This Time     Breast tenderness    Codeine Not Reported This Time     \"Drives crazy\"    Tape [Adhesive] Rash    Tetanus Toxoid, Adsorbed Anaphylaxis    Tetanus Vaccines And Toxoid Anaphylaxis     Other reaction(s): anaphylaxis/angioedema  Other reaction(s): anaphylaxis/angioedema  Other reaction(s): anesthetic shock      Medications:    Current Outpatient Medications   Medication Sig Dispense Refill    mineral oil liquid Take 30 mL by mouth daily as needed for Constipation.  sertraline (ZOLOFT) 50 mg tablet Take 1.5 Tabs by mouth daily. 1 Tab 0    amLODIPine (NORVASC) 10 mg tablet TAKE 1 TABLET BY MOUTH ONCE DAILY 90 Tab 3    hydrALAZINE (APRESOLINE) 25 mg tablet Take 1 Tab by mouth two (2) times a day. (Patient taking differently: Take 25 mg by mouth three (3) times daily. ) 1 Tab 0    celecoxib (CELEBREX) 200 mg capsule Take 1 Cap by mouth two (2) times a day. 180 Cap 1    gabapentin (NEURONTIN) 100 mg capsule Take 2 Caps by mouth three (3) times daily. Max Daily Amount: 600 mg. 180 Cap 1    omeprazole (PRILOSEC) 20 mg capsule TAKE 1 CAPSULE BY MOUTH ONCE DAILY 90 Cap 1    tamsulosin (FLOMAX) 0.4 mg capsule Take 1 Cap by mouth daily. Indications: enlarged prostate with urination problem 90 Cap 3    furosemide (LASIX) 40 mg tablet Take 1 Tab by mouth daily. 90 Tab 3    lisinopril (PRINIVIL, ZESTRIL) 40 mg tablet Take 1 Tab by mouth daily. 90 Tab 3    atorvastatin (LIPITOR) 10 mg tablet TAKE 1 TABLET BY MOUTH ONCE DAILY 90 Tab 3    potassium chloride (K-DUR, KLOR-CON) 20 mEq tablet Take 1 Tab by mouth daily. 90 Tab 2    cycloSPORINE (RESTASIS) 0.05 % ophthalmic emulsion Administer 1 Drop to both eyes nightly.  colchicine (MITIGARE) 0.6 mg capsule Take 0.6 mg by mouth every other day.       cholecalciferol, vitamin D3, (VITAMIN D3) 2,000 unit tab Take 2,000 Units by mouth daily.  diclofenac (VOLTAREN) 1 % topical gel Apply 4 g to affected area four (4) times daily.  LUMIGAN 0.01 % ophthalmic drops Administer 1 Drop to both eyes nightly.  hydroxychloroquine (PLAQUENIL) 200 mg tablet Take 400 mg by mouth daily.  MULTIVITS/IRON FUM/FA/D3/LYCOP (MULTI FOR HIM PO) Take  by mouth daily. 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 Dr. Abbe Saucedo.  CPDD (calcium pyrophosphate deposition disease)     Depression     GERD (gastroesophageal reflux disease)     Hyperlipidemia     Hypertension     Prediabetes     Primary osteoarthritis involving multiple joints 9/6/2011    [facility]    negative RF; elevated anti-CCP. Dr. Roscoe Marin. Past Surgical History:   Procedure Laterality Date    HX AMPUTATION FINGER Left     index    HX CARPAL TUNNEL RELEASE Bilateral     HX CATARACT REMOVAL Left 01/2018    HX CATARACT REMOVAL Bilateral 2018    HX COLONOSCOPY      HX HEENT      sinus, tonsillectomy    HX HERNIA REPAIR      HX MOHS PROCEDURES      bilateral     HX ORTHOPAEDIC      lef knee surgery, removed cartilage.     HX ROTATOR CUFF REPAIR Right      Social History     Socioeconomic History    Marital status:      Spouse name: Not on file    Number of children: Not on file    Years of education: Not on file    Highest education level: Not on file   Occupational History    Not on file   Social Needs    Financial resource strain: Not on file    Food insecurity:     Worry: Not on file     Inability: Not on file    Transportation needs:     Medical: Not on file     Non-medical: Not on file   Tobacco Use    Smoking status: Former Smoker     Years: 12.00     Types: Cigars, Pipe, Cigarettes    Smokeless tobacco: Former User     Types: Chew Substance and Sexual Activity    Alcohol use: Yes     Comment: rarely    Drug use: No    Sexual activity: Not Currently   Lifestyle    Physical activity:     Days per week: Not on file     Minutes per session: Not on file    Stress: Not on file   Relationships    Social connections:     Talks on phone: Not on file     Gets together: Not on file     Attends Sikhism service: Not on file     Active member of club or organization: Not on file     Attends meetings of clubs or organizations: Not on file     Relationship status: Not on file    Intimate partner violence:     Fear of current or ex partner: Not on file     Emotionally abused: Not on file     Physically abused: Not on file     Forced sexual activity: Not on file   Other Topics Concern    Not on file   Social History Narrative    Not on file      Family History   Problem Relation Age of Onset    Cancer Mother     Heart Disease Father     Alcohol abuse Father     Cancer Other         Physical Examination: Nourished mature male in no apparent distress    Urinalysis: Negative dipstick/nitrite negative/heme-negative    PVR today 62 cc      Impression: Resolved postoperative urinary retention                        Manic BPH on alpha-blocker Rx        Plan: Continue Flomax 0.4 mg daily    TC 1 year PSA MARTÍNEZ PVR      More than 1/2 of this 15 minute visit was spent in counselling and coordination of care, as described above. Tiffanie Childress MD  -electronically signed-    PLEASE NOTE:  This document has been produced using voice recognition software. Unrecognized errors in transcription may be present.

## 2019-10-31 ENCOUNTER — OFFICE VISIT (OUTPATIENT)
Dept: ORTHOPEDIC SURGERY | Age: 79
End: 2019-10-31

## 2019-10-31 VITALS
SYSTOLIC BLOOD PRESSURE: 144 MMHG | TEMPERATURE: 98.3 F | BODY MASS INDEX: 30.31 KG/M2 | HEIGHT: 68 IN | OXYGEN SATURATION: 94 % | WEIGHT: 200 LBS | DIASTOLIC BLOOD PRESSURE: 86 MMHG | HEART RATE: 73 BPM

## 2019-10-31 DIAGNOSIS — Z98.890 S/P LUMBAR LAMINECTOMY: Primary | ICD-10-CM

## 2019-10-31 RX ORDER — METHYLPREDNISOLONE 4 MG/1
TABLET ORAL
Qty: 1 DOSE PACK | Refills: 0 | Status: SHIPPED | OUTPATIENT
Start: 2019-10-31 | End: 2019-11-12 | Stop reason: ALTCHOICE

## 2019-10-31 NOTE — PROGRESS NOTES
Chief complaint/History of Present Illness:  Chief Complaint   Patient presents with    Back Pain    Surgical Follow-up     1 week     HPI  Olinda Silva is a  70 y.o.  male      HISTORY OF PRESENT ILLNESS:  The patient comes in today just a little over one week out from his right L4-5 laminectomy. Prior to surgery, he had right leg pain. He states when he woke up from surgery that leg pain was gone. He was very happy with that until Tuesday night. He went to get out of bed and had a sharp, shooting pain in his right leg again. He rates it as a 4-5/10. He states it is not as bad as it was prior to surgery, but it is still hurting quite a bit. He had gotten off the Tramadol and was only taking Tylenol for any discomfort. He has been on Gabapentin 100 mg two tablets three times a day. He is not currently taking that. He denies fever and bowel and bladder dysfunction. He denies any injuries or falls with this recurrence of pain. PHYSICAL EXAM:  Mr. Maryam London is a 82-year-old male. He is alert and oriented. He has a normal mood and affect. He has a full weightbearing, non-antalgic gait using no assistive device. He has 4/5 strength of the bilateral lower extremities and negative straight leg raise. His posterior lumbar incision is healing nicely. The edge is well-approximated. There is no erythema, warmth, drainage, or signs of infection. ASSESSMENT/PLAN:  This is a patient a little over one week out from his right L4-5 laminectomy with some recurrent, right radicular pain. I am going to give him a Medrol Dosepak to take with food. He knows not to take it with other anti-inflammatory medications. He has an appointment next week for his two-week postop appointment. We will see him back at that time. Review of systems:    Past Medical History:   Diagnosis Date    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 Dr. Ceferino Mohamud.  CPDD (calcium pyrophosphate deposition disease)     Depression     GERD (gastroesophageal reflux disease)     Hyperlipidemia     Hypertension     Prediabetes     Primary osteoarthritis involving multiple joints 9/6/2011    Rheumatoid arthritis ([facility] [address] 2013    negative RF; elevated anti-CCP. Dr. Ray Saleh. Past Surgical History:   Procedure Laterality Date    HX AMPUTATION FINGER Left     index    HX CARPAL TUNNEL RELEASE Bilateral     HX CATARACT REMOVAL Left 01/2018    HX CATARACT REMOVAL Bilateral 2018    HX COLONOSCOPY      HX HEENT      sinus, tonsillectomy    HX HERNIA REPAIR      HX MOHS PROCEDURES      bilateral     HX ORTHOPAEDIC      lef knee surgery, removed cartilage.     HX ROTATOR CUFF REPAIR Right      Social History     Socioeconomic History    Marital status:      Spouse name: Not on file    Number of children: Not on file    Years of education: Not on file    Highest education level: Not on file   Occupational History    Not on file   Social Needs    Financial resource strain: Not on file    Food insecurity:     Worry: Not on file     Inability: Not on file    Transportation needs:     Medical: Not on file     Non-medical: Not on file   Tobacco Use    Smoking status: Former Smoker     Years: 12.00     Types: Cigars, Pipe, Cigarettes    Smokeless tobacco: Former User     Types: Chew   Substance and Sexual Activity    Alcohol use: Yes     Comment: rarely    Drug use: No    Sexual activity: Not Currently   Lifestyle    Physical activity:     Days per week: Not on file     Minutes per session: Not on file    Stress: Not on file   Relationships    Social connections:     Talks on phone: Not on file     Gets together: Not on file     Attends Jainism service: Not on file     Active member of club or organization: Not on file     Attends meetings of clubs or organizations: Not on file     Relationship status: Not on file    Intimate partner violence:     Fear of current or ex partner: Not on file     Emotionally abused: Not on file     Physically abused: Not on file     Forced sexual activity: Not on file   Other Topics Concern    Not on file   Social History Narrative    Not on file     Family History   Problem Relation Age of Onset    Cancer Mother     Heart Disease Father     Alcohol abuse Father     Cancer Other        Physical Exam:  Visit Vitals  /86 (BP 1 Location: Left arm, BP Patient Position: Sitting)   Pulse 73   Temp 98.3 °F (36.8 °C) (Oral)   Ht 5' 8\" (1.727 m)   Wt 200 lb (90.7 kg)   SpO2 94%   BMI 30.41 kg/m²     Pain Scale: 5/10    ent without side effects        has been . reviewed and is appropriate          Diagnoses and all orders for this visit:    1. S/P lumbar laminectomy  -     methylPREDNISolone (MEDROL, DEA,) 4 mg tablet; Per dose pack instructions            Follow-up and Dispositions    · Return for at 2 week post op appt next week.              We have informed Izabela Suazo Brett to notify us for immediate appointment if he has any worsening neurogical symptoms or if an emergency situation presents, then call [phone]

## 2019-11-06 ENCOUNTER — OFFICE VISIT (OUTPATIENT)
Dept: ORTHOPEDIC SURGERY | Age: 79
End: 2019-11-06

## 2019-11-06 VITALS
OXYGEN SATURATION: 98 % | SYSTOLIC BLOOD PRESSURE: 125 MMHG | HEART RATE: 85 BPM | DIASTOLIC BLOOD PRESSURE: 67 MMHG | TEMPERATURE: 98.1 F | BODY MASS INDEX: 30.37 KG/M2 | WEIGHT: 200.4 LBS | HEIGHT: 68 IN

## 2019-11-06 DIAGNOSIS — M48.062 LUMBAR STENOSIS WITH NEUROGENIC CLAUDICATION: Primary | ICD-10-CM

## 2019-11-06 DIAGNOSIS — Z98.890 S/P LUMBAR LAMINECTOMY: ICD-10-CM

## 2019-11-06 RX ORDER — GABAPENTIN 300 MG/1
300 CAPSULE ORAL 3 TIMES DAILY
Qty: 90 CAP | Refills: 1 | Status: SHIPPED | OUTPATIENT
Start: 2019-11-06 | End: 2019-12-23 | Stop reason: SDUPTHER

## 2019-11-06 NOTE — LETTER
NOTIFICATION RETURN TO WORK / SCHOOL 
 
11/6/2019 10:45 AM 
 
Mr. Mayte Henderson [facility] [id number] [facility] [id number] To Whom It May Concern: 
 
Mayte Henderson is currently under the care of [address] [place] [address]. He will return to work/school on: 11/07/19 With the following restrictions; 15lb lifting limit, no bending or twisting. If there are questions or concerns please have the patient contact our office. Sincerely, Kathrene Simmonds, NP

## 2019-11-06 NOTE — PATIENT INSTRUCTIONS
Gabapentin (By mouth)   Gabapentin (miguel-a-PEN-tin)  Treats seizures and pain caused by shingles. Brand Name(s): ACTIVE-PAC with Gabapentin, Convenience Luca, Cyclo/Azra 10/300 Pack, FusePaq Fanatrex, Azra-V, Gralise, [address] Pack, Neurontin, SmartRx Azra Kit   There may be other brand names for this medicine. When This Medicine Should Not Be Used: This medicine is not right for everyone. Do not use it if you had an allergic reaction to gabapentin. How to Use This Medicine:   Capsule, Liquid, Tablet  · Take your medicine as directed. Your dose may need to be changed several times to find what works best for you. If you have epilepsy, do not allow more than 12 hours to pass between doses. · Capsule: Swallow the capsule whole with plenty of water. Do not open, crush, or chew it. · Gralise® tablet: Swallow the tablet whole . Do not crush, break, or chew it. · Neurontin® tablet: If you break a tablet into 2 pieces, use the second half as your next dose. If you don't use it within 28 days, throw it away. · Measure the oral liquid medicine with a marked measuring spoon, oral syringe, or medicine cup. · This medicine should come with a Medication Guide. Ask your pharmacist for a copy if you do not have one. · Missed dose: Take a dose as soon as you remember. If it is almost time for your next dose, wait until then and take a regular dose. Do not take extra medicine to make up for a missed dose. · Store the medicine in a closed container at room temperature, away from heat, moisture, and direct light. Store the Neurontin® oral liquid in the refrigerator. Do not freeze. Drugs and Foods to Avoid:   Ask your doctor or pharmacist before using any other medicine, including over-the-counter medicines, vitamins, and herbal products. · Some medicines can affect how gabapentin works.  Tell your doctor if you also use any of the following:   ¨ Hydrocodone  ¨ Morphine  · If you take an antacid, wait at least 2 hours before you take gabapentin. · Tell your doctor if you use anything else that makes you sleepy. Some examples are allergy medicine, narcotic pain medicine, and alcohol. Warnings While Using This Medicine:   · Tell your doctor if you are pregnant or breastfeeding, or if you have kidney problems or are receiving dialysis. Tell your doctor if you have a history of depression or mental health problems. · This medicine may increase depression or thoughts of suicide. Tell your doctor right away if you start to feel more depressed or think about hurting yourself. · This medicine may cause a serious allergic reaction called multiorgan hypersensitivity, which can damage organs and be life-threatening. · Do not stop using this medicine suddenly. Your doctor will need to slowly decrease your dose before you stop it completely. If you take this medicine to prevent seizures, your seizures may return or occur more often if you stop this medicine suddenly. · This medicine may make you dizzy or drowsy. Do not drive or do anything else that could be dangerous until you know how this medicine affects you. · Tell any doctor or dentist who treats you that you are using this medicine. This medicine may affect certain medical test results. · Your doctor will check your progress and the effects of this medicine at regular visits. Keep all appointments. · Keep all medicine out of the reach of children. Never share your medicine with anyone.   Possible Side Effects While Using This Medicine:   Call your doctor right away if you notice any of these side effects:  · Allergic reaction: Itching or hives, swelling in your face or hands, swelling or tingling in your mouth or throat, chest tightness, trouble breathing  · Behavior problems, aggression, restlessness, trouble concentrating, moodiness (especially in children)  · Blistering, peeling, red skin rash  · Change in how much or how often you urinate, bloody or cloudy urine,  · Chest pain, fast heartbeat, trouble breathing  · Dark urine or pale stools, nausea, vomiting, loss of appetite, stomach pain, yellow skin or eyes  · Fever, rash, swollen or tender glands in the neck, armpit, or groin  · Problems with coordination, shakiness, unsteadiness  · Rapid weight gain, swelling in your hands, ankles, or feet  · Unusual moods or behaviors, thoughts of hurting yourself, feeling depressed  If you notice these less serious side effects, talk with your doctor:   · Dizziness, drowsiness, sleepiness, tiredness  If you notice other side effects that you think are caused by this medicine, tell your doctor. Call your doctor for medical advice about side effects. You may report side effects to FDA at 0-965-FDA-7408  © 2017 [facility] Information is for End User's use only and may not be sold, redistributed or otherwise used for commercial purposes. The above information is an  only. It is not intended as medical advice for individual conditions or treatments. Talk to your doctor, nurse or pharmacist before following any medical regimen to see if it is safe and effective for you.

## 2019-11-06 NOTE — PROGRESS NOTES
[address]  [address], [address]  [place], [address] [place] [address] Street  Phone: [phone]  Fax: [phone]  PROGRESS NOTE-Post Op  Patient: Hannah Chris                MRN: [id number]       SSN: [id number]  YOB: 1940        AGE: 70 y.o. SEX: male  Body mass index is 30.47 kg/m². PCP: Latoya Casey MD  11/06/19    No chief complaint on file. HISTORY OF PRESENT ILLNESS:  Hannah Chris is a 70 y.o. male with history of back and RLE pain who had R L4/5 lami- resection of synovial cyst surgery 2 weeks ago for synovial cyst. He was seen last week for a nerve flare and was given a MDP. Pain prior to surgery was in the L5 distribution from back to foot and described as aching, burning and numbing. Today, pain in RLE has has mildly improved since surgery. His pain is now some myofascial back pain and residual RLE pain, he feels like its almost as bad as before surgery, just not as constant. Patient denies any fevers, wound drainage, bladder/bowel dysfunction, new onset weakness, new neuropathic pain, or other neurological deficits. Pt desires to continue with evaluation of back pain and postoperative care. Current Medications: Gabapentin 200mg TID,  Has Celebrex 200mg at home    ASSESSMENT   Diagnoses and all orders for this visit:    1. Lumbar stenosis with neurogenic claudication  -     gabapentin (NEURONTIN) 300 mg capsule; Take 1 Cap by mouth three (3) times daily. Max Daily Amount: 900 mg. Indications: Neuropathic Pain    2. S/P lumbar laminectomy  -     gabapentin (NEURONTIN) 300 mg capsule; Take 1 Cap by mouth three (3) times daily. Max Daily Amount: 900 mg. Indications: Neuropathic Pain         IMPRESSION AND PLAN:  This is a pt who had a lumbar decompression surgery 2 weeks ago. He is doing well, he continues to have leg pain. He has a nerve flare.  I explained the nature of the healing process and that we just have to give it more time. His incision is healing nicely. > Pt was given information on gabapentin post-operative and wound care.  > Reviewed activity   > Increase Gabapentin, may resume Celebrex  > Mr. Brigida Sherman has a reminder for a \"due or due soon\" health maintenance. I have asked that he contact his primary care provider, Nadeem Hamm MD, for follow-up on this health maintenance.  > We have informed the patient to notify us for immediate appointment if he has any worsening neurogical symptoms or if an emergency situation presents, then call 911  >  demonstrated consistency with prescribing.   > Pt will follow-up in 4 wks. SUBJECTIVE  Work: Gun-smith    Pain Scale: 7/10    Pain Assessment  11/6/2019   Location of Pain Back;Leg   Pain Location Comment -   Location Modifiers Right   Severity of Pain 7   Quality of Pain Other (Comment)   Quality of Pain Comment shooting   Duration of Pain A few minutes   Frequency of Pain Intermittent   Aggravating Factors Other (Comment)   Aggravating Factors Comment getting in and out of bed   Limiting Behavior Yes   Relieving Factors Ice;NSAID   Relieving Factors Comment -   Result of Injury -           REVIEW OF SYSTEMS  Constitutional: Negative for fever, chills, or weight change. Respiratory: Negative for cough or shortness of breath. Cardiovascular: Negative for chest pain or palpitations. Gastrointestinal: Negative for incontinence, acid reflux, change in bowel habits, or constipation. Genitourinary: Negative for incontinence, dysuria and flank pain. Musculoskeletal: Positive for back and RLE pain. See HPI. Skin: Negative for rash. Neurological:RLE L5 radiculopathy. See HPI. Endo/Heme/Allergies: Negative. Psychiatric/Behavioral: Negative.       PHYSICAL EXAMINATION  Visit Vitals  /67 (BP 1 Location: Left arm, BP Patient Position: Sitting)   Pulse 85   Temp 98.1 °F (36.7 °C) (Oral)   Ht 5' 8\" (1.727 m)   Wt 200 lb 6.4 oz (90.9 kg)   SpO2 98%   BMI 30.47 kg/m²         Accompanied by Self. Constitutional:  Well developed, well nourished, in no acute distress. Psychiatric: Affect and mood are appropriate. Integumentary: No rashes or abrasions noted on exposed areas. Wound: mid low back Incision healing well, no drainage, no erythema, no hernia, no seroma, no swelling, no dehiscence, incision well approximated. Cardiovascular/Peripheral Vascular: +2 radial & pedal pulses. No peripheral edema is noted. Lymphatic:  No evidence of lymphedema. No cervical lymphadenopathy. SPINE/MUSCULOSKELETAL EXAM    Lumbar spine:  No rash, ecchymosis, or gross obliquity. No fasciculations. No focal atrophy is noted. Range of motion is intact with flexion, extension, turning right, turning left . Tenderness to palpation R LB. No tenderness to palpation at the sciatic notch. Sensation grossly intact to light touch. Straight leg raise neg      MOTOR:     Hip Flex Quads Hamstrings Ankle DF EHL Ankle PF   Right 5/5 5/5 5/5 5/5 5/5 5/5   Left 5/5 5/5 5/5 5/5 5/5 5/5       normal gait and station    Ambulation without assistive device. FWB. 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 Dr. Cherelle Thompson.  CPDD (calcium pyrophosphate deposition disease)     Depression     GERD (gastroesophageal reflux disease)     Hyperlipidemia     Hypertension     Prediabetes     Primary osteoarthritis involving multiple joints 9/6/2011    Rheumatoid arthritis ([facility] [address] 2013    negative RF; elevated anti-CCP. Dr. Dustin Johnson.        Past Surgical History:   Procedure Laterality Date    HX AMPUTATION FINGER Left     index    HX CARPAL TUNNEL RELEASE Bilateral     HX CATARACT REMOVAL Left 01/2018    HX CATARACT REMOVAL Bilateral 2018    HX COLONOSCOPY      HX HEENT      sinus, tonsillectomy    HX HERNIA REPAIR      HX MOHS PROCEDURES      bilateral     HX ORTHOPAEDIC      lef knee surgery, removed cartilage.  HX ROTATOR CUFF REPAIR Right    . MEDICATIONS      Current Outpatient Medications   Medication Sig Dispense Refill    gabapentin (NEURONTIN) 300 mg capsule Take 1 Cap by mouth three (3) times daily. Max Daily Amount: 900 mg. Indications: Neuropathic Pain 90 Cap 1    methylPREDNISolone (MEDROL, DEA,) 4 mg tablet Per dose pack instructions 1 Dose Pack 0    mineral oil liquid Take 30 mL by mouth daily as needed for Constipation.  sertraline (ZOLOFT) 50 mg tablet Take 1.5 Tabs by mouth daily. 1 Tab 0    amLODIPine (NORVASC) 10 mg tablet TAKE 1 TABLET BY MOUTH ONCE DAILY 90 Tab 3    hydrALAZINE (APRESOLINE) 25 mg tablet Take 1 Tab by mouth two (2) times a day. (Patient taking differently: Take 25 mg by mouth three (3) times daily. ) 1 Tab 0    omeprazole (PRILOSEC) 20 mg capsule TAKE 1 CAPSULE BY MOUTH ONCE DAILY 90 Cap 1    tamsulosin (FLOMAX) 0.4 mg capsule Take 1 Cap by mouth daily. Indications: enlarged prostate with urination problem 90 Cap 3    furosemide (LASIX) 40 mg tablet Take 1 Tab by mouth daily. 90 Tab 3    lisinopril (PRINIVIL, ZESTRIL) 40 mg tablet Take 1 Tab by mouth daily. 90 Tab 3    atorvastatin (LIPITOR) 10 mg tablet TAKE 1 TABLET BY MOUTH ONCE DAILY 90 Tab 3    potassium chloride (K-DUR, KLOR-CON) 20 mEq tablet Take 1 Tab by mouth daily. 90 Tab 2    cycloSPORINE (RESTASIS) 0.05 % ophthalmic emulsion Administer 1 Drop to both eyes nightly.  colchicine (MITIGARE) 0.6 mg capsule Take 0.6 mg by mouth every other day.  cholecalciferol, vitamin D3, (VITAMIN D3) 2,000 unit tab Take 2,000 Units by mouth daily.  diclofenac (VOLTAREN) 1 % topical gel Apply 4 g to affected area four (4) times daily.  LUMIGAN 0.01 % ophthalmic drops Administer 1 Drop to both eyes nightly.  hydroxychloroquine (PLAQUENIL) 200 mg tablet Take 400 mg by mouth daily.       MULTIVITS/IRON FUM/FA/D3/LYCOP (MULTI FOR HIM PO) Take  by mouth daily.  celecoxib (CELEBREX) 200 mg capsule Take 1 Cap by mouth two (2) times a day.  (Patient not taking: Reported on 11/6/2019) 180 Cap 1        ALLERGIES    Allergies   Allergen Reactions    Latex, Natural Rubber Swelling    Adhesive Tape-Silicones Rash and Itching    Aldactone [Spironolactone] Not Reported This Time     Breast tenderness    Codeine Not Reported This Time     \"Drives crazy\"    Tape [Adhesive] Rash    Tetanus Toxoid, Adsorbed Anaphylaxis    Tetanus Vaccines And Toxoid Anaphylaxis     Other reaction(s): anaphylaxis/angioedema  Other reaction(s): anaphylaxis/angioedema  Other reaction(s): anesthetic shock          SOCIAL HISTORY    Social History     Socioeconomic History    Marital status:      Spouse name: Not on file    Number of children: Not on file    Years of education: Not on file    Highest education level: Not on file   Occupational History    Not on file   Social Needs    Financial resource strain: Not on file    Food insecurity:     Worry: Not on file     Inability: Not on file    Transportation needs:     Medical: Not on file     Non-medical: Not on file   Tobacco Use    Smoking status: Former Smoker     Years: 12.00     Types: Cigars, Pipe, Cigarettes    Smokeless tobacco: Former User     Types: Chew   Substance and Sexual Activity    Alcohol use: Yes     Comment: rarely    Drug use: No    Sexual activity: Not Currently   Lifestyle    Physical activity:     Days per week: Not on file     Minutes per session: Not on file    Stress: Not on file   Relationships    Social connections:     Talks on phone: Not on file     Gets together: Not on file     Attends [employer] service: Not on file     Active member of club or organization: Not on file     Attends meetings of clubs or organizations: Not on file     Relationship status: Not on file    Intimate partner violence:     Fear of current or ex partner: Not on file     Emotionally abused: Not on file Physically abused: Not on file     Forced sexual activity: Not on file   Other Topics Concern    Not on file   Social History Narrative    Not on file       FAMILY HISTORY    Family History   Problem Relation Age of Onset    Cancer Mother     Heart Disease Father     Alcohol abuse Father     Cancer Other          Dawit Marin NP

## 2019-11-11 ENCOUNTER — HOSPITAL ENCOUNTER (OUTPATIENT)
Dept: LAB | Age: 79
Discharge: HOME | End: 2019-11-11
Payer: MEDICARE

## 2019-11-11 ENCOUNTER — APPOINTMENT (OUTPATIENT)
Dept: INTERNAL MEDICINE CLINIC | Age: 79
End: 2019-11-11

## 2019-11-11 DIAGNOSIS — R73.03 PRE-DIABETES: ICD-10-CM

## 2019-11-11 DIAGNOSIS — E78.5 HYPERLIPIDEMIA, UNSPECIFIED HYPERLIPIDEMIA TYPE: ICD-10-CM

## 2019-11-11 DIAGNOSIS — R06.00 PND (PAROXYSMAL NOCTURNAL DYSPNEA): ICD-10-CM

## 2019-11-11 DIAGNOSIS — M06.041 RHEUMATOID ARTHRITIS INVOLVING BOTH HANDS WITH NEGATIVE RHEUMATOID FACTOR (HCC): ICD-10-CM

## 2019-11-11 DIAGNOSIS — K21.9 GASTROESOPHAGEAL REFLUX DISEASE, ESOPHAGITIS PRESENCE NOT SPECIFIED: ICD-10-CM

## 2019-11-11 DIAGNOSIS — R68.89 OTHER GENERAL SYMPTOMS AND SIGNS: ICD-10-CM

## 2019-11-11 DIAGNOSIS — E66.09 CLASS 1 OBESITY DUE TO EXCESS CALORIES WITH SERIOUS COMORBIDITY AND BODY MASS INDEX (BMI) OF 32.0 TO 32.9 IN ADULT: ICD-10-CM

## 2019-11-11 DIAGNOSIS — M15.9 PRIMARY OSTEOARTHRITIS INVOLVING MULTIPLE JOINTS: ICD-10-CM

## 2019-11-11 DIAGNOSIS — G47.30 SLEEP APNEA, UNSPECIFIED TYPE: ICD-10-CM

## 2019-11-11 DIAGNOSIS — F33.1 MODERATE EPISODE OF RECURRENT MAJOR DEPRESSIVE DISORDER (HCC): ICD-10-CM

## 2019-11-11 DIAGNOSIS — Z23 ENCOUNTER FOR IMMUNIZATION: ICD-10-CM

## 2019-11-11 DIAGNOSIS — M06.042 RHEUMATOID ARTHRITIS INVOLVING BOTH HANDS WITH NEGATIVE RHEUMATOID FACTOR (HCC): ICD-10-CM

## 2019-11-11 DIAGNOSIS — M47.816 LUMBAR FACET ARTHROPATHY: ICD-10-CM

## 2019-11-11 DIAGNOSIS — I10 ESSENTIAL HYPERTENSION: ICD-10-CM

## 2019-11-11 DIAGNOSIS — M11.20 CPDD (CALCIUM PYROPHOSPHATE DEPOSITION DISEASE): ICD-10-CM

## 2019-11-11 DIAGNOSIS — M54.31 RIGHT SIDED SCIATICA: ICD-10-CM

## 2019-11-11 DIAGNOSIS — S68.111A: ICD-10-CM

## 2019-11-11 LAB
ALBUMIN SERPL-MCNC: 3.9 G/DL (ref 3.4–5)
ALBUMIN/GLOB SERPL: 1.6 {RATIO} (ref 0.8–1.7)
ALP SERPL-CCNC: 109 U/L (ref 45–117)
ALT SERPL-CCNC: 27 U/L (ref 16–61)
ANION GAP SERPL CALC-SCNC: 5 MMOL/L (ref 3–18)
AST SERPL-CCNC: 17 U/L (ref 10–38)
BASOPHILS # BLD: 0 K/UL (ref 0–0.1)
BASOPHILS NFR BLD: 0 % (ref 0–2)
BILIRUB SERPL-MCNC: 0.7 MG/DL (ref 0.2–1)
BUN SERPL-MCNC: 13 MG/DL (ref 7–18)
BUN/CREAT SERPL: 18 (ref 12–20)
CALCIUM SERPL-MCNC: 9.3 MG/DL (ref 8.5–10.1)
CHLORIDE SERPL-SCNC: 107 MMOL/L (ref 100–111)
CO2 SERPL-SCNC: 30 MMOL/L (ref 21–32)
CREAT SERPL-MCNC: 0.73 MG/DL (ref 0.6–1.3)
DIFFERENTIAL METHOD BLD: ABNORMAL
EOSINOPHIL # BLD: 0.3 K/UL (ref 0–0.4)
EOSINOPHIL NFR BLD: 7 % (ref 0–5)
ERYTHROCYTE [DISTWIDTH] IN BLOOD BY AUTOMATED COUNT: 13 % (ref 11.6–14.5)
EST. AVERAGE GLUCOSE BLD GHB EST-MCNC: 111 MG/DL
GLOBULIN SER CALC-MCNC: 2.5 G/DL (ref 2–4)
GLUCOSE SERPL-MCNC: 107 MG/DL (ref 74–99)
HBA1C MFR BLD: 5.5 % (ref 4.2–5.6)
HCT VFR BLD AUTO: 45.2 % (ref 36–48)
HGB BLD-MCNC: 14.6 G/DL (ref 13–16)
LYMPHOCYTES # BLD: 1.3 K/UL (ref 0.9–3.6)
LYMPHOCYTES NFR BLD: 26 % (ref 21–52)
MCH RBC QN AUTO: 33 PG (ref 24–34)
MCHC RBC AUTO-ENTMCNC: 32.3 G/DL (ref 31–37)
MCV RBC AUTO: 102.3 FL (ref 74–97)
MONOCYTES # BLD: 0.7 K/UL (ref 0.05–1.2)
MONOCYTES NFR BLD: 14 % (ref 3–10)
NEUTS SEG # BLD: 2.7 K/UL (ref 1.8–8)
NEUTS SEG NFR BLD: 53 % (ref 40–73)
PLATELET # BLD AUTO: 229 K/UL (ref 135–420)
PMV BLD AUTO: 10.3 FL (ref 9.2–11.8)
POTASSIUM SERPL-SCNC: 3.7 MMOL/L (ref 3.5–5.5)
PROT SERPL-MCNC: 6.4 G/DL (ref 6.4–8.2)
RBC # BLD AUTO: 4.42 M/UL (ref 4.7–5.5)
SODIUM SERPL-SCNC: 142 MMOL/L (ref 136–145)
WBC # BLD AUTO: 5.1 K/UL (ref 4.6–13.2)

## 2019-11-11 PROCEDURE — 80053 COMPREHEN METABOLIC PANEL: CPT

## 2019-11-11 PROCEDURE — 83036 HEMOGLOBIN GLYCOSYLATED A1C: CPT

## 2019-11-11 PROCEDURE — 36415 COLL VENOUS BLD VENIPUNCTURE: CPT

## 2019-11-11 PROCEDURE — 85025 COMPLETE CBC W/AUTO DIFF WBC: CPT

## 2019-11-11 PROCEDURE — 80061 LIPID PANEL: CPT

## 2019-11-11 PROCEDURE — 82607 VITAMIN B-12: CPT

## 2019-11-12 ENCOUNTER — PATIENT OUTREACH (OUTPATIENT)
Dept: FAMILY MEDICINE CLINIC | Age: 79
End: 2019-11-12

## 2019-11-12 LAB
CHOLEST SERPL-MCNC: 148 MG/DL
FOLATE SERPL-MCNC: 17.2 NG/ML (ref 3.1–17.5)
HDLC SERPL-MCNC: 54 MG/DL (ref 40–60)
HDLC SERPL: 2.7 {RATIO} (ref 0–5)
LDLC SERPL CALC-MCNC: 77.4 MG/DL (ref 0–100)
LIPID PROFILE,FLP: NORMAL
TRIGL SERPL-MCNC: 83 MG/DL (ref ?–150)
VIT B12 SERPL-MCNC: 381 PG/ML (ref 211–911)
VLDLC SERPL CALC-MCNC: 16.6 MG/DL

## 2019-11-12 NOTE — PROGRESS NOTES
Hospital Discharge Follow-Up      Date/Time:  2019     10:47 AM      Patient was admitted to [facility] on 10/23/19 and discharged on 10/24/19 for L4/5 right lumbar laminectomy sp synovial cyst of lumbar facet joint. The physician discharge summary was available at the time of outreach. Patient was contacted within 1 business days of discharge. Top Challenges reviewed with the provider   1. Post operative Urinary retention - resolved patient reports voiding normally. 2. Postoperative pain - patient was placed on Gabapentin 300 mg 3 times a day at postoperative ortho appointment. For leg pain. Patient states that this new regimen makes the pain bareable  Advance Care Planning:   Does patient have an Advance Directive:  not on file        Method of communication with provider :none    Inpatient RRAT score: 25  Was this a readmission? no   Patient stated reason for the readmission: N/A    Care Transition Nurse (CTN) contacted the patient by telephone to perform post hospital discharge assessment. Verified name and  with patient as identifiers. Provided introduction to self, and explanation of the CTN role. Patient received hospital discharge instructions. CTN reviewed discharge instructions and red flags with patient who verbalized understanding. Patient given an opportunity to ask questions and does not have any further questions or concerns at this time. The patient agrees to contact the PCP office for questions related to their healthcare. CTN provided contact information for future reference.     Disease Specific:   N/A    Patients top risk factors for readmission:  None at this time    Home Health orders at discharge: patient refused  [address]: N/A  Date of initial visit: [address] [facility] [address] ordered at discharge: none  [address] received: N/A    Medication(s):   New Medications at Discharge: Ultram  Changed Medications at Discharge: None  Discontinued Medications at Discharge: None    Medication reconciliation was performed with patient, who verbalizes understanding of administration of home medications. There were no barriers to obtaining medications identified at this time. Referral to Pharm D needed: no     Current Outpatient Medications   Medication Sig    gabapentin (NEURONTIN) 300 mg capsule Take 1 Cap by mouth three (3) times daily. Max Daily Amount: 900 mg. Indications: Neuropathic Pain    mineral oil liquid Take 30 mL by mouth daily as needed for Constipation.  sertraline (ZOLOFT) 50 mg tablet Take 1.5 Tabs by mouth daily.  amLODIPine (NORVASC) 10 mg tablet TAKE 1 TABLET BY MOUTH ONCE DAILY    hydrALAZINE (APRESOLINE) 25 mg tablet Take 1 Tab by mouth two (2) times a day.  celecoxib (CELEBREX) 200 mg capsule Take 1 Cap by mouth two (2) times a day.  omeprazole (PRILOSEC) 20 mg capsule TAKE 1 CAPSULE BY MOUTH ONCE DAILY    tamsulosin (FLOMAX) 0.4 mg capsule Take 1 Cap by mouth daily. Indications: enlarged prostate with urination problem    furosemide (LASIX) 40 mg tablet Take 1 Tab by mouth daily.  lisinopril (PRINIVIL, ZESTRIL) 40 mg tablet Take 1 Tab by mouth daily.  atorvastatin (LIPITOR) 10 mg tablet TAKE 1 TABLET BY MOUTH ONCE DAILY    potassium chloride (K-DUR, KLOR-CON) 20 mEq tablet Take 1 Tab by mouth daily.  cycloSPORINE (RESTASIS) 0.05 % ophthalmic emulsion Administer 1 Drop to both eyes nightly.  colchicine (MITIGARE) 0.6 mg capsule Take 0.6 mg by mouth every other day.  cholecalciferol, vitamin D3, (VITAMIN D3) 2,000 unit tab Take 2,000 Units by mouth daily.  LUMIGAN 0.01 % ophthalmic drops Administer 1 Drop to both eyes nightly.  hydroxychloroquine (PLAQUENIL) 200 mg tablet Take 400 mg by mouth daily.  MULTIVITS/IRON FUM/FA/D3/LYCOP (MULTI FOR HIM PO) Take  by mouth daily.     diclofenac (VOLTAREN) 1 % topical gel Apply 4 g to affected area four (4) times daily. No current facility-administered medications for this visit. Medications Discontinued During This Encounter   Medication Reason    methylPREDNISolone (MEDROL, DEA,) 4 mg tablet Therapy Completed       [facility] follow up appointment(s):   Future Appointments   Date Time Provider Kaleigh Bolesi   11/19/2019  2:30 PM Maryana Mancini MD [address]   12/3/2019  1:45 PM Edgar Head  [address]   12/19/2019 10:00 AM Mike Saleem MD [address] [place] [address]      Non-[facility] follow up appointment(s): None  [facility]:  out of service area       Goals      Attends follow-up appointments as directed. Ensure provider appt is scheduled within 7 days post-discharge; 10/25: Reviewed upcoming appts with patient. Confirm patient attended post-discharge provider appt   Complete post-visit call to confirm attendance and update care needs            Prevent complications post hospitalization.       Plan of Care:   CTN will monitor X 4 weeks   Review/educate common or potential \"red flags\" of condition worsening; 10/25: Educated on s/s to monitor and report: redness, warmth at site(s), swelling, bleeding or pus-like drainage, chills, fever (> 100.5), low body temperature (<97.2), nausea/vomiting that prevents eating/drinking/taking medications, SOB, chest pain/pressure, palpitations, increased respirations, decreased urine output  Coordinate and maintain acceptable pain management; 10/25: Patient reported 5/10 pain; goal <5   Discuss and provide resources for ACP               DemystData Scottie RN, [address] [facility] Nurse  [phone]

## 2019-11-19 ENCOUNTER — OFFICE VISIT (OUTPATIENT)
Dept: INTERNAL MEDICINE CLINIC | Age: 79
End: 2019-11-19

## 2019-11-19 VITALS
TEMPERATURE: 97.9 F | RESPIRATION RATE: 16 BRPM | WEIGHT: 209 LBS | DIASTOLIC BLOOD PRESSURE: 60 MMHG | HEART RATE: 96 BPM | SYSTOLIC BLOOD PRESSURE: 118 MMHG | BODY MASS INDEX: 31.67 KG/M2 | OXYGEN SATURATION: 97 % | HEIGHT: 68 IN

## 2019-11-19 DIAGNOSIS — E66.09 CLASS 1 OBESITY DUE TO EXCESS CALORIES WITH SERIOUS COMORBIDITY AND BODY MASS INDEX (BMI) OF 31.0 TO 31.9 IN ADULT: ICD-10-CM

## 2019-11-19 DIAGNOSIS — M54.31 RIGHT SIDED SCIATICA: Primary | ICD-10-CM

## 2019-11-19 DIAGNOSIS — N40.1 BPH WITH OBSTRUCTION/LOWER URINARY TRACT SYMPTOMS: ICD-10-CM

## 2019-11-19 DIAGNOSIS — F33.0 MILD EPISODE OF RECURRENT MAJOR DEPRESSIVE DISORDER (HCC): ICD-10-CM

## 2019-11-19 DIAGNOSIS — M71.38 SYNOVIAL CYST OF LUMBAR FACET JOINT: ICD-10-CM

## 2019-11-19 DIAGNOSIS — D75.89 MACROCYTOSIS WITHOUT ANEMIA: ICD-10-CM

## 2019-11-19 DIAGNOSIS — B35.1 ONYCHOMYCOSIS: ICD-10-CM

## 2019-11-19 DIAGNOSIS — G47.30 SLEEP APNEA, UNSPECIFIED TYPE: ICD-10-CM

## 2019-11-19 DIAGNOSIS — M11.20 CPDD (CALCIUM PYROPHOSPHATE DEPOSITION DISEASE): ICD-10-CM

## 2019-11-19 DIAGNOSIS — I10 ESSENTIAL HYPERTENSION: ICD-10-CM

## 2019-11-19 DIAGNOSIS — M79.675 GREAT TOE PAIN, LEFT: ICD-10-CM

## 2019-11-19 DIAGNOSIS — E78.5 HYPERLIPIDEMIA, UNSPECIFIED HYPERLIPIDEMIA TYPE: ICD-10-CM

## 2019-11-19 DIAGNOSIS — N13.8 BPH WITH OBSTRUCTION/LOWER URINARY TRACT SYMPTOMS: ICD-10-CM

## 2019-11-19 DIAGNOSIS — M47.816 LUMBAR FACET ARTHROPATHY: ICD-10-CM

## 2019-11-19 DIAGNOSIS — M15.9 PRIMARY OSTEOARTHRITIS INVOLVING MULTIPLE JOINTS: ICD-10-CM

## 2019-11-19 DIAGNOSIS — R06.00 PND (PAROXYSMAL NOCTURNAL DYSPNEA): ICD-10-CM

## 2019-11-19 DIAGNOSIS — E55.9 VITAMIN D DEFICIENCY: ICD-10-CM

## 2019-11-19 DIAGNOSIS — M06.042 RHEUMATOID ARTHRITIS INVOLVING BOTH HANDS WITH NEGATIVE RHEUMATOID FACTOR (HCC): ICD-10-CM

## 2019-11-19 DIAGNOSIS — R73.03 PRE-DIABETES: ICD-10-CM

## 2019-11-19 DIAGNOSIS — S68.111A: ICD-10-CM

## 2019-11-19 DIAGNOSIS — K21.9 GASTROESOPHAGEAL REFLUX DISEASE, ESOPHAGITIS PRESENCE NOT SPECIFIED: ICD-10-CM

## 2019-11-19 DIAGNOSIS — M06.041 RHEUMATOID ARTHRITIS INVOLVING BOTH HANDS WITH NEGATIVE RHEUMATOID FACTOR (HCC): ICD-10-CM

## 2019-11-19 NOTE — PROGRESS NOTES
Chief Complaint   Patient presents with    Sleep Apnea     8 week follow up with lab review. [facility] Follow Up     Follow up from 10/23- 10/24/2019 at [facility] for synovial cyst.     There are no preventive care reminders to display for this patient. 1. Have you been to the ER, urgent care clinic or hospitalized since your last visit? YES.     2. Have you seen or consulted any other health care providers outside of the [address] [place] [address] since your last visit (Include any pap smears or colon screening)? NO      Do you have an Advanced Directive? NO    Would you like information on Advanced Directives? NO    Learning Assessment 9/4/2019   PRIMARY LEARNER Patient   HIGHEST LEVEL OF EDUCATION - PRIMARY LEARNER  -   BARRIERS PRIMARY LEARNER -   CO-LEARNER CAREGIVER -   PRIMARY LANGUAGE ENGLISH   LEARNER PREFERENCE PRIMARY DEMONSTRATION     -   ANSWERED BY Patient   RELATIONSHIP SELF     Abuse Screening Questionnaire 11/19/2019   Do you ever feel afraid of your partner? N   Are you in a relationship with someone who physically or mentally threatens you? N   Is it safe for you to go home?  Y     3 most recent PHQ Screens 11/19/2019   Little interest or pleasure in doing things Not at all   Feeling down, depressed, irritable, or hopeless Not at all   Total Score PHQ 2 0   Trouble falling or staying asleep, or sleeping too much -   Feeling tired or having little energy -   Poor appetite, weight loss, or overeating -   Feeling bad about yourself - or that you are a failure or have let yourself or your family down -   Trouble concentrating on things such as school, work, reading, or watching TV -   Moving or speaking so slowly that other people could have noticed; or the opposite being so fidgety that others notice -   Thoughts of being better off dead, or hurting yourself in some way -   PHQ 9 Score -   How difficult have these problems made it for you to do your work, take care of your home and get along with others -     Fall Risk Assessment, last 12 mths 11/19/2019   Able to walk? Yes   Fall in past 12 months?  No

## 2019-11-19 NOTE — PATIENT INSTRUCTIONS
Please schedule follow-up appointment with Dr. Angy Kong ([phone]) DASH Diet: Care Instructions Your Care Instructions The DASH diet is an eating plan that can help lower your blood pressure. DASH stands for Dietary Approaches to Stop Hypertension. Hypertension is high blood pressure. The DASH diet focuses on eating foods that are high in calcium, potassium, and magnesium. These nutrients can lower blood pressure. The foods that are highest in these nutrients are fruits, vegetables, low-fat dairy products, nuts, seeds, and legumes. But taking calcium, potassium, and magnesium supplements instead of eating foods that are high in those nutrients does not have the same effect. The DASH diet also includes whole grains, fish, and poultry. The DASH diet is one of several lifestyle changes your doctor may recommend to lower your high blood pressure. Your doctor may also want you to decrease the amount of sodium in your diet. Lowering sodium while following the DASH diet can lower blood pressure even further than just the DASH diet alone. Follow-up care is a key part of your treatment and safety. Be sure to make and go to all appointments, and call your doctor if you are having problems. It's also a good idea to know your test results and keep a list of the medicines you take. How can you care for yourself at home? Following the DASH diet · Eat 4 to 5 servings of fruit each day. A serving is 1 medium-sized piece of fruit, ½ cup chopped or canned fruit, 1/4 cup dried fruit, or 4 ounces (½ cup) of fruit juice. Choose fruit more often than fruit juice. · Eat 4 to 5 servings of vegetables each day. A serving is 1 cup of lettuce or raw leafy vegetables, ½ cup of chopped or cooked vegetables, or 4 ounces (½ cup) of vegetable juice. Choose vegetables more often than vegetable juice. · Get 2 to 3 servings of low-fat and fat-free dairy each day.  A serving is 8 ounces of milk, 1 cup of yogurt, or 1 ½ ounces of cheese. · Eat 6 to 8 servings of grains each day. A serving is 1 slice of bread, 1 ounce of dry cereal, or ½ cup of cooked rice, pasta, or cooked cereal. Try to choose whole-grain products as much as possible. · Limit lean meat, poultry, and fish to 2 servings each day. A serving is 3 ounces, about the size of a deck of cards. · Eat 4 to 5 servings of nuts, seeds, and legumes (cooked dried beans, lentils, and split peas) each week. A serving is 1/3 cup of nuts, 2 tablespoons of seeds, or ½ cup of cooked beans or peas. · Limit fats and oils to 2 to 3 servings each day. A serving is 1 teaspoon of vegetable oil or 2 tablespoons of salad dressing. · Limit sweets and added sugars to 5 servings or less a week. A serving is 1 tablespoon jelly or jam, ½ cup sorbet, or 1 cup of lemonade. · Eat less than 2,300 milligrams (mg) of sodium a day. If you limit your sodium to 1,500 mg a day, you can lower your blood pressure even more. Tips for success · Start small. Do not try to make dramatic changes to your diet all at once. You might feel that you are missing out on your favorite foods and then be more likely to not follow the plan. Make small changes, and stick with them. Once those changes become habit, add a few more changes. · Try some of the following: ? Make it a goal to eat a fruit or vegetable at every meal and at snacks. This will make it easy to get the recommended amount of fruits and vegetables each day. ? Try yogurt topped with fruit and nuts for a snack or healthy dessert. ? Add lettuce, tomato, cucumber, and onion to sandwiches. ? Combine a ready-made pizza crust with low-fat mozzarella cheese and lots of vegetable toppings. Try using tomatoes, squash, spinach, broccoli, carrots, cauliflower, and onions. ? Have a variety of cut-up vegetables with a low-fat dip as an appetizer instead of chips and dip. ? Sprinkle sunflower seeds or chopped almonds over salads. Or try adding chopped walnuts or almonds to cooked vegetables. ? Try some vegetarian meals using beans and peas. Add garbanzo or kidney beans to salads. Make burritos and tacos with mashed new beans or black beans. Where can you learn more? Go to http://regina-carmen.info/. Enter Z910 in the search box to learn more about \"DASH Diet: Care Instructions. \" Current as of: April 9, 2019 Content Version: 12.2 © [id number] [employer], [employer]. Care instructions adapted under license by [employer] (which disclaims liability or warranty for this information). If you have questions about a medical condition or this instruction, always ask your healthcare professional. [address] any warranty or liability for your use of this information.

## 2019-11-23 PROBLEM — M71.30 SYNOVIAL CYST: Status: RESOLVED | Noted: 2019-10-23 | Resolved: 2019-11-23

## 2019-11-23 NOTE — PROGRESS NOTES
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 bilateral pulmonary nodules which were stable when compared with prior chest CT scan from 2007. No further evaluation needed. 5. Hypertension. Blood pressure well controlled on current regimen of lisinopril 40 mg daily, amlodipine 10 mg daily, lasix 40 mg daily (potassium 20 meq daily) and hydralazine 25 mg bid. Renal function normal with creatinine 0.73/ eGFR >60. Normal echocardiogram (8/2019). Continue to follow. 6. Hyperlipidemia. On moderate intensity dose atorvastatin with LDL 77 and HDL 54, indicative of excellent control in this patient. Continue to follow. 7. Prediabetes. Has been controlled on diet alone. HbA1c improved to 5.5. No evidence of microvascular complications. On Ace-I and statin. Continue follow-up with Dr. Howard Mcbride for annual eye exams. Emphasized importance of lifestyle modifications, including diet, exercise, and weight loss. 8. Rheumatoid arthritis. On Plaquenil. Has regular eye exams with Dr. Howard Mcbride. Difficult to gauge response as appears to have evidence of osteoarthritis and CPPD occurring concurrently, but noted significant improvement since initiating colchicine. Voltaren gel for pain control. Tylenol while at work to help with pain control as needed. Followed by Dr. Gi Glass. 9. Primary osteoarthritis. Evident in bilateral hands and knees, bilateral shoulders, and cervical spine. Neck pain now returned to baseline. Shoulder pain (R>L).  X-ray with evidence of osteoarthritis and rotator cuff pathology. Evaluated by Dr. Giorgi Cortez and received cortisone injection to right shoulder with some improvement. Surgery for reverse shoulder replacement recommended. Changed from ibuprofen 400 mg qid and Tylenol to Celebrex 200 mg bid with significant improvement. Also using Voltaren gel. 10. CPPD. Colchicine started on 1/19/2017 to help address chondrocalcinosis on x-rays. Reports significant improvement. Now taking every other day with good control. 11. Macrocytosis. Mild evidence of macrocytosis on recent blood work. Hb/Hct normal. B12 and folate levels normal today. Will continue to monitor. 12. Rectal bleeding. Colonoscopy 8/2015. Evaluated by Dr. Ellen Winston and considered to be hemorrhoidal in source. Known internal and external hemorrhoids. Improved with Citrucel. No evidence of anemia. Follow. 13. BPH. Does have lower urinary tract symptoms. Developed postop urinary retention and now resolved. On Flomax. Followed by Dr. Isaiah Anna. 14. GERD. Good control of symptoms with omeprazole. No issues today. 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 Monitored annually. 16. Obesity. Emphasized importance of lifestyle modifications, including diet, exercise, and weight loss. Discussed that would help control blood sugar. Will readdress at next visit. 17. Insomnia. Using melatonin agonist, ramelteon, to replace Xanax. Had good response and weaned from Xanax. 18. Health maintenance. Already received influenza vaccine. Unable to receive Tdap due to allergy (anaphylaxis to Td). Will address Shingrix vaccine at next visit. Other immunizations up to date. Colonoscopy due 2020. Continue regular eye exams with Dr. Delbert Mary. Vitamin D level normal. Continue maintenance dose supplement. Medicare wellness visit up to date. Aspirin discontinued. Referral to podiatry for onychomycosis and left great toe pain. Patient understands recommendations and agrees with plan.   Follow-up in 3 months for physical.

## 2019-11-26 ENCOUNTER — PATIENT OUTREACH (OUTPATIENT)
Dept: INTERNAL MEDICINE CLINIC | Age: 79
End: 2019-11-26

## 2019-11-26 NOTE — LETTER
11/26/2019 12:34 PM 
 
Mr. Pierce [phone] [phone] [facility] is pleased to offer information and support for Advance Care Planning. Advance Care Planning is an important part of planning for your healthcare future, especially in the event of a serious medical illness or an unforeseen medical emergency. Planning ahead includes selecting a healthcare agent, who is the person you appoint in an advance care planning document to communicate your medical wishes if you were unable to speak for yourself. Your Healthcare Team at [facility] feels that discussing your preferences is a part of good healthcare so we can always be guided by your values and goals. If you do not already have an advance directive, please contact office and ask to speak with me so they we may schedule an appointment to discuss and complete this important document. Even if you already have a document completed, such as a healthcare power of  or living will, we encourage you to review your document with us and provide a copy for your medical record. We can also assist you with updating it if needed. To schedule an Advance Care Planning visit, or to receive more information, please call the office at [phone], or ask about it at your next appointment. We look forward to seeing you soon.    
 
  
 
 
 
 
 
Sincerely, 
 
 
Chacho Butcher RN

## 2019-11-26 NOTE — PROGRESS NOTES
Patient has graduated from the Transitions of Care Coordination  program on 11/26/19. Patient's symptoms are stable at this time. Patient/family has the ability to self-manage. Care management goals have been completed at this time. No further care transitions nurse follow up scheduled. Goals Addressed                 This Visit's Progress     COMPLETED: Attends follow-up appointments as directed. On track     Ensure provider appt is scheduled within 7 days post-discharge; 10/25: Reviewed upcoming appts with patient. Confirm patient attended post-discharge provider appt; 11/19: Attended PCP on 11/19 as scheduled. Complete post-visit call to confirm attendance and update care needs; Completed            COMPLETED: Prevent complications post hospitalization. Plan of Care:   CTN will monitor X 4 weeks   Review/educate common or potential \"red flags\" of condition worsening; 10/25: Educated on s/s to monitor and report: redness, warmth at site(s), swelling, bleeding or pus-like drainage, chills, fever (> 100.5), low body temperature (<97.2), nausea/vomiting that prevents eating/drinking/taking medications, SOB, chest pain/pressure, palpitations, increased respirations, decreased urine output  Coordinate and maintain acceptable pain management; 10/25: Patient reported 5/10 pain; goal <5   Discuss and provide resources for ACP; Letter sent    No admissions post 30 days from discharge of 11/23/19. Patient has care transitions nurse contact information for any further questions, concerns, or needs.   Patient's upcoming visits:    Future Appointments   Date Time Provider Kaleigh Madelin   12/3/2019  1:45 PM Henry Patel  [id number]    2/13/2020  9:25 AM [facility] NURSE VISIT [facility]   2/27/2020 11:00 AM Maryan Fontenot MD [facility]

## 2019-12-03 ENCOUNTER — OFFICE VISIT (OUTPATIENT)
Dept: ORTHOPEDIC SURGERY | Age: 79
End: 2019-12-03

## 2019-12-03 VITALS
RESPIRATION RATE: 15 BRPM | SYSTOLIC BLOOD PRESSURE: 155 MMHG | HEART RATE: 101 BPM | BODY MASS INDEX: 32.33 KG/M2 | DIASTOLIC BLOOD PRESSURE: 75 MMHG | WEIGHT: 212.6 LBS | TEMPERATURE: 98.1 F

## 2019-12-03 DIAGNOSIS — Z98.890 S/P LUMBAR LAMINECTOMY: Primary | ICD-10-CM

## 2019-12-03 DIAGNOSIS — M71.38 SYNOVIAL CYST OF LUMBAR SPINE: ICD-10-CM

## 2019-12-03 NOTE — PROGRESS NOTES
[facility] [address]  [address], [address]  [place], [address] [place] [address] Street  Phone: [phone]  Fax: [phone]  PROGRESS NOTE  Patient: Omar Alcazar                MRN: [id number]       SSN: [id number]  YOB: 1940        AGE: 70 y.o. SEX: male  Body mass index is 32.33 kg/m². PCP: Loni Kelly MD  12/03/19    Chief Complaint   Patient presents with    Back Pain     post op       HISTORY OF PRESENT ILLNESS, RADIOGRAPHS, and PLAN:     HISTORY OF PRESENT ILLNESS:  Mr. Sandhya Altamirano returns today. He is six weeks out from his laminectomy for a large synovial cyst.  His pain is dramatically improved. He rates it as a 0. He is neurologically intact with no nerve tension signs and a well healed wound. He still gets some sensations and tightness in his thigh though. It is probably neuropathic. He finds the Gabapentin helps, and he is still taking that. ASSESSMENT/PLAN: We will see him back in six weeks time to monitor his progress. cc: Fabrice Ray M.D. 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 Dr. Owens Conception.  CPDD (calcium pyrophosphate deposition disease)     Depression     GERD (gastroesophageal reflux disease)     Hyperlipidemia     Hypertension     Prediabetes     Primary osteoarthritis involving multiple joints 9/6/2011    Rheumatoid arthritis ([facility] [address] 2013    negative RF; elevated anti-CCP. Dr. Ray Saleh. Family History   Problem Relation Age of Onset    Cancer Mother     Heart Disease Father     Alcohol abuse Father     Cancer Other        Current Outpatient Medications   Medication Sig Dispense Refill    omeprazole (PRILOSEC) 20 mg capsule TAKE 1 CAPSULE BY MOUTH ONCE DAILY 90 Cap 2    gabapentin (NEURONTIN) 300 mg capsule Take 1 Cap by mouth three (3) times daily. Max Daily Amount: 900 mg.  Indications: Neuropathic Pain 90 Cap 1    mineral oil liquid Take 30 mL by mouth daily as needed for Constipation.  sertraline (ZOLOFT) 50 mg tablet Take 1.5 Tabs by mouth daily. (Patient taking differently: Take 75 mg by mouth daily. Indications: one pill daily) 1 Tab 0    amLODIPine (NORVASC) 10 mg tablet TAKE 1 TABLET BY MOUTH ONCE DAILY 90 Tab 3    hydrALAZINE (APRESOLINE) 25 mg tablet Take 1 Tab by mouth two (2) times a day. 1 Tab 0    celecoxib (CELEBREX) 200 mg capsule Take 1 Cap by mouth two (2) times a day. 180 Cap 1    tamsulosin (FLOMAX) 0.4 mg capsule Take 1 Cap by mouth daily. Indications: enlarged prostate with urination problem 90 Cap 3    furosemide (LASIX) 40 mg tablet Take 1 Tab by mouth daily. 90 Tab 3    lisinopril (PRINIVIL, ZESTRIL) 40 mg tablet Take 1 Tab by mouth daily. 90 Tab 3    atorvastatin (LIPITOR) 10 mg tablet TAKE 1 TABLET BY MOUTH ONCE DAILY 90 Tab 3    potassium chloride (K-DUR, KLOR-CON) 20 mEq tablet Take 1 Tab by mouth daily. 90 Tab 2    cycloSPORINE (RESTASIS) 0.05 % ophthalmic emulsion Administer 1 Drop to both eyes nightly.  colchicine (MITIGARE) 0.6 mg capsule Take 0.6 mg by mouth every other day.  cholecalciferol, vitamin D3, (VITAMIN D3) 2,000 unit tab Take 2,000 Units by mouth daily.  diclofenac (VOLTAREN) 1 % topical gel Apply 4 g to affected area four (4) times daily.  LUMIGAN 0.01 % ophthalmic drops Administer 1 Drop to both eyes nightly.  hydroxychloroquine (PLAQUENIL) 200 mg tablet Take 400 mg by mouth daily.  MULTIVITS/IRON FUM/FA/D3/LYCOP (MULTI FOR HIM PO) Take  by mouth daily.          Allergies   Allergen Reactions    Latex, Natural Rubber Swelling    Adhesive Tape-Silicones Rash and Itching    Aldactone [Spironolactone] Not Reported This Time     Breast tenderness    Codeine Not Reported This Time     \"Drives crazy\"    Tape [Adhesive] Rash    Tetanus Toxoid, Adsorbed Anaphylaxis    Tetanus Vaccines And Toxoid Anaphylaxis Other reaction(s): anaphylaxis/angioedema  Other reaction(s): anaphylaxis/angioedema  Other reaction(s): anesthetic shock       Past Surgical History:   Procedure Laterality Date    HX AMPUTATION FINGER Left     index    HX BACK SURGERY  10/23/2019    Right L4-5 hemilaminectomy, medial facetectomy, resection of synovial cyst    HX CARPAL TUNNEL RELEASE Bilateral     HX CATARACT REMOVAL Left 01/2018    HX CATARACT REMOVAL Bilateral 2018    HX COLONOSCOPY      HX HEENT      sinus, tonsillectomy    HX HERNIA REPAIR      HX MOHS PROCEDURES      bilateral     HX ORTHOPAEDIC      lef knee surgery, removed cartilage.  HX ROTATOR CUFF REPAIR Right        Past Medical History:   Diagnosis Date    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 Dr. Tio Murguia.  CPDD (calcium pyrophosphate deposition disease)     Depression     GERD (gastroesophageal reflux disease)     Hyperlipidemia     Hypertension     Prediabetes     Primary osteoarthritis involving multiple joints 9/6/2011    Rheumatoid arthritis ([facility] [address] 2013    negative RF; elevated anti-CCP. Dr. Dony Mayberry.        Social History     Socioeconomic History    Marital status:      Spouse name: Not on file    Number of children: Not on file    Years of education: Not on file    Highest education level: Not on file   Occupational History    Not on file   Social Needs    Financial resource strain: Not on file    Food insecurity:     Worry: Not on file     Inability: Not on file    Transportation needs:     Medical: Not on file     Non-medical: Not on file   Tobacco Use    Smoking status: Former Smoker     Years: 12.00     Types: Cigars, Pipe, Cigarettes    Smokeless tobacco: Former User     Types: Chew   Substance and Sexual Activity    Alcohol use: Yes     Comment: rarely    Drug use: No    Sexual activity: Not Currently   Lifestyle    Physical activity:     Days per week: Not on file Minutes per session: Not on file    Stress: Not on file   Relationships    Social connections:     Talks on phone: Not on file     Gets together: Not on file     Attends Baptism service: Not on file     Active member of club or organization: Not on file     Attends meetings of clubs or organizations: Not on file     Relationship status: Not on file    Intimate partner violence:     Fear of current or ex partner: Not on file     Emotionally abused: Not on file     Physically abused: Not on file     Forced sexual activity: Not on file   Other Topics Concern     Service Not Asked    Blood Transfusions Not Asked    Caffeine Concern Not Asked    Occupational Exposure Not Asked   Denette Saad Hazards Not Asked    Sleep Concern Not Asked    Stress Concern Not Asked    Weight Concern Not Asked    Special Diet Not Asked    Back Care Not Asked    Exercise Not Asked    Bike Helmet Not Asked   [address] Not Asked    Self-Exams Not Asked   Social History Narrative    Not on file         REVIEW OF SYSTEMS:   CONSTITUTIONAL SYMPTOMS:  Negative. EYES:  Negative. EARS, NOSE, THROAT AND MOUTH:  Negative. CARDIOVASCULAR:  Negative. RESPIRATORY:  Negative. GENITOURINARY: Per HPI. GASTROINTESTINAL:  Per HPI. INTEGUMENTARY (SKIN AND/OR BREAST):  Negative. MUSCULOSKELETAL: Per HPI.   ENDOCRINE/RHEUMATOLOGIC:  Negative. NEUROLOGICAL:  Per HPI. HEMATOLOGIC/LYMPHATIC:  Negative. ALLERGIC/IMMUNOLOGIC:  Negative. PSYCHIATRIC:  Negative. PHYSICAL EXAMINATION:   Visit Vitals  /75 (BP 1 Location: Left arm, BP Patient Position: Sitting)   Pulse (!) 101   Temp 98.1 °F (36.7 °C) (Oral)   Resp 15   Wt 212 lb 9.6 oz (96.4 kg)   BMI 32.33 kg/m²    PAIN SCALE: 0 - No pain/10    CONSTITUTIONAL: The patient is in no apparent distress and is alert and oriented x 3. HEENT: Normocephalic. Hearing grossly intact. NECK: Supple and symmetric. no tenderness, or masses were felt.   RESPIRATORY: No labored breathing. CARDIOVASCULAR: The carotid pulses were normal. Peripheral pulses were 2+. CHEST: Normal AP diameter and normal contour without any kyphoscoliosis. LYMPHATIC: No lymphadenopathy was appreciated in the neck, axillae or groin. SKIN:  Incision healing well, no drainage, no erythema, no hernia, no seroma, no swelling, no dehiscence, incision well approximated. Negative for scars, rashes, lesions, or ulcers on the right upper, right lower, left upper, left lower and trunk. NEUROLOGICAL: Alert and oriented x 3. Ambulation without assistive device. FWB. EXTREMITIES: See musculoskeletal.  MUSCULOSKELETAL:   Head and Neck:  Negative for misalignment, asymmetry, crepitation, defects, tenderness masses or effusions.  Left Upper Extremity: Inspection, percussion and palpation performed. Mcwilliamss sign is negative.  Right Upper Extremity: Inspection, percussion and palpation performed. Mcwilliamss sign is negative.  Spine, Ribs and Pelvis: Inspection, percussion and palpation performed. Negative for misalignment, asymmetry, crepitation, defects, tenderness masses or effusions.  Left Lower Extremity: Inspection, percussion and palpation performed. Negative straight leg raise.  Right Lower Extremity: Inspection, percussion and palpation performed. Negative straight leg raise. SPINE EXAM:     Lumbar spine: No rash, ecchymosis, or gross obliquity. No focal atrophy is noted. ASSESSMENT    ICD-10-CM ICD-9-CM    1. S/P lumbar laminectomy Z98.890 V45.89    2. Synovial cyst of lumbar spine M71.38 727.40        Written by Deshaun Alvarez, as dictated by Anamaria Mckeon MD.    I, Dr. Anamaria Mckeon MD, confirm that all documentation is accurate.

## 2019-12-23 DIAGNOSIS — Z98.890 S/P LUMBAR LAMINECTOMY: ICD-10-CM

## 2019-12-23 DIAGNOSIS — M48.062 LUMBAR STENOSIS WITH NEUROGENIC CLAUDICATION: ICD-10-CM

## 2019-12-23 RX ORDER — GABAPENTIN 300 MG/1
300 CAPSULE ORAL 3 TIMES DAILY
Qty: 90 CAP | Refills: 1 | Status: SHIPPED | OUTPATIENT
Start: 2019-12-23 | End: 2020-07-01 | Stop reason: SDUPTHER

## 2019-12-23 NOTE — TELEPHONE ENCOUNTER
Last Visit: 12/3/19 with MD Amira Malik  Next Appointment: 1/14/20 with GOMEZ Lyle  Previous Refill Encounter(s): 11/6/19 #90 with 1 refills    Requested Prescriptions     Pending Prescriptions Disp Refills    gabapentin (NEURONTIN) 300 mg capsule 90 Cap 1     Sig: Take 1 Cap by mouth three (3) times daily. Max Daily Amount: 900 mg.  Indications: Neuropathic Pain

## 2020-01-20 ENCOUNTER — OFFICE VISIT (OUTPATIENT)
Dept: ORTHOPEDIC SURGERY | Age: 80
End: 2020-01-20

## 2020-01-20 VITALS
WEIGHT: 205 LBS | SYSTOLIC BLOOD PRESSURE: 148 MMHG | HEIGHT: 68 IN | RESPIRATION RATE: 16 BRPM | DIASTOLIC BLOOD PRESSURE: 85 MMHG | OXYGEN SATURATION: 97 % | HEART RATE: 107 BPM | TEMPERATURE: 97.2 F | BODY MASS INDEX: 31.07 KG/M2

## 2020-01-20 DIAGNOSIS — Z98.890 S/P LUMBAR LAMINECTOMY: ICD-10-CM

## 2020-01-20 DIAGNOSIS — M48.062 LUMBAR STENOSIS WITH NEUROGENIC CLAUDICATION: Primary | ICD-10-CM

## 2020-01-20 NOTE — PATIENT INSTRUCTIONS
Back Stretches: Exercises Introduction Here are some examples of exercises for stretching your back. Start each exercise slowly. Ease off the exercise if you start to have pain. Your doctor or physical therapist will tell you when you can start these exercises and which ones will work best for you. How to do the exercises Overhead stretch 1. Stand comfortably with your feet shoulder-width apart. 2. Looking straight ahead, raise both arms over your head and reach toward the ceiling. Do not allow your head to tilt back. 3. Hold for 15 to 30 seconds, then lower your arms to your sides. 4. Repeat 2 to 4 times. Side stretch 1. Stand comfortably with your feet shoulder-width apart. 2. Raise one arm over your head, and then lean to the other side. 3. Slide your hand down your leg as you let the weight of your arm gently stretch your side muscles. Hold for 15 to 30 seconds. 4. Repeat 2 to 4 times on each side. Press-up 1. Lie on your stomach, supporting your body with your forearms. 2. Press your elbows down into the floor to raise your upper back. As you do this, relax your stomach muscles and allow your back to arch without using your back muscles. As your press up, do not let your hips or pelvis come off the floor. 3. Hold for 15 to 30 seconds, then relax. 4. Repeat 2 to 4 times. Relax and rest 
 
1. Lie on your back with a rolled towel under your neck and a pillow under your knees. Extend your arms comfortably to your sides. 2. Relax and breathe normally. 3. Remain in this position for about 10 minutes. 4. If you can, do this 2 or 3 times each day. Follow-up care is a key part of your treatment and safety. Be sure to make and go to all appointments, and call your doctor if you are having problems. It's also a good idea to know your test results and keep a list of the medicines you take. Where can you learn more? Go to http://regina-carmen.info/. Enter L767 in the search box to learn more about \"Back Stretches: Exercises. \" Current as of: June 26, 2019 Content Version: 12.2 © 6836-8074 [employer]. Care instructions adapted under license by [employer] (which disclaims liability or warranty for this information). If you have questions about a medical condition or this instruction, always ask your healthcare professional. [place] [address] any warranty or liability for your use of this information.

## 2020-01-20 NOTE — PROGRESS NOTES
[address]  [address], [address]  [place]  Phone: [phone]  Fax: [phone]  PROGRESS NOTE  Patient: Darnell Raygoza                MRN: [id number]       SSN: [id number]  YOB: 1940        AGE: 68 y.o. SEX: male  Body mass index is 31.17 kg/m². PCP: Omar Gifford MD  01/20/20    Chief Complaint   Patient presents with    Back Pain     lower back pain, post op appt. HISTORY OF PRESENT ILLNESS:  Darnell Raygoza is a 68 y.o.  male with history of back and RLE pain who had R L4/5 lami- resection of synovial cyst surgery  on 10/23/19. He had sudden RLE pain for about a month after. He was doing well at his 6 wk post-op visit. He comes in today with significantly improved pain, it is improved by %. He still uses the Gabapentin as he finds the leg pain and his overall arthritic pain increases without it. Pain is aching and throbbing, pain is worse with lifting, twisting and affects work and recreational activities. Pain is better with relaxation and pain medication. Denies bladder/bowel dysfunction, saddle paresthesia, weakness, gait disturbance, or other neurological deficits. Medications: Gabapentin 300mg BID-TID with moderate, complete, benefit. Celebrex 200mg from PCP      ASSESSMENT   Diagnoses and all orders for this visit:    1. Lumbar stenosis with neurogenic claudication    2. S/P lumbar laminectomy         IMPRESSION AND PLAN:  This is a pt with arthritic pain who is doing is unchanged since last OV.     > Pt was given information on back stretches   > May continue or taper off Gabapentin  > HEP  > Mr. Yara Manuel has a reminder for a \"due or due soon\" health maintenance.  I have asked that he contact his primary care provider, Omar Gifford MD, for follow-up on this health maintenance.  > We have informed patient to notify us for immediate appointment if he has any worsening neurogical symptoms or if an emergency situation presents, then call 911  >  has been reviewed and is appropriate  > Pt will follow-up in 3 Mo w/ me if ok then 6mo for meds. Subjective    Work [employer] works full time    Smoking Status non smoker    Pain Scale: 5/10    Pain Assessment  1/20/2020   Location of Pain Back   Pain Location Comment -   Location Modifiers (No Data)   Severity of Pain 5   Quality of Pain Sharp; Aching;Dull;Burning   Quality of Pain Comment -   Duration of Pain Persistent   Frequency of Pain Constant   Aggravating Factors Other (Comment)   Aggravating Factors Comment twisting   Limiting Behavior No   Relieving Factors Rest   Relieving Factors Comment -   Result of Injury No         REVIEW OF SYSTEMS  Constitutional: Negative for fever, chills, or weight change. Respiratory: Negative for cough or shortness of breath. Cardiovascular: Negative for chest pain or palpitations. Gastrointestinal: Negative for incontinence, acid reflux, change in bowel habits, or constipation. Genitourinary: Negative for incontinence, dysuria and flank pain. Musculoskeletal: Positive for back pain. See HPI. Skin: Negative for rash. Neurological:no  radiculopathy. See HPI. Endo/Heme/Allergies: Negative. Psychiatric/Behavioral: Negative. PHYSICAL EXAMINATION  Visit Vitals  /85 (BP 1 Location: Left arm, BP Patient Position: Sitting)   Pulse (!) 107   Temp 97.2 °F (36.2 °C) (Oral)   Resp 16   Ht 5' 8\" (1.727 m)   Wt 205 lb (93 kg)   SpO2 97%   BMI 31.17 kg/m²         Accompanied by self. Constitutional:  Well developed, well nourished, in no acute distress. Psychiatric: Affect and mood are appropriate. Integumentary: No rashes or abrasions noted on exposed areas. Cardiovascular/Peripheral Vascular: +2 radial & pedal pulses. No peripheral edema is noted. Lymphatic:  No evidence of lymphedema. No cervical lymphadenopathy.      SPINE/MUSCULOSKELETAL EXAM     Lumbar spine:  No rash, ecchymosis, or gross obliquity. No fasciculations. No focal atrophy is noted. Range of motion is decreased with flexion, extension. Tenderness to palpation bilateral low back. No tenderness to palpation at the sciatic notch. SI joints non-tender. Trochanters non tender. Straight leg raise neg  Hip Impingement neg    Sensation grossly intact to light touch. MOTOR:     Hip Flex Quads Hamstrings Ankle DF EHL Ankle PF   Right 5/5 5/5 5/5 5/5 5/5 5/5   Left 5/5 5/5 5/5 5/5 5/5 5/5         Ambulation without assistive device. FWB.    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 Dr. Rosaline Cox.  CPDD (calcium pyrophosphate deposition disease)     Depression     GERD (gastroesophageal reflux disease)     Hyperlipidemia     Hypertension     Prediabetes     Primary osteoarthritis involving multiple joints 9/6/2011    [facility] ([facility] [address] 2013    negative RF; elevated anti-CCP. Dr. Giovanna Weathers. Past Surgical History:   Procedure Laterality Date    HX AMPUTATION FINGER Left     index    HX BACK SURGERY  10/23/2019    Right L4-5 hemilaminectomy, medial facetectomy, resection of synovial cyst    HX CARPAL TUNNEL RELEASE Bilateral     HX CATARACT REMOVAL Left 01/2018    HX CATARACT REMOVAL Bilateral 2018    HX COLONOSCOPY      HX HEENT      sinus, tonsillectomy    HX HERNIA REPAIR      HX MOHS PROCEDURES      bilateral     HX ORTHOPAEDIC      lef knee surgery, removed cartilage.  HX ROTATOR CUFF REPAIR Right    . MEDICATIONS      Current Outpatient Medications   Medication Sig Dispense Refill    gabapentin (NEURONTIN) 300 mg capsule Take 1 Cap by mouth three (3) times daily. Max Daily Amount: 900 mg.  Indications: Neuropathic Pain 90 Cap 1    omeprazole (PRILOSEC) 20 mg capsule TAKE 1 CAPSULE BY MOUTH ONCE DAILY 90 Cap 2    mineral oil liquid Take 30 mL by mouth daily as needed for Constipation.  sertraline (ZOLOFT) 50 mg tablet Take 1.5 Tabs by mouth daily. (Patient taking differently: Take 75 mg by mouth daily. Indications: one pill daily) 1 Tab 0    amLODIPine (NORVASC) 10 mg tablet TAKE 1 TABLET BY MOUTH ONCE DAILY 90 Tab 3    hydrALAZINE (APRESOLINE) 25 mg tablet Take 1 Tab by mouth two (2) times a day. 1 Tab 0    celecoxib (CELEBREX) 200 mg capsule Take 1 Cap by mouth two (2) times a day. 180 Cap 1    tamsulosin (FLOMAX) 0.4 mg capsule Take 1 Cap by mouth daily. Indications: enlarged prostate with urination problem 90 Cap 3    furosemide (LASIX) 40 mg tablet Take 1 Tab by mouth daily. 90 Tab 3    lisinopril (PRINIVIL, ZESTRIL) 40 mg tablet Take 1 Tab by mouth daily. 90 Tab 3    atorvastatin (LIPITOR) 10 mg tablet TAKE 1 TABLET BY MOUTH ONCE DAILY 90 Tab 3    potassium chloride (K-DUR, KLOR-CON) 20 mEq tablet Take 1 Tab by mouth daily. 90 Tab 2    cycloSPORINE (RESTASIS) 0.05 % ophthalmic emulsion Administer 1 Drop to both eyes nightly.  colchicine (MITIGARE) 0.6 mg capsule Take 0.6 mg by mouth every other day.  cholecalciferol, vitamin D3, (VITAMIN D3) 2,000 unit tab Take 2,000 Units by mouth daily.  diclofenac (VOLTAREN) 1 % topical gel Apply 4 g to affected area four (4) times daily.  LUMIGAN 0.01 % ophthalmic drops Administer 1 Drop to both eyes nightly.  hydroxychloroquine (PLAQUENIL) 200 mg tablet Take 400 mg by mouth daily.  MULTIVITS/IRON FUM/FA/D3/LYCOP (MULTI FOR HIM PO) Take  by mouth daily.           ALLERGIES    Allergies   Allergen Reactions    Latex, Natural Rubber Swelling    Adhesive Tape-Silicones Rash and Itching    Aldactone [Spironolactone] Not Reported This Time     Breast tenderness    Codeine Not Reported This Time     \"Drives crazy\"    Tape [Adhesive] Rash    Tetanus Toxoid, Adsorbed Anaphylaxis    Tetanus Vaccines And Toxoid Anaphylaxis     Other reaction(s): anaphylaxis/angioedema  Other reaction(s): anaphylaxis/angioedema  Other reaction(s): anesthetic shock          SOCIAL HISTORY    Social History     Socioeconomic History    Marital status:      Spouse name: Not on file    Number of children: Not on file    Years of education: Not on file    Highest education level: Not on file   Occupational History    Not on file   Social Needs    Financial resource strain: Not on file    Food insecurity:     Worry: Not on file     Inability: Not on file    Transportation needs:     Medical: Not on file     Non-medical: Not on file   Tobacco Use    Smoking status: Former Smoker     Years: 12.00     Types: Cigars, Pipe, Cigarettes    Smokeless tobacco: Former User     Types: Chew   Substance and Sexual Activity    Alcohol use: Yes     Comment: rarely    Drug use: No    Sexual activity: Not Currently   Lifestyle    Physical activity:     Days per week: Not on file     Minutes per session: Not on file    Stress: Not on file   Relationships    Social connections:     Talks on phone: Not on file     Gets together: Not on file     Attends Jehovah's witness service: Not on file     Active member of club or organization: Not on file     Attends meetings of clubs or organizations: Not on file     Relationship status: Not on file    Intimate partner violence:     Fear of current or ex partner: Not on file     Emotionally abused: Not on file     Physically abused: Not on file     Forced sexual activity: Not on file   Other Topics Concern     Service Not Asked    Blood Transfusions Not Asked    Caffeine Concern Not Asked    Occupational Exposure Not Asked   Candance Griffin Hazards Not Asked    Sleep Concern Not Asked    Stress Concern Not Asked    Weight Concern Not Asked    Special Diet Not Asked    Back Care Not Asked    Exercise Not Asked    Bike Helmet Not Asked   [address] Not Asked    Self-Exams Not Asked   Social History Narrative    Not on file Socioeconomic History    Marital status:      Spouse name: Not on file    Number of children: Not on file    Years of education: Not on file    Highest education level: Not on file   Occupational History    Not on file   Social Needs    Financial resource strain: Not on file    Food insecurity:     Worry: Not on file     Inability: Not on file    Transportation needs:     Medical: Not on file     Non-medical: Not on file   Tobacco Use    Smoking status: Former Smoker     Years: 12.00     Types: Cigars, Pipe, Cigarettes    Smokeless tobacco: Former User     Types: Chew   Substance and Sexual Activity    Alcohol use: Yes     Comment: rarely    Drug use: No    Sexual activity: Not Currently   Lifestyle    Physical activity:     Days per week: Not on file     Minutes per session: Not on file    Stress: Not on file   Relationships    Social connections:     Talks on phone: Not on file     Gets together: Not on file     Attends Hindu service: Not on file     Active member of club or organization: Not on file     Attends meetings of clubs or organizations: Not on file     Relationship status: Not on file    Intimate partner violence:     Fear of current or ex partner: Not on file     Emotionally abused: Not on file     Physically abused: Not on file     Forced sexual activity: Not on file   Other Topics Concern     Service Not Asked    Blood Transfusions Not Asked    Caffeine Concern Not Asked    Occupational Exposure Not Asked   Yaakov Backer Hazards Not Asked    Sleep Concern Not Asked    Stress Concern Not Asked    Weight Concern Not Asked    Special Diet Not Asked    Back Care Not Asked    Exercise Not Asked    Bike Helmet Not Asked    Seat Belt Not Asked    Self-Exams Not Asked   Social History Narrative    Not on file      Problem Relation Age of Onset    Cancer Mother     Heart Disease Father     Alcohol abuse Father     Cancer Other          Mort Favorite, NP

## 2020-02-13 ENCOUNTER — HOSPITAL ENCOUNTER (OUTPATIENT)
Dept: LAB | Age: 80
Discharge: HOME | End: 2020-02-13
Payer: MEDICARE

## 2020-02-13 DIAGNOSIS — M79.675 GREAT TOE PAIN, LEFT: ICD-10-CM

## 2020-02-13 DIAGNOSIS — I10 ESSENTIAL HYPERTENSION: ICD-10-CM

## 2020-02-13 DIAGNOSIS — M71.38 SYNOVIAL CYST OF LUMBAR FACET JOINT: ICD-10-CM

## 2020-02-13 DIAGNOSIS — M47.816 LUMBAR FACET ARTHROPATHY: ICD-10-CM

## 2020-02-13 DIAGNOSIS — F33.0 MILD EPISODE OF RECURRENT MAJOR DEPRESSIVE DISORDER (HCC): ICD-10-CM

## 2020-02-13 DIAGNOSIS — S68.111A: ICD-10-CM

## 2020-02-13 DIAGNOSIS — E55.9 VITAMIN D DEFICIENCY: ICD-10-CM

## 2020-02-13 DIAGNOSIS — M06.041 RHEUMATOID ARTHRITIS INVOLVING BOTH HANDS WITH NEGATIVE RHEUMATOID FACTOR (HCC): ICD-10-CM

## 2020-02-13 DIAGNOSIS — B35.1 ONYCHOMYCOSIS: ICD-10-CM

## 2020-02-13 DIAGNOSIS — N13.8 BPH WITH OBSTRUCTION/LOWER URINARY TRACT SYMPTOMS: ICD-10-CM

## 2020-02-13 DIAGNOSIS — E66.09 CLASS 1 OBESITY DUE TO EXCESS CALORIES WITH SERIOUS COMORBIDITY AND BODY MASS INDEX (BMI) OF 31.0 TO 31.9 IN ADULT: ICD-10-CM

## 2020-02-13 DIAGNOSIS — M15.9 PRIMARY OSTEOARTHRITIS INVOLVING MULTIPLE JOINTS: ICD-10-CM

## 2020-02-13 DIAGNOSIS — K21.9 GASTROESOPHAGEAL REFLUX DISEASE, ESOPHAGITIS PRESENCE NOT SPECIFIED: ICD-10-CM

## 2020-02-13 DIAGNOSIS — D75.89 MACROCYTOSIS WITHOUT ANEMIA: ICD-10-CM

## 2020-02-13 DIAGNOSIS — E78.5 HYPERLIPIDEMIA, UNSPECIFIED HYPERLIPIDEMIA TYPE: ICD-10-CM

## 2020-02-13 DIAGNOSIS — M54.31 RIGHT SIDED SCIATICA: ICD-10-CM

## 2020-02-13 DIAGNOSIS — N40.1 BPH WITH OBSTRUCTION/LOWER URINARY TRACT SYMPTOMS: ICD-10-CM

## 2020-02-13 DIAGNOSIS — R73.03 PRE-DIABETES: ICD-10-CM

## 2020-02-13 DIAGNOSIS — G47.30 SLEEP APNEA, UNSPECIFIED TYPE: ICD-10-CM

## 2020-02-13 DIAGNOSIS — M06.042 RHEUMATOID ARTHRITIS INVOLVING BOTH HANDS WITH NEGATIVE RHEUMATOID FACTOR (HCC): ICD-10-CM

## 2020-02-13 DIAGNOSIS — R06.00 PND (PAROXYSMAL NOCTURNAL DYSPNEA): ICD-10-CM

## 2020-02-13 DIAGNOSIS — M11.20 CPDD (CALCIUM PYROPHOSPHATE DEPOSITION DISEASE): ICD-10-CM

## 2020-02-13 LAB
25(OH)D3 SERPL-MCNC: 34.3 NG/ML (ref 30–100)
ALBUMIN SERPL-MCNC: 4 G/DL (ref 3.4–5)
ALBUMIN/GLOB SERPL: 1.5 {RATIO} (ref 0.8–1.7)
ALP SERPL-CCNC: 109 U/L (ref 45–117)
ALT SERPL-CCNC: 30 U/L (ref 16–61)
ANION GAP SERPL CALC-SCNC: 5 MMOL/L (ref 3–18)
AST SERPL-CCNC: 22 U/L (ref 10–38)
BASOPHILS # BLD: 0 K/UL (ref 0–0.1)
BASOPHILS NFR BLD: 0 % (ref 0–2)
BILIRUB SERPL-MCNC: 0.7 MG/DL (ref 0.2–1)
BUN SERPL-MCNC: 13 MG/DL (ref 7–18)
BUN/CREAT SERPL: 20 (ref 12–20)
CALCIUM SERPL-MCNC: 9.4 MG/DL (ref 8.5–10.1)
CHLORIDE SERPL-SCNC: 109 MMOL/L (ref 100–111)
CHOLEST SERPL-MCNC: 125 MG/DL
CO2 SERPL-SCNC: 28 MMOL/L (ref 21–32)
CREAT SERPL-MCNC: 0.65 MG/DL (ref 0.6–1.3)
DIFFERENTIAL METHOD BLD: ABNORMAL
EOSINOPHIL # BLD: 0.3 K/UL (ref 0–0.4)
EOSINOPHIL NFR BLD: 6 % (ref 0–5)
ERYTHROCYTE [DISTWIDTH] IN BLOOD BY AUTOMATED COUNT: 13.8 % (ref 11.6–14.5)
EST. AVERAGE GLUCOSE BLD GHB EST-MCNC: 117 MG/DL
GLOBULIN SER CALC-MCNC: 2.6 G/DL (ref 2–4)
GLUCOSE SERPL-MCNC: 91 MG/DL (ref 74–99)
HBA1C MFR BLD: 5.7 % (ref 4.2–5.6)
HCT VFR BLD AUTO: 43.2 % (ref 36–48)
HDLC SERPL-MCNC: 55 MG/DL (ref 40–60)
HDLC SERPL: 2.3 {RATIO} (ref 0–5)
HGB BLD-MCNC: 14.5 G/DL (ref 13–16)
LDLC SERPL CALC-MCNC: 58.8 MG/DL (ref 0–100)
LIPID PROFILE,FLP: NORMAL
LYMPHOCYTES # BLD: 1 K/UL (ref 0.9–3.6)
LYMPHOCYTES NFR BLD: 21 % (ref 21–52)
MAGNESIUM SERPL-MCNC: 2.2 MG/DL (ref 1.6–2.6)
MCH RBC QN AUTO: 32.2 PG (ref 24–34)
MCHC RBC AUTO-ENTMCNC: 33.6 G/DL (ref 31–37)
MCV RBC AUTO: 96 FL (ref 74–97)
MONOCYTES # BLD: 0.7 K/UL (ref 0.05–1.2)
MONOCYTES NFR BLD: 15 % (ref 3–10)
NEUTS SEG # BLD: 2.7 K/UL (ref 1.8–8)
NEUTS SEG NFR BLD: 58 % (ref 40–73)
PLATELET # BLD AUTO: 163 K/UL (ref 135–420)
PMV BLD AUTO: 11.2 FL (ref 9.2–11.8)
POTASSIUM SERPL-SCNC: 3.7 MMOL/L (ref 3.5–5.5)
PROT SERPL-MCNC: 6.6 G/DL (ref 6.4–8.2)
RBC # BLD AUTO: 4.5 M/UL (ref 4.7–5.5)
SODIUM SERPL-SCNC: 142 MMOL/L (ref 136–145)
TRIGL SERPL-MCNC: 56 MG/DL (ref ?–150)
TSH SERPL DL<=0.05 MIU/L-ACNC: 1.51 UIU/ML (ref 0.36–3.74)
VLDLC SERPL CALC-MCNC: 11.2 MG/DL
WBC # BLD AUTO: 4.7 K/UL (ref 4.6–13.2)

## 2020-02-13 PROCEDURE — 80061 LIPID PANEL: CPT

## 2020-02-13 PROCEDURE — 36415 COLL VENOUS BLD VENIPUNCTURE: CPT

## 2020-02-13 PROCEDURE — 83735 ASSAY OF MAGNESIUM: CPT

## 2020-02-13 PROCEDURE — 84443 ASSAY THYROID STIM HORMONE: CPT

## 2020-02-13 PROCEDURE — 83036 HEMOGLOBIN GLYCOSYLATED A1C: CPT

## 2020-02-13 PROCEDURE — 80053 COMPREHEN METABOLIC PANEL: CPT

## 2020-02-13 PROCEDURE — 82306 VITAMIN D 25 HYDROXY: CPT

## 2020-02-13 PROCEDURE — 85025 COMPLETE CBC W/AUTO DIFF WBC: CPT

## 2020-02-17 RX ORDER — LISINOPRIL 40 MG/1
TABLET ORAL
Qty: 90 TAB | Refills: 3 | Status: SHIPPED | OUTPATIENT
Start: 2020-02-17 | End: 2021-03-08

## 2020-02-27 ENCOUNTER — OFFICE VISIT (OUTPATIENT)
Dept: INTERNAL MEDICINE CLINIC | Age: 80
End: 2020-02-27

## 2020-02-27 VITALS
HEIGHT: 68 IN | BODY MASS INDEX: 31.07 KG/M2 | SYSTOLIC BLOOD PRESSURE: 118 MMHG | HEART RATE: 82 BPM | RESPIRATION RATE: 16 BRPM | DIASTOLIC BLOOD PRESSURE: 64 MMHG | OXYGEN SATURATION: 97 % | TEMPERATURE: 98 F | WEIGHT: 205 LBS

## 2020-02-27 DIAGNOSIS — E66.09 CLASS 1 OBESITY DUE TO EXCESS CALORIES WITH SERIOUS COMORBIDITY AND BODY MASS INDEX (BMI) OF 31.0 TO 31.9 IN ADULT: ICD-10-CM

## 2020-02-27 DIAGNOSIS — Z01.818 PREOPERATIVE EVALUATION TO RULE OUT SURGICAL CONTRAINDICATION: Primary | ICD-10-CM

## 2020-02-27 DIAGNOSIS — M11.20 CPDD (CALCIUM PYROPHOSPHATE DEPOSITION DISEASE): ICD-10-CM

## 2020-02-27 DIAGNOSIS — M06.042 RHEUMATOID ARTHRITIS INVOLVING BOTH HANDS WITH NEGATIVE RHEUMATOID FACTOR (HCC): ICD-10-CM

## 2020-02-27 DIAGNOSIS — Z71.89 ACP (ADVANCE CARE PLANNING): ICD-10-CM

## 2020-02-27 DIAGNOSIS — M15.9 PRIMARY OSTEOARTHRITIS INVOLVING MULTIPLE JOINTS: ICD-10-CM

## 2020-02-27 DIAGNOSIS — F33.0 DEPRESSION, MAJOR, RECURRENT, MILD (HCC): ICD-10-CM

## 2020-02-27 DIAGNOSIS — G47.33 OBSTRUCTIVE SLEEP APNEA SYNDROME: ICD-10-CM

## 2020-02-27 DIAGNOSIS — R73.03 PRE-DIABETES: ICD-10-CM

## 2020-02-27 DIAGNOSIS — R73.01 IMPAIRED FASTING GLUCOSE: ICD-10-CM

## 2020-02-27 DIAGNOSIS — Z00.00 MEDICARE ANNUAL WELLNESS VISIT, SUBSEQUENT: ICD-10-CM

## 2020-02-27 DIAGNOSIS — S68.621S: ICD-10-CM

## 2020-02-27 DIAGNOSIS — M47.816 LUMBAR FACET ARTHROPATHY: ICD-10-CM

## 2020-02-27 DIAGNOSIS — M06.041 RHEUMATOID ARTHRITIS INVOLVING BOTH HANDS WITH NEGATIVE RHEUMATOID FACTOR (HCC): ICD-10-CM

## 2020-02-27 DIAGNOSIS — N13.8 BPH WITH OBSTRUCTION/LOWER URINARY TRACT SYMPTOMS: ICD-10-CM

## 2020-02-27 DIAGNOSIS — I10 ESSENTIAL HYPERTENSION: ICD-10-CM

## 2020-02-27 DIAGNOSIS — K21.9 GASTROESOPHAGEAL REFLUX DISEASE, ESOPHAGITIS PRESENCE NOT SPECIFIED: ICD-10-CM

## 2020-02-27 DIAGNOSIS — E78.5 HYPERLIPIDEMIA, UNSPECIFIED HYPERLIPIDEMIA TYPE: ICD-10-CM

## 2020-02-27 DIAGNOSIS — N40.1 BPH WITH OBSTRUCTION/LOWER URINARY TRACT SYMPTOMS: ICD-10-CM

## 2020-02-27 DIAGNOSIS — M54.31 RIGHT SIDED SCIATICA: ICD-10-CM

## 2020-02-27 NOTE — PROGRESS NOTES
Chief Complaint   Patient presents with    Hypertension     Complete physical with lab review. Omkar Ac Annual Wellness Visit     Yearly Medicare wellness exam.    Pre-op Exam     Medical clearance for bilateral eye lid surgery on March 8, 2020 with Dr. Edis Vee. Health Maintenance Due   Topic Date Due    Medicare Yearly Exam  02/20/2020    Colonoscopy  08/10/2020     1. Have you been to the ER, urgent care clinic or hospitalized since your last visit? NO.     2. Have you seen or consulted any other health care providers outside of the [address] [place] [address] since your last visit (Include any pap smears or colon screening)? YES, Dr. Edis Vee, eye surgeon. Do you have an Advanced Directive? NO    Would you like information on Advanced Directives? NO    Learning Assessment 9/4/2019   PRIMARY LEARNER Patient   HIGHEST LEVEL OF EDUCATION - PRIMARY LEARNER  -   BARRIERS PRIMARY LEARNER -   CO-LEARNER CAREGIVER -   PRIMARY LANGUAGE ENGLISH   LEARNER PREFERENCE PRIMARY DEMONSTRATION     -   ANSWERED BY Patient   RELATIONSHIP SELF     Abuse Screening Questionnaire 2/27/2020   Do you ever feel afraid of your partner? N   Are you in a relationship with someone who physically or mentally threatens you? N   Is it safe for you to go home?  Y     3 most recent PHQ Screens 2/27/2020   Little interest or pleasure in doing things Not at all   Feeling down, depressed, irritable, or hopeless Not at all   Total Score PHQ 2 0   Trouble falling or staying asleep, or sleeping too much -   Feeling tired or having little energy -   Poor appetite, weight loss, or overeating -   Feeling bad about yourself - or that you are a failure or have let yourself or your family down -   Trouble concentrating on things such as school, work, reading, or watching TV -   Moving or speaking so slowly that other people could have noticed; or the opposite being so fidgety that others notice -   Thoughts of being better off dead, or hurting yourself in some way -   PHQ 9 Score -   How difficult have these problems made it for you to do your work, take care of your home and get along with others -     Fall Risk Assessment, last 12 mths 2/27/2020   Able to walk? Yes   Fall in past 12 months?  No

## 2020-02-27 NOTE — PATIENT INSTRUCTIONS
Medicare Wellness Visit, Male The best way to improve and maintain good health is to have a healthy lifestyle by eating a well-balanced diet, exercising regularly, limiting alcohol and stopping smoking. Regular visits with your physician or non-physician health care provider also support your good health. Preventive screening tests can find health problems before they become diseases or illnesses. Preventive services such as immunizations prevent serious infections. All people over age 72 should have a Pneumovax and a Prevnar-13 shot to prevent potentially life threatening infections with the pneumococcus bacteria, a common cause of pneumonia. These are once in a lifetime unless you and your provider decide differently. All people over 65 should have a yearly influenza vaccine or \"flu\" shot. This does not prevent infection with cold viruses but has been proven to prevent hospitalization and death from influenza. Although Medicare part B \"regular Medicare\" currently only covers tetanus vaccination in the context of an injury, a tetanus vaccine (Tdap or Td) is recommended every 10 years. A shingles vaccine is recommended once in a lifetime after age 61. The Shingles vaccine is also not covered by Medicare part B. Note, however, that both the Shingles vaccine and Tdap/Td are generally covered by secondary carriers. Please check your coverage and out of pocket expenses. Consider contacting your local health department because it may stock these vaccines for a reasonable charge. We currently have documentation of the following immunization history for you: 
Immunization History Administered Date(s) Administered  (RETIRED) Pneumococcal Vaccine (Unspecified Type) 01/01/2008  Influenza High Dose Vaccine PF 09/30/2017  Influenza Vaccine (Tri) Adjuvanted 09/25/2018, 09/25/2019  Influenza Vaccine Split 10/04/2011, 10/16/2012  Influenza Vaccine Whole 01/15/2010  Pneumococcal Conjugate (PCV-13) 01/19/2015  Pneumococcal Polysaccharide (PPSV-23) 01/01/2008  Varicella Virus Vaccine 10/01/2013  Zoster 10/16/2012 Screening for infection with Hepatitis C is recommended for anyone born between [birth year] through [address]. The table at the bottom of this document indicates the status of this and other screening services. Screening for diabetes mellitus with a blood sugar test (glucose) should be done at least every 3 years until age 79. You and your health care provider may decide whether to continue screening after age 79. The most recent blood glucose we have on file for you is:  
Lab Results Component Value Date/Time Glucose 91 02/13/2020 09:29 AM  
 
 
Glaucoma is a disease of the eye due to increased ocular pressure that can lead to blindness. People with risk factors for glaucoma ( race, diabetes, family history) should be screened at least every 2 years by an eye professional. This may be covered annually if indicated as determined by you and your doctor. Cardiovascular screening tests that check for elevated lipids or cholesterol (fatty part of blood) which can lead to heart disease and strokes should be done every 4-6 years through age 79. You and your health care provider may decide whether to continue screening after age 79. The most recent lipid panel we have on file for you is:  
Lab Results Component Value Date/Time  Cholesterol, total 125 02/13/2020 09:29 AM  
 HDL Cholesterol 55 02/13/2020 09:29 AM  
 LDL, calculated 58.8 02/13/2020 09:29 AM  
 VLDL, calculated 11.2 02/13/2020 09:29 AM  
 Triglyceride 56 02/13/2020 09:29 AM  
 CHOL/HDL Ratio 2.3 02/13/2020 09:29 AM  
 
 
Colorectal cancer screening that evaluates for blood or polyps in your colon for people with average risk should be done yearly as a stool test, every five years as a flexible sigmoidoscope or every 10 years as a colonoscopy up to age 76. You and your health care provider may decide whether to continue screening after age 76. Men up to age 76 may elect to screen for prostate cancer with a blood test called a PSA at certain intervals, depending on their personal and family history. This decision is between the patient and his provider. The most recent PSA values we have on file for you are: 
Lab Results Component Value Date/Time  
 Prostate Specific Ag 3.3 09/24/2019 08:54 AM  
 Prostate Specific Ag 4.1 (H) 06/17/2019 04:48 PM  
 Prostate Specific Ag 3.5 07/16/2018 09:14 AM  
 Prostate Specific Ag 1.9 07/09/2015 09:35 AM  
 Prostate Specific Ag 2.7 07/09/2014 10:53 AM  
 Prostate Specific Ag 2.3 01/08/2014 04:42 PM  
 
 
If you have been a smoker or had family history of abdominal aortic aneurysms, you and your provider may decide to schedule an ultrasound test of your aorta. Our records show this was done on:  n/a People who have smoked the equivalent of 1 pack per day for 30 years or more may benefit from screening for lung cancer with a yearly low dose CT scan until they have been non smokers for 15 years or competing health conditions render this unlikely to be beneficial. Our records show: n/a Your Medicare Wellness Exam is recommended annually. Here is a list of your current Health Maintenance items with a due date: 
Health Maintenance Topic Date Due  Shingrix Vaccine Age 50> (1 of 2) 02/01/2021 (Originally 1/16/1992)  GLAUCOMA SCREENING Q2Y  02/11/2021  Lipid Screen  02/13/2021  Medicare Yearly Exam  02/27/2021  
 DTaP/Tdap/Td series (2 - Td) 02/01/2027  Influenza Age 5 to Adult  Completed  Pneumococcal 65+ years  Completed

## 2020-02-29 NOTE — ACP (ADVANCE CARE PLANNING)
Advance Care Planning (ACP) Provider Note - Comprehensive     Date of ACP Conversation: 02/28/20  Persons included in Conversation:  patient  Length of ACP Conversation in minutes:  16 minutes    Authorized Decision Maker (if patient is incapable of making informed decisions): wife and children  This person is:  Healthcare Agent/Medical Power of  under Advance Directive          General ACP for ALL Patients with Decision Making Capacity:   Importance of advance care planning, including choosing a healthcare agent to communicate patient's healthcare decisions if patient lost the ability to make decisions, such as after a sudden illness or accident  Understanding of the healthcare agent role was assessed and information provided  Exploration of values, goals, and preferences if recovery is not expected, even with continued medical treatment in the event of: Imminent death  Severe, permanent brain injury  \"In these circumstances, what matters most to you? \"  Care focused more on comfort or quality of life. Review of Existing Advance Directive:  Patient has not completed an advance directive previously. He states that he would designate his wife and children as his healthcare agents. He expresses that he does not wish life prolonging procedures for end of life care. For Serious or Chronic Illness:  Understanding of medical condition      Interventions Provided:  Recommended completion of Advance Directive form after review of ACP materials and conversation with prospective healthcare agent   Recommended communicating the plan and making copies for the healthcare agent, personal physician, and others as appropriate (e.g., health system)  Recommended review of completed ACP document annually or upon change in health status   Recommended to complete advance directive and return completed form to office to be copied and scanned into chart. Paperwork provided and reviewed.

## 2020-02-29 NOTE — PROGRESS NOTES
This is the Subsequent Medicare Annual Wellness Exam, performed 12 months or more after the Initial AWV or the last Subsequent AWV    I have reviewed the patient's medical history in detail and updated the computerized patient record. History     Patient Active Problem List   Diagnosis Code    BPH with obstruction/lower urinary tract symptoms N40.1, N13.8    Hypertension I10    Primary osteoarthritis involving multiple joints M15.0    Hyperlipidemia E78.5    GERD (gastroesophageal reflux disease) K21.9    Pre-diabetes R73.03    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 ([facility] [address] M06.041, M06.042    Vitamin D deficiency E55.9    Colon polyps K63.5    CPDD (calcium pyrophosphate deposition disease) M11.20    Impaired fasting glucose R73.01    Rectal bleeding K62.5    Class 1 obesity due to excess calories with serious comorbidity and body mass index (BMI) of 31.0 to 31.9 in adult E66.09, Z68.31    APC (atrial premature contractions) I49.1    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 Dr. Lior Haskins.    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 Dr. Darling Files. Past Surgical History:   Procedure Laterality Date    HX AMPUTATION FINGER Left     index    HX BACK SURGERY  10/23/2019    Right L4-5 hemilaminectomy, medial facetectomy, resection of synovial cyst    HX CARPAL TUNNEL RELEASE Bilateral     HX CATARACT REMOVAL Left 01/2018    HX CATARACT REMOVAL Bilateral 2018    HX COLONOSCOPY      HX HEENT      sinus, tonsillectomy    HX HERNIA REPAIR      HX MOHS PROCEDURES      bilateral     HX ORTHOPAEDIC      lef knee surgery, removed cartilage.  HX ROTATOR CUFF REPAIR Right      Current Outpatient Medications   Medication Sig Dispense Refill    sertraline (ZOLOFT) 50 mg tablet Take 1.5 Tabs by mouth daily. 1 Tab 0    lisinopril (PRINIVIL, ZESTRIL) 40 mg tablet TAKE 1 TABLET BY MOUTH DAILY 90 Tab 3    gabapentin (NEURONTIN) 300 mg capsule Take 1 Cap by mouth three (3) times daily. Max Daily Amount: 900 mg. Indications: Neuropathic Pain 90 Cap 1    omeprazole (PRILOSEC) 20 mg capsule TAKE 1 CAPSULE BY MOUTH ONCE DAILY 90 Cap 2    mineral oil liquid Take 30 mL by mouth daily as needed for Constipation.  amLODIPine (NORVASC) 10 mg tablet TAKE 1 TABLET BY MOUTH ONCE DAILY 90 Tab 3    hydrALAZINE (APRESOLINE) 25 mg tablet Take 1 Tab by mouth two (2) times a day. 1 Tab 0    tamsulosin (FLOMAX) 0.4 mg capsule Take 1 Cap by mouth daily. Indications: enlarged prostate with urination problem 90 Cap 3    furosemide (LASIX) 40 mg tablet Take 1 Tab by mouth daily. 90 Tab 3    atorvastatin (LIPITOR) 10 mg tablet TAKE 1 TABLET BY MOUTH ONCE DAILY 90 Tab 3    potassium chloride (K-DUR, KLOR-CON) 20 mEq tablet Take 1 Tab by mouth daily. 90 Tab 2    cycloSPORINE (RESTASIS) 0.05 % ophthalmic emulsion Administer 1 Drop to both eyes nightly.  colchicine (MITIGARE) 0.6 mg capsule Take 0.6 mg by mouth every other day.       cholecalciferol, vitamin D3, (VITAMIN D3) 2,000 unit tab Take 2,000 Units by mouth daily.  LUMIGAN 0.01 % ophthalmic drops Administer 1 Drop to both eyes nightly.  hydroxychloroquine (PLAQUENIL) 200 mg tablet Take 400 mg by mouth daily.  MULTIVITS/IRON FUM/FA/D3/LYCOP (MULTI FOR HIM PO) Take  by mouth daily.  celecoxib (CELEBREX) 200 mg capsule Take 1 Cap by mouth two (2) times a day. 180 Cap 1    diclofenac (VOLTAREN) 1 % topical gel Apply 4 g to affected area four (4) times daily.        Allergies   Allergen Reactions    Latex, Natural Rubber Swelling    Adhesive Tape-Silicones Rash and Itching    Aldactone [Spironolactone] Not Reported This Time     Breast tenderness    Codeine Not Reported This Time     \"Drives crazy\"    Tape [Adhesive] Rash    Tetanus Toxoid, Adsorbed Anaphylaxis    Tetanus Vaccines And Toxoid Anaphylaxis     Other reaction(s): anaphylaxis/angioedema  Other reaction(s): anaphylaxis/angioedema  Other reaction(s): anesthetic shock       Family History   Problem Relation Age of Onset    Cancer Mother     Heart Disease Father     Alcohol abuse Father     Cancer Other      Social History     Tobacco Use    Smoking status: Former Smoker     Years: 12.00     Types: Cigars, Pipe, Cigarettes    Smokeless tobacco: Former User     Types: Chew   Substance Use Topics    Alcohol use: Yes     Comment: rarely       Depression Risk Factor Screening:     3 most recent PHQ Screens 2/27/2020   Little interest or pleasure in doing things Not at all   Feeling down, depressed, irritable, or hopeless Not at all   Total Score PHQ 2 0   Trouble falling or staying asleep, or sleeping too much -   Feeling tired or having little energy -   Poor appetite, weight loss, or overeating -   Feeling bad about yourself - or that you are a failure or have let yourself or your family down -   Trouble concentrating on things such as school, work, reading, or watching TV -   Moving or speaking so slowly that other people could have noticed; or the opposite being so fidgety that others notice -   Thoughts of being better off dead, or hurting yourself in some way -   PHQ 9 Score -   How difficult have these problems made it for you to do your work, take care of your home and get along with others -       Alcohol Risk Factor Screening (MALE > 65): Do you average more 1 drink per night or more than 7 drinks a week: No    In the past three months have you have had more than 4 drinks containing alcohol on one occasion: No      Functional Ability and Level of Safety:   Hearing: Hearing is good. Activities of Daily Living: The home contains: no safety equipment. Patient does total self care    Ambulation: with no difficulty    Fall Risk:  Fall Risk Assessment, last 12 mths 2/27/2020   Able to walk? Yes   Fall in past 12 months? No       Abuse Screen:  Patient is not abused    Cognitive Screening   Has your family/caregiver stated any concerns about your memory: no  Cognitive Screening: none    Patient Care Team   Patient Care Team:  Yeimi Paz MD as PCP - General (Internal Medicine)  Ye[facility] Paz [facility]  Troy Rodriguez MD as Physician (Urology)  Lizzette Rubin MD (Rheumatology)  Kayy Rodriguez MD (Gastroenterology)  Roxy Suarez OD (Ophthalmology)  Amy Sheth MD (Pulmonary Disease)  Brendalyn Severs, MD (Podiatry)  Brendon Mcginnis MD (Ophthalmology)  Consuelo Cano MD (Orthopedic Surgery)    Assessment/Plan   Education and counseling provided:  Are appropriate based on today's review and evaluation  End-of-Life planning (with patient's consent)  Influenza Vaccine  Prostate cancer screening tests (PSA, covered annually)  Colorectal cancer screening tests  Cardiovascular screening blood test  Screening for glaucoma  Diabetes screening test  Shingrix vaccine    Diagnoses and all orders for this visit:    1.  Preoperative evaluation to rule out surgical contraindication    2. Depression, major, recurrent, mild ([facility] [address]    3. Obstructive sleep apnea syndrome    4. Essential hypertension    5. Hyperlipidemia, unspecified hyperlipidemia type    6. Impaired fasting glucose    7. Pre-diabetes    8. 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 ([facility] [address]    9. CPDD (calcium pyrophosphate deposition disease)    10. Primary osteoarthritis involving multiple joints    11. Lumbar facet arthropathy    12. BPH with obstruction/lower urinary tract symptoms    13. Right sided sciatica    14. Gastroesophageal reflux disease, esophagitis presence not specified    15. Class 1 obesity due to excess calories with serious comorbidity and body mass index (BMI) of 31.0 to 31.9 in adult    16. 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 Medicare annual wellness visit, subsequent    18. ACP (advance care planning)    Other orders  -     sertraline (ZOLOFT) 50 mg tablet; Take 1.5 Tabs by mouth daily. There are no preventive care reminders to display for this patient.

## 2020-03-01 ENCOUNTER — TELEPHONE (OUTPATIENT)
Dept: INTERNAL MEDICINE CLINIC | Age: 80
End: 2020-03-01

## 2020-03-01 PROBLEM — G47.33 OBSTRUCTIVE SLEEP APNEA SYNDROME: Status: ACTIVE | Noted: 2019-08-17

## 2020-03-01 PROBLEM — F32.A DEPRESSION: Status: RESOLVED | Noted: 2017-02-05 | Resolved: 2020-03-01

## 2020-03-01 PROBLEM — S68.621S: Status: ACTIVE | Noted: 2018-09-30

## 2020-03-01 PROBLEM — R06.00 PND (PAROXYSMAL NOCTURNAL DYSPNEA): Status: RESOLVED | Noted: 2019-08-17 | Resolved: 2020-03-01

## 2020-03-01 PROBLEM — F33.0 DEPRESSION, MAJOR, RECURRENT, MILD (HCC): Status: ACTIVE | Noted: 2020-03-01

## 2020-03-01 PROBLEM — D75.89 MACROCYTOSIS WITHOUT ANEMIA: Status: RESOLVED | Noted: 2019-02-23 | Resolved: 2020-03-01

## 2020-03-01 PROBLEM — R73.01 IMPAIRED FASTING GLUCOSE: Status: ACTIVE | Noted: 2017-02-05

## 2020-03-01 RX ORDER — SERTRALINE HYDROCHLORIDE 50 MG/1
75 TABLET, FILM COATED ORAL DAILY
Qty: 1 TAB | Refills: 0
Start: 2020-03-01 | End: 2020-04-27

## 2020-03-01 NOTE — PROGRESS NOTES
HPI:   Micky Balbuena is a 68y.o. year old male who presents today for a physical exam and preoperative evaluation. He has a history of hypertension, hyperlipidemia, prediabetes, rheumatoid arthritis, osteoarthritis, calcium pyrophosphate deposition disease, BPH, GERD, and depression. 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 He reports that he is doing well. He has been having difficulty with his vision due to bilateral dermatochalasis, and is scheduled to have bilateral eye lid surgery on 3/11/2020 by Dr. Joan Richardson. He reports that he has had no change in his exercise tolerance and denies any chest pain, shortness of breath at rest or with exertion, palpitations, or lightheadedness. He does have chronic difficulty with dependent edema which resolves overnight. He is otherwise without new complaints and feeling generally well. On 8/13/2019, he reported that he had been seeing Dr. Mirta Rodriguez for increasing difficulty with right sided sciatica. He reported being treated with a Medrol dose pack, physical therapy, and spinal injections without improvement, and was also prescribed Norco and Tramadol, but he stated that he found them too sedating and of no benefit. Due to persistent symptoms, he underwent a lumbar MRI (8/5/2019) which showed degenerative changes most notable at L4-L5 resulting in moderate spinal canal stenosis as well as moderate to severe right greater than left foraminal stenoses; advanced facet arthropathy with small facet effusions and suspected small right facet joint ventral synovial cyst; notable degenerative changes also L3-L4; L1 mild anterior compression deformity, chronic appearing; overall general worsening when compared to prior study in 2007. He was having continued significant pain, and was started on gabapentin 100 mg tid. He was referred to Dr. Charles Hawkins and she increased his dose of gabapentin to 200 mg tid.  She also referred him to Dr. Johanny Zapata for evaluation given his lack of response to conservative management. He recommended proceeding with decompression by performing a L4/5 right-sided hemilaminotomy and medial facetectomy, which was performed on 10/23/2019. He developed postop urinary retention and was discharged with a Deleon catheter. He had follow-up with Dr. Jaci Red on 10/29/2019 with successful voiding trial. He reports that he noted initial resolution of his right sciatica pain following surgery, but on 10/29/2019 noted abrupt recurrence when getting out of bed. He was evaluated by GOMEZ Doherty on 10/31/2019 and was treated with a Medrol dose pack without improvement. He was restarted on gabapentin 300 mg tid and reports improvement, although he continues to have mild pain in the morning. On 8/9/2019, he had an episode of waking up gasping for air forcing him to get out of bed and walk around until his breathing normalized. He has been having frequent similar episodes over that last year, but had been resistent to evaluation for sleep apnea. However, he reports that this episode was especially severe. When his symptoms persisted, he was evaluated at [facility], and testing included WBC 5.7, Hb 14.2/ Hct 41.6, creatinine 0.9/ eGFR>60, troponin I x 1 negative, NT-pro BNP 45, EKG sinus rhythm at 83 bpm and no ischemic changes, and chest x-ray without acute changes, but an ill defined 15 mm density in the lateral left mid zone was noted. He received lasix 40 mg IV and was discharged. He had an echocardiogram (8/26/2019) showing normal LV function (EF 56-60%), no RWMA, unable to estimate RVSP due to inadequate TR, but TV/PV appear normal. He was referred to Dr. Renee Coles for probable sleep apnea, and underwent a home sleep study on 10/25/2019,showing evidence of severe obstructive sleep apnea with AHI 35 and oxygen guy 80%. He has not yet received his CPAP equipment so as to begin therapy.      On 6/20/2018, he suffered an accidental gun shot wound while working resulting in traumatic injury to his left index finger with near loss of the middle phalanx and fracture of the distal phalanx. He was taken to [facility] and initially had irrigation and closure of the lacerations performed. He presented to the [facility] ED on 6/24/2018 with increased pain and swelling, and was started on doxycycline for a wound infection. On 7/7/2018, due to loss of stability and angulation of the index finger, he underwent stabilization with fusion of the proximal and distal phalanx with bone grafting by Dr. Lenore Hamlin. He did well and was started on physical therapy. On 8/1/2018, he presented to [address] [place] [address] for evaluation of increasing erythema, swelling and tenderness with concern for a possible infection. He underwent left hand x-ray (8/1/2018) showing severe soft tissue swelling of the left second finger worrisome for cellulitis, traumatic amputation of the middle phalanx with marked osteopenia and irregularity of the base of the distal phalanx and flattening and irregularity of the head of the proximal phalanx. Unclear if related to prior trauma/surgery or osteomyelitis with osseous destruction and no films available for comparison. He was started empirically on Bactrim and Augmentin for 14 days. On 8/10/2018, he presented for evaluation by GOMEZ Black for complaints of fever, malaise, headache, fatigue, and diarrhea. Lab evaluation showed WBC 17 (90% neutrophils), creatinine 1.34/ eGFR 52, blood culture negative. Stool cultures (8/13/2018) routine, O/P, and C.diff negative. Bactrim and Augmentin were discontinued on 8/13/2018, and he was started on metronidazole for 10 days for treatment of presumed C.diff with improvement. He was evaluated by Dr. Adriane Dillon on 8/15/2018 and repeat WBC 8.6 (59% neutrophils), ESR 6, and CRP 0.9. He was seen by Dr. Adriane Dillon in follow-up on 8/30/2018 and left index finger wound noted to be significantly improved and no further difficulty with diarrhea.      He has a history of hypertension, treated with amlodipine, lisinopril, lasix (+ potassium), and hydralazine was added in 4/2018. He states that his wife has been monitoring his blood pressure intermittently. He denies any chest pain, shortness of breath at rest or with exertion, lightheadedness, or palpitations. He does report bilateral lower extremity swelling that it will increase throughout the day and improve overnight. . In 6/2016, he underwent lower extremity arterial and venous duplex scans, both of which were negative. He also has a history of hyperlipidemia, treated with moderate intensity atorvastatin. He has a history of prediabetes, with HbA1c ranging from 5.9-6.2 since 2012. He denies any polyuria, polydipsia, nocturia, or blurry vision, and has no history of retinopathy, neuropathy, or nephropathy. He has regular eye exams with Dr. Jeremiah Jacob. He has a history of bilateral hand pain with morning stiffness for several years, and in 3/2014, he was noted to have a positive anti-CCP antibody level although NIMCO, rheumatoid factor, and ESR were negative. He was referred for evaluation to Dr. Carter Habermann, and was diagnosed with rheumatoid arthritis in 6/2014. He was treated with hydroxychloroquine, which has been partially helpful in controlling his symptoms. Bilateral hand x-rays also showed evidence of primary osteoarthritis with osteophytes present on the second and third MCP joints. In 1/2017, he was also noted to have evidence of possible chondrocalcinosis on x-ray, and was started on low dose colchicine in addition to hydroxychloroquine for concomitant calcium pyrophosphate deposition disease. He states that since starting on colchicine, he has had significant improvement in his hand pain. He also uses compounding cream and Voltaren gel for pain control. In 1/2019, he was complaining of worsening neck and bilateral shoulder pain (R>L).  He stated that he was previously followed by Dr. Cynthia Brian, and would occasionally receive cortisone injections into his shoulders. He also described neck pain with difficulty turning his head. He denied any upper extremity weakness or paresthesias. He underwent imaging, and bilateral shoulder x-rays (1/10/2019) showed degenerative changes and secondary findings of rotator cuff pathology. Cervical spine x-rays (1/10/2019) showed advanced degenerative changes with multilevel facet arthropathy. He was evaluated by Dr. Antonio Rahman who gave him a cortisone injection in his right shoulder with some improvement. He also recommended a reverse shoulder replacement, but he remains hesitant to proceed. He was started on Celebrex 200 mg bid in 2/2019, and reports significant improvement. He continues to also use Tylenol as needed for pain. He states that his neck pain seems to have improved to his baseline level. He has a history of symptomatic BPH, with complaints of decreased stream, hesitancy, and dribbling. He has refused treatment with medication. He is followed by Dr. Albertina Browne. He denies any dysuria, gross hematuria, or flank pain. He has a history of GERD, treated with daily omeprazole with good control of symptoms. He had a screening colonoscopy and 8/2015 by Dr. Makenzie Santos, showing a 3 mm sessile cecal polyp (pathology: intra-mucosal lymphoid aggregate), two 4 mm sessile polyps in the transverse colon (pathology: serrated adenomas), and a 1 cm lipoma in the transverse colon. Follow-up recommended for five years. He was having difficulty with rectal bleeding in 1/2018 and returned for evaluation with Dr. Makenzie Santos who felt it was most likely secondary to a hemorrhoidal source. She recommended use of Citrucel. He states that the bleeding has improved with initiation of this therapy. He denies any abdominal pain, nausea, vomiting, melena, or change in bowel movements. He has a history of depression and anxiety, which had been well controlled with Paxil although inadvertently stopped.  Now on Zoloft with improvement. 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 Dr. Chidi Hickey. 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 Dr. Kristina Ha. Past Surgical History:   Procedure Laterality Date    HX AMPUTATION FINGER Left     index    HX BACK SURGERY  10/23/2019    Right L4-5 hemilaminectomy, medial facetectomy, resection of synovial cyst    HX CARPAL TUNNEL RELEASE Bilateral     HX CATARACT REMOVAL Left 01/2018    HX CATARACT REMOVAL Bilateral 2018    HX COLONOSCOPY      HX HEENT      sinus, tonsillectomy    HX HERNIA REPAIR      HX MOHS PROCEDURES      bilateral     HX ORTHOPAEDIC      lef knee surgery, removed cartilage.  HX ROTATOR CUFF REPAIR Right      Current Outpatient Medications   Medication Sig    sertraline (ZOLOFT) 50 mg tablet Take 1.5 Tabs by mouth daily.  lisinopril (PRINIVIL, ZESTRIL) 40 mg tablet TAKE 1 TABLET BY MOUTH DAILY    gabapentin (NEURONTIN) 300 mg capsule Take 1 Cap by mouth three (3) times daily. Max Daily Amount: 900 mg. Indications: Neuropathic Pain    omeprazole (PRILOSEC) 20 mg capsule TAKE 1 CAPSULE BY MOUTH ONCE DAILY    mineral oil liquid Take 30 mL by mouth daily as needed for Constipation.  amLODIPine (NORVASC) 10 mg tablet TAKE 1 TABLET BY MOUTH ONCE DAILY    hydrALAZINE (APRESOLINE) 25 mg tablet Take 1 Tab by mouth two (2) times a day.     tamsulosin (FLOMAX) 0.4 mg capsule Take 1 Cap by mouth daily. Indications: enlarged prostate with urination problem    furosemide (LASIX) 40 mg tablet Take 1 Tab by mouth daily.  atorvastatin (LIPITOR) 10 mg tablet TAKE 1 TABLET BY MOUTH ONCE DAILY    potassium chloride (K-DUR, KLOR-CON) 20 mEq tablet Take 1 Tab by mouth daily.  cycloSPORINE (RESTASIS) 0.05 % ophthalmic emulsion Administer 1 Drop to both eyes nightly.  colchicine (MITIGARE) 0.6 mg capsule Take 0.6 mg by mouth every other day.  cholecalciferol, vitamin D3, (VITAMIN D3) 2,000 unit tab Take 2,000 Units by mouth daily.  LUMIGAN 0.01 % ophthalmic drops Administer 1 Drop to both eyes nightly.  hydroxychloroquine (PLAQUENIL) 200 mg tablet Take 400 mg by mouth daily.  MULTIVITS/IRON FUM/FA/D3/LYCOP (MULTI FOR HIM PO) Take  by mouth daily.  celecoxib (CELEBREX) 200 mg capsule Take 1 Cap by mouth two (2) times a day.  diclofenac (VOLTAREN) 1 % topical gel Apply 4 g to affected area four (4) times daily. No current facility-administered medications for this visit. Allergies and Intolerances: Allergies   Allergen Reactions    Latex, Natural Rubber Swelling    Adhesive Tape-Silicones Rash and Itching    Aldactone [Spironolactone] Not Reported This Time     Breast tenderness    Codeine Not Reported This Time     \"Drives crazy\"    Tape [Adhesive] Rash    Tetanus Toxoid, Adsorbed Anaphylaxis    Tetanus Vaccines And Toxoid Anaphylaxis     Other reaction(s): anaphylaxis/angioedema  Other reaction(s): anaphylaxis/angioedema  Other reaction(s): anesthetic shock     Family History: He has no family history of colon or prostate cancer. Family History   Problem Relation Age of Onset    Cancer Mother     Heart Disease Father     Alcohol abuse Father     Cancer Other      Social History:   He  reports that he has quit smoking. His smoking use included cigars, pipe, and cigarettes. He quit after 12.00 years of use.  He has quit using smokeless tobacco.  His smokeless tobacco use included chew. He smoked 2 ppd for 50 years, stopping in 1974. He is  with two adult children. 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 Social History     Substance and Sexual Activity   Alcohol Use Yes    Comment: rarely     Immunization History:  Immunization History   Administered Date(s) Administered    (RETIRED) Pneumococcal Vaccine (Unspecified Type) 01/01/2008    Influenza High Dose Vaccine PF 09/30/2017    Influenza Vaccine (Tri) Adjuvanted 09/25/2018, 09/25/2019    Influenza Vaccine Split 10/04/2011, 10/16/2012    Influenza Vaccine Whole 01/15/2010    Pneumococcal Conjugate (PCV-13) 01/19/2015    Pneumococcal Polysaccharide (PPSV-23) 01/01/2008    Varicella Virus Vaccine 10/01/2013    Zoster 10/16/2012       Review of Systems:   As above included in HPI. Otherwise 11 point review of systems negative including constitutional, skin, HENT, eyes, respiratory, cardiovascular, gastrointestinal, genitourinary, musculoskeletal, endocrine, hematologic, allergy, and neurologic. Physical:   Vitals:   BP: 118/64   HR: 82  WT: 205 lb (93 kg)  BMI:  31.17 kg/m2      Exam:   Patient appears in no apparent distress. Affect is appropriate. HEENT --Anicteric sclerae, tympanic membranes normal,  ear canals normal.  PERRL, EOMI, conjunctiva and lids normal.   Sinuses were nontender, turbinates normal, hearing normal.  Oropharynx without  erythema, normal tongue, oral mucosa and tonsils. No cervical lymphadenopathy. No thyromegaly, JVD, or bruits. Carotid pulses 2+ with normal upstroke. Lungs --Clear to auscultation. No wheezing or rales. Heart --Regular rate and rhythm, no murmurs, rubs, gallops, or clicks. Chest wall --Nontender to palpation. PMI normal.  Abdomen -- Soft and nontender, no hepatosplenomegaly or masses. Extremities -- Without cyanosis, clubbing, edema.  2+ pulses equally and bilaterally. Neuro -- CN 2-12 intact, strength 5/5 with intact soft touch in all extremities  Derm  no obvious abnormalities noted, no rash      Review of Data:  Labs:    Hospital Outpatient Visit on 02/13/2020   Component Date Value Ref Range Status    WBC 02/13/2020 4.7  4.6 - 13.2 K/uL Final    RBC 02/13/2020 4.50* 4.70 - 5.50 M/uL Final    HGB 02/13/2020 14.5  13.0 - 16.0 g/dL Final    HCT 02/13/2020 43.2  36.0 - 48.0 % Final    MCV 02/13/2020 96.0  74.0 - 97.0 FL Final    MCH 02/13/2020 32.2  24.0 - 34.0 PG Final    MCHC 02/13/2020 33.6  31.0 - 37.0 g/dL Final    RDW 02/13/2020 13.8  11.6 - 14.5 % Final    PLATELET 13/47/1198 259  135 - 420 K/uL Final    MPV 02/13/2020 11.2  9.2 - 11.8 FL Final    NEUTROPHILS 02/13/2020 58  40 - 73 % Final    LYMPHOCYTES 02/13/2020 21  21 - 52 % Final    MONOCYTES 02/13/2020 15* 3 - 10 % Final    EOSINOPHILS 02/13/2020 6* 0 - 5 % Final    BASOPHILS 02/13/2020 0  0 - 2 % Final    ABS. NEUTROPHILS 02/13/2020 2.7  1.8 - 8.0 K/UL Final    ABS. LYMPHOCYTES 02/13/2020 1.0  0.9 - 3.6 K/UL Final    ABS. MONOCYTES 02/13/2020 0.7  0.05 - 1.2 K/UL Final    ABS. EOSINOPHILS 02/13/2020 0.3  0.0 - 0.4 K/UL Final    ABS.  BASOPHILS 02/13/2020 0.0  0.0 - 0.1 K/UL Final    DF 02/13/2020 AUTOMATED    Final    Hemoglobin A1c 02/13/2020 5.7* 4.2 - 5.6 % Final    Est. average glucose 02/13/2020 117  mg/dL Final    LIPID PROFILE 02/13/2020        Final    Cholesterol, total 02/13/2020 125  <200 MG/DL Final    Triglyceride 02/13/2020 56  <150 MG/DL Final    HDL Cholesterol 02/13/2020 55  40 - 60 MG/DL Final    LDL, calculated 02/13/2020 58.8  0 - 100 MG/DL Final    VLDL, calculated 02/13/2020 11.2  MG/DL Final    CHOL/HDL Ratio 02/13/2020 2.3  0 - 5.0   Final    Magnesium 02/13/2020 2.2  1.6 - 2.6 mg/dL Final    Sodium 02/13/2020 142  136 - 145 mmol/L Final    Potassium 02/13/2020 3.7  3.5 - 5.5 mmol/L Final    Chloride 02/13/2020 109  100 - 111 mmol/L Final    CO2 2020 28  21 - 32 mmol/L Final    Anion gap 2020 5  3.0 - 18 mmol/L Final    Glucose 2020 91  74 - 99 mg/dL Final    BUN 2020 13  7.0 - 18 MG/DL Final    Creatinine 2020 0.65  0.6 - 1.3 MG/DL Final    BUN/Creatinine ratio 2020 20  12 - 20   Final    GFR est AA 2020 >60  >60 ml/min/1.73m2 Final    GFR est non-AA 2020 >60  >60 ml/min/1.73m2 Final    Calcium 2020 9.4  8.5 - 10.1 MG/DL Final    Bilirubin, total 2020 0.7  0.2 - 1.0 MG/DL Final    ALT (SGPT) 2020 30  16 - 61 U/L Final    AST (SGOT) 2020 22  10 - 38 U/L Final    Alk. phosphatase 2020 109  45 - 117 U/L Final    Protein, total 2020 6.6  6.4 - 8.2 g/dL Final    Albumin 2020 4.0  3.4 - 5.0 g/dL Final    Globulin 2020 2.6  2.0 - 4.0 g/dL Final    A-G Ratio 2020 1.5  0.8 - 1.7   Final    TSH 2020 1.51  0.36 - 3.74 uIU/mL Final    Vitamin D 25-Hydroxy 2020 34.3  30 - 100 ng/mL Final       Health Maintenance:  Screening:    Colorectal: colonoscopy (2015) serrated adenomas. Dr. Berna Valderrama. Due 2020. Depression: on Paxil   DM (HbA1c/FPG): FPG 91/ HbA1c 5.7 (2020)   Hepatitis C: N/A   Falls: none   DEXA: within normal limits (2016)   PSA/MARTÍNEZ: PSA 2.1. As per Dr. Ana Eduardo   Glaucoma: regular eye exams with Dr. Cisse/ Dr. Andrea Pickett (last 2020)   Smokin pack year.  Distant past.   Vitamin D: 34.3 (2020)   Medicare Wellness: today    Impression:  Patient Active Problem List   Diagnosis Code    BPH with obstruction/lower urinary tract symptoms N40.1, N13.8    Hypertension I10    Primary osteoarthritis involving multiple joints M15.0    Hyperlipidemia E78.5    GERD (gastroesophageal reflux disease) K21.9    Pre-diabetes R73.03    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 (HCC) M06.041, M06.042    Vitamin D deficiency E55.9    Colon polyps K63.5    CPDD (calcium pyrophosphate deposition disease) M11.20    Impaired fasting glucose R73.01    Rectal bleeding K62.5    Class 1 obesity due to excess calories with serious comorbidity and body mass index (BMI) of 31.0 to 31.9 in adult E66.09, Z68.31    APC (atrial premature contractions) I49.1    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 Lumbar degenerative disease with right sciatica. Unresponsive to Medrol dose pack, physical therapy, steroid injections, and unwilling to take narcotics as not effective. Lumbar MRI with multilevel degenerative changes and facet arthropathy with R>L facet stenosis at L4-L5 likely responsible for symptoms. Had been following with Dr. Jamil Manning, but given lack of improvement, started on gabapentin 100 mg tid and referred to Dr. Mahad Bah for evaluation. She recommended increasing gabapentin to 200 mg tid, and given his lack of response to conservative measures, referred to Dr. Jarred Ortega. He underwent decompression with L4/5 right-sided hemilaminotomy and medial facetectomy on 10/23/2019. Developed urinary retention post-op, but successfully passed voiding trial and has had no further difficulties. He developed recurrence of pain on post op day 6, and treated with Medrol dose pack. Remains on gabapentin 300 mg tid with mild  pain when awakening in the morning but generally much improved. Being followed by Dr. Jarred Ortega. 2. Paroxysmal nocturnal dyspnea. Patient reported in 1/2019 awakening gasping for air several times per week. No overt evidence of heart failure and EKG was without change from baseline at that time.  He reported that he would need to sit up immediately and take deep breathes until resolved. He also admitted to snoring and experiencing significant daytime drowsiness particularly when driving. He reported that when he was on long trips, he would need to pull off the road and take a nap before he could resume driving. Given high suspicion for sleep apnea, he was referred to Dr. Ffii Haas for evaluation, but he never scheduled. Presented with worsening symptoms over several weeks, possibly exacerbated by the inadvertent abrupt cessation of Paxil during this time. Severe episode on 8/9/2019 prompted ED evaluation. EKG without change, troponin negative and NT-pro BNP normal. Echocardiogram (8/26/2019) with normal LV size and function (EF 56-60%), no RWMA, normal valves. Evaluated by Dr. Fifi Haas and completed home sleep study and diagnosed with severe JANNET. Reports that he still has not received his CPAP equipment, but is in the process of arranging. 3. Depression. Prior reasonable control with Paxil and weaned from benzodiazepine use for anxiety and insomnia. On Zoloft 50 mg daily with improvement after inadvertently stopping Paxil. Describing some persistent anxiety particularly at night, and advised to increase Zoloft to 75 mg daily. Reports today that he has noted improvement. 4. Abnormal chest x-ray. Ill defined density in left mid zone noted on chest x-ray in ED on 8/9/2019. Underwent chest CT scan (8/31/2019) which showed several tiny bilateral pulmonary nodules which were stable when compared with prior chest CT scan from 2007. No further evaluation needed. 5. Hypertension. Blood pressure well controlled on current regimen of lisinopril 40 mg daily, amlodipine 10 mg daily, lasix 40 mg daily (potassium 20 meq daily) and hydralazine 25 mg bid. Renal function normal with creatinine 0.65/ eGFR >60. Normal echocardiogram (8/2019). Continue to follow. 6. Hyperlipidemia. On moderate intensity dose atorvastatin with LDL 58 and HDL 55, indicative of excellent control in this patient. Continue to follow. 7. Prediabetes. Has been controlled on diet alone. HbA1c generally stable at 5.7. No evidence of microvascular complications. On Ace-I and statin. Continue follow-up with Dr. Gardenia Cheung for annual eye exams. Emphasized importance of lifestyle modifications, including diet, exercise, and weight loss. 8. Rheumatoid arthritis. On Plaquenil. Has regular eye exams with Dr. Gardenia Cheung. Difficult to gauge response as appears to have evidence of osteoarthritis and CPPD occurring concurrently, but noted significant improvement since initiating colchicine. Voltaren gel for pain control. Tylenol while at work to help with pain control as needed. Followed by Dr. Primo Conklin. 9. Primary osteoarthritis. Evident in bilateral hands and knees, bilateral shoulders, and cervical spine. Neck pain now returned to baseline. Shoulder pain (R>L). X-ray with evidence of osteoarthritis and rotator cuff pathology. Evaluated by Dr. Paolo Mancini and received cortisone injection to right shoulder with some improvement. Surgery for reverse shoulder replacement recommended. Changed from ibuprofen 400 mg qid and Tylenol to Celebrex 200 mg qd with significant improvement. Also using Voltaren gel. 10. CPPD. Colchicine started on 1/19/2017 to help address chondrocalcinosis on x-rays. Reports significant improvement. Now taking every other day with good control. 11. Macrocytosis. Mild evidence of macrocytosis previously, but normalized today. Hb/Hct normal. B12 and folate levels normal today. Will continue to monitor. 12. Rectal bleeding. Colonoscopy 8/2015. Evaluated by Dr. Kam Ruff and considered to be hemorrhoidal in source. Known internal and external hemorrhoids. Improved with Citrucel. No evidence of anemia. Follow. 13. BPH. Does have lower urinary tract symptoms. Developed postop urinary retention and now resolved. On Flomax. Followed by Dr. Nabila Benton. 14. GERD. Good control of symptoms with omeprazole. No issues today.   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 Monitored annually. 16. Obesity. Emphasized importance of lifestyle modifications, including diet, exercise, and weight loss. Discussed that would help control blood sugar. Will readdress at next visit. 17. Insomnia. Weaned from Xanax. Had been using melatonin agonist, ramelteon, to replace Xanax, but no longer taking it either. Follow. 18. Health maintenance. Already received influenza vaccine. Unable to receive Tdap due to allergy (anaphylaxis to Td). Will address Shingrix vaccine at next visit. Other immunizations up to date. Colonoscopy due 8/2020. Continue regular eye exams with Dr. Manuel Navarro. Vitamin D level normal. Continue maintenance dose supplement. Aspirin discontinued given new recommendations regarding primary prevention. Referred to podiatry for onychomycosis and left great toe pain. In addition, an annual Medicare wellness visit was done today. Total time: 40 minutes spent with the patient in face-to-face consultation of which greater than 50% was spent on counseling, answering questions and/or coordination of care. Complex medical review and management performed. Patient understands recommendations and agrees with plan. Follow-up in 6 months. Addendum  Preoperative risk stratification:  Patient scheduled for bilateral eyelid surgery on 3/11/2020. Surgery to be performed under local anesthesia with IV sedation. Scheduled surgery is a low risk procedure and his functional status is good. He is clinically stable and without absolute medical contraindication for surgery. He is low risk for a jewels-procedural cardiac event. Surgery may proceed as planned at the discretion of Dr. Clerance Spurling.

## 2020-03-01 NOTE — TELEPHONE ENCOUNTER
Please fax office note from 2/27/2020 for preoperative clearance to Dr. Temi Mcelroy. Surgery is on 3/11/2020. Paperwork from his office requested permission to stop Plavix prior to procedure. However, patient is not taking Plavix. Please advise them of this fact. Thank you.

## 2020-04-24 RX ORDER — ATORVASTATIN CALCIUM 10 MG/1
TABLET, FILM COATED ORAL
Qty: 90 TAB | Refills: 3 | Status: SHIPPED | OUTPATIENT
Start: 2020-04-24 | End: 2021-07-08

## 2020-06-26 ENCOUNTER — TELEPHONE (OUTPATIENT)
Dept: INTERNAL MEDICINE CLINIC | Age: 80
End: 2020-06-26

## 2020-06-26 DIAGNOSIS — N40.1 BPH ASSOCIATED WITH NOCTURIA: ICD-10-CM

## 2020-06-26 DIAGNOSIS — R35.1 BPH ASSOCIATED WITH NOCTURIA: ICD-10-CM

## 2020-06-26 RX ORDER — TAMSULOSIN HYDROCHLORIDE 0.4 MG/1
0.4 CAPSULE ORAL DAILY
Qty: 90 CAP | Refills: 3 | Status: SHIPPED | OUTPATIENT
Start: 2020-06-26 | End: 2021-07-04

## 2020-06-26 NOTE — TELEPHONE ENCOUNTER
Last visit 02/27/2020 MD Tommy Washington   Next appointment 09/10/2020 MD [place]   Previous refill encounter(s) 07/08/2019 Flomax #90 with 3 refills    Requested Prescriptions     Pending Prescriptions Disp Refills    tamsulosin (Flomax) 0.4 mg capsule 90 Cap 3     Sig: Take 1 Cap by mouth daily.  Indications: enlarged prostate with urination problem

## 2020-06-29 ENCOUNTER — APPOINTMENT (OUTPATIENT)
Dept: GENERAL RADIOLOGY | Age: 80
DRG: 177 | End: 2020-06-29
Attending: EMERGENCY MEDICINE
Payer: MEDICARE

## 2020-06-29 ENCOUNTER — HOSPITAL ENCOUNTER (EMERGENCY)
Age: 80
Discharge: HOME | DRG: 177 | End: 2020-06-29
Attending: EMERGENCY MEDICINE
Payer: MEDICARE

## 2020-06-29 ENCOUNTER — TELEPHONE (OUTPATIENT)
Dept: INTERNAL MEDICINE CLINIC | Age: 80
End: 2020-06-29

## 2020-06-29 VITALS
BODY MASS INDEX: 31.07 KG/M2 | RESPIRATION RATE: 20 BRPM | SYSTOLIC BLOOD PRESSURE: 139 MMHG | HEART RATE: 87 BPM | OXYGEN SATURATION: 96 % | DIASTOLIC BLOOD PRESSURE: 97 MMHG | HEIGHT: 68 IN | WEIGHT: 205 LBS | TEMPERATURE: 100.1 F

## 2020-06-29 DIAGNOSIS — Z20.822 SUSPECTED COVID-19 VIRUS INFECTION: Primary | ICD-10-CM

## 2020-06-29 LAB
ALBUMIN SERPL-MCNC: 3.4 G/DL (ref 3.4–5)
ALBUMIN/GLOB SERPL: 1 {RATIO} (ref 0.8–1.7)
ALP SERPL-CCNC: 160 U/L (ref 45–117)
ALT SERPL-CCNC: 46 U/L (ref 16–61)
ANION GAP SERPL CALC-SCNC: 6 MMOL/L (ref 3–18)
AST SERPL-CCNC: 53 U/L (ref 10–38)
BASOPHILS # BLD: 0 K/UL (ref 0–0.1)
BASOPHILS NFR BLD: 0 % (ref 0–2)
BILIRUB SERPL-MCNC: 0.6 MG/DL (ref 0.2–1)
BUN SERPL-MCNC: 9 MG/DL (ref 7–18)
BUN/CREAT SERPL: 13 (ref 12–20)
CALCIUM SERPL-MCNC: 8.7 MG/DL (ref 8.5–10.1)
CHLORIDE SERPL-SCNC: 109 MMOL/L (ref 100–111)
CK MB CFR SERPL CALC: 2.4 % (ref 0–4)
CK MB SERPL-MCNC: 3 NG/ML (ref 5–25)
CK SERPL-CCNC: 126 U/L (ref 39–308)
CO2 SERPL-SCNC: 28 MMOL/L (ref 21–32)
CREAT SERPL-MCNC: 0.71 MG/DL (ref 0.6–1.3)
DIFFERENTIAL METHOD BLD: ABNORMAL
EOSINOPHIL # BLD: 0 K/UL (ref 0–0.4)
EOSINOPHIL NFR BLD: 0 % (ref 0–5)
ERYTHROCYTE [DISTWIDTH] IN BLOOD BY AUTOMATED COUNT: 12.9 % (ref 11.6–14.5)
GLOBULIN SER CALC-MCNC: 3.5 G/DL (ref 2–4)
GLUCOSE SERPL-MCNC: 115 MG/DL (ref 74–99)
HCT VFR BLD AUTO: 42 % (ref 36–48)
HGB BLD-MCNC: 14.6 G/DL (ref 13–16)
LACTATE BLD-SCNC: 1.16 MMOL/L (ref 0.4–2)
LYMPHOCYTES # BLD: 0.5 K/UL (ref 0.9–3.6)
LYMPHOCYTES NFR BLD: 12 % (ref 21–52)
MCH RBC QN AUTO: 32.9 PG (ref 24–34)
MCHC RBC AUTO-ENTMCNC: 34.8 G/DL (ref 31–37)
MCV RBC AUTO: 94.6 FL (ref 74–97)
MONOCYTES # BLD: 0.4 K/UL (ref 0.05–1.2)
MONOCYTES NFR BLD: 8 % (ref 3–10)
NEUTS SEG # BLD: 3.5 K/UL (ref 1.8–8)
NEUTS SEG NFR BLD: 80 % (ref 40–73)
PLATELET # BLD AUTO: 107 K/UL (ref 135–420)
PMV BLD AUTO: 10.3 FL (ref 9.2–11.8)
POTASSIUM SERPL-SCNC: 3.9 MMOL/L (ref 3.5–5.5)
PROT SERPL-MCNC: 6.9 G/DL (ref 6.4–8.2)
RBC # BLD AUTO: 4.44 M/UL (ref 4.7–5.5)
SODIUM SERPL-SCNC: 143 MMOL/L (ref 136–145)
TROPONIN I SERPL-MCNC: <0.02 NG/ML (ref 0–0.04)
WBC # BLD AUTO: 4.4 K/UL (ref 4.6–13.2)

## 2020-06-29 PROCEDURE — 93005 ELECTROCARDIOGRAM TRACING: CPT

## 2020-06-29 PROCEDURE — 85025 COMPLETE CBC W/AUTO DIFF WBC: CPT

## 2020-06-29 PROCEDURE — 74011250637 HC RX REV CODE- 250/637: Performed by: EMERGENCY MEDICINE

## 2020-06-29 PROCEDURE — 83605 ASSAY OF LACTIC ACID: CPT

## 2020-06-29 PROCEDURE — 71045 X-RAY EXAM CHEST 1 VIEW: CPT

## 2020-06-29 PROCEDURE — 80053 COMPREHEN METABOLIC PANEL: CPT

## 2020-06-29 PROCEDURE — 82550 ASSAY OF CK (CPK): CPT

## 2020-06-29 PROCEDURE — 87635 SARS-COV-2 COVID-19 AMP PRB: CPT

## 2020-06-29 PROCEDURE — 99285 EMERGENCY DEPT VISIT HI MDM: CPT

## 2020-06-29 RX ORDER — ACETAMINOPHEN 500 MG
1000 TABLET ORAL
Status: COMPLETED | OUTPATIENT
Start: 2020-06-29 | End: 2020-06-29

## 2020-06-29 RX ADMIN — ACETAMINOPHEN 1000 MG: 500 TABLET ORAL at 11:05

## 2020-06-29 NOTE — ED TRIAGE NOTES
The patient presents for evaluation of diarrhea, generalized weakness, and fever. He admits that he had been exposed to someone with confirmed positive for Covid.

## 2020-06-29 NOTE — TELEPHONE ENCOUNTER
Called and spoke with the patient's wife. Discussed that  has not been feeling well for the last few days. He called the ambulance himself this morning due to a fever and weakness. He has been working at [employer] during the pandemic. Reviewed that awaiting COVID-19 test result. Advised that she should isolate herself and if he is positive, she will need to be tested.

## 2020-06-29 NOTE — ED NOTES
Ambulated with patient in room and in fast tract with mask and gown patient safety/isolation. Continuous pulse ox @ rest 92-93 % HR 75, on abmulation 89-91% room air, HR 93-95. Updated Dr. Gillian Foster. Plan: ?admission.

## 2020-06-29 NOTE — TELEPHONE ENCOUNTER
Wife called to let you know that Mr. Limon Staff was taken to [facility] ED this morning. He was having trouble breathing. Stating she doesn't know what is going on. She was hoping you might be able to find out.

## 2020-06-29 NOTE — ED NOTES
Dr. Aleyda Harrell, in eval patient updated current patient status. Plan: spot check pulse ox, for O2 needs. Compared to baseline, considering admission.

## 2020-06-29 NOTE — ED PROVIDER NOTES
EMERGENCY DEPARTMENT HISTORY AND PHYSICAL EXAM  This was created with voice recognition software and transcription errors may be present. 1:24 PM  Date: 6/29/2020  Patient Name: Saurabh West    History of Presenting Illness     Chief Complaint:    History Provided By:     HPI: Steve Mitchell is a 68 y.o. male with a past medical history of BPH C PDD depression reflux hyperlipidemia hypertension JANNET who presents with shortness of breath and diarrhea. 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 No nausea no vomiting no chest pain. he does have fever and chills. Have been going on for 7 days. PCP: Emil Roth MD      Past History     Past Medical History:  Past Medical History:   Diagnosis Date    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 Dr. Rj Greenfield. 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 Dr. Lex Aguilar.        Past Surgical History:  Past Surgical History:   Procedure Laterality Date    HX AMPUTATION FINGER Left     index    HX BACK SURGERY  10/23/2019    Right L4-5 hemilaminectomy, medial facetectomy, resection of synovial cyst    HX CARPAL TUNNEL RELEASE Bilateral     HX CATARACT REMOVAL Left 01/2018    HX CATARACT REMOVAL Bilateral 2018    HX COLONOSCOPY      HX HEENT      sinus, tonsillectomy    HX HERNIA REPAIR      HX MOHS PROCEDURES      bilateral     HX ORTHOPAEDIC      lef knee surgery, removed cartilage.  HX ROTATOR CUFF REPAIR Right        Family History:  Family History   Problem Relation Age of Onset    Cancer Mother     Heart Disease Father     Alcohol abuse Father     Cancer Other        Social History:  Social History     Tobacco Use    Smoking status: Former Smoker     Years: 12.00     Types: Cigars, Pipe, Cigarettes    Smokeless tobacco: Former User     Types: Chew   Substance Use Topics    Alcohol use: Yes     Comment: rarely    Drug use: No       Allergies: Allergies   Allergen Reactions    Latex, Natural Rubber Swelling    Adhesive Tape-Silicones Rash and Itching    Aldactone [Spironolactone] Not Reported This Time     Breast tenderness    Codeine Not Reported This Time     \"Drives crazy\"    Tape [Adhesive] Rash    Tetanus Toxoid, Adsorbed Anaphylaxis    Tetanus Vaccines And Toxoid Anaphylaxis     Other reaction(s): anaphylaxis/angioedema  Other reaction(s): anaphylaxis/angioedema  Other reaction(s): anesthetic shock       Review of Systems     Review of Systems   Constitutional: Positive for fever. Negative for unexpected weight change. HENT: Negative for congestion and dental problem. Cardiovascular: Negative for chest pain. All other systems reviewed and are negative. 10 point review of systems otherwise negative unless noted in HPI. Physical Exam       Physical Exam  Constitutional:       Appearance: He is well-developed. HENT:      Head: Normocephalic and atraumatic. Eyes:      Pupils: Pupils are equal, round, and reactive to light. Neck:      Musculoskeletal: Normal range of motion and neck supple. Cardiovascular:      Rate and Rhythm: Normal rate and regular rhythm. Heart sounds: Normal heart sounds. No murmur. No friction rub. Pulmonary:      Effort: Pulmonary effort is normal. No respiratory distress. Breath sounds: Normal breath sounds. No wheezing. Abdominal:      General: There is no distension.       Palpations: Abdomen is soft. Tenderness: There is no abdominal tenderness. There is no guarding or rebound. Musculoskeletal: Normal range of motion. Skin:     General: Skin is warm and dry. Neurological:      Mental Status: He is alert and oriented to person, place, and time. Psychiatric:         Behavior: Behavior normal.         Thought Content: Thought content normal.         Diagnostic Study Results     Vital Signs   Visit Vitals  BP (!) 139/97   Pulse 87   Temp 100.1 °F (37.8 °C)   Resp 20   Ht 5' 8\" (1.727 m)   Wt 93 kg (205 lb)   SpO2 95%   BMI 31.17 kg/m²      EKG: EKG shows sinus at 77 with a left axis normal intervals there is no ST elevation or depression and no hypertrophy. Labs:   Imaging:     Medical Decision Making     ED Course: Progress Notes, Reevaluation, and Consults:      Provider Notes (Medical Decision Making): This is a 74-year-old gentleman who presents after covert exposure with fever chills diarrhea and shortness of breath. BC is unremarkable chemistry is normal      IMPRESSION:     Bilateral multifocal extremely subtle groundglass opacities. This is nonspecific  but is seen with Covid positive pneumonia.       Assessment 350. Reassessed multiple times patient is borderline hypoxic at rest he is 93% with walking he is 89 to 91%. I discussed this with him he would much rather prefer to go home he has good support. Gave him strict return precautions. He expressed understanding. Diagnosis     Clinical Impression: No diagnosis found. Disposition:    Patient's Medications   Start Taking    No medications on file   Continue Taking    AMLODIPINE (NORVASC) 10 MG TABLET    TAKE 1 TABLET BY MOUTH ONCE DAILY    ATORVASTATIN (LIPITOR) 10 MG TABLET    TAKE 1 TABLET BY MOUTH ONCE DAILY    CELECOXIB (CELEBREX) 200 MG CAPSULE    TAKE 1 CAPSULE BY MOUTH ONCE DAILY    CHOLECALCIFEROL, VITAMIN D3, (VITAMIN D3) 2,000 UNIT TAB    Take 2,000 Units by mouth daily.     COLCHICINE (MITIGARE) 0.6 MG CAPSULE    Take 0.6 mg by mouth every other day. CYCLOSPORINE (RESTASIS) 0.05 % OPHTHALMIC EMULSION    Administer 1 Drop to both eyes nightly. DICLOFENAC (VOLTAREN) 1 % TOPICAL GEL    Apply 4 g to affected area four (4) times daily. FUROSEMIDE (LASIX) 40 MG TABLET    Take 1 Tab by mouth daily. GABAPENTIN (NEURONTIN) 300 MG CAPSULE    Take 1 Cap by mouth three (3) times daily. Max Daily Amount: 900 mg. Indications: Neuropathic Pain    HYDRALAZINE (APRESOLINE) 25 MG TABLET    Take 1 Tab by mouth two (2) times a day. HYDROXYCHLOROQUINE (PLAQUENIL) 200 MG TABLET    Take 400 mg by mouth daily. LISINOPRIL (PRINIVIL, ZESTRIL) 40 MG TABLET    TAKE 1 TABLET BY MOUTH DAILY    LUMIGAN 0.01 % OPHTHALMIC DROPS    Administer 1 Drop to both eyes nightly. MINERAL OIL LIQUID    Take 30 mL by mouth daily as needed for Constipation. MULTIVITS/IRON FUM/FA/D3/LYCOP (MULTI FOR HIM PO)    Take  by mouth daily. OMEPRAZOLE (PRILOSEC) 20 MG CAPSULE    TAKE 1 CAPSULE BY MOUTH ONCE DAILY    POTASSIUM CHLORIDE (K-DUR, KLOR-CON) 20 MEQ TABLET    Take 1 Tab by mouth daily. SERTRALINE (ZOLOFT) 50 MG TABLET    Take 1.5 Tabs by mouth daily. TAMSULOSIN (FLOMAX) 0.4 MG CAPSULE    Take 1 Cap by mouth daily.  Indications: enlarged prostate with urination problem   These Medications have changed    No medications on file   Stop Taking    No medications on file

## 2020-06-29 NOTE — PROGRESS NOTES
Telephone call from family , adult dtr in law [facility]. Patient gave verbal permission to update family of current patient status via telephone.

## 2020-06-29 NOTE — DISCHARGE INSTRUCTIONS
Patient Education        Viral Infections: Care Instructions  Your Care Instructions     You don't feel well, but it's not clear what's causing it. You may have a viral infection. Viruses cause many illnesses, such as the common cold, influenza, fever, rashes, and the diarrhea, nausea, and vomiting that are often called \"stomach flu. \" You may wonder if antibiotic medicines could make you feel better. But antibiotics only treat infections caused by bacteria. They don't work on viruses. The good news is that viral infections usually aren't serious. Most will go away in a few days without medical treatment. In the meantime, there are a few things you can do to make yourself more comfortable. Follow-up care is a key part of your treatment and safety. Be sure to make and go to all appointments, and call your doctor if you are having problems. It's also a good idea to know your test results and keep a list of the medicines you take. How can you care for yourself at home? · Get plenty of rest if you feel tired. · Take an over-the-counter pain medicine if needed, such as acetaminophen (Tylenol), ibuprofen (Advil, Motrin), or naproxen (Aleve). Read and follow all instructions on the label. · Be careful when taking over-the-counter cold or flu medicines and Tylenol at the same time. Many of these medicines have acetaminophen, which is Tylenol. Read the labels to make sure that you are not taking more than the recommended dose. Too much acetaminophen (Tylenol) can be harmful. · Drink plenty of fluids, enough so that your urine is light yellow or clear like water. If you have kidney, heart, or liver disease and have to limit fluids, talk with your doctor before you increase the amount of fluids you drink. · Stay home from work, school, and other public places while you have a fever. When should you call for help? [id number] anytime you think you may need emergency care.  For example, call if:  · You have severe trouble breathing. · You passed out (lost consciousness). Call your doctor now or seek immediate medical care if:  · You seem to be getting much sicker. · You have a new or higher fever. · You have blood in your stools. · You have new belly pain, or your pain gets worse. · You have a new rash. Watch closely for changes in your health, and be sure to contact your doctor if:  · You start to get better and then get worse. · You do not get better as expected. Where can you learn more? Go to http://www.gray.com/  Enter L906 in the search box to learn more about \"Viral Infections: Care Instructions. \"  Current as of: February 11, 2020               Content Version: 12.5  © 2006-2020 [employer]. Care instructions adapted under license by [employer] (which disclaims liability or warranty for this information). If you have questions about a medical condition or this instruction, always ask your healthcare professional. [address] any warranty or liability for your use of this information.

## 2020-06-30 ENCOUNTER — TELEPHONE (OUTPATIENT)
Dept: ORTHOPEDIC SURGERY | Age: 80
End: 2020-06-30

## 2020-06-30 LAB
ATRIAL RATE: 77 BPM
CALCULATED P AXIS, ECG09: 55 DEGREES
CALCULATED R AXIS, ECG10: -48 DEGREES
CALCULATED T AXIS, ECG11: 12 DEGREES
DIAGNOSIS, 93000: NORMAL
P-R INTERVAL, ECG05: 154 MS
Q-T INTERVAL, ECG07: 398 MS
QRS DURATION, ECG06: 100 MS
QTC CALCULATION (BEZET), ECG08: 450 MS
SARS-COV-2, COV2NT: DETECTED
VENTRICULAR RATE, ECG03: 77 BPM

## 2020-06-30 NOTE — TELEPHONE ENCOUNTER
I called patient to confirm appointment for tomorrow with Asad Venegas 07/01/20, however patient stated that he went into the ER yesterday ( 06/29/20) from [address] [place] [address]. Patient stated that he has been tested for COVID-19 and states that he thinks he has it,and so does the doctor he saw yesterday but they have to wait until his test results come back. Patient stated that he needs his Gabapentin refilled and I offered to switch his appointment to a VV and patient stated, \"he doesn't understand how to work any of that  and he is 68years old and that It was like speaking Frisian to him. \" Patient is unaware of what to do or how to get his medication refilled. Please advise patient at [phone].

## 2020-07-01 ENCOUNTER — VIRTUAL VISIT (OUTPATIENT)
Dept: ORTHOPEDIC SURGERY | Age: 80
End: 2020-07-01

## 2020-07-01 DIAGNOSIS — Z98.890 S/P LUMBAR LAMINECTOMY: ICD-10-CM

## 2020-07-01 DIAGNOSIS — M48.062 LUMBAR STENOSIS WITH NEUROGENIC CLAUDICATION: ICD-10-CM

## 2020-07-01 RX ORDER — GABAPENTIN 300 MG/1
300 CAPSULE ORAL 3 TIMES DAILY
Qty: 270 CAP | Refills: 1 | Status: SHIPPED | OUTPATIENT
Start: 2020-07-01 | End: 2021-04-10 | Stop reason: ALTCHOICE

## 2020-07-01 NOTE — PROGRESS NOTES
Juan Antonio Lee is a 68 y.o. male who was seen by synchronous (real-time) audio-video technology on 7/1/2020. He has a history of  history of back and RLE pain who had R L4/5 lami- resection of synovial cyst surgery  on 10/23/19. He was taking gabapentin with benefit. Today, he is doing ok. He does have COVID 19. He does not feel well from this. The gabapentin helps him but her ran out. Denies bladder/bowel dysfunction, saddle paresthesia, weakness, gait disturbance, or other neurological deficit. Pt at this time desires to  continue with current care/proceed with medication evaluation. Medications: Gabapentin 300mg BID-TID with moderate, complete, benefit. Celebrex 200mg from PCP    ASSESSMENT  68 y.o. male with back pain. Diagnoses and all orders for this visit:    1. Lumbar stenosis with neurogenic claudication    2. S/P lumbar laminectomy           IMPRESSION/PLAN    1) Pt was given information on back exercises. 2) cont Gabapentin  3) Mr. Cindi Bliss has a reminder for a \"due or due soon\" health maintenance. I have asked that he contact his primary care provider, Alexandra Vargas MD, for follow-up on this health maintenance. 4) We have informed patient to notify us for immediate appointment if he has any worsening neurogical symptoms or if an emergency situation presents, then call 911  5) Pt will follow-up in 6 months. Risks and benefits of ongoing therapy have been reviewed with the patient.  is appropriate. No pain behaviors. Denies thoughts of harming self or others. Pt has a good risk to benefit ratio which allows the pt to function in a home environment without side effects. Assessment & Plan:   Diagnoses and all orders for this visit:    1. Lumbar stenosis with neurogenic claudication    2.  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 Dr. Abbe Saucedo. 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 Dr. Roscoe Marin. MEDICATIONS  Current Outpatient Medications   Medication Sig Dispense Refill    tamsulosin (Flomax) 0.4 mg capsule Take 1 Cap by mouth daily. Indications: enlarged prostate with urination problem 90 Cap 3    sertraline (ZOLOFT) 50 mg tablet Take 1.5 Tabs by mouth daily. 135 Tab 2    atorvastatin (LIPITOR) 10 mg tablet TAKE 1 TABLET BY MOUTH ONCE DAILY 90 Tab 3    celecoxib (CELEBREX) 200 mg capsule TAKE 1 CAPSULE BY MOUTH ONCE DAILY 90 Cap 2    lisinopril (PRINIVIL, ZESTRIL) 40 mg tablet TAKE 1 TABLET BY MOUTH DAILY 90 Tab 3    gabapentin (NEURONTIN) 300 mg capsule Take 1 Cap by mouth three (3) times daily. Max Daily Amount: 900 mg. Indications: Neuropathic Pain 90 Cap 1    omeprazole (PRILOSEC) 20 mg capsule TAKE 1 CAPSULE BY MOUTH ONCE DAILY 90 Cap 2    mineral oil liquid Take 30 mL by mouth daily as needed for Constipation.  amLODIPine (NORVASC) 10 mg tablet TAKE 1 TABLET BY MOUTH ONCE DAILY 90 Tab 3    hydrALAZINE (APRESOLINE) 25 mg tablet Take 1 Tab by mouth two (2) times a day. 1 Tab 0    furosemide (LASIX) 40 mg tablet Take 1 Tab by mouth daily. 90 Tab 3    potassium chloride (K-DUR, KLOR-CON) 20 mEq tablet Take 1 Tab by mouth daily. 90 Tab 2    cycloSPORINE (RESTASIS) 0.05 % ophthalmic emulsion Administer 1 Drop to both eyes nightly.       colchicine (MITIGARE) 0.6 mg capsule Take 0.6 mg by mouth every other day.  cholecalciferol, vitamin D3, (VITAMIN D3) 2,000 unit tab Take 2,000 Units by mouth daily.  diclofenac (VOLTAREN) 1 % topical gel Apply 4 g to affected area four (4) times daily.  LUMIGAN 0.01 % ophthalmic drops Administer 1 Drop to both eyes nightly.  hydroxychloroquine (PLAQUENIL) 200 mg tablet Take 400 mg by mouth daily.  MULTIVITS/IRON FUM/FA/D3/LYCOP (MULTI FOR HIM PO) Take  by mouth daily.          ALLERGIES  Allergies   Allergen Reactions    Latex, Natural Rubber Swelling    Adhesive Tape-Silicones Rash and Itching    Aldactone [Spironolactone] Not Reported This Time     Breast tenderness    Codeine Not Reported This Time     \"Drives crazy\"    Tape [Adhesive] Rash    Tetanus Toxoid, Adsorbed Anaphylaxis    Tetanus Vaccines And Toxoid Anaphylaxis     Other reaction(s): anaphylaxis/angioedema  Other reaction(s): anaphylaxis/angioedema  Other reaction(s): anesthetic shock       SOCIAL HISTORY    Social History     Socioeconomic History    Marital status:      Spouse name: Not on file    Number of children: Not on file    Years of education: Not on file    Highest education level: Not on file   Occupational History    Not on file   Social Needs    Financial resource strain: Not on file    Food insecurity     Worry: Not on file     Inability: Not on file    Transportation needs     Medical: Not on file     Non-medical: Not on file   Tobacco Use    Smoking status: Former Smoker     Years: 12.00     Types: Cigars, Pipe, Cigarettes    Smokeless tobacco: Former User     Types: Chew   Substance and Sexual Activity    Alcohol use: Yes     Comment: rarely    Drug use: No    Sexual activity: Not Currently   Lifestyle    Physical activity     Days per week: Not on file     Minutes per session: Not on file    Stress: Not on file   Relationships    Social connections     Talks on phone: Not on file Gets together: Not on file     Attends Caodaism service: Not on file     Active member of club or organization: Not on file     Attends meetings of clubs or organizations: Not on file     Relationship status: Not on file    Intimate partner violence     Fear of current or ex partner: Not on file     Emotionally abused: Not on file     Physically abused: Not on file     Forced sexual activity: Not on file   Other Topics Concern     Service Not Asked    Blood Transfusions Not Asked    Caffeine Concern Not Asked    Occupational Exposure Not Asked   Sherian Teresa Hazards Not Asked    Sleep Concern Not Asked    Stress Concern Not Asked    Weight Concern Not Asked    Special Diet Not Asked    Back Care Not Asked    Exercise Not Asked    Bike Helmet Not Asked   [address] Not Asked    Self-Exams Not Asked   Social History Narrative    Not on file       Pain Scale: /10    Pain Assessment  1/20/2020   Location of Pain Back   Pain Location Comment -   Location Modifiers (No Data)   Severity of Pain 5   Quality of Pain Sharp; Aching;Dull;Burning   Quality of Pain Comment -   Duration of Pain Persistent   Frequency of Pain Constant   Aggravating Factors Other (Comment)   Aggravating Factors Comment twisting   Limiting Behavior No   Relieving Factors Rest   Relieving Factors Comment -   Result of Injury No         ROS      Objective: There were no vitals taken for this visit.      [INSTRUCTIONS:  \"[x]\" Indicates a positive item  \"[]\" Indicates a negative item  -- DELETE ALL ITEMS NOT EXAMINED]    Constitutional: [] Appears well-developed and well-nourished [x] No apparent distress      [] Abnormal -     Mental status: [x] Alert and awake  [x] Oriented to person/place/time [x] Able to follow commands    [] Abnormal -     Eyes:   EOM    []  Normal    [] Abnormal -   Sclera  []  Normal    [] Abnormal -          Discharge []  None visible   [] Abnormal -     HENT: [] Normocephalic, atraumatic  [] Abnormal - Neck: [] No visualized mass [] Abnormal -     Pulmonary/Chest: [] Respiratory effort normal   [] No visualized signs of difficulty breathing or respiratory distress        [] Abnormal -      Musculoskeletal:   [] Normal gait with no signs of ataxia         [] Normal range of motion of neck        [] Abnormal -        Neurological:        [] No Facial Asymmetry (Cranial nerve 7 motor function) (limited exam due to video visit)          [] No gaze palsy        [] Abnormal -          Skin:        [] No significant exanthematous lesions or discoloration noted on facial skin         [] Abnormal -                         Psychiatric:       [x] Normal Affect [] Abnormal -        [x] No Hallucinations        Due to this being a TeleHealth evaluation, many elements of the physical examination are unable to be assessed. We discussed the expected course, resolution and complications of the diagnosis(es) in detail. Medication risks, benefits, costs, interactions, and alternatives were discussed as indicated. I advised him to contact the office if his condition worsens, changes or fails to improve as anticipated. He expressed understanding with the diagnosis(es) and plan. Consent: Jodie Guallpa, who was seen by synchronous (real-time) audio-video technology, and/or his healthcare decision maker, is aware that this patient-initiated, Telehealth encounter on 7/1/2020 is a billable service, with coverage as determined by his insurance carrier. He is aware that he may receive a bill and has provided verbal consent to proceed: Yes. Jodie Guallpa is a 68 y.o. male who was evaluated by a video visit encounter for concerns as above. Patient identification was verified prior to start of the visit. A caregiver was present when appropriate.  Due to this being a TeleHealth encounter (During [facility]- public health emergency), evaluation of the following organ systems was limited: Vitals/Constitutional/EENT/Resp/CV/GI//MS/Neuro/Skin/Heme-Lymph-Imm. Pursuant to the emergency declaration under the [address] [place] [address], [facility] waiver authority and the [employer] Act, this Virtual  Visit was conducted, with patient's consent, to reduce the patient's risk of exposure to COVID-19 and provide continuity of care for an established patient. Services were provided through real time synchronous discussion virtually to substitute for in-person clinic visit. Patient verbally consented to this type of visit and that they might get a bill from the billing of this visit. This service was provided through telephone ,  the patient was located at home and  the provider was located at office. There was only the patient participating in the service. Start time 2158  End [id number].      Claudeen Primus, NP

## 2020-07-02 ENCOUNTER — VIRTUAL VISIT (OUTPATIENT)
Dept: INTERNAL MEDICINE CLINIC | Age: 80
End: 2020-07-02

## 2020-07-02 ENCOUNTER — HOSPITAL ENCOUNTER (INPATIENT)
Age: 80
LOS: 7 days | Discharge: HOME HEALTH | DRG: 177 | End: 2020-07-09
Attending: EMERGENCY MEDICINE | Admitting: INTERNAL MEDICINE
Payer: MEDICARE

## 2020-07-02 ENCOUNTER — TELEPHONE (OUTPATIENT)
Dept: INTERNAL MEDICINE CLINIC | Age: 80
End: 2020-07-02

## 2020-07-02 ENCOUNTER — APPOINTMENT (OUTPATIENT)
Dept: GENERAL RADIOLOGY | Age: 80
DRG: 177 | End: 2020-07-02
Attending: EMERGENCY MEDICINE
Payer: MEDICARE

## 2020-07-02 DIAGNOSIS — F33.0 DEPRESSION, MAJOR, RECURRENT, MILD (HCC): ICD-10-CM

## 2020-07-02 DIAGNOSIS — M06.042 RHEUMATOID ARTHRITIS INVOLVING BOTH HANDS WITH NEGATIVE RHEUMATOID FACTOR (HCC): ICD-10-CM

## 2020-07-02 DIAGNOSIS — M06.041 RHEUMATOID ARTHRITIS INVOLVING BOTH HANDS WITH NEGATIVE RHEUMATOID FACTOR (HCC): ICD-10-CM

## 2020-07-02 DIAGNOSIS — I10 ESSENTIAL HYPERTENSION: ICD-10-CM

## 2020-07-02 DIAGNOSIS — M11.20 CPDD (CALCIUM PYROPHOSPHATE DEPOSITION DISEASE): ICD-10-CM

## 2020-07-02 DIAGNOSIS — U07.1 COVID-19 VIRUS INFECTION: Primary | ICD-10-CM

## 2020-07-02 DIAGNOSIS — R06.03 ACUTE RESPIRATORY DISTRESS: ICD-10-CM

## 2020-07-02 DIAGNOSIS — M54.31 RIGHT SIDED SCIATICA: ICD-10-CM

## 2020-07-02 DIAGNOSIS — N40.1 BPH WITH OBSTRUCTION/LOWER URINARY TRACT SYMPTOMS: ICD-10-CM

## 2020-07-02 DIAGNOSIS — G47.33 OBSTRUCTIVE SLEEP APNEA SYNDROME: ICD-10-CM

## 2020-07-02 DIAGNOSIS — R73.03 PRE-DIABETES: ICD-10-CM

## 2020-07-02 DIAGNOSIS — E66.09 CLASS 1 OBESITY DUE TO EXCESS CALORIES WITH SERIOUS COMORBIDITY AND BODY MASS INDEX (BMI) OF 31.0 TO 31.9 IN ADULT: ICD-10-CM

## 2020-07-02 DIAGNOSIS — E78.5 HYPERLIPIDEMIA, UNSPECIFIED HYPERLIPIDEMIA TYPE: ICD-10-CM

## 2020-07-02 DIAGNOSIS — R09.02 HYPOXIA: ICD-10-CM

## 2020-07-02 DIAGNOSIS — N13.8 BPH WITH OBSTRUCTION/LOWER URINARY TRACT SYMPTOMS: ICD-10-CM

## 2020-07-02 LAB
ALBUMIN SERPL-MCNC: 2.7 G/DL (ref 3.4–5)
ALBUMIN/GLOB SERPL: 0.7 {RATIO} (ref 0.8–1.7)
ALP SERPL-CCNC: 170 U/L (ref 45–117)
ALT SERPL-CCNC: 79 U/L (ref 16–61)
ANION GAP SERPL CALC-SCNC: 6 MMOL/L (ref 3–18)
APTT PPP: 31.3 SEC (ref 23–36.4)
ARTERIAL PATENCY WRIST A: YES
AST SERPL-CCNC: 76 U/L (ref 10–38)
BASE DEFICIT BLD-SCNC: 1 MMOL/L
BASOPHILS # BLD: 0 K/UL (ref 0–0.06)
BASOPHILS NFR BLD: 0 % (ref 0–3)
BDY SITE: ABNORMAL
BILIRUB SERPL-MCNC: 0.7 MG/DL (ref 0.2–1)
BUN SERPL-MCNC: 17 MG/DL (ref 7–18)
BUN/CREAT SERPL: 21 (ref 12–20)
CALCIUM SERPL-MCNC: 8.8 MG/DL (ref 8.5–10.1)
CHLORIDE SERPL-SCNC: 106 MMOL/L (ref 100–111)
CK MB CFR SERPL CALC: 5.6 % (ref 0–4)
CK MB SERPL-MCNC: 3.3 NG/ML (ref 5–25)
CK SERPL-CCNC: 59 U/L (ref 39–308)
CO2 SERPL-SCNC: 28 MMOL/L (ref 21–32)
CREAT SERPL-MCNC: 0.8 MG/DL (ref 0.6–1.3)
CRP SERPL-MCNC: 16.6 MG/DL (ref 0–0.3)
D DIMER PPP FEU-MCNC: 1.35 UG/ML(FEU)
DIFFERENTIAL METHOD BLD: ABNORMAL
EOSINOPHIL # BLD: 0 K/UL (ref 0–0.4)
EOSINOPHIL NFR BLD: 0 % (ref 0–5)
ERYTHROCYTE [DISTWIDTH] IN BLOOD BY AUTOMATED COUNT: 13 % (ref 11.6–14.5)
FERRITIN SERPL-MCNC: 729 NG/ML (ref 8–388)
GAS FLOW.O2 O2 DELIVERY SYS: ABNORMAL L/MIN
GAS FLOW.O2 SETTING OXYMISER: 14 L/M
GLOBULIN SER CALC-MCNC: 4.1 G/DL (ref 2–4)
GLUCOSE SERPL-MCNC: 116 MG/DL (ref 74–99)
HCO3 BLD-SCNC: 23.1 MMOL/L (ref 22–26)
HCT VFR BLD AUTO: 42.6 % (ref 36–48)
HGB BLD-MCNC: 14.8 G/DL (ref 13–16)
INR PPP: 1 (ref 0.8–1.2)
LACTATE BLD-SCNC: 1.5 MMOL/L (ref 0.4–2)
LACTATE BLD-SCNC: 2.8 MMOL/L (ref 0.4–2)
LDH SERPL L TO P-CCNC: 520 U/L (ref 81–234)
LYMPHOCYTES # BLD: 0.8 K/UL (ref 0.8–3.5)
LYMPHOCYTES NFR BLD: 11 % (ref 20–51)
MCH RBC QN AUTO: 32.5 PG (ref 24–34)
MCHC RBC AUTO-ENTMCNC: 34.7 G/DL (ref 31–37)
MCV RBC AUTO: 93.6 FL (ref 74–97)
MONOCYTES # BLD: 0.2 K/UL (ref 0–1)
MONOCYTES NFR BLD: 3 % (ref 2–9)
NEUTS SEG # BLD: 6.3 K/UL (ref 1.8–8)
NEUTS SEG NFR BLD: 86 % (ref 42–75)
O2/TOTAL GAS SETTING VFR VENT: 100 %
PCO2 BLD: 34.6 MMHG (ref 35–45)
PH BLD: 7.43 [PH] (ref 7.35–7.45)
PLATELET # BLD AUTO: 175 K/UL (ref 135–420)
PLATELET COMMENTS,PCOM: ABNORMAL
PMV BLD AUTO: 9.9 FL (ref 9.2–11.8)
PO2 BLD: 70 MMHG (ref 80–100)
POTASSIUM SERPL-SCNC: 3.5 MMOL/L (ref 3.5–5.5)
PROT SERPL-MCNC: 6.8 G/DL (ref 6.4–8.2)
PROTHROMBIN TIME: 13.3 SEC (ref 11.5–15.2)
RBC # BLD AUTO: 4.55 M/UL (ref 4.7–5.5)
RBC MORPH BLD: ABNORMAL
SAO2 % BLD: 94 % (ref 92–97)
SERVICE CMNT-IMP: ABNORMAL
SODIUM SERPL-SCNC: 140 MMOL/L (ref 136–145)
SPECIMEN TYPE: ABNORMAL
TOTAL RESP. RATE, ITRR: 20
TROPONIN I SERPL-MCNC: <0.02 NG/ML (ref 0–0.04)
WBC # BLD AUTO: 7.3 K/UL (ref 4.6–13.2)

## 2020-07-02 PROCEDURE — 96372 THER/PROPH/DIAG INJ SC/IM: CPT

## 2020-07-02 PROCEDURE — 87040 BLOOD CULTURE FOR BACTERIA: CPT

## 2020-07-02 PROCEDURE — 82803 BLOOD GASES ANY COMBINATION: CPT

## 2020-07-02 PROCEDURE — 65660000000 HC RM CCU STEPDOWN

## 2020-07-02 PROCEDURE — 85730 THROMBOPLASTIN TIME PARTIAL: CPT

## 2020-07-02 PROCEDURE — 85025 COMPLETE CBC W/AUTO DIFF WBC: CPT

## 2020-07-02 PROCEDURE — 96375 TX/PRO/DX INJ NEW DRUG ADDON: CPT

## 2020-07-02 PROCEDURE — 36600 WITHDRAWAL OF ARTERIAL BLOOD: CPT

## 2020-07-02 PROCEDURE — 96365 THER/PROPH/DIAG IV INF INIT: CPT

## 2020-07-02 PROCEDURE — 71045 X-RAY EXAM CHEST 1 VIEW: CPT

## 2020-07-02 PROCEDURE — 83615 LACTATE (LD) (LDH) ENZYME: CPT

## 2020-07-02 PROCEDURE — 83605 ASSAY OF LACTIC ACID: CPT

## 2020-07-02 PROCEDURE — 82728 ASSAY OF FERRITIN: CPT

## 2020-07-02 PROCEDURE — 85379 FIBRIN DEGRADATION QUANT: CPT

## 2020-07-02 PROCEDURE — 80053 COMPREHEN METABOLIC PANEL: CPT

## 2020-07-02 PROCEDURE — 93005 ELECTROCARDIOGRAM TRACING: CPT

## 2020-07-02 PROCEDURE — 85610 PROTHROMBIN TIME: CPT

## 2020-07-02 PROCEDURE — 82550 ASSAY OF CK (CPK): CPT

## 2020-07-02 PROCEDURE — 86140 C-REACTIVE PROTEIN: CPT

## 2020-07-02 PROCEDURE — 99285 EMERGENCY DEPT VISIT HI MDM: CPT

## 2020-07-02 RX ORDER — ATORVASTATIN CALCIUM 10 MG/1
10 TABLET, FILM COATED ORAL
Status: DISCONTINUED | OUTPATIENT
Start: 2020-07-02 | End: 2020-07-09 | Stop reason: HOSPADM

## 2020-07-02 RX ORDER — HYDRALAZINE HYDROCHLORIDE 25 MG/1
25 TABLET, FILM COATED ORAL 2 TIMES DAILY
Status: DISCONTINUED | OUTPATIENT
Start: 2020-07-03 | End: 2020-07-09 | Stop reason: HOSPADM

## 2020-07-02 RX ORDER — CELECOXIB 100 MG/1
200 CAPSULE ORAL DAILY
Status: DISCONTINUED | OUTPATIENT
Start: 2020-07-03 | End: 2020-07-09 | Stop reason: HOSPADM

## 2020-07-02 RX ORDER — TAMSULOSIN HYDROCHLORIDE 0.4 MG/1
0.4 CAPSULE ORAL DAILY
Status: DISCONTINUED | OUTPATIENT
Start: 2020-07-03 | End: 2020-07-09 | Stop reason: HOSPADM

## 2020-07-02 RX ORDER — COLCHICINE 0.6 MG/1
0.6 CAPSULE ORAL EVERY OTHER DAY
Status: DISCONTINUED | OUTPATIENT
Start: 2020-07-02 | End: 2020-07-09 | Stop reason: HOSPADM

## 2020-07-02 RX ORDER — LISINOPRIL 40 MG/1
40 TABLET ORAL DAILY
Status: DISCONTINUED | OUTPATIENT
Start: 2020-07-03 | End: 2020-07-09 | Stop reason: HOSPADM

## 2020-07-02 RX ORDER — CYCLOSPORINE 0.5 MG/ML
1 EMULSION OPHTHALMIC
Status: DISCONTINUED | OUTPATIENT
Start: 2020-07-02 | End: 2020-07-09 | Stop reason: HOSPADM

## 2020-07-02 RX ORDER — HYDROXYCHLOROQUINE SULFATE 200 MG/1
400 TABLET, FILM COATED ORAL DAILY
Status: DISCONTINUED | OUTPATIENT
Start: 2020-07-03 | End: 2020-07-03

## 2020-07-02 RX ORDER — SERTRALINE HYDROCHLORIDE 25 MG/1
75 TABLET, FILM COATED ORAL DAILY
Status: DISCONTINUED | OUTPATIENT
Start: 2020-07-03 | End: 2020-07-09 | Stop reason: HOSPADM

## 2020-07-02 RX ORDER — AMLODIPINE BESYLATE 10 MG/1
10 TABLET ORAL DAILY
Status: DISCONTINUED | OUTPATIENT
Start: 2020-07-03 | End: 2020-07-09 | Stop reason: HOSPADM

## 2020-07-02 RX ORDER — MINERAL OIL
30 OIL (ML) ORAL DAILY PRN
Status: DISCONTINUED | OUTPATIENT
Start: 2020-07-02 | End: 2020-07-09 | Stop reason: HOSPADM

## 2020-07-02 RX ORDER — PANTOPRAZOLE SODIUM 40 MG/1
40 TABLET, DELAYED RELEASE ORAL
Status: DISCONTINUED | OUTPATIENT
Start: 2020-07-03 | End: 2020-07-09 | Stop reason: HOSPADM

## 2020-07-02 RX ORDER — ENOXAPARIN SODIUM 100 MG/ML
40 INJECTION SUBCUTANEOUS EVERY 24 HOURS
Status: DISCONTINUED | OUTPATIENT
Start: 2020-07-02 | End: 2020-07-03

## 2020-07-02 RX ORDER — GABAPENTIN 300 MG/1
300 CAPSULE ORAL 3 TIMES DAILY
Status: DISCONTINUED | OUTPATIENT
Start: 2020-07-02 | End: 2020-07-09 | Stop reason: HOSPADM

## 2020-07-02 RX ORDER — MELATONIN
2000 DAILY
Status: DISCONTINUED | OUTPATIENT
Start: 2020-07-03 | End: 2020-07-09 | Stop reason: HOSPADM

## 2020-07-02 RX ORDER — ACETAMINOPHEN 325 MG/1
650 TABLET ORAL
Status: DISCONTINUED | OUTPATIENT
Start: 2020-07-02 | End: 2020-07-09 | Stop reason: HOSPADM

## 2020-07-02 NOTE — ED PROVIDER NOTES
[facility]  [facility] EMERGENCY DEPT      6:23 PM    Date: 7/2/2020  Patient Name: Joe West    History of Presenting Illness     Chief Complaint   Patient presents with    Respiratory Distress       68 y.o. male with noted past medical history who presents to the emergency department with worsening shortness of breath. The patient was seen in the emergency department 3 days ago and had suspected COVID at that time. Eura Robert testing was done it was reported to be positive. He was told to return to the ER if he had worsening shortness of breath. He states having worsening shortness of breath the last 3 days to the present. On initial triage, vital signs were in the low 80s. Patient has no other complaints. Patient denies any other associated signs or symptoms. Patient denies any other complaints. Nursing notes regarding the HPI and triage nursing notes were reviewed. Prior medical records were reviewed. Current Outpatient Medications   Medication Sig Dispense Refill    gabapentin (NEURONTIN) 300 mg capsule Take 1 Cap by mouth three (3) times daily. Max Daily Amount: 900 mg. Indications: neuropathic pain 270 Cap 1    tamsulosin (Flomax) 0.4 mg capsule Take 1 Cap by mouth daily. Indications: enlarged prostate with urination problem 90 Cap 3    sertraline (ZOLOFT) 50 mg tablet Take 1.5 Tabs by mouth daily. 135 Tab 2    atorvastatin (LIPITOR) 10 mg tablet TAKE 1 TABLET BY MOUTH ONCE DAILY 90 Tab 3    celecoxib (CELEBREX) 200 mg capsule TAKE 1 CAPSULE BY MOUTH ONCE DAILY 90 Cap 2    lisinopril (PRINIVIL, ZESTRIL) 40 mg tablet TAKE 1 TABLET BY MOUTH DAILY 90 Tab 3    omeprazole (PRILOSEC) 20 mg capsule TAKE 1 CAPSULE BY MOUTH ONCE DAILY 90 Cap 2    mineral oil liquid Take 30 mL by mouth daily as needed for Constipation.  amLODIPine (NORVASC) 10 mg tablet TAKE 1 TABLET BY MOUTH ONCE DAILY 90 Tab 3    hydrALAZINE (APRESOLINE) 25 mg tablet Take 1 Tab by mouth two (2) times a day.  1 Tab 0    furosemide (LASIX) 40 mg tablet Take 1 Tab by mouth daily. 90 Tab 3    potassium chloride (K-DUR, KLOR-CON) 20 mEq tablet Take 1 Tab by mouth daily. 90 Tab 2    cycloSPORINE (RESTASIS) 0.05 % ophthalmic emulsion Administer 1 Drop to both eyes nightly.  colchicine (MITIGARE) 0.6 mg capsule Take 0.6 mg by mouth every other day.  cholecalciferol, vitamin D3, (VITAMIN D3) 2,000 unit tab Take 2,000 Units by mouth daily.  diclofenac (VOLTAREN) 1 % topical gel Apply 4 g to affected area four (4) times daily.  LUMIGAN 0.01 % ophthalmic drops Administer 1 Drop to both eyes nightly.  hydroxychloroquine (PLAQUENIL) 200 mg tablet Take 400 mg by mouth daily.  MULTIVITS/IRON FUM/FA/D3/LYCOP (MULTI FOR HIM PO) Take  by mouth daily. 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 Dr. Mervat Jean. 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 Dr. Evaristo Lai.        Past Surgical History:  Past Surgical History:   Procedure Laterality Date    HX AMPUTATION FINGER Left     index    HX BACK SURGERY  10/23/2019    Right L4-5 hemilaminectomy, medial facetectomy, resection of synovial cyst    HX CARPAL TUNNEL RELEASE Bilateral     HX CATARACT REMOVAL Left 01/2018    HX CATARACT REMOVAL Bilateral 2018    HX COLONOSCOPY      HX HEENT      sinus, tonsillectomy    HX HERNIA REPAIR      HX MOHS PROCEDURES      bilateral     HX ORTHOPAEDIC      lef knee surgery, removed cartilage.  HX ROTATOR CUFF REPAIR Right        Family History:  Family History   Problem Relation Age of Onset    Cancer Mother     Heart Disease Father     Alcohol abuse Father     Cancer Other        Social History:  Social History     Tobacco Use    Smoking status: Former Smoker     Years: 12.00     Types: Cigars, Pipe, Cigarettes    Smokeless tobacco: Former User     Types: Chew   Substance Use Topics    Alcohol use: Yes     Comment: rarely    Drug use: No       Allergies: Allergies   Allergen Reactions    Latex, Natural Rubber Swelling    Adhesive Tape-Silicones Rash and Itching    Aldactone [Spironolactone] Not Reported This Time     Breast tenderness    Codeine Not Reported This Time     \"Drives crazy\"    Tape [Adhesive] Rash    Tetanus Toxoid, Adsorbed Anaphylaxis    Tetanus Vaccines And Toxoid Anaphylaxis     Other reaction(s): anaphylaxis/angioedema  Other reaction(s): anaphylaxis/angioedema  Other reaction(s): anesthetic shock       Patient's primary care provider (as noted in EPIC):  Maryana Mancini MD    Review of Systems   Constitutional: Negative for diaphoresis. HENT: Negative for congestion. Eyes: Negative for discharge. Respiratory: Negative for stridor. Cardiovascular: Negative for palpitations. Gastrointestinal: Negative for diarrhea. Endocrine: Negative for heat intolerance. Genitourinary: Negative for flank pain. Musculoskeletal: Negative for back pain. Neurological: Negative for weakness. Psychiatric/Behavioral: Negative for hallucinations. All other systems reviewed and are negative. Visit Vitals  /67   Pulse 71   Temp 98.6 °F (37 °C)   Resp 18   Ht 5' 8\" (1.727 m)   Wt 90.3 kg (199 lb)   SpO2 94%   BMI 30.26 kg/m²       PHYSICAL EXAM:    CONSTITUTIONAL:  Alert, in no apparent distress;  well developed;  well nourished.   HEAD: Normocephalic, atraumatic. EYES:  EOMI. Non-icteric sclera. Normal conjunctiva. ENTM:  Nose:  no rhinorrhea. Throat:  no erythema or exudate, mucous membranes moist.  NECK:  No JVD. Supple    RESPIRATORY:  Chest clear, equal breath sounds, good air movement. CARDIOVASCULAR:  Regular rate and rhythm. No murmurs, rubs, or gallops. GI:  Normal bowel sounds, abdomen soft and non-tender. No rebound or guarding. BACK:  Non-tender. UPPER EXT:  Normal inspection. LOWER EXT:  No edema, no calf tenderness. Distal pulses intact. NEURO:  Moves all four extremities, and grossly normal motor exam.  SKIN:  No rashes;  Normal for age. PSYCH:  Alert and normal affect. DIFFERENTIAL DIAGNOSES/ MEDICAL DECISION MAKING:   Shortness of breath etiologies include chronic obstructive pulmonary disease (COPD), acute asthma exacerbation, congestive heart failure, pneumonia, acute bronchitis, pulmonary embolism, upper respiratory infection, cardiac event to include acute coronary syndrome, acute myocardial infarction or a combination of the above (ex URI on top of COPD thus causing respiratory distress). Diagnostic Study Results     Abnormal lab results from this emergency department encounter:  Labs Reviewed   CARDIAC PANEL,(CK, CKMB & TROPONIN) - Abnormal; Notable for the following components:       Result Value    CK-MB Index 5.6 (*)     All other components within normal limits   METABOLIC PANEL, COMPREHENSIVE - Abnormal; Notable for the following components:    Glucose 116 (*)     BUN/Creatinine ratio 21 (*)     ALT (SGPT) 79 (*)     AST (SGOT) 76 (*)     Alk.  phosphatase 170 (*)     Albumin 2.7 (*)     Globulin 4.1 (*)     A-G Ratio 0.7 (*)     All other components within normal limits   CBC WITH AUTOMATED DIFF - Abnormal; Notable for the following components:    RBC 4.55 (*)     NEUTROPHILS 86 (*)     LYMPHOCYTES 11 (*)     All other components within normal limits   D DIMER - Abnormal; Notable for the following components:    D DIMER 1.35 (*)     All other components within normal limits   POC LACTIC ACID - Abnormal; Notable for the following components:    Lactic Acid (POC) 2.80 (*)     All other components within normal limits   CULTURE, BLOOD   CULTURE, BLOOD   LACTIC ACID   PROTHROMBIN TIME + INR   PTT   FERRITIN   C REACTIVE PROTEIN, QT   LD       Lab values for this patient within approximately the last 12 hours:  Recent Results (from the past 12 hour(s))   CARDIAC PANEL,(CK, CKMB & TROPONIN)    Collection Time: 07/02/20  6:30 PM   Result Value Ref Range    CK - MB 3.3 <3.6 ng/ml    CK-MB Index 5.6 (H) 0.0 - 4.0 %    CK 59 39 - 308 U/L    Troponin-I, QT <0.02 0.0 - 3.234 NG/ML   METABOLIC PANEL, COMPREHENSIVE    Collection Time: 07/02/20  6:30 PM   Result Value Ref Range    Sodium 140 136 - 145 mmol/L    Potassium 3.5 3.5 - 5.5 mmol/L    Chloride 106 100 - 111 mmol/L    CO2 28 21 - 32 mmol/L    Anion gap 6 3.0 - 18 mmol/L    Glucose 116 (H) 74 - 99 mg/dL    BUN 17 7.0 - 18 MG/DL    Creatinine 0.80 0.6 - 1.3 MG/DL    BUN/Creatinine ratio 21 (H) 12 - 20      GFR est AA >60 >60 ml/min/1.73m2    GFR est non-AA >60 >60 ml/min/1.73m2    Calcium 8.8 8.5 - 10.1 MG/DL    Bilirubin, total 0.7 0.2 - 1.0 MG/DL    ALT (SGPT) 79 (H) 16 - 61 U/L    AST (SGOT) 76 (H) 10 - 38 U/L    Alk.  phosphatase 170 (H) 45 - 117 U/L    Protein, total 6.8 6.4 - 8.2 g/dL    Albumin 2.7 (L) 3.4 - 5.0 g/dL    Globulin 4.1 (H) 2.0 - 4.0 g/dL    A-G Ratio 0.7 (L) 0.8 - 1.7     CBC WITH AUTOMATED DIFF    Collection Time: 07/02/20  6:30 PM   Result Value Ref Range    WBC 7.3 4.6 - 13.2 K/uL    RBC 4.55 (L) 4.70 - 5.50 M/uL    HGB 14.8 13.0 - 16.0 g/dL    HCT 42.6 36.0 - 48.0 %    MCV 93.6 74.0 - 97.0 FL    MCH 32.5 24.0 - 34.0 PG    MCHC 34.7 31.0 - 37.0 g/dL    RDW 13.0 11.6 - 14.5 %    PLATELET 699 026 - 587 K/uL    MPV 9.9 9.2 - 11.8 FL    NEUTROPHILS 86 (H) 42 - 75 %    LYMPHOCYTES 11 (L) 20 - 51 %    MONOCYTES 3 2 - 9 %    EOSINOPHILS 0 0 - 5 %    BASOPHILS 0 0 - 3 %    ABS. NEUTROPHILS 6.3 1.8 - 8.0 K/UL    ABS. LYMPHOCYTES 0.8 0.8 - 3.5 K/UL    ABS. MONOCYTES 0.2 0 - 1.0 K/UL    ABS. EOSINOPHILS 0.0 0.0 - 0.4 K/UL    ABS. BASOPHILS 0.0 0.0 - 0.06 K/UL    DF MANUAL      PLATELET COMMENTS ADEQUATE PLATELETS      RBC COMMENTS NORMOCYTIC, NORMOCHROMIC     D DIMER    Collection Time: 07/02/20  6:30 PM   Result Value Ref Range    D DIMER 1.35 (H) <0.46 ug/ml(FEU)   PROTHROMBIN TIME + INR    Collection Time: 07/02/20  6:30 PM   Result Value Ref Range    Prothrombin time 13.3 11.5 - 15.2 sec    INR 1.0 0.8 - 1.2     PTT    Collection Time: 07/02/20  6:30 PM   Result Value Ref Range    aPTT 31.3 23.0 - 36.4 SEC   POC LACTIC ACID    Collection Time: 07/02/20  6:45 PM   Result Value Ref Range    Lactic Acid (POC) 2.80 (HH) 0.40 - 2.00 mmol/L       Radiologist and cardiologist interpretations if available at time of this note:  No results found. Emergency physician interpretation of EKG: Normal sinus rhythm at 80 bpm with premature supraventricular complexes, infrequent. Medication(s) ordered for patient during this emergency visit encounter:  Medications - No data to display    Medical Decision Making     I am the first provider for this patient. I reviewed the vital signs, available nursing notes, past medical history, past surgical history, family history and social history. Vital Signs:  Reviewed the patient's vital signs. ED COURSE:    Patient is hypoxic with a history of COVID-19 infection. Will admit. Critical Care Note:  Respiratory Distress    Critical care minutes: 34 MINUTES. Given patient's initial arrival in respiratory distress, numerous serial reevaluations of the patient's respiratory status and patient's response to various medical interventions.           Given the patients underlying condition medical intervention(s) were needed, requiring numerous reevaluations of patient's vital signs and response to different emergency department therapies, total bedside time evaluating and/or treating the patient, not including procedures, is noted below. Admit to Hospitalist    The patient was presented to the accepting hospitalist, Dr. Alise Mcclure. The patient's primary doctor is Elke Chong MD, and admissions for this physician are with the hospitalist.  If the patient has no primary doctor, then admission is to the hospitalist as well. As the emergency physician, I wrote courtesy admission orders for the hospitalist physician. The courtesy orders included explicit instructions for the floor nursing staff to call the admitting attending physician upon patient arrival on the floor. Dictation disclaimer:  Please note that this dictation was completed with [employer], the computer voice recognition software. Quite often unanticipated grammatical, syntax, homophones, and other interpretive errors are inadvertently transcribed by the computer software. Please disregard these errors. Please excuse any errors that have escaped final proofreading. Coding Diagnoses     Clinical Impression:   1. COVID-19 virus infection    2. Hypoxia        Disposition     Disposition:  Admit. ISAIAH Pizano Board Certified Emergency Physician    Provider Attestation:  If a scribe was utilized in generation of this patient record, I personally performed the services described in the documentation, reviewed the documentation, as recorded by the scribe in my presence, and it accurately records the patient's history of presenting illness, review of systems, patient physical examination, and procedures performed by me as the attending physician. ISAIAH Pizano Board Certified Emergency Physician  7/2/2020.  7:44 PM

## 2020-07-02 NOTE — ED TRIAGE NOTES
Pt states he has been SOB, off and on fevers and loss of taste for 2 weeks. Was tested for Covid and told he was positive today, came to ED for increasing difficulty breathing. ENS states his sats were low 80's upon their arrival so he was p;aced on an non rebreather with improvement. No obvious distress noted at this time.

## 2020-07-02 NOTE — TELEPHONE ENCOUNTER
1315 returned call to patient, call went to voice mail and voice mail was full. 1320 returned call to patient, call again went to voice mail and voice mail is full. [address] with Ms. West she states he just got up and plugged his phone up and will tell him to cut it on. Patient will call the office back. Spoke with Ms. West she states she is worried about patient. Attempted to call patient and call went straight to voice mail and mail box is full, unable to leave a message. Called wife back she went to patient's room and told him the office is trying to call. Patient's phone is charging. Patient requested this nurse to call back in a few minutes and he will answer the phone.

## 2020-07-02 NOTE — PROGRESS NOTES
Juliann Brewster is a 68 y.o. male who was seen by synchronous (real-time) audio-video technology on 7/2/2020 for No chief complaint on file. HPI:   Juliann Brewster is a 68y.o. year old male who presents today for an acute visit. He has a history of hypertension, hyperlipidemia, prediabetes, rheumatoid arthritis, osteoarthritis, calcium pyrophosphate deposition disease, BPH, GERD, and depression. 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 He reports that on 6/22/2020, he noted the onset of weakness, fatigue, and diarrhea. He states that he continued to work for the entire week despite feeling ill. He also reports exposure to several coworkers who had COVID-19. On 6/27/2020, he developed fever and dyspnea, which worsened throughout the weekend. He presented to [facility] on 6/28/2020, and WBC 4.0 (78 % neutrophils) and chest x-ray showed patchy density in the RUL, right perihilar area, and right base. He was advised to go to the ED, and presented to [address] [place] [address] ED on 6/29/2020. He was found to be hypoxic with pulse ox at rest 92-93 % and ambulation 89-91% room air. Chest x-ray showed bilateral multifocal extremely subtle groundglass opacities. Labs showed WBC 4.4 (80% neutrophils), Hb 14.6/ Hct 42.0, platelets 691, creatinine 0.71/ eGFR >60, AST 53, alk phos 160, lactate 1.16. He was discharged home. SARS COV-2 positive result returned on 6/30/2020. He reports that since discharge from the ED, he states that he has felt terrible. He noted persistent symptoms of weakness, diarrhea, fatigue, and fevers, and states that his dyspnea has gradually worsened. He describes chest pain with inspiration and poor po intake. He states that he is unable to take a deep breath at rest, but notes that this worsens with any ambulation or movement. He also describes new congestion in his throat and chest, but is unable to cough. He has been eating little, but is attempting to drink fluids.  He is otherwise without complaints. On 8/13/2019, he reported that he had been seeing Dr. Karmen Robledo for increasing difficulty with right sided sciatica. He reported being treated with a Medrol dose pack, physical therapy, and spinal injections without improvement, and was also prescribed Norco and Tramadol, but he stated that he found them too sedating and of no benefit. Due to persistent symptoms, he underwent a lumbar MRI (8/5/2019) which showed degenerative changes most notable at L4-L5 resulting in moderate spinal canal stenosis as well as moderate to severe right greater than left foraminal stenoses; advanced facet arthropathy with small facet effusions and suspected small right facet joint ventral synovial cyst; notable degenerative changes also L3-L4; L1 mild anterior compression deformity, chronic appearing; overall general worsening when compared to prior study in 2007. He was having continued significant pain, and was started on gabapentin 100 mg tid. He was referred to Dr. Kristi Kruse and she increased his dose of gabapentin to 200 mg tid. She also referred him to Dr. Perlie Krabbe for evaluation given his lack of response to conservative management. He recommended proceeding with decompression by performing a L4/5 right-sided hemilaminotomy and medial facetectomy, which was performed on 10/23/2019. He developed postop urinary retention and was discharged with a Deleon catheter. He had follow-up with Dr. Isaiah Anna on 10/29/2019 with successful voiding trial. He reports that he noted initial resolution of his right sciatica pain following surgery, but on 10/29/2019 noted abrupt recurrence when getting out of bed. He was evaluated by GOMEZ Doherty on 10/31/2019 and was treated with a Medrol dose pack without improvement. He was restarted on gabapentin 300 mg tid and reports improvement, although he continues to have mild pain in the morning.      On 8/9/2019, he had an episode of waking up gasping for air forcing him to get out of bed and walk around until his breathing normalized. He has been having frequent similar episodes over that last year, but had been resistent to evaluation for sleep apnea. However, he reports that this episode was especially severe. When his symptoms persisted, he was evaluated at [facility], and testing included WBC 5.7, Hb 14.2/ Hct 41.6, creatinine 0.9/ eGFR>60, troponin I x 1 negative, NT-pro BNP 45, EKG sinus rhythm at 83 bpm and no ischemic changes, and chest x-ray without acute changes, but an ill defined 15 mm density in the lateral left mid zone was noted. He received lasix 40 mg IV and was discharged. He had an echocardiogram (8/26/2019) showing normal LV function (EF 56-60%), no RWMA, unable to estimate RVSP due to inadequate TR, but TV/PV appear normal. He was referred to Dr. Sai Ferguson for probable sleep apnea, and underwent a home sleep study on 10/25/2019,showing evidence of severe obstructive sleep apnea with AHI 35 and oxygen guy 80%. He has not yet received his CPAP equipment so as to begin therapy. On 6/20/2018, he suffered an accidental gun shot wound while working resulting in traumatic injury to his left index finger with near loss of the middle phalanx and fracture of the distal phalanx. He was taken to [facility] and initially had irrigation and closure of the lacerations performed. He presented to the [facility] ED on 6/24/2018 with increased pain and swelling, and was started on doxycycline for a wound infection. On 7/7/2018, due to loss of stability and angulation of the index finger, he underwent stabilization with fusion of the proximal and distal phalanx with bone grafting by Dr. Kash Arteaga. He did well and was started on physical therapy. On 8/1/2018, he presented to [address] [place] [address] for evaluation of increasing erythema, swelling and tenderness with concern for a possible infection.  He underwent left hand x-ray (8/1/2018) showing severe soft tissue swelling of the left second finger worrisome for cellulitis, traumatic amputation of the middle phalanx with marked osteopenia and irregularity of the base of the distal phalanx and flattening and irregularity of the head of the proximal phalanx. Unclear if related to prior trauma/surgery or osteomyelitis with osseous destruction and no films available for comparison. He was started empirically on Bactrim and Augmentin for 14 days. On 8/10/2018, he presented for evaluation by GOMEZ Huitron for complaints of fever, malaise, headache, fatigue, and diarrhea. Lab evaluation showed WBC 17 (90% neutrophils), creatinine 1.34/ eGFR 52, blood culture negative. Stool cultures (8/13/2018) routine, O/P, and C.diff negative. Bactrim and Augmentin were discontinued on 8/13/2018, and he was started on metronidazole for 10 days for treatment of presumed C.diff with improvement. He was evaluated by Dr. Chang Vincent on 8/15/2018 and repeat WBC 8.6 (59% neutrophils), ESR 6, and CRP 0.9. He was seen by Dr. Chang Vincent in follow-up on 8/30/2018 and left index finger wound noted to be significantly improved and no further difficulty with diarrhea. He has a history of hypertension, treated with amlodipine, lisinopril, lasix (+ potassium), and hydralazine was added in 4/2018. He states that his wife has been monitoring his blood pressure intermittently. He denies any chest pain, shortness of breath at rest or with exertion, lightheadedness, or palpitations. He does report bilateral lower extremity swelling that it will increase throughout the day and improve overnight. . In 6/2016, he underwent lower extremity arterial and venous duplex scans, both of which were negative. He also has a history of hyperlipidemia, treated with moderate intensity atorvastatin. He has a history of prediabetes, with HbA1c ranging from 5.9-6.2 since 2012. He denies any polyuria, polydipsia, nocturia, or blurry vision, and has no history of retinopathy, neuropathy, or nephropathy.  He has regular eye exams with  Dae Viera. He has a history of bilateral hand pain with morning stiffness for several years, and in 3/2014, he was noted to have a positive anti-CCP antibody level although NIMCO, rheumatoid factor, and ESR were negative. He was referred for evaluation to Dr. Shirley Louise, and was diagnosed with rheumatoid arthritis in 6/2014. He was treated with hydroxychloroquine, which has been partially helpful in controlling his symptoms. Bilateral hand x-rays also showed evidence of primary osteoarthritis with osteophytes present on the second and third MCP joints. In 1/2017, he was also noted to have evidence of possible chondrocalcinosis on x-ray, and was started on low dose colchicine in addition to hydroxychloroquine for concomitant calcium pyrophosphate deposition disease. He states that since starting on colchicine, he has had significant improvement in his hand pain. He also uses compounding cream and Voltaren gel for pain control. In 1/2019, he was complaining of worsening neck and bilateral shoulder pain (R>L). He stated that he was previously followed by Dr. Stevie Leone, and would occasionally receive cortisone injections into his shoulders. He also described neck pain with difficulty turning his head. He denied any upper extremity weakness or paresthesias. He underwent imaging, and bilateral shoulder x-rays (1/10/2019) showed degenerative changes and secondary findings of rotator cuff pathology. Cervical spine x-rays (1/10/2019) showed advanced degenerative changes with multilevel facet arthropathy. He was evaluated by Dr. Chandana Vilchis who gave him a cortisone injection in his right shoulder with some improvement. He also recommended a reverse shoulder replacement, but he remains hesitant to proceed. He was started on Celebrex 200 mg bid in 2/2019, and reports significant improvement. He continues to also use Tylenol as needed for pain. He states that his neck pain seems to have improved to his baseline level.      He has a history of symptomatic BPH, with complaints of decreased stream, hesitancy, and dribbling. He has refused treatment with medication. He is followed by Dr. Nestor Jensen. He denies any dysuria, gross hematuria, or flank pain. He has a history of GERD, treated with daily omeprazole with good control of symptoms. He had a screening colonoscopy and 8/2015 by Dr. Yeison Mai, showing a 3 mm sessile cecal polyp (pathology: intra-mucosal lymphoid aggregate), two 4 mm sessile polyps in the transverse colon (pathology: serrated adenomas), and a 1 cm lipoma in the transverse colon. Follow-up recommended for five years. He was having difficulty with rectal bleeding in 1/2018 and returned for evaluation with Dr. Yeison Mai who felt it was most likely secondary to a hemorrhoidal source. She recommended use of Citrucel. He states that the bleeding has improved with initiation of this therapy. He denies any abdominal pain, nausea, vomiting, melena, or change in bowel movements. He has a history of depression and anxiety, which had been well controlled with Paxil although inadvertently stopped. Now on Zoloft with improvement. 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 Dr. Nestor Jensen. 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 Dr. Nicole Daniels.      Past Surgical History:   Procedure Laterality Date    HX AMPUTATION FINGER Left     index    HX BACK SURGERY 10/23/2019    Right L4-5 hemilaminectomy, medial facetectomy, resection of synovial cyst    HX CARPAL TUNNEL RELEASE Bilateral     HX CATARACT REMOVAL Left 01/2018    HX CATARACT REMOVAL Bilateral 2018    HX COLONOSCOPY      HX HEENT      sinus, tonsillectomy    HX HERNIA REPAIR      HX MOHS PROCEDURES      bilateral     HX ORTHOPAEDIC      lef knee surgery, removed cartilage.  HX ROTATOR CUFF REPAIR Right      Current Outpatient Medications   Medication Sig    gabapentin (NEURONTIN) 300 mg capsule Take 1 Cap by mouth three (3) times daily. Max Daily Amount: 900 mg. Indications: neuropathic pain    tamsulosin (Flomax) 0.4 mg capsule Take 1 Cap by mouth daily. Indications: enlarged prostate with urination problem    sertraline (ZOLOFT) 50 mg tablet Take 1.5 Tabs by mouth daily.  atorvastatin (LIPITOR) 10 mg tablet TAKE 1 TABLET BY MOUTH ONCE DAILY    celecoxib (CELEBREX) 200 mg capsule TAKE 1 CAPSULE BY MOUTH ONCE DAILY    lisinopril (PRINIVIL, ZESTRIL) 40 mg tablet TAKE 1 TABLET BY MOUTH DAILY    omeprazole (PRILOSEC) 20 mg capsule TAKE 1 CAPSULE BY MOUTH ONCE DAILY    mineral oil liquid Take 30 mL by mouth daily as needed for Constipation.  amLODIPine (NORVASC) 10 mg tablet TAKE 1 TABLET BY MOUTH ONCE DAILY    hydrALAZINE (APRESOLINE) 25 mg tablet Take 1 Tab by mouth two (2) times a day.  furosemide (LASIX) 40 mg tablet Take 1 Tab by mouth daily.  potassium chloride (K-DUR, KLOR-CON) 20 mEq tablet Take 1 Tab by mouth daily.  cycloSPORINE (RESTASIS) 0.05 % ophthalmic emulsion Administer 1 Drop to both eyes nightly.  colchicine (MITIGARE) 0.6 mg capsule Take 0.6 mg by mouth every other day.  cholecalciferol, vitamin D3, (VITAMIN D3) 2,000 unit tab Take 2,000 Units by mouth daily.  diclofenac (VOLTAREN) 1 % topical gel Apply 4 g to affected area four (4) times daily.  LUMIGAN 0.01 % ophthalmic drops Administer 1 Drop to both eyes nightly.     hydroxychloroquine (PLAQUENIL) 200 mg tablet Take 400 mg by mouth daily.  MULTIVITS/IRON FUM/FA/D3/LYCOP (MULTI FOR HIM PO) Take  by mouth daily. No current facility-administered medications for this visit. Allergies and Intolerances: Allergies   Allergen Reactions    Latex, Natural Rubber Swelling    Adhesive Tape-Silicones Rash and Itching    Aldactone [Spironolactone] Not Reported This Time     Breast tenderness    Codeine Not Reported This Time     \"Drives crazy\"    Tape [Adhesive] Rash    Tetanus Toxoid, Adsorbed Anaphylaxis    Tetanus Vaccines And Toxoid Anaphylaxis     Other reaction(s): anaphylaxis/angioedema  Other reaction(s): anaphylaxis/angioedema  Other reaction(s): anesthetic shock     Family History: He has no family history of colon or prostate cancer. Family History   Problem Relation Age of Onset    Cancer Mother     Heart Disease Father     Alcohol abuse Father     Cancer Other      Social History:   He  reports that he has quit smoking. His smoking use included cigars, pipe, and cigarettes. He quit after 12.00 years of use. He has quit using smokeless tobacco.  His smokeless tobacco use included chew. He smoked 2 ppd for 50 years, stopping in 1974. He is  with two adult children. He previously worked on high voltage electric lines, and now works as a [employer] with significant occupational lead exposure.    Social History     Substance and Sexual Activity   Alcohol Use Yes    Comment: rarely     Immunization History:  Immunization History   Administered Date(s) Administered    (RETIRED) Pneumococcal Vaccine (Unspecified Type) 01/01/2008    Influenza High Dose Vaccine PF 09/30/2017    Influenza Vaccine (Tri) Adjuvanted 09/25/2018, 09/25/2019    Influenza Vaccine Split 10/04/2011, 10/16/2012    Influenza Vaccine Whole 01/15/2010    Pneumococcal Conjugate (PCV-13) 01/19/2015    Pneumococcal Polysaccharide (PPSV-23) 01/01/2008    Varicella Virus Vaccine 10/01/2013    Zoster 10/16/2012 Review of Systems:   As above included in HPI. Otherwise 11 point review of systems negative including constitutional, skin, HENT, eyes, respiratory, cardiovascular, gastrointestinal, genitourinary, musculoskeletal, endocrine, hematologic, allergy, and neurologic. Physical:   Vitals: none  BP:     HR:    WT:    BMI:  kg/m2      Exam:   Objective:   Vital Signs: (As obtained by patient/caregiver at home)  There were no vitals taken for this visit. Constitutional: [] Appears well-developed and well-nourished [x] No apparent distress      [x] Abnormal - appears ill with conjunctival injection and mild respiratory distress    Mental status: [x] Alert and awake  [x] Oriented to person/place/time [x] Able to follow commands    [] Abnormal -     Eyes:   EOM    [x]  Normal    [] Abnormal -   Sclera  []  Normal    [x] Abnormal - injected          Discharge [x]  None visible   [] Abnormal -     HENT: [x] Normocephalic, atraumatic  [] Abnormal -   [x] Mouth/Throat: Mucous membranes are moist    External Ears [x] Normal  [] Abnormal -    Neck: [x] No visualized mass [] Abnormal -     Pulmonary/Chest: [] Respiratory effort normal   [] No visualized signs of difficulty breathing or respiratory distress        [x] Abnormal - mild respiratory distress. Speaking in full sentences.      Musculoskeletal:   [] Normal gait with no signs of ataxia         [x] Normal range of motion of neck        [] Abnormal -     Neurological:        [x] No Facial Asymmetry (Cranial nerve 7 motor function) (limited exam due to video visit)          [x] No gaze palsy        [] Abnormal -          Skin:        [x] No significant exanthematous lesions or discoloration noted on facial skin         [] Abnormal -            Psychiatric:       [x] Normal Affect [] Abnormal -        [x] No Hallucinations            Review of Data:  Labs:    Admission on 06/29/2020, Discharged on 06/29/2020   Component Date Value Ref Range Status    Ventricular Rate 06/29/2020 77  BPM Final    Atrial Rate 06/29/2020 77  BPM Final    P-R Interval 06/29/2020 154  ms Final    QRS Duration 06/29/2020 100  ms Final    Q-T Interval 06/29/2020 398  ms Final    QTC Calculation (Bezet) 06/29/2020 450  ms Final    Calculated P Axis 06/29/2020 55  degrees Final    Calculated R Axis 06/29/2020 -48  degrees Final    Calculated T Axis 06/29/2020 12  degrees Final    Diagnosis 06/29/2020    Final                    Value:Normal sinus rhythm  Left axis deviation  Septal infarct , age undetermined  Abnormal ECG    Confirmed by Kristal Mahoney MD, Brayan ([id number]) on 6/30/2020 8:26:13 AM      Sodium 06/29/2020 143  136 - 145 mmol/L Final    Potassium 06/29/2020 3.9  3.5 - 5.5 mmol/L Final    Chloride 06/29/2020 109  100 - 111 mmol/L Final    CO2 06/29/2020 28  21 - 32 mmol/L Final    Anion gap 06/29/2020 6  3.0 - 18 mmol/L Final    Glucose 06/29/2020 115* 74 - 99 mg/dL Final    BUN 06/29/2020 9  7.0 - 18 MG/DL Final    Creatinine 06/29/2020 0.71  0.6 - 1.3 MG/DL Final    BUN/Creatinine ratio 06/29/2020 13  12 - 20   Final    GFR est AA 06/29/2020 >60  >60 ml/min/1.73m2 Final    GFR est non-AA 06/29/2020 >60  >60 ml/min/1.73m2 Final    Calcium 06/29/2020 8.7  8.5 - 10.1 MG/DL Final    Bilirubin, total 06/29/2020 0.6  0.2 - 1.0 MG/DL Final    ALT (SGPT) 06/29/2020 46  16 - 61 U/L Final    AST (SGOT) 06/29/2020 53* 10 - 38 U/L Final    Alk.  phosphatase 06/29/2020 160* 45 - 117 U/L Final    Protein, total 06/29/2020 6.9  6.4 - 8.2 g/dL Final    Albumin 06/29/2020 3.4  3.4 - 5.0 g/dL Final    Globulin 06/29/2020 3.5  2.0 - 4.0 g/dL Final    A-G Ratio 06/29/2020 1.0  0.8 - 1.7   Final    WBC 06/29/2020 4.4* 4.6 - 13.2 K/uL Final    RBC 06/29/2020 4.44* 4.70 - 5.50 M/uL Final    HGB 06/29/2020 14.6  13.0 - 16.0 g/dL Final    HCT 06/29/2020 42.0  36.0 - 48.0 % Final    MCV 06/29/2020 94.6  74.0 - 97.0 FL Final    MCH 06/29/2020 32.9  24.0 - 34.0 PG Final    MCHC 06/29/2020 34.8  31.0 - 37.0 g/dL Final    RDW 2020 12.9  11.6 - 14.5 % Final    PLATELET  158* 135 - 420 K/uL Final    MPV 2020 10.3  9.2 - 11.8 FL Final    NEUTROPHILS 2020 80* 40 - 73 % Final    LYMPHOCYTES 2020 12* 21 - 52 % Final    MONOCYTES 2020 8  3 - 10 % Final    EOSINOPHILS 2020 0  0 - 5 % Final    BASOPHILS 2020 0  0 - 2 % Final    ABS. NEUTROPHILS 2020 3.5  1.8 - 8.0 K/UL Final    ABS. LYMPHOCYTES 2020 0.5* 0.9 - 3.6 K/UL Final    ABS. MONOCYTES 2020 0.4  0.05 - 1.2 K/UL Final    ABS. EOSINOPHILS 2020 0.0  0.0 - 0.4 K/UL Final    ABS. BASOPHILS 2020 0.0  0.0 - 0.1 K/UL Final    DF 2020 AUTOMATED    Final    SARS-CoV-2 2020 Detected* Not Detected   Final    Lactic Acid (POC) 2020 1.16  0.40 - 2.00 mmol/L Final    CK - MB 2020 3.0  <3.6 ng/ml Final    CK-MB Index 2020 2.4  0.0 - 4.0 % Final    CK 2020 126  39 - 308 U/L Final    Troponin-I, QT 2020 <0.02  0.0 - 0.045 NG/ML Final       Health Maintenance:  Screening:    Colorectal: colonoscopy (2015) serrated adenomas. Dr. Nancie Olsen. Due 2020. Depression: on Paxil   DM (HbA1c/FPG): FPG 91/ HbA1c 5.7 (2020)   Hepatitis C: N/A   Falls: none   DEXA: within normal limits (2016)   PSA/MARTÍNEZ: PSA 2.1. As per Dr. Carlos Mcclure   Glaucoma: regular eye exams with Dr. Cisse/ Dr. Kyle Marie (last 2020)   Smokin pack year.  Distant past.   Vitamin D: 34.3 (2020)   Medicare Wellness: 2020    Impression:  Patient Active Problem List   Diagnosis Code    BPH with obstruction/lower urinary tract symptoms N40.1, N13.8    Hypertension I10    Primary osteoarthritis involving multiple joints M89.49    Hyperlipidemia E78.5    GERD (gastroesophageal reflux disease) K21.9    Pre-diabetes R73.03    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 ([facility] [address] M06.041, M06.042    Vitamin D deficiency E55.9    Colon polyps K63.5    CPDD (calcium pyrophosphate deposition disease) M11.20    Impaired fasting glucose R73.01    Rectal bleeding K62.5    Class 1 obesity due to excess calories with serious comorbidity and body mass index (BMI) of 31.0 to 31.9 in adult E66.09, Z68.31    APC (atrial premature contractions) I49.1    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 Patient reports symptoms of weakness, fatigue, and diarrhea since 6/22/2020, and onset of fever and dyspnea on 6/27/2020. He presented to the [facility] ED on 6/29/2020 due to difficulty breathing and found to be hypoxic with pulse ox at rest 92-93 % and ambulation 89-91% room air. Chest x-ray with bilateral multifocal extremely subtle groundglass opacities. Labs showed leukopenia, thrombocytopenia, and elevated AST and alk phos. He was discharged home,and SARS COV-2 positive result returned the following day. He reports worsening fever and dyspnea, chest pain with inspiration, diarrhea, and poor po intake. Advised that he should return to ED. Patient agreed and stated that he will call EMS to transport. Called and discussed with [facility] ED attending. Other issues:  1. Lumbar degenerative disease with right sciatica. Unresponsive to Medrol dose pack, physical therapy, steroid injections, and unwilling to take narcotics as not effective. Lumbar MRI with multilevel degenerative changes and facet arthropathy with R>L facet stenosis at L4-L5 likely responsible for symptoms. Had been following with Dr. Fatmata Bartlett, but given lack of improvement, started on gabapentin 100 mg tid and referred to Dr. Jake Valdez for evaluation.  She recommended increasing gabapentin to 200 mg tid, and given his lack of response to conservative measures, referred to Dr. Ander Hanson. He underwent decompression with L4/5 right-sided hemilaminotomy and medial facetectomy on 10/23/2019. Developed urinary retention post-op, but successfully passed voiding trial and has had no further difficulties. He developed recurrence of pain on post op day 6, and treated with Medrol dose pack. Remains on gabapentin 300 mg tid with mild  pain when awakening in the morning but generally much improved. Being followed by Dr. Ander Hanson. 2. Paroxysmal nocturnal dyspnea. Patient reported in 1/2019 awakening gasping for air several times per week. No overt evidence of heart failure and EKG was without change from baseline at that time. He reported that he would need to sit up immediately and take deep breathes until resolved. He also admitted to snoring and experiencing significant daytime drowsiness particularly when driving. He reported that when he was on long trips, he would need to pull off the road and take a nap before he could resume driving. Given high suspicion for sleep apnea, he was referred to Dr. Felicity Harris for evaluation, but he never scheduled. Presented with worsening symptoms over several weeks, possibly exacerbated by the inadvertent abrupt cessation of Paxil during this time. Severe episode on 8/9/2019 prompted ED evaluation. EKG without change, troponin negative and NT-pro BNP normal. Echocardiogram (8/26/2019) with normal LV size and function (EF 56-60%), no RWMA, normal valves. Evaluated by Dr. Felicity Harris and completed home sleep study and diagnosed with severe JANNET. Reports that he still has not received his CPAP equipment, but is in the process of arranging. 3. Depression. Prior reasonable control with Paxil and weaned from benzodiazepine use for anxiety and insomnia. On Zoloft 50 mg daily with improvement after inadvertently stopping Paxil.  Describing some persistent anxiety particularly at night, and advised to increase Zoloft to 75 mg daily. Reports today that he has noted improvement. 4. Abnormal chest x-ray. Ill defined density in left mid zone noted on chest x-ray in ED on 8/9/2019. Underwent chest CT scan (8/31/2019) which showed several tiny bilateral pulmonary nodules which were stable when compared with prior chest CT scan from 2007. No further evaluation needed. 5. Hypertension. Blood pressure well controlled on current regimen of lisinopril 40 mg daily, amlodipine 10 mg daily, lasix 40 mg daily (potassium 20 meq daily) and hydralazine 25 mg bid. Renal function normal with creatinine 0.65/ eGFR >60. Normal echocardiogram (8/2019). Continue to follow. 6. Hyperlipidemia. On moderate intensity dose atorvastatin with LDL 58 and HDL 55, indicative of excellent control in this patient. Continue to follow. 7. Prediabetes. Has been controlled on diet alone. HbA1c generally stable at 5.7. No evidence of microvascular complications. On Ace-I and statin. Continue follow-up with Dr. Natanael Mccullough for annual eye exams. Emphasized importance of lifestyle modifications, including diet, exercise, and weight loss. 8. Rheumatoid arthritis. On Plaquenil. Has regular eye exams with Dr. Natanael Mccullough. Difficult to gauge response as appears to have evidence of osteoarthritis and CPPD occurring concurrently, but noted significant improvement since initiating colchicine. Voltaren gel for pain control. Tylenol while at work to help with pain control as needed. Followed by Dr. Dony Mayberry. 9. Primary osteoarthritis. Evident in bilateral hands and knees, bilateral shoulders, and cervical spine. Neck pain now returned to baseline. Shoulder pain (R>L). X-ray with evidence of osteoarthritis and rotator cuff pathology. Evaluated by Dr. Melania Gallagher and received cortisone injection to right shoulder with some improvement. Surgery for reverse shoulder replacement recommended.  Changed from ibuprofen 400 mg qid and Tylenol to Celebrex 200 mg qd with significant improvement. Also using Voltaren gel. 10. CPPD. Colchicine started on 1/19/2017 to help address chondrocalcinosis on x-rays. Reports significant improvement. Now taking every other day with good control. 11. Macrocytosis. Mild evidence of macrocytosis previously, but normalized today. Hb/Hct normal. B12 and folate levels normal today. Will continue to monitor. 12. Rectal bleeding. Colonoscopy 8/2015. Evaluated by Dr. Mariya Hall and considered to be hemorrhoidal in source. Known internal and external hemorrhoids. Improved with Citrucel. No evidence of anemia. Follow. 13. BPH. Does have lower urinary tract symptoms. Developed postop urinary retention and now resolved. On Flomax. Followed by Dr. Mervat Jean. 14. GERD. Good control of symptoms with omeprazole. No issues today. 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 Monitored annually. 16. Obesity. Emphasized importance of lifestyle modifications, including diet, exercise, and weight loss. Discussed that would help control blood sugar. Will readdress at next visit. 17. Insomnia. Weaned from Xanax. Had been using melatonin agonist, ramelteon, to replace Xanax, but no longer taking it either. Follow. 18. Health maintenance. Already received influenza vaccine. Unable to receive Tdap due to allergy (anaphylaxis to Td). Will address Shingrix vaccine at next visit. Other immunizations up to date. Colonoscopy due 8/2020. Continue regular eye exams with Dr. Wilma Gowers. Vitamin D level normal. Continue maintenance dose supplement. Aspirin discontinued given new recommendations regarding primary prevention. Referred to podiatry for onychomycosis and left great toe pain. Medicare wellness visit up to date. Total time: 40 minutes spent with the patient in face-to-face consultation of which greater than 50% was spent on counseling, answering questions and/or coordination of care.  Complex medical review and management performed. Patient understands recommendations and agrees with plan. We discussed the expected course, resolution and complications of the diagnosis(es) in detail. Medication risks, benefits, costs, interactions, and alternatives were discussed as indicated. I advised him to contact the office if his condition worsens, changes or fails to improve as anticipated. He expressed understanding with the diagnosis(es) and plan. Analisa West, who was evaluated through a patient-initiated, synchronous (real-time) audio-video encounter, and/or his healthcare decision maker, is aware that it is a billable service, with coverage as determined by his insurance carrier. He provided verbal consent to proceed: Yes, and patient identification was verified. It was conducted pursuant to the emergency declaration under the [address] [place] [address] authority and the [employer] General Act. A caregiver was present when appropriate. Ability to conduct physical exam was limited. I was in the office. The patient was at home.       Baldwin Heimlich, MD

## 2020-07-03 LAB
ANION GAP SERPL CALC-SCNC: 8 MMOL/L (ref 3–18)
ATRIAL RATE: 82 BPM
BUN SERPL-MCNC: 19 MG/DL (ref 7–18)
BUN/CREAT SERPL: 28 (ref 12–20)
CALCIUM SERPL-MCNC: 8 MG/DL (ref 8.5–10.1)
CALCULATED P AXIS, ECG09: 41 DEGREES
CALCULATED R AXIS, ECG10: -53 DEGREES
CALCULATED T AXIS, ECG11: -25 DEGREES
CHLORIDE SERPL-SCNC: 109 MMOL/L (ref 100–111)
CO2 SERPL-SCNC: 26 MMOL/L (ref 21–32)
CREAT SERPL-MCNC: 0.68 MG/DL (ref 0.6–1.3)
CRP SERPL HS-MCNC: >9.5 MG/L
D DIMER PPP FEU-MCNC: 1.32 UG/ML(FEU)
DIAGNOSIS, 93000: NORMAL
ERYTHROCYTE [DISTWIDTH] IN BLOOD BY AUTOMATED COUNT: 12.9 % (ref 11.6–14.5)
FERRITIN SERPL-MCNC: 709 NG/ML (ref 8–388)
GLUCOSE SERPL-MCNC: 106 MG/DL (ref 74–99)
HCT VFR BLD AUTO: 40.5 % (ref 36–48)
HGB BLD-MCNC: 14 G/DL (ref 13–16)
LDH SERPL L TO P-CCNC: 442 U/L (ref 81–234)
MCH RBC QN AUTO: 32.2 PG (ref 24–34)
MCHC RBC AUTO-ENTMCNC: 34.6 G/DL (ref 31–37)
MCV RBC AUTO: 93.1 FL (ref 74–97)
P-R INTERVAL, ECG05: 144 MS
PLATELET # BLD AUTO: 187 K/UL (ref 135–420)
PMV BLD AUTO: 9.9 FL (ref 9.2–11.8)
POTASSIUM SERPL-SCNC: 3.2 MMOL/L (ref 3.5–5.5)
PROCALCITONIN SERPL-MCNC: 0.14 NG/ML
Q-T INTERVAL, ECG07: 384 MS
QRS DURATION, ECG06: 92 MS
QTC CALCULATION (BEZET), ECG08: 448 MS
RBC # BLD AUTO: 4.35 M/UL (ref 4.7–5.5)
SODIUM SERPL-SCNC: 143 MMOL/L (ref 136–145)
VENTRICULAR RATE, ECG03: 82 BPM
WBC # BLD AUTO: 6.9 K/UL (ref 4.6–13.2)

## 2020-07-03 PROCEDURE — 74011250636 HC RX REV CODE- 250/636: Performed by: INTERNAL MEDICINE

## 2020-07-03 PROCEDURE — 84145 PROCALCITONIN (PCT): CPT

## 2020-07-03 PROCEDURE — 86141 C-REACTIVE PROTEIN HS: CPT

## 2020-07-03 PROCEDURE — 74011250637 HC RX REV CODE- 250/637: Performed by: INTERNAL MEDICINE

## 2020-07-03 PROCEDURE — 83615 LACTATE (LD) (LDH) ENZYME: CPT

## 2020-07-03 PROCEDURE — 85027 COMPLETE CBC AUTOMATED: CPT

## 2020-07-03 PROCEDURE — 65660000000 HC RM CCU STEPDOWN

## 2020-07-03 PROCEDURE — 85379 FIBRIN DEGRADATION QUANT: CPT

## 2020-07-03 PROCEDURE — 74011000258 HC RX REV CODE- 258: Performed by: INTERNAL MEDICINE

## 2020-07-03 PROCEDURE — 36415 COLL VENOUS BLD VENIPUNCTURE: CPT

## 2020-07-03 PROCEDURE — 82728 ASSAY OF FERRITIN: CPT

## 2020-07-03 PROCEDURE — 80048 BASIC METABOLIC PNL TOTAL CA: CPT

## 2020-07-03 RX ORDER — LANOLIN ALCOHOL/MO/W.PET/CERES
6 CREAM (GRAM) TOPICAL
Status: DISCONTINUED | OUTPATIENT
Start: 2020-07-03 | End: 2020-07-09 | Stop reason: HOSPADM

## 2020-07-03 RX ORDER — ENOXAPARIN SODIUM 100 MG/ML
40 INJECTION SUBCUTANEOUS EVERY 12 HOURS
Status: DISCONTINUED | OUTPATIENT
Start: 2020-07-03 | End: 2020-07-03

## 2020-07-03 RX ORDER — POTASSIUM CHLORIDE 20 MEQ/1
40 TABLET, EXTENDED RELEASE ORAL 3 TIMES DAILY
Status: COMPLETED | OUTPATIENT
Start: 2020-07-03 | End: 2020-07-04

## 2020-07-03 RX ORDER — ENOXAPARIN SODIUM 100 MG/ML
90 INJECTION SUBCUTANEOUS EVERY 12 HOURS
Status: DISCONTINUED | OUTPATIENT
Start: 2020-07-03 | End: 2020-07-04

## 2020-07-03 RX ADMIN — AMLODIPINE BESYLATE 10 MG: 10 TABLET ORAL at 08:27

## 2020-07-03 RX ADMIN — GABAPENTIN 300 MG: 300 CAPSULE ORAL at 21:16

## 2020-07-03 RX ADMIN — SERTRALINE HYDROCHLORIDE 75 MG: 50 TABLET ORAL at 08:29

## 2020-07-03 RX ADMIN — AZITHROMYCIN MONOHYDRATE 500 MG: 500 INJECTION, POWDER, LYOPHILIZED, FOR SOLUTION INTRAVENOUS at 17:54

## 2020-07-03 RX ADMIN — LISINOPRIL 40 MG: 40 TABLET ORAL at 08:29

## 2020-07-03 RX ADMIN — HYDRALAZINE HYDROCHLORIDE 25 MG: 25 TABLET, FILM COATED ORAL at 21:14

## 2020-07-03 RX ADMIN — CYCLOSPORINE 1 DROP: 0.5 EMULSION OPHTHALMIC at 21:00

## 2020-07-03 RX ADMIN — POTASSIUM CHLORIDE 40 MEQ: 20 TABLET, EXTENDED RELEASE ORAL at 17:47

## 2020-07-03 RX ADMIN — POTASSIUM CHLORIDE 40 MEQ: 20 TABLET, EXTENDED RELEASE ORAL at 21:14

## 2020-07-03 RX ADMIN — ATORVASTATIN CALCIUM 10 MG: 10 TABLET, FILM COATED ORAL at 21:16

## 2020-07-03 RX ADMIN — CELECOXIB 200 MG: 100 CAPSULE ORAL at 09:10

## 2020-07-03 RX ADMIN — PIPERACILLIN AND TAZOBACTAM 3.38 G: 3; .375 INJECTION, POWDER, LYOPHILIZED, FOR SOLUTION INTRAVENOUS at 23:22

## 2020-07-03 RX ADMIN — CHOLECALCIFEROL TAB 25 MCG (1000 UNIT) 2 TABLET: 25 TAB at 08:29

## 2020-07-03 RX ADMIN — GABAPENTIN 300 MG: 300 CAPSULE ORAL at 17:47

## 2020-07-03 RX ADMIN — ENOXAPARIN SODIUM 90 MG: 100 INJECTION SUBCUTANEOUS at 17:48

## 2020-07-03 RX ADMIN — TAMSULOSIN HYDROCHLORIDE 0.4 MG: 0.4 CAPSULE ORAL at 08:27

## 2020-07-03 RX ADMIN — PANTOPRAZOLE SODIUM 40 MG: 40 TABLET, DELAYED RELEASE ORAL at 08:27

## 2020-07-03 RX ADMIN — MELATONIN 6 MG: at 21:14

## 2020-07-03 RX ADMIN — METHYLPREDNISOLONE SODIUM SUCCINATE 60 MG: 125 INJECTION, POWDER, FOR SOLUTION INTRAMUSCULAR; INTRAVENOUS at 17:47

## 2020-07-03 RX ADMIN — PIPERACILLIN AND TAZOBACTAM 3.38 G: 3; .375 INJECTION, POWDER, LYOPHILIZED, FOR SOLUTION INTRAVENOUS at 11:48

## 2020-07-03 RX ADMIN — HYDRALAZINE HYDROCHLORIDE 25 MG: 25 TABLET, FILM COATED ORAL at 08:27

## 2020-07-03 RX ADMIN — GABAPENTIN 300 MG: 300 CAPSULE ORAL at 08:28

## 2020-07-03 NOTE — PROGRESS NOTES
Reason for Admission:  COVID-19 virus infection [U07.1]                 RRAT Score:    12            Plan for utilizing home health:    Yes if needed                      Likelihood of Readmission:   LOW                         Transition of Care Plan:              Initial assessment completed with spouse/SO. Cognitive status of patient: oriented to time, place, person and situation. Face sheet information confirmed:  yes. The patient designates Tequila Neves [phone] to participate in his discharge plan and to receive any needed information. This patient lives in a single family home with patient and spouse. Patient is able to navigate steps as needed. Prior to hospitalization, patient was considered to be independent with ADLs/IADLS : yes . Patient has a current ACP document on file: no  The patient and spouse will be available to transport patient home upon discharge. The patient already has none reported,  medical equipment available in the home. Patient is not currently active with home health. .  Patient has not stayed in a skilled nursing facility or rehab. This patient is on dialysis :no    List of available Home Health agencies were provided and reviewed with the patient prior to discharge. Freedom of choice signed: yes, for [employer]. Currently, the discharge plan is Home with [address] [place] [address] Ayaan Jo. The patient states that he can obtain his medications from the pharmacy, and take his medications as directed. Patient's current insurance is Medicare and [address] [place] [address] Management Interventions  PCP Verified by CM:  Yes  Mode of Transport at Discharge: Self  Current Support Network: Lives with Spouse  Confirm Follow Up Transport: Family  The Plan for Transition of Care is Related to the Following Treatment Goals : Home health  The Patient and/or Patient Representative was Provided with a Choice of Provider and Agrees with the Discharge Plan?: Yes  Name of the Patient Representative Who was Provided with a Choice of Provider and Agrees with the Discharge Plan: Marquise Eliudaylin of Choice List was Provided with Basic Dialogue that Supports the Patient's Individualized Plan of Care/Goals, Treatment Preferences and Shares the Quality Data Associated with the Providers?: Yes  Discharge Location  Discharge Placement: Home with home health        Rebel Galvan RN BSN  Care Manager  [phone]

## 2020-07-03 NOTE — PROGRESS NOTES
[facility] Hospitalist Group  Progress Note    Patient: Li Book Age: 68 y.o. : 1940 MR#: [id number] SSN: [id number]  Date/Time: 7/3/2020     Subjective:     Review of systems    No CP   NO NVD  No SOB  NO Cough     Assessment/Plan:       1. Acute hypoxic respiratory  2   COVID pneumonia  3 hypertension  4 rheumatoid arthritis on hydroxychloroquine  5 calcium pyrophosphate deposition disease  6 GERD  7 BPH  8 hypokalemia     Plan  -Patient's oxygenation has improved  -Follow-up CRP /ferritin/procalcitonin  -We will start steroid/   -ID consulted  -Replace potassium    Case discussed with:  []Patient  [] Family ( In room with patient )    []Nursing  []Case Management  DVT Prophylaxis:  []Lovenox  []Hep SQ  []SCDs  []Coumadin   []On Heparin gtt          Objective:   VS:   Visit Vitals  /64   Pulse 84   Temp 98.6 °F (37 °C)   Resp 26   Ht 5' 8\" (1.727 m)   Wt 90.3 kg (199 lb)   SpO2 92%   BMI 30.26 kg/m²      Tmax/24hrs: Temp (24hrs), Av.6 °F (37 °C), Min:98.6 °F (37 °C), Max:98.6 °F (37 °C)  IOBRIEF    Intake/Output Summary (Last 24 hours) at 7/3/2020 1506  Last data filed at 7/3/2020 1218  Gross per 24 hour   Intake 100 ml   Output    Net 100 ml       General:  Alert, cooperative, no acute distress   Cardiovascular: S1S2 - regular , No Murmur   Pulmonary: Equal expansion , No Use of accessory muscles , No Rales No Rhonchi    GI:  +BS in all four quadrants, soft, non-tender  Extremities:  No edema; 2+ dorsalis pedis pulses bilaterally  Neuro: Alert and oriented X 2.        Medications:   Current Facility-Administered Medications   Medication Dose Route Frequency    hydrOXYchloroQUINE (PLAQUENIL) tablet 400 mg  400 mg Oral DAILY    cholecalciferol (VITAMIN D3) (1000 Units /25 mcg) tablet 2 Tab  2,000 Units Oral DAILY    colchicine (MITIGARE) capsule 0.6 mg  0.6 mg Oral EVERY OTHER DAY    cycloSPORINE (RESTASIS) 0.05 % ophthalmic emulsion 1 Drop  1 Drop Both Eyes QHS    hydrALAZINE (APRESOLINE) tablet 25 mg  25 mg Oral BID    amLODIPine (NORVASC) tablet 10 mg  10 mg Oral DAILY    mineral oil 30 mL  30 mL Oral DAILY PRN    lisinopriL (PRINIVIL, ZESTRIL) tablet 40 mg  40 mg Oral DAILY    atorvastatin (LIPITOR) tablet 10 mg  10 mg Oral QHS    sertraline (ZOLOFT) tablet 75 mg  75 mg Oral DAILY    tamsulosin (FLOMAX) capsule 0.4 mg  0.4 mg Oral DAILY    gabapentin (NEURONTIN) capsule 300 mg  300 mg Oral TID    celecoxib (CELEBREX) capsule 200 mg  200 mg Oral DAILY    pantoprazole (PROTONIX) tablet 40 mg  40 mg Oral ACB    piperacillin-tazobactam (ZOSYN) 3.375 g in 0.9% sodium chloride (MBP/ADV) 100 mL MBP  3.375 g IntraVENous Q6H    enoxaparin (LOVENOX) injection 40 mg  40 mg SubCUTAneous Q24H    acetaminophen (TYLENOL) tablet 650 mg  650 mg Oral Q6H PRN     Current Outpatient Medications   Medication Sig    gabapentin (NEURONTIN) 300 mg capsule Take 1 Cap by mouth three (3) times daily. Max Daily Amount: 900 mg. Indications: neuropathic pain    tamsulosin (Flomax) 0.4 mg capsule Take 1 Cap by mouth daily. Indications: enlarged prostate with urination problem    sertraline (ZOLOFT) 50 mg tablet Take 1.5 Tabs by mouth daily.  atorvastatin (LIPITOR) 10 mg tablet TAKE 1 TABLET BY MOUTH ONCE DAILY    celecoxib (CELEBREX) 200 mg capsule TAKE 1 CAPSULE BY MOUTH ONCE DAILY    lisinopril (PRINIVIL, ZESTRIL) 40 mg tablet TAKE 1 TABLET BY MOUTH DAILY    omeprazole (PRILOSEC) 20 mg capsule TAKE 1 CAPSULE BY MOUTH ONCE DAILY    mineral oil liquid Take 30 mL by mouth daily as needed for Constipation.  amLODIPine (NORVASC) 10 mg tablet TAKE 1 TABLET BY MOUTH ONCE DAILY    hydrALAZINE (APRESOLINE) 25 mg tablet Take 1 Tab by mouth two (2) times a day.  furosemide (LASIX) 40 mg tablet Take 1 Tab by mouth daily.  potassium chloride (K-DUR, KLOR-CON) 20 mEq tablet Take 1 Tab by mouth daily.     cycloSPORINE (RESTASIS) 0.05 % ophthalmic emulsion Administer 1 Drop to both eyes nightly.  colchicine (MITIGARE) 0.6 mg capsule Take 0.6 mg by mouth every other day.  cholecalciferol, vitamin D3, (VITAMIN D3) 2,000 unit tab Take 2,000 Units by mouth daily.  diclofenac (VOLTAREN) 1 % topical gel Apply 4 g to affected area four (4) times daily.  LUMIGAN 0.01 % ophthalmic drops Administer 1 Drop to both eyes nightly.  hydroxychloroquine (PLAQUENIL) 200 mg tablet Take 400 mg by mouth daily.  MULTIVITS/IRON FUM/FA/D3/LYCOP (MULTI FOR HIM PO) Take  by mouth daily. Labs:    Recent Labs     07/03/20  0425 07/02/20  1830   WBC 6.9 7.3   HGB 14.0 14.8   HCT 40.5 42.6    175     Recent Labs     07/03/20  0425 07/02/20  1830    140   K 3.2* 3.5    106   CO2 26 28   * 116*   BUN 19* 17   CREA 0.68 0.80   CA 8.0* 8.8   ALB  --  2.7*   ALT  --  79*   INR  --  1.0         Disclaimer: Sections of this note are dictated utilizing voice recognition software, which may have resulted in some phonetic based errors in grammar and contents. Even though attempts were made to correct all the mistakes, some may have been missed, and remained in the body of the document. If questions arise, please contact our department.     Signed By: Rick Larson MD     July 3, 2020

## 2020-07-03 NOTE — ACP (ADVANCE CARE PLANNING)
Advance Care Planning     Advance Care Planning Clinical Specialist  Conversation Note      Date of ACP Conversation: 7/2/2020    Conversation Conducted with:   Patient with Decision Making Capacity    ACP Clinical Specialist: Meseret Robert    *When Decision Maker makes decisions on behalf of the incapacitated patient: Decision Maker is asked to consider and make decisions based on patient values, known preferences, or best interests. Health Care Decision Maker:    Current Designated Health Care Decision Maker:   Primary Decision Maker: Shaniqua Doug - [phone]  (If there is a valid Health Care Decision Maker named in the [facility] Makers\" box in the ACP activity, but it is not visible above, be sure to open that field and then select the health care decision maker relationship (ie \"primary\") in the blank space to the right of the name.) Validate  this information as still accurate & up-to-date; edit [place] field as needed.)    Note: Assess and validate information in current ACP documents, as indicated. Note: If the relationship of these Decision-Makers to the patient does NOT follow your state's Next of Kin hierarchy, recommend that patient complete ACP document that meets state-specific requirements to allow them to act on the patient's behalf when appropriate. Care Preferences    Hospitalization: \"If your health worsens and it becomes clear that your chance of recovery is unlikely, what would your preference be regarding hospitalization? \"    Choice:  [x]  The patient wants hospitalization  []  The patient prefers comfort-focused treatment without hospitalization.     Length of ACP Conversation in minutes:  10  Conversation Outcomes:  [] ACP discussion completed  [] Existing advance directive reviewed with patient; no changes to patient's previously recorded wishes   [] New Advance Directive completed   [] Portable Do Not Rescitate prepared for Provider review and signature  [] POLST/POST/MOLST/MOST prepared for Provider review and signature      Follow-up plan:    [x] Schedule follow-up conversation to continue planning  [x] Referred individual to Provider for additional questions/concerns   [] Advised patient/agent/surrogate to review completed ACP document and update if needed with changes in condition, patient preferences or care setting     [] This note routed to one or more involved healthcare providers    Sher Thomas RN BSN  Care Manager  [phone]

## 2020-07-03 NOTE — ROUTINE PROCESS
TRANSFER - OUT REPORT: 
 
Verbal report given to Adán Kirk RN on Kaleigh Esquivel  being transferred to [facility] for routine progression of care Report consisted of patients Situation, Background, Assessment and  
Recommendations(SBAR). Information from the following report(s) SBAR, ED Summary and MAR was reviewed with the receiving nurse. Lines:  
Peripheral IV 07/02/20 Right Forearm (Active) Site Assessment Clean, dry, & intact 7/2/2020  7:07 PM  
Phlebitis Assessment 0 7/2/2020  7:07 PM  
Infiltration Assessment 0 7/2/2020  7:07 PM  
Dressing Status Clean, dry, & intact; Occlusive 7/2/2020  7:07 PM  
  
 
Opportunity for questions and clarification was provided. Patient transported with: 
 O2 @ 12 liters Tech

## 2020-07-03 NOTE — CONSULTS
Infectious Disease Consultation Note        Reason: COVID-19 pneumonia, worsening hypoxia    Current abx Prior abx   Pip/tazo since 7/2/20      Lines:       Assessment :    68years old with multiple medical problems including rheumatoid arthritis on hydroxychloroquine, hypertension, prediabetes, degenerative joint disease and other medical problems  presented to the emergency department on 7/2/20 with chief complaint of having shortness of breath. Positive covid- 19 test on 6/29/20    Now with bilateral increased patchy groundglass airspace opacities noted on CXR 7/2/20    Clinical presentation consistent with acute hypoxic respiratory failure secondary to severe COVID-19 pneumonia. Patient is currently on non rebreather mask. Labs on 7/2-ferritin 729, CRP 16.6, , d-dimer 1.35    Recommendations:    1. Discontinue piperacillin/tazobactam.  Start azithromycin. Start remdesivir (risk and benefit discussed with the patient. He verbalized his understanding and wishes to proceed). 2.  Start IV Solu-Medrol 60 mg IV every 12 hours  3. Obtain consent for convalescent plasma. Risk and benefits discussed with the patient and he agrees to use this if needed  4. Start ascorbic acid, melatonin, zinc  5. Increase anticoagulation to 90 mg subcu every 12hour since patient not stable for CTA chest, high risk of PE in these patients. 6.  Monitor ferritin, CRP, LDH, d-dimer, procalcitonin  7. Obtain pulmonary consult    Thank you for consultation request. Above plan was discussed in details with patient,RN and dr Gloria Driver. Please call me if any further questions or concerns. Will continue to participate in the care of this patient.   HPI:    68years old with multiple medical problems including rheumatoid arthritis on hydroxychloroquine, hypertension, prediabetes, degenerative joint disease and other medical problems  presented to the emergency department on 7/2/20 with chief complaint of having shortness of breath. He was seen in the ED 6/29 when he tested positive for COVID-19 infection. He had positive contact coworkers. He was discharged home and advised to present back to the ED if he develop shortness of breath. His symptoms started 10 days ago on 6/22 with myalgias, fatigue and diarrhea and then he developed a fever and shortness of breath. He denies having chest pain, vomiting or abdominal pain. He was hypoxic in the 80s upon arrival to the ED and required high flow oxygen. he is admitted for further management. He was started on piperacillin/tazobactam.  I have been consulted for further recommendations. Patient currently complains of shortness of breath. He has occasional cough with whitish sputum. He states that he has had poor appetite for the last 2 weeks. Patient denies headaches, visual disturbances, sore throat, runny nose, earaches, cp,abdominal pain, diarrhea, burning micturition,or weakness in extremities. He denies back pain/flank pain. He denies recent sick contacts. No h/o recent travel. No known h/o MRSA colonization or infection in the past.        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 Dr. Valentina Dennison. 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 Dr. Gi Glass.        Past Surgical History:   Procedure Laterality Date    HX AMPUTATION FINGER Left     index    HX BACK SURGERY  10/23/2019    Right L4-5 hemilaminectomy, medial facetectomy, resection of synovial cyst    HX CARPAL TUNNEL RELEASE Bilateral     HX CATARACT REMOVAL Left 01/2018    HX CATARACT REMOVAL Bilateral 2018    HX COLONOSCOPY      HX HEENT      sinus, tonsillectomy    HX HERNIA REPAIR      HX MOHS PROCEDURES      bilateral     HX ORTHOPAEDIC      lef knee surgery, removed cartilage.  HX ROTATOR CUFF REPAIR Right        Patient's Medications   Start Taking    No medications on file   Continue Taking    AMLODIPINE (NORVASC) 10 MG TABLET    TAKE 1 TABLET BY MOUTH ONCE DAILY    ATORVASTATIN (LIPITOR) 10 MG TABLET    TAKE 1 TABLET BY MOUTH ONCE DAILY    CELECOXIB (CELEBREX) 200 MG CAPSULE    TAKE 1 CAPSULE BY MOUTH ONCE DAILY    CHOLECALCIFEROL, VITAMIN D3, (VITAMIN D3) 2,000 UNIT TAB    Take 2,000 Units by mouth daily. COLCHICINE (MITIGARE) 0.6 MG CAPSULE    Take 0.6 mg by mouth every other day. CYCLOSPORINE (RESTASIS) 0.05 % OPHTHALMIC EMULSION    Administer 1 Drop to both eyes nightly. DICLOFENAC (VOLTAREN) 1 % TOPICAL GEL    Apply 4 g to affected area four (4) times daily. FUROSEMIDE (LASIX) 40 MG TABLET    Take 1 Tab by mouth daily. GABAPENTIN (NEURONTIN) 300 MG CAPSULE    Take 1 Cap by mouth three (3) times daily. Max Daily Amount: 900 mg. Indications: neuropathic pain    HYDRALAZINE (APRESOLINE) 25 MG TABLET    Take 1 Tab by mouth two (2) times a day. HYDROXYCHLOROQUINE (PLAQUENIL) 200 MG TABLET    Take 400 mg by mouth daily. LISINOPRIL (PRINIVIL, ZESTRIL) 40 MG TABLET    TAKE 1 TABLET BY MOUTH DAILY    LUMIGAN 0.01 % OPHTHALMIC DROPS    Administer 1 Drop to both eyes nightly. MINERAL OIL LIQUID    Take 30 mL by mouth daily as needed for Constipation. MULTIVITS/IRON FUM/FA/D3/LYCOP (MULTI FOR HIM PO)    Take  by mouth daily. OMEPRAZOLE (PRILOSEC) 20 MG CAPSULE    TAKE 1 CAPSULE BY MOUTH ONCE DAILY    POTASSIUM CHLORIDE (K-DUR, KLOR-CON) 20 MEQ TABLET    Take 1 Tab by mouth daily.     SERTRALINE (ZOLOFT) 50 MG TABLET    Take 1.5 Tabs by mouth daily. TAMSULOSIN (FLOMAX) 0.4 MG CAPSULE    Take 1 Cap by mouth daily. Indications: enlarged prostate with urination problem   These Medications have changed    No medications on file   Stop Taking    No medications on file       Current Facility-Administered Medications   Medication Dose Route Frequency    enoxaparin (LOVENOX) injection 40 mg  40 mg SubCUTAneous Q12H    methylPREDNISolone (PF) (SOLU-MEDROL) injection 60 mg  60 mg IntraVENous Q12H    potassium chloride (K-DUR, KLOR-CON) SR tablet 40 mEq  40 mEq Oral TID    melatonin tablet 6 mg  6 mg Oral QHS    hydrOXYchloroQUINE (PLAQUENIL) tablet 400 mg  400 mg Oral DAILY    cholecalciferol (VITAMIN D3) (1000 Units /25 mcg) tablet 2 Tab  2,000 Units Oral DAILY    colchicine (MITIGARE) capsule 0.6 mg  0.6 mg Oral EVERY OTHER DAY    cycloSPORINE (RESTASIS) 0.05 % ophthalmic emulsion 1 Drop  1 Drop Both Eyes QHS    hydrALAZINE (APRESOLINE) tablet 25 mg  25 mg Oral BID    amLODIPine (NORVASC) tablet 10 mg  10 mg Oral DAILY    mineral oil 30 mL  30 mL Oral DAILY PRN    lisinopriL (PRINIVIL, ZESTRIL) tablet 40 mg  40 mg Oral DAILY    atorvastatin (LIPITOR) tablet 10 mg  10 mg Oral QHS    sertraline (ZOLOFT) tablet 75 mg  75 mg Oral DAILY    tamsulosin (FLOMAX) capsule 0.4 mg  0.4 mg Oral DAILY    gabapentin (NEURONTIN) capsule 300 mg  300 mg Oral TID    celecoxib (CELEBREX) capsule 200 mg  200 mg Oral DAILY    pantoprazole (PROTONIX) tablet 40 mg  40 mg Oral ACB    piperacillin-tazobactam (ZOSYN) 3.375 g in 0.9% sodium chloride (MBP/ADV) 100 mL MBP  3.375 g IntraVENous Q6H    acetaminophen (TYLENOL) tablet 650 mg  650 mg Oral Q6H PRN     Current Outpatient Medications   Medication Sig    gabapentin (NEURONTIN) 300 mg capsule Take 1 Cap by mouth three (3) times daily. Max Daily Amount: 900 mg. Indications: neuropathic pain    tamsulosin (Flomax) 0.4 mg capsule Take 1 Cap by mouth daily. Indications: enlarged prostate with urination problem    sertraline (ZOLOFT) 50 mg tablet Take 1.5 Tabs by mouth daily.  atorvastatin (LIPITOR) 10 mg tablet TAKE 1 TABLET BY MOUTH ONCE DAILY    celecoxib (CELEBREX) 200 mg capsule TAKE 1 CAPSULE BY MOUTH ONCE DAILY    lisinopril (PRINIVIL, ZESTRIL) 40 mg tablet TAKE 1 TABLET BY MOUTH DAILY    omeprazole (PRILOSEC) 20 mg capsule TAKE 1 CAPSULE BY MOUTH ONCE DAILY    mineral oil liquid Take 30 mL by mouth daily as needed for Constipation.  amLODIPine (NORVASC) 10 mg tablet TAKE 1 TABLET BY MOUTH ONCE DAILY    hydrALAZINE (APRESOLINE) 25 mg tablet Take 1 Tab by mouth two (2) times a day.  furosemide (LASIX) 40 mg tablet Take 1 Tab by mouth daily.  potassium chloride (K-DUR, KLOR-CON) 20 mEq tablet Take 1 Tab by mouth daily.  cycloSPORINE (RESTASIS) 0.05 % ophthalmic emulsion Administer 1 Drop to both eyes nightly.  colchicine (MITIGARE) 0.6 mg capsule Take 0.6 mg by mouth every other day.  cholecalciferol, vitamin D3, (VITAMIN D3) 2,000 unit tab Take 2,000 Units by mouth daily.  diclofenac (VOLTAREN) 1 % topical gel Apply 4 g to affected area four (4) times daily.  LUMIGAN 0.01 % ophthalmic drops Administer 1 Drop to both eyes nightly.  hydroxychloroquine (PLAQUENIL) 200 mg tablet Take 400 mg by mouth daily.  MULTIVITS/IRON FUM/FA/D3/LYCOP (MULTI FOR HIM PO) Take  by mouth daily. Allergies: Latex, natural rubber; Adhesive tape-silicones; Aldactone [spironolactone]; Codeine; Tape [adhesive];  Tetanus toxoid, adsorbed; and Tetanus vaccines and toxoid    Family History   Problem Relation Age of Onset    Cancer Mother     Heart Disease Father     Alcohol abuse Father     Cancer Other      Social History     Socioeconomic History    Marital status:      Spouse name: Not on file    Number of children: Not on file    Years of education: Not on file    Highest education level: Not on file   Occupational History    Not on file   Social Needs    Financial resource strain: Not on file    Food insecurity     Worry: Not on file     Inability: Not on file    Transportation needs     Medical: Not on file     Non-medical: Not on file   Tobacco Use    Smoking status: Former Smoker     Years: 12.00     Types: Cigars, Pipe, Cigarettes    Smokeless tobacco: Former User     Types: Chew   Substance and Sexual Activity    Alcohol use: Yes     Comment: rarely    Drug use: No    Sexual activity: Not Currently   Lifestyle    Physical activity     Days per week: Not on file     Minutes per session: Not on file    Stress: Not on file   Relationships    Social connections     Talks on phone: Not on file     Gets together: Not on file     Attends Taoist service: Not on file     Active member of club or organization: Not on file     Attends meetings of clubs or organizations: Not on file     Relationship status: Not on file    Intimate partner violence     Fear of current or ex partner: Not on file     Emotionally abused: Not on file     Physically abused: Not on file     Forced sexual activity: Not on file   Other Topics Concern   [address] Service Not Asked    Blood Transfusions Not Asked    Caffeine Concern Not Asked    Occupational Exposure Not Asked    Hobby Hazards Not Asked    Sleep Concern Not Asked    Stress Concern Not Asked    Weight Concern Not Asked    Special Diet Not Asked    Back Care Not Asked    Exercise Not Asked    Bike Helmet Not Asked    [address] Not Asked    Self-Exams Not Asked   Social History Narrative    Not on file     Social History     Tobacco Use   Smoking Status Former Smoker    Years: 12.00    Types: Cigars, Pipe, Cigarettes   Smokeless Tobacco Former User    Types: Chew        Temp (24hrs), Av.6 °F (37 °C), Min:98.6 °F (37 °C), Max:98.6 °F (37 °C)    Visit Vitals  /64   Pulse 84   Temp 98.6 °F (37 °C)   Resp 26   Ht 5' 8\" (1.727 m)   Wt 90.3 kg (199 lb)   SpO2 92%   BMI 30.26 kg/m²       ROS: 12 point ROS obtained in details. Pertinent positives as mentioned in HPI,   otherwise negative    Physical Exam:    Constitutional: The patient is oriented to person, place, and time. tachypneic  Eyes:No scleral icterus. Cardiovascular: Regular rhythm on monitor    Pulmonary/Chest: bilateral air entry fair, chest movements equal  Abdominal: Soft. Bowel sounds are normal. exhibits no distension. No tenderness. No rebound and no guarding. Musculoskeletal:Normal strength. exhibits no tenderness. Neurological:alert and oriented to person, place, and time. Skin:No erythema. No pallor. Psychiatric: Memory, affect and judgment normal         Labs: Results:   Chemistry Recent Labs     07/03/20  0425 07/02/20  1830   * 116*    140   K 3.2* 3.5    106   CO2 26 28   BUN 19* 17   CREA 0.68 0.80   CA 8.0* 8.8   AGAP 8 6   BUCR 28* 21*   AP  --  170*   TP  --  6.8   ALB  --  2.7*   GLOB  --  4.1*   AGRAT  --  0.7*      CBC w/Diff Recent Labs     07/03/20  0425 07/02/20  1830   WBC 6.9 7.3   RBC 4.35* 4.55*   HGB 14.0 14.8   HCT 40.5 42.6    175   GRANS  --  86*   LYMPH  --  11*   EOS  --  0      Microbiology Recent Labs     07/02/20  2215 07/02/20  1830   CULT NO GROWTH AFTER 8 HOURS NO GROWTH AFTER 12 HOURS          RADIOLOGY:    All available imaging studies/reports in [facility] for this admission were reviewed     Due to this being a TeleHealth encounter (During JTXPV-51 public health emergency), evaluation of the following organ systems was limited: Vitals/Constitutional/EENT/Resp/CV/GI//MS/Neuro/Skin/Heme-Lymph-Imm.   Pursuant to the emergency declaration under the [address] [place] [address] authority and the [email or web address] and [employer] Act, this Virtual Visit was conducted with patient's (and/or legal guardian's) consent, to reduce the risk of exposure to COVID-19 ,preserve PPE and provide necessary medical care. Services were provided through a video synchronous discussion virtually to substitute for in-person encounter. Consent: Torri Hernandez ,who was seen by synchronous (real-time) audio-video technology, and/or her healthcare decision maker, is aware that this patient-initiated, Telehealth encounter on 7/3/2020 is a billable service, with coverage as determined by her insurance carrier. he is aware that he may receive a bill and has provided verbal consent to proceed: Yes.     Dr. Agustín Toro, Infectious Disease Specialist  [phone]  July 3, 2020  3:21 PM

## 2020-07-03 NOTE — H&P
Admission History and Physical    Camelia Dumont is a 68 y.o. male who is being admitted. Chief Complaint   Patient presents with    Respiratory Distress       Impression/Plan     68years old presented with complaint of having shortness of breath    -COVID-19 infection and pneumonia  -Hypoxic respiratory failure secondary to the above, requiring high FiO2  -Hypertension  -Rheumatoid arthritis on hydroxychloroquine  -Degenerative disease, and lumbar disc disease  -Prediabetes  -Calcium pyrophosphate deposition disease  -BPH  -GERD  -Other medical problems as below    Patient is admitted to [place] [address] with telemetry   Continue high flow oxygen and wean off as tolerated   Continue on IV Zosyn for pneumonia coverage  He is already on hydroxychloroquine for his arthritis  COVID-19 labs were ordered  Continue other home meds  He appears clinically stable at this point, however will be a low threshold to transfer to the ICU for any clinical deterioration. Continue home medications and outpatient follow-up    Admission plan was discussed    Lovenox for DVT prophylaxis      HPI:    68years old with multiple medical problems including rheumatoid arthritis on hydroxychloroquine, hypertension, prediabetes, degenerative joint disease and other medical problems as below who presented to the emergency department with chief complaint of having shortness of breath. He was seen in the ED 6/29 when he tested positive for COVID-19 infection. He had positive contact coworkers. He was discharged home and advised to present back to the ED if he develop shortness of breath. His symptoms started 10 days ago on 6/22 with myalgias, fatigue and diarrhea and then he developed a fever and shortness of breath. He denies having chest pain, vomiting or abdominal pain. He was hypoxic in the 80s upon arrival to the ED and required high flow oxygen.   He is feeling comfortable presently on oxygen and is being admitted for further management. His chest x-rays were consistent with COVID and showing bilateral patchy infiltrates. For details of his medical history, please refer to the comprehensive note by his PCP Dr. Ezequiel Chaparro from today. 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 Dr. Maryam Hastings. 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 Dr. Gracie Bhat. Past Surgical History:   Procedure Laterality Date    HX AMPUTATION FINGER Left     index    HX BACK SURGERY  10/23/2019    Right L4-5 hemilaminectomy, medial facetectomy, resection of synovial cyst    HX CARPAL TUNNEL RELEASE Bilateral     HX CATARACT REMOVAL Left 01/2018    HX CATARACT REMOVAL Bilateral 2018    HX COLONOSCOPY      HX HEENT      sinus, tonsillectomy    HX HERNIA REPAIR      HX MOHS PROCEDURES      bilateral     HX ORTHOPAEDIC      lef knee surgery, removed cartilage.     HX ROTATOR CUFF REPAIR Right      [id number]  Family History   Problem Relation Age of Onset    Cancer Mother     Heart Disease Father     Alcohol abuse Father     Cancer Other      Allergies   Allergen Reactions    Latex, Natural Rubber Swelling    Adhesive Tape-Silicones Rash and Itching    Aldactone [Spironolactone] Not Reported This Time     Breast tenderness    Codeine Not Reported This Time     \"Drives crazy\"    Tape [Adhesive] Rash    Tetanus Toxoid, Adsorbed Anaphylaxis    Tetanus Vaccines And Toxoid Anaphylaxis     Other reaction(s): anaphylaxis/angioedema  Other reaction(s): anaphylaxis/angioedema  Other reaction(s): anesthetic shock       Home Medications:     No current facility-administered medications on file prior to encounter. Current Outpatient Medications on File Prior to Encounter   Medication Sig Dispense Refill    gabapentin (NEURONTIN) 300 mg capsule Take 1 Cap by mouth three (3) times daily. Max Daily Amount: 900 mg. Indications: neuropathic pain 270 Cap 1    tamsulosin (Flomax) 0.4 mg capsule Take 1 Cap by mouth daily. Indications: enlarged prostate with urination problem 90 Cap 3    sertraline (ZOLOFT) 50 mg tablet Take 1.5 Tabs by mouth daily. 135 Tab 2    atorvastatin (LIPITOR) 10 mg tablet TAKE 1 TABLET BY MOUTH ONCE DAILY 90 Tab 3    celecoxib (CELEBREX) 200 mg capsule TAKE 1 CAPSULE BY MOUTH ONCE DAILY 90 Cap 2    lisinopril (PRINIVIL, ZESTRIL) 40 mg tablet TAKE 1 TABLET BY MOUTH DAILY 90 Tab 3    omeprazole (PRILOSEC) 20 mg capsule TAKE 1 CAPSULE BY MOUTH ONCE DAILY 90 Cap 2    mineral oil liquid Take 30 mL by mouth daily as needed for Constipation.  amLODIPine (NORVASC) 10 mg tablet TAKE 1 TABLET BY MOUTH ONCE DAILY 90 Tab 3    hydrALAZINE (APRESOLINE) 25 mg tablet Take 1 Tab by mouth two (2) times a day. 1 Tab 0    furosemide (LASIX) 40 mg tablet Take 1 Tab by mouth daily. 90 Tab 3    potassium chloride (K-DUR, KLOR-CON) 20 mEq tablet Take 1 Tab by mouth daily. 90 Tab 2    cycloSPORINE (RESTASIS) 0.05 % ophthalmic emulsion Administer 1 Drop to both eyes nightly.  colchicine (MITIGARE) 0.6 mg capsule Take 0.6 mg by mouth every other day.  cholecalciferol, vitamin D3, (VITAMIN D3) 2,000 unit tab Take 2,000 Units by mouth daily.  diclofenac (VOLTAREN) 1 % topical gel Apply 4 g to affected area four (4) times daily.  LUMIGAN 0.01 % ophthalmic drops Administer 1 Drop to both eyes nightly.       hydroxychloroquine (PLAQUENIL) 200 mg tablet Take 400 mg by mouth daily.  MULTIVITS/IRON FUM/FA/D3/LYCOP (MULTI FOR HIM PO) Take  by mouth daily. Review of Systems:   GEN  no fever or chills  CV  no chest pain, or syncope. PULM  see HPI  GI  no abd pain, no melena, no nausea, no vomiting     no dysuria, no flank pain   M/S-no back pain, no neck pain   SKIN  no rashes, no bruising   ENDO  no temp intolerance, no polyuria, no polydypsia   NEURO  no focal weakness, no speech changes   PSYCH  no hallucinations   HEENT  no headache, no ear pain, no sore throat, no blurry vision, no double vision, no neck pain     Physical Assessment:     Visit Vitals  /67   Pulse 71   Temp 98.6 °F (37 °C)   Resp 18   Ht 5' 8\" (1.727 m)   Wt 90.3 kg (199 lb)   SpO2 94%   BMI 30.26 kg/m²     Constitutional: The patient is oriented to person, place, and time. No distress. Eyes:No scleral icterus. Neck: No JVD present. Cardiovascular: Regular rhythm. Exam reveals no gallop and no friction rub. Pulmonary/Chest: No stridor. No respiratory distress. has no wheezes. has no rales. Abdominal: Soft. Bowel sounds are normal. exhibits no distension. No tenderness. No rebound and no guarding. Musculoskeletal:Normal strength. exhibits no tenderness. Neurological:alert and oriented to person, place, and time. Skin: Skin is warm and dry. No rash noted. Not diaphoretic. No erythema. No pallor.    Psychiatric: Memory, affect and judgment normal    Recent Results (from the past 24 hour(s))   CARDIAC PANEL,(CK, CKMB & TROPONIN)    Collection Time: 07/02/20  6:30 PM   Result Value Ref Range    CK - MB 3.3 <3.6 ng/ml    CK-MB Index 5.6 (H) 0.0 - 4.0 %    CK 59 39 - 308 U/L    Troponin-I, QT <0.02 0.0 - 8.024 NG/ML   METABOLIC PANEL, COMPREHENSIVE    Collection Time: 07/02/20  6:30 PM   Result Value Ref Range    Sodium 140 136 - 145 mmol/L    Potassium 3.5 3.5 - 5.5 mmol/L    Chloride 106 100 - 111 mmol/L    CO2 28 21 - 32 mmol/L    Anion gap 6 3.0 - 18 mmol/L    Glucose 116 (H) 74 - 99 mg/dL    BUN 17 7.0 - 18 MG/DL    Creatinine 0.80 0.6 - 1.3 MG/DL    BUN/Creatinine ratio 21 (H) 12 - 20      GFR est AA >60 >60 ml/min/1.73m2    GFR est non-AA >60 >60 ml/min/1.73m2    Calcium 8.8 8.5 - 10.1 MG/DL    Bilirubin, total 0.7 0.2 - 1.0 MG/DL    ALT (SGPT) 79 (H) 16 - 61 U/L    AST (SGOT) 76 (H) 10 - 38 U/L    Alk. phosphatase 170 (H) 45 - 117 U/L    Protein, total 6.8 6.4 - 8.2 g/dL    Albumin 2.7 (L) 3.4 - 5.0 g/dL    Globulin 4.1 (H) 2.0 - 4.0 g/dL    A-G Ratio 0.7 (L) 0.8 - 1.7     CBC WITH AUTOMATED DIFF    Collection Time: 07/02/20  6:30 PM   Result Value Ref Range    WBC 7.3 4.6 - 13.2 K/uL    RBC 4.55 (L) 4.70 - 5.50 M/uL    HGB 14.8 13.0 - 16.0 g/dL    HCT 42.6 36.0 - 48.0 %    MCV 93.6 74.0 - 97.0 FL    MCH 32.5 24.0 - 34.0 PG    MCHC 34.7 31.0 - 37.0 g/dL    RDW 13.0 11.6 - 14.5 %    PLATELET 815 836 - 022 K/uL    MPV 9.9 9.2 - 11.8 FL    NEUTROPHILS 86 (H) 42 - 75 %    LYMPHOCYTES 11 (L) 20 - 51 %    MONOCYTES 3 2 - 9 %    EOSINOPHILS 0 0 - 5 %    BASOPHILS 0 0 - 3 %    ABS. NEUTROPHILS 6.3 1.8 - 8.0 K/UL    ABS. LYMPHOCYTES 0.8 0.8 - 3.5 K/UL    ABS. MONOCYTES 0.2 0 - 1.0 K/UL    ABS. EOSINOPHILS 0.0 0.0 - 0.4 K/UL    ABS.  BASOPHILS 0.0 0.0 - 0.06 K/UL    DF MANUAL      PLATELET COMMENTS ADEQUATE PLATELETS      RBC COMMENTS NORMOCYTIC, NORMOCHROMIC     D DIMER    Collection Time: 07/02/20  6:30 PM   Result Value Ref Range    D DIMER 1.35 (H) <0.46 ug/ml(FEU)   PROTHROMBIN TIME + INR    Collection Time: 07/02/20  6:30 PM   Result Value Ref Range    Prothrombin time 13.3 11.5 - 15.2 sec    INR 1.0 0.8 - 1.2     PTT    Collection Time: 07/02/20  6:30 PM   Result Value Ref Range    aPTT 31.3 23.0 - 36.4 SEC   FERRITIN    Collection Time: 07/02/20  6:30 PM   Result Value Ref Range    Ferritin 729 (H) 8 - 388 NG/ML   C REACTIVE PROTEIN, QT    Collection Time: 07/02/20  6:30 PM   Result Value Ref Range    C-Reactive protein 16.6 (H) 0 - 0.3 mg/dL   LD    Collection Time: 07/02/20  6:30 PM   Result Value Ref Range     (H) 81 - 234 U/L   POC LACTIC ACID    Collection Time: 07/02/20  6:45 PM   Result Value Ref Range    Lactic Acid (POC) 2.80 (HH) 0.40 - 2.00 mmol/L   POC G3    Collection Time: 07/02/20  9:21 PM   Result Value Ref Range    Device: Non rebreather      Flow rate (POC) 14 L/M    FIO2 (POC) 100 %    pH (POC) 7.43 7.35 - 7.45      pCO2 (POC) 34.6 (L) 35.0 - 45.0 MMHG    pO2 (POC) 70 (L) 80 - 100 MMHG    HCO3 (POC) 23.1 22 - 26 MMOL/L    sO2 (POC) 94 92 - 97 %    Base deficit (POC) 1 mmol/L    Allens test (POC) YES      Total resp.  rate 20      Site RIGHT RADIAL      Specimen type (POC) ARTERIAL      Performed by Noemy Salas          Diagnostic Studies:  CXR:   Bilateral patchy pulmonary infiltrates  Radiology report is pending

## 2020-07-03 NOTE — ED NOTES
Updated patient's son. Son asked that we call him or his wife, yessi, with any updates. Son's number is . Yessi's number is . We can call at any time.

## 2020-07-04 LAB
ALBUMIN SERPL-MCNC: 2.4 G/DL (ref 3.4–5)
ALBUMIN/GLOB SERPL: 0.6 {RATIO} (ref 0.8–1.7)
ALP SERPL-CCNC: 174 U/L (ref 45–117)
ALT SERPL-CCNC: 79 U/L (ref 16–61)
ANION GAP SERPL CALC-SCNC: 7 MMOL/L (ref 3–18)
AST SERPL-CCNC: 65 U/L (ref 10–38)
BILIRUB SERPL-MCNC: 0.6 MG/DL (ref 0.2–1)
BUN SERPL-MCNC: 22 MG/DL (ref 7–18)
BUN/CREAT SERPL: 33 (ref 12–20)
CALCIUM SERPL-MCNC: 8.6 MG/DL (ref 8.5–10.1)
CHLORIDE SERPL-SCNC: 111 MMOL/L (ref 100–111)
CO2 SERPL-SCNC: 24 MMOL/L (ref 21–32)
CREAT SERPL-MCNC: 0.66 MG/DL (ref 0.6–1.3)
CRP SERPL-MCNC: 12.3 MG/DL (ref 0–0.3)
ERYTHROCYTE [DISTWIDTH] IN BLOOD BY AUTOMATED COUNT: 12.9 % (ref 11.6–14.5)
GLOBULIN SER CALC-MCNC: 3.9 G/DL (ref 2–4)
GLUCOSE SERPL-MCNC: 148 MG/DL (ref 74–99)
HCT VFR BLD AUTO: 40.2 % (ref 36–48)
HGB BLD-MCNC: 13.9 G/DL (ref 13–16)
MCH RBC QN AUTO: 32.6 PG (ref 24–34)
MCHC RBC AUTO-ENTMCNC: 34.6 G/DL (ref 31–37)
MCV RBC AUTO: 94.1 FL (ref 74–97)
PLATELET # BLD AUTO: 215 K/UL (ref 135–420)
PMV BLD AUTO: 9.9 FL (ref 9.2–11.8)
POTASSIUM SERPL-SCNC: 3.7 MMOL/L (ref 3.5–5.5)
PROT SERPL-MCNC: 6.3 G/DL (ref 6.4–8.2)
RBC # BLD AUTO: 4.27 M/UL (ref 4.7–5.5)
SODIUM SERPL-SCNC: 142 MMOL/L (ref 136–145)
WBC # BLD AUTO: 4.8 K/UL (ref 4.6–13.2)

## 2020-07-04 PROCEDURE — 74011000258 HC RX REV CODE- 258: Performed by: INTERNAL MEDICINE

## 2020-07-04 PROCEDURE — 74011250637 HC RX REV CODE- 250/637: Performed by: INTERNAL MEDICINE

## 2020-07-04 PROCEDURE — 80053 COMPREHEN METABOLIC PANEL: CPT

## 2020-07-04 PROCEDURE — 77010033711 HC HIGH FLOW OXYGEN

## 2020-07-04 PROCEDURE — 36415 COLL VENOUS BLD VENIPUNCTURE: CPT

## 2020-07-04 PROCEDURE — 74011000250 HC RX REV CODE- 250: Performed by: INTERNAL MEDICINE

## 2020-07-04 PROCEDURE — 74011250636 HC RX REV CODE- 250/636: Performed by: INTERNAL MEDICINE

## 2020-07-04 PROCEDURE — 85027 COMPLETE CBC AUTOMATED: CPT

## 2020-07-04 PROCEDURE — 86140 C-REACTIVE PROTEIN: CPT

## 2020-07-04 PROCEDURE — 65660000000 HC RM CCU STEPDOWN

## 2020-07-04 PROCEDURE — 94761 N-INVAS EAR/PLS OXIMETRY MLT: CPT

## 2020-07-04 RX ORDER — ENOXAPARIN SODIUM 100 MG/ML
40 INJECTION SUBCUTANEOUS EVERY 12 HOURS
Status: DISCONTINUED | OUTPATIENT
Start: 2020-07-04 | End: 2020-07-06

## 2020-07-04 RX ADMIN — METHYLPREDNISOLONE SODIUM SUCCINATE 60 MG: 125 INJECTION, POWDER, FOR SOLUTION INTRAMUSCULAR; INTRAVENOUS at 08:48

## 2020-07-04 RX ADMIN — PANTOPRAZOLE SODIUM 40 MG: 40 TABLET, DELAYED RELEASE ORAL at 08:49

## 2020-07-04 RX ADMIN — ENOXAPARIN SODIUM 40 MG: 100 INJECTION SUBCUTANEOUS at 15:27

## 2020-07-04 RX ADMIN — POTASSIUM CHLORIDE 40 MEQ: 20 TABLET, EXTENDED RELEASE ORAL at 08:50

## 2020-07-04 RX ADMIN — MINERAL OIL 30 ML: 1000 SOLUTION ORAL at 15:30

## 2020-07-04 RX ADMIN — GABAPENTIN 300 MG: 300 CAPSULE ORAL at 21:48

## 2020-07-04 RX ADMIN — SERTRALINE HYDROCHLORIDE 75 MG: 50 TABLET ORAL at 08:49

## 2020-07-04 RX ADMIN — CELECOXIB 200 MG: 100 CAPSULE ORAL at 08:50

## 2020-07-04 RX ADMIN — GABAPENTIN 300 MG: 300 CAPSULE ORAL at 15:27

## 2020-07-04 RX ADMIN — AZITHROMYCIN MONOHYDRATE 500 MG: 500 INJECTION, POWDER, LYOPHILIZED, FOR SOLUTION INTRAVENOUS at 15:28

## 2020-07-04 RX ADMIN — HYDRALAZINE HYDROCHLORIDE 25 MG: 25 TABLET, FILM COATED ORAL at 17:07

## 2020-07-04 RX ADMIN — GABAPENTIN 300 MG: 300 CAPSULE ORAL at 08:50

## 2020-07-04 RX ADMIN — MELATONIN 6 MG: at 21:47

## 2020-07-04 RX ADMIN — METHYLPREDNISOLONE SODIUM SUCCINATE 60 MG: 125 INJECTION, POWDER, FOR SOLUTION INTRAMUSCULAR; INTRAVENOUS at 20:12

## 2020-07-04 RX ADMIN — TAMSULOSIN HYDROCHLORIDE 0.4 MG: 0.4 CAPSULE ORAL at 08:52

## 2020-07-04 RX ADMIN — CYCLOSPORINE 1 DROP: 0.5 EMULSION OPHTHALMIC at 21:46

## 2020-07-04 RX ADMIN — ENOXAPARIN SODIUM 90 MG: 100 INJECTION SUBCUTANEOUS at 04:14

## 2020-07-04 RX ADMIN — COLCHICINE 0.6 MG: 0.6 CAPSULE ORAL at 21:47

## 2020-07-04 RX ADMIN — AMLODIPINE BESYLATE 10 MG: 10 TABLET ORAL at 08:50

## 2020-07-04 RX ADMIN — LISINOPRIL 40 MG: 40 TABLET ORAL at 08:50

## 2020-07-04 RX ADMIN — HYDRALAZINE HYDROCHLORIDE 25 MG: 25 TABLET, FILM COATED ORAL at 08:50

## 2020-07-04 RX ADMIN — CHOLECALCIFEROL TAB 25 MCG (1000 UNIT) 2 TABLET: 25 TAB at 08:51

## 2020-07-04 RX ADMIN — REMDESIVIR 200 MG: 100 INJECTION, POWDER, LYOPHILIZED, FOR SOLUTION INTRAVENOUS at 04:13

## 2020-07-04 RX ADMIN — ATORVASTATIN CALCIUM 10 MG: 10 TABLET, FILM COATED ORAL at 21:47

## 2020-07-04 NOTE — ROUTINE PROCESS
Verbal shift change report given to Bronson Goldmann., RN (oncoming nurse) by Anand Emanuel RN (offgoing nurse).  Report included the following information SBAR, Recent Results and Med Rec Status.

## 2020-07-04 NOTE — ROUTINE PROCESS
Verbal shift change report given to Venu Wong RN (oncoming nurse) by Shona Perkins RN (offgoing nurse). Report included the following information SBAR, Intake/Output, Recent Results and Med Rec Status.

## 2020-07-04 NOTE — PROGRESS NOTES
Admit Date: 7/2/2020  Date of Service: 7/4/2020    Reason for follow-up: Shortness of breath      Assessment:         COVID-19 pneumonia  Acute hypoxic respiratory failure secondary to interstitial pneumonitis related to COVID pneumonia  Sleep apnea  Hypertension  Seronegative rheumatoid arthritis: On Plaquenil at home  GERD  Calcium pyrophosphate deposition    Plan:   Continue with Solu-Medrol 60 mg every 12 hours  Continue him to severe through 7/8/2020  Continue Lovenox 90 mg every 12 hours  Continue Norvasc, Lipitor, colchicine, Neurontin, lisinopril, hydralazine  Follow CBC BMP  Reasserted prone positioning and again discussed instructions  Follow ferritin, CRP, LDH, d-dimer daily  We will begin to transition from high flow to nasal cannula. Appreciate pulmonary input-recommendation for usage of CPAP for sleep apnea and for oxygen support are noted. Follow-up blood cultures from 7/2    Upated wife over the phone. All questions answered. Current Antibtiocs:   Azithromycin  Zosyn 7/3  Remdesivir 7/4- present    Lines:   Peripheral    Case discussed with:  [x]Patient  []Family  [x]Nursing  []Case Management  DVT Prophylaxis:  [x]Lovenox  []Hep SQ  []SCDs  []Coumadin   []On Heparin gtt    I have independently examined the patient and reviewed all lab studies and imgaing as well as review of nursing notes and physican notes from the past 24 hours. Emily Wilks D.O. Pager [phone]      Allergies   Allergen Reactions    Latex, Natural Rubber Swelling    Adhesive Tape-Silicones Rash and Itching    Aldactone [Spironolactone] Not Reported This Time     Breast tenderness    Codeine Not Reported This Time     \"Drives crazy\"    Tape [Adhesive] Rash    Tetanus Toxoid, Adsorbed Anaphylaxis    Tetanus Vaccines And Toxoid Anaphylaxis     Other reaction(s): anaphylaxis/angioedema  Other reaction(s): anaphylaxis/angioedema  Other reaction(s): anesthetic shock           Subjective:      Pt seen and examined. Feeling a little better today. Still has ongoing shortness of breath and intermittent cough. Appetite is less than usual.  No new fevers or chills. No nausea vomiting or diarrhea at this time. Objective:        Visit Vitals  /82 (BP 1 Location: Left arm, BP Patient Position: Head of bed elevated (Comment degrees))   Pulse 82   Temp 98.2 °F (36.8 °C)   Resp 18   Ht 5' 8\" (1.727 m)   Wt 89 kg (196 lb 4.8 oz)   SpO2 96%   BMI 29.85 kg/m²     Temp (24hrs), Av.5 °F (36.4 °C), Min:96.9 °F (36.1 °C), Max:98.2 °F (36.8 °C)        General:   awake alert and oriented, non-toxic   Skin:   no rashes or skin lesions noted on limited exam, dry and warm   HEENT:  No scleral icterus or pallor; oral mucosa moist, lips moist   Lymph Nodes:   not assessed today   Lungs:   non, labored; bilaterally clear to aspiration- no crackles wheezes rales or rhonchi   Heart:  RRR, s1 and s2; no murmurs rubs or gallops; no edema, + pedal pulses   Abdomen:  soft, non-distended, active bowel sounds, non-tender   Genitourinary:  deferred   Extremities:   average muscle tone; no contractures, no joint effusions   Neurologic:  No gross focal motor or sensory abnormalities; CN 2-12 intact; Follows commands. Psychiatric:   appropriate and interactive.        Labs: Results:   Chemistry Recent Labs     20  1830   * 106* 116*    143 140   K 3.7 3.2* 3.5    109 106   CO2 24 26 28   BUN 22* 19* 17   CREA 0.66 0.68 0.80   CA 8.6 8.0* 8.8   AGAP 7 8 6   BUCR 33* 28* 21*   *  --  170*   TP 6.3*  --  6.8   ALB 2.4*  --  2.7*   GLOB 3.9  --  4.1*   AGRAT 0.6*  --  0.7*      CBC w/Diff Recent Labs     20  1830   WBC 4.8 6.9 7.3   RBC 4.27* 4.35* 4.55*   HGB 13.9 14.0 14.8   HCT 40.2 40.5 42.6    187 175   GRANS  --   --  86*   LYMPH  --   --  11*   EOS  --   --  0        No results found for: SDES Lab Results   Component Value Date/Time Culture result: NO GROWTH 2 DAYS 07/02/2020 10:15 PM    Culture result: NO GROWTH 2 DAYS 07/02/2020 06:30 PM    Culture result: NO GROWTH 2 DAYS 10/24/2019 07:30 AM    Culture result:  08/13/2018 10:15 AM     NO AEROMONAS,SALMONELLA,SHIGELLA,E. COLI 0:157 OR CAMPYLOBACTER ISOLATED    Culture result: REDUCED GRAM NEGATIVE ENTERIC MICROBIOTA 08/13/2018 10:15 AM    Culture result:  08/13/2018 10:15 AM     Toxigenic C. difficile NEGATIVE                         C. difficile target DNA sequences are not detected. Results     Procedure Component Value Units Date/Time    CULTURE, BLOOD [[id number]] Collected:  07/02/20 2215    Order Status:  Completed Specimen:  Blood Updated:  07/04/20 0634     Special Requests: NO SPECIAL REQUESTS        Culture result: NO GROWTH 2 DAYS       CULTURE, BLOOD [[id number]] Collected:  07/02/20 1830    Order Status:  Completed Specimen:  Blood Updated:  07/04/20 0634     Special Requests: NO SPECIAL REQUESTS        Culture result: NO GROWTH 2 DAYS             Imaging:     Xr Chest Port    Result Date: 7/3/2020  Impression:  1. Similar to minimally increased patchy groundglass airspace opacities.  Mild diffuse prominence of the bronchovascular markings, probably bronchitis or mild pulmonary vascular congestion

## 2020-07-04 NOTE — ROUTINE PROCESS
Verbal shift change report given to Fabrice Wray RN (oncoming nurse) by Melvin Constantino RN 
(offgoing nurse).  Report included the following information SBAR, Intake/Output, MAR, Recent Results and Cardiac Rhythm SR.

## 2020-07-04 NOTE — CONSULTS
[facility]  Pulmonary, Critical Care, and Sleep Medicine    Initial Patient Consult- Telehealth in touch    Name: Juliann Brewster MRN: [id number]   : 1940 Hospital: [address] [place] [address] Dr   Date: 2020      Consent: Juliann Brewster, who was seen by synchronous (real-time) audio-video technology, and/or his healthcare decision maker, is aware that this patient-initiated, Telehealth encounter on 2020 is a billable service, with coverage as determined by his insurance carrier. He is aware that he may receive a bill and has provided verbal consent to proceed: Yes. IMPRESSION:   1. Acute hypoxic respiratory-secondary to bilateral interstitial pneumonitis related to COVID pneumonia  2   COVID-19 pneumonia  3. Severe sleep apnea with AHI of 35  4. Hypertension  5. rheumatoid arthritis-RA negative on hydroxychloroquine  6. Calcium pyrophosphate deposition disease  7. GERD     COVID specific evaluation:    Oxygenation: Nonrebreather mask  Proning-instructed  Flolan, Deep-not indicated  Imaging: CXR, CT-reviewed  Labs: LDH, Ferritin, CRP, d-dimer,  IL-6, ABG, EKG  Pharmacologic treatment:   Current:   Vit C, Zinc, Vit D3, Thiamine, Magnesium, Lovenox, Pepcid  Azithromycin, Hydroxychloroquine, Steroids,   Options- Consent from patient. Investigationals:Remdisivir,  Plasma  Goals of care discussion     RECOMMENDATIONS:   · Oxygen-continue to supplement with to maintain saturation more than 90%. Will switch from nonrebreather to high flow/nasal cannula with airborne precautions  · Consider CPAP as next step for support in a negative pressure room.   Will help with the sleep apnea as well  · Bronchodilators-PRN MDI  · Steroids-agree with Solu-Medrol 60 mg every 12 hours  · Agree with Remdesivir and consent for convalescent plasma  · Continue hydroxychloroquine  · Monitor QTC  · Trend inflammatory markers  · Antibiotics-per ID  · Aspiration precautions  · Discussed need for prone positioning with patient. Instructions given  · Assess home Oxygen needs at discharge. Will also need home CPAP machine-for sleep apnea  · OT, PT, OOB and ambulate  · DVT, PUD prophylaxis  · Will follow      Subjective: This patient has been seen and evaluated at the request of Dr. Fadumo Rothman for acute hypoxic respiratory failure and COVID 19 pneumonia. Patient visited via [email or web address]. Provider present  at [facility]  Patient Located at [facility]       81 years old with multiple medical problems including rheumatoid arthritis on hydroxychloroquine, hypertension, prediabetes, degenerative joint disease and other medical problems  presented to the emergency department on 7/2/20 with chief complaint of having shortness of breath.    He was seen in the ED 6/29 when he tested positive for COVID-19 infection.    He had positive contact coworkers. 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 He was discharged home and advised to present back to the ED if he develop shortness of breath.    He had done a post discharge follow-up virtual visit with his primary care physician as well. His symptoms started 10 days ago on 6/22 with myalgias, fatigue and diarrhea and then he developed a fever and shortness of breath.    He states he has a cough with somewhat congested sounding mucus-minimal expectoration  He denies having chest pain, vomiting or abdominal pain.    He was hypoxic in the 80s upon arrival to the ED and was placed on a nonrebreather mask.    He is admitted for further management.        Patient currently complains of shortness of breath. He has occasional cough with whitish sputum. He states that he has had poor appetite for the last 2 weeks. Patient denies headaches, visual disturbances, sore throat, runny nose, earaches, cp,abdominal pain, diarrhea, burning micturition,or weakness in extremities. He denies back pain/flank pain. He denies recent sick contacts. No h/o recent travel.  No known h/o MRSA colonization or infection in the past.    Positive covid- 19 test on 6/29/20  Now with bilateral increased patchy groundglass airspace opacities noted on CXR 7/2/20  Arterial blood gas-7.43/34 and 70 on nonrebreather mask  Labs on 7/2-ferritin 729, CRP 16.6, , d-dimer 1.35    HPI:    I have reviewed all of the available data including the patient's previous history external records and radiological imaging available for review. He had an echocardiogram (8/26/2019) showing normal LV function (EF 56-60%), no RWMA, unable to estimate RVSP due to inadequate TR, but TV/PV appear normal. He was referred to Dr. Lula Stevens. Lori Mercedes for probable sleep apnea, and underwent a home sleep study on 10/25/2019,showing evidence of severe obstructive sleep apnea with AHI 35 and oxygen guy 80%. In addition applicable cardiology and other lab data were also reviewed. 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 Dr. Tio Murguia. 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 Dr. Dony Mayberry.       Past Surgical History:   Procedure Laterality Date    HX AMPUTATION FINGER Left     index    HX BACK SURGERY  10/23/2019    Right L4-5 hemilaminectomy, medial facetectomy, resection of synovial cyst    HX CARPAL TUNNEL RELEASE Bilateral     HX CATARACT REMOVAL Left 01/2018    HX CATARACT REMOVAL Bilateral 2018    HX COLONOSCOPY      HX HEENT sinus, tonsillectomy    HX HERNIA REPAIR      HX MOHS PROCEDURES      bilateral     HX ORTHOPAEDIC      lef knee surgery, removed cartilage.  HX ROTATOR CUFF REPAIR Right       Prior to Admission medications    Medication Sig Start Date End Date Taking? Authorizing Provider   gabapentin (NEURONTIN) 300 mg capsule Take 1 Cap by mouth three (3) times daily. Max Daily Amount: 900 mg. Indications: neuropathic pain 7/1/20   Sharyle Perla E, NP   tamsulosin (Flomax) 0.4 mg capsule Take 1 Cap by mouth daily. Indications: enlarged prostate with urination problem 6/26/20   Noe Castillo MD   sertraline (ZOLOFT) 50 mg tablet Take 1.5 Tabs by mouth daily. 4/27/20   Noe Castillo MD   atorvastatin (LIPITOR) 10 mg tablet TAKE 1 TABLET BY MOUTH ONCE DAILY 4/24/20   Noe Castillo MD   celecoxib (CELEBREX) 200 mg capsule TAKE 1 CAPSULE BY MOUTH ONCE DAILY 3/2/20   Noe Castillo MD   lisinopril (PRINIVIL, ZESTRIL) 40 mg tablet TAKE 1 TABLET BY MOUTH DAILY 2/17/20   Noe Castillo MD   omeprazole (PRILOSEC) 20 mg capsule TAKE 1 CAPSULE BY MOUTH ONCE DAILY 11/25/19   Noe Castillo MD   mineral oil liquid Take 30 mL by mouth daily as needed for Constipation. Provider, Historical   amLODIPine (NORVASC) 10 mg tablet TAKE 1 TABLET BY MOUTH ONCE DAILY 10/10/19   Noe Castillo MD   hydrALAZINE (APRESOLINE) 25 mg tablet Take 1 Tab by mouth two (2) times a day. 9/30/19   Noe Castillo MD   furosemide (LASIX) 40 mg tablet Take 1 Tab by mouth daily. 5/6/19   Noe Castillo MD   potassium chloride (K-DUR, KLOR-CON) 20 mEq tablet Take 1 Tab by mouth daily. 8/10/17   Noe Castillo MD   cycloSPORINE (RESTASIS) 0.05 % ophthalmic emulsion Administer 1 Drop to both eyes nightly. Provider, Historical   colchicine (MITIGARE) 0.6 mg capsule Take 0.6 mg by mouth every other day.     Provider, Historical   cholecalciferol, vitamin D3, (VITAMIN D3) 2,000 unit tab Take 2,000 Units by mouth daily.    Provider, Historical   diclofenac (VOLTAREN) 1 % topical gel Apply 4 g to affected area four (4) times daily. Provider, Historical   LUMIGAN 0.01 % ophthalmic drops Administer 1 Drop to both eyes nightly. 5/19/14   Provider, Historical   MULTIVITS/IRON FUM/FA/D3/LYCOP (MULTI FOR HIM PO) Take  by mouth daily.     Provider, Historical     Allergies   Allergen Reactions    Latex, Natural Rubber Swelling    Adhesive Tape-Silicones Rash and Itching    Aldactone [Spironolactone] Not Reported This Time     Breast tenderness    Codeine Not Reported This Time     \"Drives crazy\"    Tape [Adhesive] Rash    Tetanus Toxoid, Adsorbed Anaphylaxis    Tetanus Vaccines And Toxoid Anaphylaxis     Other reaction(s): anaphylaxis/angioedema  Other reaction(s): anaphylaxis/angioedema  Other reaction(s): anesthetic shock      Social History     Tobacco Use    Smoking status: Former Smoker     Years: 12.00     Types: Cigars, Pipe, Cigarettes    Smokeless tobacco: Former User     Types: Chew   Substance Use Topics    Alcohol use: Yes     Comment: rarely      Family History   Problem Relation Age of Onset    Cancer Mother     Heart Disease Father     Alcohol abuse Father     Cancer Other         Current Facility-Administered Medications   Medication Dose Route Frequency    methylPREDNISolone (PF) (SOLU-MEDROL) injection 60 mg  60 mg IntraVENous Q12H    melatonin tablet 6 mg  6 mg Oral QHS    azithromycin (ZITHROMAX) 500 mg in  mL  500 mg IntraVENous Q24H    [START ON 7/5/2020] remdesivir 100 mg in 0.9% sodium chloride 250 mL IVPB  100 mg IntraVENous Q24H    enoxaparin (LOVENOX) injection 90 mg  90 mg SubCUTAneous Q12H    cholecalciferol (VITAMIN D3) (1000 Units /25 mcg) tablet 2 Tab  2,000 Units Oral DAILY    colchicine (MITIGARE) capsule 0.6 mg  0.6 mg Oral EVERY OTHER DAY    cycloSPORINE (RESTASIS) 0.05 % ophthalmic emulsion 1 Drop  1 Drop Both Eyes QHS    hydrALAZINE (APRESOLINE) tablet 25 mg  25 mg Oral BID    amLODIPine (NORVASC) tablet 10 mg  10 mg Oral DAILY    lisinopriL (PRINIVIL, ZESTRIL) tablet 40 mg  40 mg Oral DAILY    atorvastatin (LIPITOR) tablet 10 mg  10 mg Oral QHS    sertraline (ZOLOFT) tablet 75 mg  75 mg Oral DAILY    tamsulosin (FLOMAX) capsule 0.4 mg  0.4 mg Oral DAILY    gabapentin (NEURONTIN) capsule 300 mg  300 mg Oral TID    celecoxib (CELEBREX) capsule 200 mg  200 mg Oral DAILY    pantoprazole (PROTONIX) tablet 40 mg  40 mg Oral ACB       Review of Systems:  A comprehensive review of systems was negative except for that written in the HPI. Objective:   Vital Signs:    Visit Vitals  /80 (BP 1 Location: Left arm, BP Patient Position: At rest)   Pulse 76   Temp 97.5 °F (36.4 °C)   Resp 18   Ht 5' 8\" (1.727 m)   Wt 89 kg (196 lb 4.8 oz)   SpO2 95%   BMI 29.85 kg/m²       O2 Device: Non-rebreather mask   O2 Flow Rate (L/min): 12 l/min   Temp (24hrs), Av.4 °F (36.3 °C), Min:96.9 °F (36.1 °C), Max:97.9 °F (36.6 °C)       Intake/Output:   Last shift:      No intake/output data recorded. Last 3 shifts:  1901 -  0700  In: 690 [P.O.:240; I.V.:450]  Out: 840 [Urine:840]    Intake/Output Summary (Last 24 hours) at 2020 0857  Last data filed at 2020 0413  Gross per 24 hour   Intake 690 ml   Output 840 ml   Net -150 ml      Physical Exam: Limited due to tele visit with stethoscope capability  General:  Alert, cooperative, no distress, appears stated age. Head:  Normocephalic, without obvious abnormality, atraumatic. Eyes:  Conjunctivae/corneas clear. PERRL, EOMs intact. Throat: Lips, mucosa, and tongue normal. Teeth and gums normal.   Neck: Supple, symmetrical, trachea midline, no JVD. Thyroid-not visibly enlarged   Lungs:    Scattered basal rales   Chest wall:  No deformity. Heart:  Regular rate and rhythm, S1, S2 normal, no murmur, click, rub or gallop. Abdomen:   Examined with Nursing assistance-Soft, non-tender.     Extremities: Extremities normal, atraumatic, no cyanosis or edema. Skin: Skin color normal. No rashes or lesions   Neurologic: Grossly nonfocal     Data review:     Recent Results (from the past 24 hour(s))   PROCALCITONIN    Collection Time: 07/03/20  8:54 PM   Result Value Ref Range    Procalcitonin 0.14 ng/mL   CBC W/O DIFF    Collection Time: 07/04/20  4:30 AM   Result Value Ref Range    WBC 4.8 4.6 - 13.2 K/uL    RBC 4.27 (L) 4.70 - 5.50 M/uL    HGB 13.9 13.0 - 16.0 g/dL    HCT 40.2 36.0 - 48.0 %    MCV 94.1 74.0 - 97.0 FL    MCH 32.6 24.0 - 34.0 PG    MCHC 34.6 31.0 - 37.0 g/dL    RDW 12.9 11.6 - 14.5 %    PLATELET 835 839 - 618 K/uL    MPV 9.9 9.2 - 78.8 FL   METABOLIC PANEL, COMPREHENSIVE    Collection Time: 07/04/20  4:30 AM   Result Value Ref Range    Sodium 142 136 - 145 mmol/L    Potassium 3.7 3.5 - 5.5 mmol/L    Chloride 111 100 - 111 mmol/L    CO2 24 21 - 32 mmol/L    Anion gap 7 3.0 - 18 mmol/L    Glucose 148 (H) 74 - 99 mg/dL    BUN 22 (H) 7.0 - 18 MG/DL    Creatinine 0.66 0.6 - 1.3 MG/DL    BUN/Creatinine ratio 33 (H) 12 - 20      GFR est AA >60 >60 ml/min/1.73m2    GFR est non-AA >60 >60 ml/min/1.73m2    Calcium 8.6 8.5 - 10.1 MG/DL    Bilirubin, total 0.6 0.2 - 1.0 MG/DL    ALT (SGPT) 79 (H) 16 - 61 U/L    AST (SGOT) 65 (H) 10 - 38 U/L    Alk. phosphatase 174 (H) 45 - 117 U/L    Protein, total 6.3 (L) 6.4 - 8.2 g/dL    Albumin 2.4 (L) 3.4 - 5.0 g/dL    Globulin 3.9 2.0 - 4.0 g/dL    A-G Ratio 0.6 (L) 0.8 - 1.7         Imaging:  I have personally reviewed the patients radiographs and have reviewed the reports:  XR Results (most recent):  Results from Hospital Encounter encounter on 07/02/20   XR CHEST PORT    Narrative AP CHEST, PORTABLE    INDICATION: Shortness of breath. Covid 19 infection/pneumonia    COMPARISON: Prior chest x-rays, most recent 6/29/2020    FINDINGS:  EKG leads overlie the patient. The cardiac silhouette is normal in size. Mild diffuse prominence of the  bronchovascular markings.  Similar to minimally increased patchy groundglass  opacities. No pleural effusion or pneumothorax No acute osseous abnormalities  are identified. Impression Impression:      1. Similar to minimally increased patchy groundglass airspace opacities. Mild  diffuse prominence of the bronchovascular markings, probably bronchitis or mild  pulmonary vascular congestion                   CT Results (most recent):  Results from Hospital Encounter encounter on 08/31/19   CT CHEST W CONT    Narrative CT CHEST WITH ENHANCEMENT    INDICATION: Lung nodule seen on imaging study, abnormal chest x-ray. TECHNIQUE: CT images obtained from the thoracic inlet to the level of the  diaphragm following uneventful administration of 80 mL Isovue 300 nonionic  intravenous contrast. Axial, coronal and sagittal reformats were obtained. All CT scans at this facility are performed using dose optimization technique as  appropriate to the performed exam, to include automated exposure control,  adjustment of the mA and/or kV according to patient's size (Including  appropriate matching for site-specific examinations), or use of iterative  reconstruction technique. COMPARISON: CT chest 2/28/2007. CHEST FINDINGS:     Thyroid/Base Of Neck:  Unremarkable  . Lungs:   No acute infiltrates are evident. .   No mass lesions are seen. .  Trachea and central bronchi are clear. .  Stable 4 mm chronic pleural based nodule left posterior right upper lobe (17). Also stable 2 mm nodule posterior right upper lobe (21). Stable 2-3 mm nodule  subpleural left upper lobe (14) and 2 mm nodule anterior inferior left upper  lobe (23)    Pleural Spaces: There is no pneumothorax or pleural fluid evident. No pleural plaques are seen    Lymph Nodes:   Axillae: No enlargement. Mediastinum / Janina: No enlargement. Mediastinum, Great Vessels And Heart: The heart is normal in size. No pericardial effusion. No aortic aneurysm.   Moderate calcified plaques in the aortic arch and descending aorta. .    Abdomen Structures Included: The included portions of the liver and spleen are unremarkable. .    Osseous Structures:   No destructive osseous process. Degenerative changes in the shoulders. Thoracic  spondylosis. Impression IMPRESSION:     1. No evidence of pulmonary mass. Several stable tiny bilateral pulmonary  nodules as discussed.  -The reported abnormal chest x-ray is not available for direct comparison. 2. Atherosclerosis. 3. Degenerative changes in the spine and shoulders. 08/26/19   ECHO ADULT COMPLETE 08/26/2019 8/26/2019    Narrative · Left Ventricle: Normal cavity size, wall thickness, systolic function   (ejection fraction normal) and diastolic function. Estimated left   ventricular ejection fraction is 56 - 60%. Visually measured ejection   fraction. No regional wall motion abnormality noted. · Tricuspid regurgitation is inadequate for estimation of right   ventricular systolic pressure. Signed by: Demetra Bernheim, MD         Maco Patricia is a 68 y.o. male being evaluated by a Virtual Visit (video visit) encounter to address concerns as mentioned above. A caregiver was present when appropriate. Due to this being a TeleHealth encounter (During XSR-06 public health emergency), evaluation of the following organ systems was limited: Vitals/Constitutional/EENT/Resp/CV/GI//MS/Neuro/Skin/Heme-Lymph-Imm. Pursuant to the emergency declaration under the [address] [place] [address] authority and the [employer] and [employer] Act, this Virtual Visit was conducted with patient's (and/or legal guardian's) consent, to reduce the risk of exposure to COVID-19 ,preserve PPE and provide necessary medical care. Services were provided through a video synchronous discussion virtually to substitute for in-person encounter.     --Jesús Farmer MD on 7/4/2020 at 8:57 AM    An electronic signature was used to authenticate this note. Complex decision making was made in the evaluation and management plans during this consultation. More than 50% of time was spent in counseling and coordination of care including review of data and discussion with other team members.          Kimberly Ray MD

## 2020-07-04 NOTE — PROGRESS NOTES
Interim events noted. Patient is afebrile, hemodynamically stable. Improved oxygenation. Currently on 6 L oxygen. Labs reviewed. D-dimer 1.32 on 7/3, 1.35 on 7/2  LFTs and renal function preserved  Tolerating remdesivir  Rec:  1. Continue remdesivir, high dose solumedrol, azithromycin  2. Add on CRP to today's labs  3. Monitor daily ferritin, CRP, LDH, d-dimer, procalcitonin  4. Decrease Lovenox to 40 mg subcu every 12 hours  5.   Per pulmonary recommendations regarding weaning oxygen    Time spent greater than 20 minutes

## 2020-07-04 NOTE — PROGRESS NOTES
Problem: Breathing Pattern - Ineffective  Goal: *Absence of hypoxia  Outcome: Progressing Towards Goal  Goal: *Use of effective breathing techniques  Outcome: Progressing Towards Goal     Problem: Pain  Goal: *Control of Pain  Outcome: Progressing Towards Goal  Goal: *PALLIATIVE CARE:  Alleviation of Pain  Outcome: Progressing Towards Goal     Problem: Falls - Risk of  Goal: *Absence of Falls  Description: Document Leyda Fall Risk and appropriate interventions in the flowsheet.   Outcome: Progressing Towards Goal  Note: Fall Risk Interventions:

## 2020-07-04 NOTE — PROGRESS NOTES
Problem: Breathing Pattern - Ineffective  Goal: *Absence of hypoxia  Outcome: Progressing Towards Goal  Goal: *Use of effective breathing techniques  Outcome: Progressing Towards Goal     Problem: Falls - Risk of  Goal: *Absence of Falls  Description: Document Leyda Fall Risk and appropriate interventions in the flowsheet.   Outcome: Progressing Towards Goal  Note: Fall Risk Interventions:            Medication Interventions: Teach patient to arise slowly

## 2020-07-05 LAB
ALBUMIN SERPL-MCNC: 2.3 G/DL (ref 3.4–5)
ALBUMIN/GLOB SERPL: 0.6 {RATIO} (ref 0.8–1.7)
ALP SERPL-CCNC: 186 U/L (ref 45–117)
ALT SERPL-CCNC: 109 U/L (ref 16–61)
ANION GAP SERPL CALC-SCNC: 6 MMOL/L (ref 3–18)
AST SERPL-CCNC: 74 U/L (ref 10–38)
BILIRUB SERPL-MCNC: 0.5 MG/DL (ref 0.2–1)
BUN SERPL-MCNC: 23 MG/DL (ref 7–18)
BUN/CREAT SERPL: 34 (ref 12–20)
CALCIUM SERPL-MCNC: 8.5 MG/DL (ref 8.5–10.1)
CHLORIDE SERPL-SCNC: 113 MMOL/L (ref 100–111)
CO2 SERPL-SCNC: 26 MMOL/L (ref 21–32)
CREAT SERPL-MCNC: 0.68 MG/DL (ref 0.6–1.3)
CRP SERPL-MCNC: 5.9 MG/DL (ref 0–0.3)
D DIMER PPP FEU-MCNC: 0.46 UG/ML(FEU)
ERYTHROCYTE [DISTWIDTH] IN BLOOD BY AUTOMATED COUNT: 12.8 % (ref 11.6–14.5)
EST. AVERAGE GLUCOSE BLD GHB EST-MCNC: 128 MG/DL
FERRITIN SERPL-MCNC: 684 NG/ML (ref 8–388)
GLOBULIN SER CALC-MCNC: 3.6 G/DL (ref 2–4)
GLUCOSE BLD STRIP.AUTO-MCNC: 138 MG/DL (ref 70–110)
GLUCOSE BLD STRIP.AUTO-MCNC: 202 MG/DL (ref 70–110)
GLUCOSE SERPL-MCNC: 168 MG/DL (ref 74–99)
HBA1C MFR BLD: 6.1 % (ref 4.2–5.6)
HCT VFR BLD AUTO: 38.7 % (ref 36–48)
HGB BLD-MCNC: 13 G/DL (ref 13–16)
LDH SERPL L TO P-CCNC: 386 U/L (ref 81–234)
MCH RBC QN AUTO: 31.7 PG (ref 24–34)
MCHC RBC AUTO-ENTMCNC: 33.6 G/DL (ref 31–37)
MCV RBC AUTO: 94.4 FL (ref 74–97)
PLATELET # BLD AUTO: 241 K/UL (ref 135–420)
PMV BLD AUTO: 9.9 FL (ref 9.2–11.8)
POTASSIUM SERPL-SCNC: 3.8 MMOL/L (ref 3.5–5.5)
PROT SERPL-MCNC: 5.9 G/DL (ref 6.4–8.2)
RBC # BLD AUTO: 4.1 M/UL (ref 4.7–5.5)
SODIUM SERPL-SCNC: 145 MMOL/L (ref 136–145)
WBC # BLD AUTO: 13.7 K/UL (ref 4.6–13.2)

## 2020-07-05 PROCEDURE — 74011250637 HC RX REV CODE- 250/637: Performed by: INTERNAL MEDICINE

## 2020-07-05 PROCEDURE — 86140 C-REACTIVE PROTEIN: CPT

## 2020-07-05 PROCEDURE — 65660000000 HC RM CCU STEPDOWN

## 2020-07-05 PROCEDURE — 77010033711 HC HIGH FLOW OXYGEN

## 2020-07-05 PROCEDURE — 83036 HEMOGLOBIN GLYCOSYLATED A1C: CPT

## 2020-07-05 PROCEDURE — 85027 COMPLETE CBC AUTOMATED: CPT

## 2020-07-05 PROCEDURE — 83615 LACTATE (LD) (LDH) ENZYME: CPT

## 2020-07-05 PROCEDURE — 74011250636 HC RX REV CODE- 250/636: Performed by: INTERNAL MEDICINE

## 2020-07-05 PROCEDURE — 82728 ASSAY OF FERRITIN: CPT

## 2020-07-05 PROCEDURE — 74011000258 HC RX REV CODE- 258: Performed by: INTERNAL MEDICINE

## 2020-07-05 PROCEDURE — 85379 FIBRIN DEGRADATION QUANT: CPT

## 2020-07-05 PROCEDURE — 36415 COLL VENOUS BLD VENIPUNCTURE: CPT

## 2020-07-05 PROCEDURE — 74011000250 HC RX REV CODE- 250: Performed by: INTERNAL MEDICINE

## 2020-07-05 PROCEDURE — 80053 COMPREHEN METABOLIC PANEL: CPT

## 2020-07-05 PROCEDURE — 74011636637 HC RX REV CODE- 636/637: Performed by: INTERNAL MEDICINE

## 2020-07-05 PROCEDURE — 82962 GLUCOSE BLOOD TEST: CPT

## 2020-07-05 RX ORDER — POLYETHYLENE GLYCOL 3350 17 G/17G
17 POWDER, FOR SOLUTION ORAL DAILY
Status: DISCONTINUED | OUTPATIENT
Start: 2020-07-06 | End: 2020-07-09 | Stop reason: HOSPADM

## 2020-07-05 RX ORDER — INSULIN LISPRO 100 [IU]/ML
INJECTION, SOLUTION INTRAVENOUS; SUBCUTANEOUS
Status: DISCONTINUED | OUTPATIENT
Start: 2020-07-05 | End: 2020-07-09 | Stop reason: HOSPADM

## 2020-07-05 RX ORDER — DEXTROSE MONOHYDRATE 100 MG/ML
125-250 INJECTION, SOLUTION INTRAVENOUS AS NEEDED
Status: DISCONTINUED | OUTPATIENT
Start: 2020-07-05 | End: 2020-07-09 | Stop reason: HOSPADM

## 2020-07-05 RX ORDER — MAGNESIUM SULFATE 100 %
4 CRYSTALS MISCELLANEOUS AS NEEDED
Status: DISCONTINUED | OUTPATIENT
Start: 2020-07-05 | End: 2020-07-09 | Stop reason: HOSPADM

## 2020-07-05 RX ADMIN — AZITHROMYCIN MONOHYDRATE 500 MG: 500 INJECTION, POWDER, LYOPHILIZED, FOR SOLUTION INTRAVENOUS at 15:24

## 2020-07-05 RX ADMIN — METHYLPREDNISOLONE SODIUM SUCCINATE 60 MG: 125 INJECTION, POWDER, FOR SOLUTION INTRAMUSCULAR; INTRAVENOUS at 21:57

## 2020-07-05 RX ADMIN — LISINOPRIL 40 MG: 40 TABLET ORAL at 09:28

## 2020-07-05 RX ADMIN — HYDRALAZINE HYDROCHLORIDE 25 MG: 25 TABLET, FILM COATED ORAL at 18:00

## 2020-07-05 RX ADMIN — ATORVASTATIN CALCIUM 10 MG: 10 TABLET, FILM COATED ORAL at 21:57

## 2020-07-05 RX ADMIN — ENOXAPARIN SODIUM 40 MG: 100 INJECTION SUBCUTANEOUS at 04:04

## 2020-07-05 RX ADMIN — CHOLECALCIFEROL TAB 25 MCG (1000 UNIT) 2 TABLET: 25 TAB at 09:28

## 2020-07-05 RX ADMIN — PANTOPRAZOLE SODIUM 40 MG: 40 TABLET, DELAYED RELEASE ORAL at 09:28

## 2020-07-05 RX ADMIN — GABAPENTIN 300 MG: 300 CAPSULE ORAL at 16:00

## 2020-07-05 RX ADMIN — INSULIN LISPRO 4 UNITS: 100 INJECTION, SOLUTION INTRAVENOUS; SUBCUTANEOUS at 16:30

## 2020-07-05 RX ADMIN — AMLODIPINE BESYLATE 10 MG: 10 TABLET ORAL at 09:28

## 2020-07-05 RX ADMIN — CELECOXIB 200 MG: 100 CAPSULE ORAL at 09:28

## 2020-07-05 RX ADMIN — GABAPENTIN 300 MG: 300 CAPSULE ORAL at 09:28

## 2020-07-05 RX ADMIN — MELATONIN 6 MG: at 21:58

## 2020-07-05 RX ADMIN — SERTRALINE HYDROCHLORIDE 75 MG: 50 TABLET ORAL at 09:27

## 2020-07-05 RX ADMIN — CYCLOSPORINE 1 DROP: 0.5 EMULSION OPHTHALMIC at 21:58

## 2020-07-05 RX ADMIN — HYDRALAZINE HYDROCHLORIDE 25 MG: 25 TABLET, FILM COATED ORAL at 09:28

## 2020-07-05 RX ADMIN — GABAPENTIN 300 MG: 300 CAPSULE ORAL at 21:57

## 2020-07-05 RX ADMIN — TAMSULOSIN HYDROCHLORIDE 0.4 MG: 0.4 CAPSULE ORAL at 09:28

## 2020-07-05 RX ADMIN — ENOXAPARIN SODIUM 40 MG: 100 INJECTION SUBCUTANEOUS at 16:00

## 2020-07-05 RX ADMIN — REMDESIVIR 100 MG: 100 INJECTION, POWDER, LYOPHILIZED, FOR SOLUTION INTRAVENOUS at 04:05

## 2020-07-05 RX ADMIN — METHYLPREDNISOLONE SODIUM SUCCINATE 60 MG: 125 INJECTION, POWDER, FOR SOLUTION INTRAMUSCULAR; INTRAVENOUS at 09:28

## 2020-07-05 NOTE — ROUTINE PROCESS
Bedside and Verbal shift change report given to Yesi Wheeler (oncoming nurse) by Abby Kimball (offgoing nurse). Report included the following information SBAR, Kardex, Intake/Output, MAR and Cardiac Rhythm NSR.

## 2020-07-05 NOTE — PROGRESS NOTES
Problem: Breathing Pattern - Ineffective  Goal: *Absence of hypoxia  Outcome: Progressing Towards Goal  Goal: *Use of effective breathing techniques  Outcome: Progressing Towards Goal     Problem: Pain  Goal: *Control of Pain  Outcome: Progressing Towards Goal  Goal: *PALLIATIVE CARE:  Alleviation of Pain  Outcome: Progressing Towards Goal     Problem: Falls - Risk of  Goal: *Absence of Falls  Description: Document Leyda Fall Risk and appropriate interventions in the flowsheet.   Outcome: Progressing Towards Goal  Note: Fall Risk Interventions:            Medication Interventions: Evaluate medications/consider consulting pharmacy

## 2020-07-05 NOTE — PROGRESS NOTES
Admit Date: 7/2/2020  Date of Service: 7/5/2020    Reason for follow-up: Shortness of breath      Assessment:         COVID-19 pneumonia: on high flow 10L/min   - LFT mildly elevated, LD trending down, ferritin trending down, CRP trending down, troponin negative; d-dimer trending down. Acute hypoxic respiratory failure secondary to interstitial pneumonitis related to COVID pneumonia  Sleep apnea  Hypertension: stable  Seronegative rheumatoid arthritis: On Plaquenil at home  GERD  Calcium pyrophosphate deposition    Plan:   Continue with Solu-Medrol 60 mg every 12 hours  Continue him to severe through 7/8/2020  Continue Lovenox 90 mg every 12 hours  Continue Norvasc, Lipitor, colchicine, Neurontin, lisinopril, hydralazine  Follow CBC BMP  Reasserted prone positioning and again discussed instructions  Follow ferritin, CRP, LDH, d-dimer daily   transition from high flow to nasal cannula as tolerated. Appreciate pulmonary input-recommendation for usage of CPAP for sleep apnea and for oxygen support are noted. Follow-up blood cultures from 7/2  Add accuchecks and SSI given IV steroid usage. Upated wife over the phone. All questions answered. Current Antibtiocs:   Azithromycin  Zosyn 7/3  Remdesivir 7/4- present    Lines:   Peripheral    Case discussed with:  [x]Patient  []Family  [x]Nursing  []Case Management  DVT Prophylaxis:  [x]Lovenox  []Hep SQ  []SCDs  []Coumadin   []On Heparin gtt    I have independently examined the patient and reviewed all lab studies and imgaing as well as review of nursing notes and physican notes from the past 24 hours. Svetlana Ortiz D.O.   Pager [phone]      Allergies   Allergen Reactions    Latex, Natural Rubber Swelling    Adhesive Tape-Silicones Rash and Itching    Aldactone [Spironolactone] Not Reported This Time     Breast tenderness    Codeine Not Reported This Time     \"Drives crazy\"    Tape [Adhesive] Rash    Tetanus Toxoid, Adsorbed Anaphylaxis    Tetanus Vaccines And Toxoid Anaphylaxis     Other reaction(s): anaphylaxis/angioedema  Other reaction(s): anaphylaxis/angioedema  Other reaction(s): anesthetic shock           Subjective:      Pt seen and examined. Feeling a better today. Still has  shortness of breath and intermittent cough. No new fevers or chills. No nausea vomiting or diarrhea at this time. Objective:        Visit Vitals  /81 (BP 1 Location: Left arm, BP Patient Position: At rest)   Pulse 68   Temp 97.6 °F (36.4 °C)   Resp 18   Ht 5' 8\" (1.727 m)   Wt 87.3 kg (192 lb 8 oz)   SpO2 98%   BMI 29.27 kg/m²     Temp (24hrs), Av.5 °F (36.4 °C), Min:97 °F (36.1 °C), Max:98.2 °F (36.8 °C)        General:   awake alert and oriented, non-toxic   Skin:   no rashes or skin lesions noted on limited exam, dry and warm   HEENT:  No scleral icterus or pallor; oral mucosa moist, lips moist   Lymph Nodes:   not assessed today   Lungs:   non, labored; bilaterally clear to aspiration- no crackles wheezes rales or rhonchi   Heart:  RRR, s1 and s2; no murmurs rubs or gallops; no edema, + pedal pulses   Abdomen:  soft, non-distended, active bowel sounds, non-tender   Genitourinary:  deferred   Extremities:   average muscle tone; no contractures, no joint effusions   Neurologic:  No gross focal motor or sensory abnormalities; CN 2-12 intact; Follows commands. Psychiatric:   appropriate and interactive.        Labs: Results:   Chemistry Recent Labs     20  0442 20  0430 20  0425 20  1830   * 148* 106* 116*    142 143 140   K 3.8 3.7 3.2* 3.5   * 111 109 106   CO2 26 24 26 28   BUN 23* 22* 19* 17   CREA 0.68 0.66 0.68 0.80   CA 8.5 8.6 8.0* 8.8   AGAP 6 7 8 6   BUCR 34* 33* 28* 21*   * 174*  --  170*   TP 5.9* 6.3*  --  6.8   ALB 2.3* 2.4*  --  2.7*   GLOB 3.6 3.9  --  4.1*   AGRAT 0.6* 0.6*  --  0.7*      CBC w/Diff Recent Labs     20  0442 20  0430 20  0425 20  1830   WBC 13.7* 4.8 6.9 7.3   RBC 4.10* 4.27* 4.35* 4.55*   HGB 13.0 13.9 14.0 14.8   HCT 38.7 40.2 40.5 42.6    215 187 175   GRANS  --   --   --  86*   LYMPH  --   --   --  11*   EOS  --   --   --  0        No results found for: SDES Lab Results   Component Value Date/Time    Culture result: NO GROWTH 3 DAYS 07/02/2020 10:15 PM    Culture result: NO GROWTH 3 DAYS 07/02/2020 06:30 PM    Culture result: NO GROWTH 2 DAYS 10/24/2019 07:30 AM    Culture result:  08/13/2018 10:15 AM     NO AEROMONAS,SALMONELLA,SHIGELLA,E. COLI 0:157 OR CAMPYLOBACTER ISOLATED    Culture result: REDUCED GRAM NEGATIVE ENTERIC MICROBIOTA 08/13/2018 10:15 AM    Culture result:  08/13/2018 10:15 AM     Toxigenic C. difficile NEGATIVE                         C. difficile target DNA sequences are not detected. Results     Procedure Component Value Units Date/Time    CULTURE, BLOOD [[id number]] Collected:  07/02/20 2215    Order Status:  Completed Specimen:  Blood Updated:  07/05/20 0621     Special Requests: NO SPECIAL REQUESTS        Culture result: NO GROWTH 3 DAYS       CULTURE, BLOOD [[id number]] Collected:  07/02/20 1830    Order Status:  Completed Specimen:  Blood Updated:  07/05/20 0621     Special Requests: NO SPECIAL REQUESTS        Culture result: NO GROWTH 3 DAYS             Imaging:     Xr Chest Port    Result Date: 7/3/2020  Impression:  1. Similar to minimally increased patchy groundglass airspace opacities.  Mild diffuse prominence of the bronchovascular markings, probably bronchitis or mild pulmonary vascular congestion

## 2020-07-05 NOTE — PROGRESS NOTES
Bedside and Verbal shift change report given to Christopher 91Dutch (oncoming nurse) by Vickie Eduardo   (offgoing nurse). Report included the following information SBAR, Kardex, ED Summary, Procedure Summary, Intake/Output and MAR.          Visit Vitals  /78 (BP 1 Location: Left arm, BP Patient Position: At rest)   Pulse 69   Temp 97.2 °F (36.2 °C)   Resp 18   Ht 5' 8\" (1.727 m)   Wt 87.3 kg (192 lb 8 oz)   SpO2 96%   BMI 29.27 kg/m²

## 2020-07-05 NOTE — ROUTINE PROCESS
Bedside and Verbal shift change report given to Melly Christian (oncoming nurse) by  Karin olivo). Report included the following information SBAR, Kardex and MAR.

## 2020-07-06 LAB
ALBUMIN SERPL-MCNC: 2.3 G/DL (ref 3.4–5)
ALBUMIN/GLOB SERPL: 0.7 {RATIO} (ref 0.8–1.7)
ALP SERPL-CCNC: 158 U/L (ref 45–117)
ALT SERPL-CCNC: 124 U/L (ref 16–61)
ANION GAP SERPL CALC-SCNC: 6 MMOL/L (ref 3–18)
AST SERPL-CCNC: 67 U/L (ref 10–38)
BILIRUB SERPL-MCNC: 0.5 MG/DL (ref 0.2–1)
BUN SERPL-MCNC: 21 MG/DL (ref 7–18)
BUN/CREAT SERPL: 31 (ref 12–20)
CALCIUM SERPL-MCNC: 8.4 MG/DL (ref 8.5–10.1)
CHLORIDE SERPL-SCNC: 115 MMOL/L (ref 100–111)
CO2 SERPL-SCNC: 23 MMOL/L (ref 21–32)
CREAT SERPL-MCNC: 0.68 MG/DL (ref 0.6–1.3)
CRP SERPL-MCNC: 2.9 MG/DL (ref 0–0.3)
D DIMER PPP FEU-MCNC: 0.51 UG/ML(FEU)
ERYTHROCYTE [DISTWIDTH] IN BLOOD BY AUTOMATED COUNT: 12.7 % (ref 11.6–14.5)
FERRITIN SERPL-MCNC: 537 NG/ML (ref 8–388)
GLOBULIN SER CALC-MCNC: 3.4 G/DL (ref 2–4)
GLUCOSE BLD STRIP.AUTO-MCNC: 125 MG/DL (ref 70–110)
GLUCOSE BLD STRIP.AUTO-MCNC: 144 MG/DL (ref 70–110)
GLUCOSE BLD STRIP.AUTO-MCNC: 345 MG/DL (ref 70–110)
GLUCOSE SERPL-MCNC: 173 MG/DL (ref 74–99)
HCT VFR BLD AUTO: 38 % (ref 36–48)
HGB BLD-MCNC: 13.2 G/DL (ref 13–16)
LDH SERPL L TO P-CCNC: 371 U/L (ref 81–234)
MCH RBC QN AUTO: 32.7 PG (ref 24–34)
MCHC RBC AUTO-ENTMCNC: 34.7 G/DL (ref 31–37)
MCV RBC AUTO: 94.1 FL (ref 74–97)
PLATELET # BLD AUTO: 272 K/UL (ref 135–420)
PMV BLD AUTO: 9.8 FL (ref 9.2–11.8)
POTASSIUM SERPL-SCNC: 4 MMOL/L (ref 3.5–5.5)
PROT SERPL-MCNC: 5.7 G/DL (ref 6.4–8.2)
RBC # BLD AUTO: 4.04 M/UL (ref 4.7–5.5)
SODIUM SERPL-SCNC: 144 MMOL/L (ref 136–145)
WBC # BLD AUTO: 14.4 K/UL (ref 4.6–13.2)

## 2020-07-06 PROCEDURE — 74011250637 HC RX REV CODE- 250/637: Performed by: INTERNAL MEDICINE

## 2020-07-06 PROCEDURE — 77010033711 HC HIGH FLOW OXYGEN

## 2020-07-06 PROCEDURE — 82728 ASSAY OF FERRITIN: CPT

## 2020-07-06 PROCEDURE — 80053 COMPREHEN METABOLIC PANEL: CPT

## 2020-07-06 PROCEDURE — 65660000000 HC RM CCU STEPDOWN

## 2020-07-06 PROCEDURE — 82962 GLUCOSE BLOOD TEST: CPT

## 2020-07-06 PROCEDURE — 74011000250 HC RX REV CODE- 250: Performed by: INTERNAL MEDICINE

## 2020-07-06 PROCEDURE — 85379 FIBRIN DEGRADATION QUANT: CPT

## 2020-07-06 PROCEDURE — 74011250636 HC RX REV CODE- 250/636: Performed by: INTERNAL MEDICINE

## 2020-07-06 PROCEDURE — 74011636637 HC RX REV CODE- 636/637: Performed by: INTERNAL MEDICINE

## 2020-07-06 PROCEDURE — 83615 LACTATE (LD) (LDH) ENZYME: CPT

## 2020-07-06 PROCEDURE — 94762 N-INVAS EAR/PLS OXIMTRY CONT: CPT

## 2020-07-06 PROCEDURE — 74011000258 HC RX REV CODE- 258: Performed by: INTERNAL MEDICINE

## 2020-07-06 PROCEDURE — 86140 C-REACTIVE PROTEIN: CPT

## 2020-07-06 PROCEDURE — 36415 COLL VENOUS BLD VENIPUNCTURE: CPT

## 2020-07-06 PROCEDURE — 85027 COMPLETE CBC AUTOMATED: CPT

## 2020-07-06 RX ORDER — INSULIN GLARGINE 100 [IU]/ML
5 INJECTION, SOLUTION SUBCUTANEOUS DAILY
Status: DISCONTINUED | OUTPATIENT
Start: 2020-07-06 | End: 2020-07-09 | Stop reason: HOSPADM

## 2020-07-06 RX ORDER — ENOXAPARIN SODIUM 100 MG/ML
40 INJECTION SUBCUTANEOUS EVERY 24 HOURS
Status: DISCONTINUED | OUTPATIENT
Start: 2020-07-07 | End: 2020-07-09 | Stop reason: HOSPADM

## 2020-07-06 RX ADMIN — METHYLPREDNISOLONE SODIUM SUCCINATE 60 MG: 125 INJECTION, POWDER, FOR SOLUTION INTRAMUSCULAR; INTRAVENOUS at 22:25

## 2020-07-06 RX ADMIN — AMLODIPINE BESYLATE 10 MG: 10 TABLET ORAL at 08:30

## 2020-07-06 RX ADMIN — POLYETHYLENE GLYCOL 3350 17 G: 17 POWDER, FOR SOLUTION ORAL at 08:30

## 2020-07-06 RX ADMIN — HYDRALAZINE HYDROCHLORIDE 25 MG: 25 TABLET, FILM COATED ORAL at 22:26

## 2020-07-06 RX ADMIN — SERTRALINE HYDROCHLORIDE 75 MG: 50 TABLET ORAL at 08:30

## 2020-07-06 RX ADMIN — CHOLECALCIFEROL TAB 25 MCG (1000 UNIT) 2 TABLET: 25 TAB at 08:30

## 2020-07-06 RX ADMIN — MELATONIN 6 MG: at 22:27

## 2020-07-06 RX ADMIN — GABAPENTIN 300 MG: 300 CAPSULE ORAL at 08:30

## 2020-07-06 RX ADMIN — HYDRALAZINE HYDROCHLORIDE 25 MG: 25 TABLET, FILM COATED ORAL at 08:30

## 2020-07-06 RX ADMIN — CYCLOSPORINE 1 DROP: 0.5 EMULSION OPHTHALMIC at 22:28

## 2020-07-06 RX ADMIN — TAMSULOSIN HYDROCHLORIDE 0.4 MG: 0.4 CAPSULE ORAL at 08:30

## 2020-07-06 RX ADMIN — ENOXAPARIN SODIUM 40 MG: 100 INJECTION SUBCUTANEOUS at 06:13

## 2020-07-06 RX ADMIN — GABAPENTIN 300 MG: 300 CAPSULE ORAL at 15:11

## 2020-07-06 RX ADMIN — INSULIN LISPRO 8 UNITS: 100 INJECTION, SOLUTION INTRAVENOUS; SUBCUTANEOUS at 12:21

## 2020-07-06 RX ADMIN — INSULIN LISPRO 3 UNITS: 100 INJECTION, SOLUTION INTRAVENOUS; SUBCUTANEOUS at 22:35

## 2020-07-06 RX ADMIN — AZITHROMYCIN MONOHYDRATE 500 MG: 500 INJECTION, POWDER, LYOPHILIZED, FOR SOLUTION INTRAVENOUS at 15:11

## 2020-07-06 RX ADMIN — PANTOPRAZOLE SODIUM 40 MG: 40 TABLET, DELAYED RELEASE ORAL at 08:30

## 2020-07-06 RX ADMIN — REMDESIVIR 100 MG: 100 INJECTION, POWDER, LYOPHILIZED, FOR SOLUTION INTRAVENOUS at 06:13

## 2020-07-06 RX ADMIN — LISINOPRIL 40 MG: 40 TABLET ORAL at 08:30

## 2020-07-06 RX ADMIN — GABAPENTIN 300 MG: 300 CAPSULE ORAL at 22:26

## 2020-07-06 RX ADMIN — INSULIN GLARGINE 5 UNITS: 100 INJECTION, SOLUTION SUBCUTANEOUS at 17:11

## 2020-07-06 RX ADMIN — COLCHICINE 0.6 MG: 0.6 CAPSULE ORAL at 22:27

## 2020-07-06 RX ADMIN — CELECOXIB 200 MG: 100 CAPSULE ORAL at 08:30

## 2020-07-06 RX ADMIN — ATORVASTATIN CALCIUM 10 MG: 10 TABLET, FILM COATED ORAL at 22:27

## 2020-07-06 RX ADMIN — METHYLPREDNISOLONE SODIUM SUCCINATE 60 MG: 125 INJECTION, POWDER, FOR SOLUTION INTRAMUSCULAR; INTRAVENOUS at 08:27

## 2020-07-06 NOTE — DIABETES MGMT
Glycemic Control Plan of Care    Prediabetes. A1c 6.1% (07/05/2020). Home diabetes medications: None. POC BG values on 07/05/2020: 202, 138. POC BG values on 07/06/2020 at time of review: 125, 345. Patient is currently on IV Solumedrol 60 mg every 12 hours. Recommendation(s):   1.) consider adding daily 5 units lantus insulin while patient is on steroids. Obtained order. 2.) modify diet by adding consistent carb. Current hospital diabetes medications:  Basal lantus insulin 5 units daily  Correctional lispro insulin ACHS. Modified to very resistant dose. Total daily dose insulin requirement previous day: 07/05/2020:  Lispro: 4 units      Assessment:  Patient is 68year old with PMH including prediabetes, GERD, hyperlipidemia, hypertension, JANNET, rheumatoid arthritis. He was seen in ED on 6/29/2020 and tested (+) for COVID-19 infection and discharged to home. He was advised to return if he dev shortness of breath. Patient was admitted on 0702/2020 with report of shortness of breath. Noted:  COVID-19 pnemonia. Acute respiratory failure  Pre diabetes. Most recent blood glucose values:        Current A1C:     Lab Results   Component Value Date/Time    Hemoglobin A1c 6.1 (H) 07/05/2020 04:42 AM     This lab test reflects the patient's estimated average blood glucose of 128 over the past 3 months. Diet: Diabetic consistent carb regular; 2 Gm NA    Goals:  Blood glucose will be within target range of  mg/dL by 07/09/2020.      Education:  ___  Refer to Diabetes Education Record             _X__  Education not indicated at this time    Kamla Landa RN [facility]  Pager: [phone]

## 2020-07-06 NOTE — ROUTINE PROCESS
Verbal shift change report given to Trang Samano (oncoming nurse) by Eliecer Romero RN (offgoing nurse). Report included the following information SBAR, Kardex, MAR, Recent Results, Med Rec Status and Alarm Parameters .

## 2020-07-06 NOTE — HOME CARE
Rounded on this \"[facility]\" patient via phone. Explained ACO and services offered by [facility]. [facility] will be available if needed. Patient has no DME.     Meseret Muniz LPN   [facility] Liaison  [phone]

## 2020-07-06 NOTE — PROGRESS NOTES
Infectious Disease progress Note        Reason: COVID-19 pneumonia, worsening hypoxia    Current abx Prior abx      azithromycin since 7/3  remdesivir since 7/3 Pip/tazo since 7/2/20-7/4     Lines:       Assessment :    68years old with multiple medical problems including rheumatoid arthritis on hydroxychloroquine, hypertension, prediabetes, degenerative joint disease and other medical problems  presented to the emergency department on 7/2/20 with chief complaint of having shortness of breath. Positive covid- 19 test on 6/29/20    Now with bilateral increased patchy groundglass airspace opacities noted on CXR 7/2/20    Clinical presentation consistent with acute hypoxic respiratory failure secondary to severe COVID-19 pneumonia. Labs on 7/2-ferritin 729, CRP 16.6, , d-dimer 1.35  Labs on 7/6-ferritin 537, CRP 2.9, , d-dimer 0.5    Clinically better. Improving oxygenation. Currently on 10 L high flow oxygen. Recommendations:    1. Continue remdesivir till 7/8, continue high dose solumedrol, azithromycin  2. Monitor daily ferritin, CRP, LDH, d-dimer, procalcitonin  3. Decrease Lovenox to 40 mg subcu every 24 hours  4.  f/u pulmonary recommendations regarding weaning oxygen       Above plan was discussed in details with patient,RN . Please call me if any further questions or concerns. Will continue to participate in the care of this patient. HPI:    Feels better. Patient denies headaches, visual disturbances, sore throat, runny nose, earaches, cp,abdominal pain, diarrhea, burning micturition,or weakness in extremities. He denies back pain/flank pain. home Medication List    Details   gabapentin (NEURONTIN) 300 mg capsule Take 1 Cap by mouth three (3) times daily. Max Daily Amount: 900 mg.  Indications: neuropathic pain  Qty: 270 Cap, Refills: 1    Associated Diagnoses: Lumbar stenosis with neurogenic claudication; S/P lumbar laminectomy      tamsulosin (Flomax) 0.4 mg capsule Take 1 Cap by mouth daily. Indications: enlarged prostate with urination problem  Qty: 90 Cap, Refills: 3    Associated Diagnoses: BPH associated with nocturia      sertraline (ZOLOFT) 50 mg tablet Take 1.5 Tabs by mouth daily. Qty: 135 Tab, Refills: 2      atorvastatin (LIPITOR) 10 mg tablet TAKE 1 TABLET BY MOUTH ONCE DAILY  Qty: 90 Tab, Refills: 3      celecoxib (CELEBREX) 200 mg capsule TAKE 1 CAPSULE BY MOUTH ONCE DAILY  Qty: 90 Cap, Refills: 2      lisinopril (PRINIVIL, ZESTRIL) 40 mg tablet TAKE 1 TABLET BY MOUTH DAILY  Qty: 90 Tab, Refills: 3      omeprazole (PRILOSEC) 20 mg capsule TAKE 1 CAPSULE BY MOUTH ONCE DAILY  Qty: 90 Cap, Refills: 2      mineral oil liquid Take 30 mL by mouth daily as needed for Constipation. amLODIPine (NORVASC) 10 mg tablet TAKE 1 TABLET BY MOUTH ONCE DAILY  Qty: 90 Tab, Refills: 3      hydrALAZINE (APRESOLINE) 25 mg tablet Take 1 Tab by mouth two (2) times a day. Qty: 1 Tab, Refills: 0      furosemide (LASIX) 40 mg tablet Take 1 Tab by mouth daily. Qty: 90 Tab, Refills: 3      potassium chloride (K-DUR, KLOR-CON) 20 mEq tablet Take 1 Tab by mouth daily. Qty: 90 Tab, Refills: 2      cycloSPORINE (RESTASIS) 0.05 % ophthalmic emulsion Administer 1 Drop to both eyes nightly. colchicine (MITIGARE) 0.6 mg capsule Take 0.6 mg by mouth every other day. cholecalciferol, vitamin D3, (VITAMIN D3) 2,000 unit tab Take 2,000 Units by mouth daily. diclofenac (VOLTAREN) 1 % topical gel Apply 4 g to affected area four (4) times daily. LUMIGAN 0.01 % ophthalmic drops Administer 1 Drop to both eyes nightly. MULTIVITS/IRON FUM/FA/D3/LYCOP (MULTI FOR HIM PO) Take  by mouth daily.           Current Facility-Administered Medications   Medication Dose Route Frequency    insulin lispro (HUMALOG) injection   SubCUTAneous AC&HS    glucose chewable tablet 16 g  4 Tab Oral PRN    glucagon (GLUCAGEN) injection 1 mg  1 mg IntraMUSCular PRN    dextrose 10% infusion 125-250 mL 125-250 mL IntraVENous PRN    polyethylene glycol (MIRALAX) packet 17 g  17 g Oral DAILY    enoxaparin (LOVENOX) injection 40 mg  40 mg SubCUTAneous Q12H    methylPREDNISolone (PF) (SOLU-MEDROL) injection 60 mg  60 mg IntraVENous Q12H    melatonin tablet 6 mg  6 mg Oral QHS    azithromycin (ZITHROMAX) 500 mg in  mL  500 mg IntraVENous Q24H    remdesivir 100 mg in 0.9% sodium chloride 250 mL IVPB  100 mg IntraVENous Q24H    cholecalciferol (VITAMIN D3) (1000 Units /25 mcg) tablet 2 Tab  2,000 Units Oral DAILY    colchicine (MITIGARE) capsule 0.6 mg  0.6 mg Oral EVERY OTHER DAY    cycloSPORINE (RESTASIS) 0.05 % ophthalmic emulsion 1 Drop  1 Drop Both Eyes QHS    hydrALAZINE (APRESOLINE) tablet 25 mg  25 mg Oral BID    amLODIPine (NORVASC) tablet 10 mg  10 mg Oral DAILY    mineral oil 30 mL  30 mL Oral DAILY PRN    lisinopriL (PRINIVIL, ZESTRIL) tablet 40 mg  40 mg Oral DAILY    atorvastatin (LIPITOR) tablet 10 mg  10 mg Oral QHS    sertraline (ZOLOFT) tablet 75 mg  75 mg Oral DAILY    tamsulosin (FLOMAX) capsule 0.4 mg  0.4 mg Oral DAILY    gabapentin (NEURONTIN) capsule 300 mg  300 mg Oral TID    celecoxib (CELEBREX) capsule 200 mg  200 mg Oral DAILY    pantoprazole (PROTONIX) tablet 40 mg  40 mg Oral ACB    acetaminophen (TYLENOL) tablet 650 mg  650 mg Oral Q6H PRN       Allergies: Latex, natural rubber; Adhesive tape-silicones; Aldactone [spironolactone]; Codeine; Tape [adhesive]; Tetanus toxoid, adsorbed; and Tetanus vaccines and toxoid    Temp (24hrs), Av.3 °F (36.3 °C), Min:97 °F (36.1 °C), Max:97.6 °F (36.4 °C)    Visit Vitals  /83 (BP 1 Location: Left arm, BP Patient Position: At rest)   Pulse 63   Temp 97 °F (36.1 °C)   Resp 18   Ht 5' 8\" (1.727 m)   Wt 87.3 kg (192 lb 8 oz)   SpO2 95%   BMI 29.27 kg/m²       ROS: 12 point ROS obtained in details.  Pertinent positives as mentioned in HPI,   otherwise negative    Physical Exam:    Constitutional: The patient is oriented to person, place, and time. More comfortable. Eyes:No scleral icterus. Cardiovascular: Regular rhythm on monitor    Pulmonary/Chest: bilateral air entry fair, chest movements equal  Abdominal: Soft. Bowel sounds are normal. exhibits no distension. No tenderness. No rebound and no guarding. Musculoskeletal:Normal strength. exhibits no tenderness. Neurological:alert and oriented to person, place, and time. Skin:No erythema. No pallor. Psychiatric: Memory, affect and judgment normal         Labs: Results:   Chemistry Recent Labs     07/06/20 0328 07/05/20 0442 07/04/20  0430   * 168* 148*    145 142   K 4.0 3.8 3.7   * 113* 111   CO2 23 26 24   BUN 21* 23* 22*   CREA 0.68 0.68 0.66   CA 8.4* 8.5 8.6   AGAP 6 6 7   BUCR 31* 34* 33*   * 186* 174*   TP 5.7* 5.9* 6.3*   ALB 2.3* 2.3* 2.4*   GLOB 3.4 3.6 3.9   AGRAT 0.7* 0.6* 0.6*      CBC w/Diff Recent Labs     07/06/20 0328 07/05/20 0442 07/04/20  0430   WBC 14.4* 13.7* 4.8   RBC 4.04* 4.10* 4.27*   HGB 13.2 13.0 13.9   HCT 38.0 38.7 40.2    241 215      Microbiology No results for input(s): CULT in the last 72 hours. RADIOLOGY:    All available imaging studies/reports in [facility] for this admission were reviewed     Due to this being a TeleHealth encounter ([facility]), evaluation of the following organ systems was limited: Vitals/Constitutional/EENT/Resp/CV/GI//MS/Neuro/Skin/Heme-Lymph-Imm. Pursuant to the emergency declaration under the [facility], [address] [place] [address] authority and the [employer] General Act, this Virtual Visit was conducted with patient's (and/or legal guardian's) consent, to reduce the risk of exposure to COVID-19 ,preserve PPE and provide necessary medical care. Services were provided through a video synchronous discussion virtually to substitute for in-person encounter.    Consent: madelyn, [employer] ,who was seen by synchronous (real-time) audio-video technology, and/or her healthcare decision maker, is aware that this patient-initiated, Telehealth encounter on 7/3/2020 is a billable service, with coverage as determined by her insurance carrier. he is aware that he may receive a bill and has provided verbal consent to proceed: Yes.     Dr. Oskar Lockwood, Infectious Disease Specialist  [phone]  July 6, 2020  3:21 PM

## 2020-07-06 NOTE — PROGRESS NOTES
Problem: Breathing Pattern - Ineffective  Goal: *Absence of hypoxia  Outcome: Progressing Towards Goal  Goal: *Use of effective breathing techniques  Outcome: Progressing Towards Goal     Problem: Pain  Goal: *Control of Pain  Outcome: Progressing Towards Goal  Goal: *PALLIATIVE CARE:  Alleviation of Pain  Outcome: Progressing Towards Goal     Problem: Falls - Risk of  Goal: *Absence of Falls  Description: Document Leyda Fall Risk and appropriate interventions in the flowsheet. Outcome: Progressing Towards Goal  Note: Fall Risk Interventions:            Medication Interventions: Bed/chair exit alarm, Evaluate medications/consider consulting pharmacy         History of Falls Interventions: Bed/chair exit alarm, Evaluate medications/consider consulting pharmacy         Problem: Airway Clearance - Ineffective  Goal: Achieve or maintain patent airway  Outcome: Progressing Towards Goal     Problem: Gas Exchange - Impaired  Goal: Absence of hypoxia  Outcome: Progressing Towards Goal  Goal: Promote optimal lung function  Outcome: Progressing Towards Goal     Problem: Breathing Pattern - Ineffective  Goal: Ability to achieve and maintain a regular respiratory rate  Outcome: Progressing Towards Goal     Problem:  Body Temperature -  Risk of, Imbalanced  Goal: Ability to maintain a body temperature within defined limits  Outcome: Progressing Towards Goal  Goal: Will regain or maintain usual level of consciousness  Outcome: Progressing Towards Goal  Goal: Complications related to the disease process, condition or treatment will be avoided or minimized  Outcome: Progressing Towards Goal     Problem: Isolation Precautions - Risk of Spread of Infection  Goal: Prevent transmission of infectious organism to others  Outcome: Progressing Towards Goal     Problem: Nutrition Deficits  Goal: Optimize nutrtional status  Outcome: Progressing Towards Goal     Problem: Risk for Fluid Volume Deficit  Goal: Maintain normal heart rhythm  Outcome: Progressing Towards Goal  Goal: Maintain absence of muscle cramping  Outcome: Progressing Towards Goal  Goal: Maintain normal serum potassium, sodium, calcium, phosphorus, and pH  Outcome: Progressing Towards Goal     Problem: Loneliness or Risk for Loneliness  Goal: Demonstrate positive use of time alone when socialization is not possible  Outcome: Progressing Towards Goal     Problem: Fatigue  Goal: Verbalize increase energy and improved vitality  Outcome: Progressing Towards Goal     Problem: Patient Education: Go to Patient Education Activity  Goal: Patient/Family Education  Outcome: Progressing Towards Goal

## 2020-07-06 NOTE — ROUTINE PROCESS
Bedside and Verbal shift change report given to Yeison Fish RN (oncoming nurse) by Chacha Grimm RN 
 (offgoing nurse). Report included the following information SBAR, Kardex, Intake/Output, MAR, Recent Results, Med Rec Status and Cardiac Rhythm NSR.

## 2020-07-06 NOTE — PROGRESS NOTES
Admit Date: 7/2/2020  Date of Service: 7/6/2020    Reason for follow-up: Shortness of breath      Assessment:         COVID-19 pneumonia: on high flow 10L/min   - LFT mildly elevated, LD trending down, ferritin trending down, CRP trending down, troponin negative; d-dimer trending down. Acute hypoxic respiratory failure secondary to interstitial pneumonitis related to COVID pneumonia  Leukocytosis: suspect related to high dose steroids  Sleep apnea  Hypertension: stable  Seronegative rheumatoid arthritis: On Plaquenil at home  GERD  Calcium pyrophosphate deposition    Plan:   Continue with Solu-Medrol 60 mg every 12 hours- slow taper  Continue Remdesivir through 7/8/2020  Continue Lovenox 90 mg every 12 hours  Continue Norvasc, Lipitor, colchicine, Neurontin, lisinopril, hydralazine  Follow CBC BMP  Reasserted prone positioning and again discussed instructions  Follow ferritin, CRP, LDH, d-dimer daily   transition from high flow to nasal cannula as tolerated. Appreciate pulmonary input-recommendation for usage of CPAP for sleep apnea and for oxygen support are noted. Follow-up blood cultures from 7/2  Accuchecks and SSI given IV steroid usage. Upated wife over the phone. All questions answered. Current Antibtiocs:   Azithromycin  Zosyn 7/3  Remdesivir 7/4- present    Lines:   Peripheral    Case discussed with:  [x]Patient  []Family  [x]Nursing  []Case Management  DVT Prophylaxis:  [x]Lovenox  []Hep SQ  []SCDs  []Coumadin   []On Heparin gtt    I have independently examined the patient and reviewed all lab studies and imgaing as well as review of nursing notes and physican notes from the past 24 hours. Francisco J Garcia D.O.   Pager [phone]      Allergies   Allergen Reactions    Latex, Natural Rubber Swelling    Adhesive Tape-Silicones Rash and Itching    Aldactone [Spironolactone] Not Reported This Time     Breast tenderness    Codeine Not Reported This Time     \"Drives crazy\"    Tape [Adhesive] Rash  Tetanus Toxoid, Adsorbed Anaphylaxis    Tetanus Vaccines And Toxoid Anaphylaxis     Other reaction(s): anaphylaxis/angioedema  Other reaction(s): anaphylaxis/angioedema  Other reaction(s): anesthetic shock           Subjective:      Pt seen and examined. Feeling a better today. Still has  shortness of breath and intermittent cough. No new fevers or chills. No nausea vomiting or diarrhea at this time. Objective:        Visit Vitals  /79 (BP 1 Location: Left arm, BP Patient Position: At rest)   Pulse 63   Temp 97.6 °F (36.4 °C)   Resp 18   Ht 5' 8\" (1.727 m)   Wt 87.3 kg (192 lb 8 oz)   SpO2 98%   BMI 29.27 kg/m²     Temp (24hrs), Av.3 °F (36.3 °C), Min:97 °F (36.1 °C), Max:97.6 °F (36.4 °C)        General:   awake alert and oriented, non-toxic   Skin:   no rashes or skin lesions noted on limited exam, dry and warm   HEENT:  No scleral icterus or pallor; oral mucosa moist, lips moist   Lymph Nodes:   not assessed today   Lungs:   non, labored; bilaterally clear to aspiration- no crackles wheezes rales or rhonchi   Heart:  RRR, s1 and s2; no murmurs rubs or gallops; no edema, + pedal pulses   Abdomen:  soft, non-distended, active bowel sounds, non-tender   Genitourinary:  deferred   Extremities:   average muscle tone; no contractures, no joint effusions   Neurologic:  No gross focal motor or sensory abnormalities; CN 2-12 intact; Follows commands. Psychiatric:   appropriate and interactive.        Labs: Results:   Chemistry Recent Labs     20  0328 20  0442 20  0430   * 168* 148*    145 142   K 4.0 3.8 3.7   * 113* 111   CO2 23 26 24   BUN 21* 23* 22*   CREA 0.68 0.68 0.66   CA 8.4* 8.5 8.6   AGAP 6 6 7   BUCR 31* 34* 33*   * 186* 174*   TP 5.7* 5.9* 6.3*   ALB 2.3* 2.3* 2.4*   GLOB 3.4 3.6 3.9   AGRAT 0.7* 0.6* 0.6*      CBC w/Diff Recent Labs     20  0328 20  0442 20  0430   WBC 14.4* 13.7* 4.8   RBC 4.04* 4.10* 4.27*   HGB 13.2 13.0 13.9   HCT 38.0 38.7 40.2    241 215        No results found for: SDES Lab Results   Component Value Date/Time    Culture result: NO GROWTH 4 DAYS 07/02/2020 10:15 PM    Culture result: NO GROWTH 4 DAYS 07/02/2020 06:30 PM    Culture result: NO GROWTH 2 DAYS 10/24/2019 07:30 AM    Culture result:  08/13/2018 10:15 AM     NO AEROMONAS,SALMONELLA,SHIGELLA,E. COLI 0:157 OR CAMPYLOBACTER ISOLATED    Culture result: REDUCED GRAM NEGATIVE ENTERIC MICROBIOTA 08/13/2018 10:15 AM    Culture result:  08/13/2018 10:15 AM     Toxigenic C. difficile NEGATIVE                         C. difficile target DNA sequences are not detected. Results     Procedure Component Value Units Date/Time    CULTURE, BLOOD [[id number]] Collected:  07/02/20 2215    Order Status:  Completed Specimen:  Blood Updated:  07/06/20 0646     Special Requests: NO SPECIAL REQUESTS        Culture result: NO GROWTH 4 DAYS       CULTURE, BLOOD [[id number]] Collected:  07/02/20 1830    Order Status:  Completed Specimen:  Blood Updated:  07/06/20 0646     Special Requests: NO SPECIAL REQUESTS        Culture result: NO GROWTH 4 DAYS             Imaging:     Xr Chest Port    Result Date: 7/3/2020  Impression:  1. Similar to minimally increased patchy groundglass airspace opacities.  Mild diffuse prominence of the bronchovascular markings, probably bronchitis or mild pulmonary vascular congestion

## 2020-07-07 LAB
ALBUMIN SERPL-MCNC: 2.4 G/DL (ref 3.4–5)
ALBUMIN/GLOB SERPL: 0.7 {RATIO} (ref 0.8–1.7)
ALP SERPL-CCNC: 179 U/L (ref 45–117)
ALT SERPL-CCNC: 167 U/L (ref 16–61)
ANION GAP SERPL CALC-SCNC: 5 MMOL/L (ref 3–18)
AST SERPL-CCNC: 69 U/L (ref 10–38)
BILIRUB SERPL-MCNC: 0.4 MG/DL (ref 0.2–1)
BUN SERPL-MCNC: 21 MG/DL (ref 7–18)
BUN/CREAT SERPL: 29 (ref 12–20)
CALCIUM SERPL-MCNC: 8.5 MG/DL (ref 8.5–10.1)
CHLORIDE SERPL-SCNC: 109 MMOL/L (ref 100–111)
CO2 SERPL-SCNC: 27 MMOL/L (ref 21–32)
CREAT SERPL-MCNC: 0.72 MG/DL (ref 0.6–1.3)
CRP SERPL-MCNC: 1.6 MG/DL (ref 0–0.3)
D DIMER PPP FEU-MCNC: 0.87 UG/ML(FEU)
ERYTHROCYTE [DISTWIDTH] IN BLOOD BY AUTOMATED COUNT: 12.7 % (ref 11.6–14.5)
FERRITIN SERPL-MCNC: 452 NG/ML (ref 8–388)
GLOBULIN SER CALC-MCNC: 3.3 G/DL (ref 2–4)
GLUCOSE BLD STRIP.AUTO-MCNC: 107 MG/DL (ref 70–110)
GLUCOSE BLD STRIP.AUTO-MCNC: 126 MG/DL (ref 70–110)
GLUCOSE BLD STRIP.AUTO-MCNC: 149 MG/DL (ref 70–110)
GLUCOSE BLD STRIP.AUTO-MCNC: 181 MG/DL (ref 70–110)
GLUCOSE BLD STRIP.AUTO-MCNC: 193 MG/DL (ref 70–110)
GLUCOSE SERPL-MCNC: 161 MG/DL (ref 74–99)
HCT VFR BLD AUTO: 40.6 % (ref 36–48)
HGB BLD-MCNC: 13.8 G/DL (ref 13–16)
LDH SERPL L TO P-CCNC: 388 U/L (ref 81–234)
MCH RBC QN AUTO: 32.1 PG (ref 24–34)
MCHC RBC AUTO-ENTMCNC: 34 G/DL (ref 31–37)
MCV RBC AUTO: 94.4 FL (ref 74–97)
PLATELET # BLD AUTO: 258 K/UL (ref 135–420)
PMV BLD AUTO: 9.8 FL (ref 9.2–11.8)
POTASSIUM SERPL-SCNC: 4.7 MMOL/L (ref 3.5–5.5)
PROT SERPL-MCNC: 5.7 G/DL (ref 6.4–8.2)
RBC # BLD AUTO: 4.3 M/UL (ref 4.7–5.5)
SODIUM SERPL-SCNC: 141 MMOL/L (ref 136–145)
WBC # BLD AUTO: 15 K/UL (ref 4.6–13.2)

## 2020-07-07 PROCEDURE — 74011250637 HC RX REV CODE- 250/637: Performed by: INTERNAL MEDICINE

## 2020-07-07 PROCEDURE — 94762 N-INVAS EAR/PLS OXIMTRY CONT: CPT

## 2020-07-07 PROCEDURE — 80053 COMPREHEN METABOLIC PANEL: CPT

## 2020-07-07 PROCEDURE — 74011000250 HC RX REV CODE- 250: Performed by: INTERNAL MEDICINE

## 2020-07-07 PROCEDURE — 83615 LACTATE (LD) (LDH) ENZYME: CPT

## 2020-07-07 PROCEDURE — 74011250636 HC RX REV CODE- 250/636: Performed by: INTERNAL MEDICINE

## 2020-07-07 PROCEDURE — 74011000258 HC RX REV CODE- 258: Performed by: INTERNAL MEDICINE

## 2020-07-07 PROCEDURE — 65660000000 HC RM CCU STEPDOWN

## 2020-07-07 PROCEDURE — 82728 ASSAY OF FERRITIN: CPT

## 2020-07-07 PROCEDURE — 77010033711 HC HIGH FLOW OXYGEN

## 2020-07-07 PROCEDURE — 85027 COMPLETE CBC AUTOMATED: CPT

## 2020-07-07 PROCEDURE — 74011636637 HC RX REV CODE- 636/637: Performed by: INTERNAL MEDICINE

## 2020-07-07 PROCEDURE — 86140 C-REACTIVE PROTEIN: CPT

## 2020-07-07 PROCEDURE — 85379 FIBRIN DEGRADATION QUANT: CPT

## 2020-07-07 PROCEDURE — 36415 COLL VENOUS BLD VENIPUNCTURE: CPT

## 2020-07-07 RX ADMIN — TAMSULOSIN HYDROCHLORIDE 0.4 MG: 0.4 CAPSULE ORAL at 08:22

## 2020-07-07 RX ADMIN — LISINOPRIL 40 MG: 40 TABLET ORAL at 09:00

## 2020-07-07 RX ADMIN — HYDRALAZINE HYDROCHLORIDE 25 MG: 25 TABLET, FILM COATED ORAL at 08:23

## 2020-07-07 RX ADMIN — GABAPENTIN 300 MG: 300 CAPSULE ORAL at 14:57

## 2020-07-07 RX ADMIN — MELATONIN 6 MG: at 22:28

## 2020-07-07 RX ADMIN — METHYLPREDNISOLONE SODIUM SUCCINATE 40 MG: 40 INJECTION, POWDER, FOR SOLUTION INTRAMUSCULAR; INTRAVENOUS at 22:28

## 2020-07-07 RX ADMIN — CYCLOSPORINE 1 DROP: 0.5 EMULSION OPHTHALMIC at 22:26

## 2020-07-07 RX ADMIN — GABAPENTIN 300 MG: 300 CAPSULE ORAL at 22:29

## 2020-07-07 RX ADMIN — GABAPENTIN 300 MG: 300 CAPSULE ORAL at 08:23

## 2020-07-07 RX ADMIN — INSULIN GLARGINE 5 UNITS: 100 INJECTION, SOLUTION SUBCUTANEOUS at 08:23

## 2020-07-07 RX ADMIN — METHYLPREDNISOLONE SODIUM SUCCINATE 60 MG: 125 INJECTION, POWDER, FOR SOLUTION INTRAMUSCULAR; INTRAVENOUS at 08:22

## 2020-07-07 RX ADMIN — ATORVASTATIN CALCIUM 10 MG: 10 TABLET, FILM COATED ORAL at 22:29

## 2020-07-07 RX ADMIN — ENOXAPARIN SODIUM 40 MG: 100 INJECTION SUBCUTANEOUS at 08:22

## 2020-07-07 RX ADMIN — HYDRALAZINE HYDROCHLORIDE 25 MG: 25 TABLET, FILM COATED ORAL at 22:29

## 2020-07-07 RX ADMIN — CELECOXIB 200 MG: 100 CAPSULE ORAL at 08:22

## 2020-07-07 RX ADMIN — AMLODIPINE BESYLATE 10 MG: 10 TABLET ORAL at 08:22

## 2020-07-07 RX ADMIN — REMDESIVIR 100 MG: 100 INJECTION, POWDER, LYOPHILIZED, FOR SOLUTION INTRAVENOUS at 08:24

## 2020-07-07 RX ADMIN — SERTRALINE HYDROCHLORIDE 75 MG: 50 TABLET ORAL at 08:22

## 2020-07-07 RX ADMIN — AZITHROMYCIN MONOHYDRATE 500 MG: 500 INJECTION, POWDER, LYOPHILIZED, FOR SOLUTION INTRAVENOUS at 14:57

## 2020-07-07 RX ADMIN — PANTOPRAZOLE SODIUM 40 MG: 40 TABLET, DELAYED RELEASE ORAL at 08:22

## 2020-07-07 RX ADMIN — INSULIN LISPRO 3 UNITS: 100 INJECTION, SOLUTION INTRAVENOUS; SUBCUTANEOUS at 16:30

## 2020-07-07 RX ADMIN — POLYETHYLENE GLYCOL 3350 17 G: 17 POWDER, FOR SOLUTION ORAL at 08:22

## 2020-07-07 RX ADMIN — CHOLECALCIFEROL TAB 25 MCG (1000 UNIT) 2 TABLET: 25 TAB at 08:22

## 2020-07-07 NOTE — PROGRESS NOTES
Infectious Disease progress Note        Reason: COVID-19 pneumonia, worsening hypoxia    Current abx Prior abx   Solu-Medrol since 7/3   azithromycin since 7/3  remdesivir since 7/3 Pip/tazo since 7/2/20-7/4     Lines:       Assessment :    68years old with multiple medical problems including rheumatoid arthritis on hydroxychloroquine, hypertension, prediabetes, degenerative joint disease and other medical problems  presented to the emergency department on 7/2/20 with chief complaint of having shortness of breath. Positive covid- 19 test on 6/29/20    Now with bilateral increased patchy groundglass airspace opacities noted on CXR 7/2/20    Clinical presentation consistent with acute hypoxic respiratory failure secondary to severe COVID-19 pneumonia. Labs on 7/2-ferritin 729, CRP 16.6, , d-dimer 1.35  Labs on 7/6-ferritin 537, CRP 2.9, , d-dimer 0.5  Labs on 7/7-ferritin 452, CRP 1.6, , d-dimer 0.87    Clinically better. Improving oxygenation. Currently on 10 L high flow oxygen. Recommendations:    1. Continue remdesivir till 7/8, decrease Solumedrol to 40 mg IV every 12 hours,  discontinue azithromycin after today's dose  2. Monitor daily ferritin, CRP, LDH, d-dimer, procalcitonin  3. Continue Lovenox 40 mg subcu every 24 hours  4.  f/u pulmonary recommendations regarding weaning oxygen       Above plan was discussed in details with patient,RN, dr. Chinyere Naylor . Please call me if any further questions or concerns. Will continue to participate in the care of this patient. HPI:    Feels better. Patient denies headaches, visual disturbances, sore throat, runny nose, earaches, cp,abdominal pain, diarrhea, burning micturition,or weakness in extremities. He denies back pain/flank pain. home Medication List    Details   gabapentin (NEURONTIN) 300 mg capsule Take 1 Cap by mouth three (3) times daily. Max Daily Amount: 900 mg.  Indications: neuropathic pain  Qty: 270 Cap, Refills: 1    Associated Diagnoses: Lumbar stenosis with neurogenic claudication; S/P lumbar laminectomy      tamsulosin (Flomax) 0.4 mg capsule Take 1 Cap by mouth daily. Indications: enlarged prostate with urination problem  Qty: 90 Cap, Refills: 3    Associated Diagnoses: BPH associated with nocturia      sertraline (ZOLOFT) 50 mg tablet Take 1.5 Tabs by mouth daily. Qty: 135 Tab, Refills: 2      atorvastatin (LIPITOR) 10 mg tablet TAKE 1 TABLET BY MOUTH ONCE DAILY  Qty: 90 Tab, Refills: 3      celecoxib (CELEBREX) 200 mg capsule TAKE 1 CAPSULE BY MOUTH ONCE DAILY  Qty: 90 Cap, Refills: 2      lisinopril (PRINIVIL, ZESTRIL) 40 mg tablet TAKE 1 TABLET BY MOUTH DAILY  Qty: 90 Tab, Refills: 3      omeprazole (PRILOSEC) 20 mg capsule TAKE 1 CAPSULE BY MOUTH ONCE DAILY  Qty: 90 Cap, Refills: 2      mineral oil liquid Take 30 mL by mouth daily as needed for Constipation. amLODIPine (NORVASC) 10 mg tablet TAKE 1 TABLET BY MOUTH ONCE DAILY  Qty: 90 Tab, Refills: 3      hydrALAZINE (APRESOLINE) 25 mg tablet Take 1 Tab by mouth two (2) times a day. Qty: 1 Tab, Refills: 0      furosemide (LASIX) 40 mg tablet Take 1 Tab by mouth daily. Qty: 90 Tab, Refills: 3      potassium chloride (K-DUR, KLOR-CON) 20 mEq tablet Take 1 Tab by mouth daily. Qty: 90 Tab, Refills: 2      cycloSPORINE (RESTASIS) 0.05 % ophthalmic emulsion Administer 1 Drop to both eyes nightly. colchicine (MITIGARE) 0.6 mg capsule Take 0.6 mg by mouth every other day. cholecalciferol, vitamin D3, (VITAMIN D3) 2,000 unit tab Take 2,000 Units by mouth daily. diclofenac (VOLTAREN) 1 % topical gel Apply 4 g to affected area four (4) times daily. LUMIGAN 0.01 % ophthalmic drops Administer 1 Drop to both eyes nightly. MULTIVITS/IRON FUM/FA/D3/LYCOP (MULTI FOR HIM PO) Take  by mouth daily.           Current Facility-Administered Medications   Medication Dose Route Frequency    insulin glargine (LANTUS) injection 5 Units  5 Units SubCUTAneous DAILY    enoxaparin (LOVENOX) injection 40 mg  40 mg SubCUTAneous Q24H    insulin lispro (HUMALOG) injection   SubCUTAneous AC&HS    glucose chewable tablet 16 g  4 Tab Oral PRN    glucagon (GLUCAGEN) injection 1 mg  1 mg IntraMUSCular PRN    dextrose 10% infusion 125-250 mL  125-250 mL IntraVENous PRN    polyethylene glycol (MIRALAX) packet 17 g  17 g Oral DAILY    methylPREDNISolone (PF) (SOLU-MEDROL) injection 60 mg  60 mg IntraVENous Q12H    melatonin tablet 6 mg  6 mg Oral QHS    azithromycin (ZITHROMAX) 500 mg in  mL  500 mg IntraVENous Q24H    remdesivir 100 mg in 0.9% sodium chloride 250 mL IVPB  100 mg IntraVENous Q24H    cholecalciferol (VITAMIN D3) (1000 Units /25 mcg) tablet 2 Tab  2,000 Units Oral DAILY    colchicine (MITIGARE) capsule 0.6 mg  0.6 mg Oral EVERY OTHER DAY    cycloSPORINE (RESTASIS) 0.05 % ophthalmic emulsion 1 Drop  1 Drop Both Eyes QHS    hydrALAZINE (APRESOLINE) tablet 25 mg  25 mg Oral BID    amLODIPine (NORVASC) tablet 10 mg  10 mg Oral DAILY    mineral oil 30 mL  30 mL Oral DAILY PRN    lisinopriL (PRINIVIL, ZESTRIL) tablet 40 mg  40 mg Oral DAILY    atorvastatin (LIPITOR) tablet 10 mg  10 mg Oral QHS    sertraline (ZOLOFT) tablet 75 mg  75 mg Oral DAILY    tamsulosin (FLOMAX) capsule 0.4 mg  0.4 mg Oral DAILY    gabapentin (NEURONTIN) capsule 300 mg  300 mg Oral TID    celecoxib (CELEBREX) capsule 200 mg  200 mg Oral DAILY    pantoprazole (PROTONIX) tablet 40 mg  40 mg Oral ACB    acetaminophen (TYLENOL) tablet 650 mg  650 mg Oral Q6H PRN       Allergies: Latex, natural rubber; Adhesive tape-silicones; Aldactone [spironolactone]; Codeine; Tape [adhesive];  Tetanus toxoid, adsorbed; and Tetanus vaccines and toxoid    Temp (24hrs), Av.2 °F (36.2 °C), Min:96.4 °F (35.8 °C), Max:97.8 °F (36.6 °C)    Visit Vitals  /87   Pulse 65   Temp 97.5 °F (36.4 °C)   Resp 18   Ht 5' 8\" (1.727 m)   Wt 87.3 kg (192 lb 8 oz)   SpO2 95%   BMI 29.27 kg/m²       ROS: 12 point ROS obtained in details. Pertinent positives as mentioned in HPI,   otherwise negative    Physical Exam:    Constitutional: The patient is oriented to person, place, and time. More comfortable. Eyes:No scleral icterus. Cardiovascular: Regular rhythm on monitor    Pulmonary/Chest: bilateral air entry fair, chest movements equal  Abdominal: Soft. Bowel sounds are normal. exhibits no distension. No tenderness. No rebound and no guarding. Musculoskeletal:Normal strength. exhibits no tenderness. Neurological:alert and oriented to person, place, and time. Skin:No erythema. No pallor. Psychiatric: Memory, affect and judgment normal         Labs: Results:   Chemistry Recent Labs     07/07/20 0428 07/06/20 0328 07/05/20 0442   * 173* 168*    144 145   K 4.7 4.0 3.8    115* 113*   CO2 27 23 26   BUN 21* 21* 23*   CREA 0.72 0.68 0.68   CA 8.5 8.4* 8.5   AGAP 5 6 6   BUCR 29* 31* 34*   * 158* 186*   TP 5.7* 5.7* 5.9*   ALB 2.4* 2.3* 2.3*   GLOB 3.3 3.4 3.6   AGRAT 0.7* 0.7* 0.6*      CBC w/Diff Recent Labs     07/07/20 0428 07/06/20 0328 07/05/20  0442   WBC 15.0* 14.4* 13.7*   RBC 4.30* 4.04* 4.10*   HGB 13.8 13.2 13.0   HCT 40.6 38.0 38.7    272 241      Microbiology No results for input(s): CULT in the last 72 hours. RADIOLOGY:    All available imaging studies/reports in [facility] for this admission were reviewed     Due to this being a TeleHealth encounter (During JNHEI-05 public health emergency), evaluation of the following organ systems was limited: Vitals/Constitutional/EENT/Resp/CV/GI//MS/Neuro/Skin/Heme-Lymph-Imm.   Pursuant to the emergency declaration under the [address] [place] [address] authority and the [employer] Act, this Virtual Visit was conducted with patient's (and/or legal guardian's) consent, to reduce the risk of exposure to COVID-19 ,preserve PPE and provide necessary medical care. Services were provided through a video synchronous discussion virtually to substitute for in-person encounter. Consent: Adan Quiles ,who was seen by synchronous (real-time) audio-video technology, and/or her healthcare decision maker, is aware that this patient-initiated, Telehealth encounter on 7/3/2020 is a billable service, with coverage as determined by her insurance carrier. he is aware that he may receive a bill and has provided verbal consent to proceed: Yes.     Dr. Gwendolyn Griffin, Infectious Disease Specialist  [phone]  July 7, 2020  3:21 PM

## 2020-07-07 NOTE — PROGRESS NOTES
Admit Date: 7/2/2020  Date of Service: 7/7/2020      Patient reports breathing is better. Still has slight cough, productive of scant sputum. He denies chest pain, nausea, vomiting, diarrhea, constipation. Remains on high flow oxygen, with slightly decreasing requirements. Assessment / plan:           1. COVID-19 pneumonia: Positive covid- 19 test 6/29/20 - on high flow 10L/min - per ID recs, continue remdesivir till 7/8, decrease Solumedrol to 40 mg IV every 12 hours,  discontinue azithromycin after today's dose  2. Acute hypoxic respiratory failure secondary to interstitial pneumonitis related to COVID pneumonia  3. Leukocytosis: suspect related to high dose steroids  4. Obstructive sleep apnea  5. Hypertension: stable  6. Seronegative rheumatoid arthritis: On Plaquenil at home  7. GERD  8. Calcium pyrophosphate deposition  9. Steroid induced hyperglycemia - ssi.   10. Overweight  Body mass index is 29.27 kg/m². 11. elevated LFTs likely related to remdesivir - close monitoring. 12. DVT prophylaxis  13. Full code. PT OT once oxygen requirements decrease. Infectious disease input appreciated. Case discussed with:  [x]Patient  []Family  [x]Nursing  []Case Management  DVT Prophylaxis:  [x]Lovenox  []Hep SQ  []SCDs  []Coumadin   []On Heparin gtt    I have independently examined the patient and reviewed all lab studies and imgaing as well as review of nursing notes and physican notes from the past 24 hours.          Allergies   Allergen Reactions    Latex, Natural Rubber Swelling    Adhesive Tape-Silicones Rash and Itching    Aldactone [Spironolactone] Not Reported This Time     Breast tenderness    Codeine Not Reported This Time     \"Drives crazy\"    Tape [Adhesive] Rash    Tetanus Toxoid, Adsorbed Anaphylaxis    Tetanus Vaccines And Toxoid Anaphylaxis     Other reaction(s): anaphylaxis/angioedema  Other reaction(s): anaphylaxis/angioedema  Other reaction(s): anesthetic shock Objective:        Visit Vitals  /87   Pulse 65   Temp 97.5 °F (36.4 °C)   Resp 18   Ht 5' 8\" (1.727 m)   Wt 87.3 kg (192 lb 8 oz)   SpO2 95%   BMI 29.27 kg/m²     Temp (24hrs), Av.2 °F (36.2 °C), Min:96.4 °F (35.8 °C), Max:97.8 °F (36.6 °C)        General:   awake alert and oriented, sitting up in bed speaking in full sentences on high flow. Appears several years younger than his stated age. Skin:   no rashes or skin lesions noted on limited exam, dry and warm   HEENT:  No scleral icterus or pallor; oral mucosa moist, lips moist   Lymph Nodes:   not assessed today   Lungs:   non, labored; bilaterally clear to aspiration- no crackles wheezes rales or rhonchi   Heart:  RRR, s1 and s2; no murmurs rubs or gallops; no edema, + pedal pulses   Abdomen:  soft, non-distended, active bowel sounds, non-tender       Extremities:   average muscle tone; no contractures, no joint effusions   Neurologic:  No gross focal motor or sensory abnormalities; CN 2-12 intact; Follows commands.             Labs: Results:   Chemistry Recent Labs     20   * 173* 168*    144 145   K 4.7 4.0 3.8    115* 113*   CO2 27 23 26   BUN 21* 21* 23*   CREA 0.72 0.68 0.68   CA 8.5 8.4* 8.5   AGAP 5 6 6   BUCR 29* 31* 34*   * 158* 186*   TP 5.7* 5.7* 5.9*   ALB 2.4* 2.3* 2.3*   GLOB 3.3 3.4 3.6   AGRAT 0.7* 0.7* 0.6*      CBC w/Diff Recent Labs     20   WBC 15.0* 14.4* 13.7*   RBC 4.30* 4.04* 4.10*   HGB 13.8 13.2 13.0   HCT 40.6 38.0 38.7    272 241        No results found for: SDES Lab Results   Component Value Date/Time    Culture result: NO GROWTH 5 DAYS 2020 10:15 PM    Culture result: NO GROWTH 5 DAYS 2020 06:30 PM    Culture result: NO GROWTH 2 DAYS 10/24/2019 07:30 AM    Culture result:  2018 10:15 AM     NO AEROMONAS,SALMONELLA,SHIGELLA,E. COLI 0:157 OR CAMPYLOBACTER ISOLATED    Culture result: REDUCED GRAM NEGATIVE ENTERIC MICROBIOTA 08/13/2018 10:15 AM    Culture result:  08/13/2018 10:15 AM     Toxigenic C. difficile NEGATIVE                         C. difficile target DNA sequences are not detected. Results     Procedure Component Value Units Date/Time    CULTURE, BLOOD [[id number]] Collected:  07/02/20 2215    Order Status:  Completed Specimen:  Blood Updated:  07/07/20 0700     Special Requests: NO SPECIAL REQUESTS        Culture result: NO GROWTH 5 DAYS       CULTURE, BLOOD [[id number]] Collected:  07/02/20 1830    Order Status:  Completed Specimen:  Blood Updated:  07/07/20 0700     Special Requests: NO SPECIAL REQUESTS        Culture result: NO GROWTH 5 DAYS             Imaging:     Xr Chest Port    Result Date: 7/3/2020  Impression:  1. Similar to minimally increased patchy groundglass airspace opacities.  Mild diffuse prominence of the bronchovascular markings, probably bronchitis or mild pulmonary vascular congestion

## 2020-07-07 NOTE — PROGRESS NOTES
CM Chart Review:  Patient Covid+, on 10 Liter High Flow Oxygen, discharge disposition is at this time [facility] [address] [facility] [facility] [address] for [facility]. CM will continue to monitor this patient, and be available for discharge planning needs.        Penny Perkins, RN  Case Management [phone]

## 2020-07-07 NOTE — ROUTINE PROCESS
Received verbal report from [facility], report included SBAR, MAR, and Kardex. Gave verbal report to Camron Neal RN, using SBAR, MAR, and Kardex.

## 2020-07-07 NOTE — PROGRESS NOTES
Problem: Breathing Pattern - Ineffective  Goal: *Absence of hypoxia  Outcome: Progressing Towards Goal  Goal: *Use of effective breathing techniques  Outcome: Progressing Towards Goal     Problem: Pain  Goal: *Control of Pain  Outcome: Progressing Towards Goal  Goal: *PALLIATIVE CARE:  Alleviation of Pain  Outcome: Progressing Towards Goal     Problem: Falls - Risk of  Goal: *Absence of Falls  Description: Document Leyda Fall Risk and appropriate interventions in the flowsheet. Outcome: Progressing Towards Goal  Note: Fall Risk Interventions:            Medication Interventions: Evaluate medications/consider consulting pharmacy         History of Falls Interventions: Bed/chair exit alarm         Problem: Airway Clearance - Ineffective  Goal: Achieve or maintain patent airway  Outcome: Progressing Towards Goal     Problem: Gas Exchange - Impaired  Goal: Absence of hypoxia  Outcome: Progressing Towards Goal  Goal: Promote optimal lung function  Outcome: Progressing Towards Goal     Problem: Breathing Pattern - Ineffective  Goal: Ability to achieve and maintain a regular respiratory rate  Outcome: Progressing Towards Goal     Problem:  Body Temperature -  Risk of, Imbalanced  Goal: Ability to maintain a body temperature within defined limits  Outcome: Progressing Towards Goal  Goal: Will regain or maintain usual level of consciousness  Outcome: Progressing Towards Goal  Goal: Complications related to the disease process, condition or treatment will be avoided or minimized  Outcome: Progressing Towards Goal     Problem: Isolation Precautions - Risk of Spread of Infection  Goal: Prevent transmission of infectious organism to others  Outcome: Progressing Towards Goal     Problem: Nutrition Deficits  Goal: Optimize nutrtional status  Outcome: Progressing Towards Goal     Problem: Risk for Fluid Volume Deficit  Goal: Maintain normal heart rhythm  Outcome: Progressing Towards Goal  Goal: Maintain absence of muscle cramping  Outcome: Progressing Towards Goal  Goal: Maintain normal serum potassium, sodium, calcium, phosphorus, and pH  Outcome: Progressing Towards Goal     Problem: Loneliness or Risk for Loneliness  Goal: Demonstrate positive use of time alone when socialization is not possible  Outcome: Progressing Towards Goal     Problem: Fatigue  Goal: Verbalize increase energy and improved vitality  Outcome: Progressing Towards Goal     Problem: Patient Education: Go to Patient Education Activity  Goal: Patient/Family Education  Outcome: Progressing Towards Goal

## 2020-07-07 NOTE — PROGRESS NOTES
Nellie Escalante, who is a 68 y. o.,male. Patient's Primary Language is: [place]. According to the patient's EMR Hoahaoism Affiliation is: Peterson Menon provided nursing staff with a [address] for the patient. Provided leaflet about virtual visits. Chaplains can provide Spiritual Care to [facility] patients via phone conference. Patients can call Spiritual Care at [phone], or dial; or  dial 0  on their hospital phones and ask the  to page a . No physical visits will be initiated by Chaplains while patients are in isolation for [facility]. Connect care will be noted with [facility] (do not visit) until patients are no longer in [address] isolation. If a COVID19 patient has a phone conference with a ; connect care will be noted IV-SA-DNV-(followed by the Chaplains initials). Plan:  Chaplains will continue to follow and will provide pastoral care on an as needed/requested basis.  recommends bedside caregivers page  on duty if patient shows signs of acute spiritual or emotional distress.       [address] [phone]

## 2020-07-07 NOTE — ROUTINE PROCESS
6978- Verbal shift change report given to Yen Veras (oncoming nurse) by Lee Mathew RN (offgoing nurse). Report included the following information SBAR, Kardex, Recent Results, Med Rec Status and Alarm Parameters .  
  
1915- Verbal shift change report given to Lee Mathew RN (oncoming nurse) by Yen Veras (offgoing nurse). Report included the following information SBAR, Kardex, Recent Results, Med Rec Status and Alarm Parameters .

## 2020-07-08 LAB
ALBUMIN SERPL-MCNC: 2.3 G/DL (ref 3.4–5)
ALBUMIN/GLOB SERPL: 0.8 {RATIO} (ref 0.8–1.7)
ALP SERPL-CCNC: 146 U/L (ref 45–117)
ALT SERPL-CCNC: 138 U/L (ref 16–61)
ANION GAP SERPL CALC-SCNC: 5 MMOL/L (ref 3–18)
AST SERPL-CCNC: 46 U/L (ref 10–38)
BACTERIA SPEC CULT: NORMAL
BACTERIA SPEC CULT: NORMAL
BASOPHILS # BLD: 0 K/UL (ref 0–0.1)
BASOPHILS NFR BLD: 0 % (ref 0–2)
BILIRUB SERPL-MCNC: 0.5 MG/DL (ref 0.2–1)
BUN SERPL-MCNC: 21 MG/DL (ref 7–18)
BUN/CREAT SERPL: 28 (ref 12–20)
CALCIUM SERPL-MCNC: 8.3 MG/DL (ref 8.5–10.1)
CHLORIDE SERPL-SCNC: 109 MMOL/L (ref 100–111)
CO2 SERPL-SCNC: 27 MMOL/L (ref 21–32)
CREAT SERPL-MCNC: 0.74 MG/DL (ref 0.6–1.3)
CRP SERPL-MCNC: 1.2 MG/DL (ref 0–0.3)
D DIMER PPP FEU-MCNC: 0.99 UG/ML(FEU)
DIFFERENTIAL METHOD BLD: ABNORMAL
EOSINOPHIL # BLD: 0 K/UL (ref 0–0.4)
EOSINOPHIL NFR BLD: 0 % (ref 0–5)
ERYTHROCYTE [DISTWIDTH] IN BLOOD BY AUTOMATED COUNT: 12.6 % (ref 11.6–14.5)
FERRITIN SERPL-MCNC: 368 NG/ML (ref 8–388)
GLOBULIN SER CALC-MCNC: 3 G/DL (ref 2–4)
GLUCOSE BLD STRIP.AUTO-MCNC: 110 MG/DL (ref 70–110)
GLUCOSE BLD STRIP.AUTO-MCNC: 124 MG/DL (ref 70–110)
GLUCOSE BLD STRIP.AUTO-MCNC: 142 MG/DL (ref 70–110)
GLUCOSE BLD STRIP.AUTO-MCNC: 163 MG/DL (ref 70–110)
GLUCOSE SERPL-MCNC: 171 MG/DL (ref 74–99)
HCT VFR BLD AUTO: 38.6 % (ref 36–48)
HGB BLD-MCNC: 13.4 G/DL (ref 13–16)
LDH SERPL L TO P-CCNC: 324 U/L (ref 81–234)
LYMPHOCYTES # BLD: 0.6 K/UL (ref 0.9–3.6)
LYMPHOCYTES NFR BLD: 5 % (ref 21–52)
MAGNESIUM SERPL-MCNC: 2.2 MG/DL (ref 1.6–2.6)
MCH RBC QN AUTO: 32.3 PG (ref 24–34)
MCHC RBC AUTO-ENTMCNC: 34.7 G/DL (ref 31–37)
MCV RBC AUTO: 93 FL (ref 74–97)
MONOCYTES # BLD: 0.6 K/UL (ref 0.05–1.2)
MONOCYTES NFR BLD: 4 % (ref 3–10)
NEUTS SEG # BLD: 12.8 K/UL (ref 1.8–8)
NEUTS SEG NFR BLD: 91 % (ref 40–73)
PHOSPHATE SERPL-MCNC: 3.4 MG/DL (ref 2.5–4.9)
PLATELET # BLD AUTO: 283 K/UL (ref 135–420)
PMV BLD AUTO: 9.8 FL (ref 9.2–11.8)
POTASSIUM SERPL-SCNC: 4.3 MMOL/L (ref 3.5–5.5)
PROT SERPL-MCNC: 5.3 G/DL (ref 6.4–8.2)
RBC # BLD AUTO: 4.15 M/UL (ref 4.7–5.5)
SERVICE CMNT-IMP: NORMAL
SERVICE CMNT-IMP: NORMAL
SODIUM SERPL-SCNC: 141 MMOL/L (ref 136–145)
WBC # BLD AUTO: 14 K/UL (ref 4.6–13.2)

## 2020-07-08 PROCEDURE — 82728 ASSAY OF FERRITIN: CPT

## 2020-07-08 PROCEDURE — 85025 COMPLETE CBC W/AUTO DIFF WBC: CPT

## 2020-07-08 PROCEDURE — 84100 ASSAY OF PHOSPHORUS: CPT

## 2020-07-08 PROCEDURE — 85379 FIBRIN DEGRADATION QUANT: CPT

## 2020-07-08 PROCEDURE — 83615 LACTATE (LD) (LDH) ENZYME: CPT

## 2020-07-08 PROCEDURE — 74011250637 HC RX REV CODE- 250/637: Performed by: INTERNAL MEDICINE

## 2020-07-08 PROCEDURE — 74011000250 HC RX REV CODE- 250: Performed by: INTERNAL MEDICINE

## 2020-07-08 PROCEDURE — 83735 ASSAY OF MAGNESIUM: CPT

## 2020-07-08 PROCEDURE — 74011250637 HC RX REV CODE- 250/637: Performed by: HOSPITALIST

## 2020-07-08 PROCEDURE — 65660000000 HC RM CCU STEPDOWN

## 2020-07-08 PROCEDURE — 74011250636 HC RX REV CODE- 250/636: Performed by: INTERNAL MEDICINE

## 2020-07-08 PROCEDURE — 82962 GLUCOSE BLOOD TEST: CPT

## 2020-07-08 PROCEDURE — 86140 C-REACTIVE PROTEIN: CPT

## 2020-07-08 PROCEDURE — 74011636637 HC RX REV CODE- 636/637: Performed by: INTERNAL MEDICINE

## 2020-07-08 PROCEDURE — 80053 COMPREHEN METABOLIC PANEL: CPT

## 2020-07-08 PROCEDURE — 36415 COLL VENOUS BLD VENIPUNCTURE: CPT

## 2020-07-08 PROCEDURE — 74011000258 HC RX REV CODE- 258: Performed by: INTERNAL MEDICINE

## 2020-07-08 RX ORDER — FACIAL-BODY WIPES
10 EACH TOPICAL
Status: DISPENSED | OUTPATIENT
Start: 2020-07-08 | End: 2020-07-09

## 2020-07-08 RX ORDER — FACIAL-BODY WIPES
10 EACH TOPICAL DAILY PRN
Status: DISCONTINUED | OUTPATIENT
Start: 2020-07-09 | End: 2020-07-09 | Stop reason: HOSPADM

## 2020-07-08 RX ORDER — PREDNISONE 20 MG/1
40 TABLET ORAL
Status: DISCONTINUED | OUTPATIENT
Start: 2020-07-08 | End: 2020-07-09 | Stop reason: HOSPADM

## 2020-07-08 RX ORDER — FACIAL-BODY WIPES
10 EACH TOPICAL DAILY PRN
Status: DISCONTINUED | OUTPATIENT
Start: 2020-07-08 | End: 2020-07-08

## 2020-07-08 RX ORDER — AMOXICILLIN 250 MG
2 CAPSULE ORAL
Status: DISCONTINUED | OUTPATIENT
Start: 2020-07-08 | End: 2020-07-09 | Stop reason: HOSPADM

## 2020-07-08 RX ADMIN — INSULIN LISPRO 2 UNITS: 100 INJECTION, SOLUTION INTRAVENOUS; SUBCUTANEOUS at 12:35

## 2020-07-08 RX ADMIN — REMDESIVIR 100 MG: 100 INJECTION, POWDER, LYOPHILIZED, FOR SOLUTION INTRAVENOUS at 15:45

## 2020-07-08 RX ADMIN — HYDRALAZINE HYDROCHLORIDE 25 MG: 25 TABLET, FILM COATED ORAL at 09:09

## 2020-07-08 RX ADMIN — DOCUSATE SODIUM 50 MG AND SENNOSIDES 8.6 MG 2 TABLET: 8.6; 5 TABLET, FILM COATED ORAL at 22:18

## 2020-07-08 RX ADMIN — POLYETHYLENE GLYCOL 3350 17 G: 17 POWDER, FOR SOLUTION ORAL at 09:09

## 2020-07-08 RX ADMIN — CYCLOSPORINE 1 DROP: 0.5 EMULSION OPHTHALMIC at 22:18

## 2020-07-08 RX ADMIN — CHOLECALCIFEROL TAB 25 MCG (1000 UNIT) 2 TABLET: 25 TAB at 09:09

## 2020-07-08 RX ADMIN — ATORVASTATIN CALCIUM 10 MG: 10 TABLET, FILM COATED ORAL at 22:18

## 2020-07-08 RX ADMIN — GABAPENTIN 300 MG: 300 CAPSULE ORAL at 22:18

## 2020-07-08 RX ADMIN — TAMSULOSIN HYDROCHLORIDE 0.4 MG: 0.4 CAPSULE ORAL at 09:09

## 2020-07-08 RX ADMIN — HYDRALAZINE HYDROCHLORIDE 25 MG: 25 TABLET, FILM COATED ORAL at 22:19

## 2020-07-08 RX ADMIN — CELECOXIB 200 MG: 100 CAPSULE ORAL at 09:09

## 2020-07-08 RX ADMIN — PANTOPRAZOLE SODIUM 40 MG: 40 TABLET, DELAYED RELEASE ORAL at 09:09

## 2020-07-08 RX ADMIN — METHYLPREDNISOLONE SODIUM SUCCINATE 40 MG: 40 INJECTION, POWDER, FOR SOLUTION INTRAMUSCULAR; INTRAVENOUS at 09:08

## 2020-07-08 RX ADMIN — GABAPENTIN 300 MG: 300 CAPSULE ORAL at 15:45

## 2020-07-08 RX ADMIN — AMLODIPINE BESYLATE 10 MG: 10 TABLET ORAL at 09:09

## 2020-07-08 RX ADMIN — LISINOPRIL 40 MG: 40 TABLET ORAL at 09:09

## 2020-07-08 RX ADMIN — SERTRALINE HYDROCHLORIDE 75 MG: 50 TABLET ORAL at 09:08

## 2020-07-08 RX ADMIN — ENOXAPARIN SODIUM 40 MG: 100 INJECTION SUBCUTANEOUS at 09:07

## 2020-07-08 RX ADMIN — GABAPENTIN 300 MG: 300 CAPSULE ORAL at 09:09

## 2020-07-08 RX ADMIN — MELATONIN 6 MG: at 22:18

## 2020-07-08 RX ADMIN — COLCHICINE 0.6 MG: 0.6 CAPSULE ORAL at 22:18

## 2020-07-08 RX ADMIN — INSULIN GLARGINE 5 UNITS: 100 INJECTION, SOLUTION SUBCUTANEOUS at 09:00

## 2020-07-08 NOTE — PROGRESS NOTES
CM called and spoke to [id number] [place] [address], she is unable at this time to get faxed Oxygen test to CM office at this time. [phone]. CM let her know we will try to coordinate in the morning, for patient to be discharged with Oxygen tomorrow.        Miles Meeks RN  Case Management [phone]

## 2020-07-08 NOTE — ROUTINE PROCESS
Received verbal report from [facility], reports included SBAR, MAR, and Kardex. Gave verbal report to Kelly Baez, reports included SBAR, MAR, and Kdardex.

## 2020-07-08 NOTE — PROGRESS NOTES
Infectious Disease progress Note        Reason: COVID-19 pneumonia, worsening hypoxia    Current abx Prior abx   Solu-Medrol since 7/3  remdesivir since 7/3 Pip/tazo since 7/2/20-7/4   azithromycin 7/3-7/7     Lines:       Assessment :    68years old with multiple medical problems including rheumatoid arthritis on hydroxychloroquine, hypertension, prediabetes, degenerative joint disease and other medical problems  presented to the emergency department on 7/2/20 with chief complaint of having shortness of breath. Positive covid- 19 test on 6/29/20    Now with bilateral increased patchy groundglass airspace opacities noted on CXR 7/2/20    Clinical presentation consistent with acute hypoxic respiratory failure secondary to severe COVID-19 pneumonia. Labs on 7/2-ferritin 729, CRP 16.6, , d-dimer 1.35  Labs on 7/6-ferritin 537, CRP 2.9, , d-dimer 0.5  Labs on 7/7-ferritin 452, CRP 1.6, , d-dimer 0.87  Labs on 7/8-ferritin 368, CRP 1.2, , d-dimer 0.99    Clinically better. Improving oxygenation. Currently on 5 L high flow oxygen. Recommendations:    1. Discontinue remdesivir after today's dose, d/c solumedrol. Start prednisone taper  2. Monitor daily ferritin, CRP, LDH, d-dimer, procalcitonin  3. Continue Lovenox 40 mg subcu every 24 hours  4.  f/u pulmonary recommendations regarding weaning oxygen       Above plan was discussed in details with patient,RN, dr. León Valle . Please call me if any further questions or concerns. Will continue to participate in the care of this patient. HPI:    Feels better. Patient denies headaches, visual disturbances, sore throat, runny nose, earaches, cp,abdominal pain, diarrhea, burning micturition,or weakness in extremities. He denies back pain/flank pain. home Medication List    Details   gabapentin (NEURONTIN) 300 mg capsule Take 1 Cap by mouth three (3) times daily. Max Daily Amount: 900 mg.  Indications: neuropathic pain  Qty: 270 Cap, Refills: 1    Associated Diagnoses: Lumbar stenosis with neurogenic claudication; S/P lumbar laminectomy      tamsulosin (Flomax) 0.4 mg capsule Take 1 Cap by mouth daily. Indications: enlarged prostate with urination problem  Qty: 90 Cap, Refills: 3    Associated Diagnoses: BPH associated with nocturia      sertraline (ZOLOFT) 50 mg tablet Take 1.5 Tabs by mouth daily. Qty: 135 Tab, Refills: 2      atorvastatin (LIPITOR) 10 mg tablet TAKE 1 TABLET BY MOUTH ONCE DAILY  Qty: 90 Tab, Refills: 3      celecoxib (CELEBREX) 200 mg capsule TAKE 1 CAPSULE BY MOUTH ONCE DAILY  Qty: 90 Cap, Refills: 2      lisinopril (PRINIVIL, ZESTRIL) 40 mg tablet TAKE 1 TABLET BY MOUTH DAILY  Qty: 90 Tab, Refills: 3      omeprazole (PRILOSEC) 20 mg capsule TAKE 1 CAPSULE BY MOUTH ONCE DAILY  Qty: 90 Cap, Refills: 2      mineral oil liquid Take 30 mL by mouth daily as needed for Constipation. amLODIPine (NORVASC) 10 mg tablet TAKE 1 TABLET BY MOUTH ONCE DAILY  Qty: 90 Tab, Refills: 3      hydrALAZINE (APRESOLINE) 25 mg tablet Take 1 Tab by mouth two (2) times a day. Qty: 1 Tab, Refills: 0      furosemide (LASIX) 40 mg tablet Take 1 Tab by mouth daily. Qty: 90 Tab, Refills: 3      potassium chloride (K-DUR, KLOR-CON) 20 mEq tablet Take 1 Tab by mouth daily. Qty: 90 Tab, Refills: 2      cycloSPORINE (RESTASIS) 0.05 % ophthalmic emulsion Administer 1 Drop to both eyes nightly. colchicine (MITIGARE) 0.6 mg capsule Take 0.6 mg by mouth every other day. cholecalciferol, vitamin D3, (VITAMIN D3) 2,000 unit tab Take 2,000 Units by mouth daily. diclofenac (VOLTAREN) 1 % topical gel Apply 4 g to affected area four (4) times daily. LUMIGAN 0.01 % ophthalmic drops Administer 1 Drop to both eyes nightly. MULTIVITS/IRON FUM/FA/D3/LYCOP (MULTI FOR HIM PO) Take  by mouth daily.           Current Facility-Administered Medications   Medication Dose Route Frequency    bisacodyL (DULCOLAX) suppository 10 mg 10 mg Rectal DAILY PRN    methylPREDNISolone (PF) (Solu-MEDROL) injection 40 mg  40 mg IntraVENous Q12H    insulin glargine (LANTUS) injection 5 Units  5 Units SubCUTAneous DAILY    enoxaparin (LOVENOX) injection 40 mg  40 mg SubCUTAneous Q24H    insulin lispro (HUMALOG) injection   SubCUTAneous AC&HS    glucose chewable tablet 16 g  4 Tab Oral PRN    glucagon (GLUCAGEN) injection 1 mg  1 mg IntraMUSCular PRN    dextrose 10% infusion 125-250 mL  125-250 mL IntraVENous PRN    polyethylene glycol (MIRALAX) packet 17 g  17 g Oral DAILY    melatonin tablet 6 mg  6 mg Oral QHS    remdesivir 100 mg in 0.9% sodium chloride 250 mL IVPB  100 mg IntraVENous Q24H    cholecalciferol (VITAMIN D3) (1000 Units /25 mcg) tablet 2 Tab  2,000 Units Oral DAILY    colchicine (MITIGARE) capsule 0.6 mg  0.6 mg Oral EVERY OTHER DAY    cycloSPORINE (RESTASIS) 0.05 % ophthalmic emulsion 1 Drop  1 Drop Both Eyes QHS    hydrALAZINE (APRESOLINE) tablet 25 mg  25 mg Oral BID    amLODIPine (NORVASC) tablet 10 mg  10 mg Oral DAILY    mineral oil 30 mL  30 mL Oral DAILY PRN    lisinopriL (PRINIVIL, ZESTRIL) tablet 40 mg  40 mg Oral DAILY    atorvastatin (LIPITOR) tablet 10 mg  10 mg Oral QHS    sertraline (ZOLOFT) tablet 75 mg  75 mg Oral DAILY    tamsulosin (FLOMAX) capsule 0.4 mg  0.4 mg Oral DAILY    gabapentin (NEURONTIN) capsule 300 mg  300 mg Oral TID    celecoxib (CELEBREX) capsule 200 mg  200 mg Oral DAILY    pantoprazole (PROTONIX) tablet 40 mg  40 mg Oral ACB    acetaminophen (TYLENOL) tablet 650 mg  650 mg Oral Q6H PRN       Allergies: Latex, natural rubber; Adhesive tape-silicones; Aldactone [spironolactone]; Codeine; Tape [adhesive];  Tetanus toxoid, adsorbed; and Tetanus vaccines and toxoid    Temp (24hrs), Av.3 °F (36.3 °C), Min:97 °F (36.1 °C), Max:97.6 °F (36.4 °C)    Visit Vitals  BP (!) 166/91 (BP 1 Location: Left arm, BP Patient Position: At rest)   Pulse 66   Temp 97 °F (36.1 °C)   Resp 18   Ht 5' 8\" (1.727 m)   Wt 87.3 kg (192 lb 8 oz)   SpO2 93%   BMI 29.27 kg/m²       ROS: 12 point ROS obtained in details. Pertinent positives as mentioned in HPI,   otherwise negative    Physical Exam:    Constitutional: The patient is oriented to person, place, and time. More comfortable. Eyes:No scleral icterus. Cardiovascular: Regular rhythm on monitor    Pulmonary/Chest: bilateral air entry fair, chest movements equal  Abdominal: Soft. Bowel sounds are normal. exhibits no distension. No tenderness. No rebound and no guarding. Musculoskeletal:Normal strength. exhibits no tenderness. Neurological:alert and oriented to person, place, and time. Skin:No erythema. No pallor. Psychiatric: Memory, affect and judgment normal         Labs: Results:   Chemistry Recent Labs     07/08/20 0221 07/07/20 0428 07/06/20  0328   * 161* 173*    141 144   K 4.3 4.7 4.0    109 115*   CO2 27 27 23   BUN 21* 21* 21*   CREA 0.74 0.72 0.68   CA 8.3* 8.5 8.4*   AGAP 5 5 6   BUCR 28* 29* 31*   * 179* 158*   TP 5.3* 5.7* 5.7*   ALB 2.3* 2.4* 2.3*   GLOB 3.0 3.3 3.4   AGRAT 0.8 0.7* 0.7*      CBC w/Diff Recent Labs     07/08/20 0221 07/07/20 0428 07/06/20  0328   WBC 14.0* 15.0* 14.4*   RBC 4.15* 4.30* 4.04*   HGB 13.4 13.8 13.2   HCT 38.6 40.6 38.0    258 272   GRANS 91*  --   --    LYMPH 5*  --   --    EOS 0  --   --       Microbiology No results for input(s): CULT in the last 72 hours. RADIOLOGY:    All available imaging studies/reports in [facility] for this admission were reviewed     Due to this being a TeleHealth encounter (During [facility]- public health emergency), evaluation of the following organ systems was limited: Vitals/Constitutional/EENT/Resp/CV/GI//MS/Neuro/Skin/Heme-Lymph-Imm.   Pursuant to the emergency declaration under the [address] [facility] [place], [address] [facility] authority and the [employer] Act, this Virtual Visit was conducted with patient's (and/or legal guardian's) consent, to reduce the risk of exposure to COVID-19 ,preserve PPE and provide necessary medical care. Services were provided through a video synchronous discussion virtually to substitute for in-person encounter. Consent: Sherrill Opitz ,who was seen by synchronous (real-time) audio-video technology, and/or her healthcare decision maker, is aware that this patient-initiated, Telehealth encounter on 7/3/2020 is a billable service, with coverage as determined by her insurance carrier. he is aware that he may receive a bill and has provided verbal consent to proceed: Yes.     Dr. Baron Schwab, Infectious Disease Specialist  [phone]  July 8, 2020  3:21 PM

## 2020-07-08 NOTE — PROGRESS NOTES
Problem: Falls - Risk of  Goal: *Absence of Falls  Description: Document Sharona Dear Fall Risk and appropriate interventions in the flowsheet. Outcome: Progressing Towards Goal  Note: Fall Risk Interventions:            Medication Interventions: Evaluate medications/consider consulting pharmacy, Teach patient to arise slowly         History of Falls Interventions: Vital signs minimum Q4HRs X 24 hrs (comment for end date), Assess for delayed presentation/identification of injury for 48 hrs (comment for end date)         Problem: Airway Clearance - Ineffective  Goal: Achieve or maintain patent airway  Outcome: Progressing Towards Goal     Problem: Gas Exchange - Impaired  Goal: Absence of hypoxia  Outcome: Progressing Towards Goal  Goal: Promote optimal lung function  Outcome: Progressing Towards Goal     Problem: Breathing Pattern - Ineffective  Goal: Ability to achieve and maintain a regular respiratory rate  Outcome: Progressing Towards Goal     Problem:  Body Temperature -  Risk of, Imbalanced  Goal: Ability to maintain a body temperature within defined limits  Outcome: Progressing Towards Goal     Problem: Isolation Precautions - Risk of Spread of Infection  Goal: Prevent transmission of infectious organism to others  Outcome: Progressing Towards Goal     Problem: Nutrition Deficits  Goal: Optimize nutrtional status  Outcome: Progressing Towards Goal

## 2020-07-08 NOTE — ROUTINE PROCESS
Verbal shift change report given to Kenisha Sauer RN (oncoming nurse) by Venu Wong RN (offgoing nurse). Report included the following information SBAR, Recent Results and Med Rec Status.

## 2020-07-08 NOTE — CONSULTS
[facility]  Pulmonary, Critical Care, and Sleep Medicine    Follow-up progress note- Telehealth in touch    Name: Zuhair Sheth MRN: [id number]   : 1940 Hospital: [address] [place] [address]    Date: 2020      Consent: Zuhair Sheth, who was seen by synchronous (real-time) audio-video technology, and/or his healthcare decision maker, is aware that this patient-initiated, Telehealth encounter on 2020 is a billable service, with coverage as determined by his insurance carrier. He is aware that he may receive a bill and has provided verbal consent to proceed: Yes. IMPRESSION:   1. Acute hypoxic respiratory-secondary to bilateral interstitial pneumonitis related to COVID pneumonia-improved with oxygen requirements decreased to 5 L today and saturating at 93%  2   COVID-19 pneumonia-clinically improved with minimal symptoms. All inflammatory markers have trended down-d-dimer 0.99, , ferritin 368 and CRP of 1.2  3. Severe sleep apnea with AHI of 35  4. Hypertension  5. rheumatoid arthritis-RA negative on hydroxychloroquine  6. Calcium pyrophosphate deposition disease  7. GERD     COVID specific evaluation:    Oxygenation: Nasal cannula oxygen  Proning-instructed-not able to do much  Flolan, Deep-not indicated  Imaging: CXR, CT-reviewed  Labs: LDH, Ferritin, CRP, d-dimer,  IL-6, ABG, EKG-all improving  Pharmacologic treatment:   Current:   Vit C, Zinc, Vit D3, Thiamine, Magnesium, Lovenox, Pepcid  Azithromycin, Hydroxychloroquine, Steroids,   Options- Consent from patient. Investigationals:Remdisivir-last dose today  Plasma-not given  Goals of care discussion     RECOMMENDATIONS:   · Oxygen-continue to supplement with to maintain saturation more than 90%.   Should be able to wean further  · Bronchodilators-PRN MDI  · Steroids-agree with Solu-Medrol 40 mg every 12 hours-can be tapered further to p.o. prednisone  · Complete Remdesivir -5 doses  · Continue hydroxychloroquine  · Monitor QTC  · Antibiotics-per ID  · Aspiration precautions  · Discussed need for prone positioning with patient. Instructions given  · Assess home Oxygen needs at discharge.  -Will need supplemental oxygen 2 to 4 L  · Will also need home CPAP machine-for sleep apnea  · OT, PT, OOB and ambulate  · DVT, PUD prophylaxis  · Will follow as outpatient     Subjective: This patient has been seen and evaluated at the request of Dr. Luisa Feliz for acute hypoxic respiratory failure and COVID 19 pneumonia. Patient visited via [place] [id number]. Provider present  at [facility]  Patient Located at [facility]    07/08/20   Feels much better  Still has minimal cough but not productive  Oxygen is being weaned from 8 L to 5 L today maintaining saturation  He denies any shortness of breath  Is afebrile  Has been ambulating to the bathroom  Denies any new complaints  Labs skygiedy-s-gzcge LDH ferritin and CRP all decreasing. HPI   81 years old with multiple medical problems including rheumatoid arthritis on hydroxychloroquine, hypertension, prediabetes, degenerative joint disease and other medical problems  presented to the emergency department on 7/2/20 with chief complaint of having shortness of breath.    He was seen in the ED 6/29 when he tested positive for COVID-19 infection.    He had positive contact coworkers. 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 He was discharged home and advised to present back to the ED if he develop shortness of breath.    He had done a post discharge follow-up virtual visit with his primary care physician as well.   His symptoms started 10 days ago on 6/22 with myalgias, fatigue and diarrhea and then he developed a fever and shortness of breath.    He states he has a cough with somewhat congested sounding mucus-minimal expectoration  He denies having chest pain, vomiting or abdominal pain.    He was hypoxic in the 80s upon arrival to the ED and was placed on a nonrebreather mask.    He is admitted for further management.        Patient currently complains of shortness of breath. He has occasional cough with whitish sputum. He states that he has had poor appetite for the last 2 weeks. Patient denies headaches, visual disturbances, sore throat, runny nose, earaches, cp,abdominal pain, diarrhea, burning micturition,or weakness in extremities. He denies back pain/flank pain. He denies recent sick contacts. No h/o recent travel. No known h/o MRSA colonization or infection in the past.    Positive covid- 19 test on 6/29/20  Now with bilateral increased patchy groundglass airspace opacities noted on CXR 7/2/20  Arterial blood gas-7.43/34 and 70 on nonrebreather mask  Labs on 7/2-ferritin 729, CRP 16.6, , d-dimer 1.35    I have reviewed all of the available data including the patient's previous history external records and radiological imaging available for review. He had an echocardiogram (8/26/2019) showing normal LV function (EF 56-60%), no RWMA, unable to estimate RVSP due to inadequate TR, but TV/PV appear normal. He was referred to Dr. Malgorzata Vivas for probable sleep apnea, and underwent a home sleep study on 10/25/2019,showing evidence of severe obstructive sleep apnea with AHI 35 and oxygen guy 80%. In addition applicable cardiology and other lab data were also reviewed.       Allergies   Allergen Reactions    Latex, Natural Rubber Swelling    Adhesive Tape-Silicones Rash and Itching    Aldactone [Spironolactone] Not Reported This Time     Breast tenderness    Codeine Not Reported This Time     \"Drives crazy\"    Tape [Adhesive] Rash    Tetanus Toxoid, Adsorbed Anaphylaxis    Tetanus Vaccines And Toxoid Anaphylaxis     Other reaction(s): anaphylaxis/angioedema  Other reaction(s): anaphylaxis/angioedema  Other reaction(s): anesthetic shock        Current Facility-Administered Medications   Medication Dose Route Frequency    methylPREDNISolone (PF) (Solu-MEDROL) injection 40 mg  40 mg IntraVENous Q12H    insulin glargine (LANTUS) injection 5 Units  5 Units SubCUTAneous DAILY    enoxaparin (LOVENOX) injection 40 mg  40 mg SubCUTAneous Q24H    insulin lispro (HUMALOG) injection   SubCUTAneous AC&HS    polyethylene glycol (MIRALAX) packet 17 g  17 g Oral DAILY    melatonin tablet 6 mg  6 mg Oral QHS    remdesivir 100 mg in 0.9% sodium chloride 250 mL IVPB  100 mg IntraVENous Q24H    cholecalciferol (VITAMIN D3) (1000 Units /25 mcg) tablet 2 Tab  2,000 Units Oral DAILY    colchicine (MITIGARE) capsule 0.6 mg  0.6 mg Oral EVERY OTHER DAY    cycloSPORINE (RESTASIS) 0.05 % ophthalmic emulsion 1 Drop  1 Drop Both Eyes QHS    hydrALAZINE (APRESOLINE) tablet 25 mg  25 mg Oral BID    amLODIPine (NORVASC) tablet 10 mg  10 mg Oral DAILY    lisinopriL (PRINIVIL, ZESTRIL) tablet 40 mg  40 mg Oral DAILY    atorvastatin (LIPITOR) tablet 10 mg  10 mg Oral QHS    sertraline (ZOLOFT) tablet 75 mg  75 mg Oral DAILY    tamsulosin (FLOMAX) capsule 0.4 mg  0.4 mg Oral DAILY    gabapentin (NEURONTIN) capsule 300 mg  300 mg Oral TID    celecoxib (CELEBREX) capsule 200 mg  200 mg Oral DAILY    pantoprazole (PROTONIX) tablet 40 mg  40 mg Oral ACB       Review of Systems:  A comprehensive review of systems was negative except for that written in the HPI. Objective:   Vital Signs:    Visit Vitals  BP (!) 166/91 (BP 1 Location: Left arm, BP Patient Position: At rest)   Pulse 66   Temp 97 °F (36.1 °C)   Resp 18   Ht 5' 8\" (1.727 m)   Wt 87.3 kg (192 lb 8 oz)   SpO2 93%   BMI 29.27 kg/m²       O2 Device: Hi flow nasal cannula   O2 Flow Rate (L/min): 8 l/min   Temp (24hrs), Av.3 °F (36.3 °C), Min:97 °F (36.1 °C), Max:97.6 °F (36.4 °C)       Intake/Output:   Last shift:      No intake/output data recorded.   Last 3 shifts:  1901 -  0700  In: 750 [I.V.:750]  Out: -     Intake/Output Summary (Last 24 hours) at 2020 1115  Last data filed at 2020 1600  Gross per 24 hour   Intake 750 ml   Output    Net 750 ml      Physical Exam: Limited due to tele visit with stethoscope capability  General:  Alert, cooperative, no distress, appears stated age. Head:  Normocephalic, without obvious abnormality, atraumatic. Eyes:  Conjunctivae/corneas clear. PERRL, EOMs intact. Throat: Lips, mucosa, and tongue normal. Teeth and gums normal.   Neck: Supple, symmetrical, trachea midline, no JVD. Thyroid-not visibly enlarged   Lungs:    Scattered basal rales   Chest wall:  No deformity. Heart:  Regular rate and rhythm, S1, S2 normal, no murmur, click, rub or gallop. Abdomen:   Examined with Nursing assistance-Soft, non-tender. Extremities: Extremities normal, atraumatic, no cyanosis or edema.    Skin: Skin color normal. No rashes or lesions   Neurologic: Grossly nonfocal     Data review:     Recent Results (from the past 24 hour(s))   GLUCOSE, POC    Collection Time: 07/07/20 12:06 PM   Result Value Ref Range    Glucose (POC) 149 (H) 70 - 110 mg/dL   GLUCOSE, POC    Collection Time: 07/07/20  4:13 PM   Result Value Ref Range    Glucose (POC) 193 (H) 70 - 110 mg/dL   GLUCOSE, POC    Collection Time: 07/07/20 10:42 PM   Result Value Ref Range    Glucose (POC) 107 70 - 110 mg/dL   FERRITIN    Collection Time: 07/08/20  2:21 AM   Result Value Ref Range    Ferritin 368 8 - 388 NG/ML   C REACTIVE PROTEIN, QT    Collection Time: 07/08/20  2:21 AM   Result Value Ref Range    C-Reactive protein 1.2 (H) 0 - 0.3 mg/dL   LD    Collection Time: 07/08/20  2:21 AM   Result Value Ref Range     (H) 81 - 234 U/L   D DIMER    Collection Time: 07/08/20  2:21 AM   Result Value Ref Range    D DIMER 0.99 (H) <0.46 ug/ml(FEU)   CBC WITH AUTOMATED DIFF    Collection Time: 07/08/20  2:21 AM   Result Value Ref Range    WBC 14.0 (H) 4.6 - 13.2 K/uL    RBC 4.15 (L) 4.70 - 5.50 M/uL    HGB 13.4 13.0 - 16.0 g/dL    HCT 38.6 36.0 - 48.0 %    MCV 93.0 74.0 - 97.0 FL    MCH 32.3 24.0 - 34.0 PG MCHC 34.7 31.0 - 37.0 g/dL    RDW 12.6 11.6 - 14.5 %    PLATELET 109 324 - 096 K/uL    MPV 9.8 9.2 - 11.8 FL    NEUTROPHILS 91 (H) 40 - 73 %    LYMPHOCYTES 5 (L) 21 - 52 %    MONOCYTES 4 3 - 10 %    EOSINOPHILS 0 0 - 5 %    BASOPHILS 0 0 - 2 %    ABS. NEUTROPHILS 12.8 (H) 1.8 - 8.0 K/UL    ABS. LYMPHOCYTES 0.6 (L) 0.9 - 3.6 K/UL    ABS. MONOCYTES 0.6 0.05 - 1.2 K/UL    ABS. EOSINOPHILS 0.0 0.0 - 0.4 K/UL    ABS. BASOPHILS 0.0 0.0 - 0.1 K/UL    DF AUTOMATED     MAGNESIUM    Collection Time: 07/08/20  2:21 AM   Result Value Ref Range    Magnesium 2.2 1.6 - 2.6 mg/dL   METABOLIC PANEL, COMPREHENSIVE    Collection Time: 07/08/20  2:21 AM   Result Value Ref Range    Sodium 141 136 - 145 mmol/L    Potassium 4.3 3.5 - 5.5 mmol/L    Chloride 109 100 - 111 mmol/L    CO2 27 21 - 32 mmol/L    Anion gap 5 3.0 - 18 mmol/L    Glucose 171 (H) 74 - 99 mg/dL    BUN 21 (H) 7.0 - 18 MG/DL    Creatinine 0.74 0.6 - 1.3 MG/DL    BUN/Creatinine ratio 28 (H) 12 - 20      GFR est AA >60 >60 ml/min/1.73m2    GFR est non-AA >60 >60 ml/min/1.73m2    Calcium 8.3 (L) 8.5 - 10.1 MG/DL    Bilirubin, total 0.5 0.2 - 1.0 MG/DL    ALT (SGPT) 138 (H) 16 - 61 U/L    AST (SGOT) 46 (H) 10 - 38 U/L    Alk. phosphatase 146 (H) 45 - 117 U/L    Protein, total 5.3 (L) 6.4 - 8.2 g/dL    Albumin 2.3 (L) 3.4 - 5.0 g/dL    Globulin 3.0 2.0 - 4.0 g/dL    A-G Ratio 0.8 0.8 - 1.7     PHOSPHORUS    Collection Time: 07/08/20  2:21 AM   Result Value Ref Range    Phosphorus 3.4 2.5 - 4.9 MG/DL   GLUCOSE, POC    Collection Time: 07/08/20  8:47 AM   Result Value Ref Range    Glucose (POC) 124 (H) 70 - 110 mg/dL       Imaging:  I have personally reviewed the patients radiographs and have reviewed the reports:  XR Results (most recent):  Results from Hospital Encounter encounter on 07/02/20   XR CHEST PORT    Narrative AP CHEST, PORTABLE    INDICATION: Shortness of breath.  Covid 19 infection/pneumonia    COMPARISON: Prior chest x-rays, most recent 6/29/2020    FINDINGS: EKG leads overlie the patient. The cardiac silhouette is normal in size. Mild diffuse prominence of the  bronchovascular markings. Similar to minimally increased patchy groundglass  opacities. No pleural effusion or pneumothorax No acute osseous abnormalities  are identified. Impression Impression:      1. Similar to minimally increased patchy groundglass airspace opacities. Mild  diffuse prominence of the bronchovascular markings, probably bronchitis or mild  pulmonary vascular congestion                   CT Results (most recent):  Results from Hospital Encounter encounter on 08/31/19   CT CHEST W CONT    Narrative CT CHEST WITH ENHANCEMENT    INDICATION: Lung nodule seen on imaging study, abnormal chest x-ray. TECHNIQUE: CT images obtained from the thoracic inlet to the level of the  diaphragm following uneventful administration of 80 mL Isovue 300 nonionic  intravenous contrast. Axial, coronal and sagittal reformats were obtained. All CT scans at this facility are performed using dose optimization technique as  appropriate to the performed exam, to include automated exposure control,  adjustment of the mA and/or kV according to patient's size (Including  appropriate matching for site-specific examinations), or use of iterative  reconstruction technique. COMPARISON: CT chest 2/28/2007. CHEST FINDINGS:     Thyroid/Base Of Neck:  Unremarkable  . Lungs:   No acute infiltrates are evident. .   No mass lesions are seen. .  Trachea and central bronchi are clear. .  Stable 4 mm chronic pleural based nodule left posterior right upper lobe (17). Also stable 2 mm nodule posterior right upper lobe (21). Stable 2-3 mm nodule  subpleural left upper lobe (14) and 2 mm nodule anterior inferior left upper  lobe (23)    Pleural Spaces: There is no pneumothorax or pleural fluid evident. No pleural plaques are seen    Lymph Nodes:   Axillae: No enlargement.   Mediastinum / Janina: No enlargement. Mediastinum, Great Vessels And Heart: The heart is normal in size. No pericardial effusion. No aortic aneurysm. Moderate calcified plaques in the aortic arch and descending aorta. .    Abdomen Structures Included: The included portions of the liver and spleen are unremarkable. .    Osseous Structures:   No destructive osseous process. Degenerative changes in the shoulders. Thoracic  spondylosis. Impression IMPRESSION:     1. No evidence of pulmonary mass. Several stable tiny bilateral pulmonary  nodules as discussed.  -The reported abnormal chest x-ray is not available for direct comparison. 2. Atherosclerosis. 3. Degenerative changes in the spine and shoulders. 08/26/19   ECHO ADULT COMPLETE 08/26/2019 8/26/2019    Narrative · Left Ventricle: Normal cavity size, wall thickness, systolic function   (ejection fraction normal) and diastolic function. Estimated left   ventricular ejection fraction is 56 - 60%. Visually measured ejection   fraction. No regional wall motion abnormality noted. · Tricuspid regurgitation is inadequate for estimation of right   ventricular systolic pressure. Signed by: Cuba Rajan MD         Terri Saini is a 68 y.o. male being evaluated by a Virtual Visit (video visit) encounter to address concerns as mentioned above. A caregiver was present when appropriate. Due to this being a TeleHealth encounter (During UKP-45 public health emergency), evaluation of the following organ systems was limited: Vitals/Constitutional/EENT/Resp/CV/GI//MS/Neuro/Skin/Heme-Lymph-Imm. Pursuant to the emergency declaration under the [address] [place] [address], [address] [place] [address] and the [facility] and [employer] Act, this Virtual Visit was conducted with patient's (and/or legal guardian's) consent, to reduce the risk of exposure to COVID-19 ,preserve PPE and provide necessary medical care. Services were provided through a video synchronous discussion virtually to substitute for in-person encounter. --Amy Gonzalez MD on 7/8/2020     An electronic signature was used to authenticate this note. Complex decision making was made in the evaluation and management plans during this consultation. More than 50% of time was spent in counseling and coordination of care including review of data and discussion with other team members.          Amy Gonzalez MD

## 2020-07-08 NOTE — PROGRESS NOTES
Oxygen needs test performed:    Room air at rest: 90% O2 sat  Recovery:  94% at 3L/min  Room air while ambulatin%   Ambulates on 5L/min with O2 sats at 94%. Form completed and placed in patient chart at this time.

## 2020-07-08 NOTE — PROGRESS NOTES
Admit Date: 7/2/2020  Date of Service: 7/8/2020      Afebrile x 48º. remains on high flow, but with decreasing requirements. States he lives at home with his wife, and he will be able to test quarantine at home, including separate bathroom. He states he thinks he can go home in the next 1 to 2 days. No BM for several days, he accepts suppository. Assessment / plan:           1. COVID-19 pneumonia: Positive covid- 19 test 6/29/20 - on high flow 10L/min - per ID recs, continue remdesivir till 7/8, change to po steroids, abx completed. Inflammatory markers continue to trend down. 2. Acute hypoxic respiratory failure secondary to interstitial pneumonitis related to COVID pneumonia - he qualified for home oxygen. 3. Leukocytosis: suspect related to high dose steroids  4. Obstructive sleep apnea, severe with AHI of 35 -   5. Hypertension: stable  6. Seronegative rheumatoid arthritis: On Plaquenil at home  7. GERD  8. Calcium pyrophosphate deposition  9. Steroid induced hyperglycemia - ssi, lantus. 10. Overweight  Body mass index is 29.27 kg/m². 11. elevated LFTs likely related to remdesivir - close monitoring. 12. DVT prophylaxis  13. Full code. PT OT once oxygen requirements decrease. Infectious disease, pulmonary input appreciated. Anticipate discharge to home with home O2 next 24 - 48º. I discussed the case with Dr. Moses Sharma, and Dr. Jo Montalvo. Case discussed with:  [x]Patient  []Family  [x]Nursing  [x]Case Management  DVT Prophylaxis:  [x]Lovenox  []Hep SQ  []SCDs  []Coumadin   []On Heparin gtt    I have independently examined the patient and reviewed all lab studies and imgaing as well as review of nursing notes and physican notes from the past 24 hours.          Allergies   Allergen Reactions    Latex, Natural Rubber Swelling    Adhesive Tape-Silicones Rash and Itching    Aldactone [Spironolactone] Not Reported This Time     Breast tenderness    Codeine Not Reported This Time \"Drives crazy\"    Tape [Adhesive] Rash    Tetanus Toxoid, Adsorbed Anaphylaxis    Tetanus Vaccines And Toxoid Anaphylaxis     Other reaction(s): anaphylaxis/angioedema  Other reaction(s): anaphylaxis/angioedema  Other reaction(s): anesthetic shock         Objective:        Visit Vitals  BP (!) 166/91 (BP 1 Location: Left arm, BP Patient Position: At rest)   Pulse 66   Temp 97 °F (36.1 °C)   Resp 18   Ht 5' 8\" (1.727 m)   Wt 87.3 kg (192 lb 8 oz)   SpO2 93%   BMI 29.27 kg/m²     Temp (24hrs), Av.3 °F (36.3 °C), Min:97 °F (36.1 °C), Max:97.6 °F (36.4 °C)        General:   awake alert and oriented, sitting up in bed speaking in full sentences on high flow. Appears several years younger than his stated age. Skin:   no rashes or skin lesions noted on limited exam, dry and warm   HEENT:  No scleral icterus or pallor; oral mucosa moist, lips moist   Lymph Nodes:   not assessed today   Lungs:   non, labored; bilaterally clear to aspiration- no crackles wheezes rales or rhonchi   Heart:  RRR, s1 and s2; no murmurs rubs or gallops; no edema, + pedal pulses   Abdomen:  soft, non-distended, active bowel sounds, non-tender       Extremities:   average muscle tone; no contractures, no joint effusions   Neurologic:  No gross focal motor or sensory abnormalities; CN 2-12 intact; Follows commands.             Labs: Results:   Chemistry Recent Labs     20  0221 20  0428 20  0328   * 161* 173*    141 144   K 4.3 4.7 4.0    109 115*   CO2 27 27 23   BUN 21* 21* 21*   CREA 0.74 0.72 0.68   CA 8.3* 8.5 8.4*   AGAP 5 5 6   BUCR 28* 29* 31*   * 179* 158*   TP 5.3* 5.7* 5.7*   ALB 2.3* 2.4* 2.3*   GLOB 3.0 3.3 3.4   AGRAT 0.8 0.7* 0.7*      CBC w/Diff Recent Labs     20  0221 20  0428 20  0328   WBC 14.0* 15.0* 14.4*   RBC 4.15* 4.30* 4.04*   HGB 13.4 13.8 13.2   HCT 38.6 40.6 38.0    258 272   GRANS 91*  --   --    LYMPH 5*  --   --    EOS 0  --   -- No results found for: SDES Lab Results   Component Value Date/Time    Culture result: NO GROWTH 6 DAYS 07/02/2020 10:15 PM    Culture result: NO GROWTH 6 DAYS 07/02/2020 06:30 PM    Culture result: NO GROWTH 2 DAYS 10/24/2019 07:30 AM    Culture result:  08/13/2018 10:15 AM     NO AEROMONAS,SALMONELLA,SHIGELLA,E. COLI 0:157 OR CAMPYLOBACTER ISOLATED    Culture result: REDUCED GRAM NEGATIVE ENTERIC MICROBIOTA 08/13/2018 10:15 AM    Culture result:  08/13/2018 10:15 AM     Toxigenic C. difficile NEGATIVE                         C. difficile target DNA sequences are not detected. Results     Procedure Component Value Units Date/Time    CULTURE, BLOOD [[id number]] Collected:  07/02/20 2215    Order Status:  Completed Specimen:  Blood Updated:  07/08/20 0656     Special Requests: NO SPECIAL REQUESTS        Culture result: NO GROWTH 6 DAYS       CULTURE, BLOOD [[id number]] Collected:  07/02/20 1830    Order Status:  Completed Specimen:  Blood Updated:  07/08/20 0656     Special Requests: NO SPECIAL REQUESTS        Culture result: NO GROWTH 6 DAYS             Imaging:     Xr Chest Port    Result Date: 7/3/2020  Impression:  1. Similar to minimally increased patchy groundglass airspace opacities.  Mild diffuse prominence of the bronchovascular markings, probably bronchitis or mild pulmonary vascular congestion

## 2020-07-08 NOTE — ROUTINE PROCESS
Verbal shift change report given to Leonid Rodgers RN (oncoming nurse) by Aj Sprague RN (offgoing nurse). Report included the following information SBAR, Intake/Output, Recent Results and Med Rec Status.

## 2020-07-09 VITALS
WEIGHT: 204.6 LBS | RESPIRATION RATE: 18 BRPM | OXYGEN SATURATION: 95 % | DIASTOLIC BLOOD PRESSURE: 71 MMHG | BODY MASS INDEX: 31.01 KG/M2 | SYSTOLIC BLOOD PRESSURE: 126 MMHG | HEART RATE: 76 BPM | TEMPERATURE: 97.7 F | HEIGHT: 68 IN

## 2020-07-09 LAB
CRP SERPL-MCNC: 0.8 MG/DL (ref 0–0.3)
D DIMER PPP FEU-MCNC: 0.85 UG/ML(FEU)
FERRITIN SERPL-MCNC: 338 NG/ML (ref 8–388)
GLUCOSE BLD STRIP.AUTO-MCNC: 134 MG/DL (ref 70–110)
GLUCOSE BLD STRIP.AUTO-MCNC: 165 MG/DL (ref 70–110)
GLUCOSE BLD STRIP.AUTO-MCNC: 91 MG/DL (ref 70–110)
LDH SERPL L TO P-CCNC: 309 U/L (ref 81–234)

## 2020-07-09 PROCEDURE — 74011636637 HC RX REV CODE- 636/637: Performed by: INTERNAL MEDICINE

## 2020-07-09 PROCEDURE — 97165 OT EVAL LOW COMPLEX 30 MIN: CPT

## 2020-07-09 PROCEDURE — 74011250637 HC RX REV CODE- 250/637: Performed by: INTERNAL MEDICINE

## 2020-07-09 PROCEDURE — 97535 SELF CARE MNGMENT TRAINING: CPT

## 2020-07-09 PROCEDURE — 83615 LACTATE (LD) (LDH) ENZYME: CPT

## 2020-07-09 PROCEDURE — 86140 C-REACTIVE PROTEIN: CPT

## 2020-07-09 PROCEDURE — 77010033678 HC OXYGEN DAILY

## 2020-07-09 PROCEDURE — 85379 FIBRIN DEGRADATION QUANT: CPT

## 2020-07-09 PROCEDURE — 97161 PT EVAL LOW COMPLEX 20 MIN: CPT

## 2020-07-09 PROCEDURE — 82962 GLUCOSE BLOOD TEST: CPT

## 2020-07-09 PROCEDURE — 74011250636 HC RX REV CODE- 250/636: Performed by: INTERNAL MEDICINE

## 2020-07-09 PROCEDURE — 36415 COLL VENOUS BLD VENIPUNCTURE: CPT

## 2020-07-09 PROCEDURE — 82728 ASSAY OF FERRITIN: CPT

## 2020-07-09 RX ORDER — ASCORBIC ACID 500 MG
500 TABLET ORAL 2 TIMES DAILY
Qty: 28 TAB | Refills: 0 | Status: SHIPPED | OUTPATIENT
Start: 2020-07-09 | End: 2020-07-23

## 2020-07-09 RX ORDER — PREDNISONE 10 MG/1
TABLET ORAL
Qty: 20 TAB | Refills: 0 | Status: SHIPPED | OUTPATIENT
Start: 2020-07-10 | End: 2020-07-15

## 2020-07-09 RX ADMIN — LISINOPRIL 40 MG: 40 TABLET ORAL at 09:18

## 2020-07-09 RX ADMIN — GABAPENTIN 300 MG: 300 CAPSULE ORAL at 16:56

## 2020-07-09 RX ADMIN — TAMSULOSIN HYDROCHLORIDE 0.4 MG: 0.4 CAPSULE ORAL at 09:18

## 2020-07-09 RX ADMIN — HYDRALAZINE HYDROCHLORIDE 25 MG: 25 TABLET, FILM COATED ORAL at 09:18

## 2020-07-09 RX ADMIN — ENOXAPARIN SODIUM 40 MG: 100 INJECTION SUBCUTANEOUS at 09:18

## 2020-07-09 RX ADMIN — CHOLECALCIFEROL TAB 25 MCG (1000 UNIT) 2 TABLET: 25 TAB at 09:18

## 2020-07-09 RX ADMIN — PANTOPRAZOLE SODIUM 40 MG: 40 TABLET, DELAYED RELEASE ORAL at 09:18

## 2020-07-09 RX ADMIN — CELECOXIB 200 MG: 100 CAPSULE ORAL at 09:18

## 2020-07-09 RX ADMIN — GABAPENTIN 300 MG: 300 CAPSULE ORAL at 09:18

## 2020-07-09 RX ADMIN — AMLODIPINE BESYLATE 10 MG: 10 TABLET ORAL at 09:18

## 2020-07-09 RX ADMIN — SERTRALINE HYDROCHLORIDE 75 MG: 50 TABLET ORAL at 09:17

## 2020-07-09 RX ADMIN — PREDNISONE 40 MG: 20 TABLET ORAL at 09:17

## 2020-07-09 RX ADMIN — INSULIN GLARGINE 5 UNITS: 100 INJECTION, SOLUTION SUBCUTANEOUS at 09:18

## 2020-07-09 RX ADMIN — POLYETHYLENE GLYCOL 3350 17 G: 17 POWDER, FOR SOLUTION ORAL at 09:19

## 2020-07-09 RX ADMIN — INSULIN LISPRO 3 UNITS: 100 INJECTION, SOLUTION INTRAVENOUS; SUBCUTANEOUS at 16:57

## 2020-07-09 NOTE — PROGRESS NOTES
\"Important Message from [employer]"  reviewed document with patient at phone number [phone] telephonically and addressed questions. A copy of the IMM letter will be given to patient at bedside patient. Original, with verbal signature noted, placed in patient's chart.

## 2020-07-09 NOTE — PROGRESS NOTES
Infectious Disease progress Note        Reason: COVID-19 pneumonia, worsening hypoxia    Current abx Prior abx   Solu-Medrol since 7/3  remdesivir since 7/3 Pip/tazo since 7/2/20-7/4   azithromycin 7/3-7/7     Lines:       Assessment :    68years old with multiple medical problems including rheumatoid arthritis on hydroxychloroquine, hypertension, prediabetes, degenerative joint disease and other medical problems  presented to the emergency department on 7/2/20 with chief complaint of having shortness of breath. Positive covid- 19 test on 6/29/20    Now with bilateral increased patchy groundglass airspace opacities noted on CXR 7/2/20    Clinical presentation consistent with acute hypoxic respiratory failure secondary to severe COVID-19 pneumonia. Labs on 7/2-ferritin 729, CRP 16.6, , d-dimer 1.35  Labs on 7/6-ferritin 537, CRP 2.9, , d-dimer 0.5  Labs on 7/7-ferritin 452, CRP 1.6, , d-dimer 0.87  Labs on 7/8-ferritin 368, CRP 1.2, , d-dimer 0.99  Labs on 7/9-ferritin 338, CRP 0.8, , d-dimer 0.85    Clinically better. Improving oxygenation. Currently on 5 L high flow oxygen. Recommendations:    1.  continue prednisone taper  2. Monitor daily ferritin, CRP, LDH, d-dimer, procalcitonin  3.   d/c lovenox. Start apixaban 2.5 mg po q 12 for total 30 days. 4.  f/u pulmonary recommendations regarding weaning oxygen- will need O2 at home. Above plan was discussed in details with patient,RN,dr. arnold . Please call me if any further questions or concerns. Will continue to participate in the care of this patient. HPI:    Feels better. Patient denies headaches, visual disturbances, sore throat, runny nose, earaches, cp,abdominal pain, diarrhea, burning micturition,or weakness in extremities. He denies back pain/flank pain. home Medication List    Details   gabapentin (NEURONTIN) 300 mg capsule Take 1 Cap by mouth three (3) times daily.  Max Daily Amount: 900 mg. Indications: neuropathic pain  Qty: 270 Cap, Refills: 1    Associated Diagnoses: Lumbar stenosis with neurogenic claudication; S/P lumbar laminectomy      tamsulosin (Flomax) 0.4 mg capsule Take 1 Cap by mouth daily. Indications: enlarged prostate with urination problem  Qty: 90 Cap, Refills: 3    Associated Diagnoses: BPH associated with nocturia      sertraline (ZOLOFT) 50 mg tablet Take 1.5 Tabs by mouth daily. Qty: 135 Tab, Refills: 2      atorvastatin (LIPITOR) 10 mg tablet TAKE 1 TABLET BY MOUTH ONCE DAILY  Qty: 90 Tab, Refills: 3      celecoxib (CELEBREX) 200 mg capsule TAKE 1 CAPSULE BY MOUTH ONCE DAILY  Qty: 90 Cap, Refills: 2      lisinopril (PRINIVIL, ZESTRIL) 40 mg tablet TAKE 1 TABLET BY MOUTH DAILY  Qty: 90 Tab, Refills: 3      omeprazole (PRILOSEC) 20 mg capsule TAKE 1 CAPSULE BY MOUTH ONCE DAILY  Qty: 90 Cap, Refills: 2      mineral oil liquid Take 30 mL by mouth daily as needed for Constipation. amLODIPine (NORVASC) 10 mg tablet TAKE 1 TABLET BY MOUTH ONCE DAILY  Qty: 90 Tab, Refills: 3      hydrALAZINE (APRESOLINE) 25 mg tablet Take 1 Tab by mouth two (2) times a day. Qty: 1 Tab, Refills: 0      furosemide (LASIX) 40 mg tablet Take 1 Tab by mouth daily. Qty: 90 Tab, Refills: 3      potassium chloride (K-DUR, KLOR-CON) 20 mEq tablet Take 1 Tab by mouth daily. Qty: 90 Tab, Refills: 2      cycloSPORINE (RESTASIS) 0.05 % ophthalmic emulsion Administer 1 Drop to both eyes nightly. colchicine (MITIGARE) 0.6 mg capsule Take 0.6 mg by mouth every other day. cholecalciferol, vitamin D3, (VITAMIN D3) 2,000 unit tab Take 2,000 Units by mouth daily. diclofenac (VOLTAREN) 1 % topical gel Apply 4 g to affected area four (4) times daily. LUMIGAN 0.01 % ophthalmic drops Administer 1 Drop to both eyes nightly. MULTIVITS/IRON FUM/FA/D3/LYCOP (MULTI FOR HIM PO) Take  by mouth daily.           Current Facility-Administered Medications   Medication Dose Route Frequency    predniSONE (DELTASONE) tablet 40 mg  40 mg Oral DAILY WITH BREAKFAST    bisacodyL (DULCOLAX) suppository 10 mg  10 mg Rectal DAILY PRN    senna-docusate (PERICOLACE) 8.6-50 mg per tablet 2 Tab  2 Tab Oral QHS    insulin glargine (LANTUS) injection 5 Units  5 Units SubCUTAneous DAILY    enoxaparin (LOVENOX) injection 40 mg  40 mg SubCUTAneous Q24H    insulin lispro (HUMALOG) injection   SubCUTAneous AC&HS    glucose chewable tablet 16 g  4 Tab Oral PRN    glucagon (GLUCAGEN) injection 1 mg  1 mg IntraMUSCular PRN    dextrose 10% infusion 125-250 mL  125-250 mL IntraVENous PRN    polyethylene glycol (MIRALAX) packet 17 g  17 g Oral DAILY    melatonin tablet 6 mg  6 mg Oral QHS    cholecalciferol (VITAMIN D3) (1000 Units /25 mcg) tablet 2 Tab  2,000 Units Oral DAILY    colchicine (MITIGARE) capsule 0.6 mg  0.6 mg Oral EVERY OTHER DAY    cycloSPORINE (RESTASIS) 0.05 % ophthalmic emulsion 1 Drop  1 Drop Both Eyes QHS    hydrALAZINE (APRESOLINE) tablet 25 mg  25 mg Oral BID    amLODIPine (NORVASC) tablet 10 mg  10 mg Oral DAILY    mineral oil 30 mL  30 mL Oral DAILY PRN    lisinopriL (PRINIVIL, ZESTRIL) tablet 40 mg  40 mg Oral DAILY    atorvastatin (LIPITOR) tablet 10 mg  10 mg Oral QHS    sertraline (ZOLOFT) tablet 75 mg  75 mg Oral DAILY    tamsulosin (FLOMAX) capsule 0.4 mg  0.4 mg Oral DAILY    gabapentin (NEURONTIN) capsule 300 mg  300 mg Oral TID    celecoxib (CELEBREX) capsule 200 mg  200 mg Oral DAILY    pantoprazole (PROTONIX) tablet 40 mg  40 mg Oral ACB    acetaminophen (TYLENOL) tablet 650 mg  650 mg Oral Q6H PRN       Allergies: Latex, natural rubber; Adhesive tape-silicones; Aldactone [spironolactone]; Codeine; Tape [adhesive];  Tetanus toxoid, adsorbed; and Tetanus vaccines and toxoid    Temp (24hrs), Av.3 °F (36.3 °C), Min:97 °F (36.1 °C), Max:97.6 °F (36.4 °C)    Visit Vitals  /86 (BP 1 Location: Left arm, BP Patient Position: At rest)   Pulse 65   Temp 97 °F (36.1 °C) Resp 18   Ht 5' 8\" (1.727 m)   Wt 92.8 kg (204 lb 9.6 oz)   SpO2 94%   BMI 31.11 kg/m²       ROS: 12 point ROS obtained in details. Pertinent positives as mentioned in HPI,   otherwise negative    Physical Exam:    Constitutional: The patient is oriented to person, place, and time. More comfortable. Eyes:No scleral icterus. Cardiovascular: Regular rhythm on monitor    Pulmonary/Chest: bilateral air entry fair, chest movements equal  Abdominal: Soft. Bowel sounds are normal. exhibits no distension. No tenderness. No rebound and no guarding. Musculoskeletal:Normal strength. exhibits no tenderness. Neurological:alert and oriented to person, place, and time. Skin:No erythema. No pallor. Psychiatric: Memory, affect and judgment normal         Labs: Results:   Chemistry Recent Labs     07/08/20 0221 07/07/20 0428   * 161*    141   K 4.3 4.7    109   CO2 27 27   BUN 21* 21*   CREA 0.74 0.72   CA 8.3* 8.5   AGAP 5 5   BUCR 28* 29*   * 179*   TP 5.3* 5.7*   ALB 2.3* 2.4*   GLOB 3.0 3.3   AGRAT 0.8 0.7*      CBC w/Diff Recent Labs     07/08/20 0221 07/07/20 0428   WBC 14.0* 15.0*   RBC 4.15* 4.30*   HGB 13.4 13.8   HCT 38.6 40.6    258   GRANS 91*  --    LYMPH 5*  --    EOS 0  --       Microbiology No results for input(s): CULT in the last 72 hours. RADIOLOGY:    All available imaging studies/reports in [facility] for this admission were reviewed     Due to this being a TeleHealth encounter ([facility]), evaluation of the following organ systems was limited: Vitals/Constitutional/EENT/Resp/CV/GI//MS/Neuro/Skin/Heme-Lymph-Imm.   Pursuant to the emergency declaration under the [address] [place] [address], [address] [place] [address] authority and the [employer] Act, this Virtual Visit was conducted with patient's (and/or legal guardian's) consent, to reduce the risk of exposure to COVID-19 ,preserve PPE and provide necessary medical care. Services were provided through a video synchronous discussion virtually to substitute for in-person encounter. Consent: Meseret Dominic ,who was seen by synchronous (real-time) audio-video technology, and/or her healthcare decision maker, is aware that this patient-initiated, Telehealth encounter on 7/3/2020 is a billable service, with coverage as determined by her insurance carrier. he is aware that he may receive a bill and has provided verbal consent to proceed: Yes.     Dr. Moses Shamra, Infectious Disease Specialist  [phone]  July 9, 2020  3:21 PM

## 2020-07-09 NOTE — PROGRESS NOTES
Problem: Self Care Deficits Care Plan (Adult)  Goal: *Acute Goals and Plan of Care (Insert Text)  Outcome: Resolved/Met     OCCUPATIONAL THERAPY EVALUATION/DISCHARGE    Patient: Ganesh Palomino (89 y.o. male)  Date: 7/9/2020  Primary Diagnosis: COVID-19 virus infection [U07.1]  Precautions:  Contact(Standard; Contact: Covid+)  PLOF: Patient was independent with self-care and functional mobility PTA. Patient reports he is still working in [employer] at [employer]. ASSESSMENT AND RECOMMENDATIONS:  Patient cleared to participate in OT evaluation by RN. Upon entering the room, patient was supine in bed, alert, and agreeable to participate in OT evaluation on 5L nasal cannula. Patient educated on the role of OT, evaluation process, and safety during this admission with patient verbalizing understanding. Patient is independent with basic self-care, bed mobility, and functional transfers using no AD. Based on the objective data described below, the patient presents with no deficits that impede pt function with ADLs, functional transfers, and functional mobility. At this time patient is safe to d/c home with family support. OT to d/c from caseload at this time. Skilled occupational therapy is not indicated at this time. Discharge Recommendations: none  Further Equipment Recommendations for Discharge:N/A     SUBJECTIVE:   Patient stated Donny [place] want a Hot dog and a beer    OBJECTIVE DATA SUMMARY:     Past Medical History:   Diagnosis Date    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 Dr. Mervat Jean.    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 Dr. Nicole Daniels. Past Surgical History:   Procedure Laterality Date    HX AMPUTATION FINGER Left     index    HX BACK SURGERY  10/23/2019    Right L4-5 hemilaminectomy, medial facetectomy, resection of synovial cyst    HX CARPAL TUNNEL RELEASE Bilateral     HX CATARACT REMOVAL Left 01/2018    HX CATARACT REMOVAL Bilateral 2018    HX COLONOSCOPY      HX HEENT      sinus, tonsillectomy    HX HERNIA REPAIR      HX MOHS PROCEDURES      bilateral     HX ORTHOPAEDIC      lef knee surgery, removed cartilage.  HX ROTATOR CUFF REPAIR Right      Barriers to Learning/Limitations: None  Compensate with: visual, verbal, tactile, kinesthetic cues/model    Home Situation:   Home Situation  Home Environment: Private residence  # Steps to Enter: 4  One/Two Story Residence: Two story  # of Interior Steps: 12  Living Alone: No  Support Systems: Family member(s)  Patient Expects to be Discharged to[de-identified] Private residence  Current DME Used/Available at Home: None  Tub or Shower Type: Tub/Shower combination  [x]     Right hand dominant   []     Left hand dominant    Cognitive/Behavioral Status:  Neurologic State: Alert  Orientation Level: Oriented X4  Cognition: Follows commands  Safety/Judgement: Fall prevention; Awareness of environment    Skin: Intact  Edema: None noted    Vision/Perceptual:    Acuity: Within Defined Limits       Coordination: BUE     Fine Motor Skills-Upper: Left Intact; Right Intact    Gross Motor Skills-Upper: Left Intact; Right Intact    Balance:  Sitting: Intact  Standing: Intact    Strength: BUE  Strength:  Within functional limits        Tone & Sensation: BUE  Tone: Normal  Sensation: Intact     Range of Motion: BUE  AROM: Within functional limits       Functional Mobility and Transfers for ADLs:  Bed Mobility:     Supine to Sit: Independent  Sit to Supine: Independent  Scooting: Independent     Transfers:  Sit to Stand: Independent  Stand to Sit: Independent    ADL Assessment:  Feeding: Independent    Oral Facial Hygiene/Grooming: Independent    Bathing: Independent    Upper Body Dressing: Independent    Lower Body Dressing: Independent    Toileting: Independent    ADL Intervention:  Lower Body Dressing Assistance  Dressing Assistance: Independent  Socks: Independent  Leg Crossed Method Used: Yes  Position Performed: Seated edge of bed    Cognitive Retraining  Safety/Judgement: Fall prevention; Awareness of environment      Pain:  Pain level pre-treatment: 0/10   Pain level post-treatment: 0/10   Pain Intervention(s): Medication (see MAR); Response to intervention: Nurse notified, See doc flow    Activity Tolerance:   Good    Please refer to the flowsheet for vital signs taken during this treatment. After treatment:   []  Patient left in no apparent distress sitting up in chair  [x]  Patient left in no apparent distress in bed  [x]  Call bell left within reach  [x]  Nursing notified  []  Caregiver present  []  Bed alarm activated    COMMUNICATION/EDUCATION:   [x]      Role of Occupational Therapy in the acute care setting  [x]      Home safety education was provided and the patient/caregiver indicated understanding. [x]      Patient/family have participated as able and agree with findings and recommendations. []      Patient is unable to participate in plan of care at this time. Thank you for this referral.  Sherlyn Zamarripa OTR/MARS  Time Calculation: 11 mins      Eval Complexity: History: MEDIUM Complexity : Expanded review of history including physical, cognitive and psychosocial  history ; Examination: LOW Complexity : 1-3 performance deficits relating to physical, cognitive , or psychosocial skils that result in activity limitations and / or participation restrictions ;    Decision Making:LOW Complexity : No comorbidities that affect functional and no verbal or physical assistance needed to complete eval tasks

## 2020-07-09 NOTE — DISCHARGE INSTRUCTIONS
Patient Education        Learning How To Care for Someone Who Has COVID-19  Things to know  Most people who get COVID-19 will recover with time and home care. Here are some things to know if you're caring for someone who's sick. · Treat the symptoms. Common symptoms include a fever, coughing, and feeling short of breath. Urge the person to get extra rest and drink plenty of fluids to replace fluids lost from fever. To reduce a fever, offer acetaminophen (such as Tylenol). It may also help with muscle aches. Read and follow all instructions on the label. · Watch for signs that the illness is getting worse. The person may need medical care if they're getting sicker (for example, if it's hard to breathe). But call the doctor's office before you go. They can tell you what to do. Call 911 or emergency services if the person has any of these symptoms:  ? Severe trouble breathing or shortness of breath. ? Constant pain or pressure in their chest.  ? Confusion, or trouble thinking clearly. ? A blue tint to their lips or face. Some people are more likely to get very sick and need medical care. Call the doctor as soon as symptoms start if the person you're caring for is over 72, smokes, or has a serious health problem, like asthma, heart disease, diabetes, or an immune system problem. · Protect yourself and others. The virus spreads easily from person to person, so take extra care to avoid catching or spreading the infection. ? Keep the sick person away from others as much as you can. § Have the person stay in one room. If you can, give them their own bathroom to use. § Have only one person take care of them. Keep other people--and pets--out of the sickroom. § Have the person wear a cloth face cover around other people. This includes when anyone is in the room with them or if they leave their room (for example, to go to the bathroom).  If the face cover makes it harder for the sick person to breathe, the other person should wear a face cover. § Don't share personal items. These include dishes, cups, towels, and bedding. ? Wash your hands often and well. Use soap and water, and scrub for at least 20 seconds. This is especially important after you've been around the sick person or touched things they've touched. If soap and water aren't handy, use a hand  with at least 60% alcohol. ? Avoid touching your mouth, nose, and eyes. ? Take care with the person's laundry. It's okay to wash the sick person's laundry with yours. If you have them, wear disposable gloves when handling their dirty laundry, and wash your hands well after you touch it. Wash items in the warmest water allowed for the fabric type, and dry them completely. ? Clean high-touch items every day and anytime the sick person touches them. These include doorknobs, light switches, toilets, counters, and remote controls. Use a household disinfectant or a homemade bleach solution. (Follow the directions on the label.) If the sick person has their own room, have them disinfect it every day. ? Limit visitors to your home. To help protect family and friends, stay in touch with them only by phone or computer. Current as of: May 8, 2020               Content Version: 12.5  © 5122-3221 [employer]. Care instructions adapted under license by [employer] (which disclaims liability or warranty for this information). If you have questions about a medical condition or this instruction, always ask your healthcare professional. [place] [address] any warranty or liability for your use of this information.

## 2020-07-09 NOTE — PROGRESS NOTES
Spoke with pt by phone.  Pt understands that he will need oxygen prior to leaving hospital. Oxygen order sent to [facility] @9:30 am. Confirmed pt's PCP and requested appointment be arranged through [address] [place] [address] Drive, [place], L- [phone]

## 2020-07-09 NOTE — PROGRESS NOTES
Physical therapy order received and chart reviewed. Patient currently has bedrest orders in place. Bedrest orders will need to be discontinued prior to initiation of physical therapy services.  Thank you for this referral.   ARELI CaliT

## 2020-07-09 NOTE — ROUTINE PROCESS
Pt is alert and oriented x4. Respiration regular and non labored. Pt denies pain. Call bell in reach. Bed in low position/bed locked. 5065 Bedside shift change report given to Mallorie Angulo (oncoming nurse) by Rafael Johns (offgoing nurse). Report given with SBAR, Kardex, Intake/Output, MAR and Recent Results.

## 2020-07-09 NOTE — PROGRESS NOTES
Problem: Mobility Impaired (Adult and Pediatric)  Goal: *Acute Goals and Plan of Care (Insert Text)  Outcome: Resolved/Met   PHYSICAL THERAPY EVALUATION AND DISCHARGE    Patient: Yovany Way (38 y.o. male)  Date: 7/9/2020  Primary Diagnosis: COVID-19 virus infection [U07.1]        Precautions:   Contact  PLOF: independent, working \"part-time\" at [employer] 50-60 hrs a week. ASSESSMENT :  Based on the objective data described below, the patient presents at his functional baseline but needing 5 LPM O2 per NC. Session completed with OT to maximize patient safety. Nurse cleared patient for participation in skilled physical therapy. Patient received reclined in bed, awake and alert, agreeable to physical therapy treatment. Patient with 5 LPM O2 per NC. Vitals at rest:  bpm. Demonstrated independence for all functional mobility including gait in room without AD. Denied pain, denied shortness of breath. Patient denies functional mobility concerns at this time and is eager to go home. Will sign off from skilled PT. No further therapy needed and no DME needs. Patient does not require further skilled intervention at this level of care. PLAN :  Recommendations and Planned Interventions:   No formal PT needs identified at this time. Discharge Recommendations: None  Further Equipment Recommendations for Discharge: N/A     SUBJECTIVE:   Patient stated I want to get back to work! You can bring me a hot dog and a beer. \"    OBJECTIVE DATA SUMMARY:     Past Medical History:   Diagnosis Date    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 Dr. Cherelle Thompson.     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 Dr. Ray Saleh. Past Surgical History:   Procedure Laterality Date    HX AMPUTATION FINGER Left     index    HX BACK SURGERY  10/23/2019    Right L4-5 hemilaminectomy, medial facetectomy, resection of synovial cyst    HX CARPAL TUNNEL RELEASE Bilateral     HX CATARACT REMOVAL Left 01/2018    HX CATARACT REMOVAL Bilateral 2018    HX COLONOSCOPY      HX HEENT      sinus, tonsillectomy    HX HERNIA REPAIR      HX MOHS PROCEDURES      bilateral     HX ORTHOPAEDIC      lef knee surgery, removed cartilage. HX ROTATOR CUFF REPAIR Right      Barriers to Learning/Limitations: None  Compensate with: N/A  Home Situation:   Home Situation  Home Environment: Private residence  # Steps to Enter: 1  One/Two Story Residence: Two story  # of Interior Steps: 13  Living Alone: No  Support Systems: Spouse/Significant Other/Partner  Patient Expects to be Discharged to[de-identified] Private residence  Current DME Used/Available at Home: None  Tub or Shower Type: Tub/Shower combination  Critical Behavior:  Neurologic State: Alert  Orientation Level: Oriented X4  Cognition: Follows commands  Safety/Judgement: Fall prevention  Psychosocial  Patient Behaviors: Calm; Cooperative  Purposeful Interaction: Yes  Pt Identified Daily Priority: Clinical issues (comment)  Caritas Process: Nurture loving kindness;Establish trust;Teaching/learning; Attend basic human needs; Supportive expression  Caring Interventions: Reassure  Reassure: Acceptance;Caring rounds  Therapeutic Modalities: Intentional therapeutic touch  Other Caring Modalities: 5 Ps/rounding                 Strength:    Strength:  Within functional limits            Tone & Sensation:   Tone: Normal      Sensation: Intact          Range Of Motion:  AROM: Within functional limits            Functional Mobility:  Bed Mobility:   Supine to Sit: Independent  Sit to Supine: Independent  Scooting: Independent  Transfers:  Sit to Stand: Independent  Stand to Sit: Independent     Balance:   Sitting: Intact  Standing: Intact    Ambulation/Gait Training:  Distance (ft): 50 Feet (ft)  Assistive Device: Other (comment)(none)  Ambulation - Level of Assistance: Independent     Gait Description (WDL): Within defined limits       Pain:  Pain level pre-treatment: 0/10   Pain level post-treatment: 0/10  Pain Intervention(s): Medication (see MAR); Rest, Repositioning   Response to intervention: Nurse notified, See doc flow    Activity Tolerance:   Fair+  Please refer to the flowsheet for vital signs taken during this treatment. After treatment:   []         Patient left in no apparent distress sitting up in chair  [x]         Patient left in no apparent distress in bed  [x]         Call bell left within reach  [x]         Nursing notified  []         Caregiver present  []         Bed alarm activated  []         SCDs applied    COMMUNICATION/EDUCATION:   [x]         Role of Physical Therapy in the acute care setting. [x]         Fall prevention education was provided and the patient/caregiver indicated understanding. []         Patient/family have participated as able in goal setting and plan of care. []         Patient/family agree to work toward stated goals and plan of care. []         Patient understands intent and goals of therapy, but is neutral about his/her participation. []         Patient is unable to participate in goal setting/plan of care: ongoing with therapy staff. [x]         Other: patient agreeable to discharge from skilled PT and denies functional goals.     Thank you for this referral.  Aleah Santos DPT   Time Calculation: 11 mins      Eval Complexity: History: MEDIUM  Complexity : 1-2 comorbidities / personal factors will impact the outcome/ POC Exam:LOW Complexity : 1-2 Standardized tests and measures addressing body structure, function, activity limitation and / or participation in recreation  Presentation: LOW Complexity : Stable, uncomplicated  Clinical Decision Making:Low Complexity mobility, gait, balance  Overall Complexity:LOW

## 2020-07-09 NOTE — PROGRESS NOTES
[facility] discharge instructions with patient. All questions/concerns addressed. Pt received filled prescriptions for Eliquis and prednisone. Home oxygen delivered to patient home and to be brought in my patient son at [place].

## 2020-07-09 NOTE — PROGRESS NOTES
D/c order noted for today. Spoke with [facility] and oxygen portable and concentrator to be delivered to home and pt's son will bring portable to hospital to  pt. Updated bedside Rn and pt by phone. No other needs at this time.  RAVEN Melgar, [place]- [phone]

## 2020-07-09 NOTE — DISCHARGE SUMMARY
Discharge Summary    Patient: Annalise Ness MRN: [id number]  CSN: [id number]    YOB: 1940  Age: 68 y.o.   Sex: male    DOA: 7/2/2020 LOS:  LOS: 7 days   Discharge Date:      Admission Diagnoses: COVID-19 virus infection [U07.1]    Discharge Diagnoses:    Problem List as of 7/9/2020 Date Reviewed: 7/2/2020          Codes Class Noted - Resolved    COVID-19 virus infection ICD-10-CM: U07.1  ICD-9-CM: 079.89  7/2/2020 - Present        Acute respiratory distress ICD-10-CM: R06.03  ICD-9-CM: 518.82  7/2/2020 - Present        Depression, major, recurrent, mild ([facility] ICD-10-CM: F33.0  ICD-9-CM: 296.31  3/1/2020 - Present        Synovial cyst of lumbar facet joint ICD-10-CM: M71.38  ICD-9-CM: 727.40  10/23/2019 - Present        Spinal stenosis ICD-10-CM: M48.00  ICD-9-CM: 724.00  10/23/2019 - Present        Obstructive sleep apnea syndrome ICD-10-CM: G47.33  ICD-9-CM: 327.23  8/17/2019 - Present    Overview Addendum 3/1/2020  5:05 PM by Magen Meier MD     Sleep study (10/2019) severe JANNET with AHI 35 and oxygen guy 80%             Right sided sciatica ICD-10-CM: M54.31  ICD-9-CM: 724.3  8/17/2019 - Present        Lumbar facet arthropathy ICD-10-CM: M47.816  ICD-9-CM: 721.3  8/17/2019 - Present        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, sequela ([facility] ICD-10-CM: G77.993E  ICD-9-CM: 905.9  9/30/2018 - Present        Candidal intertrigo ICD-10-CM: B37.2  ICD-9-CM: 112.3  9/30/2018 - Present        APC (atrial premature contractions) ICD-10-CM: I49.1  ICD-9-CM: 427.61  8/15/2018 - Present        Class 1 obesity due to excess calories with serious comorbidity and body mass index (BMI) of 31.0 to 31.9 in adult ICD-10-CM: E66.09, Z68.31  ICD-9-CM: 278.00, V85.31  2/20/2018 - Present        Rectal bleeding ICD-10-CM: K62.5  ICD-9-CM: 569.3  8/13/2017 - Present        Impaired fasting glucose ICD-10-CM: R73.01  ICD-9-CM: 790.21  2/5/2017 - Present        CPDD (calcium pyrophosphate deposition disease) ICD-10-CM: M11.20  ICD-9-CM: 275.49, 712.20  Unknown - Present        Colon polyps ICD-10-CM: K63.5  ICD-9-CM: 211.3  1/27/2016 - Present        Vitamin D deficiency ICD-10-CM: E55.9  ICD-9-CM: 268.9  1/19/2015 - Present        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 ([facility] ICD-10-CM: M06.041, M06.042  ICD-9-CM: 714.0  7/28/2014 - Present        Hypertension ICD-10-CM: I10  ICD-9-CM: 401.9  9/6/2011 - Present        Primary osteoarthritis involving multiple joints ICD-10-CM: M89.49  ICD-9-CM: 715.98  9/6/2011 - Present        Hyperlipidemia ICD-10-CM: E78.5  ICD-9-CM: 272.4  9/6/2011 - Present        GERD (gastroesophageal reflux disease) ICD-10-CM: K21.9  ICD-9-CM: 530.81  9/6/2011 - Present        Pre-diabetes ICD-10-CM: R73.03  ICD-9-CM: 790.29  9/6/2011 - Present        BPH with obstruction/lower urinary tract symptoms ICD-10-CM: N40.1, N13.8  ICD-9-CM: 600.01, 599.69  Unknown - Present        RESOLVED: Synovial cyst ICD-10-CM: M71.30  ICD-9-CM: 727.40  10/23/2019 - 11/23/2019        RESOLVED: PND (paroxysmal nocturnal dyspnea) ICD-10-CM: R06.00  ICD-9-CM: 786.09  8/17/2019 - 3/1/2020        RESOLVED: Macrocytosis without anemia ICD-10-CM: D75.89  ICD-9-CM: 289.89  2/23/2019 - 3/1/2020        RESOLVED: Depression ICD-10-CM: F32.9  ICD-9-CM: 334  2/5/2017 - 3/1/2020        RESOLVED: Positive anti-CCP test ICD-10-CM: R76.8  ICD-9-CM: 795.79  3/19/2014 - 1/19/2015              Reason for Admission  68years old white male with multiple medical problems including rheumatoid arthritis on hydroxychloroquine, hypertension, prediabetes, degenerative joint disease and other medical problems as below who presented to the emergency department with shortness of breath. He was seen in the ED 6/29 when he tested positive for COVID-19 infection. He had positive contact with coworkers.   He was discharged home and advised to present back to the ED if he developed shortness of breath. His symptoms started 6/22/20 with myalgias, fatigue and diarrhea, and then he developed a fever and shortness of breath. He denied having chest pain, vomiting or abdominal pain. He was hypoxic in the 80s upon arrival to the ED and required high flow oxygen. He was admitted for further management. His chest x-ray was consistent with COVID and showing bilateral patchy infiltrates. Discharge Condition: Good    PHYSICAL EXAM at discharge  Visit Vitals  /86 (BP 1 Location: Left arm, BP Patient Position: At rest)   Pulse 65   Temp 97 °F (36.1 °C)   Resp 18   Ht 5' 8\" (1.727 m)   Wt 92.8 kg (204 lb 9.6 oz)   SpO2 94%   BMI 31.11 kg/m²         General:   awake alert and oriented, sitting up in bed speaking in full sentences on high flow. Appears several years younger than his stated age. Skin:   no rashes or skin lesions noted on limited exam, dry and warm   HEENT:  No scleral icterus or pallor; oral mucosa moist, lips moist   Lymph Nodes:   not assessed today   Lungs:   non, labored; bilaterally clear to aspiration- no crackles wheezes rales or rhonchi   Heart:  RRR, s1 and s2; no murmurs rubs or gallops; no edema, + pedal pulses   Abdomen:  soft, non-distended, active bowel sounds, non-tender         Extremities:   average muscle tone; no contractures, no joint effusions   Neurologic:  No gross focal motor or sensory abnormalities; CN 2-12 intact; Follows commands. Hospital Course:   1. COVID-19 pneumonia: Positive covid- 19 test 6/29/20 - oxygen requirements weaned down from high flow O2 5 L nasal cannula continuous - per ID recs, he received remdesivir, he was given IV steroids and is discharged on oral steroid taper. He is also discharged on low-dose Eliquis for 30 days, vitamin C, zinc, vitamin D. Antibiotics completed. Inflammatory markers continue to trend down.    2. Acute hypoxic respiratory failure secondary to interstitial pneumonitis related to COVID pneumonia - he qualified for home oxygen. 3. Leukocytosis: suspect related to high dose steroids  4. Obstructive sleep apnea, severe with AHI of 35 - outpatient follow-up with pulmonary has been arranged  5. Hypertension: Resume home medications  6. Seronegative rheumatoid arthritis: On Plaquenil at home  7. GERD  8. Calcium pyrophosphate deposition  9. Steroid induced hyperglycemia - ssi, lantus given in house. Close outpatient follow-up with primary care physician. 10. Overweight  Body mass index is 29.27 kg/m². 11. elevated LFTs likely related to remdesivir -  trending down prior to discharge. 12. DVT prophylaxis was given in the form of Lovenox subcut daily  13. Full code. He did well with physical therapy, he is not homebound. Discharge to home with home O2. Patient agrees, all questions answered to the best of my ability.       Consults: Infectious Disease Dr. Donell Beckford  Pulmonary critical care Dr. Marissa Keating    Significant Diagnostic Studies: labs:   Recent Results (from the past 24 hour(s))   GLUCOSE, POC    Collection Time: 07/08/20  5:38 PM   Result Value Ref Range    Glucose (POC) 142 (H) 70 - 110 mg/dL   GLUCOSE, POC    Collection Time: 07/08/20 10:23 PM   Result Value Ref Range    Glucose (POC) 110 70 - 110 mg/dL   FERRITIN    Collection Time: 07/09/20  5:28 AM   Result Value Ref Range    Ferritin 338 8 - 388 NG/ML   C REACTIVE PROTEIN, QT    Collection Time: 07/09/20  5:28 AM   Result Value Ref Range    C-Reactive protein 0.8 (H) 0 - 0.3 mg/dL   LD    Collection Time: 07/09/20  5:28 AM   Result Value Ref Range     (H) 81 - 234 U/L   D DIMER    Collection Time: 07/09/20  5:28 AM   Result Value Ref Range    D DIMER 0.85 (H) <0.46 ug/ml(FEU)   GLUCOSE, POC    Collection Time: 07/09/20  8:49 AM   Result Value Ref Range    Glucose (POC) 91 70 - 110 mg/dL   GLUCOSE, POC    Collection Time: 07/09/20 12:18 PM   Result Value Ref Range    Glucose (POC) 134 (H) 70 - 110 mg/dL       Results Procedure Component Value Units Date/Time    CULTURE, BLOOD [[id number]] Collected:  07/02/20 2215    Order Status:  Completed Specimen:  Blood Updated:  07/08/20 0656     Special Requests: NO SPECIAL REQUESTS        Culture result: NO GROWTH 6 DAYS       CULTURE, BLOOD [[id number]] Collected:  07/02/20 1830    Order Status:  Completed Specimen:  Blood Updated:  07/08/20 0656     Special Requests: NO SPECIAL REQUESTS        Culture result: NO GROWTH 6 DAYS             COVID + 6/29/2020. IMAGING  XR Results (most recent):  Results from Hospital Encounter encounter on 07/02/20   XR CHEST PORT    Narrative AP CHEST, PORTABLE    INDICATION: Shortness of breath. Covid 19 infection/pneumonia    COMPARISON: Prior chest x-rays, most recent 6/29/2020    FINDINGS:  EKG leads overlie the patient. The cardiac silhouette is normal in size. Mild diffuse prominence of the  bronchovascular markings. Similar to minimally increased patchy groundglass  opacities. No pleural effusion or pneumothorax No acute osseous abnormalities  are identified. Impression Impression:      1. Similar to minimally increased patchy groundglass airspace opacities. Mild  diffuse prominence of the bronchovascular markings, probably bronchitis or mild  pulmonary vascular congestion                       Discharge Medications:     Current Discharge Medication List      START taking these medications    Details   predniSONE (DELTASONE) 10 mg tablet 4 tabs po daily with breakfast x 2 days, then 3 tabs po daily with breakfast x 2 days, then 2  tabs po daily with breakfast x 2 days, then 1 tab po daily with breakfast x 2 days, then stop. Qty: 20 Tab, Refills: 0      apixaban (Eliquis) 2.5 mg tablet Take 1 Tab by mouth two (2) times a day for 30 days. Qty: 60 Tab, Refills: 0      ascorbic acid, vitamin C, (Vitamin C) 500 mg tablet Take 1 Tab by mouth two (2) times a day for 14 days.   Qty: 28 Tab, Refills: 0      zinc 50 mg tab tablet Take 1 Tab by mouth daily for 14 days. Qty: 14 Tab, Refills: 0         CONTINUE these medications which have NOT CHANGED    Details   gabapentin (NEURONTIN) 300 mg capsule Take 1 Cap by mouth three (3) times daily. Max Daily Amount: 900 mg. Indications: neuropathic pain  Qty: 270 Cap, Refills: 1    Associated Diagnoses: Lumbar stenosis with neurogenic claudication; S/P lumbar laminectomy      tamsulosin (Flomax) 0.4 mg capsule Take 1 Cap by mouth daily. Indications: enlarged prostate with urination problem  Qty: 90 Cap, Refills: 3    Associated Diagnoses: BPH associated with nocturia      sertraline (ZOLOFT) 50 mg tablet Take 1.5 Tabs by mouth daily. Qty: 135 Tab, Refills: 2      atorvastatin (LIPITOR) 10 mg tablet TAKE 1 TABLET BY MOUTH ONCE DAILY  Qty: 90 Tab, Refills: 3      celecoxib (CELEBREX) 200 mg capsule TAKE 1 CAPSULE BY MOUTH ONCE DAILY  Qty: 90 Cap, Refills: 2      lisinopril (PRINIVIL, ZESTRIL) 40 mg tablet TAKE 1 TABLET BY MOUTH DAILY  Qty: 90 Tab, Refills: 3      omeprazole (PRILOSEC) 20 mg capsule TAKE 1 CAPSULE BY MOUTH ONCE DAILY  Qty: 90 Cap, Refills: 2      mineral oil liquid Take 30 mL by mouth daily as needed for Constipation. amLODIPine (NORVASC) 10 mg tablet TAKE 1 TABLET BY MOUTH ONCE DAILY  Qty: 90 Tab, Refills: 3      hydrALAZINE (APRESOLINE) 25 mg tablet Take 1 Tab by mouth two (2) times a day. Qty: 1 Tab, Refills: 0      furosemide (LASIX) 40 mg tablet Take 1 Tab by mouth daily. Qty: 90 Tab, Refills: 3      potassium chloride (K-DUR, KLOR-CON) 20 mEq tablet Take 1 Tab by mouth daily. Qty: 90 Tab, Refills: 2      cycloSPORINE (RESTASIS) 0.05 % ophthalmic emulsion Administer 1 Drop to both eyes nightly. colchicine (MITIGARE) 0.6 mg capsule Take 0.6 mg by mouth every other day. cholecalciferol, vitamin D3, (VITAMIN D3) 2,000 unit tab Take 2,000 Units by mouth daily. diclofenac (VOLTAREN) 1 % topical gel Apply 4 g to affected area four (4) times daily.       LUMIGAN 0.01 % ophthalmic drops Administer 1 Drop to both eyes nightly. MULTIVITS/IRON FUM/FA/D3/LYCOP (MULTI FOR HIM PO) Take  by mouth daily.          STOP taking these medications       hydroxychloroquine (PLAQUENIL) 200 mg tablet Comments:   Reason for Stopping:               Activity: Activity as tolerated    Diet: Cardiac Diet    Wound Care: None needed    Follow-up: Dr. Irma Tijerina 1 week    Minutes spent on discharge: Greater than 30

## 2020-07-10 NOTE — TELEPHONE ENCOUNTER
Spoke with the pharmacist at [facility] he states prescription was received on 6/26/2020 and patient picked up medication.

## 2020-07-10 NOTE — TELEPHONE ENCOUNTER
Spoke with patient he picked up medication and states he would like to hold off in seeing urology at this time. Patient was advised to call the office if he changes his mind. Patient verbalizes understanding.

## 2020-07-10 NOTE — TELEPHONE ENCOUNTER
He is on Flomax. Refill was sent to [facility] on 6/26/2020 for 90 tabs with 3 refills. Please see if I need to send this again.

## 2020-07-10 NOTE — TELEPHONE ENCOUNTER
Pt stated its no particular reason he just wants referral for a medication that  used to prescribe. Pt couldn't think of name of med but stated he may have a bottle around so that he can locate name.

## 2020-07-10 NOTE — TELEPHONE ENCOUNTER
Patient was previously followed by Dr. Celso Robins for BPH with urinary retention. Please see if he is having any current problems. Otherwise, will place referral for [facility], Dr. Ranjan Palma or [facility]. Thanks.

## 2020-07-16 ENCOUNTER — TELEPHONE (OUTPATIENT)
Dept: INTERNAL MEDICINE CLINIC | Age: 80
End: 2020-07-16

## 2020-07-16 ENCOUNTER — VIRTUAL VISIT (OUTPATIENT)
Dept: INTERNAL MEDICINE CLINIC | Age: 80
End: 2020-07-16

## 2020-07-16 DIAGNOSIS — N40.1 BPH WITH OBSTRUCTION/LOWER URINARY TRACT SYMPTOMS: ICD-10-CM

## 2020-07-16 DIAGNOSIS — M15.9 PRIMARY OSTEOARTHRITIS INVOLVING MULTIPLE JOINTS: ICD-10-CM

## 2020-07-16 DIAGNOSIS — R73.01 IMPAIRED FASTING GLUCOSE: ICD-10-CM

## 2020-07-16 DIAGNOSIS — N13.8 BPH WITH OBSTRUCTION/LOWER URINARY TRACT SYMPTOMS: ICD-10-CM

## 2020-07-16 DIAGNOSIS — M06.041 RHEUMATOID ARTHRITIS INVOLVING BOTH HANDS WITH NEGATIVE RHEUMATOID FACTOR (HCC): ICD-10-CM

## 2020-07-16 DIAGNOSIS — G47.33 OBSTRUCTIVE SLEEP APNEA SYNDROME: ICD-10-CM

## 2020-07-16 DIAGNOSIS — M06.042 RHEUMATOID ARTHRITIS INVOLVING BOTH HANDS WITH NEGATIVE RHEUMATOID FACTOR (HCC): ICD-10-CM

## 2020-07-16 DIAGNOSIS — J96.01 ACUTE RESPIRATORY FAILURE WITH HYPOXIA (HCC): ICD-10-CM

## 2020-07-16 DIAGNOSIS — I10 ESSENTIAL HYPERTENSION: ICD-10-CM

## 2020-07-16 DIAGNOSIS — U07.1 COVID-19 VIRUS INFECTION: Primary | ICD-10-CM

## 2020-07-16 DIAGNOSIS — S68.621S: ICD-10-CM

## 2020-07-16 DIAGNOSIS — R73.03 PRE-DIABETES: ICD-10-CM

## 2020-07-16 DIAGNOSIS — E78.5 HYPERLIPIDEMIA, UNSPECIFIED HYPERLIPIDEMIA TYPE: ICD-10-CM

## 2020-07-16 DIAGNOSIS — F33.0 DEPRESSION, MAJOR, RECURRENT, MILD (HCC): ICD-10-CM

## 2020-07-16 DIAGNOSIS — U07.1 PNEUMONIA DUE TO COVID-19 VIRUS: ICD-10-CM

## 2020-07-16 DIAGNOSIS — M11.20 CPDD (CALCIUM PYROPHOSPHATE DEPOSITION DISEASE): ICD-10-CM

## 2020-07-16 DIAGNOSIS — J12.82 PNEUMONIA DUE TO COVID-19 VIRUS: ICD-10-CM

## 2020-07-16 NOTE — TELEPHONE ENCOUNTER
Please schedule patient for follow-up virtual appointment in 2 weeks. Will try video if possible. Thank you.

## 2020-07-16 NOTE — TELEPHONE ENCOUNTER
LMTCB    Called patient to advise him that the Pulse Oximeter that Dr. Alexander Kimble ordered is not covered by his insurance. He will have to buy over the counter at the drug store or online. Dr. Alexander Kimble did place a STAT referral for pulmonary that we will expedite.

## 2020-07-16 NOTE — TELEPHONE ENCOUNTER
Pt's spouse Margot Garay calling and wants to know how long does he need to be quarantined due to Covid? No one has told him what is he supposed to do and she is stuck in the house. Please advise her at [phone].

## 2020-07-17 ENCOUNTER — TELEPHONE (OUTPATIENT)
Dept: INTERNAL MEDICINE CLINIC | Age: 80
End: 2020-07-17

## 2020-07-17 NOTE — TELEPHONE ENCOUNTER
Victoria Kong with Dr Elveria Bence office at [facility] calling re: request for urgent referral     Stated they are unable to schedule an \"in office appt\" due to pt and covid. Also stated Dr Oralia Bazzi wanted oxygen ordered and it appears oxygen was ordered at hospital discharge    She ask you want them to try and schedule a virtual appt?

## 2020-07-17 NOTE — TELEPHONE ENCOUNTER
Spoke with Michelle Eaton- they will schedule patient virtually. Informed her that he was being referred to them so Dr. Benjie Dent could help guide weaning him from the oxygen. Patient already has oxygen at home. She said they will reach out and schedule patient. No further questions or concerns at this time.

## 2020-07-18 RX ORDER — HYDRALAZINE HYDROCHLORIDE 25 MG/1
TABLET, FILM COATED ORAL
Qty: 270 TAB | Refills: 2 | Status: SHIPPED | OUTPATIENT
Start: 2020-07-18 | End: 2021-03-19 | Stop reason: ALTCHOICE

## 2020-07-19 PROBLEM — U07.1 PNEUMONIA DUE TO COVID-19 VIRUS: Status: ACTIVE | Noted: 2020-07-19

## 2020-07-19 PROBLEM — R06.03 ACUTE RESPIRATORY DISTRESS: Status: RESOLVED | Noted: 2020-07-02 | Resolved: 2020-07-19

## 2020-07-19 PROBLEM — J96.01 ACUTE RESPIRATORY FAILURE WITH HYPOXIA (HCC): Status: ACTIVE | Noted: 2020-07-19

## 2020-07-19 PROBLEM — J12.82 PNEUMONIA DUE TO COVID-19 VIRUS: Status: ACTIVE | Noted: 2020-07-19

## 2020-07-19 NOTE — PROGRESS NOTES
Mayte Henderson is a 68 y.o. male, evaluated via audio-only technology on 7/16/2020. Patient being seen today for transitional care management. Patient was admitted to [facility] from: 7/3-7/9/2020            Initial interactive contact was done by nurse navigator Charles Brian. I thoroughly reviewed the discharge summary, notes, consults, labs and imaging studies in the electronic medical record. Pertinent details are summarized below and the record has been updated to reflect recent events. HPI:   Mayte Henderson is a 68y.o. year old male who presents today for post-hospitalization follow-up. He has a history of hypertension, hyperlipidemia, prediabetes, rheumatoid arthritis, osteoarthritis, calcium pyrophosphate deposition disease, BPH, GERD, and depression. 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 On 6/22/2020, he noted the onset of weakness, fatigue, and diarrhea. He stated that he continued to work for the entire week despite feeling ill. On 6/27/2020, he developed fever and dyspnea, which worsened throughout the weekend. He presented to [facility] on 6/28/2020, and WBC 4.0 (78 % neutrophils) and chest x-ray showed patchy density in the RUL, right perihilar area, and right base. He was advised to go to the ED, and presented to [facility] ED on 6/29/2020. He was found to be hypoxic with pulse ox at rest 92-93 % and ambulation 89-91% (room air). Chest x-ray showed bilateral multifocal extremely subtle groundglass opacities. Labs showed WBC 4.4 (80% neutrophils), Hb 14.6/ Hct 42.0, platelets 265, creatinine 0.71/ eGFR >60, AST 53, alk phos 160, lactate 1.16. He was discharged home, and SARS COV-2 positive (6/29/2020) result returned. He presented for a virtual visit on 7/2/2020, and reported that since discharge from the ED, he noted persistent symptoms of weakness, diarrhea, fatigue, and fevers, and prgressive dyspnea.  He described poor po intake, and chest pain with inspiration and felt that he was unable to take a deep breath or cough. He was advised to call EMS and return to the ED. Upon arrival by EMS, his oxygenation was noted to be in the low 80's, and required high flow oxygen. Chest x-ray (7/2/2020) showed bilateral increased patchy groundglass airspace opacities, and labs revealed ABG 7.43/34/70 on nonrebreather mask; WBC 7.3 (11% lymphocytes), Hb 14.8/ Hct 42.6, creatinine 0.8/ eGFR>60, ALT 79, AST 76, alk phos 170, albumin 2.7 D-dimer 1.35, ferritin 729, CRP 16.6, , procalcitonin 0.14, lactate 2.8, blood culture negative. He was initially started on Zosyn, but Dr. Dwane Lennox. Patel (ID) was consulted and recommended discontinuing and beginning azithromycin, remdesivir, IV Solumedrol, and lovenox. He was also started on ascorbic acid, melatonin, and zinc. Dr. Jackeline Marie (pulmonary) was consulted and recommended change to high flow nasal cannula and recommended proning to the patient. He improved clinically and inflammatory indices improved. His oxygen was weaned to 5 liters nasal cannula, and he completed 5 day course of remdesivir and azithromycin. He was discharged on 7/9/2020 with an 8 day course of prednisone, 30 day course of Eliquis 2.5 mg bid, and two week course of Vitamin  C and zinc. He reports today that he has been gradually improving since discharge. He reports that he lost 25 pounds during his acute illness, and states that his appetite is much improved and he is eating well. He does report some persistent congestion, and reports significant dyspnea on exertion. He states that he has to walk up a flight of stairs to get to his bathroom, and must remove his oxygen to do so. He reports severe exhaustion and shortness of breath after climbing the stairs, and states that he often must lie down to recover before returning downstairs. He also describes his unsteadiness and states that \"his equilibrium is off\".   He does not have a pulse oximeter and has not been monitoring his blood pressure. He reports that he had a virtual appointment with Dr. Pippa Bah earlier this week and he recommended holding regular dosing of Celebrex and taking only as needed. He is otherwise without new complaints. On 8/13/2019, he reported that he had been seeing Dr. Comfort Contreras for increasing difficulty with right sided sciatica. He reported being treated with a Medrol dose pack, physical therapy, and spinal injections without improvement, and was also prescribed Norco and Tramadol, but he stated that he found them too sedating and of no benefit. Due to persistent symptoms, he underwent a lumbar MRI (8/5/2019) which showed degenerative changes most notable at L4-L5 resulting in moderate spinal canal stenosis as well as moderate to severe right greater than left foraminal stenoses; advanced facet arthropathy with small facet effusions and suspected small right facet joint ventral synovial cyst; notable degenerative changes also L3-L4; L1 mild anterior compression deformity, chronic appearing; overall general worsening when compared to prior study in 2007. He was having continued significant pain, and was started on gabapentin 100 mg tid. He was referred to Dr. Sharda Fiore and she increased his dose of gabapentin to 200 mg tid. She also referred him to Dr. Lazaro Alvarado for evaluation given his lack of response to conservative management. He recommended proceeding with decompression by performing a L4/5 right-sided hemilaminotomy and medial facetectomy, which was performed on 10/23/2019. He developed postop urinary retention and was discharged with a Deleon catheter. He had follow-up with Dr. Jermain Moran on 10/29/2019 with successful voiding trial. He reports that he noted initial resolution of his right sciatica pain following surgery, but on 10/29/2019 noted abrupt recurrence when getting out of bed. He was evaluated by GOMEZ Doherty on 10/31/2019 and was treated with a Medrol dose pack without improvement.  He was restarted on gabapentin 300 mg tid and reports improvement, although he continues to have mild pain in the morning. On 8/9/2019, he had an episode of waking up gasping for air forcing him to get out of bed and walk around until his breathing normalized. He has been having frequent similar episodes over that last year, but had been resistent to evaluation for sleep apnea. However, he reports that this episode was especially severe. When his symptoms persisted, he was evaluated at [facility], and testing included WBC 5.7, Hb 14.2/ Hct 41.6, creatinine 0.9/ eGFR>60, troponin I x 1 negative, NT-pro BNP 45, EKG sinus rhythm at 83 bpm and no ischemic changes, and chest x-ray without acute changes, but an ill defined 15 mm density in the lateral left mid zone was noted. He received lasix 40 mg IV and was discharged. He had an echocardiogram (8/26/2019) showing normal LV function (EF 56-60%), no RWMA, unable to estimate RVSP due to inadequate TR, but TV/PV appear normal. He was referred to Dr. Desi Salgado for probable sleep apnea, and underwent a home sleep study on 10/25/2019,showing evidence of severe obstructive sleep apnea with AHI 35 and oxygen guy 80%. He has not yet received his CPAP equipment so as to begin therapy. On 6/20/2018, he suffered an accidental gun shot wound while working resulting in traumatic injury to his left index finger with near loss of the middle phalanx and fracture of the distal phalanx. He was taken to [facility] and initially had irrigation and closure of the lacerations performed. He presented to the [facility] ED on 6/24/2018 with increased pain and swelling, and was started on doxycycline for a wound infection. On 7/7/2018, due to loss of stability and angulation of the index finger, he underwent stabilization with fusion of the proximal and distal phalanx with bone grafting by Dr. Jamal Abernathy. He did well and was started on physical therapy.  On 8/1/2018, he presented to [facility] for evaluation of increasing erythema, swelling and tenderness with concern for a possible infection. He underwent left hand x-ray (8/1/2018) showing severe soft tissue swelling of the left second finger worrisome for cellulitis, traumatic amputation of the middle phalanx with marked osteopenia and irregularity of the base of the distal phalanx and flattening and irregularity of the head of the proximal phalanx. Unclear if related to prior trauma/surgery or osteomyelitis with osseous destruction and no films available for comparison. He was started empirically on Bactrim and Augmentin for 14 days. On 8/10/2018, he presented for evaluation by GOMEZ Reeves for complaints of fever, malaise, headache, fatigue, and diarrhea. Lab evaluation showed WBC 17 (90% neutrophils), creatinine 1.34/ eGFR 52, blood culture negative. Stool cultures (8/13/2018) routine, O/P, and C.diff negative. Bactrim and Augmentin were discontinued on 8/13/2018, and he was started on metronidazole for 10 days for treatment of presumed C.diff with improvement. He was evaluated by Dr. Kylie Boyer on 8/15/2018 and repeat WBC 8.6 (59% neutrophils), ESR 6, and CRP 0.9. He was seen by Dr. Kylie Boyer in follow-up on 8/30/2018 and left index finger wound noted to be significantly improved and no further difficulty with diarrhea. He has a history of hypertension, treated with amlodipine, lisinopril, lasix (+ potassium), and hydralazine was added in 4/2018. He states that his wife has been monitoring his blood pressure intermittently. He denies any chest pain, shortness of breath at rest or with exertion, lightheadedness, or palpitations. He does report bilateral lower extremity swelling that it will increase throughout the day and improve overnight. . In 6/2016, he underwent lower extremity arterial and venous duplex scans, both of which were negative. He also has a history of hyperlipidemia, treated with moderate intensity atorvastatin.     He has a history of prediabetes, with HbA1c ranging from 5.9-6.2 since 2012. He denies any polyuria, polydipsia, nocturia, or blurry vision, and has no history of retinopathy, neuropathy, or nephropathy. He has regular eye exams with Dr. Belinda Tracey. He has a history of bilateral hand pain with morning stiffness for several years, and in 3/2014, he was noted to have a positive anti-CCP antibody level although NIMCO, rheumatoid factor, and ESR were negative. He was referred for evaluation to Dr. Chey Fine, and was diagnosed with rheumatoid arthritis in 6/2014. He was treated with hydroxychloroquine, which has been partially helpful in controlling his symptoms. Bilateral hand x-rays also showed evidence of primary osteoarthritis with osteophytes present on the second and third MCP joints. In 1/2017, he was also noted to have evidence of possible chondrocalcinosis on x-ray, and was started on low dose colchicine in addition to hydroxychloroquine for concomitant calcium pyrophosphate deposition disease. He states that since starting on colchicine, he has had significant improvement in his hand pain. He also uses compounding cream and Voltaren gel for pain control. In 1/2019, he was complaining of worsening neck and bilateral shoulder pain (R>L). He stated that he was previously followed by Dr. Sadiq Alonso, and would occasionally receive cortisone injections into his shoulders. He also described neck pain with difficulty turning his head. He denied any upper extremity weakness or paresthesias. He underwent imaging, and bilateral shoulder x-rays (1/10/2019) showed degenerative changes and secondary findings of rotator cuff pathology. Cervical spine x-rays (1/10/2019) showed advanced degenerative changes with multilevel facet arthropathy. He was evaluated by Dr. Eli Rodríguez who gave him a cortisone injection in his right shoulder with some improvement. He also recommended a reverse shoulder replacement, but he remains hesitant to proceed.  He was started on Celebrex 200 mg bid in 2/2019, and reports significant improvement. He continues to also use Tylenol as needed for pain. He states that his neck pain seems to have improved to his baseline level. He has a history of symptomatic BPH, with complaints of decreased stream, hesitancy, and dribbling. He has refused treatment with medication. He is followed by Dr. Art Song. He denies any dysuria, gross hematuria, or flank pain. He has a history of GERD, treated with daily omeprazole with good control of symptoms. He had a screening colonoscopy and 8/2015 by Dr. Ananda Sanchez, showing a 3 mm sessile cecal polyp (pathology: intra-mucosal lymphoid aggregate), two 4 mm sessile polyps in the transverse colon (pathology: serrated adenomas), and a 1 cm lipoma in the transverse colon. Follow-up recommended for five years. He was having difficulty with rectal bleeding in 1/2018 and returned for evaluation with Dr. Ananda Sanchez who felt it was most likely secondary to a hemorrhoidal source. She recommended use of Citrucel. He states that the bleeding has improved with initiation of this therapy. He denies any abdominal pain, nausea, vomiting, melena, or change in bowel movements. He has a history of depression and anxiety, which had been well controlled with Paxil although inadvertently stopped. Now on Zoloft with improvement. 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 Dr. Art Song.    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 Dr. Wu Spears. Past Surgical History:   Procedure Laterality Date    HX AMPUTATION FINGER Left     index    HX BACK SURGERY  10/23/2019    Right L4-5 hemilaminectomy, medial facetectomy, resection of synovial cyst    HX CARPAL TUNNEL RELEASE Bilateral     HX CATARACT REMOVAL Left 01/2018    HX CATARACT REMOVAL Bilateral 2018    HX COLONOSCOPY      HX HEENT      sinus, tonsillectomy    HX HERNIA REPAIR      HX MOHS PROCEDURES      bilateral     HX ORTHOPAEDIC      lef knee surgery, removed cartilage.  HX ROTATOR CUFF REPAIR Right      Current Outpatient Medications   Medication Sig    hydrALAZINE (APRESOLINE) 25 mg tablet TAKE 1 TABLET BY MOUTH 3 TIMES A DAY    apixaban (Eliquis) 2.5 mg tablet Take 1 Tab by mouth two (2) times a day for 30 days.  ascorbic acid, vitamin C, (Vitamin C) 500 mg tablet Take 1 Tab by mouth two (2) times a day for 14 days.  zinc 50 mg tab tablet Take 1 Tab by mouth daily for 14 days.  gabapentin (NEURONTIN) 300 mg capsule Take 1 Cap by mouth three (3) times daily. Max Daily Amount: 900 mg. Indications: neuropathic pain    tamsulosin (Flomax) 0.4 mg capsule Take 1 Cap by mouth daily. Indications: enlarged prostate with urination problem    sertraline (ZOLOFT) 50 mg tablet Take 1.5 Tabs by mouth daily.  atorvastatin (LIPITOR) 10 mg tablet TAKE 1 TABLET BY MOUTH ONCE DAILY    celecoxib (CELEBREX) 200 mg capsule TAKE 1 CAPSULE BY MOUTH ONCE DAILY    lisinopril (PRINIVIL, ZESTRIL) 40 mg tablet TAKE 1 TABLET BY MOUTH DAILY    omeprazole (PRILOSEC) 20 mg capsule TAKE 1 CAPSULE BY MOUTH ONCE DAILY    mineral oil liquid Take 30 mL by mouth daily as needed for Constipation.  amLODIPine (NORVASC) 10 mg tablet TAKE 1 TABLET BY MOUTH ONCE DAILY    furosemide (LASIX) 40 mg tablet Take 1 Tab by mouth daily.  potassium chloride (K-DUR, KLOR-CON) 20 mEq tablet Take 1 Tab by mouth daily.  cycloSPORINE (RESTASIS) 0.05 % ophthalmic emulsion Administer 1 Drop to both eyes nightly.  colchicine (MITIGARE) 0.6 mg capsule Take 0.6 mg by mouth every other day.  cholecalciferol, vitamin D3, (VITAMIN D3) 2,000 unit tab Take 2,000 Units by mouth daily.  diclofenac (VOLTAREN) 1 % topical gel Apply 4 g to affected area four (4) times daily.  LUMIGAN 0.01 % ophthalmic drops Administer 1 Drop to both eyes nightly.  MULTIVITS/IRON FUM/FA/D3/LYCOP (MULTI FOR HIM PO) Take  by mouth daily. No current facility-administered medications for this visit. Allergies and Intolerances: Allergies   Allergen Reactions    Latex, Natural Rubber Swelling    Adhesive Tape-Silicones Rash and Itching    Aldactone [Spironolactone] Not Reported This Time     Breast tenderness    Codeine Not Reported This Time     \"Drives crazy\"    Tape [Adhesive] Rash    Tetanus Toxoid, Adsorbed Anaphylaxis    Tetanus Vaccines And Toxoid Anaphylaxis     Other reaction(s): anaphylaxis/angioedema  Other reaction(s): anaphylaxis/angioedema  Other reaction(s): anesthetic shock     Family History: He has no family history of colon or prostate cancer. Family History   Problem Relation Age of Onset    Cancer Mother     Heart Disease Father     Alcohol abuse Father     Cancer Other      Social History:   He  reports that he has quit smoking. His smoking use included cigars, pipe, and cigarettes. He quit after 12.00 years of use. He has quit using smokeless tobacco.  His smokeless tobacco use included chew. He smoked 2 ppd for 50 years, stopping in 1974. He is  with two adult children. 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    Social History     Substance and Sexual Activity   Alcohol Use Yes    Comment: rarely     Immunization History:  Immunization History   Administered Date(s) Administered    (RETIRED) Pneumococcal Vaccine (Unspecified Type) 01/01/2008    Influenza High Dose Vaccine PF 09/30/2017    Influenza Vaccine (Tri) Adjuvanted 09/25/2018, 09/25/2019    Influenza Vaccine Split 10/04/2011, 10/16/2012    Influenza Vaccine Whole 01/15/2010    Pneumococcal Conjugate (PCV-13) 01/19/2015    Pneumococcal Polysaccharide (PPSV-23) 01/01/2008    Varicella Virus Vaccine 10/01/2013    Zoster 10/16/2012       Review of Systems:   As above included in HPI. Otherwise 11 point review of systems negative including constitutional, skin, HENT, eyes, respiratory, cardiovascular, gastrointestinal, genitourinary, musculoskeletal, endocrine, hematologic, allergy, and neurologic. Physical:   Vitals: none  BP:     HR:    WT:    BMI:  kg/m2    Exam:   None performed due to audio only visit. Review of Data:  Labs:    No results displayed because visit has over 200 results.       Admission on 06/29/2020, Discharged on 06/29/2020   Component Date Value Ref Range Status    Ventricular Rate 06/29/2020 77  BPM Final    Atrial Rate 06/29/2020 77  BPM Final    P-R Interval 06/29/2020 154  ms Final    QRS Duration 06/29/2020 100  ms Final    Q-T Interval 06/29/2020 398  ms Final    QTC Calculation (Bezet) 06/29/2020 450  ms Final    Calculated P Axis 06/29/2020 55  degrees Final    Calculated R Axis 06/29/2020 -48  degrees Final    Calculated T Axis 06/29/2020 12  degrees Final    Diagnosis 06/29/2020    Final                    Value:Normal sinus rhythm  Left axis deviation  Septal infarct , age undetermined  Abnormal ECG    Confirmed by Lucinda Ocampo MD, Murray Tinsley ([id number]) on 6/30/2020 8:26:13 AM      Sodium 06/29/2020 143  136 - 145 mmol/L Final    Potassium 06/29/2020 3.9  3.5 - 5.5 mmol/L Final    Chloride 06/29/2020 109  100 - 111 mmol/L Final    CO2 06/29/2020 28  21 - 32 mmol/L Final    Anion gap 06/29/2020 6  3.0 - 18 mmol/L Final    Glucose 06/29/2020 115* 74 - 99 mg/dL Final    BUN 06/29/2020 9  7.0 - 18 MG/DL Final    Creatinine 06/29/2020 0.71  0.6 - 1.3 MG/DL Final    BUN/Creatinine ratio 06/29/2020 13  12 - 20   Final    GFR est AA 06/29/2020 >60  >60 ml/min/1.73m2 Final    GFR est non-AA 06/29/2020 >60  >60 ml/min/1.73m2 Final    Calcium 06/29/2020 8.7  8.5 - 10.1 MG/DL Final    Bilirubin, total 06/29/2020 0.6  0.2 - 1.0 MG/DL Final    ALT (SGPT) 06/29/2020 46  16 - 61 U/L Final    AST (SGOT) 06/29/2020 53* 10 - 38 U/L Final    Alk. phosphatase 06/29/2020 160* 45 - 117 U/L Final    Protein, total 06/29/2020 6.9  6.4 - 8.2 g/dL Final    Albumin 06/29/2020 3.4  3.4 - 5.0 g/dL Final    Globulin 06/29/2020 3.5  2.0 - 4.0 g/dL Final    A-G Ratio 06/29/2020 1.0  0.8 - 1.7   Final    WBC 06/29/2020 4.4* 4.6 - 13.2 K/uL Final    RBC 06/29/2020 4.44* 4.70 - 5.50 M/uL Final    HGB 06/29/2020 14.6  13.0 - 16.0 g/dL Final    HCT 06/29/2020 42.0  36.0 - 48.0 % Final    MCV 06/29/2020 94.6  74.0 - 97.0 FL Final    MCH 06/29/2020 32.9  24.0 - 34.0 PG Final    MCHC 06/29/2020 34.8  31.0 - 37.0 g/dL Final    RDW 06/29/2020 12.9  11.6 - 14.5 % Final    PLATELET 30/27/1965 924* 135 - 420 K/uL Final    MPV 06/29/2020 10.3  9.2 - 11.8 FL Final    NEUTROPHILS 06/29/2020 80* 40 - 73 % Final    LYMPHOCYTES 06/29/2020 12* 21 - 52 % Final    MONOCYTES 06/29/2020 8  3 - 10 % Final    EOSINOPHILS 06/29/2020 0  0 - 5 % Final    BASOPHILS 06/29/2020 0  0 - 2 % Final    ABS. NEUTROPHILS 06/29/2020 3.5  1.8 - 8.0 K/UL Final    ABS. LYMPHOCYTES 06/29/2020 0.5* 0.9 - 3.6 K/UL Final    ABS. MONOCYTES 06/29/2020 0.4  0.05 - 1.2 K/UL Final    ABS. EOSINOPHILS 06/29/2020 0.0  0.0 - 0.4 K/UL Final    ABS.  BASOPHILS 06/29/2020 0.0  0.0 - 0.1 K/UL Final    DF 06/29/2020 AUTOMATED    Final    SARS-CoV-2 06/29/2020 Detected* Not Detected   Final    Lactic Acid (POC) 06/29/2020 1.16  0.40 - 2.00 mmol/L Final    CK - MB 06/29/2020 3.0  <3.6 ng/ml Final    CK-MB Index 06/29/2020 2.4  0.0 - 4.0 % Final    CK 06/29/2020 126  39 - 308 U/L Final  Troponin-I, QT 2020 <0.02  0.0 - 0.045 NG/ML Final       Health Maintenance:  Screening:    Colorectal: colonoscopy (2015) serrated adenomas. Dr. Yeison Mai. Due 2020. Depression: on Paxil   DM (HbA1c/FPG): FPG 91/ HbA1c 5.7 (2020)   Hepatitis C: N/A   Falls: none   DEXA: within normal limits (2016)   PSA/MARTÍNEZ: PSA 2.1. As per Dr. Nestor Jensen   Glaucoma: regular eye exams with Dr. Cisse/ Dr. Shankar Foster (last 2020)   Smokin pack year. Distant past.   Vitamin D: 34.3 (2020)   Medicare Wellness: 2020    Impression:  Patient Active Problem List   Diagnosis Code    BPH with obstruction/lower urinary tract symptoms N40.1, N13.8    Hypertension I10    Primary osteoarthritis involving multiple joints M89.49    Hyperlipidemia E78.5    GERD (gastroesophageal reflux disease) K21.9    Pre-diabetes R73.03    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 (HCC) M06.041, M06.042    Vitamin D deficiency E55.9    Colon polyps K63.5    CPDD (calcium pyrophosphate deposition disease) M11.20    Impaired fasting glucose R73.01    Rectal bleeding K62.5    Class 1 obesity due to excess calories with serious comorbidity and body mass index (BMI) of 31.0 to 31.9 in adult E66.09, Z68.31    APC (atrial premature contractions) I49.1    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  Patient reports symptoms of weakness, fatigue, and diarrhea since 6/22/2020, and onset of fever and dyspnea on 6/27/2020. He presented to the [facility] ED on 6/29/2020 due to difficulty breathing and found to be hypoxic with pulse ox at rest 92-93 % and ambulation 89-91% room air. Chest x-ray with bilateral multifocal extremely subtle groundglass opacities. Labs showed leukopenia, thrombocytopenia, and elevated AST and alk phos. He was discharged home,and SARS COV-2 positive result returned the following day. He developed progressive worsening fever and dyspnea, chest pain with inspiration, diarrhea, and poor po intake. Advised to return to ED on 7/2/2020 and found to be hypoxic in the 80's requiring mask rebreather. Chest x-ray showed bilateral increased patchy groundglass airspace opacities and inflammatory markers were elevated. He was treated with high flow nasal cannula, azithromycin, remdesivir, Solumedrol, and Lovenox. He was advised to prone and also received zinc and Vitamin C supplementation. He gradually improved and his oxygen was weaned to 5 liter nasal cannula. He was discharged with home oxygen, prednisone taper for 8 days, and Eliquis 2.5 mg bid for 30 days. Continuing to have difficulty with exertional dyspnea, and has been removing his oxygen at home to climb the starts to his bathroom. Appetite good. Advised of need to obtain pulse oximeter to monitor his oxygenation, and advised to monitor blood pressure due to weakness. Will refer for follow-up with Dr. Chandana Sahu to help with management. Advised that he should continue to isolate until until at least 10 days after his last fever and until his symptoms improve. Other issues:  1. Obstructive sleep apnea, severe. Patient reported in 1/2019 awakening gasping for air several times per week. No overt evidence of heart failure and EKG was without change from baseline at that time. He reported that he would need to sit up immediately and take deep breathes until resolved.  He also admitted to snoring and experiencing significant daytime drowsiness particularly when driving. Given high suspicion for sleep apnea, he was referred to Dr. Alonso Beaver for evaluation, but he never scheduled. Presented with worsening symptoms over several weeks, possibly exacerbated by the inadvertent abrupt cessation of Paxil during this time. Severe episode on 8/9/2019 prompted ED evaluation. EKG without change, troponin negative and NT-pro BNP normal. Echocardiogram (8/26/2019) with normal LV size and function (EF 56-60%), no RWMA, normal valves. Evaluated by Dr. Alonso Beaver and completed home sleep study and diagnosed with severe JANNET. Reported that he still has not received his CPAP equipment, but is in the process of arranging. Will ask Dr. Anahi Thornton to address. 2. Hypertension. Blood pressure had been well controlled on current regimen of lisinopril 40 mg daily, amlodipine 10 mg daily, lasix 40 mg daily (potassium 20 meq daily) and hydralazine 25 mg bid. Advised to monitor his blood pressure at home. Renal function has been normal with creatinine 0.74/ eGFR >60 at discharge. Normal echocardiogram (8/2019). Continue to follow. 3. Hyperlipidemia. On moderate intensity dose atorvastatin with LDL 58 and HDL 55, indicative of excellent control in this patient. Continue to follow. 4. Prediabetes. Had been controlled on diet alone with HbA1c generally stable at 5.7. Required sliding scale insulin dosing in the hospital due to elevated blood sugar, likely related to high dose steroids. Not monitoring blood sugar at home. No evidence of microvascular complications. On Ace-I and statin. Continue follow-up with Dr. Wilma Gowers for annual eye exams. Emphasized importance of lifestyle modifications, including diet, exercise, and weight loss. 5. Rheumatoid arthritis. On hydroxychloroquine. Has regular eye exams with Dr. Wilma Gowers.  Difficult to gauge response as appears to have evidence of osteoarthritis and CPPD occurring concurrently, but noted significant improvement since initiating colchicine. Voltaren gel for pain control. Tylenol while at work to help with pain control as needed. Followed by Dr. Pippa Bah. 6. Primary osteoarthritis. Evident in bilateral hands and knees, bilateral shoulders, and cervical spine. Neck pain now returned to baseline. Shoulder pain (R>L). X-ray with evidence of osteoarthritis and rotator cuff pathology. Evaluated by Dr. Le Hobson and received cortisone injection to right shoulder with some improvement. Surgery for reverse shoulder replacement recommended. Changed from ibuprofen 400 mg qid and Tylenol to Celebrex 200 mg qd with significant improvement. However, will hold given treatment with Eliquis for 30 days. Also using Voltaren gel. 7. CPPD. Colchicine started on 1/19/2017 to help address chondrocalcinosis on x-rays. Reports significant improvement. Now taking every other day with good control. 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 Unresponsive to Medrol dose pack, physical therapy, steroid injections, and unwilling to take narcotics as not effective. Lumbar MRI with multilevel degenerative changes and facet arthropathy with R>L facet stenosis at L4-L5 likely responsible for symptoms. Had been following with Dr. Comfort Contreras, but given lack of improvement, started on gabapentin 100 mg tid and referred to Dr. Sharda Fiore for evaluation. She recommended increasing gabapentin to 200 mg tid, and given his lack of response to conservative measures, referred to Dr. Lazaro Alvarado. He underwent decompression with L4/5 right-sided hemilaminotomy and medial facetectomy on 10/23/2019. Developed urinary retention post-op, but successfully passed voiding trial and has had no further difficulties. He developed recurrence of pain on post op day 6, and treated with Medrol dose pack. Remains on gabapentin 300 mg tid with mild  pain when awakening in the morning but generally much improved.  Being followed by  Quentin Ordaz. 9. Depression. Prior reasonable control with Paxil and weaned from benzodiazepine use for anxiety and insomnia. On Zoloft 50 mg daily with improvement after inadvertently stopping Paxil. Described some persistent anxiety particularly at night, and advised to increase Zoloft to 75 mg daily with improvement. 10. Abnormal chest x-ray. Ill defined density in left mid zone noted on chest x-ray in ED on 8/9/2019. Underwent chest CT scan (8/31/2019) which showed several tiny bilateral pulmonary nodules which were stable when compared with prior chest CT scan from 2007. No further evaluation needed. 11. Rectal bleeding. Colonoscopy 8/2015. Evaluated by Dr. Rafael Barnett and considered to be hemorrhoidal in source. Known internal and external hemorrhoids. Improved with Citrucel. No evidence of anemia. Follow. 12. BPH. Does have lower urinary tract symptoms. Developed postop urinary retention and now resolved. On Flomax. Followed previously by Dr. Abbe Saucedo, and will need to establish follow-up with [facility]. Shira Knott 13. GERD. Good control of symptoms with omeprazole. No issues today. 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 Monitored annually. 15. Obesity. Lost 25 pounds during his hospitalization. Emphasized importance of healthy eating and improved nutrition. Will readdress at next visit. 16. Insomnia. Weaned from Xanax. Had been using melatonin agonist, ramelteon, to replace Xanax, but no longer taking it either. Follow. 17. Health maintenance. Unable to receive Tdap due to allergy (anaphylaxis to Td). Will address Shingrix vaccine at next visit. Other immunizations up to date. Colonoscopy due 8/2020, but will defer for now until recovers. Continue regular eye exams with Dr. Samuel Collado. Vitamin D level normal. Continue maintenance dose supplement. Aspirin discontinued given new recommendations regarding primary prevention. Referred to podiatry for onychomycosis and left great toe pain. Medicare wellness visit up to date. Patient understands recommendations and agrees with plan. Follow-up in 2 weeks. We discussed the expected course, resolution and complications of the diagnosis(es) in detail. Medication risks, benefits, costs, interactions, and alternatives were discussed as indicated. I advised him to contact the office if his condition worsens, changes or fails to improve as anticipated. He expressed understanding with the diagnosis(es) and plan. Priyanka Botello, who was evaluated through a patient-initiated, synchronous (real-time) audio only encounter, and/or her healthcare decision maker, is aware that it is a billable service, with coverage as determined by his insurance carrier. He provided verbal consent to proceed: Yes. He has not had a related appointment within my department in the past 7 days or scheduled within the next 24 hours.       Total Time: minutes: 45 minutes    Kisha Page MD

## 2020-07-23 ENCOUNTER — VIRTUAL VISIT (OUTPATIENT)
Dept: PULMONOLOGY | Age: 80
End: 2020-07-23

## 2020-07-23 DIAGNOSIS — J96.01 ACUTE RESPIRATORY FAILURE WITH HYPOXIA (HCC): ICD-10-CM

## 2020-07-23 DIAGNOSIS — G47.33 OBSTRUCTIVE SLEEP APNEA SYNDROME: ICD-10-CM

## 2020-07-23 DIAGNOSIS — J12.82 PNEUMONIA DUE TO COVID-19 VIRUS: Primary | ICD-10-CM

## 2020-07-23 DIAGNOSIS — M06.042 RHEUMATOID ARTHRITIS INVOLVING BOTH HANDS WITH NEGATIVE RHEUMATOID FACTOR (HCC): ICD-10-CM

## 2020-07-23 DIAGNOSIS — M11.20 CPDD (CALCIUM PYROPHOSPHATE DEPOSITION DISEASE): ICD-10-CM

## 2020-07-23 DIAGNOSIS — M06.041 RHEUMATOID ARTHRITIS INVOLVING BOTH HANDS WITH NEGATIVE RHEUMATOID FACTOR (HCC): ICD-10-CM

## 2020-07-23 DIAGNOSIS — U07.1 PNEUMONIA DUE TO COVID-19 VIRUS: Primary | ICD-10-CM

## 2020-07-23 NOTE — Clinical Note
Alta LEMUS placed order for CPAP- [facility]. Sleep study results scanned in chart. Cannot do titration due to covid.  Please do the needed

## 2020-07-23 NOTE — PROGRESS NOTES
The pt. Notes if, he doesn't use the O2 he finds himself becoming more SOB and therefore uses it fairly continuous. He has improved since hospitalization but, feels it is taking longer than usual.    Exertional dyspnea. No recent ED visits since hosp. Discharge. Meds per Med. Rec.

## 2020-07-23 NOTE — PROGRESS NOTES
Elizabeth Mai is a 68 y.o. male who was seen by synchronous (real-time) audio technology on 7/23/2020. Consent:  He and/or his healthcare decision maker is aware that this patient-initiated Telehealth encounter is a billable service, with coverage as determined by his insurance carrier. He is aware that he may receive a bill and has provided verbal consent to proceed: Yes    I was in the office while conducting this encounter. Assessment & Plan:   Diagnoses and all orders for this visit:    1. Pneumonia due to COVID-19 virus    2. 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 ([facility] [address]    3. Obstructive sleep apnea syndrome    4. Acute respiratory failure with hypoxia (HCC)    5. CPDD (calcium pyrophosphate deposition disease)        1. COVID-19 pneumonia: Positive covid- 19 test 6/29/20 - oxygen requirements weaned down from high flow O2 5 L nasal cannula continuous - per ID recs, he received remdesivir, he was given IV steroids and is discharged on oral steroid taper. He was also discharged on low-dose Eliquis for 30 days-end date 8/8/2020, vitamin C, zinc, vitamin D.  Clinically improving with no more fevers, chills. Some residual cough and some shortness of breath but feels better  2. Acute hypoxic respiratory failure secondary to interstitial pneumonitis related to COVID pneumonia - he qualified for home oxygen.  He is currently using oxygen at 2 L with activity and at night and intermittently in the daytime when he feels fatigued  3. Obstructive sleep apnea, severe with AHI of 35 - 45. Evaluated by Dr. Augie Robertson. Chapin Soares and completed home sleep study and diagnosed with severe JANNET. Reported that he still has not received his CPAP equipment. He could not go for the CPAP titration study due to COVID. I will arrange for auto CPAP  4. Seronegative rheumatoid arthritis: On Plaquenil   5. Hypertension: on maintainence home medications  6. GERD  7.   Dizziness-intermittent question if related to COVID versus other etiology      Follow-up and Dispositions    · Return in about 2 months (around 9/23/2020). Patient asking about return to [facility] appointment with Dr. Lazarus Maus on July 29 at which time it will be addressed. He states he works in a room upstairs in the [employer] by himself and will follow masking, distancing guidelines  712  Subjective:   Christopher Conde was seen for Breathing Problem [facility] disch. 7/9 )    HPI:    68years old white male with multiple medical problems including rheumatoid arthritis on hydroxychloroquine, hypertension, prediabetes, degenerative joint disease and other medical problems as below who presented to the emergency department with shortness of breath.  He was seen in the ED 6/29 when he tested positive for COVID-19 infection.  He had positive contact with coworkers.  He was discharged home and advised to present back to the ED if he developed shortness of breath.  His symptoms started 6/22/20 with myalgias, fatigue and diarrhea, and then he developed a fever and shortness of breath.  He denied having chest pain, vomiting or abdominal pain.  He was hypoxic in the 80s upon arrival to the ED and required high flow oxygen.  He was admitted for further management. His chest x-ray was consistent with COVID and showing bilateral patchy infiltrates. Prior to Admission medications    Medication Sig Start Date End Date Taking? Authorizing Provider   Oxygen 2 Liters continuous [facility]   Yes Provider, Historical   hydrALAZINE (APRESOLINE) 25 mg tablet TAKE 1 TABLET BY MOUTH 3 TIMES A DAY 7/18/20  Yes Elke Chong MD   apixaban (Eliquis) 2.5 mg tablet Take 1 Tab by mouth two (2) times a day for 30 days. 7/9/20 8/8/20 Yes Jess Luciano MD   gabapentin (NEURONTIN) 300 mg capsule Take 1 Cap by mouth three (3) times daily. Max Daily Amount: 900 mg.  Indications: neuropathic pain 7/1/20  Yes Anamika Venegas NP   tamsulosin (Flomax) 0.4 mg capsule Take 1 Cap by mouth daily. Indications: enlarged prostate with urination problem 6/26/20  Yes Maryana Mancini MD   sertraline (ZOLOFT) 50 mg tablet Take 1.5 Tabs by mouth daily. 4/27/20  Yes Maryana Mancini MD   atorvastatin (LIPITOR) 10 mg tablet TAKE 1 TABLET BY MOUTH ONCE DAILY 4/24/20  Yes Maryana Mancini MD   lisinopril (PRINIVIL, ZESTRIL) 40 mg tablet TAKE 1 TABLET BY MOUTH DAILY 2/17/20  Yes Maryana Mancini MD   omeprazole (PRILOSEC) 20 mg capsule TAKE 1 CAPSULE BY MOUTH ONCE DAILY 11/25/19  Yes Maryana Mancini MD   mineral oil liquid Take 30 mL by mouth daily as needed for Constipation. Yes Provider, Historical   amLODIPine (NORVASC) 10 mg tablet TAKE 1 TABLET BY MOUTH ONCE DAILY 10/10/19  Yes Maryana Mancini MD   furosemide (LASIX) 40 mg tablet Take 1 Tab by mouth daily. 5/6/19  Yes Maryana Mancini MD   cycloSPORINE (RESTASIS) 0.05 % ophthalmic emulsion Administer 1 Drop to both eyes nightly. Yes Provider, Historical   colchicine (MITIGARE) 0.6 mg capsule Take 0.6 mg by mouth every other day. Yes Provider, Historical   cholecalciferol, vitamin D3, (VITAMIN D3) 2,000 unit tab Take 2,000 Units by mouth daily. Yes Provider, Historical   diclofenac (VOLTAREN) 1 % topical gel Apply 4 g to affected area four (4) times daily. Yes Provider, Historical   LUMIGAN 0.01 % ophthalmic drops Administer 1 Drop to both eyes nightly. 5/19/14  Yes Provider, Historical   MULTIVITS/IRON FUM/FA/D3/LYCOP (MULTI FOR HIM PO) Take  by mouth daily. Yes Provider, Historical   ascorbic acid, vitamin C, (Vitamin C) 500 mg tablet Take 1 Tab by mouth two (2) times a day for 14 days. 7/9/20 7/23/20  Sylvia Wiggins MD   zinc 50 mg tab tablet Take 1 Tab by mouth daily for 14 days. 7/9/20 7/23/20  Sylvia Wiggins MD   celecoxib (CELEBREX) 200 mg capsule TAKE 1 CAPSULE BY MOUTH ONCE DAILY 3/2/20   Maryana Mancini MD   potassium chloride (K-DUR, KLOR-CON) 20 mEq tablet Take 1 Tab by mouth daily.  8/10/17 Kendall Blizzard, MD     Allergies   Allergen Reactions    Latex, Natural Rubber Swelling    Adhesive Tape-Silicones Rash and Itching    Aldactone [Spironolactone] Not Reported This Time     Breast tenderness    Codeine Not Reported This Time     \"Drives crazy\"    Tape [Adhesive] Rash    Tetanus Toxoid, Adsorbed Anaphylaxis    Tetanus Vaccines And Toxoid Anaphylaxis     Other reaction(s): anaphylaxis/angioedema  Other reaction(s): anaphylaxis/angioedema  Other reaction(s): anesthetic shock       Patient Active Problem List   Diagnosis Code    BPH with obstruction/lower urinary tract symptoms N40.1, N13.8    Hypertension I10    Primary osteoarthritis involving multiple joints M89.49    Hyperlipidemia E78.5    GERD (gastroesophageal reflux disease) K21.9    Pre-diabetes R73.03    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 (HCC) M06.041, M06.042    Vitamin D deficiency E55.9    Colon polyps K63.5    CPDD (calcium pyrophosphate deposition disease) M11.20    Impaired fasting glucose R73.01    Rectal bleeding K62.5    Class 1 obesity due to excess calories with serious comorbidity and body mass index (BMI) of 31.0 to 31.9 in adult E66.09, Z68.31    APC (atrial premature contractions) I49.1    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, 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    Candidal intertrigo B37.2    Obstructive sleep apnea syndrome G47.33    Right sided sciatica M54.31    Lumbar facet arthropathy M47.816    Synovial cyst of lumbar facet joint M71.38    Spinal stenosis M48.00    Depression, major, recurrent, 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 Covid 19 infection/pneumonia    COMPARISON: Prior chest x-rays, most recent 6/29/2020    FINDINGS:  EKG leads overlie the patient. The cardiac silhouette is normal in size. Mild diffuse prominence of the  bronchovascular markings. Similar to minimally increased patchy groundglass  opacities. No pleural effusion or pneumothorax No acute osseous abnormalities  are identified. Impression Impression:      1. Similar to minimally increased patchy groundglass airspace opacities. Mild  diffuse prominence of the bronchovascular markings, probably bronchitis or mild  pulmonary vascular congestion                  CT Results (most recent):  Results from Hospital Encounter encounter on 08/31/19   CT CHEST W CONT    Narrative CT CHEST WITH ENHANCEMENT    INDICATION: Lung nodule seen on imaging study, abnormal chest x-ray. TECHNIQUE: CT images obtained from the thoracic inlet to the level of the  diaphragm following uneventful administration of 80 mL Isovue 300 nonionic  intravenous contrast. Axial, coronal and sagittal reformats were obtained. All CT scans at this facility are performed using dose optimization technique as  appropriate to the performed exam, to include automated exposure control,  adjustment of the mA and/or kV according to patient's size (Including  appropriate matching for site-specific examinations), or use of iterative  reconstruction technique. COMPARISON: CT chest 2/28/2007. CHEST FINDINGS:     Thyroid/Base Of Neck:  Unremarkable  . Lungs:   No acute infiltrates are evident. .   No mass lesions are seen. .  Trachea and central bronchi are clear. .  Stable 4 mm chronic pleural based nodule left posterior right upper lobe (17). Also stable 2 mm nodule posterior right upper lobe (21).  Stable 2-3 mm nodule  subpleural left upper lobe (14) and 2 mm nodule anterior inferior left upper  lobe (23)    Pleural Spaces: There is no pneumothorax or pleural fluid evident. No pleural plaques are seen    Lymph Nodes:   Axillae: No enlargement. Mediastinum / Janina: No enlargement. Mediastinum, Great Vessels And Heart: The heart is normal in size. No pericardial effusion. No aortic aneurysm. Moderate calcified plaques in the aortic arch and descending aorta. .    Abdomen Structures Included: The included portions of the liver and spleen are unremarkable. .    Osseous Structures:   No destructive osseous process. Degenerative changes in the shoulders. Thoracic  spondylosis. Impression IMPRESSION:     1. No evidence of pulmonary mass. Several stable tiny bilateral pulmonary  nodules as discussed.  -The reported abnormal chest x-ray is not available for direct comparison. 2. Atherosclerosis. 3. Degenerative changes in the spine and shoulders. We discussed the expected course, resolution and complications of the diagnosis(es) in detail. Medication risks, benefits, costs, interactions, and alternatives were discussed as indicated. I advised him to contact the office if his condition worsens, changes or fails to improve as anticipated. He expressed understanding with the diagnosis(es) and plan. This patient is being evaluated by a video visit encounter for concerns as above. A caregiver was present when appropriate. Due to this being a TeleHealth encounter (During VWSBN-19 public health emergency), evaluation of the following organ systems was limited: Vitals/Constitutional/EENT/Resp/CV/GI//MS/Neuro/Skin/Heme-Lymph-Imm.   Pursuant to the emergency declaration under the [facility], [facility] waiver authority and the [employer] Act, this Virtual  Visit was conducted, with patient's (and/or legal guardian's) consent, to reduce the patient's risk of exposure to COVID-19 and provide necessary medical care. Services were provided through a video synchronous discussion virtually to substitute for in-person clinic visit. Patient located in his home. Provider located in clinic.     Nina Wilcox MD

## 2020-07-27 ENCOUNTER — HOSPITAL ENCOUNTER (OUTPATIENT)
Dept: LAB | Age: 80
Discharge: HOME | End: 2020-07-27
Payer: MEDICARE

## 2020-07-27 ENCOUNTER — TELEPHONE (OUTPATIENT)
Dept: INTERNAL MEDICINE CLINIC | Age: 80
End: 2020-07-27

## 2020-07-27 ENCOUNTER — CLINICAL SUPPORT (OUTPATIENT)
Dept: FAMILY MEDICINE CLINIC | Facility: CLINIC | Age: 80
End: 2020-07-27

## 2020-07-27 DIAGNOSIS — U07.1 CLINICAL DIAGNOSIS OF COVID-19: Primary | ICD-10-CM

## 2020-07-27 PROCEDURE — 87635 SARS-COV-2 COVID-19 AMP PRB: CPT

## 2020-07-27 NOTE — TELEPHONE ENCOUNTER
Pt's wife asking for covid testing for both her and her  (see separate encounter)    Stated her personal care giver can not return to her home until both she and her  get a negative result for covid-19 testing     She stated you had placed the order for them before and is hopeful you will place order.

## 2020-07-27 NOTE — TELEPHONE ENCOUNTER
Order placed for testing and please set him up for nurse visit at [facility] ([facility]). Please make sure clinic aware that patient had Covid-19 and is post-hospitalization.

## 2020-07-29 ENCOUNTER — TELEPHONE (OUTPATIENT)
Dept: PULMONOLOGY | Age: 80
End: 2020-07-29

## 2020-07-29 ENCOUNTER — VIRTUAL VISIT (OUTPATIENT)
Dept: INTERNAL MEDICINE CLINIC | Age: 80
End: 2020-07-29

## 2020-07-29 ENCOUNTER — TELEPHONE (OUTPATIENT)
Dept: INTERNAL MEDICINE CLINIC | Age: 80
End: 2020-07-29

## 2020-07-29 DIAGNOSIS — M11.20 CPDD (CALCIUM PYROPHOSPHATE DEPOSITION DISEASE): ICD-10-CM

## 2020-07-29 DIAGNOSIS — E78.5 HYPERLIPIDEMIA, UNSPECIFIED HYPERLIPIDEMIA TYPE: ICD-10-CM

## 2020-07-29 DIAGNOSIS — G47.33 OBSTRUCTIVE SLEEP APNEA SYNDROME: ICD-10-CM

## 2020-07-29 DIAGNOSIS — M06.041 RHEUMATOID ARTHRITIS INVOLVING BOTH HANDS WITH NEGATIVE RHEUMATOID FACTOR (HCC): ICD-10-CM

## 2020-07-29 DIAGNOSIS — U07.1 PNEUMONIA DUE TO COVID-19 VIRUS: Primary | ICD-10-CM

## 2020-07-29 DIAGNOSIS — N40.1 BPH WITH OBSTRUCTION/LOWER URINARY TRACT SYMPTOMS: ICD-10-CM

## 2020-07-29 DIAGNOSIS — K21.9 GASTROESOPHAGEAL REFLUX DISEASE, ESOPHAGITIS PRESENCE NOT SPECIFIED: ICD-10-CM

## 2020-07-29 DIAGNOSIS — S68.621S: ICD-10-CM

## 2020-07-29 DIAGNOSIS — N13.8 BPH WITH OBSTRUCTION/LOWER URINARY TRACT SYMPTOMS: ICD-10-CM

## 2020-07-29 DIAGNOSIS — U07.1 COVID-19 VIRUS INFECTION: ICD-10-CM

## 2020-07-29 DIAGNOSIS — F33.0 DEPRESSION, MAJOR, RECURRENT, MILD (HCC): ICD-10-CM

## 2020-07-29 DIAGNOSIS — J12.82 PNEUMONIA DUE TO COVID-19 VIRUS: Primary | ICD-10-CM

## 2020-07-29 DIAGNOSIS — I10 ESSENTIAL HYPERTENSION: ICD-10-CM

## 2020-07-29 DIAGNOSIS — R73.03 PRE-DIABETES: ICD-10-CM

## 2020-07-29 DIAGNOSIS — E66.09 CLASS 1 OBESITY DUE TO EXCESS CALORIES WITH SERIOUS COMORBIDITY AND BODY MASS INDEX (BMI) OF 31.0 TO 31.9 IN ADULT: ICD-10-CM

## 2020-07-29 DIAGNOSIS — J96.01 ACUTE RESPIRATORY FAILURE WITH HYPOXIA (HCC): ICD-10-CM

## 2020-07-29 DIAGNOSIS — M06.042 RHEUMATOID ARTHRITIS INVOLVING BOTH HANDS WITH NEGATIVE RHEUMATOID FACTOR (HCC): ICD-10-CM

## 2020-07-29 LAB — SARS-COV-2, COV2NT: NOT DETECTED

## 2020-07-29 NOTE — TELEPHONE ENCOUNTER
No visits with results within 2 Day(s) from this visit. Latest known visit with results is:   Hospital Outpatient Visit on 07/27/2020   Component Date Value Ref Range Status    SARS-CoV-2 07/27/2020 Not Detected  Not Detected   Final       Please let the patient know that his follow-up Covid-19 test is now negative.

## 2020-08-02 NOTE — PROGRESS NOTES
Gerald Fonseca is a 68 y.o. male, evaluated via audio-only technology on 7/29/2020. HPI:   Gerald Fonseca is a 68y.o. year old male who presents today for follow-up. He has a history of hypertension, hyperlipidemia, prediabetes, rheumatoid arthritis, osteoarthritis, calcium pyrophosphate deposition disease, BPH, GERD, and depression. 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 On 6/22/2020, he noted the onset of weakness, fatigue, and diarrhea. He stated that he continued to work for the entire week despite feeling ill. On 6/27/2020, he developed fever and dyspnea, which worsened throughout the weekend. He presented to [facility] on 6/28/2020, and WBC 4.0 (78 % neutrophils) and chest x-ray showed patchy density in the RUL, right perihilar area, and right base. He was advised to go to the ED, and presented to [facility] ED on 6/29/2020. He was found to be hypoxic with pulse ox at rest 92-93 % and ambulation 89-91% (room air). Chest x-ray showed bilateral multifocal extremely subtle groundglass opacities. Labs showed WBC 4.4 (80% neutrophils), Hb 14.6/ Hct 42.0, platelets 596, creatinine 0.71/ eGFR >60, AST 53, alk phos 160, lactate 1.16. He was discharged home, and SARS COV-2 positive (6/29/2020) result returned. He presented for a virtual visit on 7/2/2020, and reported that since discharge from the ED, he noted persistent symptoms of weakness, diarrhea, fatigue, and fevers, and prgressive dyspnea. He described poor po intake, and chest pain with inspiration and felt that he was unable to take a deep breath or cough. He was advised to call EMS and return to the ED. Upon arrival by EMS, his oxygenation was noted to be in the low 80's, and required high flow oxygen.  Chest x-ray (7/2/2020) showed bilateral increased patchy groundglass airspace opacities, and labs revealed ABG 7.43/34/70 on nonrebreather mask; WBC 7.3 (11% lymphocytes), Hb 14.8/ Hct 42.6, creatinine 0.8/ eGFR>60, ALT 79, AST 76, alk phos 170, albumin 2.7 D-dimer 1.35, ferritin 729, CRP 16.6, , procalcitonin 0.14, lactate 2.8, blood culture negative. He was initially started on Zosyn, but Dr. Giorgi Miller (ID) was consulted and recommended discontinuing and beginning azithromycin, remdesivir, IV Solumedrol, and lovenox. He was also started on ascorbic acid, melatonin, and zinc. Dr. Grey Slaughter (pulmonary) was consulted and recommended change to high flow nasal cannula and recommended proning to the patient. He improved clinically and inflammatory indices improved. His oxygen was weaned to 5 liters nasal cannula, and he completed 5 day course of remdesivir and azithromycin. He was discharged on 7/9/2020 with an 8 day course of prednisone, 30 day course of Eliquis 2.5 mg bid, and two week course of Vitamin  C and zinc. He was seen for post-hospitalization follow-up on 7/16/2020, and reported that he lost 25 pounds during his acute illness. He stated that his appetite was now much improved. He reported some persistent congestion and significant dyspnea on exertion. He was referred to Dr. Tristan Palma, and she recommended continued oxygen use as needed and stressed need for treatment of obstructive sleep apnea with CPAP. She placed order for him to receive auto CPAP. He presents today and states that he is continuing to improve. He still experiences dyspnea and fatigue with exertion, and is wearing 2 liters of oxygen continuously when he is at home since it makes him feel better. He states that his oxygen saturation at rest is 94-98% on 2 liters, and 92-94% with exertion. He states that he has not yet received the CPAP equipment. He does describe chest discomfort with deep inspiration, but denies any significant cough. He states that his sense of taste and smell have returned, and his appetite is good. He states that his weight has increased approximately 10 pounds since discharge. He states that his energy level is gradually improving.  He reports that his blood pressure is well controlled, ranging 120-134/78-80. He is otherwise without new complaints. On 8/13/2019, he reported that he had been seeing Dr. Kj Shelton for increasing difficulty with right sided sciatica. He reported being treated with a Medrol dose pack, physical therapy, and spinal injections without improvement, and was also prescribed Norco and Tramadol, but he stated that he found them too sedating and of no benefit. Due to persistent symptoms, he underwent a lumbar MRI (8/5/2019) which showed degenerative changes most notable at L4-L5 resulting in moderate spinal canal stenosis as well as moderate to severe right greater than left foraminal stenoses; advanced facet arthropathy with small facet effusions and suspected small right facet joint ventral synovial cyst; notable degenerative changes also L3-L4; L1 mild anterior compression deformity, chronic appearing; overall general worsening when compared to prior study in 2007. He was having continued significant pain, and was started on gabapentin 100 mg tid. He was referred to Dr. Milla Call and she increased his dose of gabapentin to 200 mg tid. She also referred him to Dr. Robinson Levy for evaluation given his lack of response to conservative management. He recommended proceeding with decompression by performing a L4/5 right-sided hemilaminotomy and medial facetectomy, which was performed on 10/23/2019. He developed postop urinary retention and was discharged with a Deleon catheter. He had follow-up with Dr. Erick Sharma on 10/29/2019 with successful voiding trial. He reports that he noted initial resolution of his right sciatica pain following surgery, but on 10/29/2019 noted abrupt recurrence when getting out of bed. He was evaluated by GOMEZ Doherty on 10/31/2019 and was treated with a Medrol dose pack without improvement. He was restarted on gabapentin 300 mg tid and reports improvement, although he continues to have mild pain in the morning. On 8/9/2019, he had an episode of waking up gasping for air forcing him to get out of bed and walk around until his breathing normalized. He has been having frequent similar episodes over that last year, but had been resistent to evaluation for sleep apnea. However, he reports that this episode was especially severe. When his symptoms persisted, he was evaluated at [facility], and testing included WBC 5.7, Hb 14.2/ Hct 41.6, creatinine 0.9/ eGFR>60, troponin I x 1 negative, NT-pro BNP 45, EKG sinus rhythm at 83 bpm and no ischemic changes, and chest x-ray without acute changes, but an ill defined 15 mm density in the lateral left mid zone was noted. He received lasix 40 mg IV and was discharged. He had an echocardiogram (8/26/2019) showing normal LV function (EF 56-60%), no RWMA, unable to estimate RVSP due to inadequate TR, but TV/PV appear normal. He was referred to Dr. Felicity Harris for probable sleep apnea, and underwent a home sleep study on 10/25/2019,showing evidence of severe obstructive sleep apnea with AHI 35 and oxygen guy 80%. He has not yet received his CPAP equipment so as to begin therapy. On 6/20/2018, he suffered an accidental gun shot wound while working resulting in traumatic injury to his left index finger with near loss of the middle phalanx and fracture of the distal phalanx. He was taken to [facility] and initially had irrigation and closure of the lacerations performed. He presented to the [facility] ED on 6/24/2018 with increased pain and swelling, and was started on doxycycline for a wound infection. On 7/7/2018, due to loss of stability and angulation of the index finger, he underwent stabilization with fusion of the proximal and distal phalanx with bone grafting by Dr. Kristi Lee. He did well and was started on physical therapy. On 8/1/2018, he presented to [address] [place] [address] for evaluation of increasing erythema, swelling and tenderness with concern for a possible infection.  He underwent left hand x-ray (8/1/2018) showing severe soft tissue swelling of the left second finger worrisome for cellulitis, traumatic amputation of the middle phalanx with marked osteopenia and irregularity of the base of the distal phalanx and flattening and irregularity of the head of the proximal phalanx. Unclear if related to prior trauma/surgery or osteomyelitis with osseous destruction and no films available for comparison. He was started empirically on Bactrim and Augmentin for 14 days. On 8/10/2018, he presented for evaluation by GOMEZ Sears for complaints of fever, malaise, headache, fatigue, and diarrhea. Lab evaluation showed WBC 17 (90% neutrophils), creatinine 1.34/ eGFR 52, blood culture negative. Stool cultures (8/13/2018) routine, O/P, and C.diff negative. Bactrim and Augmentin were discontinued on 8/13/2018, and he was started on metronidazole for 10 days for treatment of presumed C.diff with improvement. He was evaluated by Dr. Shani Duff on 8/15/2018 and repeat WBC 8.6 (59% neutrophils), ESR 6, and CRP 0.9. He was seen by Dr. Shani Duff in follow-up on 8/30/2018 and left index finger wound noted to be significantly improved and no further difficulty with diarrhea. He has a history of hypertension, treated with amlodipine, lisinopril, lasix (+ potassium), and hydralazine was added in 4/2018. He states that his wife has been monitoring his blood pressure intermittently. He denies any chest pain, shortness of breath at rest or with exertion, lightheadedness, or palpitations. He does report bilateral lower extremity swelling that it will increase throughout the day and improve overnight. . In 6/2016, he underwent lower extremity arterial and venous duplex scans, both of which were negative. He also has a history of hyperlipidemia, treated with moderate intensity atorvastatin. He has a history of prediabetes, with HbA1c ranging from 5.9-6.2 since 2012.  He denies any polyuria, polydipsia, nocturia, or blurry vision, and has no history of retinopathy, neuropathy, or nephropathy. He has regular eye exams with Dr. Terri Miller. He has a history of bilateral hand pain with morning stiffness for several years, and in 3/2014, he was noted to have a positive anti-CCP antibody level although NIMCO, rheumatoid factor, and ESR were negative. He was referred for evaluation to Dr. Therese Yao, and was diagnosed with rheumatoid arthritis in 6/2014. He was treated with hydroxychloroquine, which has been partially helpful in controlling his symptoms. Bilateral hand x-rays also showed evidence of primary osteoarthritis with osteophytes present on the second and third MCP joints. In 1/2017, he was also noted to have evidence of possible chondrocalcinosis on x-ray, and was started on low dose colchicine in addition to hydroxychloroquine for concomitant calcium pyrophosphate deposition disease. He states that since starting on colchicine, he has had significant improvement in his hand pain. He also uses compounding cream and Voltaren gel for pain control. In 1/2019, he was complaining of worsening neck and bilateral shoulder pain (R>L). He stated that he was previously followed by Dr. May Rasmussen, and would occasionally receive cortisone injections into his shoulders. He also described neck pain with difficulty turning his head. He denied any upper extremity weakness or paresthesias. He underwent imaging, and bilateral shoulder x-rays (1/10/2019) showed degenerative changes and secondary findings of rotator cuff pathology. Cervical spine x-rays (1/10/2019) showed advanced degenerative changes with multilevel facet arthropathy. He was evaluated by Dr. Triston Jaeger who gave him a cortisone injection in his right shoulder with some improvement. He also recommended a reverse shoulder replacement, but he remains hesitant to proceed. He was started on Celebrex 200 mg bid in 2/2019, and reports significant improvement.  He continues to also use Tylenol as needed for pain. He states that his neck pain seems to have improved to his baseline level. He has a history of symptomatic BPH, with complaints of decreased stream, hesitancy, and dribbling. He has refused treatment with medication. He is followed by Dr. Peng Cohen. He denies any dysuria, gross hematuria, or flank pain. He has a history of GERD, treated with daily omeprazole with good control of symptoms. He had a screening colonoscopy and 8/2015 by Dr. Su Thacker, showing a 3 mm sessile cecal polyp (pathology: intra-mucosal lymphoid aggregate), two 4 mm sessile polyps in the transverse colon (pathology: serrated adenomas), and a 1 cm lipoma in the transverse colon. Follow-up recommended for five years. He was having difficulty with rectal bleeding in 1/2018 and returned for evaluation with Dr. Su Thacker who felt it was most likely secondary to a hemorrhoidal source. She recommended use of Citrucel. He states that the bleeding has improved with initiation of this therapy. He denies any abdominal pain, nausea, vomiting, melena, or change in bowel movements. He has a history of depression and anxiety, which had been well controlled with Paxil although inadvertently stopped. Now on Zoloft with improvement. 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 Dr. Peng Cohen. 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 Dr. Judge Tucker.      Past Surgical History:   Procedure Laterality Date    HX AMPUTATION FINGER Left     index    HX BACK SURGERY  10/23/2019    Right L4-5 hemilaminectomy, medial facetectomy, resection of synovial cyst    HX CARPAL TUNNEL RELEASE Bilateral     HX CATARACT REMOVAL Left 01/2018    HX CATARACT REMOVAL Bilateral 2018    HX COLONOSCOPY      HX HEENT      sinus, tonsillectomy    HX HERNIA REPAIR      HX MOHS PROCEDURES      bilateral     HX ORTHOPAEDIC      lef knee surgery, removed cartilage.  HX ROTATOR CUFF REPAIR Right      Current Outpatient Medications   Medication Sig    Oxygen 2 Liters continuous [employer]    hydrALAZINE (APRESOLINE) 25 mg tablet TAKE 1 TABLET BY MOUTH 3 TIMES A DAY    apixaban (Eliquis) 2.5 mg tablet Take 1 Tab by mouth two (2) times a day for 30 days.  gabapentin (NEURONTIN) 300 mg capsule Take 1 Cap by mouth three (3) times daily. Max Daily Amount: 900 mg. Indications: neuropathic pain    tamsulosin (Flomax) 0.4 mg capsule Take 1 Cap by mouth daily. Indications: enlarged prostate with urination problem    sertraline (ZOLOFT) 50 mg tablet Take 1.5 Tabs by mouth daily.  atorvastatin (LIPITOR) 10 mg tablet TAKE 1 TABLET BY MOUTH ONCE DAILY    celecoxib (CELEBREX) 200 mg capsule TAKE 1 CAPSULE BY MOUTH ONCE DAILY    lisinopril (PRINIVIL, ZESTRIL) 40 mg tablet TAKE 1 TABLET BY MOUTH DAILY    omeprazole (PRILOSEC) 20 mg capsule TAKE 1 CAPSULE BY MOUTH ONCE DAILY    mineral oil liquid Take 30 mL by mouth daily as needed for Constipation.  amLODIPine (NORVASC) 10 mg tablet TAKE 1 TABLET BY MOUTH ONCE DAILY    furosemide (LASIX) 40 mg tablet Take 1 Tab by mouth daily.  potassium chloride (K-DUR, KLOR-CON) 20 mEq tablet Take 1 Tab by mouth daily.  cycloSPORINE (RESTASIS) 0.05 % ophthalmic emulsion Administer 1 Drop to both eyes nightly.  colchicine (MITIGARE) 0.6 mg capsule Take 0.6 mg by mouth every other day.     cholecalciferol, vitamin D3, (VITAMIN D3) 2,000 unit tab Take 2,000 Units by mouth daily.  diclofenac (VOLTAREN) 1 % topical gel Apply 4 g to affected area four (4) times daily.  LUMIGAN 0.01 % ophthalmic drops Administer 1 Drop to both eyes nightly.  MULTIVITS/IRON FUM/FA/D3/LYCOP (MULTI FOR HIM PO) Take  by mouth daily. No current facility-administered medications for this visit. Allergies and Intolerances: Allergies   Allergen Reactions    Latex, Natural Rubber Swelling    Adhesive Tape-Silicones Rash and Itching    Aldactone [Spironolactone] Not Reported This Time     Breast tenderness    Codeine Not Reported This Time     \"Drives crazy\"    Tape [Adhesive] Rash    Tetanus Toxoid, Adsorbed Anaphylaxis    Tetanus Vaccines And Toxoid Anaphylaxis     Other reaction(s): anaphylaxis/angioedema  Other reaction(s): anaphylaxis/angioedema  Other reaction(s): anesthetic shock     Family History: He has no family history of colon or prostate cancer. Family History   Problem Relation Age of Onset    Cancer Mother     Heart Disease Father     Alcohol abuse Father     Cancer Other      Social History:   He  reports that he has quit smoking. His smoking use included cigars, pipe, and cigarettes. He quit after 12.00 years of use. He has quit using smokeless tobacco.  His smokeless tobacco use included chew. He smoked 2 ppd for 50 years, stopping in 1974. He is  with two adult children. 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    Social History     Substance and Sexual Activity   Alcohol Use Yes    Comment: rarely     Immunization History:  Immunization History   Administered Date(s) Administered    (RETIRED) Pneumococcal Vaccine (Unspecified Type) 01/01/2008    Influenza High Dose Vaccine PF 09/30/2017    Influenza Vaccine (Tri) Adjuvanted 09/25/2018, 09/25/2019    Influenza Vaccine Split 10/04/2011, 10/16/2012    Influenza Vaccine Whole 01/15/2010    Pneumococcal Conjugate (PCV-13) 2015    Pneumococcal Polysaccharide (PPSV-23) 2008    Varicella Virus Vaccine 10/01/2013    Zoster 10/16/2012       Review of Systems:   As above included in HPI. Otherwise 11 point review of systems negative including constitutional, skin, HENT, eyes, respiratory, cardiovascular, gastrointestinal, genitourinary, musculoskeletal, endocrine, hematologic, allergy, and neurologic. Physical:   Vitals: none  BP:     HR:    WT:    BMI:  kg/m2    Exam:   None performed due to audio only visit. Review of Data:  Labs:    Hospital Outpatient Visit on 2020   Component Date Value Ref Range Status    SARS-CoV-2 2020 Not Detected  Not Detected   Final   No results displayed because visit has over 200 results. Health Maintenance:  Screening:    Colorectal: colonoscopy (2015) serrated adenomas. Dr. Tree Perdomo. Due 2020. Depression: on Paxil   DM (HbA1c/FPG): FPG 91/ HbA1c 5.7 (2020)   Hepatitis C: N/A   Falls: none   DEXA: within normal limits (2016)   PSA/MARTÍNEZ: PSA 2.1. As per Dr. Owens Conception   Glaucoma: regular eye exams with Dr. Cisse/ Dr. Denise Friday (last 2020)   Smokin pack year.  Distant past.   Vitamin D: 34.3 (2020)   Medicare Wellness: 2020    Impression:  Patient Active Problem List   Diagnosis Code    BPH with obstruction/lower urinary tract symptoms N40.1, N13.8    Hypertension I10    Primary osteoarthritis involving multiple joints M89.49    Hyperlipidemia E78.5    GERD (gastroesophageal reflux disease) K21.9    Pre-diabetes R73.03    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 (HCC) M06.041, M06.042    Vitamin D deficiency E55.9    Colon polyps K63.5    CPDD (calcium pyrophosphate deposition disease) M11.20    Impaired fasting glucose R73.01    Rectal bleeding K62.5    Class 1 obesity due to excess calories with serious comorbidity and body mass index (BMI) of 31.0 to 31.9 in adult E66.09, Z68.31    APC (atrial premature contractions) I49.1    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 Patient reports symptoms of weakness, fatigue, and diarrhea since 6/22/2020, and onset of fever and dyspnea on 6/27/2020. He presented to the [facility] ED on 6/29/2020 due to difficulty breathing and found to be hypoxic with pulse ox at rest 92-93 % and ambulation 89-91% room air. Chest x-ray with bilateral multifocal extremely subtle groundglass opacities. Labs showed leukopenia, thrombocytopenia, and elevated AST and alk phos. He was discharged home,and SARS COV-2 positive result returned the following day. He developed progressive worsening fever and dyspnea, chest pain with inspiration, diarrhea, and poor po intake. Advised to return to ED on 7/2/2020 and found to be hypoxic in the 80's requiring mask rebreather. Chest x-ray showed bilateral increased patchy groundglass airspace opacities and inflammatory markers were elevated. He was treated with high flow nasal cannula, azithromycin, remdesivir, Solumedrol, and Lovenox. He was advised to prone and also received zinc and Vitamin C supplementation. He gradually improved and his oxygen was weaned to 5 liter nasal cannula. He was discharged with home oxygen, prednisone taper for 8 days, and Eliquis 2.5 mg bid for 30 days (complete 8/9/2020). Gradually improving.  Continuing to have difficulty with exertional dyspnea and fatigue, and using 2 liters of oxygen at rest and following exertion with oxygenation >92%. Appetite improved and weight increasing. Patient requesting to return to work. Discussed that CDC recommendations are that isolation can be discontinued 10 days after his last fever and symptoms improve. He also had a repeat COVID-19 test on 7/29/2020 which was negative. However,  advised that would not be prudent given his continued need for oxygen, and instructed to hold off on returning to work until oxygen need resolves since he did not have portable supply. Will reassess in 2 weeks. Other issues:  1. Obstructive sleep apnea, severe. Patient reported in 1/2019 awakening gasping for air several times per week. No overt evidence of heart failure and EKG was without change from baseline at that time. He reported that he would need to sit up immediately and take deep breathes until resolved. He also admitted to snoring and experiencing significant daytime drowsiness particularly when driving. Given high suspicion for sleep apnea, he was referred to Dr. Benton Morris for evaluation, but he never scheduled. Presented with worsening symptoms over several weeks, possibly exacerbated by the inadvertent abrupt cessation of Paxil during this time. Severe episode on 8/9/2019 prompted ED evaluation. EKG without change, troponin negative and NT-pro BNP normal. Echocardiogram (8/26/2019) with normal LV size and function (EF 56-60%), no RWMA, normal valves. Evaluated by Dr. Benton Morris and completed home sleep study and diagnosed with severe JANNET. Reported that he still has not received his CPAP equipment, and addressed by Dr. Estela Orta who is in the process of arranging auto CPAP. Advised that patient should contact her office to check on status. 2. Hypertension. Blood pressure had been well controlled on current regimen of lisinopril 40 mg daily, amlodipine 10 mg daily, lasix 40 mg daily (potassium 20 meq daily) and hydralazine 25 mg bid.  Advised to monitor his blood pressure at home. Renal function has been normal with creatinine 0.74/ eGFR >60 at discharge. Normal echocardiogram (8/2019). Continue to follow. 3. Hyperlipidemia. On moderate intensity dose atorvastatin with LDL 58 and HDL 55, indicative of excellent control in this patient. Continue to follow. 4. Prediabetes. Had been controlled on diet alone with HbA1c generally stable at 5.7. Required sliding scale insulin dosing in the hospital due to elevated blood sugar, likely related to high dose steroids. Not monitoring blood sugar at home. No evidence of microvascular complications. On Ace-I and statin. Continue follow-up with Dr. Arcadio Desai for annual eye exams. Emphasized importance of lifestyle modifications, including diet, exercise, and weight loss. 5. Rheumatoid arthritis. On hydroxychloroquine. Has regular eye exams with Dr. Arcadio Desai. Difficult to gauge response as appears to have evidence of osteoarthritis and CPPD occurring concurrently, but noted significant improvement since initiating colchicine. Voltaren gel for pain control. Tylenol while at work to help with pain control as needed. Followed by Dr. Roslyn Rasmussen. 6. Primary osteoarthritis. Evident in bilateral hands and knees, bilateral shoulders, and cervical spine. Neck pain now returned to baseline. Shoulder pain (R>L). X-ray with evidence of osteoarthritis and rotator cuff pathology. Evaluated by Dr. Donna Delong and received cortisone injection to right shoulder with some improvement. Surgery for reverse shoulder replacement recommended. Changed from ibuprofen 400 mg qid and Tylenol to Celebrex 200 mg qd with significant improvement. However, will hold given treatment with Eliquis for 30 days. Also using Voltaren gel. 7. CPPD. Colchicine started on 1/19/2017 to help address chondrocalcinosis on x-rays. Reports significant improvement. Now taking every other day with good control. 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 Unresponsive to Medrol dose pack, physical therapy, steroid injections, and unwilling to take narcotics as not effective. Lumbar MRI with multilevel degenerative changes and facet arthropathy with R>L facet stenosis at L4-L5 likely responsible for symptoms. Had been following with Dr. Anup Fraser, but given lack of improvement, started on gabapentin 100 mg tid and referred to Dr. Leopoldo Sites for evaluation. She recommended increasing gabapentin to 200 mg tid, and given his lack of response to conservative measures, referred to Dr. Daniel Barragan. He underwent decompression with L4/5 right-sided hemilaminotomy and medial facetectomy on 10/23/2019. Developed urinary retention post-op, but successfully passed voiding trial and has had no further difficulties. He developed recurrence of pain on post op day 6, and treated with Medrol dose pack. Remains on gabapentin 300 mg tid with mild  pain when awakening in the morning but generally much improved. Being followed by Dr. Daniel Barragan. 9. Depression. Prior reasonable control with Paxil and weaned from benzodiazepine use for anxiety and insomnia. On Zoloft 50 mg daily with improvement after inadvertently stopping Paxil. Described some persistent anxiety particularly at night, and advised to increase Zoloft to 75 mg daily with improvement. 10. Abnormal chest x-ray. Ill defined density in left mid zone noted on chest x-ray in ED on 8/9/2019. Underwent chest CT scan (8/31/2019) which showed several tiny bilateral pulmonary nodules which were stable when compared with prior chest CT scan from 2007. No further evaluation needed. 11. Rectal bleeding. Colonoscopy 8/2015. Evaluated by Dr. Julianne Lazcano and considered to be hemorrhoidal in source. Known internal and external hemorrhoids. Improved with Citrucel. No evidence of anemia. Follow. 12. BPH. Does have lower urinary tract symptoms. Developed postop urinary retention and now resolved. On Flomax.  Followed previously by Dr. Maryam Hastings, and will need to establish follow-up with [facility]. Bakari Lozano 13. GERD. Good control of symptoms with omeprazole. No issues today. 14. 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 Monitored annually. 15. Obesity. Lost 25 pounds during his hospitalization. Emphasized importance of healthy eating and improved nutrition. Will readdress at next visit. 16. Insomnia. Weaned from Xanax. Had been using melatonin agonist, ramelteon, to replace Xanax, but no longer taking it either. Follow. 17. Health maintenance. Unable to receive Tdap due to allergy (anaphylaxis to Td). Will address Shingrix vaccine at next visit. Other immunizations up to date. Colonoscopy due 8/2020, but will defer for now until recovers. Continue regular eye exams with Dr. Radhames Salazar. Vitamin D level normal. Continue maintenance dose supplement. Aspirin discontinued given new recommendations regarding primary prevention. Referred to podiatry for onychomycosis and left great toe pain. Medicare wellness visit up to date. Patient understands recommendations and agrees with plan. Follow-up in 2 weeks. We discussed the expected course, resolution and complications of the diagnosis(es) in detail. Medication risks, benefits, costs, interactions, and alternatives were discussed as indicated. I advised him to contact the office if his condition worsens, changes or fails to improve as anticipated. He expressed understanding with the diagnosis(es) and plan. Vj Birmingham, who was evaluated through a patient-initiated, synchronous (real-time) audio only encounter, and/or her healthcare decision maker, is aware that it is a billable service, with coverage as determined by his insurance carrier. He provided verbal consent to proceed: Yes. He has not had a related appointment within my department in the past 7 days or scheduled within the next 24 hours.       Total Time: minutes: 21-30 minutes    Baldwin Heimlich, MD

## 2020-08-13 ENCOUNTER — TELEPHONE (OUTPATIENT)
Dept: PULMONOLOGY | Age: 80
End: 2020-08-13

## 2020-08-13 ENCOUNTER — VIRTUAL VISIT (OUTPATIENT)
Dept: INTERNAL MEDICINE CLINIC | Age: 80
End: 2020-08-13

## 2020-08-13 DIAGNOSIS — G47.33 OBSTRUCTIVE SLEEP APNEA SYNDROME: Primary | ICD-10-CM

## 2020-08-13 DIAGNOSIS — M06.042 RHEUMATOID ARTHRITIS INVOLVING BOTH HANDS WITH NEGATIVE RHEUMATOID FACTOR (HCC): ICD-10-CM

## 2020-08-13 DIAGNOSIS — J12.82 PNEUMONIA DUE TO COVID-19 VIRUS: Primary | ICD-10-CM

## 2020-08-13 DIAGNOSIS — J96.01 ACUTE RESPIRATORY FAILURE WITH HYPOXIA (HCC): ICD-10-CM

## 2020-08-13 DIAGNOSIS — R73.03 PRE-DIABETES: ICD-10-CM

## 2020-08-13 DIAGNOSIS — E78.5 HYPERLIPIDEMIA, UNSPECIFIED HYPERLIPIDEMIA TYPE: ICD-10-CM

## 2020-08-13 DIAGNOSIS — M15.9 PRIMARY OSTEOARTHRITIS INVOLVING MULTIPLE JOINTS: ICD-10-CM

## 2020-08-13 DIAGNOSIS — M06.041 RHEUMATOID ARTHRITIS INVOLVING BOTH HANDS WITH NEGATIVE RHEUMATOID FACTOR (HCC): ICD-10-CM

## 2020-08-13 DIAGNOSIS — K21.9 GASTROESOPHAGEAL REFLUX DISEASE, ESOPHAGITIS PRESENCE NOT SPECIFIED: ICD-10-CM

## 2020-08-13 DIAGNOSIS — U07.1 COVID-19 VIRUS INFECTION: ICD-10-CM

## 2020-08-13 DIAGNOSIS — I10 ESSENTIAL HYPERTENSION: ICD-10-CM

## 2020-08-13 DIAGNOSIS — F33.0 DEPRESSION, MAJOR, RECURRENT, MILD (HCC): ICD-10-CM

## 2020-08-13 DIAGNOSIS — E66.09 CLASS 1 OBESITY DUE TO EXCESS CALORIES WITH SERIOUS COMORBIDITY AND BODY MASS INDEX (BMI) OF 31.0 TO 31.9 IN ADULT: ICD-10-CM

## 2020-08-13 DIAGNOSIS — R73.01 IMPAIRED FASTING GLUCOSE: ICD-10-CM

## 2020-08-13 DIAGNOSIS — E55.9 VITAMIN D DEFICIENCY, UNSPECIFIED: ICD-10-CM

## 2020-08-13 DIAGNOSIS — U07.1 PNEUMONIA DUE TO COVID-19 VIRUS: Primary | ICD-10-CM

## 2020-08-13 DIAGNOSIS — M11.20 CPDD (CALCIUM PYROPHOSPHATE DEPOSITION DISEASE): ICD-10-CM

## 2020-08-13 DIAGNOSIS — S68.621S: ICD-10-CM

## 2020-08-13 DIAGNOSIS — G47.33 OBSTRUCTIVE SLEEP APNEA SYNDROME: ICD-10-CM

## 2020-08-13 NOTE — TELEPHONE ENCOUNTER
Naa Freeman at [facility] needs to know if pt is to have O2 bled into Cpap. Please call [phone].

## 2020-08-14 NOTE — TELEPHONE ENCOUNTER
Order placed for 2 LPM O2 bled into CPAP, per Verbal Order from Dr. Siria Laura on 8/14/2020. Last office visit: 7/23/20  Follow up Visit: 9/29/20    Provider is aware of last office visit and follow up. No further action requested from provider.

## 2020-08-17 NOTE — PROGRESS NOTES
Ave Lindquist is a 68 y.o. male, evaluated via audio-only technology on 8/13/2020. HPI:   Ave Lindquist is a 68y.o. year old male who presents today for follow-up. He has a history of hypertension, hyperlipidemia, prediabetes, rheumatoid arthritis, osteoarthritis, calcium pyrophosphate deposition disease, BPH, GERD, and depression. 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 He was seen for post-hospitalization follow-up on 7/16/2020, and reported that he lost 25 pounds during his acute illness. He stated that his appetite was now much improved. He reported some persistent congestion and significant dyspnea on exertion. He was referred to Dr. Baldev Tena, and she recommended continued oxygen use as needed and stressed need for treatment of obstructive sleep apnea with CPAP. She placed order for him to receive auto CPAP. He presents today and states that he is continuing to improve. He still experiences dyspnea and fatigue with exertion, and continues to find wearing 2 liters of oxygen continuously while at home to be helpful. He states that his oxygen saturation at rest is 97-98% on 2 liters, but he reports that he is continuing to feel pain in his chest with deep inspiration. He state that he just does not feel he can take a deep breath without pain. He states that his sense of taste and smell are normal, and his appetite is good. He reports that he has returned to work, but finds it to be exhausting. He states that he finds that he can only last approximately 3-4 hours before needing to return home due to fatigue. He is not taking oxygen with him when going to work. He does report that he did receive his CPAP machine and started using it on 8/8/2020. However, he states that he has been struggling for air when he is wearing it. He states that he feels better when wearing the nasal cannula oxygen.  He reports that he did take his equipment to the medical supply company and she reviewed how he should be wearing it. He states that she noted that he would benefit from using the oxygen with CPAP and he states that she is waiting for approval from Dr. Félix Gross. He is otherwise without new complaints. On 6/22/2020, he noted the onset of weakness, fatigue, and diarrhea. He stated that he continued to work for the entire week despite feeling ill. On 6/27/2020, he developed fever and dyspnea, which worsened throughout the weekend. He presented to [facility] on 6/28/2020, and WBC 4.0 (78 % neutrophils) and chest x-ray showed patchy density in the RUL, right perihilar area, and right base. He was advised to go to the ED, and presented to [facility] ED on 6/29/2020. He was found to be hypoxic with pulse ox at rest 92-93 % and ambulation 89-91% (room air). Chest x-ray showed bilateral multifocal extremely subtle groundglass opacities. Labs showed WBC 4.4 (80% neutrophils), Hb 14.6/ Hct 42.0, platelets 589, creatinine 0.71/ eGFR >60, AST 53, alk phos 160, lactate 1.16. He was discharged home, and SARS COV-2 positive (6/29/2020) result returned. He presented for a virtual visit on 7/2/2020, and reported that since discharge from the ED, he noted persistent symptoms of weakness, diarrhea, fatigue, and fevers, and prgressive dyspnea. He described poor po intake, and chest pain with inspiration and felt that he was unable to take a deep breath or cough. He was advised to call EMS and return to the ED. Upon arrival by EMS, his oxygenation was noted to be in the low 80's, and required high flow oxygen. Chest x-ray (7/2/2020) showed bilateral increased patchy groundglass airspace opacities, and labs revealed ABG 7.43/34/70 on nonrebreather mask; WBC 7.3 (11% lymphocytes), Hb 14.8/ Hct 42.6, creatinine 0.8/ eGFR>60, ALT 79, AST 76, alk phos 170, albumin 2.7 D-dimer 1.35, ferritin 729, CRP 16.6, , procalcitonin 0.14, lactate 2.8, blood culture negative. He was initially started on Zosyn, but Dr. Faisal Parra.  Paul (ID) was consulted and recommended discontinuing and beginning azithromycin, remdesivir, IV Solumedrol, and lovenox. He was also started on ascorbic acid, melatonin, and zinc. Dr. Haylee Mueller (pulmonary) was consulted and recommended change to high flow nasal cannula and recommended proning to the patient. He improved clinically and inflammatory indices improved. His oxygen was weaned to 5 liters nasal cannula, and he completed 5 day course of remdesivir and azithromycin. He was discharged on 7/9/2020 with an 8 day course of prednisone, 30 day course of Eliquis 2.5 mg bid, and two week course of Vitamin  C and zinc.     On 8/13/2019, he reported that he had been seeing Dr. José Luis Benitez for increasing difficulty with right sided sciatica. He reported being treated with a Medrol dose pack, physical therapy, and spinal injections without improvement, and was also prescribed Norco and Tramadol, but he stated that he found them too sedating and of no benefit. Due to persistent symptoms, he underwent a lumbar MRI (8/5/2019) which showed degenerative changes most notable at L4-L5 resulting in moderate spinal canal stenosis as well as moderate to severe right greater than left foraminal stenoses; advanced facet arthropathy with small facet effusions and suspected small right facet joint ventral synovial cyst; notable degenerative changes also L3-L4; L1 mild anterior compression deformity, chronic appearing; overall general worsening when compared to prior study in 2007. He was having continued significant pain, and was started on gabapentin 100 mg tid. He was referred to Dr. Mu Santana and she increased his dose of gabapentin to 200 mg tid. She also referred him to Dr. Samantha Sanchez for evaluation given his lack of response to conservative management. He recommended proceeding with decompression by performing a L4/5 right-sided hemilaminotomy and medial facetectomy, which was performed on 10/23/2019.  He developed postop urinary retention and was discharged with a Deleon catheter. He had follow-up with Dr. Lili Gaucher on 10/29/2019 with successful voiding trial. He reports that he noted initial resolution of his right sciatica pain following surgery, but on 10/29/2019 noted abrupt recurrence when getting out of bed. He was evaluated by GOMEZ Doherty on 10/31/2019 and was treated with a Medrol dose pack without improvement. He was restarted on gabapentin 300 mg tid and reports improvement, although he continues to have mild pain in the morning. On 8/9/2019, he had an episode of waking up gasping for air forcing him to get out of bed and walk around until his breathing normalized. He has been having frequent similar episodes over that last year, but had been resistent to evaluation for sleep apnea. However, he reports that this episode was especially severe. When his symptoms persisted, he was evaluated at [facility], and testing included WBC 5.7, Hb 14.2/ Hct 41.6, creatinine 0.9/ eGFR>60, troponin I x 1 negative, NT-pro BNP 45, EKG sinus rhythm at 83 bpm and no ischemic changes, and chest x-ray without acute changes, but an ill defined 15 mm density in the lateral left mid zone was noted. He received lasix 40 mg IV and was discharged. He had an echocardiogram (8/26/2019) showing normal LV function (EF 56-60%), no RWMA, unable to estimate RVSP due to inadequate TR, but TV/PV appear normal. He was referred to Dr. Felicity Harris for probable sleep apnea, and underwent a home sleep study on 10/25/2019,showing evidence of severe obstructive sleep apnea with AHI 35 and oxygen guy 80%. He has not yet received his CPAP equipment so as to begin therapy. On 6/20/2018, he suffered an accidental gun shot wound while working resulting in traumatic injury to his left index finger with near loss of the middle phalanx and fracture of the distal phalanx. He was taken to [facility] and initially had irrigation and closure of the lacerations performed.  He presented to the [facility] ED on 6/24/2018 with increased pain and swelling, and was started on doxycycline for a wound infection. On 7/7/2018, due to loss of stability and angulation of the index finger, he underwent stabilization with fusion of the proximal and distal phalanx with bone grafting by Dr. Jasmin Giles. He did well and was started on physical therapy. On 8/1/2018, he presented to [address] [place] [address] for evaluation of increasing erythema, swelling and tenderness with concern for a possible infection. He underwent left hand x-ray (8/1/2018) showing severe soft tissue swelling of the left second finger worrisome for cellulitis, traumatic amputation of the middle phalanx with marked osteopenia and irregularity of the base of the distal phalanx and flattening and irregularity of the head of the proximal phalanx. Unclear if related to prior trauma/surgery or osteomyelitis with osseous destruction and no films available for comparison. He was started empirically on Bactrim and Augmentin for 14 days. On 8/10/2018, he presented for evaluation by GOMEZ Rivas for complaints of fever, malaise, headache, fatigue, and diarrhea. Lab evaluation showed WBC 17 (90% neutrophils), creatinine 1.34/ eGFR 52, blood culture negative. Stool cultures (8/13/2018) routine, O/P, and C.diff negative. Bactrim and Augmentin were discontinued on 8/13/2018, and he was started on metronidazole for 10 days for treatment of presumed C.diff with improvement. He was evaluated by Dr. Garfield Sullivan on 8/15/2018 and repeat WBC 8.6 (59% neutrophils), ESR 6, and CRP 0.9. He was seen by Dr. Garfield Sullivan in follow-up on 8/30/2018 and left index finger wound noted to be significantly improved and no further difficulty with diarrhea. He has a history of hypertension, treated with amlodipine, lisinopril, lasix (+ potassium), and hydralazine was added in 4/2018. He states that his wife has been monitoring his blood pressure intermittently.  He denies any chest pain, shortness of breath at rest or with exertion, lightheadedness, or palpitations. He does report bilateral lower extremity swelling that it will increase throughout the day and improve overnight. . In 6/2016, he underwent lower extremity arterial and venous duplex scans, both of which were negative. He also has a history of hyperlipidemia, treated with moderate intensity atorvastatin. He has a history of prediabetes, with HbA1c ranging from 5.9-6.2 since 2012. He denies any polyuria, polydipsia, nocturia, or blurry vision, and has no history of retinopathy, neuropathy, or nephropathy. He has regular eye exams with Dr. Africa Leger. He has a history of bilateral hand pain with morning stiffness for several years, and in 3/2014, he was noted to have a positive anti-CCP antibody level although NIMCO, rheumatoid factor, and ESR were negative. He was referred for evaluation to Dr. Glen Sykes, and was diagnosed with rheumatoid arthritis in 6/2014. He was treated with hydroxychloroquine, which has been partially helpful in controlling his symptoms. Bilateral hand x-rays also showed evidence of primary osteoarthritis with osteophytes present on the second and third MCP joints. In 1/2017, he was also noted to have evidence of possible chondrocalcinosis on x-ray, and was started on low dose colchicine in addition to hydroxychloroquine for concomitant calcium pyrophosphate deposition disease. He states that since starting on colchicine, he has had significant improvement in his hand pain. He also uses compounding cream and Voltaren gel for pain control. In 1/2019, he was complaining of worsening neck and bilateral shoulder pain (R>L). He stated that he was previously followed by Dr. Josee Frakn, and would occasionally receive cortisone injections into his shoulders. He also described neck pain with difficulty turning his head. He denied any upper extremity weakness or paresthesias.  He underwent imaging, and bilateral shoulder x-rays (1/10/2019) showed degenerative changes and secondary findings of rotator cuff pathology. Cervical spine x-rays (1/10/2019) showed advanced degenerative changes with multilevel facet arthropathy. He was evaluated by Dr. Heather Bowles who gave him a cortisone injection in his right shoulder with some improvement. He also recommended a reverse shoulder replacement, but he remains hesitant to proceed. He was started on Celebrex 200 mg bid in 2/2019, and reports significant improvement. He continues to also use Tylenol as needed for pain. He states that his neck pain seems to have improved to his baseline level. He has a history of symptomatic BPH, with complaints of decreased stream, hesitancy, and dribbling. He has refused treatment with medication. He is followed by Dr. Dinorah Mills. He denies any dysuria, gross hematuria, or flank pain. He has a history of GERD, treated with daily omeprazole with good control of symptoms. He had a screening colonoscopy and 8/2015 by Dr. James Pandya, showing a 3 mm sessile cecal polyp (pathology: intra-mucosal lymphoid aggregate), two 4 mm sessile polyps in the transverse colon (pathology: serrated adenomas), and a 1 cm lipoma in the transverse colon. Follow-up recommended for five years. He was having difficulty with rectal bleeding in 1/2018 and returned for evaluation with Dr. James Pandya who felt it was most likely secondary to a hemorrhoidal source. She recommended use of Citrucel. He states that the bleeding has improved with initiation of this therapy. He denies any abdominal pain, nausea, vomiting, melena, or change in bowel movements. He has a history of depression and anxiety, which had been well controlled with Paxil although inadvertently stopped. Now on Zoloft with improvement. 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 Dr. Dinorah Mills.    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 Dr. Nicole Daniels. Past Surgical History:   Procedure Laterality Date    HX AMPUTATION FINGER Left     index    HX BACK SURGERY  10/23/2019    Right L4-5 hemilaminectomy, medial facetectomy, resection of synovial cyst    HX CARPAL TUNNEL RELEASE Bilateral     HX CATARACT REMOVAL Left 01/2018    HX CATARACT REMOVAL Bilateral 2018    HX COLONOSCOPY      HX HEENT      sinus, tonsillectomy    HX HERNIA REPAIR      HX MOHS PROCEDURES      bilateral     HX ORTHOPAEDIC      lef knee surgery, removed cartilage.  HX ROTATOR CUFF REPAIR Right      Current Outpatient Medications   Medication Sig    Oxygen 2 Liters continuous [employer]    hydrALAZINE (APRESOLINE) 25 mg tablet TAKE 1 TABLET BY MOUTH 3 TIMES A DAY    gabapentin (NEURONTIN) 300 mg capsule Take 1 Cap by mouth three (3) times daily. Max Daily Amount: 900 mg. Indications: neuropathic pain    tamsulosin (Flomax) 0.4 mg capsule Take 1 Cap by mouth daily. Indications: enlarged prostate with urination problem    sertraline (ZOLOFT) 50 mg tablet Take 1.5 Tabs by mouth daily.  atorvastatin (LIPITOR) 10 mg tablet TAKE 1 TABLET BY MOUTH ONCE DAILY    celecoxib (CELEBREX) 200 mg capsule TAKE 1 CAPSULE BY MOUTH ONCE DAILY    lisinopril (PRINIVIL, ZESTRIL) 40 mg tablet TAKE 1 TABLET BY MOUTH DAILY    omeprazole (PRILOSEC) 20 mg capsule TAKE 1 CAPSULE BY MOUTH ONCE DAILY    mineral oil liquid Take 30 mL by mouth daily as needed for Constipation.     amLODIPine (NORVASC) 10 mg tablet TAKE 1 TABLET BY MOUTH ONCE DAILY    furosemide (LASIX) 40 mg tablet Take 1 Tab by mouth daily.  potassium chloride (K-DUR, KLOR-CON) 20 mEq tablet Take 1 Tab by mouth daily.  cycloSPORINE (RESTASIS) 0.05 % ophthalmic emulsion Administer 1 Drop to both eyes nightly.  colchicine (MITIGARE) 0.6 mg capsule Take 0.6 mg by mouth every other day.  cholecalciferol, vitamin D3, (VITAMIN D3) 2,000 unit tab Take 2,000 Units by mouth daily.  diclofenac (VOLTAREN) 1 % topical gel Apply 4 g to affected area four (4) times daily.  LUMIGAN 0.01 % ophthalmic drops Administer 1 Drop to both eyes nightly.  MULTIVITS/IRON FUM/FA/D3/LYCOP (MULTI FOR HIM PO) Take  by mouth daily. No current facility-administered medications for this visit. Allergies and Intolerances: Allergies   Allergen Reactions    Latex, Natural Rubber Swelling    Adhesive Tape-Silicones Rash and Itching    Aldactone [Spironolactone] Not Reported This Time     Breast tenderness    Codeine Not Reported This Time     \"Drives crazy\"    Tape [Adhesive] Rash    Tetanus Toxoid, Adsorbed Anaphylaxis    Tetanus Vaccines And Toxoid Anaphylaxis     Other reaction(s): anaphylaxis/angioedema  Other reaction(s): anaphylaxis/angioedema  Other reaction(s): anesthetic shock     Family History: He has no family history of colon or prostate cancer. Family History   Problem Relation Age of Onset    Cancer Mother     Heart Disease Father     Alcohol abuse Father     Cancer Other      Social History:   He  reports that he has quit smoking. His smoking use included cigars, pipe, and cigarettes. He quit after 12.00 years of use. He has quit using smokeless tobacco.  His smokeless tobacco use included chew. He smoked 2 ppd for 50 years, stopping in 1974. He is  with two adult children. 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    Social History     Substance and Sexual Activity   Alcohol Use Yes    Comment: rarely     Immunization History:  Immunization History   Administered Date(s) Administered    (RETIRED) Pneumococcal Vaccine (Unspecified Type) 2008    Influenza High Dose Vaccine PF 2017    Influenza Vaccine (Tri) Adjuvanted 2018, 2019    Influenza Vaccine Split 10/04/2011, 10/16/2012    Influenza Vaccine Whole 01/15/2010    Pneumococcal Conjugate (PCV-13) 2015    Pneumococcal Polysaccharide (PPSV-23) 2008    Varicella Virus Vaccine 10/01/2013    Zoster 10/16/2012       Review of Systems:   As above included in HPI. Otherwise 11 point review of systems negative including constitutional, skin, HENT, eyes, respiratory, cardiovascular, gastrointestinal, genitourinary, musculoskeletal, endocrine, hematologic, allergy, and neurologic. Physical:   Vitals: none  BP:     HR:    WT:    BMI:  kg/m2    Exam:   None performed due to audio only visit. Review of Data:  Labs:    Hospital Outpatient Visit on 2020   Component Date Value Ref Range Status    SARS-CoV-2 2020 Not Detected  Not Detected   Final       Health Maintenance:  Screening:    Colorectal: colonoscopy (2015) serrated adenomas. Dr. Marnie Posey. Due 2020. Depression: on Paxil   DM (HbA1c/FPG): FPG 91/ HbA1c 5.7 (2020)   Hepatitis C: N/A   Falls: none   DEXA: within normal limits (2016)   PSA/MARTÍNEZ: PSA 2.1. As per Dr. Jamie Vasquez   Glaucoma: regular eye exams with Dr. Cisse/ Dr. Ag Camacho (last 2020)   Smokin pack year.  Distant past.   Vitamin D: 34.3 (2020)   Medicare Wellness: 2020    Impression:  Patient Active Problem List   Diagnosis Code    BPH with obstruction/lower urinary tract symptoms N40.1, N13.8    Hypertension I10    Primary osteoarthritis involving multiple joints M89.49    Hyperlipidemia E78.5    GERD (gastroesophageal reflux disease) K21.9    Pre-diabetes R73.03    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 (HCC) M06.041, M06.042    Vitamin D deficiency E55.9    Colon polyps K63.5    CPDD (calcium pyrophosphate deposition disease) M11.20    Impaired fasting glucose R73.01    Rectal bleeding K62.5    Class 1 obesity due to excess calories with serious comorbidity and body mass index (BMI) of 31.0 to 31.9 in adult E66.09, Z68.31    APC (atrial premature contractions) I49.1    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 Patient reports symptoms of weakness, fatigue, and diarrhea since 6/22/2020, and onset of fever and dyspnea on 6/27/2020. He presented to the [facility] ED on 6/29/2020 due to difficulty breathing and found to be hypoxic with pulse ox at rest 92-93 % and ambulation 89-91% room air. Chest x-ray with bilateral multifocal extremely subtle groundglass opacities. Labs showed leukopenia, thrombocytopenia, and elevated AST and alk phos. He was discharged home,and SARS COV-2 positive result returned the following day. He developed progressive worsening fever and dyspnea, chest pain with inspiration, diarrhea, and poor po intake. Advised to return to ED on 7/2/2020 and found to be hypoxic in the 80's requiring mask rebreather. Chest x-ray showed bilateral increased patchy groundglass airspace opacities and inflammatory markers were elevated. He was treated with high flow nasal cannula, azithromycin, remdesivir, Solumedrol, and Lovenox.  He was advised to prone and also received zinc and Vitamin C supplementation. He gradually improved and his oxygen was weaned to 5 liter nasal cannula. He was discharged with home oxygen, prednisone taper for 8 days, and Eliquis 2.5 mg bid for 30 days (complete 8/9/2020). He had a repeat COVID-19 test on 7/29/2020 which was negative. He is gradually improving. Continuing to have difficulty with exertional dyspnea and fatigue, and using 2 liters of oxygen at rest and following exertion with oxygenation >92%. Also reports chest pains with deep breaths. Appetite improved and weight increasing. Patient has returned to work, but finding unable to last beyond 3-4 hours due to overwhelming fatigue. Not taking oxygen with him to work. Started using CPAP, but very uncomfortable since feels not getting enough air. Awaiting approval to bleed 2 liters of oxygen into CPAP to help with air hunger. Advised to continue and follow-up with Dr. Shoaib Casillas as scheduled. Other issues:  1. Obstructive sleep apnea, severe. Patient reported in 1/2019 awakening gasping for air several times per week. No overt evidence of heart failure and EKG was without change from baseline at that time. He reported that he would need to sit up immediately and take deep breathes until resolved. He also admitted to snoring and experiencing significant daytime drowsiness particularly when driving. Given high suspicion for sleep apnea, he was referred to Dr. Latisha Vasquez for evaluation, but he never scheduled. Presented with worsening symptoms over several weeks, possibly exacerbated by the inadvertent abrupt cessation of Paxil during this time. Severe episode on 8/9/2019 prompted ED evaluation. EKG without change, troponin negative and NT-pro BNP normal. Echocardiogram (8/26/2019) with normal LV size and function (EF 56-60%), no RWMA, normal valves. Evaluated by Dr. Latisha Vasquez and completed home sleep study and diagnosed with severe JANNET.  Started on auto CPAP as discussed above, and now to begin using with 2 liters of oxygen. Advised to continue. .  2. Hypertension. Blood pressure had been well controlled on current regimen of lisinopril 40 mg daily, amlodipine 10 mg daily, lasix 40 mg daily (potassium 20 meq daily) and hydralazine 25 mg bid. Advised to monitor his blood pressure at home. Renal function has been normal with creatinine 0.74/ eGFR >60 at discharge. Normal echocardiogram (8/2019). Continue to follow. 3. Hyperlipidemia. On moderate intensity dose atorvastatin with LDL 58 and HDL 55, indicative of excellent control in this patient. Continue to follow. 4. Prediabetes. Had been controlled on diet alone with HbA1c generally stable at 5.7. Required sliding scale insulin dosing in the hospital due to elevated blood sugar, likely related to high dose steroids. Not monitoring blood sugar at home. No evidence of microvascular complications. On Ace-I and statin. Continue follow-up with Dr. Dileep Lilly for annual eye exams. Emphasized importance of lifestyle modifications, including diet, exercise, and weight loss. 5. Rheumatoid arthritis. On hydroxychloroquine. Has regular eye exams with Dr. Dileep Lilly. Difficult to gauge response as appears to have evidence of osteoarthritis and CPPD occurring concurrently, but noted significant improvement since initiating colchicine. Voltaren gel and Tylenol for pain control as needed. Followed by Dr. Dominga Lopez. 6. Primary osteoarthritis. Evident in bilateral hands and knees, bilateral shoulders, and cervical spine. Neck pain now returned to baseline. Shoulder pain (R>L). X-ray with evidence of osteoarthritis and rotator cuff pathology. Evaluated by Dr. John Paul Mazariegos and received cortisone injection to right shoulder with some improvement. Surgery for reverse shoulder replacement recommended. Changed from ibuprofen 400 mg qid and Tylenol to Celebrex 200 mg qd with significant improvement. However, held while being treated with Eliquis for 30 days.  Also using Voltaren gel.  7. CPPD. Colchicine started on 1/19/2017 to help address chondrocalcinosis on x-rays. Reports significant improvement. Now taking every other day with good control. 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 Unresponsive to Medrol dose pack, physical therapy, steroid injections, and unwilling to take narcotics as not effective. Lumbar MRI with multilevel degenerative changes and facet arthropathy with R>L facet stenosis at L4-L5 likely responsible for symptoms. Had been following with Dr. Samantha Chang, but given lack of improvement, started on gabapentin 100 mg tid and referred to Dr. Antonio Pearl for evaluation. She recommended increasing gabapentin to 200 mg tid, and given his lack of response to conservative measures, referred to Dr. Emperatriz Colby. He underwent decompression with L4/5 right-sided hemilaminotomy and medial facetectomy on 10/23/2019. Developed urinary retention post-op, but successfully passed voiding trial and has had no further difficulties. He developed recurrence of pain on post op day 6, and treated with Medrol dose pack. Remains on gabapentin 300 mg tid with mild  pain when awakening in the morning but generally much improved. Being followed by Dr. Emperatriz Colby. 9. Depression. Prior reasonable control with Paxil and weaned from benzodiazepine use for anxiety and insomnia. On Zoloft 50 mg daily with improvement after inadvertently stopping Paxil. Described some persistent anxiety particularly at night, and advised to increase Zoloft to 75 mg daily with improvement. 10. Abnormal chest x-ray. Ill defined density in left mid zone noted on chest x-ray in ED on 8/9/2019. Underwent chest CT scan (8/31/2019) which showed several tiny bilateral pulmonary nodules which were stable when compared with prior chest CT scan from 2007. No further evaluation needed. 11. Rectal bleeding. Colonoscopy 8/2015. Evaluated by Dr. Marnie Posey and considered to be hemorrhoidal in source. Known internal and external hemorrhoids. Improved with Citrucel. No evidence of anemia. Follow. 12. BPH. Does have lower urinary tract symptoms. Developed postop urinary retention and now resolved. On Flomax. Followed previously by Dr. Mervat Jean, and will need to establish follow-up with [facility] [address] [place] [address]. Carney Plaster 13. GERD. Good control of symptoms with omeprazole. No issues today. 14. 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 Monitored annually. 15. Obesity. Lost 25 pounds during his hospitalization. Emphasized importance of healthy eating and improved nutrition. Will readdress at next visit. 16. Insomnia. Weaned from Xanax. Had been using melatonin agonist, ramelteon, to replace Xanax, but no longer taking it either. Follow. 17. Health maintenance. Urged to obtain influenza vaccine this fall. Unable to receive Tdap due to allergy (anaphylaxis to Td). Will address Shingrix vaccine at next visit. Other immunizations up to date. Colonoscopy due 8/2020, but will defer for now until recovers. Continue regular eye exams with Dr. Wilma Gowers. Vitamin D level normal. Continue maintenance dose supplement. Aspirin discontinued given new recommendations regarding primary prevention. Referred to podiatry for onychomycosis and left great toe pain. Medicare wellness visit up to date. Patient understands recommendations and agrees with plan. Follow-up as previously scheduled for in office visit with labs prior. We discussed the expected course, resolution and complications of the diagnosis(es) in detail. Medication risks, benefits, costs, interactions, and alternatives were discussed as indicated. I advised him to contact the office if his condition worsens, changes or fails to improve as anticipated. He expressed understanding with the diagnosis(es) and plan.      Francisca West, who was evaluated through a patient-initiated, synchronous (real-time) audio only encounter, and/or her healthcare decision maker, is aware that it is a billable service, with coverage as determined by his insurance carrier. He provided verbal consent to proceed: Yes. He has not had a related appointment within my department in the past 7 days or scheduled within the next 24 hours.       Total Time: minutes: 21-30 minutes    Lino Zacarias MD

## 2020-09-03 ENCOUNTER — HOSPITAL ENCOUNTER (OUTPATIENT)
Dept: LAB | Age: 80
Discharge: HOME | End: 2020-09-03
Payer: MEDICARE

## 2020-09-03 DIAGNOSIS — R06.00 PND (PAROXYSMAL NOCTURNAL DYSPNEA): ICD-10-CM

## 2020-09-03 DIAGNOSIS — R73.01 IMPAIRED FASTING GLUCOSE: ICD-10-CM

## 2020-09-03 DIAGNOSIS — E55.9 VITAMIN D DEFICIENCY: ICD-10-CM

## 2020-09-03 DIAGNOSIS — M06.041 RHEUMATOID ARTHRITIS INVOLVING BOTH HANDS WITH NEGATIVE RHEUMATOID FACTOR (HCC): ICD-10-CM

## 2020-09-03 DIAGNOSIS — F33.0 DEPRESSION, MAJOR, RECURRENT, MILD (HCC): ICD-10-CM

## 2020-09-03 DIAGNOSIS — M15.9 PRIMARY OSTEOARTHRITIS INVOLVING MULTIPLE JOINTS: ICD-10-CM

## 2020-09-03 DIAGNOSIS — E78.5 HYPERLIPIDEMIA, UNSPECIFIED HYPERLIPIDEMIA TYPE: ICD-10-CM

## 2020-09-03 DIAGNOSIS — M47.816 LUMBAR FACET ARTHROPATHY: ICD-10-CM

## 2020-09-03 DIAGNOSIS — G47.33 OBSTRUCTIVE SLEEP APNEA SYNDROME: ICD-10-CM

## 2020-09-03 DIAGNOSIS — G47.30 SLEEP APNEA, UNSPECIFIED TYPE: ICD-10-CM

## 2020-09-03 DIAGNOSIS — N40.1 BPH WITH OBSTRUCTION/LOWER URINARY TRACT SYMPTOMS: ICD-10-CM

## 2020-09-03 DIAGNOSIS — M71.38 SYNOVIAL CYST OF LUMBAR FACET JOINT: ICD-10-CM

## 2020-09-03 DIAGNOSIS — F33.0 MILD EPISODE OF RECURRENT MAJOR DEPRESSIVE DISORDER (HCC): ICD-10-CM

## 2020-09-03 DIAGNOSIS — J96.01 ACUTE RESPIRATORY FAILURE WITH HYPOXIA (HCC): ICD-10-CM

## 2020-09-03 DIAGNOSIS — U07.1 COVID-19 VIRUS INFECTION: ICD-10-CM

## 2020-09-03 DIAGNOSIS — M54.31 RIGHT SIDED SCIATICA: ICD-10-CM

## 2020-09-03 DIAGNOSIS — J12.82 PNEUMONIA DUE TO COVID-19 VIRUS: ICD-10-CM

## 2020-09-03 DIAGNOSIS — K21.9 GASTROESOPHAGEAL REFLUX DISEASE, ESOPHAGITIS PRESENCE NOT SPECIFIED: ICD-10-CM

## 2020-09-03 DIAGNOSIS — E55.9 VITAMIN D DEFICIENCY, UNSPECIFIED: ICD-10-CM

## 2020-09-03 DIAGNOSIS — U07.1 PNEUMONIA DUE TO COVID-19 VIRUS: ICD-10-CM

## 2020-09-03 DIAGNOSIS — M79.675 GREAT TOE PAIN, LEFT: ICD-10-CM

## 2020-09-03 DIAGNOSIS — S68.621S: ICD-10-CM

## 2020-09-03 DIAGNOSIS — M11.20 CPDD (CALCIUM PYROPHOSPHATE DEPOSITION DISEASE): ICD-10-CM

## 2020-09-03 DIAGNOSIS — E66.09 CLASS 1 OBESITY DUE TO EXCESS CALORIES WITH SERIOUS COMORBIDITY AND BODY MASS INDEX (BMI) OF 31.0 TO 31.9 IN ADULT: ICD-10-CM

## 2020-09-03 DIAGNOSIS — M06.042 RHEUMATOID ARTHRITIS INVOLVING BOTH HANDS WITH NEGATIVE RHEUMATOID FACTOR (HCC): ICD-10-CM

## 2020-09-03 DIAGNOSIS — R73.03 PRE-DIABETES: ICD-10-CM

## 2020-09-03 DIAGNOSIS — I10 ESSENTIAL HYPERTENSION: ICD-10-CM

## 2020-09-03 DIAGNOSIS — D75.89 MACROCYTOSIS WITHOUT ANEMIA: ICD-10-CM

## 2020-09-03 DIAGNOSIS — B35.1 ONYCHOMYCOSIS: ICD-10-CM

## 2020-09-03 DIAGNOSIS — S68.111A: ICD-10-CM

## 2020-09-03 DIAGNOSIS — N13.8 BPH WITH OBSTRUCTION/LOWER URINARY TRACT SYMPTOMS: ICD-10-CM

## 2020-09-03 LAB
25(OH)D3 SERPL-MCNC: 29.6 NG/ML (ref 30–100)
ALBUMIN SERPL-MCNC: 4 G/DL (ref 3.4–5)
ALBUMIN/GLOB SERPL: 1.6 {RATIO} (ref 0.8–1.7)
ALP SERPL-CCNC: 114 U/L (ref 45–117)
ALT SERPL-CCNC: 30 U/L (ref 16–61)
ANION GAP SERPL CALC-SCNC: 6 MMOL/L (ref 3–18)
APPEARANCE UR: CLEAR
AST SERPL-CCNC: 18 U/L (ref 10–38)
BASOPHILS # BLD: 0 K/UL (ref 0–0.1)
BASOPHILS NFR BLD: 0 % (ref 0–2)
BILIRUB SERPL-MCNC: 0.6 MG/DL (ref 0.2–1)
BILIRUB UR QL: NEGATIVE
BUN SERPL-MCNC: 16 MG/DL (ref 7–18)
BUN/CREAT SERPL: 24 (ref 12–20)
CALCIUM SERPL-MCNC: 9.5 MG/DL (ref 8.5–10.1)
CHLORIDE SERPL-SCNC: 111 MMOL/L (ref 100–111)
CHOLEST SERPL-MCNC: 139 MG/DL
CO2 SERPL-SCNC: 27 MMOL/L (ref 21–32)
COLOR UR: YELLOW
CREAT SERPL-MCNC: 0.66 MG/DL (ref 0.6–1.3)
CREAT UR-MCNC: 54 MG/DL (ref 30–125)
DIFFERENTIAL METHOD BLD: ABNORMAL
EOSINOPHIL # BLD: 0.4 K/UL (ref 0–0.4)
EOSINOPHIL NFR BLD: 6 % (ref 0–5)
ERYTHROCYTE [DISTWIDTH] IN BLOOD BY AUTOMATED COUNT: 14.9 % (ref 11.6–14.5)
EST. AVERAGE GLUCOSE BLD GHB EST-MCNC: 111 MG/DL
GLOBULIN SER CALC-MCNC: 2.5 G/DL (ref 2–4)
GLUCOSE SERPL-MCNC: 102 MG/DL (ref 74–99)
GLUCOSE UR STRIP.AUTO-MCNC: NEGATIVE MG/DL
HBA1C MFR BLD: 5.5 % (ref 4.2–5.6)
HCT VFR BLD AUTO: 41.8 % (ref 36–48)
HDLC SERPL-MCNC: 59 MG/DL (ref 40–60)
HDLC SERPL: 2.4 {RATIO} (ref 0–5)
HGB BLD-MCNC: 13.4 G/DL (ref 13–16)
HGB UR QL STRIP: NEGATIVE
KETONES UR QL STRIP.AUTO: NEGATIVE MG/DL
LDLC SERPL CALC-MCNC: 68 MG/DL (ref 0–100)
LEUKOCYTE ESTERASE UR QL STRIP.AUTO: NEGATIVE
LIPID PROFILE,FLP: NORMAL
LYMPHOCYTES # BLD: 2.1 K/UL (ref 0.9–3.6)
LYMPHOCYTES NFR BLD: 33 % (ref 21–52)
MAGNESIUM SERPL-MCNC: 2.3 MG/DL (ref 1.6–2.6)
MCH RBC QN AUTO: 32.3 PG (ref 24–34)
MCHC RBC AUTO-ENTMCNC: 32.1 G/DL (ref 31–37)
MCV RBC AUTO: 100.7 FL (ref 74–97)
MICROALBUMIN UR-MCNC: 0.5 MG/DL (ref 0–3)
MICROALBUMIN/CREAT UR-RTO: 9 MG/G (ref 0–30)
MONOCYTES # BLD: 0.5 K/UL (ref 0.05–1.2)
MONOCYTES NFR BLD: 9 % (ref 3–10)
NEUTS SEG # BLD: 3.3 K/UL (ref 1.8–8)
NEUTS SEG NFR BLD: 52 % (ref 40–73)
NITRITE UR QL STRIP.AUTO: NEGATIVE
PH UR STRIP: 6.5 [PH] (ref 5–8)
PLATELET # BLD AUTO: 226 K/UL (ref 135–420)
PMV BLD AUTO: 10.4 FL (ref 9.2–11.8)
POTASSIUM SERPL-SCNC: 4.7 MMOL/L (ref 3.5–5.5)
PROT SERPL-MCNC: 6.5 G/DL (ref 6.4–8.2)
PROT UR STRIP-MCNC: NEGATIVE MG/DL
RBC # BLD AUTO: 4.15 M/UL (ref 4.7–5.5)
SODIUM SERPL-SCNC: 144 MMOL/L (ref 136–145)
SP GR UR REFRACTOMETRY: 1.01 (ref 1–1.03)
TRIGL SERPL-MCNC: 60 MG/DL (ref ?–150)
TSH SERPL DL<=0.05 MIU/L-ACNC: 1.49 UIU/ML (ref 0.36–3.74)
UROBILINOGEN UR QL STRIP.AUTO: 0.2 EU/DL (ref 0.2–1)
VLDLC SERPL CALC-MCNC: 12 MG/DL
WBC # BLD AUTO: 6.2 K/UL (ref 4.6–13.2)

## 2020-09-03 PROCEDURE — 84443 ASSAY THYROID STIM HORMONE: CPT

## 2020-09-03 PROCEDURE — 81003 URINALYSIS AUTO W/O SCOPE: CPT

## 2020-09-03 PROCEDURE — 83735 ASSAY OF MAGNESIUM: CPT

## 2020-09-03 PROCEDURE — 80061 LIPID PANEL: CPT

## 2020-09-03 PROCEDURE — 85025 COMPLETE CBC W/AUTO DIFF WBC: CPT

## 2020-09-03 PROCEDURE — 36415 COLL VENOUS BLD VENIPUNCTURE: CPT

## 2020-09-03 PROCEDURE — 82306 VITAMIN D 25 HYDROXY: CPT

## 2020-09-03 PROCEDURE — 82043 UR ALBUMIN QUANTITATIVE: CPT

## 2020-09-03 PROCEDURE — 80053 COMPREHEN METABOLIC PANEL: CPT

## 2020-09-03 PROCEDURE — 83036 HEMOGLOBIN GLYCOSYLATED A1C: CPT

## 2020-09-04 LAB — PERIPHERAL SMEAR,PSM: NORMAL

## 2020-09-10 ENCOUNTER — OFFICE VISIT (OUTPATIENT)
Dept: INTERNAL MEDICINE CLINIC | Age: 80
End: 2020-09-10

## 2020-09-10 ENCOUNTER — HOSPITAL ENCOUNTER (OUTPATIENT)
Dept: GENERAL RADIOLOGY | Age: 80
Discharge: HOME | End: 2020-09-10
Payer: MEDICARE

## 2020-09-10 VITALS
OXYGEN SATURATION: 98 % | DIASTOLIC BLOOD PRESSURE: 61 MMHG | SYSTOLIC BLOOD PRESSURE: 123 MMHG | HEART RATE: 91 BPM | RESPIRATION RATE: 16 BRPM | HEIGHT: 68 IN | TEMPERATURE: 97.5 F | BODY MASS INDEX: 31.22 KG/M2 | WEIGHT: 206 LBS

## 2020-09-10 DIAGNOSIS — E66.09 CLASS 1 OBESITY DUE TO EXCESS CALORIES WITH SERIOUS COMORBIDITY AND BODY MASS INDEX (BMI) OF 31.0 TO 31.9 IN ADULT: ICD-10-CM

## 2020-09-10 DIAGNOSIS — U07.1 PNEUMONIA DUE TO COVID-19 VIRUS: ICD-10-CM

## 2020-09-10 DIAGNOSIS — M47.816 LUMBAR FACET ARTHROPATHY: ICD-10-CM

## 2020-09-10 DIAGNOSIS — E55.9 VITAMIN D DEFICIENCY: ICD-10-CM

## 2020-09-10 DIAGNOSIS — M54.9 UPPER BACK PAIN: ICD-10-CM

## 2020-09-10 DIAGNOSIS — I10 ESSENTIAL HYPERTENSION: ICD-10-CM

## 2020-09-10 DIAGNOSIS — M06.042 RHEUMATOID ARTHRITIS INVOLVING BOTH HANDS WITH NEGATIVE RHEUMATOID FACTOR (HCC): ICD-10-CM

## 2020-09-10 DIAGNOSIS — E78.5 HYPERLIPIDEMIA, UNSPECIFIED HYPERLIPIDEMIA TYPE: ICD-10-CM

## 2020-09-10 DIAGNOSIS — G47.33 OBSTRUCTIVE SLEEP APNEA SYNDROME: ICD-10-CM

## 2020-09-10 DIAGNOSIS — U07.1 COVID-19 VIRUS INFECTION: ICD-10-CM

## 2020-09-10 DIAGNOSIS — M06.041 RHEUMATOID ARTHRITIS INVOLVING BOTH HANDS WITH NEGATIVE RHEUMATOID FACTOR (HCC): ICD-10-CM

## 2020-09-10 DIAGNOSIS — R60.0 BILATERAL LOWER EXTREMITY EDEMA: ICD-10-CM

## 2020-09-10 DIAGNOSIS — M54.2 NECK PAIN: ICD-10-CM

## 2020-09-10 DIAGNOSIS — J12.82 PNEUMONIA DUE TO COVID-19 VIRUS: ICD-10-CM

## 2020-09-10 DIAGNOSIS — R73.03 PRE-DIABETES: ICD-10-CM

## 2020-09-10 DIAGNOSIS — M15.9 PRIMARY OSTEOARTHRITIS INVOLVING MULTIPLE JOINTS: ICD-10-CM

## 2020-09-10 DIAGNOSIS — N40.1 BPH WITH OBSTRUCTION/LOWER URINARY TRACT SYMPTOMS: ICD-10-CM

## 2020-09-10 DIAGNOSIS — J96.01 ACUTE RESPIRATORY FAILURE WITH HYPOXIA (HCC): ICD-10-CM

## 2020-09-10 DIAGNOSIS — R06.09 DYSPNEA ON EXERTION: Primary | ICD-10-CM

## 2020-09-10 DIAGNOSIS — R73.01 IMPAIRED FASTING GLUCOSE: ICD-10-CM

## 2020-09-10 DIAGNOSIS — M11.20 CPDD (CALCIUM PYROPHOSPHATE DEPOSITION DISEASE): ICD-10-CM

## 2020-09-10 DIAGNOSIS — N13.8 BPH WITH OBSTRUCTION/LOWER URINARY TRACT SYMPTOMS: ICD-10-CM

## 2020-09-10 DIAGNOSIS — Z23 ENCOUNTER FOR IMMUNIZATION: ICD-10-CM

## 2020-09-10 DIAGNOSIS — R06.09 DYSPNEA ON EXERTION: ICD-10-CM

## 2020-09-10 DIAGNOSIS — Z23 NEEDS FLU SHOT: ICD-10-CM

## 2020-09-10 PROCEDURE — 71046 X-RAY EXAM CHEST 2 VIEWS: CPT

## 2020-09-10 PROCEDURE — 72040 X-RAY EXAM NECK SPINE 2-3 VW: CPT

## 2020-09-10 NOTE — PROGRESS NOTES
Called and discussed results with patient. Wishes to hold off on physical therapy and performing exercises/stretches on his own.

## 2020-09-10 NOTE — PROGRESS NOTES
Pierce Tariq is a 68 y.o. male who presents for routine immunizations- High Dose Influenza  He denies any symptoms , reactions or allergies that would exclude them from being immunized today. Risks and adverse reactions were discussed and the VIS was given to them. All questions were addressed. He was observed for 15 min post injection. There were no reactions observed.     Tomas Phalen, LPN

## 2020-09-10 NOTE — PATIENT INSTRUCTIONS
Vaccine Information Statement Influenza (Flu) Vaccine (Inactivated or Recombinant): What You Need to Know Many Vaccine Information Statements are available in [place] and other languages. See www.immunize.org/vis Hojas de información sobre vacunas están disponibles en español y en muchos otros idiomas. Visite www.immunize.org/vis 1. Why get vaccinated? Influenza vaccine can prevent influenza (flu). Flu is a contagious disease that spreads around the [facility] every year, usually between October and May. Anyone can get the flu, but it is more dangerous for some people. Infants and young children, people 72years of age and older, pregnant women, and people with certain health conditions or a weakened immune system are at greatest risk of flu complications. Pneumonia, bronchitis, sinus infections and ear infections are examples of flu-related complications. If you have a medical condition, such as heart disease, cancer or diabetes, flu can make it worse. Flu can cause fever and chills, sore throat, muscle aches, fatigue, cough, headache, and runny or stuffy nose. Some people may have vomiting and diarrhea, though this is more common in children than adults. Each year thousands of people in the [facility] die from flu, and many more are hospitalized. Flu vaccine prevents millions of illnesses and flu-related visits to the doctor each year. 2. Influenza vaccines CDC recommends everyone 10months of age and older get vaccinated every flu season. Children 6 months through 6years of age may need 2 doses during a single flu season. Everyone else needs only 1 dose each flu season. It takes about 2 weeks for protection to develop after vaccination. There are many flu viruses, and they are always changing. Each year a new flu vaccine is made to protect against three or four viruses that are likely to cause disease in the upcoming flu season.  Even when the vaccine doesnt exactly match these viruses, it may still provide some protection. Influenza vaccine does not cause flu. Influenza vaccine may be given at the same time as other vaccines. 3. Talk with your health care provider Tell your vaccine provider if the person getting the vaccine: 
 Has had an allergic reaction after a previous dose of influenza vaccine, or has any severe, life-threatening allergies.  Has ever had Guillain-Barré Syndrome (also called GBS). In some cases, your health care provider may decide to postpone influenza vaccination to a future visit. People with minor illnesses, such as a cold, may be vaccinated. People who are moderately or severely ill should usually wait until they recover before getting influenza vaccine. Your health care provider can give you more information. 4. Risks of a reaction  Soreness, redness, and swelling where shot is given, fever, muscle aches, and headache can happen after influenza vaccine.  There may be a very small increased risk of Guillain-Barré Syndrome (GBS) after inactivated influenza vaccine (the flu shot). [employer] children who get the flu shot along with pneumococcal vaccine (PCV13), and/or DTaP vaccine at the same time might be slightly more likely to have a seizure caused by fever. Tell your health care provider if a child who is getting flu vaccine has ever had a seizure. People sometimes faint after medical procedures, including vaccination. Tell your provider if you feel dizzy or have vision changes or ringing in the ears. As with any medicine, there is a very remote chance of a vaccine causing a severe allergic reaction, other serious injury, or death. 5. What if there is a serious problem? An allergic reaction could occur after the vaccinated person leaves the clinic.  If you see signs of a severe allergic reaction (hives, swelling of the face and throat, difficulty breathing, a fast heartbeat, dizziness, or weakness), call 9-1-1 and get the person to the nearest hospital. 
 
For other signs that concern you, call your health care provider. Adverse reactions should be reported to the Vaccine Adverse Event Reporting System (VAERS). Your health care provider will usually file this report, or you can do it yourself. Visit the VAERS website at www.vaers. hhs.gov or call 6-364.619.1682. VAERS is only for reporting reactions, and VAERS staff do not give medical advice. 6. The National Vaccine Injury Compensation Program 
 
The [facility] Vaccine Injury Compensation Program (VICP) is a federal program that was created to compensate people who may have been injured by certain vaccines. Visit the VICP website at www.hrsa.gov/vaccinecompensation or call 2-845.904.8024 to learn about the program and about filing a claim. There is a time limit to file a claim for compensation. 7. How can I learn more?  Ask your health care provider.  Call your local or state health department.  Contact the Centers for Disease Control and Prevention (CDC): 
- Call 7-895.105.5113 (1-800-CDC-INFO) or 
- Visit CDCs influenza website at www.cdc.gov/flu Vaccine Information Statement (Interim) Inactivated Influenza Vaccine 8/15/2019 
42 PEGGY Canales [id number] Department of Health and [employer] Centers for Disease Control and Prevention Office Use Only Restart Vitamin D3 2000 U daily.

## 2020-09-13 PROBLEM — R60.0 BILATERAL LOWER EXTREMITY EDEMA: Status: ACTIVE | Noted: 2020-09-13

## 2020-09-13 PROBLEM — M54.2 NECK PAIN: Status: ACTIVE | Noted: 2020-09-13

## 2020-09-13 PROBLEM — R06.09 DYSPNEA ON EXERTION: Status: ACTIVE | Noted: 2020-09-13

## 2020-09-13 NOTE — PROGRESS NOTES
HPI:   Juan Antonio Lee is a 68y.o. year old male who presents today for a routine visit. He has a history of hypertension, hyperlipidemia, prediabetes, rheumatoid arthritis, osteoarthritis, calcium pyrophosphate deposition disease, BPH, GERD, and depression. He is also recovering from COVID-19 infection with severe pneumonia and acute hypoxic respiratory failure. He presents today and states that he is continuing to improve. He still experiences dyspnea and fatigue with exertion, but is no longer using oxygen. He also  reports that he is continuing to feel dull aching pain in his chest with deep inspiration. He reports that he has returned to work and is able to work longer hours. He reports that he continues to attempt to use his CPAP machine nightly, but complains of continued air hunger despite bleeding in 2 liters nasal cannula. He states that he can not last more than a few hours due to the discomfort. He states that he also notes worsening lower extremity edema since his last visit. He also describes worsening upper back and neck pain, and feels that it may be related to his work at the [employer] due to his poor posture when working. He describes some concern regarding his balance and states that he feels unsteady when walking. He is otherwise without new complaints. On 6/22/2020, he noted the onset of weakness, fatigue, and diarrhea. He stated that he continued to work for the entire week despite feeling ill. On 6/27/2020, he developed fever and dyspnea, which worsened throughout the weekend. He presented to [facility] on 6/28/2020, and WBC 4.0 (78 % neutrophils) and chest x-ray showed patchy density in the RUL, right perihilar area, and right base. He was advised to go to the ED, and presented to [facility] ED on 6/29/2020. He was found to be hypoxic with pulse ox at rest 92-93 % and ambulation 89-91% (room air). Chest x-ray showed bilateral multifocal extremely subtle groundglass opacities.  Labs showed WBC 4.4 (80% neutrophils), Hb 14.6/ Hct 42.0, platelets 525, creatinine 0.71/ eGFR >60, AST 53, alk phos 160, lactate 1.16. He was discharged home, and SARS COV-2 positive (6/29/2020) result returned. He presented for a virtual visit on 7/2/2020, and reported that since discharge from the ED, he noted persistent symptoms of weakness, diarrhea, fatigue, and fevers, and prgressive dyspnea. He described poor po intake, and chest pain with inspiration and felt that he was unable to take a deep breath or cough. He was advised to call EMS and return to the ED. Upon arrival by EMS, his oxygenation was noted to be in the low 80's, and required high flow oxygen. Chest x-ray (7/2/2020) showed bilateral increased patchy groundglass airspace opacities, and labs revealed ABG 7.43/34/70 on nonrebreather mask; WBC 7.3 (11% lymphocytes), Hb 14.8/ Hct 42.6, creatinine 0.8/ eGFR>60, ALT 79, AST 76, alk phos 170, albumin 2.7 D-dimer 1.35, ferritin 729, CRP 16.6, , procalcitonin 0.14, lactate 2.8, blood culture negative. He was initially started on Zosyn, but Dr. Dwane Lennox. Patel (ID) was consulted and recommended discontinuing and beginning azithromycin, remdesivir, IV Solumedrol, and lovenox. He was also started on ascorbic acid, melatonin, and zinc. Dr. Jackeline Marie (pulmonary) was consulted and recommended change to high flow nasal cannula and recommended proning to the patient. He improved clinically and inflammatory indices improved. His oxygen was weaned to 5 liters nasal cannula, and he completed 5 day course of remdesivir and azithromycin. He was discharged on 7/9/2020 with an 8 day course of prednisone, 30 day course of Eliquis 2.5 mg bid, and two week course of Vitamin  C and zinc. He was seen for post-hospitalization follow-up on 7/16/2020 and reported some persistent congestion and significant dyspnea on exertion.  He was referred to Dr. Alexandru Dunham, and she recommended continued oxygen use as needed and stressed need for treatment of obstructive sleep apnea with CPAP. She placed order for him to receive auto CPAP. On 8/13/2019, he reported that he had been seeing Dr. Anup Fraser for increasing difficulty with right sided sciatica. He reported being treated with a Medrol dose pack, physical therapy, and spinal injections without improvement, and was also prescribed Norco and Tramadol, but he stated that he found them too sedating and of no benefit. Due to persistent symptoms, he underwent a lumbar MRI (8/5/2019) which showed degenerative changes most notable at L4-L5 resulting in moderate spinal canal stenosis as well as moderate to severe right greater than left foraminal stenoses; advanced facet arthropathy with small facet effusions and suspected small right facet joint ventral synovial cyst; notable degenerative changes also L3-L4; L1 mild anterior compression deformity, chronic appearing; overall general worsening when compared to prior study in 2007. He was having continued significant pain, and was started on gabapentin 100 mg tid. He was referred to Dr. Leopoldo Sites and she increased his dose of gabapentin to 200 mg tid. She also referred him to Dr. Daniel Barragan for evaluation given his lack of response to conservative management. He recommended proceeding with decompression by performing a L4/5 right-sided hemilaminotomy and medial facetectomy, which was performed on 10/23/2019. He developed postop urinary retention and was discharged with a Deleon catheter. He had follow-up with Dr. Maryam Hastings on 10/29/2019 with successful voiding trial. He reports that he noted initial resolution of his right sciatica pain following surgery, but on 10/29/2019 noted abrupt recurrence when getting out of bed. He was evaluated by GOMEZ Doherty on 10/31/2019 and was treated with a Medrol dose pack without improvement. He was restarted on gabapentin 300 mg tid and reports improvement, although he continues to have mild pain in the morning.      On 8/9/2019, he had an episode of waking up gasping for air forcing him to get out of bed and walk around until his breathing normalized. He has been having frequent similar episodes over that last year, but had been resistent to evaluation for sleep apnea. However, he reports that this episode was especially severe. When his symptoms persisted, he was evaluated at [facility], and testing included WBC 5.7, Hb 14.2/ Hct 41.6, creatinine 0.9/ eGFR>60, troponin I x 1 negative, NT-pro BNP 45, EKG sinus rhythm at 83 bpm and no ischemic changes, and chest x-ray without acute changes, but an ill defined 15 mm density in the lateral left mid zone was noted. He received lasix 40 mg IV and was discharged. He had an echocardiogram (8/26/2019) showing normal LV function (EF 56-60%), no RWMA, unable to estimate RVSP due to inadequate TR, but TV/PV appear normal. He was referred to Dr. Kaylene Agudelo for probable sleep apnea, and underwent a home sleep study on 10/25/2019,showing evidence of severe obstructive sleep apnea with AHI 35 and oxygen guy 80%. He has not yet received his CPAP equipment so as to begin therapy. On 6/20/2018, he suffered an accidental gun shot wound while working resulting in traumatic injury to his left index finger with near loss of the middle phalanx and fracture of the distal phalanx. He was taken to [facility] and initially had irrigation and closure of the lacerations performed. He presented to the [facility] ED on 6/24/2018 with increased pain and swelling, and was started on doxycycline for a wound infection. On 7/7/2018, due to loss of stability and angulation of the index finger, he underwent stabilization with fusion of the proximal and distal phalanx with bone grafting by Dr. Tobi Squires. He did well and was started on physical therapy. On 8/1/2018, he presented to [address] [place] [address] for evaluation of increasing erythema, swelling and tenderness with concern for a possible infection.  He underwent left hand x-ray (8/1/2018) showing severe soft tissue swelling of the left second finger worrisome for cellulitis, traumatic amputation of the middle phalanx with marked osteopenia and irregularity of the base of the distal phalanx and flattening and irregularity of the head of the proximal phalanx. Unclear if related to prior trauma/surgery or osteomyelitis with osseous destruction and no films available for comparison. He was started empirically on Bactrim and Augmentin for 14 days. On 8/10/2018, he presented for evaluation by GOMEZ Sears for complaints of fever, malaise, headache, fatigue, and diarrhea. Lab evaluation showed WBC 17 (90% neutrophils), creatinine 1.34/ eGFR 52, blood culture negative. Stool cultures (8/13/2018) routine, O/P, and C.diff negative. Bactrim and Augmentin were discontinued on 8/13/2018, and he was started on metronidazole for 10 days for treatment of presumed C.diff with improvement. He was evaluated by Dr. Shani Duff on 8/15/2018 and repeat WBC 8.6 (59% neutrophils), ESR 6, and CRP 0.9. He was seen by Dr. Shani Duff in follow-up on 8/30/2018 and left index finger wound noted to be significantly improved and no further difficulty with diarrhea. He has a history of hypertension, treated with amlodipine, lisinopril, lasix (+ potassium), and hydralazine was added in 4/2018. He states that his wife has been monitoring his blood pressure intermittently. He denies any chest pain, shortness of breath at rest or with exertion, lightheadedness, or palpitations. He does report bilateral lower extremity swelling that it will increase throughout the day and improve overnight. . In 6/2016, he underwent lower extremity arterial and venous duplex scans, both of which were negative. He also has a history of hyperlipidemia, treated with moderate intensity atorvastatin. He has a history of prediabetes, with HbA1c ranging from 5.9-6.2 since 2012.  He denies any polyuria, polydipsia, nocturia, or blurry vision, and has no history of retinopathy, neuropathy, or nephropathy. He has regular eye exams with Dr. Gregg Haddad. He has a history of bilateral hand pain with morning stiffness for several years, and in 3/2014, he was noted to have a positive anti-CCP antibody level although NIMCO, rheumatoid factor, and ESR were negative. He was referred for evaluation to Dr. Deepa Mir, and was diagnosed with rheumatoid arthritis in 6/2014. He was treated with hydroxychloroquine, which has been partially helpful in controlling his symptoms. Bilateral hand x-rays also showed evidence of primary osteoarthritis with osteophytes present on the second and third MCP joints. In 1/2017, he was also noted to have evidence of possible chondrocalcinosis on x-ray, and was started on low dose colchicine in addition to hydroxychloroquine for concomitant calcium pyrophosphate deposition disease. He states that since starting on colchicine, he has had significant improvement in his hand pain. He also uses compounding cream and Voltaren gel for pain control. In 1/2019, he was complaining of worsening neck and bilateral shoulder pain (R>L). He stated that he was previously followed by Dr. Amira Llanos, and would occasionally receive cortisone injections into his shoulders. He also described neck pain with difficulty turning his head. He denied any upper extremity weakness or paresthesias. He underwent imaging, and bilateral shoulder x-rays (1/10/2019) showed degenerative changes and secondary findings of rotator cuff pathology. Cervical spine x-rays (1/10/2019) showed advanced degenerative changes with multilevel facet arthropathy. He was evaluated by Dr. Kobe Sherwood who gave him a cortisone injection in his right shoulder with some improvement. He also recommended a reverse shoulder replacement, but he remains hesitant to proceed. He was started on Celebrex 200 mg bid in 2/2019, and reports significant improvement. He continues to also use Tylenol as needed for pain.  He states that his neck pain seems to have improved to his baseline level. He has a history of symptomatic BPH, with complaints of decreased stream, hesitancy, and dribbling. He has refused treatment with medication. He is followed by Dr. Stella Joseph. He denies any dysuria, gross hematuria, or flank pain. He has a history of GERD, treated with daily omeprazole with good control of symptoms. He had a screening colonoscopy and 8/2015 by Dr. Abimael De La Rosa, showing a 3 mm sessile cecal polyp (pathology: intra-mucosal lymphoid aggregate), two 4 mm sessile polyps in the transverse colon (pathology: serrated adenomas), and a 1 cm lipoma in the transverse colon. Follow-up recommended for five years. He was having difficulty with rectal bleeding in 1/2018 and returned for evaluation with Dr. Abimael De La Rosa who felt it was most likely secondary to a hemorrhoidal source. She recommended use of Citrucel. He states that the bleeding has improved with initiation of this therapy. He denies any abdominal pain, nausea, vomiting, melena, or change in bowel movements. He has a history of depression and anxiety, which had been well controlled with Paxil although inadvertently stopped. Now on Zoloft with improvement. 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 Dr. Stella Joseph. 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 Dr. Sree Delong.      Past Surgical History:   Procedure Laterality Date    HX AMPUTATION FINGER Left     index    HX BACK SURGERY  10/23/2019    Right L4-5 hemilaminectomy, medial facetectomy, resection of synovial cyst    HX CARPAL TUNNEL RELEASE Bilateral     HX CATARACT REMOVAL Left 01/2018    HX CATARACT REMOVAL Bilateral 2018    HX COLONOSCOPY      HX HEENT      sinus, tonsillectomy    HX HERNIA REPAIR      HX MOHS PROCEDURES      bilateral     HX ORTHOPAEDIC      lef knee surgery, removed cartilage.  HX ROTATOR CUFF REPAIR Right      Current Outpatient Medications   Medication Sig    Oxygen 2 Liters continuous [employer]    hydrALAZINE (APRESOLINE) 25 mg tablet TAKE 1 TABLET BY MOUTH 3 TIMES A DAY    gabapentin (NEURONTIN) 300 mg capsule Take 1 Cap by mouth three (3) times daily. Max Daily Amount: 900 mg. Indications: neuropathic pain    tamsulosin (Flomax) 0.4 mg capsule Take 1 Cap by mouth daily. Indications: enlarged prostate with urination problem    sertraline (ZOLOFT) 50 mg tablet Take 1.5 Tabs by mouth daily.  atorvastatin (LIPITOR) 10 mg tablet TAKE 1 TABLET BY MOUTH ONCE DAILY    celecoxib (CELEBREX) 200 mg capsule TAKE 1 CAPSULE BY MOUTH ONCE DAILY    lisinopril (PRINIVIL, ZESTRIL) 40 mg tablet TAKE 1 TABLET BY MOUTH DAILY    omeprazole (PRILOSEC) 20 mg capsule TAKE 1 CAPSULE BY MOUTH ONCE DAILY    mineral oil liquid Take 30 mL by mouth daily as needed for Constipation.  amLODIPine (NORVASC) 10 mg tablet TAKE 1 TABLET BY MOUTH ONCE DAILY    furosemide (LASIX) 40 mg tablet Take 1 Tab by mouth daily.  potassium chloride (K-DUR, KLOR-CON) 20 mEq tablet Take 1 Tab by mouth daily.  cycloSPORINE (RESTASIS) 0.05 % ophthalmic emulsion Administer 1 Drop to both eyes nightly.  colchicine (MITIGARE) 0.6 mg capsule Take 0.6 mg by mouth every other day.  cholecalciferol, vitamin D3, (VITAMIN D3) 2,000 unit tab Take 2,000 Units by mouth daily.     diclofenac (VOLTAREN) 1 % topical gel Apply 4 g to affected area four (4) times daily.    LUMIGAN 0.01 % ophthalmic drops Administer 1 Drop to both eyes nightly.  MULTIVITS/IRON FUM/FA/D3/LYCOP (MULTI FOR HIM PO) Take  by mouth daily. No current facility-administered medications for this visit. Allergies and Intolerances: Allergies   Allergen Reactions    Latex, Natural Rubber Swelling    Adhesive Tape-Silicones Rash and Itching    Aldactone [Spironolactone] Not Reported This Time     Breast tenderness    Codeine Not Reported This Time     \"Drives crazy\"    Tape [Adhesive] Rash    Tetanus Toxoid, Adsorbed Anaphylaxis    Tetanus Vaccines And Toxoid Anaphylaxis     Family History: He has no family history of colon or prostate cancer. Family History   Problem Relation Age of Onset    Cancer Mother     Heart Disease Father     Alcohol abuse Father     Cancer Other      Social History:   He  reports that he has quit smoking. His smoking use included cigars, pipe, and cigarettes. He quit after 12.00 years of use. He has quit using smokeless tobacco.  His smokeless tobacco use included chew. He smoked 2 ppd for 50 years, stopping in 1974. He is  with two adult children. 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 He is a employed at [employer] in [place].    Social History     Substance and Sexual Activity   Alcohol Use Yes    Comment: rarely     Immunization History:  Immunization History   Administered Date(s) Administered    (RETIRED) Pneumococcal Vaccine (Unspecified Type) 01/01/2008    Influenza High Dose Vaccine PF 09/30/2017    Influenza Vaccine (Tri) Adjuvanted 09/25/2018, 09/25/2019    Influenza Vaccine Split 10/04/2011, 10/16/2012    Influenza Vaccine Whole 01/15/2010    Influenza, Quadrivalent, Adjuvanted 09/10/2020    Pneumococcal Conjugate (PCV-13) 01/19/2015    Pneumococcal Polysaccharide (PPSV-23) 01/01/2008    Varicella Virus Vaccine 10/01/2013    Zoster 10/16/2012       Review of Systems:   As above included in HPI. Otherwise 11 point review of systems negative including constitutional, skin, HENT, eyes, respiratory, cardiovascular, gastrointestinal, genitourinary, musculoskeletal, endocrine, hematologic, allergy, and neurologic. Physical:   Vitals: none  BP: 123/61   HR: 91  WT: 206 lb (93.4 kg)  BMI:  31.32 kg/m2    Exam:   Patient appears in no apparent distress. Affect is appropriate. HEENT: PERRLA, anicteric, oropharynx clear, no JVD, adenopathy or thyromegaly. No carotid bruits or radiated murmur. Lungs: clear to auscultation, no wheezes, rhonchi, or rales. Heart: regular rate and rhythm. No murmur, rubs, gallops  Abdomen: soft, nontender, nondistended, normal bowel sounds, no hepatosplenomegaly or masses. Extremities: 1+ edema bilaterally. Review of Data:  Labs:    Hospital Outpatient Visit on 09/03/2020   Component Date Value Ref Range Status    WBC 09/03/2020 6.2  4.6 - 13.2 K/uL Final    RBC 09/03/2020 4.15* 4.70 - 5.50 M/uL Final    HGB 09/03/2020 13.4  13.0 - 16.0 g/dL Final    HCT 09/03/2020 41.8  36.0 - 48.0 % Final    MCV 09/03/2020 100.7* 74.0 - 97.0 FL Final    MCH 09/03/2020 32.3  24.0 - 34.0 PG Final    MCHC 09/03/2020 32.1  31.0 - 37.0 g/dL Final    RDW 09/03/2020 14.9* 11.6 - 14.5 % Final    PLATELET 39/98/7434 026  135 - 420 K/uL Final    MPV 09/03/2020 10.4  9.2 - 11.8 FL Final    NEUTROPHILS 09/03/2020 52  40 - 73 % Final    LYMPHOCYTES 09/03/2020 33  21 - 52 % Final    MONOCYTES 09/03/2020 9  3 - 10 % Final    EOSINOPHILS 09/03/2020 6* 0 - 5 % Final    BASOPHILS 09/03/2020 0  0 - 2 % Final    ABS. NEUTROPHILS 09/03/2020 3.3  1.8 - 8.0 K/UL Final    ABS. LYMPHOCYTES 09/03/2020 2.1  0.9 - 3.6 K/UL Final    ABS. MONOCYTES 09/03/2020 0.5  0.05 - 1.2 K/UL Final    ABS. EOSINOPHILS 09/03/2020 0.4  0.0 - 0.4 K/UL Final    ABS.  BASOPHILS 09/03/2020 0.0  0.0 - 0.1 K/UL Final    DF 09/03/2020 AUTOMATED    Final  LIPID PROFILE 09/03/2020        Final    Cholesterol, total 09/03/2020 139  <200 MG/DL Final    Triglyceride 09/03/2020 60  <150 MG/DL Final    HDL Cholesterol 09/03/2020 59  40 - 60 MG/DL Final    LDL, calculated 09/03/2020 68  0 - 100 MG/DL Final    VLDL, calculated 09/03/2020 12  MG/DL Final    CHOL/HDL Ratio 09/03/2020 2.4  0 - 5.0   Final    Magnesium 09/03/2020 2.3  1.6 - 2.6 mg/dL Final    Sodium 09/03/2020 144  136 - 145 mmol/L Final    Potassium 09/03/2020 4.7  3.5 - 5.5 mmol/L Final    Chloride 09/03/2020 111  100 - 111 mmol/L Final    CO2 09/03/2020 27  21 - 32 mmol/L Final    Anion gap 09/03/2020 6  3.0 - 18 mmol/L Final    Glucose 09/03/2020 102* 74 - 99 mg/dL Final    BUN 09/03/2020 16  7.0 - 18 MG/DL Final    Creatinine 09/03/2020 0.66  0.6 - 1.3 MG/DL Final    BUN/Creatinine ratio 09/03/2020 24* 12 - 20   Final    GFR est AA 09/03/2020 >60  >60 ml/min/1.73m2 Final    GFR est non-AA 09/03/2020 >60  >60 ml/min/1.73m2 Final    Calcium 09/03/2020 9.5  8.5 - 10.1 MG/DL Final    Bilirubin, total 09/03/2020 0.6  0.2 - 1.0 MG/DL Final    ALT (SGPT) 09/03/2020 30  16 - 61 U/L Final    AST (SGOT) 09/03/2020 18  10 - 38 U/L Final    Alk.  phosphatase 09/03/2020 114  45 - 117 U/L Final    Protein, total 09/03/2020 6.5  6.4 - 8.2 g/dL Final    Albumin 09/03/2020 4.0  3.4 - 5.0 g/dL Final    Globulin 09/03/2020 2.5  2.0 - 4.0 g/dL Final    A-G Ratio 09/03/2020 1.6  0.8 - 1.7   Final    PERIPHERAL SMEAR 09/03/2020 PLEASE SEE PATHOLOGIST'S REVIEW IN THE [employer] SYSTEM, ACCESSION NUMBER:    Final    TSH 09/03/2020 1.49  0.36 - 3.74 uIU/mL Final    Color 09/03/2020 YELLOW    Final    Appearance 09/03/2020 CLEAR    Final    Specific gravity 09/03/2020 1.014  1.005 - 1.030   Final    pH (UA) 09/03/2020 6.5  5.0 - 8.0   Final    Protein 09/03/2020 Negative  NEG mg/dL Final    Glucose 09/03/2020 Negative  NEG mg/dL Final    Ketone 09/03/2020 Negative  NEG mg/dL Final    Bilirubin 2020 Negative  NEG   Final    Blood 2020 Negative  NEG   Final    Urobilinogen 2020 0.2  0.2 - 1.0 EU/dL Final    Nitrites 2020 Negative  NEG   Final    Leukocyte Esterase 2020 Negative  NEG   Final    Hemoglobin A1c 2020 5.5  4.2 - 5.6 % Final    Est. average glucose 2020 111  mg/dL Final    Vitamin D 25-Hydroxy 2020 29.6* 30 - 100 ng/mL Final    Microalbumin,urine random 2020 0.50  0 - 3.0 MG/DL Final    Creatinine, urine 2020 54.00  30 - 125 mg/dL Final    Microalbumin/Creat ratio (mg/g cre* 2020 9  0 - 30 mg/g Final       EKG (9/10/2020) sinus rhythm at 76 bpm, normal intervals, no ischemic changes; no significant change from prior in 2020 and 10/2019. Health Maintenance:  Screening:    Colorectal: colonoscopy (2015) serrated adenomas. Dr. Su Thacker. Due 2020. Depression: on Paxil   DM (HbA1c/FPG): / HbA1c 5.5 (2020)   Hepatitis C: N/A   Falls: none   DEXA: within normal limits (2016)   PSA/MARTÍNEZ: PSA 3.3 (2019)    Glaucoma: regular eye exams with Dr. Cisse/ Dr. Susy Henao (last 2020)   Smokin pack year.  Distant past.   Vitamin D: 29.6 (2020)   Medicare Wellness: 2020    Impression:  Patient Active Problem List   Diagnosis Code    BPH with obstruction/lower urinary tract symptoms N40.1, N13.8    Hypertension I10    Primary osteoarthritis involving multiple joints M89.49    Hyperlipidemia E78.5    GERD (gastroesophageal reflux disease) K21.9    Pre-diabetes R73.03    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 (HCC) M06.041, M06.042    Vitamin D deficiency E55.9    Colon polyps K63.5    CPDD (calcium pyrophosphate deposition disease) M11.20    Impaired fasting glucose R73.01    Rectal bleeding K62.5    Class 1 obesity due to excess calories with serious comorbidity and body mass index (BMI) of 31.0 to 31.9 in adult E66.09, Z68.31    APC (atrial premature contractions) I49.1    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 COVID-19 infection with severe pneumonia and acute hypoxic respiratory failure. Patient reported symptoms of weakness, fatigue, and diarrhea since 6/22/2020, and onset of fever and dyspnea on 6/27/2020. He presented to the [facility] ED on 6/29/2020 due to difficulty breathing and found to be hypoxic with pulse ox at rest 92-93 % and ambulation 89-91% room air. Chest x-ray with bilateral multifocal extremely subtle groundglass opacities. Labs showed leukopenia, thrombocytopenia, and elevated AST and alk phos. He was discharged home,and COVID-19 positive result returned the following day. He developed progressive worsening fever and dyspnea, chest pain with inspiration, diarrhea, and poor po intake. Advised to return to ED on 7/2/2020 and found to be hypoxic in the 80's requiring mask rebreather. Chest x-ray showed bilateral increased patchy groundglass airspace opacities and inflammatory markers were elevated. He was treated with high flow nasal cannula, azithromycin, remdesivir, Solumedrol, and Lovenox. He was advised to prone and also received zinc and Vitamin C supplementation. He gradually improved and his oxygen was weaned to 5 liter nasal cannula. He was discharged with home oxygen, prednisone taper for 8 days, and Eliquis 2.5 mg bid for 30 days (completed 8/9/2020).  He had a repeat COVID-19 test on 7/29/2020 which was negative. Reports improvement today, although noting continued exertional dyspnea and fatigue. Also reports persistent dull aching chest pains with deep breaths. Has returned to work. SpO2 98% today on RA. Will obtain repeat chest x-ray. Has follow-up scheduled with Dr. Kandice Walden in 2 weeks and will be able to assess oxygenation with 6 minute walk test in her office. Further evaluation as per her recommendations. 2. Obstructive sleep apnea, severe. Patient reported in 1/2019 awakening gasping for air several times per week. No overt evidence of heart failure and EKG was without change from baseline at that time. He reported that he would need to sit up immediately and take deep breathes until resolved. He also admitted to snoring and experiencing significant daytime drowsiness particularly when driving. Given high suspicion for sleep apnea, he was referred to Dr. Humera Aviles for evaluation, but he never scheduled. Presented with worsening symptoms over several weeks, possibly exacerbated by the inadvertent abrupt cessation of Paxil during this time. Severe episode on 8/9/2019 prompted ED evaluation. EKG without change, troponin negative and NT-pro BNP normal. Echocardiogram (8/26/2019) with normal LV size and function (EF 56-60%), no RWMA, normal valves. Evaluated by Dr. Humera Aviles and completed home sleep study and diagnosed with severe JANNET. Started on auto CPAP as discussed above, and now to begin using with 2 liters of oxygen. However, despite best efforts, patient continuing to describe significant difficulty using due to dyspnea and air hunger. Advised to discuss with Dr. Kandice Walden at upcoming visit. 3. Hypertension. Blood pressure remains well controlled on current regimen of lisinopril 40 mg daily, amlodipine 10 mg daily, lasix 40 mg daily (potassium 20 meq daily) and hydralazine 25 mg bid. Renal function remains normal with creatinine 0.66/ eGFR >60. Normal echocardiogram (8/2019). However, reports persistent dyspnea and worsening lower extremity edema since discharge from hospital. EKG today without change from baseline. Will obtain echocardiogram to evaluate for possible myocardial effects of COVID-19 infection. Will also prescribe compression hose. Continue to follow. 4. Hyperlipidemia. On moderate intensity dose atorvastatin with LDL 68 and HDL 59, indicative of excellent control in this patient. Continue to follow. 5. Rheumatoid arthritis. On hydroxychloroquine. Has regular eye exams with Dr. Howard Mcbride. Difficult to gauge response as appears to have evidence of osteoarthritis and CPPD occurring concurrently, but noted significant improvement since initiating colchicine. Voltaren gel and Tylenol for pain control as needed. Followed by Dr. Gi Glass. 6. Primary osteoarthritis. Evident in bilateral hands and knees, bilateral shoulders, and cervical spine. Shoulder pain (R>L). X-ray with evidence of osteoarthritis and rotator cuff pathology. Evaluated by Dr. Jessie Madison and received cortisone injection to right shoulder with some improvement. Surgery for reverse shoulder replacement recommended. Changed from ibuprofen 400 mg qid and Tylenol to Celebrex 200 mg qd with significant improvement. However, held while being treated with Eliquis, and now restarted but using only as needed. Also using Voltaren gel. Reports increasing neck pain today, and will proceed with cervical spine x-ray to evaluate. Not wishing to proceed with physical therapy currently although had found it to be helpful in the past. Follow. 7. CPPD. Colchicine started on 1/19/2017 to help address chondrocalcinosis on x-rays. Reports significant improvement. Now taking every other day with good control. 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 Unresponsive to Medrol dose pack, physical therapy, steroid injections, and unwilling to take narcotics as not effective.  Lumbar MRI with multilevel degenerative changes and facet arthropathy with R>L facet stenosis at L4-L5 likely responsible for symptoms. Had been following with Dr. Anup Fraser, but given lack of improvement, started on gabapentin 100 mg tid and referred to Dr. Leopoldo Sites for evaluation. She recommended increasing gabapentin to 200 mg tid, and given his lack of response to conservative measures, referred to Dr. Daniel Barragan. He underwent decompression with L4/5 right-sided hemilaminotomy and medial facetectomy on 10/23/2019. Developed urinary retention post-op, but successfully passed voiding trial and has had no further difficulties. He developed recurrence of pain on post op day 6, and treated with Medrol dose pack. Remains on gabapentin 300 mg tid with mild  pain when awakening in the morning but generally much improved. Being followed by Dr. Daniel Barragan. 9. Prediabetes. Had been controlled on diet alone with HbA1c normal today at 5.5. Required sliding scale insulin dosing in the hospital due to elevated blood sugar, likely related to high dose steroids. No evidence of microvascular complications. On Ace-I and statin. Continue follow-up with Dr. Saida Varela for annual eye exams. Emphasized importance of lifestyle modifications, including diet, exercise, and weight loss. 10. Depression. Prior reasonable control with Paxil and weaned from benzodiazepine use for anxiety and insomnia. After inadvertently stopping Paxil, started on Zoloft and dose titrated to 75 mg daily with good control. 11. Rectal bleeding. Colonoscopy 8/2015. Evaluated by Dr. Julianne Lazcano and considered to be hemorrhoidal in source. Known internal and external hemorrhoids. Improved with Citrucel. No evidence of anemia. Follow. 12. BPH. Does have lower urinary tract symptoms. Developed postop urinary retention and now resolved. On Flomax. Followed previously by Dr. Maryam Hastings, and will need to establish follow-up with [facility]. Not wishing to do so currently. 13. GERD. Good control of symptoms with omeprazole.  No issues today. 14. Abnormal chest x-ray. Ill defined density in left mid zone noted on chest x-ray in ED on 8/9/2019. Underwent chest CT scan (8/31/2019) which showed several tiny bilateral pulmonary nodules which were stable when compared with prior chest CT scan from 2007. No further evaluation needed. 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 Monitored annually. 16. Obesity. Lost 25 pounds during his hospitalization, but now returned to near baseline. Emphasized importance of healthy eating and improved nutrition. Will readdress at next visit. 17. Insomnia. Weaned from Xanax. Had been using melatonin agonist, ramelteon, to replace Xanax, but no longer taking it either. Follow. 18. Health maintenance. Will give influenza vaccine today. Unable to receive Tdap due to allergy (anaphylaxis to Td). Will address Shingrix vaccine at next visit. Other immunizations up to date. Colonoscopy due 8/2020, but wishing to defer for now. Continue regular eye exams with Dr. Jen Vu. Vitamin D level mildly low, and advised to resume maintenance dose supplement. Aspirin discontinued given new recommendations regarding primary prevention. Medicare wellness visit up to date. Total time: 40 minutes spent with the patient on counseling, answering questions and/or coordination of care. Complex medical review performed, including review of medical history, lab results, and testing. Complicated management plan formulated. Patient understands recommendations and agrees with plan. Follow-up in 3 months. Addendum    XR Results (most recent):  Results from Hospital Encounter encounter on 09/10/20   XR SPINE CERV PA LAT ODONT 3 V MAX    Narrative History: Neck pain. No trauma. TECHNIQUE: 4 views cervical spine. COMPARISON: January 2019    FINDINGS:    Prevertebral soft tissues within normal limits.     Multilevel degenerative disc disease, uncovertebral joint hypertrophy and facet  arthropathy without gross interval change. No acute bone findings. C1-C2 relationship maintained. Visualized dens is intact. Right carotid atherosclerosis grossly unchanged. Visualized lung apices are  stable. Impression IMPRESSION:    No acute findings or gross interval change. MRI can be obtained for further  clarification. Atherosclerosis. XR CHEST PA LAT     INDICATION: Shortness of breath     COMPARISON: July 2020.     FINDINGS: PA and lateral radiographs of the chest demonstrate very mild  peribronchial thickening, similar to prior exam. No focal consolidation. No  pneumothorax or pleural effusion. . Stable cardiac silhouette and  atherosclerosis. . No acute bone findings. .      IMPRESSION:      Very mild peribronchial thickening, similar to prior exam. No new focal  consolidation. Additional findings as discussed. Reviewed imaging. Cervical spine x-ray without acute findings. Discussed physical therapy, but wishing to hold off for now. Advised to follow-up with Dr. Ray Saleh. Chest x-ray with mild peribronchial thickening. Unclear if due to continued airway inflammation or evidence of mild interstitial edema. Will await echocardiogram results. Already on lasix 40 mg daily. Will continue for now.

## 2020-09-17 ENCOUNTER — HOSPITAL ENCOUNTER (OUTPATIENT)
Dept: NON INVASIVE DIAGNOSTICS | Age: 80
Discharge: HOME | End: 2020-09-17
Attending: INTERNAL MEDICINE
Payer: MEDICARE

## 2020-09-17 VITALS
DIASTOLIC BLOOD PRESSURE: 61 MMHG | BODY MASS INDEX: 31.22 KG/M2 | WEIGHT: 206 LBS | HEIGHT: 68 IN | SYSTOLIC BLOOD PRESSURE: 123 MMHG

## 2020-09-17 DIAGNOSIS — R06.09 DYSPNEA ON EXERTION: ICD-10-CM

## 2020-09-17 DIAGNOSIS — R60.0 BILATERAL LOWER EXTREMITY EDEMA: ICD-10-CM

## 2020-09-17 DIAGNOSIS — U07.1 COVID-19 VIRUS INFECTION: ICD-10-CM

## 2020-09-17 LAB
ECHO AO ROOT DIAM: 3.17 CM
ECHO LA AREA 4C: 18.46 CM2
ECHO LA VOL 2C: 71.44 ML (ref 18–58)
ECHO LA VOL 4C: 42.21 ML (ref 18–58)
ECHO LA VOL BP: 62.97 ML (ref 18–58)
ECHO LA VOL/BSA BIPLANE: 30.42 ML/M2 (ref 16–28)
ECHO LA VOLUME INDEX A2C: 34.52 ML/M2 (ref 16–28)
ECHO LA VOLUME INDEX A4C: 20.39 ML/M2 (ref 16–28)
ECHO LV INTERNAL DIMENSION DIASTOLIC: 4.27 CM (ref 4.2–5.9)
ECHO LV INTERNAL DIMENSION SYSTOLIC: 3.2 CM
ECHO LV IVSD: 0.87 CM (ref 0.6–1)
ECHO LV MASS 2D: 136 G (ref 88–224)
ECHO LV MASS INDEX 2D: 65.7 G/M2 (ref 49–115)
ECHO LV POSTERIOR WALL DIASTOLIC: 1.08 CM (ref 0.6–1)
ECHO LVOT DIAM: 2.11 CM
ECHO LVOT PEAK GRADIENT: 4.34 MMHG
ECHO LVOT PEAK VELOCITY: 104.17 CM/S
ECHO LVOT SV: 90.4 ML
ECHO LVOT VTI: 25.82 CM
LVOT MG: 2.44 MMHG

## 2020-09-17 PROCEDURE — 93306 TTE W/DOPPLER COMPLETE: CPT

## 2020-09-21 ENCOUNTER — TELEPHONE (OUTPATIENT)
Dept: INTERNAL MEDICINE CLINIC | Age: 80
End: 2020-09-21

## 2020-09-21 NOTE — TELEPHONE ENCOUNTER
09/17/20   ECHO ADULT COMPLETE 09/17/2020 9/17/2020    Narrative · LV: Estimated LVEF is 55 - 60%. Visually measured ejection fraction. Normal cavity size, wall thickness and systolic function (ejection   fraction normal). Wall motion: normal. Inconclusive left ventricular   diastolic function. · MV: Mild mitral valve regurgitation is present. Signed by: Ivory Spangler MD       Please let the patient know that the echocardiogram showed normal functioning of the heart and no evidence of heart failure. No effect from his recent infection with COVID-19 was seen. Please encourage him to limit his salt intake and use compression hose to help with his lower extremity edema. Thanks.

## 2020-09-22 RX ORDER — HYDROXYCHLOROQUINE SULFATE 200 MG/1
400 TABLET, FILM COATED ORAL DAILY
COMMUNITY
Start: 2020-07-16

## 2020-09-22 RX ORDER — LATANOPROST 50 UG/ML
SOLUTION/ DROPS OPHTHALMIC
COMMUNITY
End: 2021-02-13 | Stop reason: ALTCHOICE

## 2020-09-22 RX ORDER — ALPRAZOLAM 0.5 MG/1
0.5 TABLET ORAL
COMMUNITY
Start: 2020-09-29 | End: 2020-09-29

## 2020-09-22 RX ORDER — PAROXETINE HYDROCHLORIDE 20 MG/1
20 TABLET, FILM COATED ORAL DAILY
COMMUNITY
Start: 2020-09-29 | End: 2020-09-29

## 2020-09-22 NOTE — TELEPHONE ENCOUNTER
Pt aware of message below and verbalized understanding. Patient reports he has been watching his salt and has been wearing the compression hose. No further questions or concerns from pt at this time.

## 2020-09-29 ENCOUNTER — OFFICE VISIT (OUTPATIENT)
Dept: PULMONOLOGY | Age: 80
End: 2020-09-29
Payer: MEDICARE

## 2020-09-29 VITALS
SYSTOLIC BLOOD PRESSURE: 141 MMHG | HEIGHT: 68 IN | WEIGHT: 205.6 LBS | RESPIRATION RATE: 16 BRPM | TEMPERATURE: 98.2 F | HEART RATE: 82 BPM | DIASTOLIC BLOOD PRESSURE: 67 MMHG | OXYGEN SATURATION: 99 % | BODY MASS INDEX: 31.16 KG/M2

## 2020-09-29 DIAGNOSIS — M06.041 RHEUMATOID ARTHRITIS INVOLVING BOTH HANDS WITH NEGATIVE RHEUMATOID FACTOR (HCC): ICD-10-CM

## 2020-09-29 DIAGNOSIS — G47.33 OBSTRUCTIVE SLEEP APNEA SYNDROME: Primary | ICD-10-CM

## 2020-09-29 DIAGNOSIS — U07.1 COVID-19 VIRUS INFECTION: ICD-10-CM

## 2020-09-29 DIAGNOSIS — M06.042 RHEUMATOID ARTHRITIS INVOLVING BOTH HANDS WITH NEGATIVE RHEUMATOID FACTOR (HCC): ICD-10-CM

## 2020-09-29 PROCEDURE — G8536 NO DOC ELDER MAL SCRN: HCPCS | Performed by: INTERNAL MEDICINE

## 2020-09-29 PROCEDURE — G8417 CALC BMI ABV UP PARAM F/U: HCPCS | Performed by: INTERNAL MEDICINE

## 2020-09-29 PROCEDURE — 99215 OFFICE O/P EST HI 40 MIN: CPT | Performed by: INTERNAL MEDICINE

## 2020-09-29 PROCEDURE — G8427 DOCREV CUR MEDS BY ELIG CLIN: HCPCS | Performed by: INTERNAL MEDICINE

## 2020-09-29 PROCEDURE — G8754 DIAS BP LESS 90: HCPCS | Performed by: INTERNAL MEDICINE

## 2020-09-29 PROCEDURE — G9717 DOC PT DX DEP/BP F/U NT REQ: HCPCS | Performed by: INTERNAL MEDICINE

## 2020-09-29 PROCEDURE — 1101F PT FALLS ASSESS-DOCD LE1/YR: CPT | Performed by: INTERNAL MEDICINE

## 2020-09-29 PROCEDURE — G8753 SYS BP > OR = 140: HCPCS | Performed by: INTERNAL MEDICINE

## 2020-09-29 NOTE — PROGRESS NOTES
Zena Pickard presents today for   Chief Complaint   Patient presents with    Follow-up     from virtual visit on 7/23/2020 for respiratory failure with hypoxia & pneumonia d/t COVID-19 virus    Results     COVID-19 7/27/2020 (-), CXR 9/10/2020, Echo 9/17/2020       Is someone accompanying this pt? No    Is the patient using any DME equipment during OV? Yes. O2 & CPAP machine (has tried different ones but none works for him)   -[employer]    Depression Screening:  3 most recent PHQ Screens 9/29/2020   Little interest or pleasure in doing things Not at all   Feeling down, depressed, irritable, or hopeless Not at all   Total Score PHQ 2 0   Trouble falling or staying asleep, or sleeping too much -   Feeling tired or having little energy -   Poor appetite, weight loss, or overeating -   Feeling bad about yourself - or that you are a failure or have let yourself or your family down -   Trouble concentrating on things such as school, work, reading, or watching TV -   Moving or speaking so slowly that other people could have noticed; or the opposite being so fidgety that others notice -   Thoughts of being better off dead, or hurting yourself in some way -   PHQ 9 Score -   How difficult have these problems made it for you to do your work, take care of your home and get along with others -       Learning Assessment:  Learning Assessment 9/4/2019   PRIMARY LEARNER Patient   HIGHEST LEVEL OF EDUCATION - PRIMARY LEARNER  -   BARRIERS PRIMARY LEARNER -   CO-LEARNER CAREGIVER -   PRIMARY LANGUAGE ENGLISH   LEARNER PREFERENCE PRIMARY DEMONSTRATION     -   ANSWERED BY Patient   RELATIONSHIP SELF       Abuse Screening:  Abuse Screening Questionnaire 9/29/2020   Do you ever feel afraid of your partner? N   Are you in a relationship with someone who physically or mentally threatens you? N   Is it safe for you to go home? Y       Fall Risk  Fall Risk Assessment, last 12 mths 9/29/2020   Able to walk?  Yes   Fall in past 12 months? No         Coordination of Care:  1. Have you been to the ER, urgent care clinic since your last visit? Hospitalized since your last visit? No    2. Have you seen or consulted any other health care providers outside of the [address] [place] [address] since your last visit? Include any pap smears or colon screening.  No

## 2020-09-29 NOTE — PROGRESS NOTES
[facility]  Pulmonary, Critical Care, and Sleep Medicine      Pulmonary Office Progress Notes    Name: Fernando West     : 1940     Date: 2020        Subjective:     Patient is a 68 y.o. male is here for follow up for: Problems-severe obstructive sleep apnea, rheumatoid arthritis . Recovering COVID-19 pneumonia-status post acute hypoxic respiratory failure    20    He is  recovering from COVID-19 infection with severe pneumonia and acute hypoxic respiratory failure. He presents today and states that he is continuing to improve. He still experiences dyspnea and fatigue with exertion, but is no longer using oxygen. He also  reports that he is continuing to feel dull aching pain in his chest with deep inspiration. He reports that he has returned to work and is able to work longer hours. He reports that he continues to attempt to use his CPAP machine nightly, but complains of continued air hunger despite bleeding in 2 liters nasal cannula. He states that he can not last more than a few hours due to the discomfort. He tells me that he has tried different masks with the vendor-not tolerant to any of the mask so far-fullface, nasal, and nasal prongs. 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 Dr. Ta Thompson.    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 Dr. Therese Yao. Allergies   Allergen Reactions    Latex, Natural Rubber Swelling    Adhesive Tape-Silicones Rash and Itching    Aldactone [Spironolactone] Not Reported This Time     Breast tenderness    Codeine Not Reported This Time     \"Drives crazy\"    Tape [Adhesive] Rash    Tetanus Toxoid, Adsorbed Anaphylaxis    Tetanus Vaccines And Toxoid Anaphylaxis       Current Outpatient Medications   Medication Sig Dispense Refill    ALPRAZolam (Xanax) 0.5 mg tablet Take 0.5 mg by mouth nightly as needed.  hydrOXYchloroQUINE (PLAQUENIL) 200 mg tablet Take 400 mg by mouth daily.  latanoprost (Xalatan) 0.005 % ophthalmic solution 1 drop into affected eye in the evening      Oxygen 2 Liters continuous [employer]      hydrALAZINE (APRESOLINE) 25 mg tablet TAKE 1 TABLET BY MOUTH 3 TIMES A DAY (Patient taking differently: Take 25 mg by mouth three (3) times daily.) 270 Tab 2    gabapentin (NEURONTIN) 300 mg capsule Take 1 Cap by mouth three (3) times daily. Max Daily Amount: 900 mg. Indications: neuropathic pain (Patient taking differently: Take 300 mg by mouth daily. Indications: neuropathic pain) 270 Cap 1    tamsulosin (Flomax) 0.4 mg capsule Take 1 Cap by mouth daily. Indications: enlarged prostate with urination problem 90 Cap 3    sertraline (ZOLOFT) 50 mg tablet Take 1.5 Tabs by mouth daily. 135 Tab 2    atorvastatin (LIPITOR) 10 mg tablet TAKE 1 TABLET BY MOUTH ONCE DAILY 90 Tab 3    celecoxib (CELEBREX) 200 mg capsule TAKE 1 CAPSULE BY MOUTH ONCE DAILY 90 Cap 2    lisinopril (PRINIVIL, ZESTRIL) 40 mg tablet TAKE 1 TABLET BY MOUTH DAILY 90 Tab 3    omeprazole (PRILOSEC) 20 mg capsule TAKE 1 CAPSULE BY MOUTH ONCE DAILY 90 Cap 2    mineral oil liquid Take 30 mL by mouth daily as needed for Constipation.       amLODIPine (NORVASC) 10 mg tablet TAKE 1 TABLET BY MOUTH ONCE DAILY 90 Tab 3    furosemide (LASIX) 40 mg tablet Take 1 Tab by mouth daily. 90 Tab 3    potassium chloride (K-DUR, KLOR-CON) 20 mEq tablet Take 1 Tab by mouth daily. 90 Tab 2    cycloSPORINE (RESTASIS) 0.05 % ophthalmic emulsion Administer 1 Drop to both eyes nightly.  colchicine (MITIGARE) 0.6 mg capsule Take 0.6 mg by mouth every other day.  cholecalciferol, vitamin D3, (VITAMIN D3) 2,000 unit tab Take 2,000 Units by mouth daily.  diclofenac (VOLTAREN) 1 % topical gel Apply 4 g to affected area four (4) times daily.  LUMIGAN 0.01 % ophthalmic drops Administer 1 Drop to both eyes nightly.  MULTIVITS/IRON FUM/FA/D3/LYCOP (MULTI FOR HIM PO) Take  by mouth daily.  PARoxetine (PAXIL) 20 mg tablet Take 20 mg by mouth daily.          Review of Systems:  HEENT: No epistaxis, no nasal drainage, no difficulty in swallowing, no redness in eyes  Respiratory: as above  Cardiovascular: no chest pain, no palpitations, no chronic leg edema, no syncope  Gastrointestinal: no abd pain, no vomiting, no diarrhea, no bleeding symptoms  Genitourinary: No urinary symptoms or hematuria  Integument/breast: No ulcers or rashes  Musculoskeletal:Neg  Neurological: No focal weakness, no seizures, no headaches  Behvioral/Psych: No anxiety, no depression  Constitutional: No fever, no chills, no weight loss, no night sweats     Objective:     Visit Vitals  BP (!) 141/67 (BP 1 Location: Right arm, BP Patient Position: Sitting)   Pulse 82   Temp 98.2 °F (36.8 °C) (Oral)   Resp 16   Ht 5' 8\" (1.727 m)   Wt 93.3 kg (205 lb 9.6 oz)   SpO2 99%   BMI 31.26 kg/m²        Physical Exam:   General: comfortable, no acute distress  HEENT: pupils reactive, sclera anicteric, EOM intact  Neck: No adenopathy or thyroid swelling, no JVD, supple  CVS: S1S2 no murmurs  RS: Mod AE bilaterally, no tactile fremitus or egophony, no accessory muscle use  Abd: soft, non tender, no hepatosplenomegaly  Neuro: non focal, awake, alert  Extrm: no leg edema, clubbing or cyanosis  Skin: no rash    Data review:     Hospital Outpatient Visit on 09/17/2020   Component Date Value Ref Range Status    IVSd 09/17/2020 0.87  0.6 - 1.0 cm Final    LVIDd 09/17/2020 4.27  4.2 - 5.9 cm Final    LVIDs 09/17/2020 3.20  cm Final    LVOT d 09/17/2020 2.11  cm Final    LVPWd 09/17/2020 1.08* 0.6 - 1.0 cm Final    LVOT Peak Gradient 09/17/2020 4.34  mmHg Final    Left Ventricular Outflow Tract Jeaneth* 09/17/2020 2.44  mmHg Final    LVOT SV 09/17/2020 90.4  mL Final    LVOT Peak Velocity 09/17/2020 104.17  cm/s Final    LVOT VTI 09/17/2020 25.82  cm Final    LA Volume 09/17/2020 62.97  18 - 58 mL Final    LA Area 4C 09/17/2020 18.46  cm2 Final    LA Vol 2C 09/17/2020 71.44* 18 - 58 mL Final    LA Vol 4C 09/17/2020 42.21  18 - 58 mL Final    Ao Root D 09/17/2020 3.17  cm Final    LV Mass AL 09/17/2020 136.0  88 - 224 g Final    LV Mass AL Index 09/17/2020 65.7  49 - 115 g/m2 Final    LA Vol Index 09/17/2020 30.42  16 - 28 ml/m2 Final    LA Vol Index 09/17/2020 34.52  16 - 28 ml/m2 Final    LA Vol Index 09/17/2020 20.39  16 - 28 ml/m2 Final   Hospital Outpatient Visit on 09/03/2020   Component Date Value Ref Range Status    WBC 09/03/2020 6.2  4.6 - 13.2 K/uL Final    RBC 09/03/2020 4.15* 4.70 - 5.50 M/uL Final    HGB 09/03/2020 13.4  13.0 - 16.0 g/dL Final    HCT 09/03/2020 41.8  36.0 - 48.0 % Final    MCV 09/03/2020 100.7* 74.0 - 97.0 FL Final    MCH 09/03/2020 32.3  24.0 - 34.0 PG Final    MCHC 09/03/2020 32.1  31.0 - 37.0 g/dL Final    RDW 09/03/2020 14.9* 11.6 - 14.5 % Final    PLATELET 72/02/9812 913  135 - 420 K/uL Final    MPV 09/03/2020 10.4  9.2 - 11.8 FL Final    NEUTROPHILS 09/03/2020 52  40 - 73 % Final    LYMPHOCYTES 09/03/2020 33  21 - 52 % Final    MONOCYTES 09/03/2020 9  3 - 10 % Final    EOSINOPHILS 09/03/2020 6* 0 - 5 % Final    BASOPHILS 09/03/2020 0  0 - 2 % Final    ABS. NEUTROPHILS 09/03/2020 3.3  1.8 - 8.0 K/UL Final    ABS.  LYMPHOCYTES 09/03/2020 2.1  0.9 - 3.6 K/UL Final    ABS. MONOCYTES 09/03/2020 0.5  0.05 - 1.2 K/UL Final    ABS. EOSINOPHILS 09/03/2020 0.4  0.0 - 0.4 K/UL Final    ABS. BASOPHILS 09/03/2020 0.0  0.0 - 0.1 K/UL Final    DF 09/03/2020 AUTOMATED    Final    LIPID PROFILE 09/03/2020        Final    Cholesterol, total 09/03/2020 139  <200 MG/DL Final    Triglyceride 09/03/2020 60  <150 MG/DL Final    Comment: The drugs N-acetylcysteine (NAC) and  Metamiszole have been found to cause falsely  low results in this chemical assay. Please  be sure to submit blood samples obtained  BEFORE administration of either of these  drugs to assure correct results.  HDL Cholesterol 09/03/2020 59  40 - 60 MG/DL Final    LDL, calculated 09/03/2020 68  0 - 100 MG/DL Final    VLDL, calculated 09/03/2020 12  MG/DL Final    CHOL/HDL Ratio 09/03/2020 2.4  0 - 5.0   Final    Magnesium 09/03/2020 2.3  1.6 - 2.6 mg/dL Final    Sodium 09/03/2020 144  136 - 145 mmol/L Final    Potassium 09/03/2020 4.7  3.5 - 5.5 mmol/L Final    Chloride 09/03/2020 111  100 - 111 mmol/L Final    CO2 09/03/2020 27  21 - 32 mmol/L Final    Anion gap 09/03/2020 6  3.0 - 18 mmol/L Final    Glucose 09/03/2020 102* 74 - 99 mg/dL Final    BUN 09/03/2020 16  7.0 - 18 MG/DL Final    Creatinine 09/03/2020 0.66  0.6 - 1.3 MG/DL Final    BUN/Creatinine ratio 09/03/2020 24* 12 - 20   Final    GFR est AA 09/03/2020 >60  >60 ml/min/1.73m2 Final    GFR est non-AA 09/03/2020 >60  >60 ml/min/1.73m2 Final    Comment: (NOTE)  Estimated GFR is calculated using the Modification of Diet in Renal   Disease (MDRD) Study equation, reported for both  Americans   (GFRAA) and non- Americans (GFRNA), and normalized to 1.73m2   body surface area. The physician must decide which value applies to   the patient. The MDRD study equation should only be used in   individuals age 25 or older.  It has not been validated for the   following: pregnant women, patients with serious comorbid conditions,   or on certain medications, or persons with extremes of body size,   muscle mass, or nutritional status.  Calcium 09/03/2020 9.5  8.5 - 10.1 MG/DL Final    Bilirubin, total 09/03/2020 0.6  0.2 - 1.0 MG/DL Final    ALT (SGPT) 09/03/2020 30  16 - 61 U/L Final    AST (SGOT) 09/03/2020 18  10 - 38 U/L Final    Alk. phosphatase 09/03/2020 114  45 - 117 U/L Final    Protein, total 09/03/2020 6.5  6.4 - 8.2 g/dL Final    Albumin 09/03/2020 4.0  3.4 - 5.0 g/dL Final    Globulin 09/03/2020 2.5  2.0 - 4.0 g/dL Final    A-G Ratio 09/03/2020 1.6  0.8 - 1.7   Final    PERIPHERAL SMEAR 09/03/2020 PLEASE SEE PATHOLOGIST'S REVIEW IN THE [employer] SYSTEM, ACCESSION NUMBER:    Final    [id number]    TSH 09/03/2020 1.49  0.36 - 3.74 uIU/mL Final    Color 09/03/2020 YELLOW    Final    Appearance 09/03/2020 CLEAR    Final    Specific gravity 09/03/2020 1.014  1.005 - 1.030   Final    pH (UA) 09/03/2020 6.5  5.0 - 8.0   Final    Protein 09/03/2020 Negative  NEG mg/dL Final    Glucose 09/03/2020 Negative  NEG mg/dL Final    Ketone 09/03/2020 Negative  NEG mg/dL Final    Bilirubin 09/03/2020 Negative  NEG   Final    Blood 09/03/2020 Negative  NEG   Final    Urobilinogen 09/03/2020 0.2  0.2 - 1.0 EU/dL Final    Nitrites 09/03/2020 Negative  NEG   Final    Leukocyte Esterase 09/03/2020 Negative  NEG   Final    Hemoglobin A1c 09/03/2020 5.5  4.2 - 5.6 % Final    Comment: (NOTE)  HbA1C Interpretive Ranges  <5.7              Normal  5.7 - 6.4         Consider Prediabetes  >6.5              Consider Diabetes      Est. average glucose 09/03/2020 111  mg/dL Final    Comment: (NOTE)  The eAG should be interpreted with patient characteristics in mind   since ethnicity, interindividual differences, red cell lifespan,   variation in rates of glycation, etc. may affect the validity of the   calculation.       Vitamin D 25-Hydroxy 09/03/2020 29.6* 30 - 100 ng/mL Final    Comment: (NOTE)  Deficiency               <20 ng/mL  Insufficiency 20-30 ng/mL  Sufficient             ng/mL  Possible toxicity       >100 ng/mL    The Method used is Siemens Advia Centaur currently standardized to a   Center of Disease Control and Prevention (CDC) certified reference   [facility]. Samples containing fluorescein dye can produce falsely   elevated values when tested with the ADVIA Centaur Vitamin D Assay. It is recommended that results in the toxic range, >100 ng/mL, be   retested 72 hours post fluorescein exposure.  Microalbumin,urine random 09/03/2020 0.50  0 - 3.0 MG/DL Final    Creatinine, urine 09/03/2020 54.00  30 - 125 mg/dL Final    Microalbumin/Creat ratio (mg/g cre* 09/03/2020 9  0 - 30 mg/g Final   Hospital Outpatient Visit on 07/27/2020   Component Date Value Ref Range Status    SARS-CoV-2 07/27/2020 Not Detected  Not Detected   Final    Comment: (NOTE)  This test was developed and its performance characteristics  determined by [employer]. This test has not been FDA  cleared or approved. This test has been authorized by FDA under an  Emergency Use Authorization (EUA). This test is only authorized for  the duration of time the declaration that circumstances exist  justifying the authorization of the emergency use of in vitro  diagnostic tests for detection of SARS-CoV-2 virus and/or diagnosis  of COVID-19 infection under section 564(b)(1) of the Act, 21 U. S.C.  428ZSN-8(H)(5), unless the authorization is terminated or revoked  sooner. When diagnostic testing is negative, the possibility of a  false negative result should be considered in the context of a  patient's recent exposures and the presence of clinical signs and  symptoms consistent with COVID-19. An individual without symptoms of  COVID-19 and who is not shedding SARS-CoV-2 virus would expect to  have a negative (not detected) result in this assay.   Performed                            At: [address] [id number]  Rodríguez Loco MD :[id number]     No results displayed because visit has over 200 results. Admission on 06/29/2020, Discharged on 06/29/2020   Component Date Value Ref Range Status    Ventricular Rate 06/29/2020 77  BPM Final    Atrial Rate 06/29/2020 77  BPM Final    P-R Interval 06/29/2020 154  ms Final    QRS Duration 06/29/2020 100  ms Final    Q-T Interval 06/29/2020 398  ms Final    QTC Calculation (Bezet) 06/29/2020 450  ms Final    Calculated P Axis 06/29/2020 55  degrees Final    Calculated R Axis 06/29/2020 -48  degrees Final    Calculated T Axis 06/29/2020 12  degrees Final    Diagnosis 06/29/2020    Final                    Value:Normal sinus rhythm  Left axis deviation  Septal infarct , age undetermined  Abnormal ECG    Confirmed by Elana Morales MD, [facility] ([id number]) on 6/30/2020 8:26:13 AM      Sodium 06/29/2020 143  136 - 145 mmol/L Final    Potassium 06/29/2020 3.9  3.5 - 5.5 mmol/L Final    Chloride 06/29/2020 109  100 - 111 mmol/L Final    CO2 06/29/2020 28  21 - 32 mmol/L Final    Anion gap 06/29/2020 6  3.0 - 18 mmol/L Final    Glucose 06/29/2020 115* 74 - 99 mg/dL Final    BUN 06/29/2020 9  7.0 - 18 MG/DL Final    Creatinine 06/29/2020 0.71  0.6 - 1.3 MG/DL Final    BUN/Creatinine ratio 06/29/2020 13  12 - 20   Final    GFR est AA 06/29/2020 >60  >60 ml/min/1.73m2 Final    GFR est non-AA 06/29/2020 >60  >60 ml/min/1.73m2 Final    Comment: (NOTE)  Estimated GFR is calculated using the Modification of Diet in Renal   Disease (MDRD) Study equation, reported for both  Americans   (GFRAA) and non- Americans (GFRNA), and normalized to 1.73m2   body surface area. The physician must decide which value applies to   the patient. The MDRD study equation should only be used in   individuals age 25 or older.  It has not been validated for the   following: pregnant women, patients with serious comorbid conditions,   or on certain medications, or persons with extremes of body size,   muscle mass, or nutritional status.  Calcium 06/29/2020 8.7  8.5 - 10.1 MG/DL Final    Bilirubin, total 06/29/2020 0.6  0.2 - 1.0 MG/DL Final    ALT (SGPT) 06/29/2020 46  16 - 61 U/L Final    AST (SGOT) 06/29/2020 53* 10 - 38 U/L Final    Alk. phosphatase 06/29/2020 160* 45 - 117 U/L Final    Protein, total 06/29/2020 6.9  6.4 - 8.2 g/dL Final    Albumin 06/29/2020 3.4  3.4 - 5.0 g/dL Final    Globulin 06/29/2020 3.5  2.0 - 4.0 g/dL Final    A-G Ratio 06/29/2020 1.0  0.8 - 1.7   Final    WBC 06/29/2020 4.4* 4.6 - 13.2 K/uL Final    RBC 06/29/2020 4.44* 4.70 - 5.50 M/uL Final    HGB 06/29/2020 14.6  13.0 - 16.0 g/dL Final    HCT 06/29/2020 42.0  36.0 - 48.0 % Final    MCV 06/29/2020 94.6  74.0 - 97.0 FL Final    MCH 06/29/2020 32.9  24.0 - 34.0 PG Final    MCHC 06/29/2020 34.8  31.0 - 37.0 g/dL Final    RDW 06/29/2020 12.9  11.6 - 14.5 % Final    PLATELET 22/02/7250 450* 135 - 420 K/uL Final    MPV 06/29/2020 10.3  9.2 - 11.8 FL Final    NEUTROPHILS 06/29/2020 80* 40 - 73 % Final    LYMPHOCYTES 06/29/2020 12* 21 - 52 % Final    MONOCYTES 06/29/2020 8  3 - 10 % Final    EOSINOPHILS 06/29/2020 0  0 - 5 % Final    BASOPHILS 06/29/2020 0  0 - 2 % Final    ABS. NEUTROPHILS 06/29/2020 3.5  1.8 - 8.0 K/UL Final    ABS. LYMPHOCYTES 06/29/2020 0.5* 0.9 - 3.6 K/UL Final    ABS. MONOCYTES 06/29/2020 0.4  0.05 - 1.2 K/UL Final    ABS. EOSINOPHILS 06/29/2020 0.0  0.0 - 0.4 K/UL Final    ABS. BASOPHILS 06/29/2020 0.0  0.0 - 0.1 K/UL Final    DF 06/29/2020 AUTOMATED    Final    SARS-CoV-2 06/29/2020 Detected* Not Detected   Final    Comment: (NOTE)  This test was developed and its performance characteristics  determined by [employer]. This test has not been FDA  cleared or approved. This test has been authorized by FDA under an  Emergency Use Authorization (EUA).  This test is only authorized for  the duration of time the declaration that circumstances exist  justifying the authorization of the emergency use of in vitro  diagnostic tests for detection of SARS-CoV-2 virus and/or diagnosis  of COVID-19 infection under section 564(b)(1) of the Act, 21 U. S.C.  490YAQ-6(N)(5), unless the authorization is terminated or revoked  sooner. When diagnostic testing is negative, the possibility of a  false negative result should be considered in the context of a  patient's recent exposures and the presence of clinical signs and  symptoms consistent with COVID-19. An individual without symptoms of  COVID-19 and who is not shedding SARS-CoV-2 virus would expect to  have a negative (not detected) result in this assay. Test(s) SA                           RS-CoV-2, SHANA called to Kerrie Salgado on 06/30/2020 at  20:20 E  ST  Performed At: [address] [id number]  Erendira Ann MD KE:[id number]      Lactic Acid (POC) 06/29/2020 1.16  0.40 - 2.00 mmol/L Final    CK - MB 06/29/2020 3.0  <3.6 ng/ml Final    CK-MB Index 06/29/2020 2.4  0.0 - 4.0 % Final    CK 06/29/2020 126  39 - 308 U/L Final    Troponin-I, QT 06/29/2020 <0.02  0.0 - 0.045 NG/ML Final    Comment: The presence of detectable troponin above the reference range indicates myocardial injury which may be due to ischemia, myocarditis, trauma, etc.  Clinical correlation is necessary to establish the significance of this finding. Sequential testing is recommended to determine if the typical rise and fall of cTnI is demonstrated. Note:  Cardiac troponin I has a relatively long half life and may be present well after the CK MB has returned to baseline. The reference range is based on the 99th percentile of the referent population.          Date FVC FEV1  FEV1/FVC FAA66-66 TLC RV RV/TLC VC DLCO                                                     Imaging:  I have personally reviewed the patients radiographs and have reviewed the reports:  XR Results (most recent):  Results from Hospital Encounter encounter on 09/10/20   XR SPINE CERV PA LAT ODONT 3 V MAX    Narrative History: Neck pain. No trauma. TECHNIQUE: 4 views cervical spine. COMPARISON: January 2019    FINDINGS:    Prevertebral soft tissues within normal limits. Multilevel degenerative disc disease, uncovertebral joint hypertrophy and facet  arthropathy without gross interval change. No acute bone findings. C1-C2 relationship maintained. Visualized dens is intact. Right carotid atherosclerosis grossly unchanged. Visualized lung apices are  stable. Impression IMPRESSION:    No acute findings or gross interval change. MRI can be obtained for further  clarification. Atherosclerosis. CT Results (most recent):  Results from Hospital Encounter encounter on 08/31/19   CT CHEST W CONT    Narrative CT CHEST WITH ENHANCEMENT    INDICATION: Lung nodule seen on imaging study, abnormal chest x-ray. TECHNIQUE: CT images obtained from the thoracic inlet to the level of the  diaphragm following uneventful administration of 80 mL Isovue 300 nonionic  intravenous contrast. Axial, coronal and sagittal reformats were obtained. All CT scans at this facility are performed using dose optimization technique as  appropriate to the performed exam, to include automated exposure control,  adjustment of the mA and/or kV according to patient's size (Including  appropriate matching for site-specific examinations), or use of iterative  reconstruction technique. COMPARISON: CT chest 2/28/2007. CHEST FINDINGS:     Thyroid/Base Of Neck:  Unremarkable  . Lungs:   No acute infiltrates are evident. .   No mass lesions are seen. .  Trachea and central bronchi are clear. .  Stable 4 mm chronic pleural based nodule left posterior right upper lobe (17). Also stable 2 mm nodule posterior right upper lobe (21). Stable 2-3 mm nodule  subpleural left upper lobe (14) and 2 mm nodule anterior inferior left upper  lobe (23)    Pleural Spaces:   There is no pneumothorax or pleural fluid evident. No pleural plaques are seen    Lymph Nodes:   Axillae: No enlargement. Mediastinum / Janina: No enlargement. Mediastinum, Great Vessels And Heart: The heart is normal in size. No pericardial effusion. No aortic aneurysm. Moderate calcified plaques in the aortic arch and descending aorta. .    Abdomen Structures Included: The included portions of the liver and spleen are unremarkable. .    Osseous Structures:   No destructive osseous process. Degenerative changes in the shoulders. Thoracic  spondylosis. Impression IMPRESSION:     1. No evidence of pulmonary mass. Several stable tiny bilateral pulmonary  nodules as discussed.  -The reported abnormal chest x-ray is not available for direct comparison. 2. Atherosclerosis. 3. Degenerative changes in the spine and shoulders.         Patient Active Problem List   Diagnosis Code    BPH with obstruction/lower urinary tract symptoms N40.1, N13.8    Hypertension I10    Primary osteoarthritis involving multiple joints M89.49    Hyperlipidemia E78.5    GERD (gastroesophageal reflux disease) K21.9    Pre-diabetes R73.03    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 (HCC) M06.041, M06.042    Vitamin D deficiency E55.9    Colon polyps K63.5    CPDD (calcium pyrophosphate deposition disease) M11.20    Impaired fasting glucose R73.01    Rectal bleeding K62.5    Class 1 obesity due to excess calories with serious comorbidity and body mass index (BMI) of 31.0 to 31.9 in adult E66.09, Z68.31    APC (atrial premature contractions) I49.1    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 Evaluated by Dr. Chandrika Martini. Deena Lopez and completed home sleep study and diagnosed with severe JANNET. Patient was ordered auto CPAP which she has tried using and states that he just cannot breathe with the mask on. He has tried several different masks and none of them are comfortable for him. He is asking for options . Download from 8/20/2020-9/18/2020 shows usage of 15 out of 30 days with only 5 days more than 4 hours AHI of 13.4  S/p COVID-19 pneumonia: Positive covid- 19 test 6/29/20 - oxygen requirements weaned down from high flow O2 5 L nasal cannula continuous - per ID recs, he received remdesivir, he was given IV steroids and is discharged on oral steroid taper.  He was also discharged on low-dose Eliquis for 30 days-end date 8/8/2020, vitamin C, zinc, vitamin D.  Clinically improved with no more fevers, chills. No cough no other constitutional symptoms and is back to work he is off oxygen  Seronegative rheumatoid arthritis on Plaquenil  Hypertension  GERD  CPDD      RECOMMENDATIONS:   · We will continue to work towards finding him a comfortable mask to treat his sleep apnea.   I discussed alternative treatment options including oral devices  · Explained to him different techniques to include trying to wear the mask in the daytime when he is able to adjust to comfort-he can incrementally increase the duration and sleep with the CPAP machine thereafter  · We will continue to monitor for long-term symptoms from COVID-19  · Continue to follow safe practices-mask, handwashing, distancing  · We will follow-up in 4 months        Jareth Rodriguez MD

## 2020-10-08 ENCOUNTER — TELEPHONE (OUTPATIENT)
Dept: PULMONOLOGY | Age: 80
End: 2020-10-08

## 2020-10-08 DIAGNOSIS — G47.33 OSA ON CPAP: Primary | ICD-10-CM

## 2020-10-08 DIAGNOSIS — Z99.89 OSA ON CPAP: Primary | ICD-10-CM

## 2020-10-08 NOTE — TELEPHONE ENCOUNTER
Loreto from [employer] states she needs to speak w/ nurse regarding pt CPAP intolerance. Company wanting to know if pt can complete titration to be switched over to a bipap. Please advise [phone].

## 2020-10-23 ENCOUNTER — TELEPHONE (OUTPATIENT)
Dept: INTERNAL MEDICINE CLINIC | Age: 80
End: 2020-10-23

## 2020-10-23 NOTE — TELEPHONE ENCOUNTER
Attempted to call patient, but unable to leave message as mailbox is full. Please attempt to contact him and explain that he has been seeing Dr. Al Barrow for his post-COVID care and she has been assisting with his CPAP management. She will be able to order any needed supplies and he should contact her for assistance.

## 2020-10-23 NOTE — TELEPHONE ENCOUNTER
Sleep doctor has moved to  [place]. Patient needs one closer to home. Also, [facility]  does not have the right Cpap but he says he needs a new prescription for a different one and that you are aware of this?

## 2020-10-26 NOTE — TELEPHONE ENCOUNTER
Patient has already been contacted by Dr. Carmen Montes in regards to the CPAP order. Nothing further is needed from our office.

## 2020-11-02 ENCOUNTER — TRANSCRIBE ORDER (OUTPATIENT)
Dept: SLEEP MEDICINE | Age: 80
End: 2020-11-02

## 2020-11-02 DIAGNOSIS — G47.33 OSA (OBSTRUCTIVE SLEEP APNEA): Primary | ICD-10-CM

## 2020-11-03 RX ORDER — CELECOXIB 200 MG/1
CAPSULE ORAL
Qty: 90 CAP | Refills: 2 | Status: SHIPPED | OUTPATIENT
Start: 2020-11-03 | End: 2021-09-02

## 2020-11-04 ENCOUNTER — TELEPHONE (OUTPATIENT)
Dept: INTERNAL MEDICINE CLINIC | Age: 80
End: 2020-11-04

## 2020-11-04 DIAGNOSIS — Z77.011 LEAD EXPOSURE RISK ASSESSMENT, HIGH RISK: Primary | ICD-10-CM

## 2020-11-04 NOTE — TELEPHONE ENCOUNTER
Pt calling, has labs scheduled this month. Wants to know if Dr. Michael Vidal can check his blood for lead. Says he needs it for his job.

## 2020-12-03 ENCOUNTER — TELEPHONE (OUTPATIENT)
Dept: INTERNAL MEDICINE CLINIC | Age: 80
End: 2020-12-03

## 2020-12-03 DIAGNOSIS — M67.432 GANGLION CYST OF WRIST, LEFT: Primary | ICD-10-CM

## 2020-12-03 NOTE — TELEPHONE ENCOUNTER
Pt calling, says he has a ganglion cyst on his left hand near the wrist. Wants to know who he should see to get it removed?

## 2020-12-04 NOTE — TELEPHONE ENCOUNTER
Called patient and informed him the referral was placed and he will be contacted to schedule an appointment. Patient was driving and unable to take the phone number at this time. He will just wait for their office to call him. No further questions or concerns.

## 2020-12-16 ENCOUNTER — OFFICE VISIT (OUTPATIENT)
Dept: ORTHOPEDIC SURGERY | Age: 80
End: 2020-12-16
Payer: MEDICARE

## 2020-12-16 VITALS
SYSTOLIC BLOOD PRESSURE: 144 MMHG | BODY MASS INDEX: 32.55 KG/M2 | HEIGHT: 68 IN | WEIGHT: 214.8 LBS | RESPIRATION RATE: 16 BRPM | DIASTOLIC BLOOD PRESSURE: 81 MMHG | HEART RATE: 83 BPM | TEMPERATURE: 96.7 F | OXYGEN SATURATION: 98 %

## 2020-12-16 DIAGNOSIS — M67.432 GANGLION CYST OF DORSUM OF LEFT WRIST: Primary | ICD-10-CM

## 2020-12-16 DIAGNOSIS — R22.32 MASS OF LEFT WRIST: ICD-10-CM

## 2020-12-16 PROCEDURE — G8427 DOCREV CUR MEDS BY ELIG CLIN: HCPCS | Performed by: ORTHOPAEDIC SURGERY

## 2020-12-16 PROCEDURE — G8417 CALC BMI ABV UP PARAM F/U: HCPCS | Performed by: ORTHOPAEDIC SURGERY

## 2020-12-16 PROCEDURE — 73110 X-RAY EXAM OF WRIST: CPT | Performed by: ORTHOPAEDIC SURGERY

## 2020-12-16 PROCEDURE — 99214 OFFICE O/P EST MOD 30 MIN: CPT | Performed by: ORTHOPAEDIC SURGERY

## 2020-12-16 PROCEDURE — G9717 DOC PT DX DEP/BP F/U NT REQ: HCPCS | Performed by: ORTHOPAEDIC SURGERY

## 2020-12-16 PROCEDURE — 1101F PT FALLS ASSESS-DOCD LE1/YR: CPT | Performed by: ORTHOPAEDIC SURGERY

## 2020-12-16 PROCEDURE — G8536 NO DOC ELDER MAL SCRN: HCPCS | Performed by: ORTHOPAEDIC SURGERY

## 2020-12-16 PROCEDURE — G8753 SYS BP > OR = 140: HCPCS | Performed by: ORTHOPAEDIC SURGERY

## 2020-12-16 PROCEDURE — 20612 ASPIRATE/INJ GANGLION CYST: CPT | Performed by: ORTHOPAEDIC SURGERY

## 2020-12-16 PROCEDURE — G8754 DIAS BP LESS 90: HCPCS | Performed by: ORTHOPAEDIC SURGERY

## 2020-12-16 NOTE — PROGRESS NOTES
Reji Gibbons is a 68 y.o. male right handed [employer]. Worker's Compensation and legal considerations: none filed. Vitals:    12/16/20 0749   BP: (!) 144/81   Pulse: 83   Resp: 16   Temp: (!) 96.7 °F (35.9 °C)   TempSrc: Temporal   SpO2: 98%   Weight: 214 lb 12.8 oz (97.4 kg)   Height: 5' 8\" (1.727 m)   PainSc:   0 - No pain   PainLoc: Hand           Chief Complaint   Patient presents with    Hand Pain     left hand pain         HPI: Patient comes in today with complaints of a mass over the back of his left wrist.  He denies any pain. It has been there for the past 3 weeks. Date of onset: Late November 2020    Injury: No    Prior Treatment:  No    Numbness/ Tingling: No      ROS: Review of Systems - General ROS: negative  Psychological ROS: negative  ENT ROS: negative  Allergy and Immunology ROS: negative  Hematological and Lymphatic ROS: negative  Respiratory ROS: no cough, shortness of breath, or wheezing  Cardiovascular ROS: no chest pain or dyspnea on exertion  Gastrointestinal ROS: no abdominal pain, change in bowel habits, or black or bloody stools  Musculoskeletal ROS: positive for - swelling in wrist - left  Neurological ROS: negative  Dermatological ROS: negative    Past Medical History:   Diagnosis Date    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 Dr. Osman Telles. 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 Dr. Jalil Nuñez.        Past Surgical History:   Procedure Laterality Date    HX AMPUTATION FINGER Left     index    HX BACK SURGERY  10/23/2019    Right L4-5 hemilaminectomy, medial facetectomy, resection of synovial cyst    HX CARPAL TUNNEL RELEASE Bilateral     HX CATARACT REMOVAL Left 01/2018    HX CATARACT REMOVAL Bilateral 2018    HX COLONOSCOPY      HX HEENT      sinus, tonsillectomy    HX HERNIA REPAIR      HX MOHS PROCEDURES      bilateral     HX ORTHOPAEDIC      lef knee surgery, removed cartilage.  HX ROTATOR CUFF REPAIR Right        Current Outpatient Medications   Medication Sig Dispense Refill    celecoxib (CELEBREX) 200 mg capsule TAKE 1 CAPSULE BY MOUTH ONCE DAILY 90 Cap 2    amLODIPine (NORVASC) 10 mg tablet TAKE 1 TABLET BY MOUTH ONCE DAILY 90 Tab 3    hydrOXYchloroQUINE (PLAQUENIL) 200 mg tablet Take 400 mg by mouth daily.  latanoprost (Xalatan) 0.005 % ophthalmic solution 1 drop into affected eye in the evening      Oxygen 2 Liters continuous [employer]      hydrALAZINE (APRESOLINE) 25 mg tablet TAKE 1 TABLET BY MOUTH 3 TIMES A DAY (Patient taking differently: Take 25 mg by mouth three (3) times daily.) 270 Tab 2    gabapentin (NEURONTIN) 300 mg capsule Take 1 Cap by mouth three (3) times daily. Max Daily Amount: 900 mg. Indications: neuropathic pain (Patient taking differently: Take 300 mg by mouth daily. Indications: neuropathic pain) 270 Cap 1    tamsulosin (Flomax) 0.4 mg capsule Take 1 Cap by mouth daily. Indications: enlarged prostate with urination problem 90 Cap 3    sertraline (ZOLOFT) 50 mg tablet Take 1.5 Tabs by mouth daily. 135 Tab 2    atorvastatin (LIPITOR) 10 mg tablet TAKE 1 TABLET BY MOUTH ONCE DAILY 90 Tab 3    lisinopril (PRINIVIL, ZESTRIL) 40 mg tablet TAKE 1 TABLET BY MOUTH DAILY 90 Tab 3    omeprazole (PRILOSEC) 20 mg capsule TAKE 1 CAPSULE BY MOUTH ONCE DAILY 90 Cap 2    mineral oil liquid Take 30 mL by mouth daily as needed for Constipation.       furosemide (LASIX) 40 mg tablet Take 1 Tab by mouth daily. 90 Tab 3    potassium chloride (K-DUR, KLOR-CON) 20 mEq tablet Take 1 Tab by mouth daily. 90 Tab 2    cycloSPORINE (RESTASIS) 0.05 % ophthalmic emulsion Administer 1 Drop to both eyes nightly.  colchicine (MITIGARE) 0.6 mg capsule Take 0.6 mg by mouth every other day.  cholecalciferol, vitamin D3, (VITAMIN D3) 2,000 unit tab Take 2,000 Units by mouth daily.  diclofenac (VOLTAREN) 1 % topical gel Apply 4 g to affected area four (4) times daily.  LUMIGAN 0.01 % ophthalmic drops Administer 1 Drop to both eyes nightly.  MULTIVITS/IRON FUM/FA/D3/LYCOP (MULTI FOR HIM PO) Take  by mouth daily. Current Facility-Administered Medications   Medication Dose Route Frequency Provider Last Rate Last Admin    triamcinolone acetonide (KENALOG) 10 mg/mL injection 10 mg  10 mg Other ONCE Dharmesh Hernandez, DO           Allergies   Allergen Reactions    Latex, Natural Rubber Swelling    Adhesive Tape-Silicones Rash and Itching    Aldactone [Spironolactone] Not Reported This Time     Breast tenderness    Codeine Not Reported This Time     \"Drives crazy\"    Tape [Adhesive] Rash    Tetanus Toxoid, Adsorbed Anaphylaxis    Tetanus Vaccines And Toxoid Anaphylaxis           PE:     Physical Exam  Vitals signs and nursing note reviewed. Constitutional:       General: He is not in acute distress. Appearance: Normal appearance. He is not ill-appearing. Eyes:      Extraocular Movements: Extraocular movements intact. Pupils: Pupils are equal, round, and reactive to light. Neck:      Musculoskeletal: Normal range of motion and neck supple. Cardiovascular:      Pulses: Normal pulses. Pulmonary:      Effort: Pulmonary effort is normal. No respiratory distress. Abdominal:      General: Abdomen is flat. There is no distension. Musculoskeletal: Normal range of motion. General: Swelling present. No tenderness, deformity or signs of injury.       Right lower leg: No edema. Left lower leg: No edema. Skin:     General: Skin is warm and dry. Capillary Refill: Capillary refill takes less than 2 seconds. Findings: No bruising or erythema. Neurological:      General: No focal deficit present. Mental Status: He is alert and oriented to person, place, and time. Cranial Nerves: No cranial nerve deficit. Sensory: No sensory deficit. Psychiatric:         Mood and Affect: Mood normal.         Behavior: Behavior normal.            Wrist: Nontender to palpation    Tenderness L R Test L R   1st Ext Comp - - Finkelstein's - -   Snuff Box - - Nicholas - -   2nd Ext Comp - - S-L Shear - -   S-L Joint - - L-T Shear - -   L-T Joint - - DRUJ Sup - -   6th Ext Comp - - DRUJ Pro - -   Ulnar Snuff - - DRUJ Grind - -   Fovea - - TFCC - -   STT Joint - - Mid-Carp Inst - -   FCR - - P-T Grind - -   Intersection - - ECU Sublux. - -      Dorsal Ganglion: + 1cm    Volar Ganglion: -      ROM: Full        Imagin2020 plain films of left wrist does not show any fracture or dislocation. There are moderate degenerative changes noted about the radiocarpal joint. There is soft tissue swelling noted dorsally. ICD-10-CM ICD-9-CM    1. Ganglion cyst of dorsum of left wrist  M67.432 727.41 triamcinolone acetonide (KENALOG) 10 mg/mL injection 10 mg      ASPIRAT/INJECTION GANGLION CYST(S)   2. Mass of left wrist  R22.32 719.63 AMB POC XRAY, WRIST; COMPLETE, 3+ VIE         Plan:     Left ganglion cyst injection and decompression. I discussed the possibility of wearing a brace given the acuity of the cyst for 4 to 6 weeks to see if it goes away on its own. He says he can wear a brace for his job you prefer injections we proceeded with this. Follow-up and Dispositions    · Return if symptoms worsen or fail to improve.           Plan was reviewed with patient, who verbalized agreement and understanding of the plan    [address] - [facility]  OFFICE PROCEDURE PROGRESS NOTE        Chart reviewed for the following:   Dharmesh LEMUS DO, have reviewed the History, Physical and updated the Allergic reactions for [address] performed immediately prior to start of procedure:   Dharmesh LEMUS DO, have performed the following reviews on [place] prior to the start of the procedure:            * Patient was identified by name and date of birth   * Agreement on procedure being performed was verified  * Risks and Benefits explained to the patient  * Procedure site verified and marked as necessary  * Patient was positioned for comfort  * Consent was signed and verified     Time: 08:30 AM      Date of procedure: 12/16/2020    Procedure performed by: Rodolfo Fuentes DO    Provider assisted by: Alise Herrera LPN    Patient assisted by: self    How tolerated by patient: tolerated the procedure well with no complications    Post Procedural Pain Scale: 0 - No Hurt    Comments: none    Procedure:  After consent was obtained, using sterile technique the ganglion was prepped. Local anesthetic used: 1% lidocaine. Kenalog 10 mg and was then injected and the needle withdrawn. The procedure was well tolerated. The patient is asked to continue to rest the area for a few more days before resuming regular activities. It may be more painful for the first 1-2 days. Watch for fever, or increased swelling or persistent pain in the joint. Call or return to clinic prn if such symptoms occur or there is failure to improve as anticipated.

## 2021-01-01 NOTE — TELEPHONE ENCOUNTER
Reviewed report generated by the [facility]. Does not demonstrate aberrancies or inconsistencies with regard to the prescribing of controlled medications to this patient by other providers. Last filled Xanax 3/29/2017 per . alert and awake/follows commands

## 2021-01-09 RX ORDER — OMEPRAZOLE 20 MG/1
CAPSULE, DELAYED RELEASE ORAL
Qty: 90 CAP | Refills: 2 | Status: SHIPPED | OUTPATIENT
Start: 2021-01-09 | End: 2021-10-06

## 2021-01-12 RX ORDER — SERTRALINE HYDROCHLORIDE 50 MG/1
75 TABLET, FILM COATED ORAL DAILY
Qty: 135 TAB | Refills: 2 | Status: SHIPPED | OUTPATIENT
Start: 2021-01-12 | End: 2021-09-28

## 2021-01-12 NOTE — TELEPHONE ENCOUNTER
Last Visit: 9/10/20 with MD Delores Marquez  Next Appointment: pt cancelled appt's on 11/20/20 & 12/2/20  Previous Refill Encounter(s): 4/27/20 #135 with 2 refills    Requested Prescriptions     Pending Prescriptions Disp Refills    sertraline (ZOLOFT) 50 mg tablet 135 Tab 2     Sig: Take 1.5 Tabs by mouth daily.

## 2021-01-18 ENCOUNTER — TELEPHONE (OUTPATIENT)
Dept: INTERNAL MEDICINE CLINIC | Age: 81
End: 2021-01-18

## 2021-01-18 NOTE — TELEPHONE ENCOUNTER
Pt calling, says yesterday he had chest discomfort. Says it wasn't a shooting pain just discomfort. Says he never had before. He was working out in yard. Says he came in and sat down and fell asleep. Says it happened as second time yesterday. No pain today. Says he has also been falling asleep at work not sure if it is related. Wants to know if he needs to see Dr. Love Muñoz sooner than next week? He does not see cardiology. Please advise.

## 2021-01-18 NOTE — TELEPHONE ENCOUNTER
Please obtain more information. Is chest pain only exertional? Associated symptoms? Should be seen this week. Can add on on Wednesday if needed.

## 2021-01-18 NOTE — TELEPHONE ENCOUNTER
Patient stating he was watching TV the first time the pain happened he had fallen asleep and the pain woke him up with the second episode. No SOB or other concerning symptoms at time of episode.  Patient added to schedule on Wednesday 1/20 at 3:30pm

## 2021-01-20 ENCOUNTER — OFFICE VISIT (OUTPATIENT)
Dept: INTERNAL MEDICINE CLINIC | Age: 81
End: 2021-01-20
Payer: MEDICARE

## 2021-01-20 VITALS
TEMPERATURE: 98.1 F | HEART RATE: 80 BPM | DIASTOLIC BLOOD PRESSURE: 79 MMHG | HEIGHT: 68 IN | BODY MASS INDEX: 31.55 KG/M2 | OXYGEN SATURATION: 99 % | SYSTOLIC BLOOD PRESSURE: 132 MMHG | RESPIRATION RATE: 16 BRPM | WEIGHT: 208.2 LBS

## 2021-01-20 DIAGNOSIS — M06.041 RHEUMATOID ARTHRITIS INVOLVING BOTH HANDS WITH NEGATIVE RHEUMATOID FACTOR (HCC): ICD-10-CM

## 2021-01-20 DIAGNOSIS — F33.0 MILD EPISODE OF RECURRENT MAJOR DEPRESSIVE DISORDER (HCC): ICD-10-CM

## 2021-01-20 DIAGNOSIS — J12.82 PNEUMONIA DUE TO COVID-19 VIRUS: ICD-10-CM

## 2021-01-20 DIAGNOSIS — R07.89 CHEST WALL PAIN: ICD-10-CM

## 2021-01-20 DIAGNOSIS — M50.30 DDD (DEGENERATIVE DISC DISEASE), CERVICAL: ICD-10-CM

## 2021-01-20 DIAGNOSIS — E66.09 CLASS 1 OBESITY DUE TO EXCESS CALORIES WITH SERIOUS COMORBIDITY AND BODY MASS INDEX (BMI) OF 31.0 TO 31.9 IN ADULT: ICD-10-CM

## 2021-01-20 DIAGNOSIS — G47.33 OBSTRUCTIVE SLEEP APNEA SYNDROME: ICD-10-CM

## 2021-01-20 DIAGNOSIS — M15.9 PRIMARY OSTEOARTHRITIS INVOLVING MULTIPLE JOINTS: ICD-10-CM

## 2021-01-20 DIAGNOSIS — M06.042 RHEUMATOID ARTHRITIS INVOLVING BOTH HANDS WITH NEGATIVE RHEUMATOID FACTOR (HCC): ICD-10-CM

## 2021-01-20 DIAGNOSIS — M47.812 FACET ARTHROPATHY, CERVICAL: ICD-10-CM

## 2021-01-20 DIAGNOSIS — S68.621S: ICD-10-CM

## 2021-01-20 DIAGNOSIS — M54.2 NECK PAIN: ICD-10-CM

## 2021-01-20 DIAGNOSIS — Z86.16 HISTORY OF 2019 NOVEL CORONAVIRUS DISEASE (COVID-19): Primary | ICD-10-CM

## 2021-01-20 DIAGNOSIS — U07.1 PNEUMONIA DUE TO COVID-19 VIRUS: ICD-10-CM

## 2021-01-20 PROCEDURE — G0463 HOSPITAL OUTPT CLINIC VISIT: HCPCS | Performed by: INTERNAL MEDICINE

## 2021-01-20 PROCEDURE — G8754 DIAS BP LESS 90: HCPCS | Performed by: INTERNAL MEDICINE

## 2021-01-20 PROCEDURE — 99215 OFFICE O/P EST HI 40 MIN: CPT | Performed by: INTERNAL MEDICINE

## 2021-01-20 PROCEDURE — G9717 DOC PT DX DEP/BP F/U NT REQ: HCPCS | Performed by: INTERNAL MEDICINE

## 2021-01-20 PROCEDURE — G8427 DOCREV CUR MEDS BY ELIG CLIN: HCPCS | Performed by: INTERNAL MEDICINE

## 2021-01-20 PROCEDURE — G8752 SYS BP LESS 140: HCPCS | Performed by: INTERNAL MEDICINE

## 2021-01-20 PROCEDURE — 1101F PT FALLS ASSESS-DOCD LE1/YR: CPT | Performed by: INTERNAL MEDICINE

## 2021-01-20 PROCEDURE — G8417 CALC BMI ABV UP PARAM F/U: HCPCS | Performed by: INTERNAL MEDICINE

## 2021-01-20 PROCEDURE — G8536 NO DOC ELDER MAL SCRN: HCPCS | Performed by: INTERNAL MEDICINE

## 2021-01-20 NOTE — PATIENT INSTRUCTIONS
Learning About Sleeping Well What does sleeping well mean? Sleeping well means getting enough sleep. How much sleep is enough varies among people. The number of hours you sleep is not as important as how you feel when you wake up. If you do not feel refreshed, you probably need more sleep. Another sign of not getting enough sleep is feeling tired during the day. The average total nightly sleep time is 7½ to 8 hours. Healthy adults may need a little more or a little less than this. Why is getting enough sleep important? Getting enough quality sleep is a basic part of good health. When your sleep suffers, your mood and your thoughts can suffer too. You may find yourself feeling more grumpy or stressed. Not getting enough sleep also can lead to serious problems, including injury, accidents, anxiety, and depression. What might cause poor sleeping? Many things can cause sleep problems, including: · Stress. Stress can be caused by fear about a single event, such as giving a speech. Or you may have ongoing stress, such as worry about work or school. · Depression, anxiety, and other mental or emotional conditions. · Changes in your sleep habits or surroundings. This includes changes that happen where you sleep, such as noise, light, or sleeping in a different bed. It also includes changes in your sleep pattern, such as having jet lag or working a late shift. · Health problems, such as pain, breathing problems, and restless legs syndrome. · Lack of regular exercise. How can you help yourself? Here are some tips that may help you sleep more soundly and wake up feeling more refreshed. Your sleeping area · Use your bedroom only for sleeping and sex. A bit of light reading may help you fall asleep. But if it doesn't, do your reading elsewhere in the house. Don't watch TV in bed. · Be sure your bed is big enough to stretch out comfortably, especially if you have a sleep partner. · Keep your bedroom quiet, dark, and cool. Use curtains, blinds, or a sleep mask to block out light. To block out noise, use earplugs, soothing music, or a \"white noise\" machine. Your evening and bedtime routine · Create a relaxing bedtime routine. You might want to take a warm shower or bath, listen to soothing music, or drink a cup of noncaffeinated tea. · Go to bed at the same time every night. And get up at the same time every morning, even if you feel tired. What to avoid · Limit caffeine (coffee, tea, caffeinated sodas) during the day, and don't have any for at least 4 to 6 hours before bedtime. · Don't drink alcohol before bedtime. Alcohol can cause you to wake up more often during the night. · Don't smoke or use tobacco, especially in the evening. Nicotine can keep you awake. · Don't take naps during the day, especially close to bedtime. · Don't lie in bed awake for too long. If you can't fall asleep, or if you wake up in the middle of the night and can't get back to sleep within 15 minutes or so, get out of bed and go to another room until you feel sleepy. · Don't take medicine right before bed that may keep you awake or make you feel hyper or energized. Your doctor can tell you if your medicine may do this and if you can take it earlier in the day. If you can't sleep · Imagine yourself in a peaceful, pleasant scene. Focus on the details and feelings of being in a place that is relaxing. · Get up and do a quiet or boring activity until you feel sleepy. · Don't drink any liquids after 6 p.m. if you wake up often because you have to go to the bathroom. Where can you learn more? Go to http://www.gray.com/ Enter S909 in the search box to learn more about \"Learning About Sleeping Well. \" Current as of: January 31, 2020               Content Version: 12.6 © 4209-1599 [employer]. Care instructions adapted under license by [employer] (which disclaims liability or warranty for this information). If you have questions about a medical condition or this instruction, always ask your healthcare professional. [address] any warranty or liability for your use of this information. Learning About Sleep Apnea What is it? Sleep apnea means that breathing stops for short periods during sleep. When you stop breathing or have reduced airflow into your lungs during sleep, you don't sleep well and you can be very tired during the day. The oxygen levels in your blood may go down, and carbon dioxide levels go up. It may lead to other problems, such as high blood pressure and heart disease. Sleep apnea can range from mild to severe, based on how often breathing stops during sleep. Breathing may stop as few as 5 times an hour (mild apnea) to 30 or more times an hour (severe apnea). Obstructive sleep apnea is the most common type. This most often occurs because your airways are blocked or partly blocked. Central sleep apnea is less common. It happens when the brain has trouble controlling breathing. Some people have both types. That's called complex sleep apnea. What are the symptoms? There are symptoms of sleep apnea that you may notice and symptoms that others may notice when you're asleep. Symptoms you may notice include: · Feeling extremely sleepy during the day. · Feeling unrefreshed or tired after a night's sleep. · Problems with memory and concentration, or mood changes. · Morning or night headaches. · Heartburn or a sour taste in your mouth at night. · Swelling of the legs. · Getting up often during the night to urinate. · A dry mouth or sore throat in the morning. Your bed partner may notice that you: 
· Have episodes of not breathing. · Snore loudly. Almost all people who have sleep apnea snore. But not all people who snore have sleep apnea. · Toss and turn during sleep. · Have nighttime choking or gasping spells. How is it diagnosed? Your doctor will probably do a physical exam and ask about your past health. He or she may also ask you or your bed partner about your snoring and sleep behavior and how tired you feel during the day. Your doctor may suggest a sleep study. Sleep studies are a series of tests that look at what happens to the body during sleep. They check for how often you stop breathing or have too little air flowing into your lungs during sleep. They also find out how much oxygen you have in your blood during sleep. A sleep study may take place in your home. Or it might take place at a sleep center, where you will spend the night. How is it treated? Sleep apnea is often treated with devices that deliver air through a mask to help keep your airways open. These include: 
· Continuous positive airway pressure (CPAP). This increases air pressure in your throat. It keeps your airway open when you breathe in. It's the most common device. · Bilevel positive airway pressure (BiPAP). This uses different air pressures when you breathe in and out. · Adaptive servo ventilation (ASV). It senses pauses in breathing and adjusts air pressure. It's mostly used for central sleep apnea. If your tonsils or other tissues are blocking your airway, your doctor may suggest surgery to open the airway. How can you care for yourself? You may be able to treat mild sleep apnea by making changes in how you live and the way you sleep. For example, it may help to: 
· Lose weight if you are overweight. · Sleep on your side, not your back. · Avoid alcohol and medicines such as sedatives before bed. You may also try an oral breathing device. It helps keep your airways open while you sleep. Where can you learn more? Go to http://www.gray.com/ Enter S121 in the search box to learn more about \"Learning About Sleep Apnea. \" 
 Current as of: February 24, 2020               Content Version: 12.6 © 0767-5524 [employer]. Care instructions adapted under license by [employer] (which disclaims liability or warranty for this information). If you have questions about a medical condition or this instruction, always ask your healthcare professional. [address] any warranty or liability for your use of this information.

## 2021-01-20 NOTE — PROGRESS NOTES
1. Have you been to the ER, urgent care clinic or hospitalized since your last visit? NO.     2. Have you seen or consulted any other health care providers outside of the [facility] since your last visit (Include any pap smears or colon screening)? NO

## 2021-01-24 PROBLEM — M47.812 FACET ARTHROPATHY, CERVICAL: Status: ACTIVE | Noted: 2021-01-24

## 2021-01-24 PROBLEM — M48.00 SPINAL STENOSIS: Status: RESOLVED | Noted: 2019-10-23 | Resolved: 2021-01-24

## 2021-01-24 PROBLEM — Z86.16 HISTORY OF 2019 NOVEL CORONAVIRUS DISEASE (COVID-19): Status: ACTIVE | Noted: 2020-07-02

## 2021-01-24 PROBLEM — R07.89 CHEST WALL PAIN: Status: ACTIVE | Noted: 2021-01-24

## 2021-01-24 PROBLEM — K62.5 RECTAL BLEEDING: Status: RESOLVED | Noted: 2017-08-13 | Resolved: 2021-01-24

## 2021-01-24 PROBLEM — J96.01 ACUTE RESPIRATORY FAILURE WITH HYPOXIA (HCC): Status: RESOLVED | Noted: 2020-07-19 | Resolved: 2021-01-24

## 2021-01-24 PROBLEM — M50.30 DDD (DEGENERATIVE DISC DISEASE), CERVICAL: Status: ACTIVE | Noted: 2021-01-24

## 2021-01-24 PROBLEM — R06.09 DYSPNEA ON EXERTION: Status: RESOLVED | Noted: 2020-09-13 | Resolved: 2021-01-24

## 2021-01-24 NOTE — PROGRESS NOTES
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 Solumedrol, and Lovenox. He was advised to prone and also received zinc and Vitamin C supplementation. He gradually improved and his oxygen was weaned to 5 liter nasal cannula. He was discharged with home oxygen, prednisone taper for 8 days, and Eliquis 2.5 mg bid for 30 days (completed 8/9/2020). He had a repeat COVID-19 test on 7/29/2020 which was negative. Reports persistent chest wall pain with inhalation today. Chest x-ray (9/2020) with persistent very mild peribronchial thickening, SpO2 99% today on RA. No other persistent symptoms of COVID. Will continue to monitor. 2. Obstructive sleep apnea, severe. Patient reported in 1/2019 awakening gasping for air several times per week. No overt evidence of heart failure and EKG was without change from baseline at that time. He reported that he would need to sit up immediately and take deep breathes until resolved. He also admitted to snoring and experiencing significant daytime drowsiness particularly when driving. Severe episode on 8/9/2019 prompted ED evaluation. EKG without change, troponin negative and NT-pro BNP normal. Echocardiogram (8/26/2019) with normal LV size and function (EF 56-60%), no RWMA, normal valves. Evaluated by Dr. Dallin White and completed home sleep study and diagnosed with severe JANNET. Started on auto CPAP and supplemented with 2 liters of oxygen. However, despite best efforts, patient continued to describe significant difficulty using due to dyspnea and air hunger, and states today that he returned equipment. Advised by Dr. Leodan Phan to undergo in-patient sleep evaluation to assess best treatment, but has not yet scheduled. Two episodes of chest pain/ dyspnea upon awakening after falling asleep in chair likely related to untreated sleep apnea. Also experiencing daytime drowsiness and drowsiness when driving, requiring him to pull over to sleep. Provided today with contact information to schedule. 3. Hypertension. Blood pressure remains well controlled on current regimen of lisinopril 40 mg daily, amlodipine 10 mg daily, lasix 40 mg daily (potassium 20 meq daily) and hydralazine 25 mg bid. Renal function has been normal with creatinine 0.66/ eGFR >60 (9/2020). Repeat echocardiogram obtained (9/2020) to address persistent dyspnea and worsening lower extremity edema post-COVID infection, showing no change from 8/2019 with EF 55-60% and mild MR. Prescribed compression hose, but did not obtain. Continue to follow. 4. Hyperlipidemia. On moderate intensity dose atorvastatin with LDL 68 and HDL 59 (9/2020), indicative of excellent control in this patient. Continue to follow. 5. Rheumatoid arthritis. On hydroxychloroquine. Has regular eye exams with Dr. Dorothea Prabhakar. Difficult to gauge response as appears to have evidence of osteoarthritis and CPPD occurring concurrently, but noted significant improvement since initiating colchicine. Voltaren gel and Tylenol for pain control as needed. Followed by Dr. Nixon Dumont. 6. Primary osteoarthritis. Evident in bilateral hands and knees, bilateral shoulders, and cervical spine. Shoulder pain (R>L). X-ray with evidence of osteoarthritis and rotator cuff pathology. Evaluated by Dr. Janae Villalba and received cortisone injection to right shoulder with some improvement. Surgery for reverse shoulder replacement recommended.  Changed from ibuprofen 400 mg qid and Tylenol to Celebrex 200 mg qd with significant improvement. However, held while being treated with Eliquis post-hospitalization, and patient states that no longer taking since did not restart. Reported increasing neck and shoulder pain at last visit in 9/2020, and cervical spine x-ray (9/2020) showed multilevel degenerative disc disease and facet arthropathy without interval change. Did not wish to proceed with physical therapy although had found it to be helpful in the past. Reports significant increase in overal osteoarthritic pain today in hands, wrists, shoulders and neck, and advised to resume Celebrex 200 mg bid. Also advised to supplement with Tylenol and follow-up with Dr. Margarita Ansari. 7. CPPD. Colchicine started on 1/19/2017 to help address chondrocalcinosis on x-rays. Reports significant improvement. Now taking every other day with good control. 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 Unresponsive to Medrol dose pack, physical therapy, steroid injections, and unwilling to take narcotics as not effective. Lumbar MRI with multilevel degenerative changes and facet arthropathy with R>L facet stenosis at L4-L5 likely responsible for symptoms. Had been following with Dr. Smooth Vasquez, but given lack of improvement, started on gabapentin 100 mg tid and referred to Dr. Latisha Mari for evaluation. She recommended increasing gabapentin to 200 mg tid, and given his lack of response to conservative measures, referred to Dr. Herber Roper. He underwent decompression with L4/5 right-sided hemilaminotomy and medial facetectomy on 10/23/2019. Developed urinary retention post-op, but successfully passed voiding trial and has had no further difficulties. He developed recurrence of pain on post op day 6, and treated with Medrol dose pack. Remains on gabapentin 300 mg bid and reports reasonable control of pain. Being followed by Dr. Herber Roper. 9. Prediabetes.  Had been controlled on diet alone with HbA1c normal at 5.5 (9/2020). Required sliding scale insulin dosing in the hospital due to elevated blood sugar, likely related to high dose steroids. No evidence of microvascular complications. On Ace-I and statin. Continue follow-up with Dr. Dorothea Prabhakar for annual eye exams. Emphasized importance of lifestyle modifications, including diet, exercise, and weight loss. 10. Depression. Prior reasonable control with Paxil and weaned from benzodiazepine use for anxiety and insomnia. After inadvertently stopping Paxil, started on Zoloft and dose titrated to 75 mg daily with good control. However, appears reports worsening symptoms and inadvertently decreased dose to 50 mg daily. Advised to increase to 75 mg daily and will reassess at upcoming routine visit. 11. Rectal bleeding. Colonoscopy 8/2015. Evaluated by Dr. Carola Long and considered to be hemorrhoidal in source. Known internal and external hemorrhoids. Improved with Citrucel. Has not had evidence of anemia. Follow. 12. BPH. Does have lower urinary tract symptoms. Developed postop urinary retention and now resolved. On Flomax. Followed previously by Dr. Dre Lopez, and will need to establish follow-up with [facility]. Not wishing to do so currently. 13. GERD. Good control of symptoms with omeprazole. No issues today. 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 Managing well and no further issues. 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 Monitored annually. 16. Obesity. Lost 25 pounds during COVID 19 illness, but now returned to near baseline. Emphasized importance of healthy eating and improved nutrition. Will readdress at next visit. 17. Insomnia. Weaned from Xanax. Had been using melatonin agonist, ramelteon, to replace Xanax, but no longer taking it either. Appears to be managing without medication. 18. Health maintenance. Already received influenza vaccine. Unable to receive Tdap due to allergy (anaphylaxis to Td).  Will address Shingrix vaccine at next visit. Other immunizations up to date. Colonoscopy due 8/2020, but wishing to defer for now. Will readdress at next visit. Continue regular eye exams with Dr. Sharda Ch. Vitamin D level has been mildly low, and advised to resume maintenance dose supplement. Aspirin discontinued given new recommendations regarding primary prevention. Medicare wellness visit up to date. Counseled patient regarding strict mitigation measures for COVID-19, including wearing a mask, social distancing and diligent hand washing, as recommended by the CDC. Discussed importance of proceeding with COVID 19 vaccine when available. Total time: 45 minutes spent with the patient on counseling, answering questions and/or coordination of care. Complex medical review performed, including review of medical history, lab results, and testing. Complicated management plan formulated. Patient understands recommendations and agrees with plan. Follow-up as previously scheduled.

## 2021-01-25 ENCOUNTER — HOSPITAL ENCOUNTER (OUTPATIENT)
Dept: LAB | Age: 81
Discharge: HOME | End: 2021-01-25
Payer: MEDICARE

## 2021-01-25 PROCEDURE — 36415 COLL VENOUS BLD VENIPUNCTURE: CPT

## 2021-01-25 PROCEDURE — U0003 INFECTIOUS AGENT DETECTION BY NUCLEIC ACID (DNA OR RNA); SEVERE ACUTE RESPIRATORY SYNDROME CORONAVIRUS 2 (SARS-COV-2) (CORONAVIRUS DISEASE [COVID-19]), AMPLIFIED PROBE TECHNIQUE, MAKING USE OF HIGH THROUGHPUT TECHNOLOGIES AS DESCRIBED BY CMS-2020-01-R: HCPCS

## 2021-01-27 LAB — SARS-COV-2, COV2NT: NOT DETECTED

## 2021-01-29 ENCOUNTER — HOSPITAL ENCOUNTER (OUTPATIENT)
Dept: SLEEP MEDICINE | Age: 81
Discharge: HOME | End: 2021-01-29
Payer: MEDICARE

## 2021-01-29 DIAGNOSIS — Z99.89 OSA ON CPAP: ICD-10-CM

## 2021-01-29 DIAGNOSIS — G47.33 OSA ON CPAP: ICD-10-CM

## 2021-01-29 PROCEDURE — 95811 POLYSOM 6/>YRS CPAP 4/> PARM: CPT

## 2021-01-30 VITALS
BODY MASS INDEX: 32.83 KG/M2 | DIASTOLIC BLOOD PRESSURE: 81 MMHG | HEART RATE: 72 BPM | WEIGHT: 216.6 LBS | SYSTOLIC BLOOD PRESSURE: 147 MMHG | TEMPERATURE: 96.4 F | HEIGHT: 68 IN

## 2021-02-02 ENCOUNTER — APPOINTMENT (OUTPATIENT)
Dept: INTERNAL MEDICINE CLINIC | Age: 81
End: 2021-02-02

## 2021-02-02 ENCOUNTER — HOSPITAL ENCOUNTER (OUTPATIENT)
Dept: LAB | Age: 81
Discharge: HOME | End: 2021-02-02
Payer: MEDICARE

## 2021-02-02 DIAGNOSIS — E66.09 CLASS 1 OBESITY DUE TO EXCESS CALORIES WITH SERIOUS COMORBIDITY AND BODY MASS INDEX (BMI) OF 31.0 TO 31.9 IN ADULT: ICD-10-CM

## 2021-02-02 DIAGNOSIS — N40.1 BPH WITH OBSTRUCTION/LOWER URINARY TRACT SYMPTOMS: ICD-10-CM

## 2021-02-02 DIAGNOSIS — E78.5 HYPERLIPIDEMIA, UNSPECIFIED HYPERLIPIDEMIA TYPE: ICD-10-CM

## 2021-02-02 DIAGNOSIS — R06.09 DYSPNEA ON EXERTION: ICD-10-CM

## 2021-02-02 DIAGNOSIS — Z23 ENCOUNTER FOR IMMUNIZATION: ICD-10-CM

## 2021-02-02 DIAGNOSIS — N13.8 BPH WITH OBSTRUCTION/LOWER URINARY TRACT SYMPTOMS: ICD-10-CM

## 2021-02-02 DIAGNOSIS — Z77.011 LEAD EXPOSURE RISK ASSESSMENT, HIGH RISK: ICD-10-CM

## 2021-02-02 DIAGNOSIS — R60.0 BILATERAL LOWER EXTREMITY EDEMA: ICD-10-CM

## 2021-02-02 DIAGNOSIS — R73.01 IMPAIRED FASTING GLUCOSE: ICD-10-CM

## 2021-02-02 DIAGNOSIS — M47.816 LUMBAR FACET ARTHROPATHY: ICD-10-CM

## 2021-02-02 DIAGNOSIS — M54.9 UPPER BACK PAIN: ICD-10-CM

## 2021-02-02 DIAGNOSIS — R73.03 PRE-DIABETES: ICD-10-CM

## 2021-02-02 DIAGNOSIS — J96.01 ACUTE RESPIRATORY FAILURE WITH HYPOXIA (HCC): ICD-10-CM

## 2021-02-02 DIAGNOSIS — G47.33 OBSTRUCTIVE SLEEP APNEA SYNDROME: ICD-10-CM

## 2021-02-02 DIAGNOSIS — M11.20 CPDD (CALCIUM PYROPHOSPHATE DEPOSITION DISEASE): ICD-10-CM

## 2021-02-02 DIAGNOSIS — M54.2 NECK PAIN: ICD-10-CM

## 2021-02-02 DIAGNOSIS — E55.9 VITAMIN D DEFICIENCY: ICD-10-CM

## 2021-02-02 DIAGNOSIS — M15.9 PRIMARY OSTEOARTHRITIS INVOLVING MULTIPLE JOINTS: ICD-10-CM

## 2021-02-02 DIAGNOSIS — M06.042 RHEUMATOID ARTHRITIS INVOLVING BOTH HANDS WITH NEGATIVE RHEUMATOID FACTOR (HCC): ICD-10-CM

## 2021-02-02 DIAGNOSIS — Z23 NEEDS FLU SHOT: ICD-10-CM

## 2021-02-02 DIAGNOSIS — U07.1 COVID-19 VIRUS INFECTION: ICD-10-CM

## 2021-02-02 DIAGNOSIS — U07.1 PNEUMONIA DUE TO COVID-19 VIRUS: ICD-10-CM

## 2021-02-02 DIAGNOSIS — I10 ESSENTIAL HYPERTENSION: ICD-10-CM

## 2021-02-02 DIAGNOSIS — M06.041 RHEUMATOID ARTHRITIS INVOLVING BOTH HANDS WITH NEGATIVE RHEUMATOID FACTOR (HCC): ICD-10-CM

## 2021-02-02 DIAGNOSIS — J12.82 PNEUMONIA DUE TO COVID-19 VIRUS: ICD-10-CM

## 2021-02-02 LAB
25(OH)D3 SERPL-MCNC: 27.3 NG/ML (ref 30–100)
ALBUMIN SERPL-MCNC: 3.8 G/DL (ref 3.4–5)
ALBUMIN/GLOB SERPL: 1.6 {RATIO} (ref 0.8–1.7)
ALP SERPL-CCNC: 112 U/L (ref 45–117)
ALT SERPL-CCNC: 34 U/L (ref 16–61)
ANION GAP SERPL CALC-SCNC: 5 MMOL/L (ref 3–18)
AST SERPL-CCNC: 17 U/L (ref 10–38)
BASOPHILS # BLD: 0 K/UL (ref 0–0.1)
BASOPHILS NFR BLD: 0 % (ref 0–2)
BILIRUB SERPL-MCNC: 0.5 MG/DL (ref 0.2–1)
BUN SERPL-MCNC: 14 MG/DL (ref 7–18)
BUN/CREAT SERPL: 18 (ref 12–20)
CALCIUM SERPL-MCNC: 8.8 MG/DL (ref 8.5–10.1)
CHLORIDE SERPL-SCNC: 112 MMOL/L (ref 100–111)
CHOLEST SERPL-MCNC: 132 MG/DL
CO2 SERPL-SCNC: 29 MMOL/L (ref 21–32)
CREAT SERPL-MCNC: 0.78 MG/DL (ref 0.6–1.3)
CREAT UR-MCNC: 69 MG/DL (ref 30–125)
DIFFERENTIAL METHOD BLD: ABNORMAL
EOSINOPHIL # BLD: 0.3 K/UL (ref 0–0.4)
EOSINOPHIL NFR BLD: 4 % (ref 0–5)
ERYTHROCYTE [DISTWIDTH] IN BLOOD BY AUTOMATED COUNT: 14.2 % (ref 11.6–14.5)
EST. AVERAGE GLUCOSE BLD GHB EST-MCNC: 108 MG/DL
GLOBULIN SER CALC-MCNC: 2.4 G/DL (ref 2–4)
GLUCOSE SERPL-MCNC: 112 MG/DL (ref 74–99)
HBA1C MFR BLD: 5.4 % (ref 4.2–5.6)
HCT VFR BLD AUTO: 41.2 % (ref 36–48)
HDLC SERPL-MCNC: 68 MG/DL (ref 40–60)
HDLC SERPL: 1.9 {RATIO} (ref 0–5)
HGB BLD-MCNC: 13.4 G/DL (ref 13–16)
LDLC SERPL CALC-MCNC: 57.6 MG/DL (ref 0–100)
LIPID PROFILE,FLP: ABNORMAL
LYMPHOCYTES # BLD: 1.3 K/UL (ref 0.9–3.6)
LYMPHOCYTES NFR BLD: 18 % (ref 21–52)
MAGNESIUM SERPL-MCNC: 2 MG/DL (ref 1.6–2.6)
MCH RBC QN AUTO: 32.7 PG (ref 24–34)
MCHC RBC AUTO-ENTMCNC: 32.5 G/DL (ref 31–37)
MCV RBC AUTO: 100.5 FL (ref 74–97)
MICROALBUMIN UR-MCNC: 0.82 MG/DL (ref 0–3)
MICROALBUMIN/CREAT UR-RTO: 12 MG/G (ref 0–30)
MONOCYTES # BLD: 0.6 K/UL (ref 0.05–1.2)
MONOCYTES NFR BLD: 8 % (ref 3–10)
NEUTS SEG # BLD: 4.8 K/UL (ref 1.8–8)
NEUTS SEG NFR BLD: 70 % (ref 40–73)
PLATELET # BLD AUTO: 201 K/UL (ref 135–420)
PMV BLD AUTO: 10.5 FL (ref 9.2–11.8)
POTASSIUM SERPL-SCNC: 4.4 MMOL/L (ref 3.5–5.5)
PROT SERPL-MCNC: 6.2 G/DL (ref 6.4–8.2)
RBC # BLD AUTO: 4.1 M/UL (ref 4.7–5.5)
SODIUM SERPL-SCNC: 146 MMOL/L (ref 136–145)
TRIGL SERPL-MCNC: 32 MG/DL (ref ?–150)
VLDLC SERPL CALC-MCNC: 6.4 MG/DL
WBC # BLD AUTO: 6.9 K/UL (ref 4.6–13.2)

## 2021-02-02 PROCEDURE — 36415 COLL VENOUS BLD VENIPUNCTURE: CPT

## 2021-02-02 PROCEDURE — 83735 ASSAY OF MAGNESIUM: CPT

## 2021-02-02 PROCEDURE — 82043 UR ALBUMIN QUANTITATIVE: CPT

## 2021-02-02 PROCEDURE — 80053 COMPREHEN METABOLIC PANEL: CPT

## 2021-02-02 PROCEDURE — 83655 ASSAY OF LEAD: CPT

## 2021-02-02 PROCEDURE — 82306 VITAMIN D 25 HYDROXY: CPT

## 2021-02-02 PROCEDURE — 80061 LIPID PANEL: CPT

## 2021-02-02 PROCEDURE — 85025 COMPLETE CBC W/AUTO DIFF WBC: CPT

## 2021-02-02 PROCEDURE — 83036 HEMOGLOBIN GLYCOSYLATED A1C: CPT

## 2021-02-03 LAB — LEAD BLD-MCNC: 10 UG/DL (ref 0–4)

## 2021-02-09 ENCOUNTER — TELEPHONE (OUTPATIENT)
Dept: PULMONOLOGY | Age: 81
End: 2021-02-09

## 2021-02-09 ENCOUNTER — OFFICE VISIT (OUTPATIENT)
Dept: INTERNAL MEDICINE CLINIC | Age: 81
End: 2021-02-09
Payer: MEDICARE

## 2021-02-09 VITALS
HEIGHT: 68 IN | TEMPERATURE: 97.5 F | DIASTOLIC BLOOD PRESSURE: 77 MMHG | SYSTOLIC BLOOD PRESSURE: 134 MMHG | BODY MASS INDEX: 31.67 KG/M2 | HEART RATE: 72 BPM | OXYGEN SATURATION: 97 % | RESPIRATION RATE: 16 BRPM | WEIGHT: 209 LBS

## 2021-02-09 DIAGNOSIS — M15.9 PRIMARY OSTEOARTHRITIS INVOLVING MULTIPLE JOINTS: ICD-10-CM

## 2021-02-09 DIAGNOSIS — S68.621S: ICD-10-CM

## 2021-02-09 DIAGNOSIS — R73.03 PRE-DIABETES: ICD-10-CM

## 2021-02-09 DIAGNOSIS — I10 ESSENTIAL HYPERTENSION: Primary | ICD-10-CM

## 2021-02-09 DIAGNOSIS — K21.9 GASTROESOPHAGEAL REFLUX DISEASE, UNSPECIFIED WHETHER ESOPHAGITIS PRESENT: ICD-10-CM

## 2021-02-09 DIAGNOSIS — M47.816 LUMBAR FACET ARTHROPATHY: ICD-10-CM

## 2021-02-09 DIAGNOSIS — Z86.16 HISTORY OF 2019 NOVEL CORONAVIRUS DISEASE (COVID-19): ICD-10-CM

## 2021-02-09 DIAGNOSIS — G47.33 OBSTRUCTIVE SLEEP APNEA SYNDROME: ICD-10-CM

## 2021-02-09 DIAGNOSIS — R60.0 BILATERAL LOWER EXTREMITY EDEMA: ICD-10-CM

## 2021-02-09 DIAGNOSIS — E55.9 VITAMIN D DEFICIENCY: ICD-10-CM

## 2021-02-09 DIAGNOSIS — M06.042 RHEUMATOID ARTHRITIS INVOLVING BOTH HANDS WITH NEGATIVE RHEUMATOID FACTOR (HCC): ICD-10-CM

## 2021-02-09 DIAGNOSIS — F33.0 MILD EPISODE OF RECURRENT MAJOR DEPRESSIVE DISORDER (HCC): ICD-10-CM

## 2021-02-09 DIAGNOSIS — M11.20 CPDD (CALCIUM PYROPHOSPHATE DEPOSITION DISEASE): ICD-10-CM

## 2021-02-09 DIAGNOSIS — N13.8 BPH WITH OBSTRUCTION/LOWER URINARY TRACT SYMPTOMS: ICD-10-CM

## 2021-02-09 DIAGNOSIS — R73.01 IMPAIRED FASTING GLUCOSE: ICD-10-CM

## 2021-02-09 DIAGNOSIS — M50.30 DDD (DEGENERATIVE DISC DISEASE), CERVICAL: ICD-10-CM

## 2021-02-09 DIAGNOSIS — E66.09 CLASS 1 OBESITY DUE TO EXCESS CALORIES WITH SERIOUS COMORBIDITY AND BODY MASS INDEX (BMI) OF 31.0 TO 31.9 IN ADULT: ICD-10-CM

## 2021-02-09 DIAGNOSIS — E78.5 HYPERLIPIDEMIA, UNSPECIFIED HYPERLIPIDEMIA TYPE: ICD-10-CM

## 2021-02-09 DIAGNOSIS — N40.1 BPH WITH OBSTRUCTION/LOWER URINARY TRACT SYMPTOMS: ICD-10-CM

## 2021-02-09 DIAGNOSIS — M67.432 GANGLION CYST OF WRIST, LEFT: ICD-10-CM

## 2021-02-09 DIAGNOSIS — M06.041 RHEUMATOID ARTHRITIS INVOLVING BOTH HANDS WITH NEGATIVE RHEUMATOID FACTOR (HCC): ICD-10-CM

## 2021-02-09 PROCEDURE — G8754 DIAS BP LESS 90: HCPCS | Performed by: INTERNAL MEDICINE

## 2021-02-09 PROCEDURE — G8427 DOCREV CUR MEDS BY ELIG CLIN: HCPCS | Performed by: INTERNAL MEDICINE

## 2021-02-09 PROCEDURE — 1101F PT FALLS ASSESS-DOCD LE1/YR: CPT | Performed by: INTERNAL MEDICINE

## 2021-02-09 PROCEDURE — G9717 DOC PT DX DEP/BP F/U NT REQ: HCPCS | Performed by: INTERNAL MEDICINE

## 2021-02-09 PROCEDURE — G8417 CALC BMI ABV UP PARAM F/U: HCPCS | Performed by: INTERNAL MEDICINE

## 2021-02-09 PROCEDURE — 99214 OFFICE O/P EST MOD 30 MIN: CPT | Performed by: INTERNAL MEDICINE

## 2021-02-09 PROCEDURE — G8536 NO DOC ELDER MAL SCRN: HCPCS | Performed by: INTERNAL MEDICINE

## 2021-02-09 PROCEDURE — G8752 SYS BP LESS 140: HCPCS | Performed by: INTERNAL MEDICINE

## 2021-02-09 PROCEDURE — G0463 HOSPITAL OUTPT CLINIC VISIT: HCPCS | Performed by: INTERNAL MEDICINE

## 2021-02-09 RX ORDER — ERGOCALCIFEROL 1.25 MG/1
50000 CAPSULE ORAL
Qty: 12 CAP | Refills: 0 | Status: SHIPPED | OUTPATIENT
Start: 2021-02-09 | End: 2021-05-23 | Stop reason: ALTCHOICE

## 2021-02-09 NOTE — PROGRESS NOTES
1. Have you been to the ER, urgent care clinic or hospitalized since your last visit? NO.     2. Have you seen or consulted any other health care providers outside of the [address] [place] [address] since your last visit (Include any pap smears or colon screening)?  NO

## 2021-02-09 NOTE — PATIENT INSTRUCTIONS
Learning About Low-Sodium Foods What foods are low in sodium? The foods you eat contain nutrients, such as vitamins and minerals. Sodium is a nutrient. Your body needs the right amount to stay healthy and work as it should. You can use the list below to help you make choices about which foods to eat. Fruits · Fresh, frozen, canned, or dried fruit Vegetables · Fresh or frozen vegetables, with no added salt · Canned vegetables, low-sodium or with no added salt Grains · Bagels without salted tops · Cereal with no added salt · Corn tortillas · Crackers with no added salt · Oatmeal, cooked without salt · Popcorn with no salt · Pasta and noodles, cooked without salt · Rice, cooked without salt · Unsalted pretzels Dairy and dairy alternatives · Butter, unsalted · Cream cheese · Ice cream 
· Milk · Soy milk Meats and other protein foods · Beans and peas, canned with no salt · Eggs · Fresh fish (not smoked) · Fresh meats (not smoked or cured) · Nuts and nut butter, prepared without salt · Poultry, not packaged with sodium solution · Tofu, unseasoned · Tuna, canned without salt Seasonings · Garlic · Herbs and spices · Lemon juice · Mustard · Olive oil · Salt-free seasoning mixes · Vinegar Work with your doctor to find out how much of this nutrient you need. Depending on your health, you may need more or less of it in your diet. Where can you learn more? Go to http://www.gray.com/ Enter E305 in the search box to learn more about \"Learning About Low-Sodium Foods. \" Current as of: August 22, 2019               Content Version: 12.6 © 3917-2931 [employer]. Care instructions adapted under license by [employer] (which disclaims liability or warranty for this information). If you have questions about a medical condition or this instruction, always ask your healthcare professional. [address] any warranty or liability for your use of this information. How to Read a Food Label to Limit Sodium: Care Instructions Your Care Instructions Sodium causes your body to hold on to extra water. This can raise your blood pressure and force your heart and kidneys to work harder. In very serious cases, this could cause you to be put in the hospital. It might even be life-threatening. By limiting sodium, you will feel better and lower your risk of serious problems. Processed foods, fast food, and restaurant foods are the major sources of dietary sodium. The most common name for sodium is salt. Try to limit how much sodium you eat to less than 2,300 milligrams (mg) a day. If you limit your sodium to 1,500 mg a day, you can lower your blood pressure even more. This limit counts all the salt that you eat in foods you cook or in packaged foods. Keep a list of everything you eat and drink. Follow-up care is a key part of your treatment and safety. Be sure to make and go to all appointments, and call your doctor if you are having problems. It's also a good idea to know your test results and keep a list of the medicines you take. How can you care for yourself at home? Read ingredient lists on food labels · Read the list of ingredients on food labels to help you find how much sodium is in a food. The label lists the ingredients in a food in descending order (from the most to the least). If salt or sodium is high on the list, there may be a lot of sodium in the food. · Know that sodium has different names. Sodium is also called monosodium glutamate (MSG, common in [facility] food), sodium citrate, sodium alginate, sodium hydroxide, and sodium phosphate. Read Nutrition Facts labels · On most foods, there is a Nutrition Facts label. This will tell you how much sodium is in one serving of food. Look at both the serving size and the sodium amount. The serving size is located at the top of the label, usually right under the \"Nutrition Facts\" title. The amount of sodium is given in the list under the title. It is given in milligrams (mg). ? Check the serving size carefully. A single serving is often very small, and you may eat more than one serving. If this is the case, you will eat more sodium than listed on the label. For example, if the serving size for a canned soup is 1 cup and the sodium amount is 470 mg, if you have 2 cups you will eat 940 mg of sodium. · The nutrition facts for fresh fruits and vegetables are not listed on the food. They may be listed somewhere in the store. These foods usually have no sodium or low sodium. · The Nutrition Facts label also gives you the Percent Daily Value for sodium. This is how much of the recommended amount of sodium a serving contains. The daily value for sodium is less than 2,300 mg. So if the Percent Daily Value says 50%, this means one serving is giving you half of this, or 1,150 mg. Buy low-sodium foods · Look for foods that are made with less sodium. Watch for the following words on the label. ? \"Unsalted\" means there is no sodium added to the food. But there may be sodium already in the food naturally. ? \"Sodium-free\" means a serving has less than 5 mg of sodium. ? \"Very low sodium\" means a serving has 35 mg or less of sodium. ? \"Low-sodium\" means a serving has 140 mg or less of sodium. · \"Reduced-sodium\" means that there is 25% less sodium than what the food normally has. This is still usually too much sodium. Try not to buy foods with this on the label. · Buy fresh vegetables, or frozen vegetables without added sauces. Buy low-sodium versions of canned vegetables, soups, and other canned goods. Where can you learn more? Go to http://www.gray.com/ Enter 26 [id number] in the search box to learn more about \"How to Read a Food Label to Limit Sodium: Care Instructions. \" Current as of: August 22, 2019               Content Version: 12.6 © 9994-1495 [employer]. Care instructions adapted under license by [employer] (which disclaims liability or warranty for this information). If you have questions about a medical condition or this instruction, always ask your healthcare professional. [address] any warranty or liability for your use of this information. Low Sodium Diet (2,000 Milligram): Care Instructions Your Care Instructions Too much sodium causes your body to hold on to extra water. This can raise your blood pressure and force your heart and kidneys to work harder. In very serious cases, this could cause you to be put in the hospital. It might even be life-threatening. By limiting sodium, you will feel better and lower your risk of serious problems. The most common source of sodium is salt. People get most of the salt in their diet from canned, prepared, and packaged foods. Fast food and restaurant meals also are very high in sodium. Your doctor will probably limit your sodium to less than 2,000 milligrams (mg) a day. This limit counts all the sodium in prepared and packaged foods and any salt you add to your food. Follow-up care is a key part of your treatment and safety. Be sure to make and go to all appointments, and call your doctor if you are having problems. It's also a good idea to know your test results and keep a list of the medicines you take. How can you care for yourself at home? Read food labels · Read labels on cans and food packages. The labels tell you how much sodium is in each serving. Make sure that you look at the serving size. If you eat more than the serving size, you have eaten more sodium. · Food labels also tell you the Percent Daily Value for sodium. Choose products with low Percent Daily Values for sodium. · Be aware that sodium can come in forms other than salt, including monosodium glutamate (MSG), sodium citrate, and sodium bicarbonate (baking soda). MSG is often added to Asian food. When you eat out, you can sometimes ask for food without MSG or added salt. Buy low-sodium foods · Buy foods that are labeled \"unsalted\" (no salt added), \"sodium-free\" (less than 5 mg of sodium per serving), or \"low-sodium\" (less than 140 mg of sodium per serving). Foods labeled \"reduced-sodium\" and \"light sodium\" may still have too much sodium. Be sure to read the label to see how much sodium you are getting. · Buy fresh vegetables, or frozen vegetables without added sauces. Buy low-sodium versions of canned vegetables, soups, and other canned goods. Prepare low-sodium meals · Cut back on the amount of salt you use in cooking. This will help you adjust to the taste. Do not add salt after cooking. One teaspoon of salt has about 2,300 mg of sodium. · Take the salt shaker off the table. · Flavor your food with garlic, lemon juice, onion, vinegar, herbs, and spices. Do not use soy sauce, lite soy sauce, steak sauce, onion salt, garlic salt, celery salt, mustard, or ketchup on your food. · Use low-sodium salad dressings, sauces, and ketchup. Or make your own salad dressings and sauces without adding salt. · Use less salt (or none) when recipes call for it. You can often use half the salt a recipe calls for without losing flavor. Other foods such as rice, pasta, and grains do not need added salt. · Rinse canned vegetables, and cook them in fresh water. This removes somebut not allof the salt. · Avoid water that is naturally high in sodium or that has been treated with water softeners, which add sodium. Call your local water company to find out the sodium content of your water supply. If you buy bottled water, read the label and choose a sodium-free brand. Avoid high-sodium foods · Avoid eating: 
? Smoked, cured, salted, and canned meat, fish, and poultry. ? Ham, puentes, hot dogs, and luncheon meats. ? Regular, hard, and processed cheese and regular peanut butter. ? Crackers with salted tops, and other salted snack foods such as pretzels, chips, and salted popcorn. ? Frozen prepared meals, unless labeled low-sodium. ? Canned and dried soups, broths, and bouillon, unless labeled sodium-free or low-sodium. ? Canned vegetables, unless labeled sodium-free or low-sodium. ? [place] fries, pizza, tacos, and other fast foods. ? Pickles, olives, ketchup, and other condiments, especially soy sauce, unless labeled sodium-free or low-sodium. Where can you learn more? Go to http://www.gray.com/ Enter R065 in the search box to learn more about \"Low Sodium Diet (2,000 Milligram): Care Instructions. \" Current as of: August 22, 2019               Content Version: 12.6 © 2516-4196 [employer]. Care instructions adapted under license by [employer] (which disclaims liability or warranty for this information). If you have questions about a medical condition or this instruction, always ask your healthcare professional. [place] [address] any warranty or liability for your use of this information.

## 2021-02-09 NOTE — LETTER
2/9/2021 9:50 AM 
 
Mr. David Abraham [id number] [phone]  
 
 
 
 
2/3/2021  2:36 PM - [place] [place] 
 
Component Value Flag Ref Range Units Status Lead, blood 10  High   0 - 4 ug/dL Final  
Comment:  
(NOTE) Testing performed by Inductively coupled plasma/Mass Spectrometry. **Verified by repeat analysis** This test was developed and its performance characteristics  
determined by [facility]. It has not been cleared or approved  
by the Food and Drug Administration.  
                         Environmental Exposure:  
                          WHO Recommendation    <20  
                         Occupational Exposure:  
                          OSHA Lead Std          40  
                          MANUEL                    30  
                               Detection Limit =  1 Performed At: [address] [id number] Camilo Bills MD [id number] Sincerely, Casey Wolfe MD

## 2021-02-11 ENCOUNTER — TELEPHONE (OUTPATIENT)
Dept: PULMONOLOGY | Age: 81
End: 2021-02-11

## 2021-02-11 DIAGNOSIS — I49.1 PAC (PREMATURE ATRIAL CONTRACTION): ICD-10-CM

## 2021-02-11 DIAGNOSIS — I49.3 PVC (PREMATURE VENTRICULAR CONTRACTION): ICD-10-CM

## 2021-02-11 DIAGNOSIS — Z78.9 DIFFICULTY WITH CPAP USE: ICD-10-CM

## 2021-02-11 DIAGNOSIS — I44.1 ATRIOVENTRICULAR BLOCK, MOBITZ TYPE 1, WENCKEBACH: ICD-10-CM

## 2021-02-11 DIAGNOSIS — G47.31 COMPLEX SLEEP APNEA SYNDROME: Primary | ICD-10-CM

## 2021-02-11 NOTE — PROGRESS NOTES
BiPAP order faxed to [facility], [phone]. Patient contacted and study results reviewed with him. DME info provided to patient and he is encouraged to contact our office with any additional questions or concerns he may have. Patient should hear from [facility] within 2 weeks. If not, I have requested they call the office to let me know. Healthy sleep habits encouraged.

## 2021-02-11 NOTE — TELEPHONE ENCOUNTER
BiPAP order faxed to [facility], [phone]. Patient contacted and study results reviewed with him. DME info provided to patient and he is encouraged to contact our office with any additional questions or concerns he may have. Patient should hear from [facility] within 2 weeks. If not, I have requested they call the office to let me know. Healthy sleep habits encouraged.

## 2021-02-11 NOTE — PROGRESS NOTES
Sleep Study     Appears to have intermittent Type 2 [place] [facility])  heart block          [place] 255- 20 sec/page      Summary Report      [address] [facility]  [address] [place], [address], [address] [facility] Diana Gavin, [address],  [phone]     Polysomnography Report    [facility]       Patient: Kumar Villegas Type: Split Night PSG   : 1940 Patient Details: Male, 80 years, Height 5' 8\",   MR#: [id number]   Weight 216.6 lbs, BMI 32.93   Physician: Yeison Velasquez DO Ref. Physician: Ole Reina MD   Recording Technician: FLORECITA Bardales Recording Details: Recorded at 8:57:45 PM on 2021    Scoring Technician:    for 541.5 minutes. STUDY PROTOCOL: The study consisted of 14 channels, including left and right EOG and EEG, submental and extremity EMG, EKG, airflow, respiratory effort and oximetry measurement. The study report was generated by personal review of the raw data from the patient's sleep study. SPLIT NIGHT CPAP REPORT    MEDICATIONS: Zoloft, Prilosec, Celebrex, Norvasc, Plaquenil, Xalatan, Aresoline, Neurontin, Flomax, Lipitor, Lisinopril, Mineral Oil, Lasix, Potassium Chloride, Restasis, Mitigare, Vit D3, Voltaren, Lumigan, Multi Vit for him    SUMMARY: The blood pressure readings: Temp: 96.4 Pre BP: 168/73 Post BP: 147/81. The sleep efficiency was 84%. During the night, 21 awakenings were recorded, with 2.0 minutes spent awake after sleep onset. The arousal index was 25.7 per hour. The sleep onset latency was 2.0 minutes; the stage 2 latency was 4.5 minutes. REM sleep was 4 percent of sleep time; slow wave sleep was 0 percent. Snoring was noted 0.0 percent of the sleep time. DIAGNOSTIC PORTION:  The AHI was 68 per hour; the RDI was 74 per hour. The PLM index was 0, with 0 associated with arousal hourly, on average. The time with oxygen saturation below 88% was 8.1 minutes.  The minimum oxygen saturation was 81%. The oxygen desaturation index was 62.5. The average respiratory event lasted 22 seconds. The longest event was 1 seconds. PAP PORTION:  The overall AHI was 18.6 per hour; the RDI was 20 per hour. The PLM index was 28, with 0.0 associated with arousal hourly, on average. The time with oxygen saturation below 88% was 1.1 minutes. The minimum oxygen saturation was 85%. The oxygen desaturation index was 14.0. The average respiratory event lasted 20 seconds. The longest event was 1 seconds. The respiratory rate was typically between 12 and 20 breaths per minute; Timothy Hanna pattern was not seen. Mouth breathing was frequently noted by recording tech. No hypnotic was reportedly taken. The patient reported insufficient sleep the previous night (<7 hours). Sleep hygiene violation:  caffeine. Sleep was interrupted by one bathroom trip. The underlying cardiac tracing appeared to be normal sinus rhythm. Frequent PACs and intermittent PVCs were noted. There also appeared to be an intermittent and persistent Mobitz-1 2nd degree heart block throughout the study. These findings may be better assessed with formal cardiology evaluation. No alpha intrusion, parasomnias or EEG abnormalities occurred. Supplemental oxygen was not used during this study. Overall, the patient appeared to tolerate study fairly well. Severe JANNET was diagnosed during the initial portion of this study. The patient was then started on a CPAP titration at 5 cwp. The patient reported intolerance to CPAP and was unable to exhale; even against the lower pressures. The patient was then switched to a BIPAP mode of 8/4 which was much more comfortable for the patient. As soon as the patient fell asleep, central apneas emerged. Central apneas appeared to dissipate at higher BIPAP pressures. At a BIPAP of 16/11, frequent PLMs emerged. The patient was ultimately titrated to a final BIPAP setting of 19/15 cwp.   PLMs and central apneas appeared resolved at this final BIPAP setting. REM sleep was not achieved at this final CPAP setting. Cardiac findings noted above. The patient reported sleep better than he had in a long time. <BR>PRESSURES USED DURING THE STUDY: 8/4, 10/6, 12/8, 13/8, 15/10, 16/11, 17/13, 18/14, 19/15 cwp. 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  If above cardiac findings are new, formal cardiology consultation is recommended.  Monitor for any symptoms suggestive of a periodic limb movement disorder.  Other non-invasive treatment options are recommended were applicable and include the following: weight reduction, smoking cessation, body position training, and modification of alcohol ingestion and/or sedating agents.  If these treatments are not possible or effective, an oral appliance may be an alternative and/or adjuvant non-invasive treatment.  Healthy sleep habits are encouraged.  Individuals are encouraged to obtain 7-9 hours of sleep per day.   safety is encouraged. Drowsy and/or inattentive driving should be avoided.  COVID-19 precautions as recommended by the Centers for Disease Control (CDC) and American Academy [employer] Sleep [employer]icians (AASM)   Follow up with provider as directed.       Kishor Jackson DO Electronically signed at 10:06:33 AM, February 11, 2021

## 2021-02-11 NOTE — TELEPHONE ENCOUNTER
Study to be read within the next day or so. Once it has been read, I will call patient with results.

## 2021-02-12 ENCOUNTER — TELEPHONE (OUTPATIENT)
Dept: PULMONOLOGY | Age: 81
End: 2021-02-12

## 2021-02-12 DIAGNOSIS — Z99.89 OSA ON CPAP: Primary | ICD-10-CM

## 2021-02-12 DIAGNOSIS — I49.1 PREMATURE ATRIAL CONTRACTIONS: ICD-10-CM

## 2021-02-12 DIAGNOSIS — I49.3 PREMATURE VENTRICULAR CONTRACTIONS: ICD-10-CM

## 2021-02-12 DIAGNOSIS — G47.33 OSA ON CPAP: Primary | ICD-10-CM

## 2021-02-12 DIAGNOSIS — G47.33 OBSTRUCTIVE SLEEP APNEA SYNDROME: ICD-10-CM

## 2021-02-12 DIAGNOSIS — I45.9 HEART BLOCK: ICD-10-CM

## 2021-02-12 NOTE — TELEPHONE ENCOUNTER
Called and spoke with patient, explained that his sleep study had shown a few irregularities with his heart and Dr. Ra Leone would like for him to see a cardiologist. Patient verbalized understanding and states he will give them a call today.

## 2021-02-12 NOTE — PROGRESS NOTES
Order placed for cardiology referral, per Verbal Order from Dr. Adolfo Haley on 2/12/2021. Last office visit: 9/29/2020  Follow up Visit: 30-90 days post BiPAP setup    Provider is aware of last office visit and follow up. No further action requested from provider.

## 2021-02-13 PROBLEM — U07.1 PNEUMONIA DUE TO COVID-19 VIRUS: Status: RESOLVED | Noted: 2020-07-19 | Resolved: 2021-02-13

## 2021-02-13 PROBLEM — J12.82 PNEUMONIA DUE TO COVID-19 VIRUS: Status: RESOLVED | Noted: 2020-07-19 | Resolved: 2021-02-13

## 2021-02-13 RX ORDER — LIFITEGRAST 50 MG/ML
SOLUTION/ DROPS OPHTHALMIC
COMMUNITY
Start: 2021-01-23 | End: 2021-09-02 | Stop reason: ALTCHOICE

## 2021-02-13 NOTE — PROGRESS NOTES
HPI:   Gracia Mix is a 80y.o. year old male who presents today for a routine visit. He has a history of hypertension, hyperlipidemia, prediabetes, rheumatoid arthritis, osteoarthritis, calcium pyrophosphate deposition disease, BPH, GERD, and depression. He is also recovered from COVID-19 infection (2/2842) complicated by severe pneumonia and acute hypoxic respiratory failure. He was seen for an acute visit on 1/20/2021 complaining of  worsening arthritic pain involving his neck, bilateral shoulders, and bilateral hands. He also reported continuing chest wall pain with deep inspiration, but no significant dyspnea. He also reported that despite his best attempt, he was not able to tolerate the CPAP mask due to continued air hunger even with bleeding in of 2 liters nasal cannula. He stated that he was continuing to use the 2 liters of oxygen at night while sleeping, which seems to provide some benefit, but complained of continued daytime drowsiness and severe drowsiness while driving. He denied any chest pain or dyspnea with exertion. He also describes increased anxiety. He was restarted on Celebrex 200 mg and his dose of Zoloft was increased to 75 mg daily. He was also advised to contact pulmonary to schedule repeat sleep study as recommended by Dr. Ariana Maldonado. He returns today for follow-up. He reports that his joint pains have improved significantly since restarting Celebrex, and he reports that he is generally taking 200 mg daily with an occasional second dose if discomfort is severe. He states that his anxiety is improved since increasing Zoloft to 75 mg daily. He did contact pulmonary and underwent a repeat sleep study on 1/29/2021, but is awaiting results. He does report receiving his first dose of Moderna COVID 19 vaccine and is scheduled for his second dose. He is otherwise without new complaints. On 6/22/2020, he noted the onset of weakness, fatigue, and diarrhea.  He stated that he continued to work for the entire week despite feeling ill. On 6/27/2020, he developed fever and dyspnea, which worsened throughout the weekend. He presented to [facility] on 6/28/2020, and WBC 4.0 (78 % neutrophils) and chest x-ray showed patchy density in the RUL, right perihilar area, and right base. He was advised to go to the ED, and presented to [facility] ED on 6/29/2020. He was found to be hypoxic with pulse ox at rest 92-93 % and ambulation 89-91% (room air). Chest x-ray showed bilateral multifocal extremely subtle groundglass opacities. Labs showed WBC 4.4 (80% neutrophils), Hb 14.6/ Hct 42.0, platelets 942, creatinine 0.71/ eGFR >60, AST 53, alk phos 160, lactate 1.16. He was discharged home, and SARS COV-2 positive (6/29/2020) result returned. He presented for a virtual visit on 7/2/2020, and reported that since discharge from the ED, he noted persistent symptoms of weakness, diarrhea, fatigue, and fevers, and prgressive dyspnea. He described poor po intake, and chest pain with inspiration and felt that he was unable to take a deep breath or cough. He was advised to call EMS and return to the ED. Upon arrival by EMS, his oxygenation was noted to be in the low 80's, and required high flow oxygen. Chest x-ray (7/2/2020) showed bilateral increased patchy groundglass airspace opacities, and labs revealed ABG 7.43/34/70 on nonrebreather mask; WBC 7.3 (11% lymphocytes), Hb 14.8/ Hct 42.6, creatinine 0.8/ eGFR>60, ALT 79, AST 76, alk phos 170, albumin 2.7 D-dimer 1.35, ferritin 729, CRP 16.6, , procalcitonin 0.14, lactate 2.8, blood culture negative. He was initially started on Zosyn, but Dr. Braden Miller (ID) was consulted and recommended discontinuing and beginning azithromycin, remdesivir, IV Solumedrol, and lovenox. He was also started on ascorbic acid, melatonin, and zinc. Dr. Maximilian Casey (pulmonary) was consulted and recommended change to high flow nasal cannula and recommended proning to the patient.  He improved clinically and inflammatory indices improved. His oxygen was weaned to 5 liters nasal cannula, and he completed 5 day course of remdesivir and azithromycin. He was discharged on 7/9/2020 with an 8 day course of prednisone, 30 day course of Eliquis 2.5 mg bid, and two week course of Vitamin  C and zinc. He was seen for post-hospitalization follow-up on 7/16/2020 and reported some persistent congestion and significant dyspnea on exertion. He was referred to Dr. Joleen Isidro, and she recommended continued oxygen use as needed and stressed need for treatment of obstructive sleep apnea with CPAP. She placed order for him to receive auto CPAP. On 8/13/2019, he reported that he had been seeing Dr. Agueda Rosario for increasing difficulty with right sided sciatica. He reported being treated with a Medrol dose pack, physical therapy, and spinal injections without improvement, and was also prescribed Norco and Tramadol, but he stated that he found them too sedating and of no benefit. Due to persistent symptoms, he underwent a lumbar MRI (8/5/2019) which showed degenerative changes most notable at L4-L5 resulting in moderate spinal canal stenosis as well as moderate to severe right greater than left foraminal stenoses; advanced facet arthropathy with small facet effusions and suspected small right facet joint ventral synovial cyst; notable degenerative changes also L3-L4; L1 mild anterior compression deformity, chronic appearing; overall general worsening when compared to prior study in 2007. He was having continued significant pain, and was started on gabapentin 100 mg tid. He was referred to Dr. Analilia Aceves and she increased his dose of gabapentin to 200 mg tid. She also referred him to Dr. Ruslan Nichols for evaluation given his lack of response to conservative management. He recommended proceeding with decompression by performing a L4/5 right-sided hemilaminotomy and medial facetectomy, which was performed on 10/23/2019.  He developed postop urinary retention and was discharged with a Deleon catheter. He had follow-up with Dr. Gardner Shone on 10/29/2019 with successful voiding trial. He reports that he noted initial resolution of his right sciatica pain following surgery, but on 10/29/2019 noted abrupt recurrence when getting out of bed. He was evaluated by GOMEZ Doherty on 10/31/2019 and was treated with a Medrol dose pack without improvement. He was restarted on gabapentin 300 mg tid and reports improvement, although he continues to have mild pain in the morning. On 8/9/2019, he had an episode of waking up gasping for air forcing him to get out of bed and walk around until his breathing normalized. He has been having frequent similar episodes over that last year, but had been resistent to evaluation for sleep apnea. However, he reports that this episode was especially severe. When his symptoms persisted, he was evaluated at [facility], and testing included WBC 5.7, Hb 14.2/ Hct 41.6, creatinine 0.9/ eGFR>60, troponin I x 1 negative, NT-pro BNP 45, EKG sinus rhythm at 83 bpm and no ischemic changes, and chest x-ray without acute changes, but an ill defined 15 mm density in the lateral left mid zone was noted. He received lasix 40 mg IV and was discharged. He had an echocardiogram (8/26/2019) showing normal LV function (EF 56-60%), no RWMA, unable to estimate RVSP due to inadequate TR, but TV/PV appear normal. He was referred to Dr. Corinne Found for probable sleep apnea, and underwent a home sleep study on 10/25/2019, showing evidence of severe obstructive sleep apnea with AHI 35 and oxygen guy 80%. He has not been able to tolerate CPAP equipment as discussed. On 6/20/2018, he suffered an accidental gun shot wound while working resulting in traumatic injury to his left index finger with near loss of the middle phalanx and fracture of the distal phalanx. He was taken to [facility] and initially had irrigation and closure of the lacerations performed.  He presented to the [facility] ED on 6/24/2018 with increased pain and swelling, and was started on doxycycline for a wound infection. On 7/7/2018, due to loss of stability and angulation of the index finger, he underwent stabilization with fusion of the proximal and distal phalanx with bone grafting by Dr. Diogo Blanco. He did well and was started on physical therapy. On 8/1/2018, he presented to [address] [place] [address] for evaluation of increasing erythema, swelling and tenderness with concern for a possible infection. He underwent left hand x-ray (8/1/2018) showing severe soft tissue swelling of the left second finger worrisome for cellulitis, traumatic amputation of the middle phalanx with marked osteopenia and irregularity of the base of the distal phalanx and flattening and irregularity of the head of the proximal phalanx. Unclear if related to prior trauma/surgery or osteomyelitis with osseous destruction and no films available for comparison. He was started empirically on Bactrim and Augmentin for 14 days. On 8/10/2018, he presented for evaluation by GOMEZ Le for complaints of fever, malaise, headache, fatigue, and diarrhea. Lab evaluation showed WBC 17 (90% neutrophils), creatinine 1.34/ eGFR 52, blood culture negative. Stool cultures (8/13/2018) routine, O/P, and C.diff negative. Bactrim and Augmentin were discontinued on 8/13/2018, and he was started on metronidazole for 10 days for treatment of presumed C.diff with improvement. He was evaluated by Dr. Susannah Barry on 8/15/2018 and repeat WBC 8.6 (59% neutrophils), ESR 6, and CRP 0.9. He was seen by Dr. Susannah Barry in follow-up on 8/30/2018 and left index finger wound noted to be significantly improved and no further difficulty with diarrhea. He has a history of hypertension, treated with amlodipine, lisinopril, lasix (+ potassium), and hydralazine was added in 4/2018. He states that his wife has been monitoring his blood pressure intermittently.  He denies any chest pain, shortness of breath at rest or with exertion, lightheadedness, or palpitations. He does report bilateral lower extremity swelling that it will increase throughout the day and improve overnight. . In 6/2016, he underwent lower extremity arterial and venous duplex scans, both of which were negative. He also has a history of hyperlipidemia, treated with moderate intensity atorvastatin. He has a history of prediabetes, with HbA1c ranging from 5.9-6.2 since 2012. He denies any polyuria, polydipsia, nocturia, or blurry vision, and has no history of retinopathy, neuropathy, or nephropathy. He has regular eye exams with Dr. Ramya Chung. He has a history of bilateral hand pain with morning stiffness for several years, and in 3/2014, he was noted to have a positive anti-CCP antibody level although NIMCO, rheumatoid factor, and ESR were negative. He was referred for evaluation to Dr. Hua Ugalde, and was diagnosed with rheumatoid arthritis in 6/2014. He was treated with hydroxychloroquine, which has been partially helpful in controlling his symptoms. Bilateral hand x-rays also showed evidence of primary osteoarthritis with osteophytes present on the second and third MCP joints. In 1/2017, he was also noted to have evidence of possible chondrocalcinosis on x-ray, and was started on low dose colchicine in addition to hydroxychloroquine for concomitant calcium pyrophosphate deposition disease. He states that since starting on colchicine, he has had significant improvement in his hand pain. He also uses compounding cream and Voltaren gel for pain control. In 1/2019, he was complaining of worsening neck and bilateral shoulder pain (R>L). He stated that he was previously followed by Dr. Raphael Gonsales, and would occasionally receive cortisone injections into his shoulders. He also described neck pain with difficulty turning his head. He denied any upper extremity weakness or paresthesias.  He underwent imaging, and bilateral shoulder x-rays (1/10/2019) showed degenerative changes and secondary findings of rotator cuff pathology. Cervical spine x-rays (1/10/2019) showed advanced degenerative changes with multilevel facet arthropathy. He was evaluated by Dr. Zoila Arguelles who gave him a cortisone injection in his right shoulder with some improvement. He also recommended a reverse shoulder replacement, but he remains hesitant to proceed. He was started on Celebrex 200 mg bid in 2/2019, and reports significant improvement. He continues to also use Tylenol as needed for pain. He states that his neck pain seems to have improved to his baseline level. He has a history of symptomatic BPH, with complaints of decreased stream, hesitancy, and dribbling. He has refused treatment with medication. He is followed by Dr. Geremias Meek. He denies any dysuria, gross hematuria, or flank pain. He has a history of GERD, treated with daily omeprazole with good control of symptoms. He had a screening colonoscopy and 8/2015 by Dr. Dottie Pitt, showing a 3 mm sessile cecal polyp (pathology: intra-mucosal lymphoid aggregate), two 4 mm sessile polyps in the transverse colon (pathology: serrated adenomas), and a 1 cm lipoma in the transverse colon. Follow-up recommended for five years. He was having difficulty with rectal bleeding in 1/2018 and returned for evaluation with Dr. Dottie Pitt who felt it was most likely secondary to a hemorrhoidal source. She recommended use of Citrucel. He states that the bleeding has improved with initiation of this therapy. He denies any abdominal pain, nausea, vomiting, melena, or change in bowel movements. He has a history of depression and anxiety, which had been well controlled with Paxil although inadvertently stopped. Now on Zoloft with improvement. 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 Dr. Geremias Meek.    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 Dr. Emilee Gold. Past Surgical History:   Procedure Laterality Date    HX AMPUTATION FINGER Left     index    HX BACK SURGERY  10/23/2019    Right L4-5 hemilaminectomy, medial facetectomy, resection of synovial cyst    HX CARPAL TUNNEL RELEASE Bilateral     HX CATARACT REMOVAL Left 01/2018    HX CATARACT REMOVAL Bilateral 2018    HX COLONOSCOPY      HX HEENT      sinus, tonsillectomy    HX HERNIA REPAIR      HX MOHS PROCEDURES      bilateral     HX ORTHOPAEDIC      lef knee surgery, removed cartilage.  HX ROTATOR CUFF REPAIR Right      Current Outpatient Medications   Medication Sig    ergocalciferol (ERGOCALCIFEROL) 1,250 mcg (50,000 unit) capsule Take 1 Cap by mouth every seven (7) days. Indications: vitamin D deficiency (high dose therapy)    sertraline (ZOLOFT) 50 mg tablet Take 1.5 Tabs by mouth daily.  omeprazole (PRILOSEC) 20 mg capsule TAKE 1 CAPSULE BY MOUTH ONCE DAILY    celecoxib (CELEBREX) 200 mg capsule TAKE 1 CAPSULE BY MOUTH ONCE DAILY    amLODIPine (NORVASC) 10 mg tablet TAKE 1 TABLET BY MOUTH ONCE DAILY    hydrOXYchloroQUINE (PLAQUENIL) 200 mg tablet Take 400 mg by mouth daily.  Oxygen 2 Liters continuous [employer]    hydrALAZINE (APRESOLINE) 25 mg tablet TAKE 1 TABLET BY MOUTH 3 TIMES A DAY (Patient taking differently: Take 25 mg by mouth three (3) times daily.)    gabapentin (NEURONTIN) 300 mg capsule Take 1 Cap by mouth three (3) times daily. Max Daily Amount: 900 mg.  Indications: neuropathic pain (Patient taking differently: Take 300 mg by mouth daily. Indications: neuropathic pain)    tamsulosin (Flomax) 0.4 mg capsule Take 1 Cap by mouth daily. Indications: enlarged prostate with urination problem    atorvastatin (LIPITOR) 10 mg tablet TAKE 1 TABLET BY MOUTH ONCE DAILY    lisinopril (PRINIVIL, ZESTRIL) 40 mg tablet TAKE 1 TABLET BY MOUTH DAILY    mineral oil liquid Take 30 mL by mouth daily as needed for Constipation.  furosemide (LASIX) 40 mg tablet Take 1 Tab by mouth daily.  potassium chloride (K-DUR, KLOR-CON) 20 mEq tablet Take 1 Tab by mouth daily.  cycloSPORINE (RESTASIS) 0.05 % ophthalmic emulsion Administer 1 Drop to both eyes nightly.  colchicine (MITIGARE) 0.6 mg capsule Take 0.6 mg by mouth every other day.  cholecalciferol, vitamin D3, (VITAMIN D3) 2,000 unit tab Take 2,000 Units by mouth daily.  diclofenac (VOLTAREN) 1 % topical gel Apply 4 g to affected area four (4) times daily.  LUMIGAN 0.01 % ophthalmic drops Administer 1 Drop to both eyes nightly.  MULTIVITS/IRON FUM/FA/D3/LYCOP (MULTI FOR HIM PO) Take  by mouth daily.  Xiidra 5 % dpet      No current facility-administered medications for this visit. Allergies and Intolerances: Allergies   Allergen Reactions    Latex, Natural Rubber Swelling    Adhesive Tape-Silicones Rash and Itching    Aldactone [Spironolactone] Not Reported This Time     Breast tenderness    Codeine Not Reported This Time     \"Drives crazy\"    Tape [Adhesive] Rash    Tetanus Toxoid, Adsorbed Anaphylaxis    Tetanus Vaccines And Toxoid Anaphylaxis     Family History: He has no family history of colon or prostate cancer. Family History   Problem Relation Age of Onset    Cancer Mother     Heart Disease Father     Alcohol abuse Father     Cancer Other      Social History:   He  reports that he has quit smoking. His smoking use included cigars, pipe, and cigarettes. He has a 30.00 pack-year smoking history.  He has quit using smokeless tobacco.  His smokeless tobacco use included chew. He smoked 2 ppd for 50 years, stopping in 1974. He is  with two adult children. 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 He is a employed at [employer] in [place]. Social History     Substance and Sexual Activity   Alcohol Use Yes    Comment: rarely     Immunization History:  Immunization History   Administered Date(s) Administered    (RETIRED) Pneumococcal Vaccine (Unspecified Type) 01/01/2008    Covid-19, MODERNA, Mrna, Lnp-s, Pf, 100mcg/0.5mL 02/01/2021    Influenza High Dose Vaccine PF 09/30/2017    Influenza Vaccine ([place] Canine Kidney) PF 12/13/2017, 10/17/2018    Influenza Vaccine (Tri) Adjuvanted (>65 Yrs FLUAD TRI 31879) 09/25/2018, 09/25/2019    Influenza Vaccine (Trivalent) 10/19/2019    Influenza Vaccine Split 10/04/2011, 10/16/2012    Influenza Vaccine Whole 01/15/2010    Influenza, Quadrivalent, Adjuvanted (>65 Yrs FLUAD QUAD 48202) 09/10/2020    Pneumococcal Conjugate (PCV-13) 01/19/2015    Pneumococcal Polysaccharide (PPSV-23) 01/01/2008    Varicella Virus Vaccine 10/01/2013    Zoster 10/16/2012       Review of Systems:   As above included in HPI. Otherwise 11 point review of systems negative including constitutional, skin, HENT, eyes, respiratory, cardiovascular, gastrointestinal, genitourinary, musculoskeletal, endocrine, hematologic, allergy, and neurologic. Physical:   Vitals: none  BP: 134/77   HR: 72  WT: 209 lb (94.8 kg)  BMI:  31.78 kg/m2    Exam:   Patient appears in no apparent distress. Affect is appropriate. HEENT: PERRLA, anicteric, oropharynx clear, no JVD, adenopathy or thyromegaly. No carotid bruits or radiated murmur. Lungs: clear to auscultation, no wheezes, rhonchi, or rales. Heart: regular rate and rhythm.  No murmur, rubs, gallops  Abdomen: soft, nontender, nondistended, normal bowel sounds, no hepatosplenomegaly or masses. Extremities: 1+ edema bilaterally. Left wrist ganglion cyst present. Review of Data:  Labs:    Hospital Outpatient Visit on 02/02/2021   Component Date Value Ref Range Status    WBC 02/02/2021 6.9  4.6 - 13.2 K/uL Final    RBC 02/02/2021 4.10* 4.70 - 5.50 M/uL Final    HGB 02/02/2021 13.4  13.0 - 16.0 g/dL Final    HCT 02/02/2021 41.2  36.0 - 48.0 % Final    MCV 02/02/2021 100.5* 74.0 - 97.0 FL Final    MCH 02/02/2021 32.7  24.0 - 34.0 PG Final    MCHC 02/02/2021 32.5  31.0 - 37.0 g/dL Final    RDW 02/02/2021 14.2  11.6 - 14.5 % Final    PLATELET 52/72/7372 632  135 - 420 K/uL Final    MPV 02/02/2021 10.5  9.2 - 11.8 FL Final    NEUTROPHILS 02/02/2021 70  40 - 73 % Final    LYMPHOCYTES 02/02/2021 18* 21 - 52 % Final    MONOCYTES 02/02/2021 8  3 - 10 % Final    EOSINOPHILS 02/02/2021 4  0 - 5 % Final    BASOPHILS 02/02/2021 0  0 - 2 % Final    ABS. NEUTROPHILS 02/02/2021 4.8  1.8 - 8.0 K/UL Final    ABS. LYMPHOCYTES 02/02/2021 1.3  0.9 - 3.6 K/UL Final    ABS. MONOCYTES 02/02/2021 0.6  0.05 - 1.2 K/UL Final    ABS. EOSINOPHILS 02/02/2021 0.3  0.0 - 0.4 K/UL Final    ABS.  BASOPHILS 02/02/2021 0.0  0.0 - 0.1 K/UL Final    DF 02/02/2021 AUTOMATED    Final    Hemoglobin A1c 02/02/2021 5.4  4.2 - 5.6 % Final    Est. average glucose 02/02/2021 108  mg/dL Final    LIPID PROFILE 02/02/2021        Final    Cholesterol, total 02/02/2021 132  <200 MG/DL Final    Triglyceride 02/02/2021 32  <150 MG/DL Final    HDL Cholesterol 02/02/2021 68* 40 - 60 MG/DL Final    LDL, calculated 02/02/2021 57.6  0 - 100 MG/DL Final    VLDL, calculated 02/02/2021 6.4  MG/DL Final    CHOL/HDL Ratio 02/02/2021 1.9  0 - 5.0   Final    Magnesium 02/02/2021 2.0  1.6 - 2.6 mg/dL Final    Sodium 02/02/2021 146* 136 - 145 mmol/L Final    Potassium 02/02/2021 4.4  3.5 - 5.5 mmol/L Final    Chloride 02/02/2021 112* 100 - 111 mmol/L Final    CO2 02/02/2021 29  21 - 32 mmol/L Final    Anion gap 02/02/2021 5  3.0 - 18 mmol/L Final    Glucose 02/02/2021 112* 74 - 99 mg/dL Final    BUN 02/02/2021 14  7.0 - 18 MG/DL Final    Creatinine 02/02/2021 0.78  0.6 - 1.3 MG/DL Final    BUN/Creatinine ratio 02/02/2021 18  12 - 20   Final    GFR est AA 02/02/2021 >60  >60 ml/min/1.73m2 Final    GFR est non-AA 02/02/2021 >60  >60 ml/min/1.73m2 Final    Calcium 02/02/2021 8.8  8.5 - 10.1 MG/DL Final    Bilirubin, total 02/02/2021 0.5  0.2 - 1.0 MG/DL Final    ALT (SGPT) 02/02/2021 34  16 - 61 U/L Final    AST (SGOT) 02/02/2021 17  10 - 38 U/L Final    Alk. phosphatase 02/02/2021 112  45 - 117 U/L Final    Protein, total 02/02/2021 6.2* 6.4 - 8.2 g/dL Final    Albumin 02/02/2021 3.8  3.4 - 5.0 g/dL Final    Globulin 02/02/2021 2.4  2.0 - 4.0 g/dL Final    A-G Ratio 02/02/2021 1.6  0.8 - 1.7   Final    Microalbumin,urine random 02/02/2021 0.82  0 - 3.0 MG/DL Final    Creatinine, urine 02/02/2021 69.00  30 - 125 mg/dL Final    Microalbumin/Creat ratio (mg/g cre* 02/02/2021 12  0 - 30 mg/g Final    Vitamin D 25-Hydroxy 02/02/2021 27.3* 30 - 100 ng/mL Final    Lead, blood 02/02/2021 10* 0 - 4 ug/dL Final   Hospital Outpatient Visit on 01/25/2021   Component Date Value Ref Range Status    SARS-CoV-2 01/25/2021 Not Detected  Not Detected   Final     XR Results (most recent):  Results from Hospital Encounter encounter on 09/10/20   XR SPINE CERV PA LAT ODONT 3 V MAX    Narrative History: Neck pain. No trauma. TECHNIQUE: 4 views cervical spine. COMPARISON: January 2019    FINDINGS:    Prevertebral soft tissues within normal limits. Multilevel degenerative disc disease, uncovertebral joint hypertrophy and facet  arthropathy without gross interval change. No acute bone findings. C1-C2 relationship maintained. Visualized dens is intact. Right carotid atherosclerosis grossly unchanged. Visualized lung apices are  stable.       Impression IMPRESSION:    No acute findings or gross interval change. MRI can be obtained for further  clarification. Atherosclerosis. EKG (9/10/2020) sinus rhythm at 76 bpm, normal intervals, no ischemic changes; no significant change from prior in 2020 and 10/2019. Health Maintenance:  Screening:    Colorectal: colonoscopy (2015) serrated adenomas. Dr. Corinna Dooley. Due 2020. Depression: on Paxil   DM (HbA1c/FPG): / HbA1c 5.4 (2021)   Hepatitis C: N/A   Falls: none   DEXA: within normal limits (2016)   PSA/MARTÍNEZ: PSA 3.3 (2019)    Glaucoma: regular eye exams with Dr. Cisse/ Dr. Lynette Pineda (last 2020)   Smokin pack year.  Distant past.   Vitamin D: 27.3 (2021)   Medicare Wellness: 2020        Impression:  Patient Active Problem List   Diagnosis Code    BPH with obstruction/lower urinary tract symptoms N40.1, N13.8    Hypertension I10    Primary osteoarthritis involving multiple joints M89.49    Hyperlipidemia E78.5    GERD (gastroesophageal reflux disease) K21.9    Pre-diabetes R73.03    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 (HCC) M06.041, M06.042    Vitamin D deficiency E55.9    Colon polyps K63.5    CPDD (calcium pyrophosphate deposition disease) M11.20    Impaired fasting glucose R73.01    Class 1 obesity due to excess calories with serious comorbidity and body mass index (BMI) of 31.0 to 31.9 in adult E66.09, Z68.31    APC (atrial premature contractions) I49.1    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 COVID-19 infection with severe pneumonia and acute hypoxic respiratory failure (6/2020). Experienced symptoms of weakness, fatigue, diarrhea, fever and dyspnea. Presented to the [facility] ED on 6/29/2020 and found to be hypoxic with pulse ox at rest 92-93 % and ambulation 89-91% room air. Chest x-ray with bilateral multifocal extremely subtle groundglass opacities. Labs showed leukopenia, thrombocytopenia, and elevated AST/ alk phos. He was discharged home,and COVID-19 positive result returned the following day. He developed progressive worsening fever and dyspnea, chest pain with inspiration, diarrhea, and poor po intake. Returned to ED on 7/2/2020 and found to be hypoxic in the 80's requiring mask rebreather. Chest x-ray showed bilateral increased patchy groundglass airspace opacities and inflammatory markers were elevated. He was treated with high flow nasal cannula, azithromycin, remdesivir, Solumedrol, Lovenox, vitamin C and zinc. He gradually improved and oxygen weaned to 5 liter nasal cannula, and he was discharged with home oxygen, prednisone taper, and Eliquis 2.5 mg bid for 30 days (completed 8/9/2020). He had a repeat COVID-19 test on 7/29/2020 which was negative. Reports persistent chest wall pain with inhalation, and repeat chest x-ray (9/2020) with persistent very mild peribronchial thickening, SpO2 97% today on RA. No other persistent symptoms of COVID. Will continue to monitor. 2. Obstructive sleep apnea, severe. Patient reported in 1/2019 awakening gasping for air several times per week. No overt evidence of heart failure and EKG was without change from baseline at that time. He reported that he would need to sit up immediately and take deep breathes until resolved.  He also admitted to snoring and experiencing significant daytime drowsiness particularly when driving. Severe episode on 8/9/2019 prompted ED evaluation. EKG without change, troponin negative and NT-pro BNP normal. Echocardiogram (8/26/2019) with normal LV size and function (EF 56-60%), no RWMA, normal valves. Evaluated by Dr. Prabhu Koenig and completed home sleep study and diagnosed with severe JANNET. Started on auto CPAP and supplemented with 2 liters of oxygen. However, despite best efforts, patient continued to describe significant difficulty using due to dyspnea and air hunger, and stated he returned equipment. Advised by Dr. Piyush Mckinney to undergo in-patient sleep evaluation to assess best treatment, and now completed on 1/29/2021. Advised patient to contact pulmonary for results and recommendations regarding treatment. 3. Hypertension. Blood pressure remains well controlled on current regimen of lisinopril 40 mg daily, amlodipine 10 mg daily, lasix 40 mg daily (potassium 20 meq daily) and hydralazine 25 mg bid. Renal function has been normal with creatinine 0.78/ eGFR >60. Repeat echocardiogram obtained (9/2020) to address dyspnea and worsening lower extremity edema post-COVID infection. Showing no change from 8/2019 with EF 55-60% and mild MR. EKG (9/2020) without change. Prescribed compression hose, but finding difficult to wear. Edema improves with elevation. Continue to follow. 4. Hyperlipidemia. On moderate intensity dose atorvastatin with LDL 57 and HDL 68, indicative of excellent control in this patient. Continue to follow. 5. Rheumatoid arthritis. On hydroxychloroquine. Has regular eye exams with Dr. Gaudencio Isaacs. Difficult to gauge response as appears to have evidence of osteoarthritis and CPPD occurring concurrently, but noted significant improvement since initiating colchicine. Voltaren gel, Celebrex, and Tylenol for pain control as needed. Followed by Dr. Bobo Dean. 6. Primary osteoarthritis. Evident in bilateral hands and knees, bilateral shoulders, and cervical spine. Shoulder pain (R>L).  X-ray with evidence of osteoarthritis and rotator cuff pathology. Evaluated by Dr. Jodie Rivas and received cortisone injection to right shoulder with some improvement. Surgery for reverse shoulder replacement recommended. Changed from ibuprofen 400 mg qid and Tylenol to Celebrex 200 mg qd with significant improvement. However, held while being treated with Eliquis post-hospitalization, and patient states that no longer taking since did not restart. Reported increasing neck and shoulder pain at last visit in 9/2020, and cervical spine x-ray (9/2020) showed multilevel degenerative disc disease and facet arthropathy without interval change. Did not wish to proceed with physical therapy although had found it to be helpful in the past. Reported a significant increase in overall osteoarthritic pain in 1/2021 in hands, wrists, shoulders and neck, and advised to resume Celebrex 200 mg. Noting significant improvement today and taking daily with Tylenol. Will take an occasional second dose of Celebrex as needed. Being followed Dr. Daniel Nagel. 7. CPPD. Colchicine started on 1/19/2017 to help address chondrocalcinosis on x-rays. Reports significant improvement. Now taking every other day with good control. 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 Unresponsive to Medrol dose pack, physical therapy, steroid injections, and unwilling to take narcotics as not effective. Lumbar MRI with multilevel degenerative changes and facet arthropathy with R>L facet stenosis at L4-L5 likely responsible for symptoms. Had been following with Dr. Diania Cooks, but given lack of improvement, started on gabapentin 100 mg tid and referred to Dr. John Herrera for evaluation. She recommended increasing gabapentin to 200 mg tid, and given his lack of response to conservative measures, referred to Dr. Chanda Malik. He underwent decompression with L4/5 right-sided hemilaminotomy and medial facetectomy on 10/23/2019.  Developed urinary retention post-op, but successfully passed voiding trial and has had no further difficulties. He developed recurrence of pain on post op day 6, and treated with Medrol dose pack. Remains on gabapentin 300 mg bid and reports reasonable control of pain. Being followed by GOMEZ Venegas. 9. Prediabetes. Controlled on diet alone with HbA1c normal at 5.4 today. Required sliding scale insulin dosing when hospitalized due to COVID 19 due to elevated blood sugar, likely related to high dose steroids. No evidence of microvascular complications. On Ace-I and statin. Continue follow-up with Dr. Jessica Sanchez for annual eye exams. Emphasized importance of lifestyle modifications, including diet, exercise, and weight loss. 10. Depression. Prior reasonable control with Paxil and weaned from benzodiazepine use for anxiety and insomnia. After inadvertently stopping Paxil, started on Zoloft and dose titrated to 75 mg daily with good control. However, reported worsening symptoms in 1/2021 and noted to have inadvertently decreased dose to 50 mg daily. Advised to increase to 75 mg daily and reports improvement today. Will continue to monitor. 11. Rectal bleeding. Colonoscopy 8/2015. Evaluated by Dr. Lindajean Denver and considered to be hemorrhoidal in source. Known internal and external hemorrhoids. Improved with Citrucel. Due for routine colonoscopy in 8/2020. Will refer at next visit. 12. BPH. Does have lower urinary tract symptoms. Developed postop urinary retention and now resolved. On Flomax. Followed previously by Dr. Liz Jones. Not wishing to establish with new provider unless problem emerges. 13. GERD. Good control of symptoms with omeprazole. No issues today. 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 Managing well and no further issues. 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 Level stable today at 10. Monitored annually (due 2/2022). 16. Left wrist ganglion cyst. Previously evaluated by Dr. Vivek Morel and aspirated.  Recurred and reports now larger. Requesting referral to another orthopedist. Will refer to Dr. Richa Dalal. 17. Insomnia. Weaned from Xanax. Had been using melatonin agonist, ramelteon, to replace Xanax, but no longer taking it either. Appears to be managing without medication. 18. Obesity. Lost 25 pounds during COVID 19 illness, but now returned to near baseline. Emphasized importance of healthy eating and improved nutrition. Will readdress at next visit. 19. Health maintenance. Already received influenza vaccine. Unable to receive Tdap due to allergy (anaphylaxis to Td). Will address Shingrix vaccine at next visit. Received 1/2 doses of Moderna COVID 19 vaccine. Other immunizations up to date. Colonoscopy due 8/2020, but wishing to defer for now. Will readdress at next visit. Continue regular eye exams with Dr. Justo Philippe. Vitamin D level remains low, and will treat with ergocalciferol 50,000 U weekly for 12 weeks. Aspirin discontinued given new recommendations regarding primary prevention. Medicare wellness visit due and will perform at next visit. Patient understands recommendations and agrees with plan. Follow-up in 3 months.

## 2021-02-15 ENCOUNTER — OFFICE VISIT (OUTPATIENT)
Dept: CARDIOLOGY CLINIC | Age: 81
End: 2021-02-15
Payer: MEDICARE

## 2021-02-15 VITALS
DIASTOLIC BLOOD PRESSURE: 58 MMHG | OXYGEN SATURATION: 97 % | TEMPERATURE: 97.7 F | HEART RATE: 71 BPM | HEIGHT: 68 IN | BODY MASS INDEX: 31.83 KG/M2 | SYSTOLIC BLOOD PRESSURE: 121 MMHG | WEIGHT: 210 LBS

## 2021-02-15 DIAGNOSIS — I70.90 ATHEROSCLEROSIS: ICD-10-CM

## 2021-02-15 DIAGNOSIS — G47.30 SLEEP APNEA, UNSPECIFIED TYPE: ICD-10-CM

## 2021-02-15 DIAGNOSIS — U07.1 COVID-19 VIRUS INFECTION: ICD-10-CM

## 2021-02-15 DIAGNOSIS — R06.09 DYSPNEA ON EXERTION: Primary | ICD-10-CM

## 2021-02-15 DIAGNOSIS — M06.9 RHEUMATOID ARTHRITIS, INVOLVING UNSPECIFIED SITE, UNSPECIFIED WHETHER RHEUMATOID FACTOR PRESENT (HCC): ICD-10-CM

## 2021-02-15 DIAGNOSIS — R60.0 BILATERAL LOWER EXTREMITY EDEMA: ICD-10-CM

## 2021-02-15 PROCEDURE — G8536 NO DOC ELDER MAL SCRN: HCPCS | Performed by: INTERNAL MEDICINE

## 2021-02-15 PROCEDURE — 1101F PT FALLS ASSESS-DOCD LE1/YR: CPT | Performed by: INTERNAL MEDICINE

## 2021-02-15 PROCEDURE — G8427 DOCREV CUR MEDS BY ELIG CLIN: HCPCS | Performed by: INTERNAL MEDICINE

## 2021-02-15 PROCEDURE — 99204 OFFICE O/P NEW MOD 45 MIN: CPT | Performed by: INTERNAL MEDICINE

## 2021-02-15 PROCEDURE — G9717 DOC PT DX DEP/BP F/U NT REQ: HCPCS | Performed by: INTERNAL MEDICINE

## 2021-02-15 PROCEDURE — G8752 SYS BP LESS 140: HCPCS | Performed by: INTERNAL MEDICINE

## 2021-02-15 PROCEDURE — G8754 DIAS BP LESS 90: HCPCS | Performed by: INTERNAL MEDICINE

## 2021-02-15 PROCEDURE — G8417 CALC BMI ABV UP PARAM F/U: HCPCS | Performed by: INTERNAL MEDICINE

## 2021-02-15 RX ORDER — METOLAZONE 5 MG/1
5 TABLET ORAL DAILY
Qty: 30 TAB | Refills: 3 | Status: SHIPPED | OUTPATIENT
Start: 2021-02-15 | End: 2021-04-10 | Stop reason: SDDI

## 2021-02-15 RX ORDER — TORSEMIDE 20 MG/1
20 TABLET ORAL 2 TIMES DAILY
Qty: 60 TAB | Refills: 3 | Status: SHIPPED | OUTPATIENT
Start: 2021-02-15 | End: 2021-08-18

## 2021-02-15 NOTE — PROGRESS NOTES
HISTORY OF PRESENT ILLNESS  Musa Pantoja is a 80 y.o. male. 2/15/2021  Patient is in today for new patient evaluation he is referred here for evaluation of shortness of breath and edema. Patient has had COVID-19 infection a few months ago since then he has been having shortness of breath feels like he is unable to take deep breath. Has been followed by pulmonary. He is seeing increased edema that is on and off. Denies any chest pain. Denies any orthopnea PND. He has no known cardiac history but arteriosclerosis was reported on CAT scan done a few years ago      Review of Systems   Constitutional: Negative for chills and fever. HENT: Negative for nosebleeds. Eyes: Negative for blurred vision and double vision. Respiratory: Positive for shortness of breath. Negative for cough, hemoptysis, sputum production and wheezing. Cardiovascular: Negative for chest pain, palpitations, orthopnea, claudication, leg swelling and PND. Gastrointestinal: Negative for abdominal pain, heartburn, nausea and vomiting. Musculoskeletal: Negative for myalgias. Skin: Negative for rash. Neurological: Negative for dizziness, weakness and headaches. Endo/Heme/Allergies: Does not bruise/bleed easily. 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 Dr. Charla Gonzalez.    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 Dr. William Dash. Past Surgical History:   Procedure Laterality Date    HX AMPUTATION FINGER Left     index    HX BACK SURGERY  10/23/2019    Right L4-5 hemilaminectomy, medial facetectomy, resection of synovial cyst    HX CARPAL TUNNEL RELEASE Bilateral     HX CATARACT REMOVAL Left 01/2018    HX CATARACT REMOVAL Bilateral 2018    HX COLONOSCOPY      HX HEENT      sinus, tonsillectomy    HX HERNIA REPAIR      HX MOHS PROCEDURES      bilateral     HX ORTHOPAEDIC      lef knee surgery, removed cartilage.  HX ROTATOR CUFF REPAIR Right        Social History     Tobacco Use    Smoking status: Former Smoker     Packs/day: 2.00     Years: 15.00     Pack years: 30.00     Types: Cigars, Pipe, Cigarettes    Smokeless tobacco: Former User     Types: Chew   Substance Use Topics    Alcohol use: Yes     Comment: rarely       Allergies   Allergen Reactions    Latex, Natural Rubber Swelling    Adhesive Tape-Silicones Rash and Itching    Aldactone [Spironolactone] Not Reported This Time     Breast tenderness    Codeine Not Reported This Time     \"Drives crazy\"    Tape [Adhesive] Rash    Tetanus Toxoid, Adsorbed Anaphylaxis    Tetanus Vaccines And Toxoid Anaphylaxis       Prior to Admission medications    Medication Sig Start Date End Date Taking? Authorizing Provider   torsemide (DEMADEX) 20 mg tablet Take 1 Tab by mouth two (2) times a day. 2/15/21  Yes Carlos Alberto Yoon MD   metOLazone (ZAROXOLYN) 5 mg tablet Take 1 Tab by mouth daily. 2/15/21  Yes Carlos Alberto Yoon MD   Xiidra 5 % dpet  1/23/21  Yes Provider, Historical   ergocalciferol (ERGOCALCIFEROL) 1,250 mcg (50,000 unit) capsule Take 1 Cap by mouth every seven (7) days. Indications: vitamin D deficiency (high dose therapy) 2/9/21  Yes Santosh Hill MD   sertraline (ZOLOFT) 50 mg tablet Take 1.5 Tabs by mouth daily.  1/12/21  Yes Pepper Stokes MD   omeprazole (PRILOSEC) 20 mg capsule TAKE 1 CAPSULE BY MOUTH ONCE DAILY 1/9/21  Yes Pepper Stokes MD   celecoxib (CELEBREX) 200 mg capsule TAKE 1 CAPSULE BY MOUTH ONCE DAILY 11/3/20  Yes Pepper Stokes MD   amLODIPine (NORVASC) 10 mg tablet TAKE 1 TABLET BY MOUTH ONCE DAILY 10/19/20  Yes Pepper Stokes MD   hydrOXYchloroQUINE (PLAQUENIL) 200 mg tablet Take 400 mg by mouth daily. 7/16/20  Yes Provider, Historical   Oxygen 2 Liters continuous [facility]   Yes Provider, Historical   hydrALAZINE (APRESOLINE) 25 mg tablet TAKE 1 TABLET BY MOUTH 3 TIMES A DAY  Patient taking differently: Take 25 mg by mouth three (3) times daily. 7/18/20  Yes Pepper Stokes MD   gabapentin (NEURONTIN) 300 mg capsule Take 1 Cap by mouth three (3) times daily. Max Daily Amount: 900 mg. Indications: neuropathic pain  Patient taking differently: Take 300 mg by mouth daily. Indications: neuropathic pain 7/1/20  Yes Anamika Venegas NP   tamsulosin (Flomax) 0.4 mg capsule Take 1 Cap by mouth daily. Indications: enlarged prostate with urination problem 6/26/20  Yes ePpper Stokes MD   atorvastatin (LIPITOR) 10 mg tablet TAKE 1 TABLET BY MOUTH ONCE DAILY 4/24/20  Yes Pepper Stokes MD   lisinopril (PRINIVIL, ZESTRIL) 40 mg tablet TAKE 1 TABLET BY MOUTH DAILY 2/17/20  Yes Pepper Stokes MD   mineral oil liquid Take 30 mL by mouth daily as needed for Constipation. Yes Provider, Historical   potassium chloride (K-DUR, KLOR-CON) 20 mEq tablet Take 1 Tab by mouth daily. 8/10/17  Yes Pepper Stokes MD   cycloSPORINE (RESTASIS) 0.05 % ophthalmic emulsion Administer 1 Drop to both eyes nightly. Yes Provider, Historical   colchicine (MITIGARE) 0.6 mg capsule Take 0.6 mg by mouth every other day. Yes Provider, Historical   cholecalciferol, vitamin D3, (VITAMIN D3) 2,000 unit tab Take 2,000 Units by mouth daily.    Yes Provider, Historical   diclofenac (VOLTAREN) 1 % topical gel Apply 4 g to affected area four (4) times daily. Yes Provider, Historical   LUMIGAN 0.01 % ophthalmic drops Administer 1 Drop to both eyes nightly. 5/19/14  Yes Provider, Historical   MULTIVITS/IRON FUM/FA/D3/LYCOP (MULTI FOR HIM PO) Take  by mouth daily. Yes Provider, Historical         Visit Vitals  BP (!) 121/58 (BP 1 Location: Left upper arm, BP Patient Position: Sitting, BP Cuff Size: Large adult)   Pulse 71   Temp 97.7 °F (36.5 °C) (Temporal)   Ht 5' 8\" (1.727 m)   Wt 95.3 kg (210 lb)   SpO2 97%   BMI 31.93 kg/m²         Physical Exam   Constitutional: He is oriented to person, place, and time. He appears well-developed and well-nourished. HENT:   Head: Normocephalic and atraumatic. Eyes: Conjunctivae are normal.   Neck: Neck supple. No JVD present. No tracheal deviation present. No thyromegaly present. Cardiovascular: Normal rate, regular rhythm and normal heart sounds. Exam reveals no gallop and no friction rub. No murmur heard. Pulmonary/Chest: Breath sounds normal. No respiratory distress. He has no wheezes. He has no rales. He exhibits no tenderness. Abdominal: Soft. There is no abdominal tenderness. Musculoskeletal:         General: Edema present. Neurological: He is alert and oriented to person, place, and time. Skin: Skin is warm and dry. Psychiatric: He has a normal mood and affect. Mr. Leta Velázquez has a reminder for a \"due or due soon\" health maintenance. I have asked that he contact his primary care provider for follow-up on this health maintenance. No flowsheet data found. I have personally reviewed patient's records available from   hospital and other providers and incorporated findings in patient care. Notes, labs, CT scan, chest x-ray, EKG, echo      Chest x-ray9/2020  Very mild peribronchial thickening, similar to prior exam. No new focal  consolidation. Additional findings as discussed.   Interpretation Summary 2018    · Left Ventricle: Normal cavity size, wall thickness, systolic function (ejection fraction normal) and diastolic function. Estimated left ventricular ejection fraction is 56 - 60%. Visually measured ejection fraction. No regional wall motion abnormality noted. · Tricuspid regurgitation is inadequate for estimation of right ventricular systolic pressure. Interpretation Summary 9/2020    · LV: Estimated LVEF is 55 - 60%. Visually measured ejection fraction. Normal cavity size, wall thickness and systolic function (ejection fraction normal). Wall motion: normal. Inconclusive left ventricular diastolic function. · MV: Mild mitral valve regurgitation is present. Assessment         ICD-10-CM ICD-9-CM    1. Dyspnea on exertion  P47.32 990.72 METABOLIC PANEL, BASIC    Multifactorial.  Recovering from Covid. Follow-up echo for LV function. 2. COVID-19 virus infection  U07.1 079.89     Recovering. Last chest x-ray with increased marking in September 2020   3. Bilateral lower extremity edema  U44.2 355.6 METABOLIC PANEL, BASIC    Recently worse. Adjust medication as needed. 4. Sleep apnea, unspecified type  G47.30 780.57     On treatment monitor   5. Rheumatoid arthritis, involving unspecified site, unspecified whether rheumatoid factor present ([facility]  M06.9 714.0     On treatment continue monitoring   6. Atherosclerosis  I70.90 440.9     Reported on CT scan in past.  Continue current treatment   2/2021  Seen for increased shortness of breath and edema. Significant edema of feet. Change diuretic to Demadex and metolazone follow BMP check echo and nuclear stress test    Medications Discontinued During This Encounter   Medication Reason    furosemide (LASIX) 40 mg tablet Alternate Therapy       Orders Placed This Encounter    METABOLIC PANEL, BASIC     Standing Status:   Future     Standing Expiration Date:   3/17/2021    torsemide (DEMADEX) 20 mg tablet     Sig: Take 1 Tab by mouth two (2) times a day.      Dispense:  60 Tab     Refill:  3    metOLazone (ZAROXOLYN) 5 mg tablet     Sig: Take 1 Tab by mouth daily.      Dispense:  30 Tab     Refill:  3

## 2021-03-08 RX ORDER — LISINOPRIL 40 MG/1
TABLET ORAL
Qty: 90 TAB | Refills: 3 | Status: SHIPPED | OUTPATIENT
Start: 2021-03-08 | End: 2022-03-21

## 2021-03-16 DIAGNOSIS — R06.02 SOB (SHORTNESS OF BREATH): Primary | ICD-10-CM

## 2021-03-16 DIAGNOSIS — Z01.818 PREPROCEDURAL EXAMINATION: ICD-10-CM

## 2021-03-17 ENCOUNTER — TELEPHONE (OUTPATIENT)
Dept: CARDIOLOGY CLINIC | Age: 81
End: 2021-03-17

## 2021-03-17 DIAGNOSIS — I44.1 MOBITZ TYPE 2 SECOND DEGREE ATRIOVENTRICULAR BLOCK: Primary | ICD-10-CM

## 2021-03-17 NOTE — TELEPHONE ENCOUNTER
----- Message from Kip Romero MD sent at 3/16/2021  3:00 PM EDT -----  This patient had a sleep study and was noted to have frequent PVC's, Mobitz 2 block. Can you please evaluate/address. WE were waiting to get him appropriate PAP machine . Thanks.

## 2021-03-19 ENCOUNTER — OFFICE VISIT (OUTPATIENT)
Dept: CARDIOLOGY CLINIC | Age: 81
End: 2021-03-19
Payer: MEDICARE

## 2021-03-19 VITALS
BODY MASS INDEX: 31.22 KG/M2 | SYSTOLIC BLOOD PRESSURE: 83 MMHG | TEMPERATURE: 97.2 F | HEART RATE: 86 BPM | HEIGHT: 68 IN | DIASTOLIC BLOOD PRESSURE: 49 MMHG | OXYGEN SATURATION: 99 % | WEIGHT: 206 LBS

## 2021-03-19 DIAGNOSIS — I70.90 ATHEROSCLEROSIS: ICD-10-CM

## 2021-03-19 DIAGNOSIS — R06.09 DYSPNEA ON EXERTION: Primary | ICD-10-CM

## 2021-03-19 DIAGNOSIS — U07.1 COVID-19 VIRUS INFECTION: ICD-10-CM

## 2021-03-19 DIAGNOSIS — I44.1 SECOND DEGREE HEART BLOCK: ICD-10-CM

## 2021-03-19 DIAGNOSIS — R60.0 BILATERAL LOWER EXTREMITY EDEMA: ICD-10-CM

## 2021-03-19 PROCEDURE — G8754 DIAS BP LESS 90: HCPCS | Performed by: INTERNAL MEDICINE

## 2021-03-19 PROCEDURE — G9717 DOC PT DX DEP/BP F/U NT REQ: HCPCS | Performed by: INTERNAL MEDICINE

## 2021-03-19 PROCEDURE — 99214 OFFICE O/P EST MOD 30 MIN: CPT | Performed by: INTERNAL MEDICINE

## 2021-03-19 PROCEDURE — G8427 DOCREV CUR MEDS BY ELIG CLIN: HCPCS | Performed by: INTERNAL MEDICINE

## 2021-03-19 PROCEDURE — G8417 CALC BMI ABV UP PARAM F/U: HCPCS | Performed by: INTERNAL MEDICINE

## 2021-03-19 PROCEDURE — G8536 NO DOC ELDER MAL SCRN: HCPCS | Performed by: INTERNAL MEDICINE

## 2021-03-19 PROCEDURE — G8752 SYS BP LESS 140: HCPCS | Performed by: INTERNAL MEDICINE

## 2021-03-19 PROCEDURE — 1101F PT FALLS ASSESS-DOCD LE1/YR: CPT | Performed by: INTERNAL MEDICINE

## 2021-03-19 NOTE — PATIENT INSTRUCTIONS
Patient instruction Stop hydralazine Monitor blood pressure regularly. If blood pressure is less than 787 systolic do not take amlodipine. Between 866 systolic to 991 systolic take half a pill and if more than 617 systolic take full pill

## 2021-03-19 NOTE — PROGRESS NOTES
HISTORY OF PRESENT ILLNESS  Erick West is a 81 y.o. male.    2/15/2021  Patient is in today for new patient evaluation he is referred here for evaluation of shortness of breath and edema.  Patient has had COVID-19 infection a few months ago since then he has been having shortness of breath feels like he is unable to take deep breath.  Has been followed by pulmonary.  He is seeing increased edema that is on and off.  Denies any chest pain.  Denies any orthopnea PND.  He has no known cardiac history but arteriosclerosis was reported on CAT scan done a few years ago  3/2021  Patient seen for follow-up.  Diagnostic testing results will be discussed      Review of Systems   Constitutional: Negative for chills and fever.   HENT: Negative for nosebleeds.    Eyes: Negative for blurred vision and double vision.   Respiratory: Positive for shortness of breath. Negative for cough, hemoptysis, sputum production and wheezing.    Cardiovascular: Negative for chest pain, palpitations, orthopnea, claudication, leg swelling and PND.   Gastrointestinal: Negative for abdominal pain, heartburn, nausea and vomiting.   Musculoskeletal: Negative for myalgias.   Skin: Negative for rash.   Neurological: Negative for dizziness, weakness and headaches.   Endo/Heme/Allergies: Does not bruise/bleed easily.     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 Dr. Del Cid.   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 Dr. Merry Stewart. Past Surgical History:   Procedure Laterality Date    HX AMPUTATION FINGER Left     index    HX BACK SURGERY  10/23/2019    Right L4-5 hemilaminectomy, medial facetectomy, resection of synovial cyst    HX CARPAL TUNNEL RELEASE Bilateral     HX CATARACT REMOVAL Left 01/2018    HX CATARACT REMOVAL Bilateral 2018    HX COLONOSCOPY      HX HEENT      sinus, tonsillectomy    HX HERNIA REPAIR      HX MOHS PROCEDURES      bilateral     HX ORTHOPAEDIC      lef knee surgery, removed cartilage.  HX ROTATOR CUFF REPAIR Right        Social History     Tobacco Use    Smoking status: Former Smoker     Packs/day: 2.00     Years: 15.00     Pack years: 30.00     Types: Cigars, Pipe, Cigarettes    Smokeless tobacco: Former User     Types: Chew   Substance Use Topics    Alcohol use: Yes     Comment: rarely       Allergies   Allergen Reactions    Latex, Natural Rubber Swelling    Adhesive Tape-Silicones Rash and Itching    Aldactone [Spironolactone] Not Reported This Time     Breast tenderness    Codeine Not Reported This Time     \"Drives crazy\"    Tape [Adhesive] Rash    Tetanus Toxoid, Adsorbed Anaphylaxis    Tetanus Vaccines And Toxoid Anaphylaxis       Prior to Admission medications    Medication Sig Start Date End Date Taking? Authorizing Provider   lisinopriL (PRINIVIL, ZESTRIL) 40 mg tablet TAKE 1 TABLET BY MOUTH ONCE DAILY 3/8/21   Tien Fernández MD   torsemide [facility]) 20 mg tablet Take 1 Tab by mouth two (2) times a day. 2/15/21   Amber Camp MD   metOLazone (ZAROXOLYN) 5 mg tablet Take 1 Tab by mouth daily.  2/15/21   MD Hernandez Mojica 5 % dpet  1/23/21   Provider, Historical   ergocalciferol (ERGOCALCIFEROL) 1,250 mcg (50,000 unit) capsule Take 1 Cap by mouth every seven (7) days. Indications: vitamin D deficiency (high dose therapy) 2/9/21   Itzel Day MD   sertraline (ZOLOFT) 50 mg tablet Take 1.5 Tabs by mouth daily. 1/12/21   Itzel Day MD   omeprazole (PRILOSEC) 20 mg capsule TAKE 1 CAPSULE BY MOUTH ONCE DAILY 1/9/21   Itzel Day MD   celecoxib (CELEBREX) 200 mg capsule TAKE 1 CAPSULE BY MOUTH ONCE DAILY 11/3/20   Itzel Day MD   amLODIPine (NORVASC) 10 mg tablet TAKE 1 TABLET BY MOUTH ONCE DAILY 10/19/20   Itzel Day MD   hydrOXYchloroQUINE (PLAQUENIL) 200 mg tablet Take 400 mg by mouth daily. 7/16/20   Provider, Historical   Oxygen 2 Liters continuous [facility]    Provider, Historical   hydrALAZINE (APRESOLINE) 25 mg tablet TAKE 1 TABLET BY MOUTH 3 TIMES A DAY  Patient taking differently: Take 25 mg by mouth three (3) times daily. 7/18/20   Itzel Day MD   gabapentin (NEURONTIN) 300 mg capsule Take 1 Cap by mouth three (3) times daily. Max Daily Amount: 900 mg. Indications: neuropathic pain  Patient taking differently: Take 300 mg by mouth daily. Indications: neuropathic pain 7/1/20   Chichi TOPETE NP   tamsulosin (Flomax) 0.4 mg capsule Take 1 Cap by mouth daily. Indications: enlarged prostate with urination problem 6/26/20   Itzel Day MD   atorvastatin (LIPITOR) 10 mg tablet TAKE 1 TABLET BY MOUTH ONCE DAILY 4/24/20   Itzel Day MD   mineral oil liquid Take 30 mL by mouth daily as needed for Constipation. Provider, Historical   potassium chloride (K-DUR, KLOR-CON) 20 mEq tablet Take 1 Tab by mouth daily. 8/10/17   Itzel Day MD   cycloSPORINE (RESTASIS) 0.05 % ophthalmic emulsion Administer 1 Drop to both eyes nightly. Provider, Historical   colchicine (MITIGARE) 0.6 mg capsule Take 0.6 mg by mouth every other day. Provider, Historical   cholecalciferol, vitamin D3, (VITAMIN D3) 2,000 unit tab Take 2,000 Units by mouth daily.     Provider, Historical   diclofenac (VOLTAREN) 1 % topical gel Apply 4 g to affected area four (4) times daily. Provider, Historical   LUMIGAN 0.01 % ophthalmic drops Administer 1 Drop to both eyes nightly. 5/19/14   Provider, Historical   MULTIVITS/IRON FUM/FA/D3/LYCOP (MULTI FOR HIM PO) Take  by mouth daily. Provider, Historical         There were no vitals taken for this visit. Physical Exam   Constitutional: He is oriented to person, place, and time. He appears well-developed and well-nourished. HENT:   Head: Normocephalic and atraumatic. Eyes: Conjunctivae are normal.   Neck: Neck supple. No JVD present. No tracheal deviation present. No thyromegaly present. Cardiovascular: Normal rate, regular rhythm and normal heart sounds. Exam reveals no gallop and no friction rub. No murmur heard. Pulmonary/Chest: Breath sounds normal. No respiratory distress. He has no wheezes. He has no rales. He exhibits no tenderness. Abdominal: Soft. There is no abdominal tenderness. Musculoskeletal:         General: Edema present. Neurological: He is alert and oriented to person, place, and time. Skin: Skin is warm and dry. Psychiatric: He has a normal mood and affect. Mr. La Soni has a reminder for a \"due or due soon\" health maintenance. I have asked that he contact his primary care provider for follow-up on this health maintenance. No flowsheet data found. I have personally reviewed patient's records available from   hospital and other providers and incorporated findings in patient care. Notes, labs, CT scan, chest x-ray, EKG, echo      Chest x-ray9/2020  Very mild peribronchial thickening, similar to prior exam. No new focal  consolidation. Additional findings as discussed. Interpretation Summary 2018    · Left Ventricle: Normal cavity size, wall thickness, systolic function (ejection fraction normal) and diastolic function. Estimated left ventricular ejection fraction is 56 - 60%. Visually measured ejection fraction.  No regional wall motion abnormality noted. · Tricuspid regurgitation is inadequate for estimation of right ventricular systolic pressure. Interpretation Summary 9/2020    · LV: Estimated LVEF is 55 - 60%. Visually measured ejection fraction. Normal cavity size, wall thickness and systolic function (ejection fraction normal). Wall motion: normal. Inconclusive left ventricular diastolic function. · MV: Mild mitral valve regurgitation is present. Assessment         ICD-10-CM ICD-9-CM    1. Mobitz type 2 second degree atrioventricular block  I44.1 426.12     Reported during sleep study. Will get event monitor   2. Dyspnea on exertion  R06.00 786.09     Normal ejection fraction no pulmonary hypertension monitor   3. COVID-19 virus infection  U07.1 079.89     Unremarkable echo continue monitoring clinically   4. Atherosclerosis  I70.90 440.9     Continue treatment monitor   5. Bilateral lower extremity edema  R60.0 782.3     No pulmonary hypertension continue treatment continue diuretic   2/2021  Seen for increased shortness of breath and edema. Significant edema of feet. Change diuretic to Demadex and metolazone follow BMP check echo and nuclear stress test  2020  Seen after recent follow-up. Had occasional lightheadedness low blood pressure today discontinue hydralazine. Monitor blood pressure at home and decide on adjusting other medication. Use of diuretic as improved edema. Continue use as being done. Sleep study showed Mobitz 1 block throughout the study. Will get event monitor to make sure he does not have significant pauses. These are likely related to his sleep apnea. Okay to use what ever more of CPAP or BiPAP treatment that patient requires from cardiac standpoint. Normal ejection fraction and negative stress test  There are no discontinued medications. No orders of the defined types were placed in this encounter.

## 2021-03-26 ENCOUNTER — HOSPITAL ENCOUNTER (OUTPATIENT)
Dept: LAB | Age: 81
Discharge: HOME | End: 2021-03-26
Payer: MEDICARE

## 2021-03-26 PROCEDURE — 36415 COLL VENOUS BLD VENIPUNCTURE: CPT

## 2021-03-26 PROCEDURE — U0003 INFECTIOUS AGENT DETECTION BY NUCLEIC ACID (DNA OR RNA); SEVERE ACUTE RESPIRATORY SYNDROME CORONAVIRUS 2 (SARS-COV-2) (CORONAVIRUS DISEASE [COVID-19]), AMPLIFIED PROBE TECHNIQUE, MAKING USE OF HIGH THROUGHPUT TECHNOLOGIES AS DESCRIBED BY CMS-2020-01-R: HCPCS

## 2021-03-27 LAB — SARS-COV-2, COV2NT: NOT DETECTED

## 2021-03-31 ENCOUNTER — OFFICE VISIT (OUTPATIENT)
Dept: PULMONOLOGY | Age: 81
End: 2021-03-31

## 2021-03-31 ENCOUNTER — CLINICAL SUPPORT (OUTPATIENT)
Dept: PULMONOLOGY | Age: 81
End: 2021-03-31
Payer: MEDICARE

## 2021-03-31 VITALS
HEIGHT: 68 IN | WEIGHT: 205 LBS | TEMPERATURE: 98.2 F | BODY MASS INDEX: 31.07 KG/M2 | OXYGEN SATURATION: 98 % | RESPIRATION RATE: 20 BRPM

## 2021-03-31 VITALS
SYSTOLIC BLOOD PRESSURE: 150 MMHG | DIASTOLIC BLOOD PRESSURE: 70 MMHG | OXYGEN SATURATION: 98 % | HEIGHT: 68 IN | WEIGHT: 205 LBS | RESPIRATION RATE: 20 BRPM | BODY MASS INDEX: 31.07 KG/M2 | TEMPERATURE: 98.2 F | HEART RATE: 67 BPM

## 2021-03-31 DIAGNOSIS — R06.02 SOB (SHORTNESS OF BREATH): Primary | ICD-10-CM

## 2021-03-31 PROCEDURE — 94729 DIFFUSING CAPACITY: CPT | Performed by: INTERNAL MEDICINE

## 2021-03-31 PROCEDURE — 94060 EVALUATION OF WHEEZING: CPT | Performed by: INTERNAL MEDICINE

## 2021-03-31 PROCEDURE — 94727 GAS DIL/WSHOT DETER LNG VOL: CPT | Performed by: INTERNAL MEDICINE

## 2021-03-31 PROCEDURE — 94618 PULMONARY STRESS TESTING: CPT | Performed by: INTERNAL MEDICINE

## 2021-04-08 ENCOUNTER — HOSPITAL ENCOUNTER (OUTPATIENT)
Dept: LAB | Age: 81
Discharge: HOME | End: 2021-04-08
Payer: MEDICARE

## 2021-04-08 ENCOUNTER — TELEPHONE (OUTPATIENT)
Dept: INTERNAL MEDICINE CLINIC | Age: 81
End: 2021-04-08

## 2021-04-08 ENCOUNTER — OFFICE VISIT (OUTPATIENT)
Dept: INTERNAL MEDICINE CLINIC | Age: 81
End: 2021-04-08
Payer: MEDICARE

## 2021-04-08 VITALS
BODY MASS INDEX: 30.92 KG/M2 | OXYGEN SATURATION: 97 % | HEART RATE: 76 BPM | TEMPERATURE: 97.7 F | DIASTOLIC BLOOD PRESSURE: 70 MMHG | HEIGHT: 68 IN | RESPIRATION RATE: 16 BRPM | WEIGHT: 204 LBS | SYSTOLIC BLOOD PRESSURE: 128 MMHG

## 2021-04-08 DIAGNOSIS — E87.6 HYPOKALEMIA: ICD-10-CM

## 2021-04-08 DIAGNOSIS — Z12.11 COLON CANCER SCREENING: ICD-10-CM

## 2021-04-08 DIAGNOSIS — M11.20 CPDD (CALCIUM PYROPHOSPHATE DEPOSITION DISEASE): ICD-10-CM

## 2021-04-08 DIAGNOSIS — M15.9 PRIMARY OSTEOARTHRITIS INVOLVING MULTIPLE JOINTS: ICD-10-CM

## 2021-04-08 DIAGNOSIS — R73.03 PRE-DIABETES: ICD-10-CM

## 2021-04-08 DIAGNOSIS — E78.5 HYPERLIPIDEMIA, UNSPECIFIED HYPERLIPIDEMIA TYPE: ICD-10-CM

## 2021-04-08 DIAGNOSIS — Z11.59 NEED FOR HEPATITIS C SCREENING TEST: ICD-10-CM

## 2021-04-08 DIAGNOSIS — I44.1 MOBITZ TYPE 1 SECOND DEGREE ATRIOVENTRICULAR BLOCK: ICD-10-CM

## 2021-04-08 DIAGNOSIS — R60.0 BILATERAL LOWER EXTREMITY EDEMA: ICD-10-CM

## 2021-04-08 DIAGNOSIS — R07.89 CHEST WALL PAIN: ICD-10-CM

## 2021-04-08 DIAGNOSIS — M06.041 RHEUMATOID ARTHRITIS INVOLVING BOTH HANDS WITH NEGATIVE RHEUMATOID FACTOR (HCC): ICD-10-CM

## 2021-04-08 DIAGNOSIS — I10 ESSENTIAL HYPERTENSION: Primary | ICD-10-CM

## 2021-04-08 DIAGNOSIS — M06.042 RHEUMATOID ARTHRITIS INVOLVING BOTH HANDS WITH NEGATIVE RHEUMATOID FACTOR (HCC): ICD-10-CM

## 2021-04-08 DIAGNOSIS — Z00.00 MEDICARE ANNUAL WELLNESS VISIT, SUBSEQUENT: ICD-10-CM

## 2021-04-08 DIAGNOSIS — I10 ESSENTIAL HYPERTENSION: ICD-10-CM

## 2021-04-08 DIAGNOSIS — G47.33 OBSTRUCTIVE SLEEP APNEA SYNDROME: ICD-10-CM

## 2021-04-08 DIAGNOSIS — Z86.16 HISTORY OF 2019 NOVEL CORONAVIRUS DISEASE (COVID-19): ICD-10-CM

## 2021-04-08 DIAGNOSIS — U09.9 POST-ACUTE SEQUELAE OF COVID-19 (PASC): ICD-10-CM

## 2021-04-08 DIAGNOSIS — Z13.31 SCREENING FOR DEPRESSION: ICD-10-CM

## 2021-04-08 DIAGNOSIS — E66.09 CLASS 1 OBESITY DUE TO EXCESS CALORIES WITH SERIOUS COMORBIDITY AND BODY MASS INDEX (BMI) OF 31.0 TO 31.9 IN ADULT: ICD-10-CM

## 2021-04-08 DIAGNOSIS — F33.0 MILD EPISODE OF RECURRENT MAJOR DEPRESSIVE DISORDER (HCC): ICD-10-CM

## 2021-04-08 DIAGNOSIS — Z71.89 ADVANCED DIRECTIVES, COUNSELING/DISCUSSION: ICD-10-CM

## 2021-04-08 LAB
ANION GAP SERPL CALC-SCNC: 5 MMOL/L (ref 3–18)
BUN SERPL-MCNC: 25 MG/DL (ref 7–18)
BUN/CREAT SERPL: 24 (ref 12–20)
CALCIUM SERPL-MCNC: 8.9 MG/DL (ref 8.5–10.1)
CHLORIDE SERPL-SCNC: 106 MMOL/L (ref 100–111)
CO2 SERPL-SCNC: 34 MMOL/L (ref 21–32)
CREAT SERPL-MCNC: 1.03 MG/DL (ref 0.6–1.3)
GLUCOSE SERPL-MCNC: 100 MG/DL (ref 74–99)
MAGNESIUM SERPL-MCNC: 2.1 MG/DL (ref 1.6–2.6)
POTASSIUM SERPL-SCNC: 3.2 MMOL/L (ref 3.5–5.5)
SODIUM SERPL-SCNC: 145 MMOL/L (ref 136–145)

## 2021-04-08 PROCEDURE — 99497 ADVNCD CARE PLAN 30 MIN: CPT | Performed by: INTERNAL MEDICINE

## 2021-04-08 PROCEDURE — G8754 DIAS BP LESS 90: HCPCS | Performed by: INTERNAL MEDICINE

## 2021-04-08 PROCEDURE — 80048 BASIC METABOLIC PNL TOTAL CA: CPT

## 2021-04-08 PROCEDURE — G9717 DOC PT DX DEP/BP F/U NT REQ: HCPCS | Performed by: INTERNAL MEDICINE

## 2021-04-08 PROCEDURE — G8536 NO DOC ELDER MAL SCRN: HCPCS | Performed by: INTERNAL MEDICINE

## 2021-04-08 PROCEDURE — G0463 HOSPITAL OUTPT CLINIC VISIT: HCPCS | Performed by: INTERNAL MEDICINE

## 2021-04-08 PROCEDURE — 1101F PT FALLS ASSESS-DOCD LE1/YR: CPT | Performed by: INTERNAL MEDICINE

## 2021-04-08 PROCEDURE — 99215 OFFICE O/P EST HI 40 MIN: CPT | Performed by: INTERNAL MEDICINE

## 2021-04-08 PROCEDURE — G0439 PPPS, SUBSEQ VISIT: HCPCS | Performed by: INTERNAL MEDICINE

## 2021-04-08 PROCEDURE — G8752 SYS BP LESS 140: HCPCS | Performed by: INTERNAL MEDICINE

## 2021-04-08 PROCEDURE — G8417 CALC BMI ABV UP PARAM F/U: HCPCS | Performed by: INTERNAL MEDICINE

## 2021-04-08 PROCEDURE — G8427 DOCREV CUR MEDS BY ELIG CLIN: HCPCS | Performed by: INTERNAL MEDICINE

## 2021-04-08 PROCEDURE — 83735 ASSAY OF MAGNESIUM: CPT

## 2021-04-08 RX ORDER — POTASSIUM CHLORIDE 20 MEQ/1
40 TABLET, EXTENDED RELEASE ORAL DAILY
Qty: 10 TAB | Refills: 0 | Status: SHIPPED | OUTPATIENT
Start: 2021-04-08 | End: 2021-04-13

## 2021-04-08 NOTE — PROGRESS NOTES
This is the Subsequent Medicare Annual Wellness Exam, performed 12 months or more after the Initial AWV or the last Subsequent AWV    I have reviewed the patient's medical history in detail and updated the computerized patient record. Assessment/Plan   Education and counseling provided:  Are appropriate based on today's review and evaluation  End-of-Life planning (with patient's consent)  Influenza Vaccine  Colorectal cancer screening tests  Cardiovascular screening blood test  Screening for glaucoma  Diabetes screening test  Shingrix vaccine    1. Essential hypertension  -     MAGNESIUM; Future  -     METABOLIC PANEL, BASIC; Future  2. Mobitz type 1 second degree atrioventricular block  3. Bilateral lower extremity edema  -     MAGNESIUM; Future  -     METABOLIC PANEL, BASIC; Future  4. Obstructive sleep apnea syndrome  5. History of 2019 novel coronavirus disease (COVID-19)  6. Post-acute sequelae of COVID-19 (PASC)  7. Chest wall pain  8. Hyperlipidemia, unspecified hyperlipidemia type  9. Pre-diabetes  10. Class 1 obesity due to excess calories with serious comorbidity and body mass index (BMI) of 31.0 to 31.9 in adult  11. 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 ([facility] [address]  12. CPDD (calcium pyrophosphate deposition disease)  13. Primary osteoarthritis involving multiple joints  14. Mild episode of recurrent major depressive disorder ([facility] [address]  15. Colon cancer screening  -     COLOGUARD TEST (FECAL DNA COLORECTAL CANCER SCREENING)  16. Medicare annual wellness visit, subsequent  -     ADVANCE CARE PLANNING FIRST 30 MINS  17. Advanced directives, counseling/discussion  -     ADVANCE CARE PLANNING FIRST 27 MINS  18. Screening for depression  19. Need for hepatitis C screening test  -     HEPATITIS C AB; Future  20. Hypokalemia  -     MAGNESIUM;  Future  -     METABOLIC PANEL, BASIC; Future       Depression Risk Factor Screening     3 most recent PHQ Screens 4/8/2021   Little interest or pleasure in doing things Not at all   Feeling down, depressed, irritable, or hopeless Not at all   Total Score PHQ 2 0   Trouble falling or staying asleep, or sleeping too much -   Feeling tired or having little energy -   Poor appetite, weight loss, or overeating -   Feeling bad about yourself - or that you are a failure or have let yourself or your family down -   Trouble concentrating on things such as school, work, reading, or watching TV -   Moving or speaking so slowly that other people could have noticed; or the opposite being so fidgety that others notice -   Thoughts of being better off dead, or hurting yourself in some way -   PHQ 9 Score -   How difficult have these problems made it for you to do your work, take care of your home and get along with others -       Alcohol Risk Screen    Do you average more than 1 drink per night or more than 7 drinks a week: No    In the past three months have you have had more than 4 drinks containing alcohol on one occasion: No        Functional Ability and Level of Safety    Hearing: Hearing is good. Activities of Daily Living: The home contains: no safety equipment. Patient does total self care      Ambulation: with mild difficulty     Fall Risk:  Fall Risk Assessment, last 12 mths 4/8/2021   Able to walk? Yes   Fall in past 12 months? 0   Do you feel unsteady?  0   Are you worried about falling 0      Abuse Screen:  Patient is not abused       Cognitive Screening    Has your family/caregiver stated any concerns about your memory: no     Cognitive Screening: none performed    Health Maintenance Due     Health Maintenance Due   Topic Date Due    Hepatitis C Screening  Never done    Shingrix Vaccine Age 50> (1 of 2) Never done       Patient Care Team   Patient Care Team:  Ean Burgos MD as PCP - General (Internal Medicine)  Ean Burgos MD as PCP - [facility] Empaneled Provider  Lea Rubin MD (Rheumatology)  Urmila Cano MD (Gastroenterology)  Fifi Graham, MAHAMED (Ophthalmology)  Armand Phillips MD (Podiatry)  Antoinette Grande MD (Ophthalmology)  Bakari Pichardo MD (Orthopedic Surgery)  Tere Salas MD (Pulmonary Disease)  Francisco J Pratt MD (Cardiology)    History     Patient Active Problem List   Diagnosis Code    BPH with obstruction/lower urinary tract symptoms N40.1, N13.8    Hypertension I10    Primary osteoarthritis involving multiple joints M89.49    Hyperlipidemia E78.5    GERD (gastroesophageal reflux disease) K21.9    Pre-diabetes R73.03    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 ([facility] [address] M06.041, M06.042    Vitamin D deficiency E55.9    Colon polyps K63.5    CPDD (calcium pyrophosphate deposition disease) M11.20    Impaired fasting glucose R73.01    Class 1 obesity due to excess calories with serious comorbidity and body mass index (BMI) of 31.0 to 31.9 in adult E66.09, Z68.31    APC (atrial premature contractions) I49.1    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, sequela ([facility] [address] S68.621S    Candidal intertrigo B37.2    Obstructive sleep apnea syndrome G47.33    Right sided sciatica M54.31    Lumbar facet arthropathy M47.816    Synovial cyst of lumbar facet joint M71.38    Mild episode of recurrent major depressive disorder ([facility] [address] F33.0    History of 2019 novel coronavirus disease (COVID-19) Z86.16    Bilateral lower extremity edema R60.0    Chest wall pain R07.89    Facet arthropathy, cervical M47.812    DDD (degenerative disc disease), cervical M50.30    Mobitz type 1 second degree atrioventricular block I44.1    Post-acute sequelae of COVID-19 (PASC) B94.8     Past Medical History:   Diagnosis Date    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 Dr. Gloria Danielle.    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 Dr. La Nena Gray. Past Surgical History:   Procedure Laterality Date    HX AMPUTATION FINGER Left     index    HX BACK SURGERY  10/23/2019    Right L4-5 hemilaminectomy, medial facetectomy, resection of synovial cyst    HX CARPAL TUNNEL RELEASE Bilateral     HX CATARACT REMOVAL Left 01/2018    HX CATARACT REMOVAL Bilateral 2018    HX COLONOSCOPY      HX HEENT      sinus, tonsillectomy    HX HERNIA REPAIR      HX MOHS PROCEDURES      bilateral     HX ORTHOPAEDIC      lef knee surgery, removed cartilage.  HX ROTATOR CUFF REPAIR Right      Current Outpatient Medications   Medication Sig Dispense Refill    potassium chloride (K-DUR, KLOR-CON) 20 mEq tablet Take 2 Tabs by mouth daily for 5 days. 10 Tab 0    lisinopriL (PRINIVIL, ZESTRIL) 40 mg tablet TAKE 1 TABLET BY MOUTH ONCE DAILY 90 Tab 3    torsemide (DEMADEX) 20 mg tablet Take 1 Tab by mouth two (2) times a day. 60 Tab 3    Xiidra 5 % dpet       ergocalciferol (ERGOCALCIFEROL) 1,250 mcg (50,000 unit) capsule Take 1 Cap by mouth every seven (7) days. Indications: vitamin D deficiency (high dose therapy) 12 Cap 0    sertraline (ZOLOFT) 50 mg tablet Take 1.5 Tabs by mouth daily. 135 Tab 2    omeprazole (PRILOSEC) 20 mg capsule TAKE 1 CAPSULE BY MOUTH ONCE DAILY 90 Cap 2    celecoxib (CELEBREX) 200 mg capsule TAKE 1 CAPSULE BY MOUTH ONCE DAILY 90 Cap 2    amLODIPine (NORVASC) 10 mg tablet TAKE 1 TABLET BY MOUTH ONCE DAILY 90 Tab 3    hydrOXYchloroQUINE (PLAQUENIL) 200 mg tablet Take 400 mg by mouth daily.       Oxygen 2 Liters continuous [facility]      tamsulosin (Flomax) 0.4 mg capsule Take 1 Cap by mouth daily. Indications: enlarged prostate with urination problem 90 Cap 3    atorvastatin (LIPITOR) 10 mg tablet TAKE 1 TABLET BY MOUTH ONCE DAILY 90 Tab 3    mineral oil liquid Take 30 mL by mouth daily as needed for Constipation.  cycloSPORINE (RESTASIS) 0.05 % ophthalmic emulsion Administer 1 Drop to both eyes nightly.  colchicine (MITIGARE) 0.6 mg capsule Take 0.6 mg by mouth every other day.  cholecalciferol, vitamin D3, (VITAMIN D3) 2,000 unit tab Take 2,000 Units by mouth daily.  diclofenac (VOLTAREN) 1 % topical gel Apply 4 g to affected area four (4) times daily.  LUMIGAN 0.01 % ophthalmic drops Administer 1 Drop to both eyes nightly.  MULTIVITS/IRON FUM/FA/D3/LYCOP (MULTI FOR HIM PO) Take  by mouth daily.        Allergies   Allergen Reactions    Latex, Natural Rubber Swelling    Adhesive Tape-Silicones Rash and Itching    Aldactone [Spironolactone] Not Reported This Time     Breast tenderness    Codeine Not Reported This Time     \"Drives crazy\"    Tape [Adhesive] Rash    Tetanus Toxoid, Adsorbed Anaphylaxis    Tetanus Vaccines And Toxoid Anaphylaxis       Family History   Problem Relation Age of Onset    Cancer Mother     Heart Disease Father     Alcohol abuse Father     Cancer Other      Social History     Tobacco Use    Smoking status: Former Smoker     Packs/day: 2.00     Years: 15.00     Pack years: 30.00     Types: Cigars, Pipe, Cigarettes    Smokeless tobacco: Former User     Types: Chew   Substance Use Topics    Alcohol use: Yes     Frequency: Monthly or less     Drinks per session: 1 or 2     Comment: rarely         Kenisha Pike MD

## 2021-04-08 NOTE — ACP (ADVANCE CARE PLANNING)
Advance Care Planning     Advance Care Planning (ACP) Physician/NP/PA Conversation      Date of Conversation: 4/8/2021  Conducted with: Patient with Decision Making Capacity    Healthcare Decision Maker:     Primary Decision Maker: Andreas Wyatt - [phone]  Click here to complete [employer] including selection of the Healthcare Decision Maker Relationship (ie \"Primary\")  Today we documented Decision Maker(s) consistent with Legal Next of Kin hierarchy. Wife removed as primary decision maker due to suffering from dementia. Care Preferences:    Hospitalization: \"If your health worsens and it becomes clear that your chance of recovery is unlikely, what would be your preference regarding hospitalization? \"  The patient would prefer hospitalization. Ventilation: \"If you were unable to breathe on your own and your chance of recovery was unlikely, what would be your preference about the use of a ventilator (breathing machine) if it was available to you? \"   The patient would desire the use of a ventilator. Resuscitation: \"In the event your heart stopped as a result of an underlying serious health condition, would you want attempts to be made to restart your heart, or would you prefer a natural death? \"   Yes, attempt to resuscitate.     Additional topics discussed: treatment goals, benefit/burden of treatment options, ventilation preferences, hospitalization preferences, resuscitation preferences and end of life care preferences (vegetative state/imminent death)    Conversation Outcomes / Follow-Up Plan:   ACP in process - information provided, considering goals and options  Reviewed DNR/DNI and patient elects Full Code (Attempt Resuscitation)     Length of Voluntary ACP Conversation in minutes:  16 minutes    Diogo Mcdaniel MD

## 2021-04-08 NOTE — PATIENT INSTRUCTIONS
Learning About Low-Sodium Foods What foods are low in sodium? The foods you eat contain nutrients, such as vitamins and minerals. Sodium is a nutrient. Your body needs the right amount to stay healthy and work as it should. You can use the list below to help you make choices about which foods to eat. Fruits · Fresh, frozen, canned, or dried fruit Vegetables · Fresh or frozen vegetables, with no added salt · Canned vegetables, low-sodium or with no added salt Grains · Bagels without salted tops · Cereal with no added salt · Corn tortillas · Crackers with no added salt · Oatmeal, cooked without salt · Popcorn with no salt · Pasta and noodles, cooked without salt · Rice, cooked without salt · Unsalted pretzels Dairy and dairy alternatives · Butter, unsalted · Cream cheese · Ice cream 
· Milk · Soy milk Meats and other protein foods · Beans and peas, canned with no salt · Eggs · Fresh fish (not smoked) · Fresh meats (not smoked or cured) · Nuts and nut butter, prepared without salt · Poultry, not packaged with sodium solution · Tofu, unseasoned · Tuna, canned without salt Seasonings · Garlic · Herbs and spices · Lemon juice · Mustard · Olive oil · Salt-free seasoning mixes · Vinegar Work with your doctor to find out how much of this nutrient you need. Depending on your health, you may need more or less of it in your diet. Where can you learn more? Go to http://www.gray.com/ Enter P264 in the search box to learn more about \"Learning About Low-Sodium Foods. \" Current as of: August 22, 2019               Content Version: 12.6 © 0301-1223 [email or web address]. Care instructions adapted under license by [employer] (which disclaims liability or warranty for this information).  If you have questions about a medical condition or this instruction, always ask your healthcare professional. [employer] disclaims any warranty or liability for your use of this information. Low Sodium Diet (2,000 Milligram): Care Instructions Your Care Instructions Too much sodium causes your body to hold on to extra water. This can raise your blood pressure and force your heart and kidneys to work harder. In very serious cases, this could cause you to be put in the hospital. It might even be life-threatening. By limiting sodium, you will feel better and lower your risk of serious problems. The most common source of sodium is salt. People get most of the salt in their diet from canned, prepared, and packaged foods. Fast food and restaurant meals also are very high in sodium. Your doctor will probably limit your sodium to less than 2,000 milligrams (mg) a day. This limit counts all the sodium in prepared and packaged foods and any salt you add to your food. Follow-up care is a key part of your treatment and safety. Be sure to make and go to all appointments, and call your doctor if you are having problems. It's also a good idea to know your test results and keep a list of the medicines you take. How can you care for yourself at home? Read food labels · Read labels on cans and food packages. The labels tell you how much sodium is in each serving. Make sure that you look at the serving size. If you eat more than the serving size, you have eaten more sodium. · Food labels also tell you the Percent Daily Value for sodium. Choose products with low Percent Daily Values for sodium. · Be aware that sodium can come in forms other than salt, including monosodium glutamate (MSG), sodium citrate, and sodium bicarbonate (baking soda). MSG is often added to Asian food. When you eat out, you can sometimes ask for food without MSG or added salt. Buy low-sodium foods · Buy foods that are labeled \"unsalted\" (no salt added), \"sodium-free\" (less than 5 mg of sodium per serving), or \"low-sodium\" (less than 140 mg of sodium per serving).  Foods labeled \"reduced-sodium\" and \"light sodium\" may still have too much sodium. Be sure to read the label to see how much sodium you are getting. · Buy fresh vegetables, or frozen vegetables without added sauces. Buy low-sodium versions of canned vegetables, soups, and other canned goods. Prepare low-sodium meals · Cut back on the amount of salt you use in cooking. This will help you adjust to the taste. Do not add salt after cooking. One teaspoon of salt has about 2,300 mg of sodium. · Take the salt shaker off the table. · Flavor your food with garlic, lemon juice, onion, vinegar, herbs, and spices. Do not use soy sauce, lite soy sauce, steak sauce, onion salt, garlic salt, celery salt, mustard, or ketchup on your food. · Use low-sodium salad dressings, sauces, and ketchup. Or make your own salad dressings and sauces without adding salt. · Use less salt (or none) when recipes call for it. You can often use half the salt a recipe calls for without losing flavor. Other foods such as rice, pasta, and grains do not need added salt. · Rinse canned vegetables, and cook them in fresh water. This removes somebut not allof the salt. · Avoid water that is naturally high in sodium or that has been treated with water softeners, which add sodium. Call your local water company to find out the sodium content of your water supply. If you buy bottled water, read the label and choose a sodium-free brand. Avoid high-sodium foods · Avoid eating: 
? Smoked, cured, salted, and canned meat, fish, and poultry. ? Ham, puentes, hot dogs, and luncheon meats. ? Regular, hard, and processed cheese and regular peanut butter. ? Crackers with salted tops, and other salted snack foods such as pretzels, chips, and salted popcorn. ? Frozen prepared meals, unless labeled low-sodium. ? Canned and dried soups, broths, and bouillon, unless labeled sodium-free or low-sodium. ? Canned vegetables, unless labeled sodium-free or low-sodium. ?  [place] fries, pizza, tacos, and other fast foods. ? Pickles, olives, ketchup, and other condiments, especially soy sauce, unless labeled sodium-free or low-sodium. Where can you learn more? Go to http://www.gray.com/ Enter K615 in the search box to learn more about \"Low Sodium Diet (2,000 Milligram): Care Instructions. \" Current as of: August 22, 2019               Content Version: 12.6 © 9163-6142 [employer]. Care instructions adapted under license by [email or web address] (which disclaims liability or warranty for this information). If you have questions about a medical condition or this instruction, always ask your healthcare professional. [address] any warranty or liability for your use of this information. Medicare Wellness Visit, Male The best way to improve and maintain good health is to have a healthy lifestyle by eating a well-balanced diet, exercising regularly, limiting alcohol and stopping smoking. Regular visits with your physician or non-physician health care provider also support your good health. Preventive screening tests can find health problems before they become diseases or illnesses. Preventive services such as immunizations prevent serious infections. All people over age 72 should have a Pneumovax and a Prevnar-13 shot to prevent potentially life threatening infections with the pneumococcus bacteria, a common cause of pneumonia. These are once in a lifetime unless you and your provider decide differently. All people over 65 should have a yearly influenza vaccine or \"flu\" shot. This does not prevent infection with cold viruses but has been proven to prevent hospitalization and death from influenza. Although Medicare part B \"regular Medicare\" currently only covers tetanus vaccination in the context of an injury, a tetanus vaccine (Tdap or Td) is recommended every 10 years.  
 
A shingles vaccine is recommended once in a lifetime after age 61. The Shingles vaccine is also not covered by Medicare part B. Note, however, that both the Shingles vaccine and Tdap/Td are generally covered by secondary carriers. Please check your coverage and out of pocket expenses. Consider contacting your local health department because it may stock these vaccines for a reasonable charge. We currently have documentation of the following immunization history for you: 
Immunization History Administered Date(s) Administered  (RETIRED) Pneumococcal Vaccine (Unspecified Type) 01/01/2008  Covid-19, MODERNA, Mrna, Lnp-s, Pf, 100mcg/0.5mL 02/01/2021, 03/01/2021  Influenza High Dose Vaccine PF 09/30/2017  Influenza Vaccine (Madin Nika Canine Kidney) PF 12/13/2017, 10/17/2018  Influenza Vaccine (Tri) Adjuvanted (>65 Yrs FLUAD TRI [id number]) 09/25/2018, 09/25/2019  Influenza Vaccine (Trivalent) 10/19/2019, 11/18/2020  Influenza Vaccine Split 10/04/2011, 10/16/2012  Influenza Vaccine Whole 01/15/2010  Influenza, Quadrivalent, Adjuvanted (>65 Yrs FLUAD QUAD [id number]) 09/10/2020  Pneumococcal Conjugate (PCV-13) 01/19/2015  Pneumococcal Polysaccharide (PPSV-23) 01/01/2008  Varicella Virus Vaccine 10/01/2013  Zoster 10/16/2012 Screening for infection with Hepatitis C is recommended for anyone born between [birth year] through [address]. The table at the bottom of this document indicates the status of this and other screening services. Screening for diabetes mellitus with a blood sugar test (glucose) should be done at least every 3 years until age 79. You and your health care provider may decide whether to continue screening after age 79. The most recent blood glucose we have on file for you is:  
Lab Results Component Value Date/Time  Glucose 112 (H) 02/02/2021 07:51 AM  
 Glucose (POC) 165 (H) 07/09/2020 04:39 PM  
 
 
Glaucoma is a disease of the eye due to increased ocular pressure that can lead to blindness. People with risk factors for glaucoma ( race, diabetes, family history) should be screened at least every 2 years by an eye professional. This may be covered annually if indicated as determined by you and your doctor. Cardiovascular screening tests that check for elevated lipids or cholesterol (fatty part of blood) which can lead to heart disease and strokes should be done every 4-6 years through age 79. You and your health care provider may decide whether to continue screening after age 79. The most recent lipid panel we have on file for you is:  
Lab Results Component Value Date/Time Cholesterol, total 132 02/02/2021 07:51 AM  
 HDL Cholesterol 68 (H) 02/02/2021 07:51 AM  
 LDL, calculated 57.6 02/02/2021 07:51 AM  
 VLDL, calculated 6.4 02/02/2021 07:51 AM  
 Triglyceride 32 02/02/2021 07:51 AM  
 CHOL/HDL Ratio 1.9 02/02/2021 07:51 AM  
 
 
Colorectal cancer screening that evaluates for blood or polyps in your colon for people with average risk should be done yearly as a stool test, every five years as a flexible sigmoidoscope or every 10 years as a colonoscopy up to age 76. You and your health care provider may decide whether to continue screening after age 76. Men up to age 76 may elect to screen for prostate cancer with a blood test called a PSA at certain intervals, depending on their personal and family history. This decision is between the patient and his provider. The most recent PSA values we have on file for you are: 
Lab Results Component Value Date/Time  
 Prostate Specific Ag 3.3 09/24/2019 08:54 AM  
 Prostate Specific Ag 4.1 (H) 06/17/2019 04:48 PM  
 Prostate Specific Ag 3.5 07/16/2018 09:14 AM  
 
 
If you have been a smoker or had family history of abdominal aortic aneurysms, you and your provider may decide to schedule an ultrasound test of your aorta. Our records show this was done on:  n/a People who have smoked the equivalent of 1 pack per day for 30 years or more may benefit from screening for lung cancer with a yearly low dose CT scan until they have been non smokers for 15 years or competing health conditions render this unlikely to be beneficial. Our records show: n/a Your Medicare Wellness Exam is recommended annually. Here is a list of your current Health Maintenance items with a due date: 
Health Maintenance Topic Date Due  
 Hepatitis C Screening  Never done  Shingrix Vaccine Age 50> (1 of 2) Never done  Lipid Screen  02/02/2022  Medicare Yearly Exam  04/09/2022  DTaP/Tdap/Td series (2 - Td) 02/01/2027  Flu Vaccine  Completed  COVID-19 Vaccine  Completed  Pneumococcal 65+ years  Completed

## 2021-04-08 NOTE — PROGRESS NOTES
Potassium low. Will supplement with Kdur. Patient notified to  from pharmacy. Will recheck in 1 week.

## 2021-04-08 NOTE — PROGRESS NOTES
1. Have you been to the ER, urgent care clinic or hospitalized since your last visit? NO.     2. Have you seen or consulted any other health care providers outside of the [address] [place] [address] since your last visit (Include any pap smears or colon screening)?  NO

## 2021-04-09 NOTE — TELEPHONE ENCOUNTER
Pt says he will go to [facility] for labs. Our first appt not until 10:25 AM and he is at work by 8:30 AM.

## 2021-04-10 PROBLEM — M54.2 NECK PAIN: Status: RESOLVED | Noted: 2020-09-13 | Resolved: 2021-04-10

## 2021-04-10 PROBLEM — U09.9 POST-ACUTE SEQUELAE OF COVID-19 (PASC): Status: ACTIVE | Noted: 2021-04-10

## 2021-04-10 NOTE — PROGRESS NOTES
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 Lovenox, vitamin C and zinc. He gradually improved and oxygen weaned to 5 liter nasal cannula, and he was discharged with home oxygen, prednisone taper, and Eliquis 2.5 mg bid for 30 days (completed 8/9/2020). Repeat COVID-19 test (7/29/2020) negative. Reported persistent chest wall pain with inhalation and difficulty taking deep breaths. Also with worsening lower extremity edema. Repeat chest x-ray (9/2020) with persistent very mild peribronchial thickening. Echocardiogram (9/2020) showed no change from 8/2019 with EF 55-60% and mild MR. EKG (9/2020) without change. Underwent 6 minute walk test (3/31/2021) showing no desaturations, and PFT's (3/31/2021) showing normal flows and DLCO, and isolated decreased RV and FRC, no response to bronchodilator. Denies other post-COVID symptoms. Received Moderna vaccine with second dose 3/1/2021. Reports noting improvement in pleuritic chest pain and dyspnea over last several weeks. Will continue to monitor. 2. Obstructive sleep apnea, severe. Patient reported in 1/2019 awakening gasping for air several times per week. No overt evidence of heart failure and EKG was without change from baseline at that time. He reported that he would need to sit up immediately and take deep breathes until resolved. He also admitted to snoring and experiencing significant daytime drowsiness particularly when driving. Severe episode on 8/9/2019 prompted ED evaluation.  EKG without change, troponin negative and NT-pro BNP normal. Echocardiogram (8/26/2019) with normal LV size and function (EF 56-60%), no RWMA, normal valves. Evaluated by Dr. Riana Wallace and completed home sleep study and diagnosed with severe JANNET. Started on auto CPAP and supplemented with 2 liters of oxygen. However, despite best efforts, patient continued to describe significant difficulty using due to dyspnea and air hunger, and returned equipment. Underwent in-patient sleep evaluation on 1/29/2021 and found BIPAP to be very effective. Awaiting equipment to be delivered tomorrow so can begin at home. 3. Second degree AV block, Mobitz 1. Noted to be intermittent during sleep study and referred to Dr. Klever Delgado for evaluation. Felt to be likely secondary to untreated sleep apnea. Now wearing 30 day event monitor to assess. 4. Lower extremity edema. Chronic problem, but worsened following COVID 19 infection. Also noting chest pain with inhalation and deep breaths. Evaluated by Dr. Riana Wallace and changed from lasix to torsemide and metolazone. Became hypotensive, and patient stopped metolazone on his own. Advised to also discontinue hydralazine. Underwent pharmacologic nuclear stress test (2/24/2021) which was a normal, low risk study. Repeat echocardiogram (2/24/2021) similar to prior in 9/2020 except noted new mild PI. Edema much improved on torsemide. Will assess renal function and electrolytes today since not assessed since diuretics changed in 2/2021. Prescribed compression hose, but finding difficult to wear. WIll continue to monitor. 5. Hypertension. Blood pressure remains well controlled on adjusted regimen of lisinopril 40 mg daily, amlodipine 10 mg daily, and torsemide 20 mg bid. Hydralazine discontinued due to hypotension, and patient states that he stopped metolazone after one week since did not feel well with it. Renal function has been normal with creatinine 0.78/ eGFR >60. Continue to follow. 6. Hyperlipidemia.  On moderate intensity dose atorvastatin with LDL 57 and HDL 68 (2/2021), indicative of excellent control in this patient. Continue to follow. 7. Prediabetes. Controlled on diet alone with HbA1c normal at 5.4 (2/2021). Required sliding scale insulin dosing when hospitalized due to COVID 19 due to elevated blood sugar, likely related to high dose steroids. No evidence of microvascular complications. On Ace-I and statin. Continue follow-up with Dr. Duncan Wren for annual eye exams. Emphasized importance of lifestyle modifications, including diet, exercise, and weight loss. 8. Rheumatoid arthritis. On hydroxychloroquine. Has regular eye exams with Dr. Duncan Wren. Difficult to gauge response as appears to have evidence of osteoarthritis and CPPD occurring concurrently, but noted significant improvement since initiating colchicine. Voltaren gel, Celebrex, and Tylenol for pain control as needed. Followed by Dr. Claudia Bill. 9. Primary osteoarthritis. Evident in bilateral hands and knees, bilateral shoulders, and cervical spine. Shoulder pain (R>L). X-ray with evidence of osteoarthritis and rotator cuff pathology. Evaluated by Dr. Aurelia Ching and received cortisone injection to right shoulder with some improvement. Surgery for reverse shoulder replacement recommended. Changed from ibuprofen 400 mg qid and Tylenol to Celebrex 200 mg qd with significant improvement. However, held while being treated with Eliquis post-hospitalization, and patient states that no longer taking since did not restart. Reported increasing neck and shoulder pain at last visit in 9/2020, and cervical spine x-ray (9/2020) showed multilevel degenerative disc disease and facet arthropathy without interval change. Did not wish to proceed with physical therapy although had found it to be helpful in the past. Reported a significant increase in overall osteoarthritic pain in 1/2021 in hands, wrists, shoulders and neck, and advised to resume Celebrex 200 mg.  Noting significant improvement today and taking daily with Tylenol. Will take an occasional second dose of Celebrex as needed. Being followed Dr. Camilo Zaidi. 10. CPPD. Colchicine started on 1/19/2017 to help address chondrocalcinosis on x-rays. Reports significant improvement. Now taking every other day with good control. 11. Lumbar degenerative disease with right sciatica. Unresponsive to Medrol dose pack, physical therapy, steroid injections, and unwilling to take narcotics as not effective. Lumbar MRI with multilevel degenerative changes and facet arthropathy with R>L facet stenosis at L4-L5 likely responsible for symptoms. Had been following with Dr. Joie Avery, but given lack of improvement, started on gabapentin 100 mg tid and referred to Dr. Guero Bobo for evaluation. She recommended increasing gabapentin to 200 mg tid, and given his lack of response to conservative measures, referred to Dr. Osmin Rueda. He underwent decompression with L4/5 right-sided hemilaminotomy and medial facetectomy on 10/23/2019. Developed urinary retention post-op, but successfully passed voiding trial and has had no further difficulties. He developed recurrence of pain on post op day 6, and treated with Medrol dose pack. Reports no longer requiring gabapentin with no significant pain. Does reports left groin pain today after scrubbing floors last weekend. Likely referred pain and reports improving. Will continue to monitor. Being followed by GOMEZ Venegas. 12. Depression. Prior reasonable control with Paxil and weaned from benzodiazepine use for anxiety and insomnia. After inadvertently stopping Paxil, started on Zoloft and dose titrated to 75 mg daily with good control. However, reports today that now only taking 25 mg daily. Unclear if controlling symptoms. Will continue to monitor. 13. GERD. Good control of symptoms with omeprazole. No issues today. 14. Rectal bleeding. Colonoscopy 8/2015.  Evaluated by Dr. Jaciel Li and considered to be hemorrhoidal in source. Known internal and external hemorrhoids. Improved with Citrucel. Was due for routine colonoscopy in 8/2020, but patient unwilling to proceed. Discussed today, and agreeable to obtain Cologuard and proceed with colonoscopy only if positive. 15. BPH. Does have lower urinary tract symptoms. Developed postop urinary retention and now resolved. On Flomax. Followed previously by Dr. Stella Joseph. Not wishing to establish with new provider unless problem emerges. 16. Left wrist ganglion cyst. Previously evaluated by Dr. Danita Prabhakar and aspirated. Recurred and reports now larger. Requesting referral to another orthopedist. Referred to Dr. Laila Hoyt, and unclear if was evaluated. Will readdress at next visit. 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 Managing well and no further issues. 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 Level stable today at 10. Monitored annually (due 2/2022). 19. Insomnia. Weaned from Xanax. Had been using melatonin agonist, ramelteon, to replace Xanax, but no longer taking it either. Appears to be managing without medication. 20. Obesity. Lost 25 pounds during COVID 19 illness, but now returned to near baseline. Emphasized importance of healthy eating and improved nutrition. Will readdress at next visit. 21. Health maintenance. Already received influenza vaccine. Unable to receive Tdap due to allergy (anaphylaxis to Td). Will address Shingrix vaccine at next visit. Completed Moderna COVID 19 vaccine series. Other immunizations up to date. Colonoscopy due 8/2020, but patient not wishing to willing to proceed. Patient agreeable to obtain Cologuard and proceed with colonoscopy only if positive. Continue regular eye exams with Dr. Liliane Holbrook. Vitamin D level has been low, and treated with ergocalciferol 50,000 U weekly for 12 weeks. Will reassess at next visit. In addition, an annual Medicare wellness visit was done today.      Total time: 45 minutes spent with the patient on counseling, answering questions and/or coordination of care. Complex medical review performed, including review of medical history, lab results, and testing. Complicated management plan formulated. Patient understands recommendations and agrees with plan. Follow-up as previously scheduled.

## 2021-04-16 ENCOUNTER — HOSPITAL ENCOUNTER (OUTPATIENT)
Dept: LAB | Age: 81
Discharge: HOME | End: 2021-04-16
Payer: MEDICARE

## 2021-04-16 DIAGNOSIS — Z11.59 NEED FOR HEPATITIS C SCREENING TEST: ICD-10-CM

## 2021-04-16 DIAGNOSIS — E87.6 HYPOKALEMIA: ICD-10-CM

## 2021-04-16 LAB
ANION GAP SERPL CALC-SCNC: 4 MMOL/L (ref 3–18)
BUN SERPL-MCNC: 20 MG/DL (ref 7–18)
BUN/CREAT SERPL: 24 (ref 12–20)
CALCIUM SERPL-MCNC: 9.1 MG/DL (ref 8.5–10.1)
CHLORIDE SERPL-SCNC: 108 MMOL/L (ref 100–111)
CO2 SERPL-SCNC: 31 MMOL/L (ref 21–32)
CREAT SERPL-MCNC: 0.84 MG/DL (ref 0.6–1.3)
GLUCOSE SERPL-MCNC: 89 MG/DL (ref 74–99)
MAGNESIUM SERPL-MCNC: 2.2 MG/DL (ref 1.6–2.6)
POTASSIUM SERPL-SCNC: 3.9 MMOL/L (ref 3.5–5.5)
SODIUM SERPL-SCNC: 143 MMOL/L (ref 136–145)

## 2021-04-16 PROCEDURE — 36415 COLL VENOUS BLD VENIPUNCTURE: CPT

## 2021-04-16 PROCEDURE — 86803 HEPATITIS C AB TEST: CPT

## 2021-04-16 PROCEDURE — 83735 ASSAY OF MAGNESIUM: CPT

## 2021-04-16 PROCEDURE — 80048 BASIC METABOLIC PNL TOTAL CA: CPT

## 2021-04-19 ENCOUNTER — TELEPHONE (OUTPATIENT)
Dept: INTERNAL MEDICINE CLINIC | Age: 81
End: 2021-04-19

## 2021-04-19 LAB
HCV AB SER IA-ACNC: 0.1 INDEX
HCV AB SERPL QL IA: NEGATIVE
HCV COMMENT,HCGAC: NORMAL

## 2021-04-19 NOTE — TELEPHONE ENCOUNTER
No visits with results within 2 Day(s) from this visit. Latest known visit with results is:   Hospital Outpatient Visit on 04/16/2021   Component Date Value Ref Range Status    Hepatitis C virus Ab 04/16/2021 0.1  <0.80 Index Final    Hep C virus Ab Interp. 04/16/2021 Negative  NEG   Final    Hep C  virus Ab comment 04/16/2021        Final    Magnesium 04/16/2021 2.2  1.6 - 2.6 mg/dL Final    Sodium 04/16/2021 143  136 - 145 mmol/L Final    Potassium 04/16/2021 3.9  3.5 - 5.5 mmol/L Final    Chloride 04/16/2021 108  100 - 111 mmol/L Final    CO2 04/16/2021 31  21 - 32 mmol/L Final    Anion gap 04/16/2021 4  3.0 - 18 mmol/L Final    Glucose 04/16/2021 89  74 - 99 mg/dL Final    BUN 04/16/2021 20* 7.0 - 18 MG/DL Final    Creatinine 04/16/2021 0.84  0.6 - 1.3 MG/DL Final    BUN/Creatinine ratio 04/16/2021 24* 12 - 20   Final    GFR est AA 04/16/2021 >60  >60 ml/min/1.73m2 Final    GFR est non-AA 04/16/2021 >60  >60 ml/min/1.73m2 Final    Calcium 04/16/2021 9.1  8.5 - 10.1 MG/DL Final       Please let patient know that a repeat potassium level is in the low normal range at 3.9 and magnesium is normal. Given his diuretic use, will decide at upcoming visit with repeat labs as to whether he will need to resume maintenance potassium supplement to prevent recurrence of hypokalemia. Please advise him to increase the amount of potassium he takes in his diet. These include:  · Fresh, unprocessed whole foods have the most. These foods include:  ? Milk and other dairy products. ? Vegetables, especially broccoli, cooked dry beans, tomatoes, potatoes, artichokes, winter squash, and spinach. ? Fruits, especially citrus fruits, bananas, and apricots. Dried apricots contain more potassium than fresh apricots. ? Meat, poultry, and fish. Also, a hepatitis C screening test was performed and was negative. Thanks.

## 2021-04-19 NOTE — TELEPHONE ENCOUNTER
Patient is aware of the following:  Please let patient know that a repeat potassium level is in the low normal range at 3.9 and magnesium is normal. Given his diuretic use, will decide at upcoming visit with repeat labs as to whether he will need to resume maintenance potassium supplement to prevent recurrence of hypokalemia. Please advise him to increase the amount of potassium he takes in his diet. These include:  · Fresh, unprocessed whole foods have the most. These foods include:  ? Milk and other dairy products. ? Vegetables, especially broccoli, cooked dry beans, tomatoes, potatoes, artichokes, winter squash, and spinach. ? Fruits, especially citrus fruits, bananas, and apricots. Dried apricots contain more potassium than fresh apricots. ? Meat, poultry, and fish.     Also, a hepatitis C screening test was performed and was negative.        Patient verbalizes understanding.

## 2021-04-27 ENCOUNTER — TELEPHONE (OUTPATIENT)
Dept: INTERNAL MEDICINE CLINIC | Age: 81
End: 2021-04-27

## 2021-04-27 NOTE — TELEPHONE ENCOUNTER
LILIAN (4/18/2021): Negative    Please let the patient know that his Cologuard testing was negative for evidence of advanced adenomatous polyps or colon cancer. No further evaluation needed at this time.

## 2021-05-05 ENCOUNTER — OFFICE VISIT (OUTPATIENT)
Dept: CARDIOLOGY CLINIC | Age: 81
End: 2021-05-05
Payer: MEDICARE

## 2021-05-05 VITALS
OXYGEN SATURATION: 97 % | WEIGHT: 210.4 LBS | TEMPERATURE: 98.5 F | DIASTOLIC BLOOD PRESSURE: 75 MMHG | SYSTOLIC BLOOD PRESSURE: 144 MMHG | HEART RATE: 91 BPM | HEIGHT: 68 IN | BODY MASS INDEX: 31.89 KG/M2

## 2021-05-05 DIAGNOSIS — R60.0 BILATERAL LOWER EXTREMITY EDEMA: ICD-10-CM

## 2021-05-05 DIAGNOSIS — I44.1 SECOND DEGREE HEART BLOCK: ICD-10-CM

## 2021-05-05 DIAGNOSIS — I10 ESSENTIAL HYPERTENSION: ICD-10-CM

## 2021-05-05 DIAGNOSIS — U09.9 POST-COVID SYNDROME: ICD-10-CM

## 2021-05-05 DIAGNOSIS — I47.1 SVT (SUPRAVENTRICULAR TACHYCARDIA) (HCC): ICD-10-CM

## 2021-05-05 DIAGNOSIS — I44.1 MOBITZ TYPE 2 SECOND DEGREE ATRIOVENTRICULAR BLOCK: ICD-10-CM

## 2021-05-05 DIAGNOSIS — R06.09 DYSPNEA ON EXERTION: Primary | ICD-10-CM

## 2021-05-05 DIAGNOSIS — I70.90 ATHEROSCLEROSIS: ICD-10-CM

## 2021-05-05 PROCEDURE — G8536 NO DOC ELDER MAL SCRN: HCPCS | Performed by: INTERNAL MEDICINE

## 2021-05-05 PROCEDURE — 1101F PT FALLS ASSESS-DOCD LE1/YR: CPT | Performed by: INTERNAL MEDICINE

## 2021-05-05 PROCEDURE — G9717 DOC PT DX DEP/BP F/U NT REQ: HCPCS | Performed by: INTERNAL MEDICINE

## 2021-05-05 PROCEDURE — G8754 DIAS BP LESS 90: HCPCS | Performed by: INTERNAL MEDICINE

## 2021-05-05 PROCEDURE — G8417 CALC BMI ABV UP PARAM F/U: HCPCS | Performed by: INTERNAL MEDICINE

## 2021-05-05 PROCEDURE — G8753 SYS BP > OR = 140: HCPCS | Performed by: INTERNAL MEDICINE

## 2021-05-05 PROCEDURE — 99214 OFFICE O/P EST MOD 30 MIN: CPT | Performed by: INTERNAL MEDICINE

## 2021-05-05 PROCEDURE — G8427 DOCREV CUR MEDS BY ELIG CLIN: HCPCS | Performed by: INTERNAL MEDICINE

## 2021-05-05 RX ORDER — HYDRALAZINE HYDROCHLORIDE 25 MG/1
25 TABLET, FILM COATED ORAL 2 TIMES DAILY
Qty: 180 TAB | Refills: 3 | Status: SHIPPED | OUTPATIENT
Start: 2021-05-05 | End: 2022-05-16

## 2021-05-05 NOTE — PROGRESS NOTES
1. Have you been to the ER, urgent care clinic since your last visit? Hospitalized since your last visit? No    2. Have you seen or consulted any other health care providers outside of the [address] [place] [address] since your last visit? Include any pap smears or colon screening.  No.

## 2021-05-05 NOTE — PROGRESS NOTES
HISTORY OF PRESENT ILLNESS  Karin Morales is a 80 y.o. male. 2/15/2021  Patient is in today for new patient evaluation he is referred here for evaluation of shortness of breath and edema. Patient has had COVID-19 infection a few months ago since then he has been having shortness of breath feels like he is unable to take deep breath. Has been followed by pulmonary. He is seeing increased edema that is on and off. Denies any chest pain. Denies any orthopnea PND. He has no known cardiac history but arteriosclerosis was reported on CAT scan done a few years ago  3/2021  Patient seen for follow-up. Diagnostic testing results will be discussed    Follow-up  Associated symptoms include shortness of breath. Pertinent negatives include no chest pain, no abdominal pain and no headaches. Review of Systems   Constitutional: Negative for chills and fever. HENT: Negative for nosebleeds. Eyes: Negative for blurred vision and double vision. Respiratory: Positive for shortness of breath. Negative for cough, hemoptysis, sputum production and wheezing. Cardiovascular: Negative for chest pain, palpitations, orthopnea, claudication, leg swelling and PND. Gastrointestinal: Negative for abdominal pain, heartburn, nausea and vomiting. Musculoskeletal: Negative for myalgias. Skin: Negative for rash. Neurological: Negative for dizziness, weakness and headaches. Endo/Heme/Allergies: Does not bruise/bleed easily. 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 Dr. Kishan Newton.    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 Dr. Koby Monae. Past Surgical History:   Procedure Laterality Date    HX AMPUTATION FINGER Left     index    HX BACK SURGERY  10/23/2019    Right L4-5 hemilaminectomy, medial facetectomy, resection of synovial cyst    HX CARPAL TUNNEL RELEASE Bilateral     HX CATARACT REMOVAL Left 01/2018    HX CATARACT REMOVAL Bilateral 2018    HX COLONOSCOPY      HX HEENT      sinus, tonsillectomy    HX HERNIA REPAIR      HX MOHS PROCEDURES      bilateral     HX ORTHOPAEDIC      lef knee surgery, removed cartilage.  HX ROTATOR CUFF REPAIR Right        Social History     Tobacco Use    Smoking status: Former Smoker     Packs/day: 2.00     Years: 15.00     Pack years: 30.00     Types: Cigars, Pipe, Cigarettes    Smokeless tobacco: Former User     Types: Chew   Substance Use Topics    Alcohol use: Yes     Frequency: Monthly or less     Drinks per session: 1 or 2     Comment: rarely       Allergies   Allergen Reactions    Latex, Natural Rubber Swelling    Adhesive Tape-Silicones Rash and Itching    Aldactone [Spironolactone] Not Reported This Time     Breast tenderness    Codeine Not Reported This Time     \"Drives crazy\"    Tape [Adhesive] Rash    Tetanus Toxoid, Adsorbed Anaphylaxis    Tetanus Vaccines And Toxoid Anaphylaxis       Prior to Admission medications    Medication Sig Start Date End Date Taking? Authorizing Provider   hydrALAZINE (APRESOLINE) 25 mg tablet Take 1 Tab by mouth two (2) times a day.  5/5/21  Yes Roxane Jean Baptiste MD   lisinopriL (PRINIVIL, ZESTRIL) 40 mg tablet TAKE 1 TABLET BY MOUTH ONCE DAILY 3/8/21  Yes Charisse Call MD torsemide (DEMADEX) 20 mg tablet Take 1 Tab by mouth two (2) times a day. 2/15/21  Yes Yeyo Solomon MD   Xi[place]ra 5 % dpet  1/23/21  Yes Provider, Historical   ergocalciferol (ERGOCALCIFEROL) 1,250 mcg (50,000 unit) capsule Take 1 Cap by mouth every seven (7) days. Indications: vitamin D deficiency (high dose therapy) 2/9/21  Yes Gurdeep Doss MD   sertraline (ZOLOFT) 50 mg tablet Take 1.5 Tabs by mouth daily. 1/12/21  Yes Gurdeep Doss MD   omeprazole (PRILOSEC) 20 mg capsule TAKE 1 CAPSULE BY MOUTH ONCE DAILY 1/9/21  Yes Gurdeep Doss MD   celecoxib (CELEBREX) 200 mg capsule TAKE 1 CAPSULE BY MOUTH ONCE DAILY 11/3/20  Yes Gurdeep Doss MD   amLODIPine (NORVASC) 10 mg tablet TAKE 1 TABLET BY MOUTH ONCE DAILY 10/19/20  Yes Gurdeep Doss MD   hydrOXYchloroQUINE (PLAQUENIL) 200 mg tablet Take 400 mg by mouth daily. 7/16/20  Yes Provider, Historical   tamsulosin (Flomax) 0.4 mg capsule Take 1 Cap by mouth daily. Indications: enlarged prostate with urination problem 6/26/20  Yes Gurdeep Doss MD   atorvastatin (LIPITOR) 10 mg tablet TAKE 1 TABLET BY MOUTH ONCE DAILY 4/24/20  Yes Gurdeep Doss MD   mineral oil liquid Take 30 mL by mouth daily as needed for Constipation. Yes Provider, Historical   cycloSPORINE (RESTASIS) 0.05 % ophthalmic emulsion Administer 1 Drop to both eyes nightly. Yes Provider, Historical   colchicine (MITIGARE) 0.6 mg capsule Take 0.6 mg by mouth every other day. Yes Provider, Historical   cholecalciferol, vitamin D3, (VITAMIN D3) 2,000 unit tab Take 2,000 Units by mouth daily. Yes Provider, Historical   diclofenac (VOLTAREN) 1 % topical gel Apply 4 g to affected area four (4) times daily. Yes Provider, Historical   LUMIGAN 0.01 % ophthalmic drops Administer 1 Drop to both eyes nightly. 5/19/14  Yes Provider, Historical   MULTIVITS/IRON FUM/FA/D3/LYCOP (MULTI FOR HIM PO) Take  by mouth daily.    Yes Provider, Historical   Oxygen 2 Liters continuous [facility]    Provider, Historical         Visit Vitals  BP (!) 144/75 (BP 1 Location: Left arm, BP Patient Position: Sitting, BP Cuff Size: Adult)   Pulse 91   Temp 98.5 °F (36.9 °C) (Temporal)   Ht 5' 8\" (1.727 m)   Wt 95.4 kg (210 lb 6.4 oz)   SpO2 97%   BMI 31.99 kg/m²         Physical Exam   Constitutional: He is oriented to person, place, and time. He appears well-developed and well-nourished. HENT:   Head: Normocephalic and atraumatic. Eyes: Conjunctivae are normal.   Neck: Neck supple. No JVD present. No tracheal deviation present. No thyromegaly present. Cardiovascular: Normal rate, regular rhythm and normal heart sounds. Exam reveals no gallop and no friction rub. No murmur heard. Pulmonary/Chest: Breath sounds normal. No respiratory distress. He has no wheezes. He has no rales. He exhibits no tenderness. Abdominal: Soft. There is no abdominal tenderness. Musculoskeletal:         General: Edema present. Neurological: He is alert and oriented to person, place, and time. Skin: Skin is warm and dry. Psychiatric: He has a normal mood and affect. Mr. Shannon Lubin has a reminder for a \"due or due soon\" health maintenance. I have asked that he contact his primary care provider for follow-up on this health maintenance. No flowsheet data found. I have personally reviewed patient's records available from   hospital and other providers and incorporated findings in patient care. Notes, labs, CT scan, chest x-ray, EKG, echo      Chest x-ray9/2020  Very mild peribronchial thickening, similar to prior exam. No new focal  consolidation. Additional findings as discussed. Interpretation Summary 2018    · Left Ventricle: Normal cavity size, wall thickness, systolic function (ejection fraction normal) and diastolic function. Estimated left ventricular ejection fraction is 56 - 60%. Visually measured ejection fraction. No regional wall motion abnormality noted.   · Tricuspid regurgitation is inadequate for estimation of right ventricular systolic pressure. Interpretation Summary 9/2020    · LV: Estimated LVEF is 55 - 60%. Visually measured ejection fraction. Normal cavity size, wall thickness and systolic function (ejection fraction normal). Wall motion: normal. Inconclusive left ventricular diastolic function. · MV: Mild mitral valve regurgitation is present. Interpretation Summary 2/2021    · LV: Estimated LVEF is 60 - 65%. Normal cavity size, wall thickness and systolic function (ejection fraction normal). Wall motion: normal. Mild (grade 1) left ventricular diastolic dysfunction. · LA: Left Atrium volume index is 29.49 mL/m2. · MV: Mitral annular calcification. Mild mitral valve regurgitation is present. · PV: Mild pulmonic valve regurgitation is present. · PA: Pulmonary arterial systolic pressure is 23 mmHg. Procedure Conclusion 2/2021    Nuclear Stress Test    Nuclear Cardiac Spect Rest then Gated Stress study. Rosibel Aguero was used as the stressing method and agent. (Rosibel Aguero given via a 10 - 20 sec injection.)   One day myocardial perfusion study. Date: 2/24/2021. Left ventricular perfusion is normal. Myocardial perfusion imaging supports a low risk stress test.   There is no prior study available for comparison. .           5/5/2021  MCOTsinus rhythm rare PAC PVC. No heart blocks. 2 SVT episode. Longest 20 beats at 151 bpm  Assessment         ICD-10-CM ICD-9-CM    1. Dyspnea on exertion  R06.00 786.09     Improving continue treatment. Symptoms are slowly getting better   2. Atherosclerosis  I70.90 440.9     Stable monitor   3. Bilateral lower extremity edema  R60.0 782.3     Stable improving monitor   4. Second degree heart block  I44.1 426.13     No significant heart block noted on event monitor. Short SVT continue monitoring   5. Post-COVID syndrome  B94.8 139.8     Shortness of breath improving. Other symptoms are stable   6.  SVT (supraventricular tachycardia) ([address] I47.1 427.89     20 beat episode. Will not start any medication as had Mobitz block on sleep study. Which is improved on CPAP   7. Essential hypertension  I10 401.9     Elevated blood pressure. Home readings also elevated. Add hydralazine 25 mg twice a day   2/2021  Seen for increased shortness of breath and edema. Significant edema of feet. Change diuretic to Demadex and metolazone follow BMP check echo and nuclear stress test  2020  Seen after recent follow-up. Had occasional lightheadedness low blood pressure today discontinue hydralazine. Monitor blood pressure at home and decide on adjusting other medication. Use of diuretic as improved edema. Continue use as being done. Sleep study showed Ashley 1 block throughout the study. Will get event monitor to make sure he does not have significant pauses. These are likely related to his sleep apnea. Okay to use what ever more of CPAP or BiPAP treatment that patient requires from cardiac standpoint. Normal ejection fraction and negative stress test    5/2021  Cardiac status stable blood pressure elevated add hydralazine. No further episode of Mobitz 1 block noted on MCOT. Compliant with CPAP. Episode of SVT 20 beat. Asymptomatic. Will not start any different medication at present. Symptoms of shortness of breath improving      There are no discontinued medications. Orders Placed This Encounter    hydrALAZINE (APRESOLINE) 25 mg tablet     Sig: Take 1 Tab by mouth two (2) times a day. Dispense:  180 Tab     Refill:  3       Follow-up and Dispositions    · Return in about 6 months (around 11/5/2021).

## 2021-05-05 NOTE — PROGRESS NOTES
HISTORY OF PRESENT ILLNESS  Ival Sacks is a 80 y.o. male. 2/15/2021  Patient is in today for new patient evaluation he is referred here for evaluation of shortness of breath and edema. Patient has had COVID-19 infection a few months ago since then he has been having shortness of breath feels like he is unable to take deep breath. Has been followed by pulmonary. He is seeing increased edema that is on and off. Denies any chest pain. Denies any orthopnea PND. He has no known cardiac history but arteriosclerosis was reported on CAT scan done a few years ago  3/2021  Patient seen for follow-up. Diagnostic testing results will be discussed  5/5/2021      Follow-up  Associated symptoms include shortness of breath. Pertinent negatives include no chest pain, no abdominal pain and no headaches. Review of Systems   Constitutional: Negative for chills and fever. HENT: Negative for nosebleeds. Eyes: Negative for blurred vision and double vision. Respiratory: Positive for shortness of breath. Negative for cough, hemoptysis, sputum production and wheezing. Cardiovascular: Negative for chest pain, palpitations, orthopnea, claudication, leg swelling and PND. Gastrointestinal: Negative for abdominal pain, heartburn, nausea and vomiting. Musculoskeletal: Negative for myalgias. Skin: Negative for rash. Neurological: Negative for dizziness, weakness and headaches. Endo/Heme/Allergies: Does not bruise/bleed easily. 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 Dr. Rashawn Rodriguez.    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 Dr. Claudia Bill. Past Surgical History:   Procedure Laterality Date    HX AMPUTATION FINGER Left     index    HX BACK SURGERY  10/23/2019    Right L4-5 hemilaminectomy, medial facetectomy, resection of synovial cyst    HX CARPAL TUNNEL RELEASE Bilateral     HX CATARACT REMOVAL Left 01/2018    HX CATARACT REMOVAL Bilateral 2018    HX COLONOSCOPY      HX HEENT      sinus, tonsillectomy    HX HERNIA REPAIR      HX MOHS PROCEDURES      bilateral     HX ORTHOPAEDIC      lef knee surgery, removed cartilage.  HX ROTATOR CUFF REPAIR Right        Social History     Tobacco Use    Smoking status: Former Smoker     Packs/day: 2.00     Years: 15.00     Pack years: 30.00     Types: Cigars, Pipe, Cigarettes    Smokeless tobacco: Former User     Types: Chew   Substance Use Topics    Alcohol use: Yes     Frequency: Monthly or less     Drinks per session: 1 or 2     Comment: rarely       Allergies   Allergen Reactions    Latex, Natural Rubber Swelling    Adhesive Tape-Silicones Rash and Itching    Aldactone [Spironolactone] Not Reported This Time     Breast tenderness    Codeine Not Reported This Time     \"Drives crazy\"    Tape [Adhesive] Rash    Tetanus Toxoid, Adsorbed Anaphylaxis    Tetanus Vaccines And Toxoid Anaphylaxis       Prior to Admission medications    Medication Sig Start Date End Date Taking? Authorizing Provider   lisinopriL (PRINIVIL, ZESTRIL) 40 mg tablet TAKE 1 TABLET BY MOUTH ONCE DAILY 3/8/21  Yes Malcolm Good MD   torsemide [facility]) 20 mg tablet Take 1 Tab by mouth two (2) times a day.  2/15/21  Yes MD Akil Guzmán 5 % dpet  1/23/21  Yes Provider, Historical   ergocalciferol (ERGOCALCIFEROL) 1,250 mcg (50,000 unit) capsule Take 1 Cap by mouth every seven (7) days. Indications: vitamin D deficiency (high dose therapy) 2/9/21  Yes Baron Plummer MD   sertraline (ZOLOFT) 50 mg tablet Take 1.5 Tabs by mouth daily. 1/12/21  Yes Baron Plummer MD   omeprazole (PRILOSEC) 20 mg capsule TAKE 1 CAPSULE BY MOUTH ONCE DAILY 1/9/21  Yes Baron Plummer MD   celecoxib (CELEBREX) 200 mg capsule TAKE 1 CAPSULE BY MOUTH ONCE DAILY 11/3/20  Yes Baron Plummer MD   amLODIPine (NORVASC) 10 mg tablet TAKE 1 TABLET BY MOUTH ONCE DAILY 10/19/20  Yes Baron Plummer MD   hydrOXYchloroQUINE (PLAQUENIL) 200 mg tablet Take 400 mg by mouth daily. 7/16/20  Yes Provider, Historical   tamsulosin (Flomax) 0.4 mg capsule Take 1 Cap by mouth daily. Indications: enlarged prostate with urination problem 6/26/20  Yes Baron Plummer MD   atorvastatin (LIPITOR) 10 mg tablet TAKE 1 TABLET BY MOUTH ONCE DAILY 4/24/20  Yes Baron Plummer MD   mineral oil liquid Take 30 mL by mouth daily as needed for Constipation. Yes Provider, Historical   cycloSPORINE (RESTASIS) 0.05 % ophthalmic emulsion Administer 1 Drop to both eyes nightly. Yes Provider, Historical   colchicine (MITIGARE) 0.6 mg capsule Take 0.6 mg by mouth every other day. Yes Provider, Historical   cholecalciferol, vitamin D3, (VITAMIN D3) 2,000 unit tab Take 2,000 Units by mouth daily. Yes Provider, Historical   diclofenac (VOLTAREN) 1 % topical gel Apply 4 g to affected area four (4) times daily. Yes Provider, Historical   LUMIGAN 0.01 % ophthalmic drops Administer 1 Drop to both eyes nightly. 5/19/14  Yes Provider, Historical   MULTIVITS/IRON FUM/FA/D3/LYCOP (MULTI FOR HIM PO) Take  by mouth daily.    Yes Provider, Historical   Oxygen 2 Liters continuous [facility]    Provider, Historical         Visit Vitals  BP (!) 144/75 (BP 1 Location: Left arm, BP Patient Position: Sitting, BP Cuff Size: Adult)   Pulse 91   Temp 98.5 °F (36.9 °C) (Temporal)   Ht 5' 8\" (1.727 m)   Wt 95.4 kg (210 lb 6.4 oz)   SpO2 97%   BMI 31.99 kg/m²         Physical Exam   Constitutional: He is oriented to person, place, and time. He appears well-developed and well-nourished. HENT:   Head: Normocephalic and atraumatic. Eyes: Conjunctivae are normal.   Neck: Neck supple. No JVD present. No tracheal deviation present. No thyromegaly present. Cardiovascular: Normal rate, regular rhythm and normal heart sounds. Exam reveals no gallop and no friction rub. No murmur heard. Pulmonary/Chest: Breath sounds normal. No respiratory distress. He has no wheezes. He has no rales. He exhibits no tenderness. Abdominal: Soft. There is no abdominal tenderness. Musculoskeletal:         General: Edema present. Neurological: He is alert and oriented to person, place, and time. Skin: Skin is warm and dry. Psychiatric: He has a normal mood and affect. Mr. Estefanía Shoemaker has a reminder for a \"due or due soon\" health maintenance. I have asked that he contact his primary care provider for follow-up on this health maintenance. No flowsheet data found. I have personally reviewed patient's records available from   hospital and other providers and incorporated findings in patient care. Notes, labs, CT scan, chest x-ray, EKG, echo      Chest x-ray9/2020  Very mild peribronchial thickening, similar to prior exam. No new focal  consolidation. Additional findings as discussed. Interpretation Summary 2018    · Left Ventricle: Normal cavity size, wall thickness, systolic function (ejection fraction normal) and diastolic function. Estimated left ventricular ejection fraction is 56 - 60%. Visually measured ejection fraction. No regional wall motion abnormality noted. · Tricuspid regurgitation is inadequate for estimation of right ventricular systolic pressure.      Interpretation Summary 9/2020    · LV: Estimated LVEF is 55 - 60%. Visually measured ejection fraction. Normal cavity size, wall thickness and systolic function (ejection fraction normal). Wall motion: normal. Inconclusive left ventricular diastolic function. · MV: Mild mitral valve regurgitation is present. Assessment         ICD-10-CM ICD-9-CM    1. Dyspnea on exertion  R06.00 786.09    2. COVID-19 virus infection  U07.1 079.89    3. Atherosclerosis  I70.90 440.9    4. Bilateral lower extremity edema  R60.0 782.3    5. Second degree heart block  I44.1 426.13    2/2021  Seen for increased shortness of breath and edema. Significant edema of feet. Change diuretic to Demadex and metolazone follow BMP check echo and nuclear stress test  2020  Seen after recent follow-up. Had occasional lightheadedness low blood pressure today discontinue hydralazine. Monitor blood pressure at home and decide on adjusting other medication. Use of diuretic as improved edema. Continue use as being done. Sleep study showed Ashley 1 block throughout the study. Will get event monitor to make sure he does not have significant pauses. These are likely related to his sleep apnea. Okay to use what ever more of CPAP or BiPAP treatment that patient requires from cardiac standpoint. Normal ejection fraction and negative stress test  There are no discontinued medications. No orders of the defined types were placed in this encounter.

## 2021-05-13 ENCOUNTER — APPOINTMENT (OUTPATIENT)
Dept: INTERNAL MEDICINE CLINIC | Age: 81
End: 2021-05-13

## 2021-05-13 ENCOUNTER — HOSPITAL ENCOUNTER (OUTPATIENT)
Dept: LAB | Age: 81
Discharge: HOME | End: 2021-05-13
Payer: MEDICARE

## 2021-05-13 DIAGNOSIS — M11.20 CPDD (CALCIUM PYROPHOSPHATE DEPOSITION DISEASE): ICD-10-CM

## 2021-05-13 DIAGNOSIS — M06.042 RHEUMATOID ARTHRITIS INVOLVING BOTH HANDS WITH NEGATIVE RHEUMATOID FACTOR (HCC): ICD-10-CM

## 2021-05-13 DIAGNOSIS — N13.8 BPH WITH OBSTRUCTION/LOWER URINARY TRACT SYMPTOMS: ICD-10-CM

## 2021-05-13 DIAGNOSIS — G47.33 OBSTRUCTIVE SLEEP APNEA SYNDROME: ICD-10-CM

## 2021-05-13 DIAGNOSIS — E78.5 HYPERLIPIDEMIA, UNSPECIFIED HYPERLIPIDEMIA TYPE: ICD-10-CM

## 2021-05-13 DIAGNOSIS — R73.03 PRE-DIABETES: ICD-10-CM

## 2021-05-13 DIAGNOSIS — S68.621S: ICD-10-CM

## 2021-05-13 DIAGNOSIS — F33.0 MILD EPISODE OF RECURRENT MAJOR DEPRESSIVE DISORDER (HCC): ICD-10-CM

## 2021-05-13 DIAGNOSIS — M47.816 LUMBAR FACET ARTHROPATHY: ICD-10-CM

## 2021-05-13 DIAGNOSIS — R73.01 IMPAIRED FASTING GLUCOSE: ICD-10-CM

## 2021-05-13 DIAGNOSIS — M15.9 PRIMARY OSTEOARTHRITIS INVOLVING MULTIPLE JOINTS: ICD-10-CM

## 2021-05-13 DIAGNOSIS — Z86.16 HISTORY OF 2019 NOVEL CORONAVIRUS DISEASE (COVID-19): ICD-10-CM

## 2021-05-13 DIAGNOSIS — N40.1 BPH WITH OBSTRUCTION/LOWER URINARY TRACT SYMPTOMS: ICD-10-CM

## 2021-05-13 DIAGNOSIS — K21.9 GASTROESOPHAGEAL REFLUX DISEASE, UNSPECIFIED WHETHER ESOPHAGITIS PRESENT: ICD-10-CM

## 2021-05-13 DIAGNOSIS — M67.432 GANGLION CYST OF WRIST, LEFT: ICD-10-CM

## 2021-05-13 DIAGNOSIS — I10 ESSENTIAL HYPERTENSION: ICD-10-CM

## 2021-05-13 DIAGNOSIS — E66.09 CLASS 1 OBESITY DUE TO EXCESS CALORIES WITH SERIOUS COMORBIDITY AND BODY MASS INDEX (BMI) OF 31.0 TO 31.9 IN ADULT: ICD-10-CM

## 2021-05-13 DIAGNOSIS — R60.0 BILATERAL LOWER EXTREMITY EDEMA: ICD-10-CM

## 2021-05-13 DIAGNOSIS — E55.9 VITAMIN D DEFICIENCY: ICD-10-CM

## 2021-05-13 DIAGNOSIS — M50.30 DDD (DEGENERATIVE DISC DISEASE), CERVICAL: ICD-10-CM

## 2021-05-13 DIAGNOSIS — M06.041 RHEUMATOID ARTHRITIS INVOLVING BOTH HANDS WITH NEGATIVE RHEUMATOID FACTOR (HCC): ICD-10-CM

## 2021-05-13 LAB
25(OH)D3 SERPL-MCNC: 59.9 NG/ML (ref 30–100)
ALBUMIN SERPL-MCNC: 4 G/DL (ref 3.4–5)
ALBUMIN/GLOB SERPL: 1.6 {RATIO} (ref 0.8–1.7)
ALP SERPL-CCNC: 97 U/L (ref 45–117)
ALT SERPL-CCNC: 32 U/L (ref 16–61)
ANION GAP SERPL CALC-SCNC: 6 MMOL/L (ref 3–18)
AST SERPL-CCNC: 23 U/L (ref 10–38)
BASOPHILS # BLD: 0 K/UL (ref 0–0.1)
BASOPHILS NFR BLD: 1 % (ref 0–2)
BILIRUB SERPL-MCNC: 0.6 MG/DL (ref 0.2–1)
BUN SERPL-MCNC: 19 MG/DL (ref 7–18)
BUN/CREAT SERPL: 19 (ref 12–20)
CALCIUM SERPL-MCNC: 9.1 MG/DL (ref 8.5–10.1)
CHLORIDE SERPL-SCNC: 109 MMOL/L (ref 100–111)
CHOLEST SERPL-MCNC: 129 MG/DL
CO2 SERPL-SCNC: 32 MMOL/L (ref 21–32)
CREAT SERPL-MCNC: 0.99 MG/DL (ref 0.6–1.3)
CREAT UR-MCNC: 46 MG/DL (ref 30–125)
DIFFERENTIAL METHOD BLD: ABNORMAL
EOSINOPHIL # BLD: 0.2 K/UL (ref 0–0.4)
EOSINOPHIL NFR BLD: 5 % (ref 0–5)
ERYTHROCYTE [DISTWIDTH] IN BLOOD BY AUTOMATED COUNT: 13.7 % (ref 11.6–14.5)
EST. AVERAGE GLUCOSE BLD GHB EST-MCNC: 114 MG/DL
GLOBULIN SER CALC-MCNC: 2.5 G/DL (ref 2–4)
GLUCOSE SERPL-MCNC: 107 MG/DL (ref 74–99)
HBA1C MFR BLD: 5.6 % (ref 4.2–5.6)
HCT VFR BLD AUTO: 39.7 % (ref 36–48)
HDLC SERPL-MCNC: 56 MG/DL (ref 40–60)
HDLC SERPL: 2.3 {RATIO} (ref 0–5)
HGB BLD-MCNC: 12.9 G/DL (ref 13–16)
LDLC SERPL CALC-MCNC: 62.4 MG/DL (ref 0–100)
LIPID PROFILE,FLP: NORMAL
LYMPHOCYTES # BLD: 1.4 K/UL (ref 0.9–3.6)
LYMPHOCYTES NFR BLD: 29 % (ref 21–52)
MAGNESIUM SERPL-MCNC: 2.1 MG/DL (ref 1.6–2.6)
MCH RBC QN AUTO: 32.4 PG (ref 24–34)
MCHC RBC AUTO-ENTMCNC: 32.5 G/DL (ref 31–37)
MCV RBC AUTO: 99.7 FL (ref 74–97)
MICROALBUMIN UR-MCNC: <0.5 MG/DL (ref 0–3)
MICROALBUMIN/CREAT UR-RTO: NORMAL MG/G (ref 0–30)
MONOCYTES # BLD: 0.6 K/UL (ref 0.05–1.2)
MONOCYTES NFR BLD: 12 % (ref 3–10)
NEUTS SEG # BLD: 2.6 K/UL (ref 1.8–8)
NEUTS SEG NFR BLD: 54 % (ref 40–73)
PLATELET # BLD AUTO: 194 K/UL (ref 135–420)
PMV BLD AUTO: 10.3 FL (ref 9.2–11.8)
POTASSIUM SERPL-SCNC: 4.4 MMOL/L (ref 3.5–5.5)
PROT SERPL-MCNC: 6.5 G/DL (ref 6.4–8.2)
RBC # BLD AUTO: 3.98 M/UL (ref 4.35–5.65)
SODIUM SERPL-SCNC: 147 MMOL/L (ref 136–145)
TRIGL SERPL-MCNC: 53 MG/DL (ref ?–150)
VLDLC SERPL CALC-MCNC: 10.6 MG/DL
WBC # BLD AUTO: 4.9 K/UL (ref 4.6–13.2)

## 2021-05-13 PROCEDURE — 86803 HEPATITIS C AB TEST: CPT

## 2021-05-13 PROCEDURE — 80061 LIPID PANEL: CPT

## 2021-05-13 PROCEDURE — 82306 VITAMIN D 25 HYDROXY: CPT

## 2021-05-13 PROCEDURE — 82043 UR ALBUMIN QUANTITATIVE: CPT

## 2021-05-13 PROCEDURE — 36415 COLL VENOUS BLD VENIPUNCTURE: CPT

## 2021-05-13 PROCEDURE — 80053 COMPREHEN METABOLIC PANEL: CPT

## 2021-05-13 PROCEDURE — 83036 HEMOGLOBIN GLYCOSYLATED A1C: CPT

## 2021-05-13 PROCEDURE — 85025 COMPLETE CBC W/AUTO DIFF WBC: CPT

## 2021-05-13 PROCEDURE — 83735 ASSAY OF MAGNESIUM: CPT

## 2021-05-14 LAB
HCV AB SER IA-ACNC: 0.1 INDEX
HCV AB SERPL QL IA: NEGATIVE
HCV COMMENT,HCGAC: NORMAL

## 2021-05-20 ENCOUNTER — OFFICE VISIT (OUTPATIENT)
Dept: INTERNAL MEDICINE CLINIC | Age: 81
End: 2021-05-20
Payer: MEDICARE

## 2021-05-20 VITALS
RESPIRATION RATE: 16 BRPM | HEIGHT: 68 IN | BODY MASS INDEX: 31.98 KG/M2 | HEART RATE: 84 BPM | DIASTOLIC BLOOD PRESSURE: 68 MMHG | TEMPERATURE: 97.7 F | SYSTOLIC BLOOD PRESSURE: 134 MMHG | WEIGHT: 211 LBS | OXYGEN SATURATION: 98 %

## 2021-05-20 DIAGNOSIS — D64.9 ANEMIA, UNSPECIFIED TYPE: ICD-10-CM

## 2021-05-20 DIAGNOSIS — G47.33 OBSTRUCTIVE SLEEP APNEA SYNDROME: ICD-10-CM

## 2021-05-20 DIAGNOSIS — E78.5 HYPERLIPIDEMIA, UNSPECIFIED HYPERLIPIDEMIA TYPE: ICD-10-CM

## 2021-05-20 DIAGNOSIS — R73.03 PRE-DIABETES: ICD-10-CM

## 2021-05-20 DIAGNOSIS — F33.0 MILD EPISODE OF RECURRENT MAJOR DEPRESSIVE DISORDER (HCC): ICD-10-CM

## 2021-05-20 DIAGNOSIS — E66.09 CLASS 1 OBESITY DUE TO EXCESS CALORIES WITH SERIOUS COMORBIDITY AND BODY MASS INDEX (BMI) OF 32.0 TO 32.9 IN ADULT: ICD-10-CM

## 2021-05-20 DIAGNOSIS — Z86.16 HISTORY OF 2019 NOVEL CORONAVIRUS DISEASE (COVID-19): ICD-10-CM

## 2021-05-20 DIAGNOSIS — R68.89 OTHER GENERAL SYMPTOMS AND SIGNS: ICD-10-CM

## 2021-05-20 DIAGNOSIS — M06.041 RHEUMATOID ARTHRITIS INVOLVING BOTH HANDS WITH NEGATIVE RHEUMATOID FACTOR (HCC): ICD-10-CM

## 2021-05-20 DIAGNOSIS — I47.1 SVT (SUPRAVENTRICULAR TACHYCARDIA) (HCC): ICD-10-CM

## 2021-05-20 DIAGNOSIS — R60.0 BILATERAL LOWER EXTREMITY EDEMA: ICD-10-CM

## 2021-05-20 DIAGNOSIS — U09.9 POST-ACUTE SEQUELAE OF COVID-19 (PASC): ICD-10-CM

## 2021-05-20 DIAGNOSIS — N40.1 BPH WITH OBSTRUCTION/LOWER URINARY TRACT SYMPTOMS: ICD-10-CM

## 2021-05-20 DIAGNOSIS — I10 ESSENTIAL HYPERTENSION: Primary | ICD-10-CM

## 2021-05-20 DIAGNOSIS — M50.30 DDD (DEGENERATIVE DISC DISEASE), CERVICAL: ICD-10-CM

## 2021-05-20 DIAGNOSIS — N13.8 BPH WITH OBSTRUCTION/LOWER URINARY TRACT SYMPTOMS: ICD-10-CM

## 2021-05-20 DIAGNOSIS — M11.20 CPDD (CALCIUM PYROPHOSPHATE DEPOSITION DISEASE): ICD-10-CM

## 2021-05-20 DIAGNOSIS — M47.812 FACET ARTHROPATHY, CERVICAL: ICD-10-CM

## 2021-05-20 DIAGNOSIS — M06.042 RHEUMATOID ARTHRITIS INVOLVING BOTH HANDS WITH NEGATIVE RHEUMATOID FACTOR (HCC): ICD-10-CM

## 2021-05-20 DIAGNOSIS — R07.89 CHEST WALL PAIN: ICD-10-CM

## 2021-05-20 PROCEDURE — G8427 DOCREV CUR MEDS BY ELIG CLIN: HCPCS | Performed by: INTERNAL MEDICINE

## 2021-05-20 PROCEDURE — G0463 HOSPITAL OUTPT CLINIC VISIT: HCPCS | Performed by: INTERNAL MEDICINE

## 2021-05-20 PROCEDURE — 99214 OFFICE O/P EST MOD 30 MIN: CPT | Performed by: INTERNAL MEDICINE

## 2021-05-20 PROCEDURE — 1101F PT FALLS ASSESS-DOCD LE1/YR: CPT | Performed by: INTERNAL MEDICINE

## 2021-05-20 PROCEDURE — G8754 DIAS BP LESS 90: HCPCS | Performed by: INTERNAL MEDICINE

## 2021-05-20 PROCEDURE — G8752 SYS BP LESS 140: HCPCS | Performed by: INTERNAL MEDICINE

## 2021-05-20 PROCEDURE — G8417 CALC BMI ABV UP PARAM F/U: HCPCS | Performed by: INTERNAL MEDICINE

## 2021-05-20 PROCEDURE — G9717 DOC PT DX DEP/BP F/U NT REQ: HCPCS | Performed by: INTERNAL MEDICINE

## 2021-05-20 PROCEDURE — G8536 NO DOC ELDER MAL SCRN: HCPCS | Performed by: INTERNAL MEDICINE

## 2021-05-20 NOTE — PROGRESS NOTES
1. Have you been to the ER, urgent care clinic or hospitalized since your last visit? NO.     2. Have you seen or consulted any other health care providers outside of the [address] [place] [address] since your last visit (Include any pap smears or colon screening)?  NO

## 2021-05-20 NOTE — PATIENT INSTRUCTIONS
Learning About Low-Sodium Foods What foods are low in sodium? The foods you eat contain nutrients, such as vitamins and minerals. Sodium is a nutrient. Your body needs the right amount to stay healthy and work as it should. You can use the list below to help you make choices about which foods to eat. Fruits · Fresh, frozen, canned, or dried fruit Vegetables · Fresh or frozen vegetables, with no added salt · Canned vegetables, low-sodium or with no added salt Grains · Bagels without salted tops · Cereal with no added salt · Corn tortillas · Crackers with no added salt · Oatmeal, cooked without salt · Popcorn with no salt · Pasta and noodles, cooked without salt · Rice, cooked without salt · Unsalted pretzels Dairy and dairy alternatives · Butter, unsalted · Cream cheese · Ice cream 
· Milk · Soy milk Meats and other protein foods · Beans and peas, canned with no salt · Eggs · Fresh fish (not smoked) · Fresh meats (not smoked or cured) · Nuts and nut butter, prepared without salt · Poultry, not packaged with sodium solution · Tofu, unseasoned · Tuna, canned without salt Seasonings · Garlic · Herbs and spices · Lemon juice · Mustard · Olive oil · Salt-free seasoning mixes · Vinegar Work with your doctor to find out how much of this nutrient you need. Depending on your health, you may need more or less of it in your diet. Where can you learn more? Go to http://www.gray.com/ Enter K846 in the search box to learn more about \"Learning About Low-Sodium Foods. \" Current as of: August 22, 2019               Content Version: 12.6 © 1900-5738 [employer]. Care instructions adapted under license by [employer] (which disclaims liability or warranty for this information).  If you have questions about a medical condition or this instruction, always ask your healthcare professional. Stacy Villagomez disclaims any warranty or liability for your use of this information. Low Sodium Diet (2,000 Milligram): Care Instructions Your Care Instructions Too much sodium causes your body to hold on to extra water. This can raise your blood pressure and force your heart and kidneys to work harder. In very serious cases, this could cause you to be put in the hospital. It might even be life-threatening. By limiting sodium, you will feel better and lower your risk of serious problems. The most common source of sodium is salt. People get most of the salt in their diet from canned, prepared, and packaged foods. Fast food and restaurant meals also are very high in sodium. Your doctor will probably limit your sodium to less than 2,000 milligrams (mg) a day. This limit counts all the sodium in prepared and packaged foods and any salt you add to your food. Follow-up care is a key part of your treatment and safety. Be sure to make and go to all appointments, and call your doctor if you are having problems. It's also a good idea to know your test results and keep a list of the medicines you take. How can you care for yourself at home? Read food labels · Read labels on cans and food packages. The labels tell you how much sodium is in each serving. Make sure that you look at the serving size. If you eat more than the serving size, you have eaten more sodium. · Food labels also tell you the Percent Daily Value for sodium. Choose products with low Percent Daily Values for sodium. · Be aware that sodium can come in forms other than salt, including monosodium glutamate (MSG), sodium citrate, and sodium bicarbonate (baking soda). MSG is often added to Asian food. When you eat out, you can sometimes ask for food without MSG or added salt. Buy low-sodium foods · Buy foods that are labeled \"unsalted\" (no salt added), \"sodium-free\" (less than 5 mg of sodium per serving), or \"low-sodium\" (less than 140 mg of sodium per serving).  Foods labeled \"reduced-sodium\" and \"light sodium\" may still have too much sodium. Be sure to read the label to see how much sodium you are getting. · Buy fresh vegetables, or frozen vegetables without added sauces. Buy low-sodium versions of canned vegetables, soups, and other canned goods. Prepare low-sodium meals · Cut back on the amount of salt you use in cooking. This will help you adjust to the taste. Do not add salt after cooking. One teaspoon of salt has about 2,300 mg of sodium. · Take the salt shaker off the table. · Flavor your food with garlic, lemon juice, onion, vinegar, herbs, and spices. Do not use soy sauce, lite soy sauce, steak sauce, onion salt, garlic salt, celery salt, mustard, or ketchup on your food. · Use low-sodium salad dressings, sauces, and ketchup. Or make your own salad dressings and sauces without adding salt. · Use less salt (or none) when recipes call for it. You can often use half the salt a recipe calls for without losing flavor. Other foods such as rice, pasta, and grains do not need added salt. · Rinse canned vegetables, and cook them in fresh water. This removes somebut not allof the salt. · Avoid water that is naturally high in sodium or that has been treated with water softeners, which add sodium. Call your local water company to find out the sodium content of your water supply. If you buy bottled water, read the label and choose a sodium-free brand. Avoid high-sodium foods · Avoid eating: 
? Smoked, cured, salted, and canned meat, fish, and poultry. ? Ham, puentes, hot dogs, and luncheon meats. ? Regular, hard, and processed cheese and regular peanut butter. ? Crackers with salted tops, and other salted snack foods such as pretzels, chips, and salted popcorn. ? Frozen prepared meals, unless labeled low-sodium. ? Canned and dried soups, broths, and bouillon, unless labeled sodium-free or low-sodium. ? Canned vegetables, unless labeled sodium-free or low-sodium. ?  [place] fries, pizza, tacos, and other fast foods. ? Pickles, olives, ketchup, and other condiments, especially soy sauce, unless labeled sodium-free or low-sodium. Where can you learn more? Go to http://www.gray.com/ Enter J366 in the search box to learn more about \"Low Sodium Diet (2,000 Milligram): Care Instructions. \" Current as of: August 22, 2019               Content Version: 12.6 © 4591-4490 [employer]. Care instructions adapted under license by [employer] (which disclaims liability or warranty for this information). If you have questions about a medical condition or this instruction, always ask your healthcare professional. [address] any warranty or liability for your use of this information. Heart-Healthy Diet: Care Instructions Your Care Instructions A heart-healthy diet has lots of vegetables, fruits, nuts, beans, and whole grains, and is low in salt. It limits foods that are high in saturated fat, such as meats, cheeses, and fried foods. It may be hard to change your diet, but even small changes can lower your risk of heart attack and heart disease. Follow-up care is a key part of your treatment and safety. Be sure to make and go to all appointments, and call your doctor if you are having problems. It's also a good idea to know your test results and keep a list of the medicines you take. How can you care for yourself at home? Watch your portions · Learn what a serving is. A \"serving\" and a \"portion\" are not always the same thing. Make sure that you are not eating larger portions than are recommended. For example, a serving of pasta is ½ cup. A serving size of meat is 2 to 3 ounces. A 3-ounce serving is about the size of a deck of cards. Measure serving sizes until you are good at [place]" them. Keep in mind that restaurants often serve portions that are 2 or 3 times the size of one serving.  
· To keep your energy level up and keep you from feeling hungry, eat often but in smaller portions. · Eat only the number of calories you need to stay at a healthy weight. If you need to lose weight, eat fewer calories than your body burns (through exercise and other physical activity). Eat more fruits and vegetables · Eat a variety of fruit and vegetables every day. Dark green, deep orange, red, or yellow fruits and vegetables are especially good for you. Examples include spinach, carrots, peaches, and berries. · Keep carrots, celery, and other veggies handy for snacks. Buy fruit that is in season and store it where you can see it so that you will be tempted to eat it. · Cook dishes that have a lot of veggies in them, such as stir-fries and soups. Limit saturated and trans fat · Read food labels, and try to avoid saturated and trans fats. They increase your risk of heart disease. · Use olive or canola oil when you cook. · Bake, broil, grill, or steam foods instead of frying them. · Choose lean meats instead of high-fat meats such as hot dogs and sausages. Cut off all visible fat when you prepare meat. · Eat fish, skinless poultry, and meat alternatives such as soy products instead of high-fat meats. Soy products, such as tofu, may be especially good for your heart. · Choose low-fat or fat-free milk and dairy products. Eat foods high in fiber · Eat a variety of grain products every day. Include whole-grain foods that have lots of fiber and nutrients. Examples of whole-grain foods include oats, whole wheat bread, and brown rice. · Buy whole-grain breads and cereals, instead of white bread or pastries. Limit salt and sodium · Limit how much salt and sodium you eat to help lower your blood pressure. · Taste food before you salt it. Add only a little salt when you think you need it. With time, your taste buds will adjust to less salt. · Eat fewer snack items, fast foods, and other high-salt, processed foods. Check food labels for the amount of sodium in packaged foods. · Choose low-sodium versions of canned goods (such as soups, vegetables, and beans). Limit sugar · Limit drinks and foods with added sugar. These include candy, desserts, and soda pop. Limit alcohol · Limit alcohol to no more than 2 drinks a day for men and 1 drink a day for women. Too much alcohol can cause health problems. When should you call for help? Watch closely for changes in your health, and be sure to contact your doctor if: 
  · You would like help planning heart-healthy meals. Where can you learn more? Go to http://www.edmonds.com/ Enter V137 in the search box to learn more about \"Heart-Healthy Diet: Care Instructions. \" Current as of: August 22, 2019               Content Version: 12.6 © 5942-6071 [employer]. Care instructions adapted under license by [employer] (which disclaims liability or warranty for this information). If you have questions about a medical condition or this instruction, always ask your healthcare professional. [address] any warranty or liability for your use of this information.

## 2021-05-23 PROBLEM — B37.2 CANDIDAL INTERTRIGO: Status: RESOLVED | Noted: 2018-09-30 | Resolved: 2021-05-23

## 2021-05-23 PROBLEM — M54.31 RIGHT SIDED SCIATICA: Status: RESOLVED | Noted: 2019-08-17 | Resolved: 2021-05-23

## 2021-05-23 NOTE — PROGRESS NOTES
HPI:   Whit Danielle is a 80y.o. year old male who presents today for a routine visit. He has a history of hypertension, hyperlipidemia, prediabetes, rheumatoid arthritis, osteoarthritis, calcium pyrophosphate deposition disease, BPH, GERD, and depression. He also has a history of COVID-19 infection (0/0694) complicated by severe pneumonia and acute hypoxic respiratory failure, and PASC. He reports today that he is doing reasonably well. He has completed the Moderna COVID 19 vaccine series. He reports continuing chest wall pain with deep inspiration, but no significant dyspnea. He also reports some improvement in his lower extremity edema. He states that he is now using BIPAP every night and is finding it to be helpful. He does admit to falling asleep in his recliner for several hours before awakening and putting on his BIPAP. He is otherwise without new complaints and feeling overall well. On 6/22/2020, he noted the onset of weakness, fatigue, and diarrhea. He stated that he continued to work for the entire week despite feeling ill. On 6/27/2020, he developed fever and dyspnea, which worsened throughout the weekend. He presented to [facility] on 6/28/2020, and WBC 4.0 (78 % neutrophils) and chest x-ray showed patchy density in the RUL, right perihilar area, and right base. He was advised to go to the ED, and presented to [facility] ED on 6/29/2020. He was found to be hypoxic with pulse ox at rest 92-93 % and ambulation 89-91% (room air). Chest x-ray showed bilateral multifocal extremely subtle groundglass opacities. Labs showed WBC 4.4 (80% neutrophils), Hb 14.6/ Hct 42.0, platelets 427, creatinine 0.71/ eGFR >60, AST 53, alk phos 160, lactate 1.16. He was discharged home, and SARS COV-2 positive (6/29/2020) result returned.  He presented for a virtual visit on 7/2/2020, and reported that since discharge from the ED, he noted persistent symptoms of weakness, diarrhea, fatigue, and fevers, and prgressive dyspnea. He described poor po intake, and chest pain with inspiration and felt that he was unable to take a deep breath or cough. He was advised to call EMS and return to the ED. Upon arrival by EMS, his oxygenation was noted to be in the low 80's, and required high flow oxygen. Chest x-ray (7/2/2020) showed bilateral increased patchy groundglass airspace opacities, and labs revealed ABG 7.43/34/70 on nonrebreather mask; WBC 7.3 (11% lymphocytes), Hb 14.8/ Hct 42.6, creatinine 0.8/ eGFR>60, ALT 79, AST 76, alk phos 170, albumin 2.7 D-dimer 1.35, ferritin 729, CRP 16.6, , procalcitonin 0.14, lactate 2.8, blood culture negative. He was initially started on Zosyn, but Dr. Santos Miller (ID) was consulted and recommended discontinuing and beginning azithromycin, remdesivir, IV Solumedrol, and lovenox. He was also started on ascorbic acid, melatonin, and zinc. Dr. Clarence Colby (pulmonary) was consulted and recommended change to high flow nasal cannula and recommended proning to the patient. He improved clinically and inflammatory indices improved. His oxygen was weaned to 5 liters nasal cannula, and he completed 5 day course of remdesivir and azithromycin. He was discharged on 7/9/2020 with an 8 day course of prednisone, 30 day course of Eliquis 2.5 mg bid, and two week course of Vitamin  C and zinc. He was seen for post-hospitalization follow-up on 7/16/2020 and reported some persistent congestion and significant dyspnea on exertion. He was referred to Dr. Vicente Vann, and she recommended continued oxygen use as needed and stressed need for treatment of obstructive sleep apnea with CPAP. She placed order for him to receive auto CPAP. He was unable to tolerate CPAP due to continued air hunger even with bleeding in of 2 liters nasal cannula. He had a repeat sleep study on 1/29/2021, which showed obstructive sleep apnea with emergence of central apnea and BIPAP found to be most effective.  During the study, he was found to have PAC's, PVC's and intermittent Mobitz 1 second degree AV block. He was referred to Dr. Juan Jose Vasquez and his diuretics were changed from lasix to torsemide and metolazone. However, he stopped taking metolazone after 1 week since began to feel lightheaded and noted low blood pressure readings with SBP 80-90. He underwent an echocardiogram (2/24/2021) showing normal LV size and function (EF 60-65%), mild MR and PI and PAP 23 mmHg; and a pharmacologic nuclear stress test (2/24/2021) which was a normal, low risk study with no TID and calculated EF 62%. He had a 6 minute walk test (3/31/2021) showing no significant O2 desaturation; and PFT's showing normal flows, normal DLCO, and isolated decrease in RV and FRC. He also had a 30 day event monitor (5/2021) which showed rare PVC, rare PAC, and one episode of 20 beat SVT at 155 bpm (asymptomatic). On 8/13/2019, he reported that he had been seeing Dr. Joie Avery for increasing difficulty with right sided sciatica. He reported being treated with a Medrol dose pack, physical therapy, and spinal injections without improvement, and was also prescribed Norco and Tramadol, but he stated that he found them too sedating and of no benefit. Due to persistent symptoms, he underwent a lumbar MRI (8/5/2019) which showed degenerative changes most notable at L4-L5 resulting in moderate spinal canal stenosis as well as moderate to severe right greater than left foraminal stenoses; advanced facet arthropathy with small facet effusions and suspected small right facet joint ventral synovial cyst; notable degenerative changes also L3-L4; L1 mild anterior compression deformity, chronic appearing; overall general worsening when compared to prior study in 2007. He was having continued significant pain, and was started on gabapentin 100 mg tid. He was referred to Dr. Guero Bboo and she increased his dose of gabapentin to 200 mg tid.  She also referred him to Dr. Osmin Rueda for evaluation given his lack of response to conservative management. He recommended proceeding with decompression by performing a L4/5 right-sided hemilaminotomy and medial facetectomy, which was performed on 10/23/2019. He developed postop urinary retention and was discharged with a Deleon catheter. He had follow-up with Dr. Shirley Arias on 10/29/2019 with successful voiding trial. He reports that he noted initial resolution of his right sciatica pain following surgery, but on 10/29/2019 noted abrupt recurrence when getting out of bed. He was evaluated by GOMEZ Doherty on 10/31/2019 and was treated with a Medrol dose pack without improvement. He was restarted on gabapentin 300 mg tid and reports improvement, although he continues to have mild pain in the morning. On 8/9/2019, he had an episode of waking up gasping for air forcing him to get out of bed and walk around until his breathing normalized. He has been having frequent similar episodes over that last year, but had been resistent to evaluation for sleep apnea. However, he reports that this episode was especially severe. When his symptoms persisted, he was evaluated at [facility], and testing included WBC 5.7, Hb 14.2/ Hct 41.6, creatinine 0.9/ eGFR>60, troponin I x 1 negative, NT-pro BNP 45, EKG sinus rhythm at 83 bpm and no ischemic changes, and chest x-ray without acute changes, but an ill defined 15 mm density in the lateral left mid zone was noted. He received lasix 40 mg IV and was discharged. He had an echocardiogram (8/26/2019) showing normal LV function (EF 56-60%), no RWMA, unable to estimate RVSP due to inadequate TR, but TV/PV appear normal. He was referred to Dr. Suma Palacios for probable sleep apnea, and underwent a home sleep study on 10/25/2019, showing evidence of severe obstructive sleep apnea with AHI 35 and oxygen guy 80%. He was not able to tolerate CPAP equipment as discussed.      On 6/20/2018, he suffered an accidental gun shot wound while working resulting in traumatic injury to his left index finger with near loss of the middle phalanx and fracture of the distal phalanx. He was taken to [facility] and initially had irrigation and closure of the lacerations performed. He presented to the [facility] ED on 6/24/2018 with increased pain and swelling, and was started on doxycycline for a wound infection. On 7/7/2018, due to loss of stability and angulation of the index finger, he underwent stabilization with fusion of the proximal and distal phalanx with bone grafting by Dr. Janna Gregory. He did well and was started on physical therapy. On 8/1/2018, he presented to [address] [place] [address] for evaluation of increasing erythema, swelling and tenderness with concern for a possible infection. He underwent left hand x-ray (8/1/2018) showing severe soft tissue swelling of the left second finger worrisome for cellulitis, traumatic amputation of the middle phalanx with marked osteopenia and irregularity of the base of the distal phalanx and flattening and irregularity of the head of the proximal phalanx. Unclear if related to prior trauma/surgery or osteomyelitis with osseous destruction and no films available for comparison. He was started empirically on Bactrim and Augmentin for 14 days. On 8/10/2018, he presented for evaluation by GOMEZ Pineda for complaints of fever, malaise, headache, fatigue, and diarrhea. Lab evaluation showed WBC 17 (90% neutrophils), creatinine 1.34/ eGFR 52, blood culture negative. Stool cultures (8/13/2018) routine, O/P, and C.diff negative. Bactrim and Augmentin were discontinued on 8/13/2018, and he was started on metronidazole for 10 days for treatment of presumed C.diff with improvement. He was evaluated by Dr. Yael Ling on 8/15/2018 and repeat WBC 8.6 (59% neutrophils), ESR 6, and CRP 0.9. He was seen by Dr. Yael Ling in follow-up on 8/30/2018 and left index finger wound noted to be significantly improved and no further difficulty with diarrhea.      He has a history of hypertension, treated with amlodipine, lisinopril, lasix (+ potassium), and hydralazine was added in 4/2018. He states that his wife has been monitoring his blood pressure intermittently. He denies any chest pain, shortness of breath at rest or with exertion, lightheadedness, or palpitations. He does report bilateral lower extremity swelling that it will increase throughout the day and improve overnight. . In 6/2016, he underwent lower extremity arterial and venous duplex scans, both of which were negative. He also has a history of hyperlipidemia, treated with moderate intensity atorvastatin. He has a history of prediabetes, with HbA1c ranging from 5.9-6.2 since 2012. He denies any polyuria, polydipsia, nocturia, or blurry vision, and has no history of retinopathy, neuropathy, or nephropathy. He has regular eye exams with Dr. Sabra Oliveira. He has a history of bilateral hand pain with morning stiffness for several years, and in 3/2014, he was noted to have a positive anti-CCP antibody level although NIMCO, rheumatoid factor, and ESR were negative. He was referred for evaluation to Dr. Nelda Matta, and was diagnosed with rheumatoid arthritis in 6/2014. He was treated with hydroxychloroquine, which has been partially helpful in controlling his symptoms. Bilateral hand x-rays also showed evidence of primary osteoarthritis with osteophytes present on the second and third MCP joints. In 1/2017, he was also noted to have evidence of possible chondrocalcinosis on x-ray, and was started on low dose colchicine in addition to hydroxychloroquine for concomitant calcium pyrophosphate deposition disease. He states that since starting on colchicine, he has had significant improvement in his hand pain. He also uses compounding cream and Voltaren gel for pain control. In 1/2019, he was complaining of worsening neck and bilateral shoulder pain (R>L).  He stated that he was previously followed by Dr. Bandar Mccullough, and would occasionally receive cortisone injections into his shoulders. He also described neck pain with difficulty turning his head. He denied any upper extremity weakness or paresthesias. He underwent imaging, and bilateral shoulder x-rays (1/10/2019) showed degenerative changes and secondary findings of rotator cuff pathology. Cervical spine x-rays (1/10/2019) showed advanced degenerative changes with multilevel facet arthropathy. He was evaluated by Dr. Aurelia Ching who gave him a cortisone injection in his right shoulder with some improvement. He also recommended a reverse shoulder replacement, but he remains hesitant to proceed. He was started on Celebrex 200 mg bid in 2/2019, and reports significant improvement. He continues to also use Tylenol as needed for pain. He states that his neck pain seems to have improved to his baseline level. He has a history of symptomatic BPH, with complaints of decreased stream, hesitancy, and dribbling. He has refused treatment with medication. He is followed by Dr. Catherine Birmingham. He denies any dysuria, gross hematuria, or flank pain. He has a history of GERD, treated with daily omeprazole with good control of symptoms. He had a screening colonoscopy and 8/2015 by Dr. Bernice Lindquist, showing a 3 mm sessile cecal polyp (pathology: intra-mucosal lymphoid aggregate), two 4 mm sessile polyps in the transverse colon (pathology: serrated adenomas), and a 1 cm lipoma in the transverse colon. Follow-up recommended for five years. He was having difficulty with rectal bleeding in 1/2018 and returned for evaluation with Dr. Bernice Lindquist who felt it was most likely secondary to a hemorrhoidal source. She recommended use of Citrucel. He states that the bleeding has improved with initiation of this therapy. He denies any abdominal pain, nausea, vomiting, melena, or change in bowel movements. He has a history of depression and anxiety, which had been well controlled with Paxil although inadvertently stopped. Now on Zoloft with improvement.     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 Dr. Doreen Bravo. 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 Dr. Melisa Rajan. Past Surgical History:   Procedure Laterality Date    HX AMPUTATION FINGER Left     index    HX BACK SURGERY  10/23/2019    Right L4-5 hemilaminectomy, medial facetectomy, resection of synovial cyst    HX CARPAL TUNNEL RELEASE Bilateral     HX CATARACT REMOVAL Left 01/2018    HX CATARACT REMOVAL Bilateral 2018    HX COLONOSCOPY      HX HEENT      sinus, tonsillectomy    HX HERNIA REPAIR      HX MOHS PROCEDURES      bilateral     HX ORTHOPAEDIC      lef knee surgery, removed cartilage.  HX ROTATOR CUFF REPAIR Right      Current Outpatient Medications   Medication Sig    hydrALAZINE (APRESOLINE) 25 mg tablet Take 1 Tab by mouth two (2) times a day.  lisinopriL (PRINIVIL, ZESTRIL) 40 mg tablet TAKE 1 TABLET BY MOUTH ONCE DAILY    torsemide (DEMADEX) 20 mg tablet Take 1 Tab by mouth two (2) times a day.  Xiidra 5 % dpet     ergocalciferol (ERGOCALCIFEROL) 1,250 mcg (50,000 unit) capsule Take 1 Cap by mouth every seven (7) days. Indications: vitamin D deficiency (high dose therapy)    sertraline (ZOLOFT) 50 mg tablet Take 1.5 Tabs by mouth daily.     omeprazole (PRILOSEC) 20 mg capsule TAKE 1 CAPSULE BY MOUTH ONCE DAILY    celecoxib (CELEBREX) 200 mg capsule TAKE 1 CAPSULE BY MOUTH ONCE DAILY    amLODIPine (NORVASC) 10 mg tablet TAKE 1 TABLET BY MOUTH ONCE DAILY    hydrOXYchloroQUINE (PLAQUENIL) 200 mg tablet Take 400 mg by mouth daily.  Oxygen 2 Liters continuous [employer]    tamsulosin (Flomax) 0.4 mg capsule Take 1 Cap by mouth daily. Indications: enlarged prostate with urination problem    atorvastatin (LIPITOR) 10 mg tablet TAKE 1 TABLET BY MOUTH ONCE DAILY    mineral oil liquid Take 30 mL by mouth daily as needed for Constipation.  cycloSPORINE (RESTASIS) 0.05 % ophthalmic emulsion Administer 1 Drop to both eyes nightly.  colchicine (MITIGARE) 0.6 mg capsule Take 0.6 mg by mouth every other day.  cholecalciferol, vitamin D3, (VITAMIN D3) 2,000 unit tab Take 2,000 Units by mouth daily.  diclofenac (VOLTAREN) 1 % topical gel Apply 4 g to affected area four (4) times daily.  LUMIGAN 0.01 % ophthalmic drops Administer 1 Drop to both eyes nightly.  MULTIVITS/IRON FUM/FA/D3/LYCOP (MULTI FOR HIM PO) Take  by mouth daily. No current facility-administered medications for this visit. Allergies and Intolerances: Allergies   Allergen Reactions    Latex, Natural Rubber Swelling    Adhesive Tape-Silicones Rash and Itching    Aldactone [Spironolactone] Not Reported This Time     Breast tenderness    Codeine Not Reported This Time     \"Drives crazy\"    Tape [Adhesive] Rash    Tetanus Toxoid, Adsorbed Anaphylaxis    Tetanus Vaccines And Toxoid Anaphylaxis     Family History: He has no family history of colon or prostate cancer. Family History   Problem Relation Age of Onset    Cancer Mother     Heart Disease Father     Alcohol abuse Father     Cancer Other      Social History:   He  reports that he has quit smoking. His smoking use included cigars, pipe, and cigarettes. He has a 30.00 pack-year smoking history. He has quit using smokeless tobacco.  His smokeless tobacco use included chew. He smoked 2 ppd for 50 years, stopping in 1974.  He is  with two adult children. 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 He is a employed at [employer] in [employer]. Social History     Substance and Sexual Activity   Alcohol Use Yes    Comment: rarely     Immunization History:  Immunization History   Administered Date(s) Administered    (RETIRED) Pneumococcal Vaccine (Unspecified Type) 01/01/2008    Covid-19, MODERNA, Mrna, Lnp-s, Pf, 100mcg/0.5mL 02/01/2021, 03/01/2021    Influenza High Dose Vaccine PF 09/30/2017    Influenza Vaccine ([facility]) PF 12/13/2017, 10/17/2018    Influenza Vaccine (Tri) Adjuvanted (>65 Yrs FLUAD TRI 07234) 09/25/2018, 09/25/2019    Influenza Vaccine (Trivalent) 10/19/2019, 11/18/2020    Influenza Vaccine Split 10/04/2011, 10/16/2012    Influenza Vaccine Whole 01/15/2010    Influenza, Quadrivalent, Adjuvanted (>65 Yrs FLUAD QUAD [id number]) 09/10/2020    Pneumococcal Conjugate (PCV-13) 01/19/2015    Pneumococcal Polysaccharide (PPSV-23) 01/01/2008    Varicella Virus Vaccine 10/01/2013    Zoster 10/16/2012       Review of Systems:   As above included in HPI. Otherwise 11 point review of systems negative including constitutional, skin, HENT, eyes, respiratory, cardiovascular, gastrointestinal, genitourinary, musculoskeletal, endocrine, hematologic, allergy, and neurologic. Physical:   Visit Vitals  /68   Pulse 84   Temp 97.7 °F (36.5 °C) (Temporal)   Resp 16   Ht 5' 8\" (1.727 m)   Wt 211 lb (95.7 kg)   SpO2 98%   BMI 32.08 kg/m²       Exam:   Patient appears in no apparent distress. Affect is appropriate. HEENT: PERRLA, anicteric, no JVD, adenopathy or thyromegaly. No carotid bruits or radiated murmur. Lungs: clear to auscultation, no wheezes, rhonchi, or rales. Heart: regular rate and rhythm. No murmur, rubs, gallops  Abdomen: soft, nontender, nondistended, normal bowel sounds, no hepatosplenomegaly or masses.  Left groin without palpable hernia or pain on palpation. Extremities: 1+ edema bilaterally. Review of Data:  Labs:    Hospital Outpatient Visit on 05/13/2021   Component Date Value Ref Range Status    WBC 05/13/2021 4.9  4.6 - 13.2 K/uL Final    RBC 05/13/2021 3.98* 4.35 - 5.65 M/uL Final    HGB 05/13/2021 12.9* 13.0 - 16.0 g/dL Final    HCT 05/13/2021 39.7  36.0 - 48.0 % Final    MCV 05/13/2021 99.7* 74.0 - 97.0 FL Final    MCH 05/13/2021 32.4  24.0 - 34.0 PG Final    MCHC 05/13/2021 32.5  31.0 - 37.0 g/dL Final    RDW 05/13/2021 13.7  11.6 - 14.5 % Final    PLATELET 13/83/7760 414  135 - 420 K/uL Final    MPV 05/13/2021 10.3  9.2 - 11.8 FL Final    NEUTROPHILS 05/13/2021 54  40 - 73 % Final    LYMPHOCYTES 05/13/2021 29  21 - 52 % Final    MONOCYTES 05/13/2021 12* 3 - 10 % Final    EOSINOPHILS 05/13/2021 5  0 - 5 % Final    BASOPHILS 05/13/2021 1  0 - 2 % Final    ABS. NEUTROPHILS 05/13/2021 2.6  1.8 - 8.0 K/UL Final    ABS. LYMPHOCYTES 05/13/2021 1.4  0.9 - 3.6 K/UL Final    ABS. MONOCYTES 05/13/2021 0.6  0.05 - 1.2 K/UL Final    ABS. EOSINOPHILS 05/13/2021 0.2  0.0 - 0.4 K/UL Final    ABS.  BASOPHILS 05/13/2021 0.0  0.0 - 0.1 K/UL Final    DF 05/13/2021 AUTOMATED    Final    Hemoglobin A1c 05/13/2021 5.6  4.2 - 5.6 % Final    Est. average glucose 05/13/2021 114  mg/dL Final    LIPID PROFILE 05/13/2021        Final    Cholesterol, total 05/13/2021 129  <200 MG/DL Final    Triglyceride 05/13/2021 53  <150 MG/DL Final    HDL Cholesterol 05/13/2021 56  40 - 60 MG/DL Final    LDL, calculated 05/13/2021 62.4  0 - 100 MG/DL Final    VLDL, calculated 05/13/2021 10.6  MG/DL Final    CHOL/HDL Ratio 05/13/2021 2.3  0 - 5.0   Final    Magnesium 05/13/2021 2.1  1.6 - 2.6 mg/dL Final    Sodium 05/13/2021 147* 136 - 145 mmol/L Final    Potassium 05/13/2021 4.4  3.5 - 5.5 mmol/L Final    Chloride 05/13/2021 109  100 - 111 mmol/L Final    CO2 05/13/2021 32  21 - 32 mmol/L Final    Anion gap 05/13/2021 6 3.0 - 18 mmol/L Final    Glucose 2021 107* 74 - 99 mg/dL Final    BUN 2021 19* 7.0 - 18 MG/DL Final    Creatinine 2021 0.99  0.6 - 1.3 MG/DL Final    BUN/Creatinine ratio 2021 19  12 - 20   Final    GFR est AA 2021 >60  >60 ml/min/1.73m2 Final    GFR est non-AA 2021 >60  >60 ml/min/1.73m2 Final    Calcium 2021 9.1  8.5 - 10.1 MG/DL Final    Bilirubin, total 2021 0.6  0.2 - 1.0 MG/DL Final    ALT (SGPT) 2021 32  16 - 61 U/L Final    AST (SGOT) 2021 23  10 - 38 U/L Final    Alk. phosphatase 2021 97  45 - 117 U/L Final    Protein, total 2021 6.5  6.4 - 8.2 g/dL Final    Albumin 2021 4.0  3.4 - 5.0 g/dL Final    Globulin 2021 2.5  2.0 - 4.0 g/dL Final    A-G Ratio 2021 1.6  0.8 - 1.7   Final    Microalbumin,urine random 2021 <0.50  0 - 3.0 MG/DL Final    Creatinine, urine 2021 46.00  30 - 125 mg/dL Final    Microalbumin/Creat ratio (mg/g cre* 2021 Cannot calculate ratio due to microalbumin result outside reportable range. 0 - 30 mg/g Final    Vitamin D 25-Hydroxy 2021 59.9  30 - 100 ng/mL Final    Hepatitis C virus Ab 2021 0.1  <0.80 Index Final    Hep C virus Ab Interp. 2021 Negative  NEG   Final    Hep C  virus Ab comment 2021        Final       EKG (9/10/2020) sinus rhythm at 76 bpm, normal intervals, no ischemic changes; no significant change from prior in 2020 and 10/2019. Health Maintenance:  Screening:    Colorectal: colonoscopy (2015) serrated adenomas. Dr. Joce Ivan. Due 2020. Cologuard negative (2021)   Depression: on Paxil   DM (HbA1c/FPG): / HbA1c 5.6 (2021)   Hepatitis C: negative (2021)   Falls: none   DEXA: within normal limits (2016)   PSA/MARTÍNEZ: PSA 3.3 (2019)    Glaucoma: regular eye exams with Dr. Frieda Harding (last 2021)   Smokin pack year.  Distant past.   Vitamin D: 59.9 (2021)   Medicare Wellness: 4/8/2021        Impression:  Patient Active Problem List   Diagnosis Code    BPH with obstruction/lower urinary tract symptoms N40.1, N13.8    Hypertension I10    Primary osteoarthritis involving multiple joints M89.49    Hyperlipidemia E78.5    GERD (gastroesophageal reflux disease) K21.9    Pre-diabetes R73.03    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 (HCC) M06.041, M06.042    Vitamin D deficiency E55.9    Colon polyps K63.5    CPDD (calcium pyrophosphate deposition disease) M11.20    Impaired fasting glucose R73.01    Class 1 obesity due to excess calories with serious comorbidity and body mass index (BMI) of 31.0 to 31.9 in adult E66.09, Z68.31    APC (atrial premature contractions) I49.1    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, sequela ([facility]) S68.621S    Candidal intertrigo B37.2    Obstructive sleep apnea syndrome G47.33    Right sided sciatica M54.31    Lumbar facet arthropathy M47.816    Synovial cyst of lumbar facet joint M71.38    Mild episode of recurrent major depressive disorder ([facility]) F33.0    History of 2019 novel coronavirus disease (COVID-19) Z86.16    Bilateral lower extremity edema R60.0    Chest wall pain R07.89    Facet arthropathy, cervical M47.812    DDD (degenerative disc disease), cervical M50.30    Mobitz type 1 second degree atrioventricular block I44.1    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 COVID-19 infection with severe pneumonia/ acute hypoxic respiratory failure (6/2020) with PASC. Presented to the [facility] ED on 6/29/2020 and found to be hypoxic with pulse ox at rest 92-93 % and ambulation 89-91% room air. Chest x-ray with bilateral multifocal extremely subtle groundglass opacities. Labs showed leukopenia, thrombocytopenia, and elevated AST/ alk phos; SARS-CoV2 positive.  He was discharged home, but developed progressive worsening fever and dyspnea, chest pain with inspiration, diarrhea, and poor po intake. Returned to ED on 7/2/2020 and found to be hypoxic in the 80's requiring mask rebreather. Chest x-ray showed bilateral increased patchy groundglass airspace opacities and inflammatory markers were elevated. He was treated with high flow nasal cannula, azithromycin, remdesivir, Solumedrol, Lovenox, vitamin C and zinc. He gradually improved and oxygen weaned to 5 liter nasal cannula, and he was discharged with home oxygen, prednisone taper, and Eliquis 2.5 mg bid for 30 days (completed 8/9/2020). Repeat COVID-19 test (7/29/2020) negative. Reported persistent chest wall pain with inhalation and difficulty taking deep breaths. Also with worsening lower extremity edema. Repeat chest x-ray (9/2020) with persistent very mild peribronchial thickening. Echocardiogram (9/2020) showed no change from 8/2019 with EF 55-60% and mild MR. EKG (9/2020) without change. Underwent 6 minute walk test (3/31/2021) showing no desaturations, and PFT's (3/31/2021) showing normal flows and DLCO, and isolated decreased RV and FRC, no response to bronchodilator. Denies other post-COVID symptoms. Received Moderna vaccine with second dose 3/1/2021. Reports noting improvement in dyspnea, although still develops chest discomfort with deep inspiration. Lower extremity edema improved with change from lasix to torsemide. Will continue to monitor. 2. Obstructive sleep apnea, severe. Patient reported in 1/2019 awakening gasping for air several times per week. No overt evidence of heart failure and EKG was without change from baseline at that time. He also admitted to snoring and experiencing significant daytime drowsiness particularly when driving. Severe episode on 8/9/2019 prompted ED evaluation. EKG without change, troponin negative and NT-pro BNP normal. Echocardiogram (8/26/2019) with normal LV size and function (EF 56-60%), no RWMA, normal valves.  Evaluated by Dr. Chris Solis and completed home sleep study and diagnosed with severe JANNET. Started on auto CPAP after hospitalization for COVID 19 by Dr. Leigh Chowdhury and supplemented with 2 liters of oxygen. However, despite best efforts, patient continued to describe significant difficulty using due to dyspnea and air hunger, and returned equipment. Underwent in-patient sleep evaluation on 1/29/2021 and found BIPAP to be very effective. Reports now using and finding it to be helpful. However, does not have good sleep habits and urged to have improve and increase duration of use. 3. Second degree AV block, Mobitz 1. Noted to be intermittent during sleep study and referred to Dr. Radha Black for evaluation. Felt to be likely secondary to untreated sleep apnea. Completed 30 day event monitor (5/5/2021) and no significant findings noted except for asymptomatic 20 beat run of SVT at 155 bpm.    4. Lower extremity edema. Chronic problem, but worsened following COVID 19 infection. Also noting chest pain with inhalation and deep breaths. Evaluated by Dr. Tino Lyles and changed from lasix to torsemide and metolazone. Became hypotensive, and patient stopped metolazone on his own. Hydralazine held, but restarted on 5/5/2021 due to elevated blood pressure. Underwent pharmacologic nuclear stress test (2/24/2021) which was a normal, low risk study. Repeat echocardiogram (2/24/2021) similar to prior in 9/2020 except noted new mild PI. Edema much improved on torsemide. Prescribed compression hose, but finding difficult to wear. WIll continue to monitor. 5. Hypertension. Blood pressure now well controlled on adjusted regimen of lisinopril 40 mg daily, amlodipine 10 mg daily, hydralazine 25 mg bid, and torsemide 20 mg bid. Patient states that he stopped metolazone after one week since did not feel well with it. Renal function has been normal with creatinine 0.99/ eGFR >60. Echocardiogram (2/2021) with grade 1 diastolic dysfunction.  Continue to follow. 6. Hyperlipidemia. On moderate intensity dose atorvastatin with LDL 62 and HDL 56, indicative of excellent control in this patient. Continue to follow. 7. Prediabetes. Controlled on diet alone with HbA1c 5.6 toda. Required sliding scale insulin dosing when hospitalized due to COVID 19 due to elevated blood sugar, likely related to high dose steroids. No evidence of microvascular complications. On Ace-I and statin. Continue follow-up with Dr. Lillian Regalado for annual eye exams. Emphasized importance of lifestyle modifications, including diet, exercise, and weight loss. 8. Rheumatoid arthritis. On hydroxychloroquine. Has regular eye exams with Dr. Lillian Regalado. Difficult to gauge response as appears to have evidence of osteoarthritis and CPPD occurring concurrently, but noted significant improvement since initiating colchicine. Voltaren gel, Celebrex, and Tylenol for pain control as needed. Followed by Dr. Char Gupta. 9. Primary osteoarthritis. Evident in bilateral hands and knees, bilateral shoulders, and cervical spine. Shoulder pain (R>L). X-ray with evidence of osteoarthritis and rotator cuff pathology. Evaluated by Dr. Jose Euceda and received cortisone injection to right shoulder with some improvement. Surgery for reverse shoulder replacement recommended. Changed from ibuprofen 400 mg qid and Tylenol to Celebrex 200 mg qd with significant improvement. However, held while being treated with Eliquis post-hospitalization for COVID 19. Reported a significant increase in overall osteoarthritic pain in 1/2021 in hands, wrists, shoulders and neck, and advised to resume Celebrex 200 mg. Noting significant improvement today and taking daily with Tylenol. Will take an occasional second dose of Celebrex as needed. Being followed Dr. Char Gupta. 10. CPPD. Colchicine started on 1/19/2017 to help address chondrocalcinosis on x-rays. Reports significant improvement. Now taking every other day with good control.   11. Lumbar degenerative disease with right sciatica. Unresponsive to Medrol dose pack, physical therapy, steroid injections, and unwilling to take narcotics as not effective. Lumbar MRI with multilevel degenerative changes and facet arthropathy with R>L facet stenosis at L4-L5 likely responsible for symptoms. Had been following with Dr. Mckinnon, but given lack of improvement, started on gabapentin 100 mg tid and referred to Dr. Ade Adams for evaluation. She recommended increasing gabapentin to 200 mg tid, and given his lack of response to conservative measures, referred to Dr. Tim Reno. He underwent decompression with L4/5 right-sided hemilaminotomy and medial facetectomy on 10/23/2019. Developed urinary retention post-op, but successfully passed voiding trial and has had no further difficulties. He developed recurrence of pain on post op day 6, and treated with Medrol dose pack. Reports no longer requiring gabapentin with no significant pain. Being followed by GOMEZ Venegas. 12. Depression. Prior reasonable control with Paxil and weaned from benzodiazepine use for anxiety and insomnia. After inadvertently stopping Paxil, started on Zoloft and dose titrated to 75 mg daily with good control. However, reports today that now only taking 25 mg daily and is satisfied with improvement in symptoms. 13. GERD. Good control of symptoms with omeprazole. No issues today. 14. Rectal bleeding. Colonoscopy 8/2015. Evaluated by Dr. Leticia Zuñiga and considered to be hemorrhoidal in source. Known internal and external hemorrhoids. Improved with Citrucel. Was due for routine colonoscopy in 8/2020, but patient has been unwilling to proceed. Completed Cologuard (4/2021) which was negative. Mild anemia today with Hb 12.9. Will assess iron studies, B12/ folate at next visit. 15. BPH with urinary retention. Does have lower urinary tract symptoms. Developed postop urinary retention after back surgery. On Flomax. Followed previously by Dr. Tina Cunningham.  Not wishing to establish with new provider unless problem emerges. 16. Left wrist ganglion cyst. Previously evaluated by Dr. Shana Martínez and aspirated. Recurred and reports now larger. Requested referral to another orthopedist and evaluated by PA at Dr. Sasha Feliciano office. Was to be scheduled for surgery, but was dissatisfied with delays and now not wishing to proceed. Will continue to monitor. 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 Managing well and no further issues. 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 Level stable and monitored annually (due 2/2022). 19. Insomnia. Weaned from Xanax. Had been using melatonin agonist, ramelteon, to replace Xanax, but no longer taking it. Appears to be managing without medication. 20. Obesity. Lost 25 pounds during COVID 19 illness, but now returned to near baseline. Emphasized importance of healthy eating and improved nutrition. Will readdress at next visit. 21. Health maintenance. Completed Moderna COVID 19 vaccine series. Unable to receive Tdap due to allergy (anaphylaxis to Td). Will address Shingrix vaccine at next visit. Other immunizations up to date. Completed Cologuard (4/2021) testing. Continue regular eye exams with Dr. Saadia Wall. Vitamin D level now normal after being treated with ergocalciferol 50,000 U weekly for 12 weeks. Instructed to start maintenance dose 1000 U daily. Medicare wellness visit up to date. Patient understands recommendations and agrees with plan. Follow-up in 3 months.

## 2021-05-26 ENCOUNTER — OFFICE VISIT (OUTPATIENT)
Dept: PULMONOLOGY | Age: 81
End: 2021-05-26
Payer: MEDICARE

## 2021-05-26 VITALS
HEART RATE: 85 BPM | TEMPERATURE: 98.2 F | HEIGHT: 68 IN | SYSTOLIC BLOOD PRESSURE: 122 MMHG | RESPIRATION RATE: 16 BRPM | BODY MASS INDEX: 31.89 KG/M2 | OXYGEN SATURATION: 97 % | WEIGHT: 210.4 LBS | DIASTOLIC BLOOD PRESSURE: 64 MMHG

## 2021-05-26 DIAGNOSIS — M06.042 RHEUMATOID ARTHRITIS INVOLVING BOTH HANDS WITH NEGATIVE RHEUMATOID FACTOR (HCC): ICD-10-CM

## 2021-05-26 DIAGNOSIS — G47.33 OSA TREATED WITH BIPAP: Primary | ICD-10-CM

## 2021-05-26 DIAGNOSIS — U09.9 POST-ACUTE SEQUELAE OF COVID-19 (PASC): ICD-10-CM

## 2021-05-26 DIAGNOSIS — M06.041 RHEUMATOID ARTHRITIS INVOLVING BOTH HANDS WITH NEGATIVE RHEUMATOID FACTOR (HCC): ICD-10-CM

## 2021-05-26 DIAGNOSIS — M11.20 CPDD (CALCIUM PYROPHOSPHATE DEPOSITION DISEASE): ICD-10-CM

## 2021-05-26 PROCEDURE — G8427 DOCREV CUR MEDS BY ELIG CLIN: HCPCS | Performed by: INTERNAL MEDICINE

## 2021-05-26 PROCEDURE — G8754 DIAS BP LESS 90: HCPCS | Performed by: INTERNAL MEDICINE

## 2021-05-26 PROCEDURE — G9717 DOC PT DX DEP/BP F/U NT REQ: HCPCS | Performed by: INTERNAL MEDICINE

## 2021-05-26 PROCEDURE — G8417 CALC BMI ABV UP PARAM F/U: HCPCS | Performed by: INTERNAL MEDICINE

## 2021-05-26 PROCEDURE — G8536 NO DOC ELDER MAL SCRN: HCPCS | Performed by: INTERNAL MEDICINE

## 2021-05-26 PROCEDURE — 99214 OFFICE O/P EST MOD 30 MIN: CPT | Performed by: INTERNAL MEDICINE

## 2021-05-26 PROCEDURE — 1101F PT FALLS ASSESS-DOCD LE1/YR: CPT | Performed by: INTERNAL MEDICINE

## 2021-05-26 PROCEDURE — G8752 SYS BP LESS 140: HCPCS | Performed by: INTERNAL MEDICINE

## 2021-05-26 NOTE — LETTER
5/26/2021 Patient: Annalise Ness YOB: 1940 Date of Visit: 5/26/2021 Roly Ramos MD 
[address] [place] [address] [address] [facility] [employer] Dear Roly Ramos MD, Thank you for referring Mr. Koki Appiah to [address] [place] [address] for evaluation. My notes for this consultation are attached. If you have questions, please do not hesitate to call me. I look forward to following your patient along with you.  
 
 
Sincerely, 
 
Jeanette Gutierrez MD

## 2021-05-26 NOTE — PATIENT INSTRUCTIONS
Learning About CPAP for Sleep Apnea What is CPAP? CPAP is a small machine that you use at home every night while you sleep. It increases air pressure in your throat to keep your airway open. When you have sleep apnea, this can help you sleep better so you feel much better. CPAP stands for \"continuous positive airway pressure. \" The CPAP machine will have one of the following: · A mask that covers your nose and mouth · Prongs that fit into your nose · A mask that covers your nose only, which is the most common type. This type is called NCPAP. The N stands for \"nasal.\" Why is it done? CPAP is usually the best treatment for obstructive sleep apnea. It is the first treatment choice and the most widely used. CPAP: 
· Helps you have more normal sleep, so you feel less sleepy and more alert during the daytime. · May help keep heart failure or other heart problems from getting worse. · May help lower your blood pressure. If you use CPAP, your bed partner may also sleep better. That's because you aren't snoring or restless. Your doctor may suggest CPAP if you have: · Moderate to severe sleep apnea. · Sleep apnea and coronary artery disease (CAD). · Sleep apnea and heart failure. What are the side effects? Some people who use CPAP have: · A dry or stuffy nose and a sore throat. · Irritated skin on the face. · Sore eyes. · Bloating. How can you care for yourself? If using CPAP is not comfortable, or if you have certain side effects, work with your doctor to fix them. Here are some things you can try: · Be sure the mask or nasal prongs fit well. · See if your doctor can adjust the pressure of your CPAP. · If your nose is dry, try a humidifier. · If your nose is runny or stuffy, try decongestant medicine or a steroid nasal spray. Be safe with medicines. Read and follow all instructions on the label. Do not use the medicine longer than the label says.  
If these things don't help, you might try a different type of machine. Some machines have air pressure that adjusts on its own. Others have air pressures that are different when you breathe in than when you breathe out. This may reduce discomfort caused by too much pressure in your nose. Where can you learn more? Go to http://www.gray.com/ Enter N835 in the search box to learn more about \"Learning About CPAP for Sleep Apnea. \" Current as of: October 26, 2020               Content Version: 12.8 © 2006-2021 Healthwise, [facility]. Care instructions adapted under license by [employer] (which disclaims liability or warranty for this information). If you have questions about a medical condition or this instruction, always ask your healthcare professional. [address] any warranty or liability for your use of this information.

## 2021-05-26 NOTE — PROGRESS NOTES
Shedrick Cushing presents today for   Chief Complaint   Patient presents with    Sleep Apnea       Is someone accompanying this pt? No    Is the patient using any DME equipment during OV? No    -[employer] for CPAP     Depression Screening:  3 most recent PHQ Screens 5/20/2021   Little interest or pleasure in doing things Not at all   Feeling down, depressed, irritable, or hopeless Not at all   Total Score PHQ 2 0   Trouble falling or staying asleep, or sleeping too much -   Feeling tired or having little energy -   Poor appetite, weight loss, or overeating -   Feeling bad about yourself - or that you are a failure or have let yourself or your family down -   Trouble concentrating on things such as school, work, reading, or watching TV -   Moving or speaking so slowly that other people could have noticed; or the opposite being so fidgety that others notice -   Thoughts of being better off dead, or hurting yourself in some way -   PHQ 9 Score -   How difficult have these problems made it for you to do your work, take care of your home and get along with others -       Learning Assessment:  Learning Assessment 9/4/2019   PRIMARY LEARNER Patient   HIGHEST LEVEL OF EDUCATION - PRIMARY LEARNER  -   BARRIERS PRIMARY LEARNER -   CO-LEARNER CAREGIVER -   PRIMARY LANGUAGE ENGLISH   LEARNER PREFERENCE PRIMARY DEMONSTRATION     -   ANSWERED BY Patient   RELATIONSHIP SELF       Abuse Screening:  Abuse Screening Questionnaire 9/29/2020   Do you ever feel afraid of your partner? N   Are you in a relationship with someone who physically or mentally threatens you? N   Is it safe for you to go home? Y       Fall Risk  Fall Risk Assessment, last 12 mths 5/20/2021   Able to walk? Yes   Fall in past 12 months? 0   Do you feel unsteady? 0   Are you worried about falling 0         Coordination of Care:  1. Have you been to the ER, urgent care clinic since your last visit? Hospitalized since your last visit? No    2.  Have you seen or consulted any other health care providers outside of the [address] [place] [address] since your last visit? Include any pap smears or colon screening.  No

## 2021-06-29 ENCOUNTER — HOSPITAL ENCOUNTER (OUTPATIENT)
Dept: GENERAL RADIOLOGY | Age: 81
Discharge: HOME | End: 2021-06-29
Payer: MEDICARE

## 2021-06-29 DIAGNOSIS — M15.0 PRIMARY GENERALIZED (OSTEO)ARTHRITIS: ICD-10-CM

## 2021-06-29 PROCEDURE — 72072 X-RAY EXAM THORAC SPINE 3VWS: CPT

## 2021-06-29 PROCEDURE — 72050 X-RAY EXAM NECK SPINE 4/5VWS: CPT

## 2021-07-08 RX ORDER — ATORVASTATIN CALCIUM 10 MG/1
TABLET, FILM COATED ORAL
Qty: 90 TABLET | Refills: 4 | Status: SHIPPED | OUTPATIENT
Start: 2021-07-08 | End: 2022-07-12

## 2021-07-21 ENCOUNTER — HOSPITAL ENCOUNTER (OUTPATIENT)
Dept: PHYSICAL THERAPY | Age: 81
Discharge: HOME | End: 2021-07-21
Payer: MEDICARE

## 2021-07-21 PROCEDURE — 97162 PT EVAL MOD COMPLEX 30 MIN: CPT

## 2021-07-21 PROCEDURE — 97530 THERAPEUTIC ACTIVITIES: CPT

## 2021-07-21 NOTE — PROGRESS NOTES
PT DAILY TREATMENT NOTE/CERVICAL [id number]    Patient Name: Lillette Bamberger Scruggs  Date:2021  : 1940  [x]  Patient  Verified  Payor: VA MEDICARE / Plan: VA MEDICARE PART A & B / Product Type: Medicare /    In time: 8:17  Out time: 9:00  Total Treatment Time (min): 43  Visit #: 1 of 8    Medicare/BCBS Only   Total Timed Codes (min):  8 1:1 Treatment Time:  43     Treatment Area: Neck pain [M54.2]  Back pain [M54.9]  Pain in right shoulder [M25.511]  Pain in left shoulder [M25.512]    SUBJECTIVE  Pain Level (0-10 scale): 0/10    Any medication changes, allergies to medications, adverse drug reactions, diagnosis change, or new procedure performed?: [x] No    [] Yes (see summary sheet for update)  Subjective functional status/changes:     PLOF: Worked long hours without increase in pain   Current symptoms/Complaints: Pain occurs in the neck and across back of shoulders which has been getting worse over the past year. Leaning over aggravates the symptoms and leaning back and laying down flat decreases pain. Takes tylenol for pain and notices pain increase when tylenol wears off. Reports occasional dizziness caused by other meds he is taking. Previous Treatment/Compliance: Pt has not had therapy for neck    Work Hx: Works as a [employer] 50+ hours a week, spending a majority of the time sitting in a forward leaning position   Living Situation: Lives with and cares for wife. Has a care taker to help with wife. Pt Goals: To relieve pain       OBJECTIVE/EXAMINATION    35 min [x]Eval                  []Re-Eval     8 min Therapeutic Activity:  []?   See flow sheet : HEP, Pt education    Rationale: increase ROM and increase strength  to improve the patients ability to increase ease of ADLs             With   - TE   - TA   - neuro   - other: Patient Education: - Review HEP    - Progressed/Changed HEP based on:   - positioning   - body mechanics   - transfers   - heat/ice application    - other:      Other Objective/Functional Measures:   Decreased right/left UT flexibility   Deep cervical stamina test 30\"   Hypomobility w/ PAs in cervical and thoracic  Hypomobility with B cervical PAIVMs      Physical Therapy Evaluation Cervical Spine     SUBJECTIVE    Symptoms  Aggravated by:   [x] Bending [x] Sitting [] Standing [] Reaching Overhead   [] Moving [] Cough [] Sneeze [] Eating   [] AM  [] PM  Lying:  [] sup   [] pro   [] sidelying   [] Other:     Eased by:    [] Bending [] Sitting [] Standing Lying: [x] sup  [] pro  [] sidelying   [] Moving [] AM  [] PM  [] Other:      Past History/Treatments:    Diagnostic Tests: [] Lab work [] X-rays    [] CT [] MRI     [] Other:  Results:    Functional Status  Pt reports no issues with sleeping and laying supine relieves pain     Headaches: Do you have headaches? [] Yes   [x] No    OBJECTIVE  Posture: [] WNL  Head Position: Head in protracted position   Shoulder/Scapular Position: Forward rounded shoulders   C-Lordosis:   [] increased   [x] decreased   T-Kyphosis:  [x] increased   [] decreased      Cervical Retraction: [] WNL    [x] Abnormal: Decreased ROM, difficulty and pain with c/s retraction     Shoulder/Scapular Screen: [] WNL    [] Abnormal: Decreased AROM with scapular retraction; MMT right/left scapular retraction 4-/5;  Shoulder AROM flex and abduction WNL     Active Movements: [] N/A   [] Too acute   [] Other:  ROM % AROM % PROM Comments:pain, area   Forward flexion 50*     Extension 33*     SB right 18*     SB left  24*     Rotation right 55*     Rotation left 45*       Thoracic Spine: [] N/A    [] WNL   [] Other:    Neuro Screen (myotome/dematome/felexes): [] WNL  Myotome Level Muscle Test Myotome Level Muscle Test   C5 Shoulder Adduction - right/ left Deltoid  3/5 secondary to pain  C8 Finger Flexors   Right/left 4/5   C6 Wrist Extension right/left 4-/5 T1 Finger Abduction - Interossei right/left 4-/5   C7 Elbow Extension right/left 4-/5        Comments:  MMT Cervical Spine: Left/right side bend 5/5, left/right rotation 5/5    Special Tests:  Cervical:        Spurling's:  [x] R    [x] L    [] +    [x] -       Distraction:  [] R    [] L    [] +    [x] -       Compression: [] R    [] L    [] +    [] -      Pain Level (0-10 scale) post treatment: 0/10    ASSESSMENT/Changes in Function:   See Plan of Care     Patient will continue to benefit from skilled PT services to modify and progress therapeutic interventions, address functional mobility deficits, address ROM deficits, address strength deficits, analyze and address soft tissue restrictions, analyze and cue movement patterns, analyze and modify body mechanics/ergonomics and assess and modify postural abnormalities to attain remaining goals.      [x]  See Plan of Care  []  See progress note/recertification  []  See Discharge Summary         Progress towards goals / Updated goals:  See Plan of [address] activities as tolerated     -  Continue plan of care  -  Update interventions per flow sheet       -  Discharge due to:_  -  Other:_      ADINA Begum 7/21/2021  8:13 AM

## 2021-07-21 NOTE — PROGRESS NOTES
[facility]  [facility]  [place]  [address] [place] [address]  [phone] fax    Plan of Care/ Statement of Necessity for Physical Therapy Services    Patient name: Sandra Mcelroy Start of Care: 2021   Referral source: Dayo Rubin MD : 1940    Medical Diagnosis: Neck pain [M54.2]  Back pain [M54.9]  Pain in right shoulder [M25.511]  Pain in left shoulder [M25.512]  Payor: [place] MEDICARE / Plan: [place] MEDICARE PART A & B / Product Type: Medicare /  Onset Date:Over last year    Treatment Diagnosis: Neck pain    Prior Hospitalization: see medical history Provider#: [id number]   Medications: Verified on Patient summary List    Comorbidities: Arthritis, HTN   Prior Level of Function: Able to work long hours without an increase in pain      The Plan of Care and following information is based on the information from the initial evaluation. Assessment/ key information:   Pt is a 80 y.o. male with c/o neck, shoulder, and thoracic pain. He presents with a head forward posture, forward rounded shoulders, and increased thoracic spine kyphosis. 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 Leaning over aggravates the symptoms, while sitting in an upright posture and laying in supine relieve symptoms. Tylenol also helps decrease pain. Pt lives and cares for his wife and is looking to relieve his pain through physical therapy. Pt has decreased c/s AROM: right rotation 55*, left rotation 45*, flexion 50*, extension 33*, right lateral flexion 18*, and left lateral flexion 24*. All directions improved slightly with PROM. MMT for c/s right/left side bend 5/5 and right/left rotation 5/5. Shoulder abduction right/left 3/5 secondary to pain. Horizontal right/left abduction 4-/5.  Pt will benefit from skilled physical therapy to address strength and ROM deficits in cervical and thoracic spine to decrease pain and improve QOL. Evaluation Complexity History MEDIUM  Complexity : 1-2 comorbidities / personal factors will impact the outcome/ POC ; Examination MEDIUM Complexity : 3 Standardized tests and measures addressing body structure, function, activity limitation and / or participation in recreation  ;Presentation MEDIUM Complexity : Evolving with changing characteristics  ; Clinical Decision Making MEDIUM Complexity : FOTO score of 26-74  Overall Complexity Rating: MEDIUM  Problem List: pain affecting function, decrease ROM, decrease strength, decrease ADL/ functional abilitiies, decrease activity tolerance and decrease flexibility/ joint mobility   Treatment Plan may include any combination of the following: Therapeutic exercise, Therapeutic activities, Neuromuscular re-education, Physical agent/modality, Manual therapy and Patient education  Patient / Family readiness to learn indicated by: trying to perform skills  Persons(s) to be included in education: patient (P)  Barriers to Learning/Limitations: None  Patient Goal (s): To relieve pain  Patient Self Reported Health Status: good  Rehabilitation Potential: good    Short Term Goals: To be accomplished in 1 weeks:  1. Pt will demonstrate understanding of HEP to improve ability to perform ADLs     Long Term Goals: To be accomplished in 4 weeks:  1. Pt will increase FOTO by 5 points to indicate functional improvement  2. Pt will increase c/s extension to 45* to improve ease of ADLs  3. Pt will increase right and left horizontal abduction strength to 4/5 to improve functional activities   4. Pt will report 4/10 pain or less after a full work day to decrease limitations at work     Frequency / Duration: Patient to be seen 2 times per week for 4 weeks.     Patient/ CarPatient/ Caregiver education and instruction: Diagnosis, prognosis, exercises   [x]  Plan of care has been reviewed with EVELIN Avilez, SPT 7/21/2021 10:10 AM  I was present during the entire treatment, directing and participating in the treatment. Yaquelin Carter DPT    Certification period: 7/21/21-8/20/21      ________________________________________________________________________    I certify that the above Therapy Services are being furnished while the patient is under my care. I agree with the treatment plan and certify that this therapy is necessary.     [de-identified] Signature:____________Date:_________TIME:________     Quique Rubin MD  ** Signature, Date and Time must be completed for valid certification **    Please sign and return to [facility] [address] [facility]  [facility]  [phone] fax

## 2021-07-28 ENCOUNTER — APPOINTMENT (OUTPATIENT)
Dept: PHYSICAL THERAPY | Age: 81
End: 2021-07-28
Payer: MEDICARE

## 2021-08-03 ENCOUNTER — HOSPITAL ENCOUNTER (OUTPATIENT)
Dept: PHYSICAL THERAPY | Age: 81
Discharge: HOME | End: 2021-08-03
Payer: MEDICARE

## 2021-08-03 PROCEDURE — 97112 NEUROMUSCULAR REEDUCATION: CPT

## 2021-08-03 PROCEDURE — 97110 THERAPEUTIC EXERCISES: CPT

## 2021-08-03 NOTE — PROGRESS NOTES
PT DAILY TREATMENT NOTE     Patient Name: Mariana Maldonado  Date:8/3/2021  : 1940  [x]  Patient  Verified  Payor: VA MEDICARE / Plan: VA MEDICARE PART A & B / Product Type: Medicare /    In time:8:19  Out time:9:00  Total Treatment Time (min): 41  Visit #: 2 of 8    Medicare/BCBS Only   Total Timed Codes (min):  41 1:1 Treatment Time:  41       Treatment Area: Neck pain [M54.2]  Back pain [M54.9]  Pain in right shoulder [M25.511]  Pain in left shoulder [M25.512]    SUBJECTIVE  Pain Level (0-10 scale): 0/10 with medication   Any medication changes, allergies to medications, adverse drug reactions, diagnosis change, or new procedure performed?: [x] No    [] Yes (see summary sheet for update)  Subjective functional status/changes:   [] No changes reported  Pt reports that he is doing his HEP every other day. He is not doing his HEP more d/t busy work schedule and working 10-12 hours per day. He took pain medicine 30 minutes ago, so he has discomfort but not true pain d/t the medication helping. Pt reports that he has a history of B RTC tears. He reports a complete tear in right shoulder and partial tear in left shoulder. Pt also reports a history of left shoulder dislocating, which he always self-reduces when it dislocates. Left shoulder dislocates most often when reaching behind his back. Pt reports mild relief at work with seated thoracic extension.   The pain usually begins around noon, and he starts work around [address]      30 min Therapeutic Exercise:  [x] See flow sheet :   Rationale: increase ROM and increase strength to improve the patients ability to improve ease of job tasks and household management     9 min Neuromuscular Re-education:  [x]  See flow sheet : posture re-ed with prone T, [place] row, and c/s retraction     Rationale: increase strength and increase proprioception  to improve the patients ability to improve posture for increased tolerance/ability with job tasks     2 min Manual Therapy:  TPR left cervical paraspinals lateral to C5   The manual therapy interventions were performed at a separate and distinct time from the therapeutic activities interventions. Rationale: decrease pain, increase ROM, increase tissue extensibility and decrease trigger points to reduce pain with head movements for improved ease of job tasks            With   [] TE   [] TA   [] neuro   [] other: Patient Education: [x] Review HEP    [] Progressed/Changed HEP based on:   [] positioning   [] body mechanics   [] transfers   [] heat/ice application    [] other:      Other Objective/Functional Measures:     Minimal range evident with No Money B  Had pt perform open books from right sidelying with left elbow flexed and shoulder maintaining neutral position to avoid risk of dislocation, focused on thoracic rotation     Pt was pleased with manual  Educated pt regarding self TPR with fingers to cervical paraspinals and self TPR to periscapular musculature with tennis ball and leaning against wall     Pt reported reduced pain following session     Pain Level (0-10 scale) post treatment: 0/10    ASSESSMENT/Changes in Function:     Initiated treatment per POC. Pt was motivated in therapy and put forth good effort with interventions. He presents with forward posture and reported pain relief with manual and therapy interventions focusing on upright posture. Will continue to address strength, ROM, flexibility, and postural deficits in order to reduce pain with daily tasks and improve QOL. Patient will continue to benefit from skilled PT services to modify and progress therapeutic interventions, address ROM deficits, address strength deficits, analyze and address soft tissue restrictions, analyze and cue movement patterns, analyze and modify body mechanics/ergonomics, assess and modify postural abnormalities and instruct in home and community integration to attain remaining goals.     Progress towards goals / Updated goals:  Short Term Goals: To be accomplished in 1 weeks:  1. Pt will demonstrate understanding of HEP to improve ability to perform ADLs. Progressing. Pt reports partial compliance and is performing every other day. 8/3/21     Long Term Goals: To be accomplished in 4 weeks:  1. Pt will increase FOTO by 5 points to indicate functional improvement  2. Pt will increase c/s extension to 45* to improve ease of ADLs  3. Pt will increase right and left horizontal abduction strength to 4/5 to improve functional activities   4.  Pt will report 4/10 pain or less after a full work day to decrease limitations at work      PLAN  [x]  Upgrade activities as tolerated     [x]  Continue plan of care  []  Update interventions per flow sheet       []  Discharge due to:_  []  Other:_      Hanna Quintanilla PT 8/3/2021  8:23 AM    Future Appointments   Date Time Provider Kaleigh Yusuf   8/4/2021  7:30 AM Iraj MAYLSIEPP [facility]   8/5/2021  8:15 AM Wesley Jeanon MMCPTPB [facility]   8/10/2021  8:15 AM Minetta Richmond, PT MMCPTPB [facility]   8/12/2021  7:30 AM Zapf, Rain Nails, PT MMCPTPB [facility]   8/17/2021  8:15 AM Minetta Richmond, PT [facility]CPTP[facility]   8/19/2021  7:30 AM Zapf, Rain Nails, PT MMCPTPB [facility]   8/24/2021  8:05 AM IO NURSE VISIT [facility] BS AMB   8/24/2021  8:15 AM Zapf, Rain Nails, PT [id number] [facility]   8/26/2021  7:30 AM Zapf, Rain Nails, PT MMCPTPB [facility]   9/2/2021  8:00 AM Lexy Galan MD [facility] AM[facility]   11/10/2021  8:45 AM MD JOHANN Myers AMB

## 2021-08-04 ENCOUNTER — APPOINTMENT (OUTPATIENT)
Dept: PHYSICAL THERAPY | Age: 81
End: 2021-08-04
Payer: MEDICARE

## 2021-08-05 ENCOUNTER — HOSPITAL ENCOUNTER (OUTPATIENT)
Dept: PHYSICAL THERAPY | Age: 81
Discharge: HOME | End: 2021-08-05
Payer: MEDICARE

## 2021-08-05 ENCOUNTER — HOSPITAL ENCOUNTER (OUTPATIENT)
Dept: LAB | Age: 81
Discharge: HOME | End: 2021-08-05
Payer: MEDICARE

## 2021-08-05 PROCEDURE — 97140 MANUAL THERAPY 1/> REGIONS: CPT

## 2021-08-05 PROCEDURE — 97110 THERAPEUTIC EXERCISES: CPT

## 2021-08-05 PROCEDURE — 88305 TISSUE EXAM BY PATHOLOGIST: CPT

## 2021-08-05 NOTE — PROGRESS NOTES
PT DAILY TREATMENT NOTE     Patient Name: Olive Main  Date:2021  : 1940  [x]  Patient  Verified  Payor: VA MEDICARE / Plan: VA MEDICARE PART A & B / Product Type: Medicare /    In time: 8:17  Out time:9:01  Total Treatment Time (min): 44  Visit #: 3 of 8    Medicare/BCBS Only   Total Timed Codes (min):  44 1:1 Treatment Time:  44       Treatment Area: Neck pain [M54.2]  Back pain [M54.9]  Pain in right shoulder [M25.511]  Pain in left shoulder [M25.512]    SUBJECTIVE  Pain Level (0-10 scale): 0/10  Any medication changes, allergies to medications, adverse drug reactions, diagnosis change, or new procedure performed?: [x] No    [] Yes (see summary sheet for update)  Subjective functional status/changes:   [] No changes reported  Pt reports having no pain today, feeling overall is fine.      OBJECTIVE    Modality rationale: PD   Min Type Additional Details    [] Estim:  []Unatt       []IFC  []Premod                        []Other:  []w/ice   []w/heat  Position:  Location:    [] Estim: []Att    []TENS instruct  []NMES                    []Other:  []w/US   []w/ice   []w/heat  Position:  Location:    []  Traction: [] Cervical       []Lumbar                       [] Prone          []Supine                       []Intermittent   []Continuous Lbs:  [] before manual  [] after manual    []  Ultrasound: []Continuous   [] Pulsed                           []1MHz   []3MHz W/cm2:  Location:    []  Iontophoresis with dexamethasone         Location: [] Take home patch   [] In clinic    []  Ice     []  heat  []  Ice massage  []  Laser   []  Anodyne Position:  Location:    []  Laser with stim  []  Other:  Position:  Location:    []  Vasopneumatic Device    []  Right     []  Left  Pre-treatment girth:  Post-treatment girth:  Measured at (location):  Pressure:       [] lo [] med [] hi   Temperature: [] lo [] med [] hi   [] Skin assessment post-treatment:  []intact []redness- no adverse reaction    []redness  adverse reaction:     36 min Therapeutic Exercise:  [x] See flow sheet :   Rationale: increase ROM and increase strength to improve the patients ability to perform ADLs with more ease    8 min Manual Therapy:  TPR Left c/s paraspinal lat to C5, DTM to B UTs   The manual therapy interventions were performed at a separate and distinct time from the therapeutic activities interventions. Rationale: decrease pain, increase ROM, increase tissue extensibility and decrease trigger points to improve pt's tolerance for ADLs          With   [] TE   [] TA   [] neuro   [] other: Patient Education: [x] Review HEP    [] Progressed/Changed HEP based on:   [] positioning   [] body mechanics   [] transfers   [] heat/ice application    [] other:      Other Objective/Functional Measures:    Neck pops with lat flex towards Right   Cont to be limited with No Money even with OTB and demonstrates poor posture (shoulders hiking and protraction)   Max fatigue and challenged with prone T     Pain Level (0-10 scale) post treatment: 0/10    ASSESSMENT/Changes in Function: Pt's very pleased with manual and demonstrates improving shoulders hiking post treatment. He cont to be challenged with therex due to decreased mobility, flexibility and strength of c/s paraspinal and parascapular muscles. Will cont to progress with manual and therex as tolerated. Patient will continue to benefit from skilled PT services to modify and progress therapeutic interventions, address functional mobility deficits, address ROM deficits, address strength deficits, analyze and address soft tissue restrictions, analyze and cue movement patterns, analyze and modify body mechanics/ergonomics, assess and modify postural abnormalities, address imbalance/dizziness and instruct in home and community integration to attain remaining goals.      [x]  See Plan of Care  []  See progress note/recertification  []  See Discharge Summary         Progress towards goals / Updated goals:  Short Term Goals: To be accomplished in 1 weeks:  1. Pt will demonstrate understanding of HEP to improve ability to perform ADLs. Progressing. Pt reports partial compliance and is performing every other day. 8/3/21     Long Term Goals: To be accomplished in 4 weeks:  1. Pt will increase FOTO by 5 points to indicate functional improvement  2. Pt will increase c/s extension to 45* to improve ease of ADLs  3. Pt will increase right and left horizontal abduction strength to 4/5 to improve functional activities   4.  Pt will report 4/10 pain or less after a full work day to decrease limitations at [employer]  [x]  Upgrade activities as tolerated     [x]  Continue plan of care  []  Update interventions per flow sheet       []  Discharge due to:_  []  Other:_      Pina Shrestha, PT 2021  8:06 AM    Future Appointments   Date Time Provider Kaleigh Yusuf   2021  8:15 AM Roge Norman [id number] [facility]   8/10/2021  8:15 AM Ronald Maher, PT MMCPTPB [facility]   2021  7:30 AM ZapJm aguirre, PT MMCPTPB [facility]   2021  8:15 AM Ronald Maher, PT [facility]CPTPB [facility]   2021  7:30 AM Jm Levine, PT MMCPTPB [facility]   2021  8:05 AM [facility] NURSE VISIT [facility] BS AMB   2021  8:15 AM Jm Levine, PT [id number] [facility]   2021  7:30 AM Jm Levine, PT [id number] [facility]   2021  8:00 AM Alva Gómez MD [facility] BS AMB   11/10/2021  8:45 AM Yessenia Nolasco MD [facility] BS AMB

## 2021-08-10 ENCOUNTER — HOSPITAL ENCOUNTER (OUTPATIENT)
Dept: PHYSICAL THERAPY | Age: 81
Discharge: HOME | End: 2021-08-10
Payer: MEDICARE

## 2021-08-10 PROCEDURE — 97140 MANUAL THERAPY 1/> REGIONS: CPT

## 2021-08-10 PROCEDURE — 97110 THERAPEUTIC EXERCISES: CPT

## 2021-08-10 NOTE — PROGRESS NOTES
PT DAILY TREATMENT NOTE     Patient Name: Candi West  Date:8/10/2021  : 1940  [x]  Patient  Verified  Payor: VA MEDICARE / Plan: VA MEDICARE PART A & B / Product Type: Medicare /    In time:8:25  Out time:9:11  Total Treatment Time (min): 46  Visit #: 4 of 8    Medicare/BCBS Only   Total Timed Codes (min):  46 1:1 Treatment Time:  38       Treatment Area: Neck pain [M54.2]  Back pain [M54.9]  Pain in right shoulder [M25.511]  Pain in left shoulder [M25.512]    SUBJECTIVE  Pain Level (0-10 scale): 2/10  Any medication changes, allergies to medications, adverse drug reactions, diagnosis change, or new procedure performed?: [x] No    [] Yes (see summary sheet for update)  Subjective functional status/changes:   [] No changes reported  Pt reports that he believes therapy is helping. The pain usually starts around noon but really increases around 2-3pm, requiring him to stop and complete some exercises and take Tylenol. Pt reports 20-30% improvement since start of care. He reports his left shoulder seems to be dislocating more and more with daily tasks. OBJECTIVE      36 min Therapeutic Exercise:  [x] See flow sheet :   Rationale: increase ROM and increase strength to improve the patients ability to improve ease of job tasks and caring for his wife. 13 min Manual Therapy:  DTM/TPR B UT, DTM B cervical paraspinals, TPR left cervical paraspinals lateral to C5   The manual therapy interventions were performed at a separate and distinct time from the therapeutic activities interventions.   Rationale: decrease pain, increase ROM, increase tissue extensibility and decrease trigger points to improve ease of job tasks         With   [] TE   [] TA   [] neuro   [] other: Patient Education: [x] Review HEP    [] Progressed/Changed HEP based on:   [] positioning   [] body mechanics   [] transfers   [] heat/ice application    [] other:      Other Objective/Functional Measures:     Pt 10 minutes late to therapy   Pain relief reported with manual   Required cues for upright posture with scapular retraction   Pain with No [place], pt requested to continue, pain always subsided immediately upon ceasing  Pain with horizontal abduction with orange TB, pt requested to continue, pain always subsided immediately upon ceasing   Shoulder shrug initially with SB depression, improved form with shoulder depression and scapular depression with tactile cues     No pain following session     Pain Level (0-10 scale) post treatment: 0/10    ASSESSMENT/Changes in Function:     Pt is making slow, steady progress towards updated goals in therapy. He reports he is able to work a little longer before having to stop d/t pain, and pt reports 20-30% improvement since start of care. Will continue to address strength, ROM, flexibility, and postural deficits in order to improve ease/ability with job tasks and household management. Patient will continue to benefit from skilled PT services to modify and progress therapeutic interventions, address ROM deficits, address strength deficits, analyze and address soft tissue restrictions, analyze and cue movement patterns, analyze and modify body mechanics/ergonomics, assess and modify postural abnormalities and instruct in home and community integration to attain remaining goals. Progress towards goals / Updated goals:  Short Term Goals: To be accomplished in 1 weeks:  1. Pt will demonstrate understanding of HEP to improve ability to perform ADLs.  Progressing.  Pt reports partial compliance and is performing every other day.  8/3/21     Long Term Goals: To be accomplished in 4 weeks:  1. Pt will increase FOTO by 5 points to indicate functional improvement  2. Pt will increase c/s extension to 45* to improve ease of ADLs  3. Pt will increase right and left horizontal abduction strength to 4/5 to improve functional activities   4.  Pt will report 4/10 pain or less after a full work day to decrease limitations at [employer]  Not met. Pain range 0-8/10.   No pain in the mornings, and pain increases throughout work shift 8/10/21     PLAN  [x]  Upgrade activities as tolerated     [x]  Continue plan of care  []  Update interventions per flow sheet       []  Discharge due to:_  []  Other:_      Willard Ribeiro, PT 8/10/2021  8:36 AM    Future Appointments   Date Time Provider Kaleigh Yusuf   8/12/2021  7:30 AM Dany Malik, PT MMCPTPB [facility]   8/17/2021  8:15 AM Dany Malik, PT [facility]CPTPB [facility]   8/19/2021  7:30 AM Apple Levine, PT MMCPTPB [facility]   8/24/2021  8:05 AM [facility] NURSE VISIT [facility] BS AMB   8/24/2021  8:15 AM Apple Levine, PT [id number] [facility]   8/26/2021  7:30 AM Apple Levine, PT MMCPTPB [facility]   9/2/2021  8:00 AM Magalis Morrow MD [facility] BS AMB   11/10/2021  8:45 AM Maral Martinez MD [facility] BS AMB

## 2021-08-12 ENCOUNTER — HOSPITAL ENCOUNTER (OUTPATIENT)
Dept: PHYSICAL THERAPY | Age: 81
Discharge: HOME | End: 2021-08-12
Payer: MEDICARE

## 2021-08-12 PROCEDURE — 97110 THERAPEUTIC EXERCISES: CPT

## 2021-08-12 NOTE — PROGRESS NOTES
PT DAILY TREATMENT NOTE     Patient Name: Gary West  Date:2021  : 1940  [x]  Patient  Verified  Payor: [place] MEDICARE / Plan: [place] MEDICARE PART A & B / Product Type: Medicare /    In time:7:30  Out time:2:12  Total Treatment Time (min): 42  Visit #: 5 of 8    Medicare/BCBS Only   Total Timed Codes (min):  42 1:1 Treatment Time:  42       Treatment Area: Neck pain [M54.2]  Back pain [M54.9]  Pain in right shoulder [M25.511]  Pain in left shoulder [M25.512]    SUBJECTIVE  Pain Level (0-10 scale): 0/10  Any medication changes, allergies to medications, adverse drug reactions, diagnosis change, or new procedure performed?: [x] No    [] Yes (see summary sheet for update)  Subjective functional status/changes:   [] No changes reported  Pt reports that he has not noticed a huge difference with the manual interventions. The pain is still occurring with work tasks after approximately the same amount of time and with the same intensity. The pain begins after 4-5 hours of work. The pain was worse yesterday, and he's not sure why. He has good days and bad days, and sometimes he can work a little longer with less pain. He believes therapy may be helping a little. His shoulders have been bothering him more lately. He is able to work shorter shifts but that's not what he wants. He wants to work the longer shifts. He is working 50-60 hours per week. He does not need to work for financial reasons. He just wants to work.         OBJECTIVE      42 min Therapeutic Exercise:  [x] See flow sheet :   Rationale: increase ROM and increase strength to improve the patients ability to complete job tasks         With   [] TE   [] TA   [] neuro   [] other: Patient Education: [x] Review HEP    [] Progressed/Changed HEP based on:   [] positioning   [] body mechanics   [] transfers   [] heat/ice application    [x] other: discussed therapy options      Other Objective/Functional Measures:     Trial hold manual interventions this visit d/t lack of significant change in symptoms with manual interventions     Cervical Extension AROM:  40*    Required tactile cues to avoid shoulder shrug and lumbar extension with [place] rows, improved form with cuing     B shoulder pain with no money, pt requested to continue, increased pain subsided following completion    Challenged with 1/2 prone T     Pt distracted by conversation and frequently completed extra reps d/t losing count     Pt reported muscle soreness following session but denied pain  Pt denied heat following session     Pain Level (0-10 scale) post treatment: 3/10 \"sore\"    ASSESSMENT/Changes in Function:     Pt is making slow progress towards initial goals in therapy. He continues to report pain after 4-5 hours of job tasks, likely with hunched/forward posture contributing. Pain is reduced with rest breaks and upright posture. B RTC involvement resulting in compensatory movement patterns is also likely contributing to continued symptoms. Discussed progress in therapy this visit and encouraged pt to give more thought to improvements and remaining deficits to determine therapy plan. Will continue to address strength, ROM, flexibility, and postural deficits in order to reduce pain, improve ability with job tasks, and improve QOL. Patient will continue to benefit from skilled PT services to modify and progress therapeutic interventions, address ROM deficits, address strength deficits, analyze and address soft tissue restrictions, analyze and cue movement patterns, analyze and modify body mechanics/ergonomics, assess and modify postural abnormalities and instruct in home and community integration to attain remaining goals. Progress towards goals / Updated goals:  Short Term Goals: To be accomplished in 1 weeks:  1. Pt will demonstrate understanding of HEP to improve ability to perform ADLs.   Progressing.  Pt reports partial compliance and is performing every other day.  8/3/21     Long Term Goals: To be accomplished in 4 weeks:  1. Pt will increase FOTO by 5 points to indicate functional improvement  2. Pt will increase c/s extension to 45* to improve ease of ADLs Progressing. 40* (33* at eval) 8/12/21  3. Pt will increase right and left horizontal abduction strength to 4/5 to improve functional activities   4. Pt will report 4/10 pain or less after a full work day to decrease limitations at [employer]  Not met. Pain range 0-8/10.   No pain in the mornings, and pain increases throughout work shift 8/10/21     PLAN  [x]  Upgrade activities as tolerated     [x]  Continue plan of care  []  Update interventions per flow sheet       []  Discharge due to:_  []  Other:_      Angel Luis Jean, PT 8/12/2021  7:31 AM    Future Appointments   Date Time Provider Kaleigh Madelin   8/17/2021  8:15 AM Steve Bucio, PT [facility]   8/19/2021  7:30 AM Ángela Levine, PT [facility]   8/24/2021  8:05 AM IO NURSE VISIT IO BS AMB   8/24/2021  8:15 AM Ángela Levine, PT [id number] [facility]   8/26/2021  7:30 AM Ángela Levine, PT [id number]   9/2/2021  8:00 AM Silverio Farmer MD [facility] BS AMB   11/10/2021  8:45 AM Jeevan Tomas MD [facility] BS AMB

## 2021-08-17 ENCOUNTER — HOSPITAL ENCOUNTER (OUTPATIENT)
Dept: PHYSICAL THERAPY | Age: 81
Discharge: HOME | End: 2021-08-17
Payer: MEDICARE

## 2021-08-17 PROCEDURE — 97110 THERAPEUTIC EXERCISES: CPT

## 2021-08-17 PROCEDURE — 97140 MANUAL THERAPY 1/> REGIONS: CPT

## 2021-08-17 NOTE — PROGRESS NOTES
PT DAILY TREATMENT NOTE     Patient Name: Ford West  Date:2021  : 1940  [x]  Patient  Verified  Payor: VA MEDICARE / Plan: VA MEDICARE PART A & B / Product Type: Medicare /    In time:8:18  Out time:9:00  Total Treatment Time (min): 42  Visit #: 6 of 8    Medicare/BCBS Only   Total Timed Codes (min):  42 1:1 Treatment Time:  42       Treatment Area: Neck pain [M54.2]  Back pain [M54.9]  Pain in right shoulder [M25.511]  Pain in left shoulder [M25.512]    SUBJECTIVE  Pain Level (0-10 scale): 3/10  Any medication changes, allergies to medications, adverse drug reactions, diagnosis change, or new procedure performed?: [x] No    [] Yes (see summary sheet for update)  Subjective functional status/changes:   [] No changes reported  Pt reports that he had a busy day at work yesterday, and he had more pain than normal.  He can work a little longer before needing to take a break d/t pain. Working tolerance is 3-5 hours before needing a break. He is doing some of the exercises in his HEP during his work shift, but he reports is rarely completing full HEP. OBJECTIVE      30 min Therapeutic Exercise:  [x] See flow sheet :   Rationale: increase ROM and increase strength to improve the patients ability to improve ease of job tasks    12 min Manual Therapy:  DTM B UT, TPR left UT and levator, DTM B Cervical Paraspinals    The manual therapy interventions were performed at a separate and distinct time from the therapeutic activities interventions. Rationale: decrease pain, increase ROM, increase tissue extensibility and decrease trigger points to improve ease of cervical movements and tolerance with prolonged positions to complete job tasks.               With   [] TE   [] TA   [] neuro   [] other: Patient Education: [x] Review HEP    [] Progressed/Changed HEP based on:   [] positioning   [] body mechanics   [] transfers   [] heat/ice application    [] other:      Other Objective/Functional Measures:     Pain with c/s AROM initially  Re-attempted c/s AROM following manual and pt reported significantly improved ease and reduced pain with AROM    Multiple trigger points throughout left UT and levator, addressed manually   Pt reported significantly reduced pain following manual     Pt frequently lost count with reps during session  No pain following session    FOTO: 65/100     Pain Level (0-10 scale) post treatment: 0/10    ASSESSMENT/Changes in Function: See Progress Note     Patient will continue to benefit from skilled PT services to modify and progress therapeutic interventions, address functional mobility deficits, address ROM deficits, address strength deficits, analyze and address soft tissue restrictions, analyze and cue movement patterns, analyze and modify body mechanics/ergonomics, assess and modify postural abnormalities, address imbalance/dizziness and instruct in home and community integration to attain remaining goals. []  See Plan of Care  [x]  See progress note/recertification  []  See Discharge Summary         Progress towards goals / Updated goals:  Short Term Goals: To be accomplished in 1 weeks:  1. Pt will demonstrate understanding of HEP to improve ability to perform ADLs.  Progressing.  Pt reports partial compliance and is performing every other day.  8/3/21     Long Term Goals: To be accomplished in 4 weeks:  1. Pt will increase FOTO by 5 points to indicate functional improvement Not met. Declined 3 points 8/17/21   2. Pt will increase c/s extension to 45* to improve ease of ADLs Progressing. 40* (33* at eval) 8/12/21  3. Pt will increase right and left horizontal abduction strength to 4/5 to improve functional activities   4.  Pt will report 4/10 pain or less after a full work day to decrease limitations at [facility] [place]  Not met. Yumiko Barrs range 0-9/10.  No pain in the mornings, and pain increases throughout work shift 8/17/21     PLAN  [x]  Upgrade activities as tolerated     [x] Continue plan of care  []  Update interventions per flow sheet       []  Discharge due to:_  []  Other:_      America Sharma, PT 8/17/2021  8:20 AM    Future Appointments   Date Time Provider Kaleigh Madelin   8/19/2021  7:30 AM Elba Welch, PT [facility]   8/24/2021  8:05 AM I[facility] NURSE VISIT IO BS AMB   8/24/2021  8:15 AM Fawn Levine, PT [facility]   8/26/2021  7:30 AM Fawn Levine, PT [id number] [facility]   9/2/2021  8:00 AM Va Ryan MD [facility] BS AMB   11/10/2021  8:45 AM Ronna Cuba MD [facility] BS AMB

## 2021-08-17 NOTE — PROGRESS NOTES
[facility] [place] [facility] [place] [address]  [phone] fax    Continued Plan of Care/ Re-certification for Physical Therapy Services    Patient name: Martin Peñaloza Start of Care: 21   Referral source: Yanni Rubin MD : 1940   Medical/Treatment Diagnosis: Neck pain [M54.2]  Back pain [M54.9]  Pain in right shoulder [M25.511]  Pain in left shoulder [M25.512]  Payor: VA MEDICARE / Plan: VA MEDICARE PART A & B / Product Type: Medicare /  Onset Date:Over the last year      Prior Hospitalization: see medical history Provider#: [id number]   Medications: Verified on Patient Summary List    Comorbidities: Arthritis, HTN   Prior Level of Function: Able to work long hours without an increase in pain            Visits from Start of Care: 6    Missed Visits: 0    The Plan of Care and following information is based on the patient's current status:  Goal: Pt will demonstrate understanding of HEP to improve ability to perform ADLs. Status at last note/certification: Initiated at [facility]  Current Status: not met. Pt reports he is rarely completing     Goal: Pt will increase FOTO by 5 points to indicate functional improvement  Status at last note/certification: 98/880  Current Status: not met. Declined 3 points to 65/100    Goal: Pt will increase c/s extension to 45* to improve ease of ADLs   Status at last note/certification: 33*  Current Status: not met. Progressing. 40*    Goal: Pt will increase right and left horizontal abduction strength to 4/5 to improve functional activities   Status at last note/certification: 4-/5 B  Current Status: not met.  4-/5 B    Goal: Pt will report 4/10 pain or less after a full work day to decrease limitations at [employer]  Status at last note/certification: -  Current Status: not met.   Pain range 0-9/10.  No pain in the mornings, and pain increases throughout work shift    Key functional changes: pt reports Problems/ barriers to goal attainment: degenerative changes, prolonged forward posture with job tasks, noncompliance with HEP    Problem List: pain affecting function, decrease ROM, decrease strength, decrease ADL/ functional abilitiies, decrease activity tolerance, decrease flexibility/ joint mobility and decrease transfer abilities    Treatment Plan: Therapeutic exercise, Therapeutic activities, Neuromuscular re-education, Physical agent/modality, Gait/balance training, Manual therapy, Patient education, Self Care training, Functional mobility training, Home safety training and Stair training     Patient Goal (s) has been updated and includes: less pain with job tasks     Goals for this certification period to be accomplished in 4 weeks:  1. Pt will increase FOTO by 5 points to indicate functional improvement   2. Pt will increase c/s extension to 45* to improve ease of ADLs   3. Pt will increase right and left horizontal abduction strength to 4/5 to improve functional activities   4. Pt will report 50% improvement in symptoms in order to improve QOL. 5.  Pt will improve work tolerance to >5 hours without significant pain increase in order to improve ease of job tasks. Frequency / Duration: Patient to be seen 2-3 times per week for 4 weeks:    Assessment / Recommendations:Pt is making slow progress in therapy, progressing towards 2/5 initial goals. FOTO score has declined despite subjective reports of functional improvement. Questionable accurate initial FOTO score as pt reported on intake that he did not have difficulty with job tasks, and this is his primary complaint. Lack of compliance and prolonged hours with job tasks are likely contributing to current symptom, and pt has been educated regarding importance of HEP compliance to optimize therapy outcomes.   Cervical AROM is improving, and pt reports he is able to work a little longer (3-5 hours) before significant pain increase resulting in rest break.  Posterior kinetic chain strength continues to be decreased. Pt will benefit from continued skilled PT to address remaining strength, ROM, flexibility, and postural deficits in order to reduce pain/improve ability with job tasks and improve QOL. Certification Period: 8/18/21 to 9/16/21    Willard Ribeiro, PT 8/17/2021 8:55 AM    ________________________________________________________________________  I certify that the above Therapy Services are being furnished while the patient is under my care. I agree with the treatment plan and certify that this therapy is necessary. [] I have read the above and request that my patient continue as recommended.   [] I have read the above report and request that my patient continue therapy with the following changes/special instructions: _______________________________________  [] I have read the above report and request that my patient be discharged from therapy    Physician's Signature:____________Date:_________TIME:________     Michelle Rubin MD  ** Signature, Date and Time must be completed for valid certification **    Please sign and return to [facility] [place] [place]  [place]  [address] [place] [address]  [phone] fax

## 2021-08-18 RX ORDER — TORSEMIDE 20 MG/1
TABLET ORAL
Qty: 60 TABLET | Refills: 3 | Status: SHIPPED | OUTPATIENT
Start: 2021-08-18 | End: 2022-02-18

## 2021-08-19 ENCOUNTER — HOSPITAL ENCOUNTER (OUTPATIENT)
Dept: PHYSICAL THERAPY | Age: 81
Discharge: HOME | End: 2021-08-19
Payer: MEDICARE

## 2021-08-19 PROCEDURE — 97112 NEUROMUSCULAR REEDUCATION: CPT

## 2021-08-19 PROCEDURE — 97110 THERAPEUTIC EXERCISES: CPT

## 2021-08-19 NOTE — PROGRESS NOTES
PT DAILY TREATMENT NOTE     Patient Name: Brigida West  Date:2021  : 1940  [x]  Patient  Verified  Payor: Hanane Garcia / Plan: VA MEDICARE PART A & B / Product Type: Medicare /    In time:7:32  Out time:8:11  Total Treatment Time (min): 39  Visit #: 1 of 12    Medicare/BCBS Only   Total Timed Codes (min):  39 1:1 Treatment Time:  39       Treatment Area: Neck pain [M54.2]  Back pain [M54.9]  Pain in right shoulder [M25.511]  Pain in left shoulder [M25.512]    SUBJECTIVE  Pain Level (0-10 scale): 3-4/10  Any medication changes, allergies to medications, adverse drug reactions, diagnosis change, or new procedure performed?: [x] No    [] Yes (see summary sheet for update)  Subjective functional status/changes:   [] No changes reported  Pt reports that he's been having more pain since around 3pm yesterday. The pain started while at work. His MD wants to replace both of his shoulders, but he does not want to have surgery. He has friends who have had TSA's with poor outcomes. OBJECTIVE    28 min Therapeutic Exercise:  [x] See flow sheet :   Rationale: increase ROM and increase strength to improve the patients ability to improve ability with job tasks      11 min Neuromuscular Re-education:  [x]  See flow sheet : postural re-education with cervical retraction, SB depression, and [place] rows    Rationale: increase strength and increase proprioception  to improve the patients ability to improve upright posture for increased tolerance/ability with job tasks.              With   [] TE   [] TA   [] neuro   [] other: Patient Education: [x] Review HEP    [] Progressed/Changed HEP based on:   [] positioning   [] body mechanics   [] transfers   [] heat/ice application    [x] other: reviewed importance of HEP compliance, advised completing during rest breaks with job tasks       Other Objective/Functional Measures:     Subjective information obtained during UBE  Had pt perform UBE backward to engage periscapular musculature    Pt reported fatigue of B shoulders following UBE for <2 minutes, had pt stop after 2.5 minutes     MMT BUE  Flexion 4-/5 B   Abduction 3+/5 B (within available range)  IR: 4/5 B  ER 3-/5 B    Decreased shoulder abduction AROM B    Discomfort with end-range sidelying shoulder abduction/ER, discomfort subsided immediately following completion    Discomfort in ipsilateral UT of UE reaching with SNAGS, attempted to lower reach of UE to reduce UT substitution but unable to perform without shoulder shrug, ceased d/t poor form    Cues to perform no moneys in partial range and avoid UT substitution, correct form with cuing    Required constant tactile cues to achieve shoulder depression and scapular retraction with SB depression     Cues to avoid cervical flexion with cervical retraction, improved form with cuing      Cervical stiffness reported following shoulder interventions, stiffness reduced with cervical AROM     Pain Level (0-10 scale) post treatment: 0/10    ASSESSMENT/Changes in Function:     Pt is making slow progress towards initial goals in therapy. He's reported increased pain prior to the past two sessions, which he attributes to job tasks the day before. Added sidelying abduction/ER this visit to attempt to improve shoulder strength with existing musculature to reduce cervical compensation with job tasks. Also added chest flies this visit to attempt to improve chest wall stability for reduced anterior shoulder dislocation. Pt reported no pain following completion of new therex this visit. Will continue to address strength, ROM, flexibility, posture, and shoulder stability deficits in order to reduce pain and improve ability with job tasks.           Patient will continue to benefit from skilled PT services to modify and progress therapeutic interventions, address ROM deficits, address strength deficits, analyze and address soft tissue restrictions, analyze and cue movement patterns, analyze and modify body mechanics/ergonomics, assess and modify postural abnormalities and instruct in home and community integration to attain remaining goals. Progress towards goals / Updated goals:  1. Pt will increase FOTO by 5 points to indicate functional improvement   2. Pt will increase c/s extension to 45* to improve ease of ADLs   3. Pt will increase right and left horizontal abduction strength to 4/5 to improve functional activities   4. Pt will report 50% improvement in symptoms in order to improve QOL. 5.  Pt will improve work tolerance to >5 hours without significant pain increase in order to improve ease of job tasks. Not met.   8/19/21     PLAN  [x]  Upgrade activities as tolerated     [x]  Continue plan of care  []  Update interventions per flow sheet       []  Discharge due to:_  []  Other:_      Madonna Buchanan, PT 8/19/2021  7:33 AM    Future Appointments   Date Time Provider Kaleigh Yusuf   8/24/2021  8:05 AM [facility] NURSE VISIT [facility] BS AMB   8/24/2021  8:15 AM Mary DASH, PT [id number] [facility]   8/26/2021  7:30 AM Recel, Griselda Schilling, PT [id number] [facility]   9/2/2021  8:00 AM Samantha Landaverde MD [facility] BS AMB   11/10/2021  8:45 AM Jh Godwin MD [facility] BS AMB

## 2021-08-24 ENCOUNTER — HOSPITAL ENCOUNTER (OUTPATIENT)
Dept: LAB | Age: 81
Discharge: HOME | End: 2021-08-24
Payer: MEDICARE

## 2021-08-24 ENCOUNTER — APPOINTMENT (OUTPATIENT)
Dept: INTERNAL MEDICINE CLINIC | Age: 81
End: 2021-08-24

## 2021-08-24 DIAGNOSIS — I10 ESSENTIAL HYPERTENSION: ICD-10-CM

## 2021-08-24 DIAGNOSIS — U09.9 POST-ACUTE SEQUELAE OF COVID-19 (PASC): ICD-10-CM

## 2021-08-24 DIAGNOSIS — M50.30 DDD (DEGENERATIVE DISC DISEASE), CERVICAL: ICD-10-CM

## 2021-08-24 DIAGNOSIS — R73.03 PRE-DIABETES: ICD-10-CM

## 2021-08-24 DIAGNOSIS — D64.9 ANEMIA, UNSPECIFIED TYPE: ICD-10-CM

## 2021-08-24 DIAGNOSIS — M06.041 RHEUMATOID ARTHRITIS INVOLVING BOTH HANDS WITH NEGATIVE RHEUMATOID FACTOR (HCC): ICD-10-CM

## 2021-08-24 DIAGNOSIS — R60.0 BILATERAL LOWER EXTREMITY EDEMA: ICD-10-CM

## 2021-08-24 DIAGNOSIS — E78.5 HYPERLIPIDEMIA, UNSPECIFIED HYPERLIPIDEMIA TYPE: ICD-10-CM

## 2021-08-24 DIAGNOSIS — Z86.16 HISTORY OF 2019 NOVEL CORONAVIRUS DISEASE (COVID-19): ICD-10-CM

## 2021-08-24 DIAGNOSIS — R68.89 OTHER GENERAL SYMPTOMS AND SIGNS: ICD-10-CM

## 2021-08-24 DIAGNOSIS — E66.09 CLASS 1 OBESITY DUE TO EXCESS CALORIES WITH SERIOUS COMORBIDITY AND BODY MASS INDEX (BMI) OF 32.0 TO 32.9 IN ADULT: ICD-10-CM

## 2021-08-24 DIAGNOSIS — M11.20 CPDD (CALCIUM PYROPHOSPHATE DEPOSITION DISEASE): ICD-10-CM

## 2021-08-24 DIAGNOSIS — F33.0 MILD EPISODE OF RECURRENT MAJOR DEPRESSIVE DISORDER (HCC): ICD-10-CM

## 2021-08-24 DIAGNOSIS — M47.812 FACET ARTHROPATHY, CERVICAL: ICD-10-CM

## 2021-08-24 DIAGNOSIS — R07.89 CHEST WALL PAIN: ICD-10-CM

## 2021-08-24 DIAGNOSIS — N40.1 BPH WITH OBSTRUCTION/LOWER URINARY TRACT SYMPTOMS: ICD-10-CM

## 2021-08-24 DIAGNOSIS — G47.33 OBSTRUCTIVE SLEEP APNEA SYNDROME: ICD-10-CM

## 2021-08-24 DIAGNOSIS — I47.1 SVT (SUPRAVENTRICULAR TACHYCARDIA) (HCC): ICD-10-CM

## 2021-08-24 DIAGNOSIS — N13.8 BPH WITH OBSTRUCTION/LOWER URINARY TRACT SYMPTOMS: ICD-10-CM

## 2021-08-24 DIAGNOSIS — M06.042 RHEUMATOID ARTHRITIS INVOLVING BOTH HANDS WITH NEGATIVE RHEUMATOID FACTOR (HCC): ICD-10-CM

## 2021-08-24 LAB
ALBUMIN SERPL-MCNC: 3.6 G/DL (ref 3.4–5)
ALBUMIN/GLOB SERPL: 1.4 {RATIO} (ref 0.8–1.7)
ALP SERPL-CCNC: 104 U/L (ref 45–117)
ALT SERPL-CCNC: 30 U/L (ref 16–61)
ANION GAP SERPL CALC-SCNC: 5 MMOL/L (ref 3–18)
AST SERPL-CCNC: 24 U/L (ref 10–38)
BASOPHILS # BLD: 0 K/UL (ref 0–0.1)
BASOPHILS NFR BLD: 1 % (ref 0–2)
BILIRUB SERPL-MCNC: 0.7 MG/DL (ref 0.2–1)
BUN SERPL-MCNC: 17 MG/DL (ref 7–18)
BUN/CREAT SERPL: 18 (ref 12–20)
CALCIUM SERPL-MCNC: 9.1 MG/DL (ref 8.5–10.1)
CHLORIDE SERPL-SCNC: 111 MMOL/L (ref 100–111)
CHOLEST SERPL-MCNC: 121 MG/DL
CO2 SERPL-SCNC: 28 MMOL/L (ref 21–32)
CREAT SERPL-MCNC: 0.92 MG/DL (ref 0.6–1.3)
DIFFERENTIAL METHOD BLD: ABNORMAL
EOSINOPHIL # BLD: 0.5 K/UL (ref 0–0.4)
EOSINOPHIL NFR BLD: 8 % (ref 0–5)
ERYTHROCYTE [DISTWIDTH] IN BLOOD BY AUTOMATED COUNT: 13.2 % (ref 11.6–14.5)
EST. AVERAGE GLUCOSE BLD GHB EST-MCNC: 117 MG/DL
FERRITIN SERPL-MCNC: 75 NG/ML (ref 8–388)
FOLATE SERPL-MCNC: 15.4 NG/ML (ref 3.1–17.5)
GLOBULIN SER CALC-MCNC: 2.6 G/DL (ref 2–4)
GLUCOSE SERPL-MCNC: 111 MG/DL (ref 74–99)
HBA1C MFR BLD: 5.7 % (ref 4.2–5.6)
HCT VFR BLD AUTO: 38.5 % (ref 36–48)
HDLC SERPL-MCNC: 51 MG/DL (ref 40–60)
HDLC SERPL: 2.4 {RATIO} (ref 0–5)
HGB BLD-MCNC: 12.7 G/DL (ref 13–16)
IRON SATN MFR SERPL: 34 % (ref 20–50)
IRON SERPL-MCNC: 103 UG/DL (ref 50–175)
LDLC SERPL CALC-MCNC: 57.4 MG/DL (ref 0–100)
LIPID PROFILE,FLP: NORMAL
LYMPHOCYTES # BLD: 1.8 K/UL (ref 0.9–3.6)
LYMPHOCYTES NFR BLD: 28 % (ref 21–52)
MAGNESIUM SERPL-MCNC: 2 MG/DL (ref 1.6–2.6)
MCH RBC QN AUTO: 32.4 PG (ref 24–34)
MCHC RBC AUTO-ENTMCNC: 33 G/DL (ref 31–37)
MCV RBC AUTO: 98.2 FL (ref 78–100)
MONOCYTES # BLD: 0.7 K/UL (ref 0.05–1.2)
MONOCYTES NFR BLD: 11 % (ref 3–10)
NEUTS SEG # BLD: 3.4 K/UL (ref 1.8–8)
NEUTS SEG NFR BLD: 52 % (ref 40–73)
PLATELET # BLD AUTO: 188 K/UL (ref 135–420)
PMV BLD AUTO: 10.3 FL (ref 9.2–11.8)
POTASSIUM SERPL-SCNC: 3.7 MMOL/L (ref 3.5–5.5)
PROT SERPL-MCNC: 6.2 G/DL (ref 6.4–8.2)
RBC # BLD AUTO: 3.92 M/UL (ref 4.35–5.65)
SODIUM SERPL-SCNC: 144 MMOL/L (ref 136–145)
TIBC SERPL-MCNC: 304 UG/DL (ref 250–450)
TRIGL SERPL-MCNC: 63 MG/DL (ref ?–150)
VIT B12 SERPL-MCNC: 491 PG/ML (ref 211–911)
VLDLC SERPL CALC-MCNC: 12.6 MG/DL
WBC # BLD AUTO: 6.4 K/UL (ref 4.6–13.2)

## 2021-08-24 PROCEDURE — 85025 COMPLETE CBC W/AUTO DIFF WBC: CPT

## 2021-08-24 PROCEDURE — 80053 COMPREHEN METABOLIC PANEL: CPT

## 2021-08-24 PROCEDURE — 82728 ASSAY OF FERRITIN: CPT

## 2021-08-24 PROCEDURE — 83735 ASSAY OF MAGNESIUM: CPT

## 2021-08-24 PROCEDURE — 83036 HEMOGLOBIN GLYCOSYLATED A1C: CPT

## 2021-08-24 PROCEDURE — 83540 ASSAY OF IRON: CPT

## 2021-08-24 PROCEDURE — 36415 COLL VENOUS BLD VENIPUNCTURE: CPT

## 2021-08-24 PROCEDURE — 82607 VITAMIN B-12: CPT

## 2021-08-24 PROCEDURE — 80061 LIPID PANEL: CPT

## 2021-08-25 ENCOUNTER — HOSPITAL ENCOUNTER (OUTPATIENT)
Dept: PHYSICAL THERAPY | Age: 81
Discharge: HOME | End: 2021-08-25
Payer: MEDICARE

## 2021-08-25 PROCEDURE — 97140 MANUAL THERAPY 1/> REGIONS: CPT

## 2021-08-25 PROCEDURE — 97110 THERAPEUTIC EXERCISES: CPT

## 2021-08-25 NOTE — PROGRESS NOTES
PT DAILY TREATMENT NOTE     Patient Name: Shivam West  Date:2021  : 1940  [x]  Patient  Verified  Payor: [place] MEDICARE / Plan: [place] MEDICARE PART A & B / Product Type: Medicare /    In time: 8:20  Out time: 8:59  Total Treatment Time (min): 39  Visit #: 2 of 12    Medicare/BCBS Only   Total Timed Codes (min):  39 1:1 Treatment Time:  39       Treatment Area: Neck pain [M54.2]  Back pain [M54.9]  Pain in right shoulder [M25.511]  Pain in left shoulder [M25.512]    SUBJECTIVE  Pain Level (0-10 scale):  5/10  Any medication changes, allergies to medications, adverse drug reactions, diagnosis change, or new procedure performed?: [x] No    [] Yes (see summary sheet for update)  Subjective functional status/changes:   [] No changes reported  Pt. Reports he is sore today. He reports he may be getting a little better. OBJECTIVE    24 min Therapeutic Exercise:  [x] See flow sheet :   Rationale: increase ROM and increase strength to improve the patients ability to increase ease of ADLs    15 min Manual Therapy:  Trigger point release to B UT and cervical paraspinals, grade 4 cervical PAIVM mobs, grade 4 cervical and thoracic PA mobs   The manual therapy interventions were performed at a separate and distinct time from the therapeutic activities interventions. Rationale: decrease pain, increase ROM and increase tissue extensibility to increase ease of ADLs          With   [x] TE   [] TA   [] neuro   [] other: Patient Education: [x] Review HEP    [] Progressed/Changed HEP based on:   [] positioning   [] body mechanics   [] transfers   [] heat/ice application    [] other:      Other Objective/Functional Measures:   Pt. Was educated on updated HEP  Continues to have significant cervical and thoracic hypomobility    Poor shoulder ER with no monies with orange band  Pt.  Has less shrugging with shoulder AROM in lower ranges with mirror for visual cue  Pain with shoulder abduction       Pain Level (0-10 scale) post treatment:  2/10    ASSESSMENT/Changes in Function:  Pt. Is making limited progress towards goals. He continues to have moderate pain that worsens during 12 hour shift at work. He continues to demonstrate decreased cervical mobility secondary to joint hypomobility and decreased B shoulder strength. He demonstrates good understanding of updated HEP. Patient will continue to benefit from skilled PT services to modify and progress therapeutic interventions, address functional mobility deficits, address ROM deficits, address strength deficits, analyze and address soft tissue restrictions, analyze and cue movement patterns, analyze and modify body mechanics/ergonomics and assess and modify postural abnormalities to attain remaining goals. Progress towards goals / Updated goals:  1. Pt will increase FOTO by 5 points to indicate functional improvement   2. Pt will increase c/s extension to 45* to improve ease of ADLs   3. Pt will increase right and left horizontal abduction strength to 4/5 to improve functional activities   4. Pt will report 50% improvement in symptoms in order to improve QOL. 5.  Pt will improve work tolerance to >5 hours without significant pain increase in order to improve ease of job tasks.  Not met.   8/25/21    PLAN  []  Upgrade activities as tolerated     [x]  Continue plan of care  []  Update interventions per flow sheet       []  Discharge due to:_  []  Other:_      Wellington Peñaloza PT 8/25/2021  7:06 AM    Future Appointments   Date Time Provider Kaleigh Yusuf   8/25/2021  8:15 AM Priyanka Gonsales, PT [facility] [address]   8/26/2021  7:30 AM Justus Tripathi, PT [facility] [address]   8/31/2021  8:15 AM Rony Vergara PT [facility] [address]   9/2/2021  8:00 AM Mamie Washington MD [facility] BS AMB   9/3/2021  8:15 AM Sinda Linear, PTA [facility] [address]   9/8/2021  7:30 AM Priyanka Gonsales, PT [facility] [address]   9/10/2021  8:15 AM Sinda Linear, PTA [facility] [address]   9/15/2021  8:15 AM Pedro Morales Kurt Silva, PTA [facility]   9/17/2021  8:15 AM Katey Santiago, PTA [facility]   9/21/2021  7:30 AM Abdon Levine, PT [email or web address] [facility]   9/24/2021  8:15 AM Tequila Gerard, PT DESIRE [facility]   9/28/2021  7:30 AM Monica Ray, PT [email or web address] [facility]   10/1/2021  8:15 AM Katey Santiago, PTA [facility]   11/10/2021  8:45 AM Carl Osei MD [facility] BS AMB

## 2021-08-26 ENCOUNTER — HOSPITAL ENCOUNTER (OUTPATIENT)
Dept: PHYSICAL THERAPY | Age: 81
Discharge: HOME | End: 2021-08-26
Payer: MEDICARE

## 2021-08-26 PROCEDURE — 97140 MANUAL THERAPY 1/> REGIONS: CPT

## 2021-08-26 PROCEDURE — 97110 THERAPEUTIC EXERCISES: CPT

## 2021-08-26 NOTE — PROGRESS NOTES
PT DAILY TREATMENT NOTE     Patient Name: Anjum West  Date:2021  : 1940  [x]  Patient  Verified  Payor: [place] MEDICARE / Plan: [place] MEDICARE PART A & B / Product Type: Medicare /    In time:730  Out time:811  Total Treatment Time (min): 41   Visit #: 3 of 12    Medicare/BCBS Only   Total Timed Codes (min):  41 1:1 Treatment Time:  41       Treatment Area: Neck pain [M54.2]  Back pain [M54.9]  Pain in right shoulder [M25.511]  Pain in left shoulder [M25.512]    SUBJECTIVE  Pain Level (0-10 scale): 5  Any medication changes, allergies to medications, adverse drug reactions, diagnosis change, or new procedure performed?: [x] No    [] Yes (see summary sheet for update)  Subjective functional status/changes:   [] No changes reported  Patient states shoulder and back are sore from doing back to back therapy sessions. OBJECTIVE      26 min Therapeutic Exercise:  [x] See flow sheet :   Rationale: increase ROM, increase strength, improve coordination, improve balance and increase proprioception to improve the patients ability to perform ADL's pain free    15 min Manual Therapy:  Trigger point release to B UT and cervical paraspinals in supine   The manual therapy interventions were performed at a separate and distinct time from the therapeutic activities interventions. Rationale: decrease pain, increase ROM, increase tissue extensibility, decrease edema , correct positional vertigo, decrease trigger points and increase postural awareness to perform ADL's pan free. With   [] TE   [] TA   [] neuro   [] other: Patient Education: [x] Review HEP    [] Progressed/Changed HEP based on:   [] positioning   [] body mechanics   [] transfers   [] heat/ice application    [] other:      Other Objective/Functional Measures: UT compensation noted with flexion and abduction with mirror slides. Limited ER ROM noted with no money exercises.      Pain Level (0-10 scale) post treatment: 0    ASSESSMENT/Changes in Function: Patient able to complete exercises without increased pain or discomfort. Patient required verbal cues for proper form and posture during exercises. Decreased bilat UT tightness noted after completion of manual therapy. Noted no pain after completion of treatment. Patient will continue to benefit from skilled PT services to modify and progress therapeutic interventions, address functional mobility deficits, address ROM deficits, address strength deficits, analyze and address soft tissue restrictions, analyze and cue movement patterns, analyze and modify body mechanics/ergonomics, assess and modify postural abnormalities, address imbalance/dizziness and instruct in home and community integration to attain remaining goals. []  See Plan of Care  []  See progress note/recertification  []  See Discharge Summary         Progress towards goals / Updated goals:  1. Pt will increase FOTO by 5 points to indicate functional improvement   2. Pt will increase c/s extension to 45* to improve ease of ADLs   3. Pt will increase right and left horizontal abduction strength to 4/5 to improve functional activities   4. Pt will report 50% improvement in symptoms in order to improve QOL.   5.  Pt will improve work tolerance to >5 hours without significant pain increase in order to improve ease of job tasks.  Not met.  8/25/21    PLAN  []  Upgrade activities as tolerated     [x]  Continue plan of care  []  Update interventions per flow sheet       []  Discharge due to:_  []  Other:_      Padmini Sanchez, PTA 8/26/2021  7:33 AM    Future Appointments   Date Time Provider Kaleigh Yusuf   8/31/2021  8:15 AM Yaw Parikh, PT [facility]   9/2/2021  8:00 AM Wilda Salter MD [facility] BS AMB   9/3/2021  8:15 AM Katie Wiggins PTA [facility]   9/8/2021  7:30 AM Rosanne Dukes, PT [facility]   9/10/2021  8:15 AM [facility]iggins [facility]TA [facility]   9/15/2021  8:15 AM Katie Wiggins PTA [email or web address] [facility]   9/17/2021  8:15 AM Harshal Burk, PTA [facility]   9/21/2021  7:30 AM Triston Levine, PT [email or web address] [facility]   9/24/2021  8:15 AM Atiya Pinedo, PT VCEUTMO [facility]   9/28/2021  7:30 AM Triston Levine, PT [email or web address] [facility]   10/1/2021  8:15 AM Harshal Burk, PTA [facility]   11/10/2021  8:45 AM Silvino Phelan MD [facility] BS AMB

## 2021-08-31 ENCOUNTER — HOSPITAL ENCOUNTER (OUTPATIENT)
Dept: PHYSICAL THERAPY | Age: 81
Discharge: HOME | End: 2021-08-31
Payer: MEDICARE

## 2021-08-31 PROCEDURE — 97112 NEUROMUSCULAR REEDUCATION: CPT

## 2021-08-31 PROCEDURE — 97110 THERAPEUTIC EXERCISES: CPT

## 2021-08-31 PROCEDURE — 97140 MANUAL THERAPY 1/> REGIONS: CPT

## 2021-08-31 NOTE — PROGRESS NOTES
PT DAILY TREATMENT NOTE     Patient Name: Liliya West  Date:2021  : 1940  [x]  Patient  Verified  Payor: [place] MEDICARE / Plan: [place] MEDICARE PART A & B / Product Type: Medicare /    In time: 8:15  Out time:9:18  Total Treatment Time (min): 63  Visit #: 4 of 12    Medicare/BCBS Only   Total Timed Codes (min):  53 1:1 Treatment Time:  53       Treatment Area: Neck pain [M54.2]  Back pain [M54.9]  Pain in right shoulder [M25.511]  Pain in left shoulder [M25.512]    SUBJECTIVE  Pain Level (0-10 scale): 2-3  Any medication changes, allergies to medications, adverse drug reactions, diagnosis change, or new procedure performed?: [x] No    [] Yes (see summary sheet for update)  Subjective functional status/changes:   [] No changes reported  Patient reports he was having some elevated pain yesterday to the point he almost left work. Pain in the neck, back, and shoulders. Not as bad today, but is increasing with bike warm up \"it's creeping up now. \"     OBJECTIVE    Modality rationale: decrease inflammation, decrease pain and increase tissue extensibility to improve the patients ability to  tolerate exercises and return to daily activity with ease.    Min Type Additional Details    [] Estim:  []Unatt       []IFC  []Premod                        []Other:  []w/ice   []w/heat  Position:  Location:    [] Estim: []Att    []TENS instruct  []NMES                    []Other:  []w/US   []w/ice   []w/heat  Position:  Location:    []  Traction: [] Cervical       []Lumbar                       [] Prone          []Supine                       []Intermittent   []Continuous Lbs:  [] before manual  [] after manual    []  Ultrasound: []Continuous   [] Pulsed                           []1MHz   []3MHz W/cm2:  Location:    []  Iontophoresis with dexamethasone         Location: [] Take home patch   [] In clinic    []  Ice     []  heat  []  Ice massage  []  Laser   []  Anodyne Position:  Location:    []  Laser with stim  []  Other:  Position:  Location:    []  Vasopneumatic Device    []  Right     []  Left  Pre-treatment girth:  Post-treatment girth:  Measured at (location):  Pressure:       [] lo [] med [] hi   Temperature: [] lo [] med [] hi   [] Skin assessment post-treatment:  []intact []redness- no adverse reaction    []redness  adverse reaction:     21 min Therapeutic Exercise:  [x] See flow sheet :   Rationale: increase ROM and increase strength to improve the patients ability to perform ADLs with ease    14 min Neuromuscular Re-education:  [x]  See flow sheet :   Rationale: increase strength, improve coordination and increase proprioception  to improve the patients ability to stabilize, use proper body mechanics, and promote proper postural awareness    18 min Manual Therapy:  STM/DTM to B UT; TP release to UT and c/s paraspinals in supine; B scap mobilization with clocks and TPr to UT and rhomboids in sidelying positions. AC joint mobs bilaterally   The manual therapy interventions were performed at a separate and distinct time from the therapeutic activities interventions. Rationale: decrease pain, increase ROM, increase tissue extensibility and decrease trigger points to increase mobility and reduce restriction with ADLs          With   [x] TE   [] TA   [x] neuro   [] other: Patient Education: [x] Review HEP    [] Progressed/Changed HEP based on:   [x] positioning   [x] body mechanics   [] transfers   [] heat/ice application    [] other:      Other Objective/Functional Measures:   Doorway stretch high and low  AROM through rotation, side bend, and flex/ext   No money with GTB - constant vc to breathe  T/S over 1/2 foam with deep breathing techniques   Increased pain with chest flies - ceased exercise    Pain Level (0-10 scale) post treatment: 0    ASSESSMENT/Changes in Function: Patient tolerated session well today, with decrease in pain and symptoms post session.  He presents with tight tissue in left > right UT and rhomboids; palpable trigger points and TTP. He requires frequent verbal cuing to be aware of his breathing, as he holds his breath with exercises that require increased effort. Patient will continue to benefit from skilled PT services to modify and progress therapeutic interventions, address functional mobility deficits, address ROM deficits, address strength deficits, analyze and address soft tissue restrictions, analyze and cue movement patterns, analyze and modify body mechanics/ergonomics and assess and modify postural abnormalities to attain remaining goals. []  See Plan of Care  [x]  See progress note/recertification  []  See Discharge Summary         Progress towards goals / Updated goals:  1. Pt will increase FOTO by 5 points to indicate functional improvement   2. Pt will increase c/s extension to 45* to improve ease of ADLs   3. Pt will increase right and left horizontal abduction strength to 4/5 to improve functional activities   4. Pt will report 50% improvement in symptoms in order to improve QOL.   5.  Pt will improve work tolerance to >5 hours without significant pain increase in order to improve ease of job tasks.  Not met.  8/25/21    PLAN  [x]  Upgrade activities as tolerated     [x]  Continue plan of care  []  Update interventions per flow sheet       []  Discharge due to:_  []  Other:_      Yaakov Antony PTA 8/31/2021  8:14 AM    Future Appointments   Date Time Provider Kaleigh Yusuf   8/31/2021  8:15 AM Pacheco Martinez, PTA [facility]   9/2/2021  8:00 AM Zan Zhu MD [facility] BS AMB   9/3/2021  8:15 AM Sujey Rhodes, PTA [facility]   9/8/2021  7:30 AM Mica Music, PT [facility]   9/10/2021  8:15 AM Sujey Damei, PTA [facility]   9/15/2021  8:15 AM Sujey Daub, PTA [facility]   9/17/2021  8:15 AM Sujey Rhodes, PTA [facility]   9/21/2021  7:30 AM Desirae Levine, PT [facility]   9/24/2021  8:15 AM Mica Casillas, PT [facility] 9/28/2021  7:30 AM Jm Levine, PT YNSRXYE [facility]   10/1/2021  8:15 AM Yaz Christensen, PTA [facility]   11/10/2021  8:45 AM Yessenia Nolasco MD [facility] AMB

## 2021-09-02 ENCOUNTER — OFFICE VISIT (OUTPATIENT)
Dept: INTERNAL MEDICINE CLINIC | Age: 81
End: 2021-09-02
Payer: MEDICARE

## 2021-09-02 ENCOUNTER — TELEPHONE (OUTPATIENT)
Dept: INTERNAL MEDICINE CLINIC | Age: 81
End: 2021-09-02

## 2021-09-02 VITALS
WEIGHT: 205 LBS | HEART RATE: 79 BPM | BODY MASS INDEX: 31.07 KG/M2 | RESPIRATION RATE: 16 BRPM | DIASTOLIC BLOOD PRESSURE: 64 MMHG | TEMPERATURE: 97.8 F | SYSTOLIC BLOOD PRESSURE: 113 MMHG | OXYGEN SATURATION: 98 % | HEIGHT: 68 IN

## 2021-09-02 DIAGNOSIS — M06.041 RHEUMATOID ARTHRITIS INVOLVING BOTH HANDS WITH NEGATIVE RHEUMATOID FACTOR (HCC): ICD-10-CM

## 2021-09-02 DIAGNOSIS — R73.01 IMPAIRED FASTING GLUCOSE: ICD-10-CM

## 2021-09-02 DIAGNOSIS — I10 ESSENTIAL HYPERTENSION: Primary | ICD-10-CM

## 2021-09-02 DIAGNOSIS — M15.9 PRIMARY OSTEOARTHRITIS INVOLVING MULTIPLE JOINTS: ICD-10-CM

## 2021-09-02 DIAGNOSIS — R73.03 PRE-DIABETES: ICD-10-CM

## 2021-09-02 DIAGNOSIS — G47.33 OSA TREATED WITH BIPAP: ICD-10-CM

## 2021-09-02 DIAGNOSIS — M06.042 RHEUMATOID ARTHRITIS INVOLVING BOTH HANDS WITH NEGATIVE RHEUMATOID FACTOR (HCC): ICD-10-CM

## 2021-09-02 DIAGNOSIS — R60.0 BILATERAL LOWER EXTREMITY EDEMA: ICD-10-CM

## 2021-09-02 DIAGNOSIS — E78.5 HYPERLIPIDEMIA, UNSPECIFIED HYPERLIPIDEMIA TYPE: ICD-10-CM

## 2021-09-02 DIAGNOSIS — Z86.16 HISTORY OF 2019 NOVEL CORONAVIRUS DISEASE (COVID-19): ICD-10-CM

## 2021-09-02 DIAGNOSIS — M11.20 CPDD (CALCIUM PYROPHOSPHATE DEPOSITION DISEASE): ICD-10-CM

## 2021-09-02 PROCEDURE — G8536 NO DOC ELDER MAL SCRN: HCPCS | Performed by: INTERNAL MEDICINE

## 2021-09-02 PROCEDURE — G8427 DOCREV CUR MEDS BY ELIG CLIN: HCPCS | Performed by: INTERNAL MEDICINE

## 2021-09-02 PROCEDURE — 99214 OFFICE O/P EST MOD 30 MIN: CPT | Performed by: INTERNAL MEDICINE

## 2021-09-02 PROCEDURE — G8754 DIAS BP LESS 90: HCPCS | Performed by: INTERNAL MEDICINE

## 2021-09-02 PROCEDURE — 1101F PT FALLS ASSESS-DOCD LE1/YR: CPT | Performed by: INTERNAL MEDICINE

## 2021-09-02 PROCEDURE — G0463 HOSPITAL OUTPT CLINIC VISIT: HCPCS | Performed by: INTERNAL MEDICINE

## 2021-09-02 PROCEDURE — G9717 DOC PT DX DEP/BP F/U NT REQ: HCPCS | Performed by: INTERNAL MEDICINE

## 2021-09-02 PROCEDURE — G8752 SYS BP LESS 140: HCPCS | Performed by: INTERNAL MEDICINE

## 2021-09-02 PROCEDURE — G8417 CALC BMI ABV UP PARAM F/U: HCPCS | Performed by: INTERNAL MEDICINE

## 2021-09-02 RX ORDER — AMLODIPINE BESYLATE 10 MG/1
5 TABLET ORAL DAILY
Qty: 1 TABLET | Refills: 0
Start: 2021-09-02 | End: 2021-09-28

## 2021-09-02 RX ORDER — CELECOXIB 200 MG/1
200 CAPSULE ORAL
Qty: 1 CAPSULE | Refills: 0
Start: 2021-09-02 | End: 2021-10-06

## 2021-09-02 NOTE — TELEPHONE ENCOUNTER
Pt asking if there is anyway you can see him in an early morning appt in 2 weeks. He works in [place] and finds it hard to get here mid day and then go back to [place]. All AM appt taken. I did put him down as a 3pm. He says it is just a bp check. Asking if nurse would really just do it?

## 2021-09-02 NOTE — PROGRESS NOTES
Chief Complaint   Patient presents with    Other     Pre-DM    Cholesterol Problem    Hypertension    Other     RA    Benign Prostatic Hypertrophy    Depression    Results     1. Have you been to the ER, urgent care clinic since your last visit? Hospitalized since your last visit? No    2. Have you seen or consulted any other health care providers outside of the [address] [place] [address] since your last visit? Include any pap smears or colon screening.  No

## 2021-09-02 NOTE — PATIENT INSTRUCTIONS
Heart-Healthy Diet: Care Instructions  Your Care Instructions     A heart-healthy diet has lots of vegetables, fruits, nuts, beans, and whole grains, and is low in salt. It limits foods that are high in saturated fat, such as meats, cheeses, and fried foods. It may be hard to change your diet, but even small changes can lower your risk of heart attack and heart disease. Follow-up care is a key part of your treatment and safety. Be sure to make and go to all appointments, and call your doctor if you are having problems. It's also a good idea to know your test results and keep a list of the medicines you take. How can you care for yourself at home? Watch your portions  · Learn what a serving is. A \"serving\" and a \"portion\" are not always the same thing. Make sure that you are not eating larger portions than are recommended. For example, a serving of pasta is ½ cup. A serving size of meat is 2 to 3 ounces. A 3-ounce serving is about the size of a deck of cards. Measure serving sizes until you are good at [place]" them. Keep in mind that restaurants often serve portions that are 2 or 3 times the size of one serving. · To keep your energy level up and keep you from feeling hungry, eat often but in smaller portions. · Eat only the number of calories you need to stay at a healthy weight. If you need to lose weight, eat fewer calories than your body burns (through exercise and other physical activity). Eat more fruits and vegetables  · Eat a variety of fruit and vegetables every day. Dark green, deep orange, red, or yellow fruits and vegetables are especially good for you. Examples include spinach, carrots, peaches, and berries. · Keep carrots, celery, and other veggies handy for snacks. Buy fruit that is in season and store it where you can see it so that you will be tempted to eat it. · Cook dishes that have a lot of veggies in them, such as stir-fries and soups.   Limit saturated and trans fat  · Read food labels, and try to avoid saturated and trans fats. They increase your risk of heart disease. · Use olive or canola oil when you cook. · Bake, broil, grill, or steam foods instead of frying them. · Choose lean meats instead of high-fat meats such as hot dogs and sausages. Cut off all visible fat when you prepare meat. · Eat fish, skinless poultry, and meat alternatives such as soy products instead of high-fat meats. Soy products, such as tofu, may be especially good for your heart. · Choose low-fat or fat-free milk and dairy products. Eat foods high in fiber  · Eat a variety of grain products every day. Include whole-grain foods that have lots of fiber and nutrients. Examples of whole-grain foods include oats, whole wheat bread, and brown rice. · Buy whole-grain breads and cereals, instead of white bread or pastries. Limit salt and sodium  · Limit how much salt and sodium you eat to help lower your blood pressure. · Taste food before you salt it. Add only a little salt when you think you need it. With time, your taste buds will adjust to less salt. · Eat fewer snack items, fast foods, and other high-salt, processed foods. Check food labels for the amount of sodium in packaged foods. · Choose low-sodium versions of canned goods (such as soups, vegetables, and beans). Limit sugar  · Limit drinks and foods with added sugar. These include candy, desserts, and soda pop. Limit alcohol  · Limit alcohol to no more than 2 drinks a day for men and 1 drink a day for women. Too much alcohol can cause health problems. When should you call for help? Watch closely for changes in your health, and be sure to contact your doctor if:    · You would like help planning heart-healthy meals. Where can you learn more? Go to http://www.[employer].com/  Enter V137 in the search box to learn more about \"Heart-Healthy Diet: Care Instructions. \"  Current as of: August 22, 2019               Content Version: 12.6  © 3269-3817 [employer]. Care instructions adapted under license by [employer] (which disclaims liability or warranty for this information). If you have questions about a medical condition or this instruction, always ask your healthcare professional. [address] any warranty or liability for your use of this information. Learning About Low-Sodium Foods  What foods are low in sodium? The foods you eat contain nutrients, such as vitamins and minerals. Sodium is a nutrient. Your body needs the right amount to stay healthy and work as it should. You can use the list below to help you make choices about which foods to eat. Fruits  · Fresh, frozen, canned, or dried fruit  Vegetables  · Fresh or frozen vegetables, with no added salt  · Canned vegetables, low-sodium or with no added salt  Grains  · Bagels without salted tops  · Cereal with no added salt  · Corn tortillas  · Crackers with no added salt  · Oatmeal, cooked without salt  · Popcorn with no salt  · Pasta and noodles, cooked without salt  · Rice, cooked without salt  · Unsalted pretzels  Dairy and dairy alternatives  · Butter, unsalted  · Cream cheese  · Ice cream  · Milk  · Soy milk  Meats and other protein foods  · Beans and peas, canned with no salt  · Eggs  · Fresh fish (not smoked)  · Fresh meats (not smoked or cured)  · Nuts and nut butter, prepared without salt  · Poultry, not packaged with sodium solution  · Tofu, unseasoned  · Tuna, canned without salt  Seasonings  · Garlic  · Herbs and spices  · Lemon juice  · Mustard  · Olive oil  · Salt-free seasoning mixes  · Vinegar  Work with your doctor to find out how much of this nutrient you need. Depending on your health, you may need more or less of it in your diet. Where can you learn more?   Go to http://www.gray.com/  Enter S460 in the search box to learn more about \"Learning About Low-Sodium Foods.\"  Current as of: August 22, 2019               Content Version: 12.6  © 2110-3931 [employer]. Care instructions adapted under license by [employer] (which disclaims liability or warranty for this information). If you have questions about a medical condition or this instruction, always ask your healthcare professional. [address] any warranty or liability for your use of this information. Low Sodium Diet (2,000 Milligram): Care Instructions  Your Care Instructions     Too much sodium causes your body to hold on to extra water. This can raise your blood pressure and force your heart and kidneys to work harder. In very serious cases, this could cause you to be put in the hospital. It might even be life-threatening. By limiting sodium, you will feel better and lower your risk of serious problems. The most common source of sodium is salt. People get most of the salt in their diet from canned, prepared, and packaged foods. Fast food and restaurant meals also are very high in sodium. Your doctor will probably limit your sodium to less than 2,000 milligrams (mg) a day. This limit counts all the sodium in prepared and packaged foods and any salt you add to your food. Follow-up care is a key part of your treatment and safety. Be sure to make and go to all appointments, and call your doctor if you are having problems. It's also a good idea to know your test results and keep a list of the medicines you take. How can you care for yourself at home? Read food labels  · Read labels on cans and food packages. The labels tell you how much sodium is in each serving. Make sure that you look at the serving size. If you eat more than the serving size, you have eaten more sodium. · Food labels also tell you the Percent Daily Value for sodium. Choose products with low Percent Daily Values for sodium.   · Be aware that sodium can come in forms other than salt, including monosodium glutamate (MSG), sodium citrate, and sodium bicarbonate (baking soda). MSG is often added to Asian food. When you eat out, you can sometimes ask for food without MSG or added salt. Buy low-sodium foods  · Buy foods that are labeled \"unsalted\" (no salt added), \"sodium-free\" (less than 5 mg of sodium per serving), or \"low-sodium\" (less than 140 mg of sodium per serving). Foods labeled \"reduced-sodium\" and \"light sodium\" may still have too much sodium. Be sure to read the label to see how much sodium you are getting. · Buy fresh vegetables, or frozen vegetables without added sauces. Buy low-sodium versions of canned vegetables, soups, and other canned goods. Prepare low-sodium meals  · Cut back on the amount of salt you use in cooking. This will help you adjust to the taste. Do not add salt after cooking. One teaspoon of salt has about 2,300 mg of sodium. · Take the salt shaker off the table. · Flavor your food with garlic, lemon juice, onion, vinegar, herbs, and spices. Do not use soy sauce, lite soy sauce, steak sauce, onion salt, garlic salt, celery salt, mustard, or ketchup on your food. · Use low-sodium salad dressings, sauces, and ketchup. Or make your own salad dressings and sauces without adding salt. · Use less salt (or none) when recipes call for it. You can often use half the salt a recipe calls for without losing flavor. Other foods such as rice, pasta, and grains do not need added salt. · Rinse canned vegetables, and cook them in fresh water. This removes somebut not allof the salt. · Avoid water that is naturally high in sodium or that has been treated with water softeners, which add sodium. Call your local water company to find out the sodium content of your water supply. If you buy bottled water, read the label and choose a sodium-free brand. Avoid high-sodium foods  · Avoid eating:  ? Smoked, cured, salted, and canned meat, fish, and poultry.   ? Ham, puentes, hot dogs, and luncheon meats.  ? Regular, hard, and processed cheese and regular peanut butter. ? Crackers with salted tops, and other salted snack foods such as pretzels, chips, and salted popcorn. ? Frozen prepared meals, unless labeled low-sodium. ? Canned and dried soups, broths, and bouillon, unless labeled sodium-free or low-sodium. ? Canned vegetables, unless labeled sodium-free or low-sodium. ? [place] fries, pizza, tacos, and other fast foods. ? Pickles, olives, ketchup, and other condiments, especially soy sauce, unless labeled sodium-free or low-sodium. Where can you learn more? Go to http://www.gray.com/  Enter V843 in the search box to learn more about \"Low Sodium Diet (2,000 Milligram): Care Instructions. \"  Current as of: August 22, 2019               Content Version: 12.6  © 7115-2576 [employer]. Care instructions adapted under license by "[employer]" (which disclaims liability or warranty for this information). If you have questions about a medical condition or this instruction, always ask your healthcare professional. [place] [address] any warranty or liability for your use of this information.

## 2021-09-03 ENCOUNTER — HOSPITAL ENCOUNTER (OUTPATIENT)
Dept: PHYSICAL THERAPY | Age: 81
Discharge: HOME | End: 2021-09-03
Payer: MEDICARE

## 2021-09-03 PROCEDURE — 97110 THERAPEUTIC EXERCISES: CPT

## 2021-09-03 PROCEDURE — 97112 NEUROMUSCULAR REEDUCATION: CPT

## 2021-09-03 NOTE — PROGRESS NOTES
PT DAILY TREATMENT NOTE     Patient Name: Markie De La Rosa  Date:9/3/2021  : 1940  [x]  Patient  Verified  Payor: [place] MED[place] / Plan: [place] MEDICARE PART A & B / Product Type: Medicare /    In time: 8:15 Out time: 9:00  Total Treatment Time (min): 45  Visit #: 5 of 12    Medicare/BCBS Only   Total Timed Codes (min):  45 1:1 Treatment Time:  45       Treatment Area: Neck pain [M54.2]  Back pain [M54.9]  Pain in right shoulder [M25.511]  Pain in left shoulder [M25.512]    SUBJECTIVE  Pain Level (0-10 scale): 2-3/10  Any medication changes, allergies to medications, adverse drug reactions, diagnosis change, or new procedure performed?: [x] No    [] Yes (see summary sheet for update)  Subjective functional status/changes:   [] No changes reported  Pt states he works up to 10-12 hour shifts sometimes. He is trying to take tylenol before he has increased pain. He has a heating pad at home but hasn't tried to use it yet. He states the exercises do help him. OBJECTIVE    30 min Therapeutic Exercise:  [x] See flow sheet :   Rationale: increase ROM and increase strength to improve the patients ability to perform ADLs with ease    15 min Neuromuscular Re-education:  [x]  See flow sheet : posture re-education   Rationale: increase strength, improve coordination and increase proprioception  to improve the patients ability to stabilize, use proper body mechanics, and promote proper postural awareness          With   [x] TE   [] TA   [x] neuro   [] other: Patient Education: [x] Review HEP    [] Progressed/Changed HEP based on:   [x] positioning   [x] body mechanics   [] transfers   [] heat/ice application    [] other:      Other Objective/Functional Measures:    Forward head and rounded shoulders  Increased B LE edema; pt addressing with MD  In standing increased l/s extension with knees locked in extension, forward head and shoulders  Educated on standing at wall and working on c/s retractions at work to improve posture  Educated on taking rest breaks during his shifts to work on stretches and rows  Provided BTB for home  Educated on theracane for self TPR  Educated on using a scarf in the winter to keep neck warm to reduce stiffness    Pain Level (0-10 scale) post treatment: 0/10    ASSESSMENT/Changes in Function: Pt continues with increased forward head and shoulder posture. 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 He has decreased shoulder stability and pain with OH motions. Will continue to work to improve pt compliance with stretching/strengthening during his shifts and taking rest breaks to reduce postural pain symptoms. Patient will continue to benefit from skilled PT services to modify and progress therapeutic interventions, address functional mobility deficits, address ROM deficits, address strength deficits, analyze and address soft tissue restrictions, analyze and cue movement patterns, analyze and modify body mechanics/ergonomics and assess and modify postural abnormalities to attain remaining goals. []  See Plan of Care  [x]  See progress note/recertification  []  See Discharge Summary         Progress towards goals / Updated goals:  1. Pt will increase FOTO by 5 points to indicate functional improvement   2. Pt will increase c/s extension to 45* to improve ease of ADLs   3. Pt will increase right and left horizontal abduction strength to 4/5 to improve functional activities   4. Pt will report 50% improvement in symptoms in order to improve QOL.   5.  Pt will improve work tolerance to >5 hours without significant pain increase in order to improve ease of job tasks.  Not met.  8/25/21    PLAN  [x]  Upgrade activities as tolerated     [x]  Continue plan of care  []  Update interventions per flow sheet       []  Discharge due to:_  []  Other:_      Amberly Hendrix PTA 9/3/2021  8:14 AM    Future Appointments   Date Time Provider Kaleigh Yusuf   9/3/2021  8:15 AM Sujey Daub, PTA [facility]   9/8/2021  7:30 AM Mica Music, PT [facility]   9/10/2021  8:15 AM Sujey Daub, PTA [facility]   9/15/2021  8:15 AM Sujey Daub, PTA [facility]   9/16/2021  8:00 AM Zan Zhu MD [facility]   9/17/2021  8:15 AM Sujey Daub, PTA [facility]   9/21/2021  7:30 AM Desirae Rajput, PT [facility]   9/24/2021  8:15 AM Mica Music, PT [facility]   9/28/2021  7:30 AM Desirae Rajput, PT [facility]   10/1/2021  8:15 AM Sujey Daub, PTA [facility]   11/10/2021  8:45 AM Ayah Caban MD [facility]

## 2021-09-06 PROBLEM — U09.9 POST-ACUTE SEQUELAE OF COVID-19 (PASC): Status: RESOLVED | Noted: 2021-04-10 | Resolved: 2021-09-06

## 2021-09-06 PROBLEM — R07.89 CHEST WALL PAIN: Status: RESOLVED | Noted: 2021-01-24 | Resolved: 2021-09-06

## 2021-09-06 NOTE — PROGRESS NOTES
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 Synovial cyst of lumbar facet joint M71.38    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 Hypertension. Blood pressure now well controlled on lisinopril 40 mg daily, amlodipine 10 mg daily, hydralazine 25 mg bid, and torsemide 20 mg bid. Patient states that he stopped metolazone after one week since did not feel well with it. Renal function has been normal with creatinine 0.92/ eGFR >60. Echocardiogram (2/2021) with grade 1 diastolic dysfunction. Continue to follow. 2. Lower extremity edema. Chronic problem, but worsened following COVID 19 infection. Evaluated by Dr. Karley Lan and changed from lasix to torsemide and metolazone. Became hypotensive, and patient stopped metolazone on his own. Hydralazine held, but restarted on 5/5/2021 due to elevated blood pressure.  Underwent pharmacologic nuclear stress test (2/24/2021) which was a normal, low risk study. Repeat echocardiogram (2/24/2021) similar to prior in 9/2020 except noted new mild PI. Edema much improved on torsemide, but patient admits to only taking daily and noting worsening lower extremity edema. Advised to decrease dose of amlodipine to 5 mg daily and increase torsemide dose to 20 mg twice daily. Also urged to take metolazone 5 mg as needed for worsening edema. Will reassess blood pressure and edema in 2 weeks. Previously prescribed compression hose, but finding difficult to wear. 3. Hyperlipidemia. On moderate intensity dose atorvastatin with LDL 57 and HDL 51, indicative of excellent control in this patient. Continue to follow. 4. Prediabetes. Controlled on diet alone with HbA1c 5.7 today. Required sliding scale insulin dosing when hospitalized due to COVID 19 due to elevated blood sugar, likely related to high dose steroids. No evidence of microvascular complications. On Ace-I and statin. Continue follow-up with Dr. Adalberto Chowdhury for annual eye exams. Emphasized importance of lifestyle modifications, including diet, exercise, and weight loss. 5. History of COVID-19 infection with severe pneumonia/ acute hypoxic respiratory failure (6/2020) with PASC. Overall, appears improved. No longer complaining of chest wall pain with inhalation and difficulty taking deep breaths. Lower extremity edema continues to be a persistent problem, but was also present prior to his COVID-19 infection. Echocardiogram (9/2020) showed no change from 8/2019 with EF 55-60% and mild MR. EKG (9/2020) without change. Underwent 6 minute walk test (3/31/2021) showing no desaturations, and PFT's (3/31/2021) showing normal flows and DLCO, and isolated decreased RV and FRC, no response to bronchodilator. Completed the Moderna COVID-19 vaccine series. Will continue to monitor. 6. Obstructive sleep apnea, severe. Patient reported in 1/2019 awakening gasping for air several times per week.  No overt evidence of heart failure and EKG was without change from baseline at that time. He also admitted to snoring and experiencing significant daytime drowsiness particularly when driving. Severe episode on 8/9/2019 prompted ED evaluation. EKG without change, troponin negative and NT-pro BNP normal. Echocardiogram (8/26/2019) with normal LV size and function (EF 56-60%), no RWMA, normal valves. Evaluated by Dr. Ondina Lu and completed home sleep study and diagnosed with severe JANNET. Started on auto CPAP after hospitalization for COVID 19 by Dr. Marquise Santana and supplemented with 2 liters of oxygen. However, despite best efforts, patient continued to describe significant difficulty using due to dyspnea and air hunger, and returned equipment. Underwent in-patient sleep evaluation on 1/29/2021 and found BIPAP to be very effective. Reports now using and finding it to be helpful. 7. Second degree AV block, Mobitz 1. Noted to be intermittent during sleep study and referred to Dr. Kvng Chapman for evaluation. Felt to be likely secondary to untreated sleep apnea. Completed 30 day event monitor (5/5/2021) and no significant findings noted except for asymptomatic 20 beat run of SVT at 155 bpm.  Will continue to monitor. 8. Rheumatoid arthritis. On hydroxychloroquine. Has regular eye exams with Dr. Tabatha Fleming. Difficult to gauge response as appears to have evidence of osteoarthritis and CPPD occurring concurrently, but noted significant improvement since initiated colchicine. Voltaren gel, Celebrex prn, and Tylenol prn for pain control as needed. Followed by Dr. Yeny Glover. 9. Primary osteoarthritis. Evident in bilateral hands and knees, bilateral shoulders, and cervical spine. Shoulder pain (R>L). X-ray with evidence of osteoarthritis and rotator cuff pathology. Evaluated by Dr. Rita Jimenez and received cortisone injection to right shoulder with some improvement.  Surgery for reverse shoulder replacement recommended, but not wishing to proceed. Now using Celebrex only as needed and supplementing with Tylenol with reasonable control. Currently receiving physical therapy for his neck, back, and shoulder pain. Being followed Dr. Collette Peterson. 10. CPPD. Colchicine started on 1/19/2017 to help address chondrocalcinosis on x-rays. Reports significant improvement. Now taking every other day with good control. 11. Lumbar degenerative disease with right sciatica. Underwent decompression with L4/5 right-sided hemilaminotomy and medial facetectomy on 10/23/2019 by Dr. Maco Allred. Developed urinary retention post-op, but successfully passed voiding trial and has had no further difficulties. He developed recurrence of pain on post op day 6, and treated with Medrol dose pack. Reports no longer requiring gabapentin with no significant pain. Being followed by GOMEZ Venegas as needed. 12. Depression. Prior reasonable control with Paxil and weaned from benzodiazepine use for anxiety and insomnia. After inadvertently stopping Paxil, started on Zoloft and dose titrated to 75 mg daily with good control. However, patient had reported at last visit that he was taking 25 mg daily and reports good control of symptoms. 13. GERD. Good control of symptoms with omeprazole. No issues today. 14.  Anemia, mild. Colonoscopy 8/2015. Evaluated by Dr. Moon Trent and considered to be hemorrhoidal in source. Known internal and external hemorrhoids. Improved with Citrucel. Was due for routine colonoscopy in 8/2020, but patient unwilling to proceed. Completed Cologuard (4/2021) which was negative. Mild anemia at last visit in 5/2021 with Hb 12.9. Repeat today stable with Hb 12.7, and normal iron studies with ferritin 75 and transferrin 34% and normal B12 and folate levels. Will continue to monitor. 15. BPH with urinary retention. Does have lower urinary tract symptoms. Developed postop urinary retention after back surgery. On Flomax.  Followed previously by Dr. Jania Diaz and not wishing to establish with new provider unless problem emerges. 16. Left wrist ganglion cyst. Previously evaluated by Dr. Christa Mccullough and aspirated. Recurred and reports now larger. Requested referral to another orthopedist and evaluated by PA at Dr. Lianne Baca office. Was to be scheduled for surgery, but was dissatisfied with delays and now not wishing to proceed. Will continue to monitor. 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 Managing well and no further issues. 18. 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 Level stable and monitored annually (due 2/2022). 19. Insomnia. Weaned from Xanax. Had been using melatonin agonist, ramelteon, to replace Xanax, but no longer taking it. Appears to be managing without medication. 20. Obesity. Lost 25 pounds during COVID 19 illness, but had returned to near baseline. Weight decreased 5 pounds since last visit and patient states that he is eating better. Emphasized importance of continued healthy eating and improved nutrition. Will readdress at next visit. 21. Health maintenance. Completed Moderna COVID 19 vaccine series. Unable to receive Tdap due to allergy (anaphylaxis to Td). Will address Shingrix vaccine at next visit. Other immunizations up to date. Completed Cologuard (4/2021) testing. Continue regular eye exams with Dr. Pilar Downing. Vitamin D level has been normal after being treated with ergocalciferol 50,000 U weekly for 12 weeks. Continue maintenance dose 1000 U daily. Medicare wellness visit up to date. Patient understands recommendations and agrees with plan. Follow-up in 2 weeks.

## 2021-09-10 ENCOUNTER — HOSPITAL ENCOUNTER (OUTPATIENT)
Dept: PHYSICAL THERAPY | Age: 81
Discharge: HOME | End: 2021-09-10
Payer: MEDICARE

## 2021-09-10 PROCEDURE — 97110 THERAPEUTIC EXERCISES: CPT

## 2021-09-10 PROCEDURE — 97140 MANUAL THERAPY 1/> REGIONS: CPT

## 2021-09-10 NOTE — PROGRESS NOTES
PT DAILY TREATMENT NOTE     Patient Name: Orlando West  Date:9/10/2021  : 1940  [x]  Patient  Verified  Payor: Fredy Montero / Plan: [place] & B / Product Type: Medicare /    In time: 8:15  Out time: 9:10  Total Treatment Time (min): 55  Visit #: 6 of 12    Medicare/BCBS Only   Total Timed Codes (min): 45 1:1 Treatment Time: 45       Treatment Area: Neck pain [M54.2]  Back pain [M54.9]  Pain in right shoulder [M25.511]  Pain in left shoulder [M25.512]    SUBJECTIVE  Pain Level (0-10 scale): 2-3/10  Any medication changes, allergies to medications, adverse drug reactions, diagnosis change, or new procedure performed?: [x] No    [] Yes (see summary sheet for update)  Subjective functional status/changes:   [] No changes reported  Pt reports working almost 12 hours yesterday and had increased pain in the center lower neck. He states he hasn't tried the bands at work yet. He is trying to take rest breaks to stretch and work on posture.        OBJECTIVE    15 min Therapeutic Exercise:  [x] See flow sheet :   Rationale: increase ROM and increase strength to improve the patients ability to perform ADLs with ease     30 min Manual Therapy:  [x] See flow sheet : c/s PA mobs and lateral mobs; DTM c/s paraspinals with gentle traction; TPR B levator scapulae and upper traps   Rationale: increase ROM and increase strength to improve the patients ability to perform ADLs with ease    10 minutes of heat to the c/s post treatment to reduce stiffness/dullness of the c/s for ease of working            With   [x] TE   [] TA   [x] neuro   [] other: Patient Education: [x] Review HEP    [] Progressed/Changed HEP based on:   [x] positioning   [x] body mechanics   [] transfers   [] heat/ice application    [] other:      Other Objective/Functional Measures:   Reviewed importance of rest breaks and check on posture  Educated on degree angle of c/s and how it relates to weight of head  Provided trial of denisse for pain relief  Educated on heating pad use  Educated to try using bands in sitting versus standing    Pain Level (0-10 scale) post treatment: 0/10     ASSESSMENT/Changes in Function: Pt continues to present with forward head and forward shoulder posture. His 10-12 hour shifts of looking down continue to cause increased stiffness and pain in the lower c/s due to weight of head looking down extended periods. He is receptive of therapy recommendations and is trying to take rest breaks at work to stretch. He will continue to benefit working on improved posture and endurance of the posterior neck and shoulders to reduce pain with long hours working on guns at work. Patient will continue to benefit from skilled PT services to modify and progress therapeutic interventions, address functional mobility deficits, address ROM deficits, address strength deficits, analyze and address soft tissue restrictions, analyze and cue movement patterns, analyze and modify body mechanics/ergonomics and assess and modify postural abnormalities to attain remaining goals. []  See Plan of Care  [x]  See progress note/recertification  []  See Discharge Summary         Progress towards goals / Updated goals:  1. Pt will increase FOTO by 5 points to indicate functional improvement   2. Pt will increase c/s extension to 45* to improve ease of ADLs. Add c/s extension mobs with towel next session (9/10/21)   3. Pt will increase right and left horizontal abduction strength to 4/5 to improve functional activities   4. Pt will report 50% improvement in symptoms in order to improve QOL.   5.  Pt will improve work tolerance to >5 hours without significant pain increase in order to improve ease of job tasks.  Not met.  8/25/21 continues with increased pain at work (9/10/21)    PLAN  [x]  Upgrade activities as tolerated     [x]  Continue plan of care  []  Update interventions per flow sheet       []  Discharge due to:_  []  Other:_ Dana Alicia, PTA 9/10/2021  8:14 AM    Future Appointments   Date Time Provider Department Center   9/10/2021  8:15 AM Sujey Rhodes, PTA [facility]   9/15/2021  8:15 AM Sujey Daub, PTA [facility]   9/16/2021  8:00 AM Zan Zhu MD [facility]   9/17/2021  8:15 AM Sujey Rhodes, PTA [facility]   9/21/2021  7:30 AM Desirae Rajput, PT [facility]   9/24/2021  8:15 AM Mica Casillas, PT [facility]   9/28/2021  7:30 AM Desirae Rajput, PT [facility]   10/1/2021  8:15 AM Sujey Rhodes, PTA [facility]   11/10/2021  8:45 AM Ayah Caban MD [facility]

## 2021-09-15 ENCOUNTER — APPOINTMENT (OUTPATIENT)
Dept: PHYSICAL THERAPY | Age: 81
End: 2021-09-15
Payer: MEDICARE

## 2021-09-16 ENCOUNTER — TELEPHONE (OUTPATIENT)
Dept: INTERNAL MEDICINE CLINIC | Age: 81
End: 2021-09-16

## 2021-09-16 NOTE — TELEPHONE ENCOUNTER
Patient no showed his appt this morning. I offered him a 2:00 on the 28th (first available) Patient stated he can't come in the middle of the day. Please advise.

## 2021-09-17 ENCOUNTER — APPOINTMENT (OUTPATIENT)
Dept: PHYSICAL THERAPY | Age: 81
End: 2021-09-17
Payer: MEDICARE

## 2021-09-21 ENCOUNTER — HOSPITAL ENCOUNTER (OUTPATIENT)
Dept: PHYSICAL THERAPY | Age: 81
Discharge: HOME | End: 2021-09-21
Payer: MEDICARE

## 2021-09-21 PROCEDURE — 97110 THERAPEUTIC EXERCISES: CPT

## 2021-09-21 PROCEDURE — 97140 MANUAL THERAPY 1/> REGIONS: CPT

## 2021-09-21 NOTE — PROGRESS NOTES
[facility]  [place]  [address] [place] [address]  [phone] fax    Continued Plan of Care/ Re-certification for Physical Therapy Services    Patient name: Mariana Maldonado Start of Care: 21   Referral source: Keren Rubin MD : 1940   Medical/Treatment Diagnosis: Neck pain [M54.2]  Back pain [M54.9]  Pain in right shoulder [M25.511]  Pain in left shoulder [M25.512]  Payor: [place] MEDICARE / Plan: [place] MEDICARE PART A & B / Product Type: Medicare /  Onset Date:Over the last year      Prior Hospitalization: see medical history Provider#: [id number]   Medications: Verified on Patient Summary List    Comorbidities: Arthritis, HTN   Prior Level of Function: Able to work long hours without an increase in pain    Visits from Start of Care: 13    Missed Visits: 2    The Plan of Care and following information is based on the patient's current status:  Goal: Pt will increase FOTO by 5 points to indicate functional improvement   Status at last note/certification: 99/324  Current Status: not met. Declined 15 points to 53/100    Goal: Pt will increase c/s extension to 45* to improve ease of ADLs. Status at last note/certification: 40*  Current Status: met. 45*    Goal: Pt will increase right and left horizontal abduction strength to 4/5 to improve functional activities   Status at last note/certification: 4-/5 B  Current Status: not met. 4-/5 B    Goal: Pt will report 50% improvement in symptoms in order to improve QOL. Status at last note/certification:  Current Status: not met. Pt reports no change since initial evaluation    Goal: Pt will improve work tolerance to >5 hours without significant pain increase in order to improve ease of job tasks. Status at last note/certification:  Current Status: not met. Pain begins anywhere from immediately to 2 hours after beginning shift.   Pain depends how much he has to bend over/neck position during shif    Key functional changes: improving cervical extension AROM, limited functional change since last recert      Problems/ barriers to goal attainment: degenerative changes, postural deficits, voluntary long work shifts      Problem List: pain affecting function, decrease ROM, decrease strength, impaired gait/ balance, decrease ADL/ functional abilitiies, decrease activity tolerance, decrease flexibility/ joint mobility and decrease transfer abilities    Treatment Plan: Therapeutic exercise, Therapeutic activities, Neuromuscular re-education, Physical agent/modality, Gait/balance training, Manual therapy, Patient education, Self Care training, Functional mobility training, Home safety training and Stair training     Patient Goal (s) has been updated and includes:   1. Pt will demonstrate understanding/compliance with final HEP in order to allow for continued progression independently following DC. Goals for this certification period to be accomplished in 4 treatments:  1. Pt will demonstrate understanding/compliance with final HEP in order to allow for continued progression independently following DC. Frequency / Duration: Patient to be seen 1-2 times per week for 4 treatments:    Assessment / Recommendations: Pt is making slow progress in therapy, meeting 1/5 updated goals. Pt continues to report pain with job tasks with limited change since start of care. FOTO score has declined since initial evaluation. Pt reports that he works long hours because he wants to work, not because he needs to financially. He also reports that what he's learned in therapy helps when he follows therapy's instructions. Advised pt to shorten work shifts to reduce prolonged forward posture and elevated pain levels; however, pt declined. Will plan to establish final HEP next session to address remaining strength, ROM, flexibility, and postural deficits to allow for continued progression independently following DC.   Pt will benefit from continued skilled PT for a few final sessions in order to ensure understanding/compliance with final HEP. Anticipated DC within the next 4 visits d/t plateau in progress. Certification Period: 9/21/21 to 10/20/21    Madonna Buchanan, PT 9/21/2021 8:03 AM    ________________________________________________________________________  I certify that the above Therapy Services are being furnished while the patient is under my care. I agree with the treatment plan and certify that this therapy is necessary. [] I have read the above and request that my patient continue as recommended.   [] I have read the above report and request that my patient continue therapy with the following changes/special instructions: _______________________________________  [] I have read the above report and request that my patient be discharged from therapy    Physician's Signature:____________Date:_________TIME:________     Alice Rubin MD  ** Signature, Date and Time must be completed for valid certification **    Please sign and return to [facility]  [place]  [address] [place] [address]  [phone] fax

## 2021-09-21 NOTE — PROGRESS NOTES
PT DAILY TREATMENT NOTE     Patient Name: Ellen West  Date:2021  : 1940  [x]  Patient  Verified  Payor: [place] MEDICARE / Plan: [place] MEDICARE PART A & B / Product Type: Medicare /    In time:7:32  Out time:8:22  Total Treatment Time (min): 50  Visit #: 1 of 4    Medicare/BCBS Only   Total Timed Codes (min):  40 1:1 Treatment Time:  40       Treatment Area: Neck pain [M54.2]  Back pain [M54.9]  Pain in right shoulder [M25.511]  Pain in left shoulder [M25.512]    SUBJECTIVE  Pain Level (0-10 scale): 2-3/10  Any medication changes, allergies to medications, adverse drug reactions, diagnosis change, or new procedure performed?: [x] No    [] Yes (see summary sheet for update)  Subjective functional status/changes:   [] No changes reported  Pt reports that overall he is feeling about the same. He reports pain reduction with manual and heat and he has less pain at work on the days he has therapy, but the pain always returns. He had a lot of pain yesterday after working 10.5 hours. Reported pain after work was 9/10 yesterday. \"What you taught me to do helps, if I do it\".       OBJECTIVE    Modality rationale: decrease pain and increase tissue extensibility to improve the patients ability to tolerate daily tasks    Min Type Additional Details    [] Estim:  []Unatt       []IFC  []Premod                        []Other:  []w/ice   []w/heat  Position:  Location:    [] Estim: []Att    []TENS instruct  []NMES                    []Other:  []w/US   []w/ice   []w/heat  Position:  Location:    []  Traction: [] Cervical       []Lumbar                       [] Prone          []Supine                       []Intermittent   []Continuous Lbs:  [] before manual  [] after manual    []  Ultrasound: []Continuous   [] Pulsed                           []1MHz   []3MHz W/cm2:  Location:    []  Iontophoresis with dexamethasone         Location: [] Take home patch   [] In clinic   10 []  Ice     [x]  heat  _  Ice massage  []  Laser   []  Anodyne Position: seated  Location: c/s    []  Laser with stim  []  Other:  Position:  Location:    []  Vasopneumatic Device    []  Right     []  Left  Pre-treatment girth:  Post-treatment girth:  Measured at (location):  Pressure:       [] lo [] med [] hi   Temperature: [] lo [] med [] hi   [x] Skin assessment post-treatment:  [x]intact []redness- no adverse reaction    []redness  adverse reaction:       30 min Therapeutic Exercise:  [x] See flow sheet :   Rationale: increase ROM and increase strength to improve the patients ability to improve ease of job tasks    10 min Manual Therapy:   DTM B UT/levator, gentle SOR, gentle cervical side glides    The manual therapy interventions were performed at a separate and distinct time from the therapeutic activities interventions.   Rationale: decrease pain, increase ROM, increase tissue extensibility and decrease trigger points to improve ease of head movements with ADLs        With   [] TE   [] TA   [] neuro   [] other: Patient Education: [x] Review HEP    [] Progressed/Changed HEP based on:   [] positioning   [] body mechanics   [] transfers   [] heat/ice application    [] other:      Other Objective/Functional Measures:     Completed FOTO questionnaire during UBE    FOTO: 53/100    Pain with attempted open books to left, ceased after 1 rep d/t pain     Cervical extension AROM: 45*    MMT horizontal abduction (measured in sitting): 4-/5 B     Discussed lack of progress and how pt likely needs to reduce length of shifts for pain reduction    Discussed plan to review final HEP next visit and DC in 3 visits, pt in agreement    Pain Level (0-10 scale) post treatment: 0/10    ASSESSMENT/Changes in Function: See Recert     Patient will continue to benefit from skilled PT services to modify and progress therapeutic interventions, address functional mobility deficits, address ROM deficits, address strength deficits, analyze and address soft tissue restrictions, analyze and cue movement patterns, analyze and modify body mechanics/ergonomics, assess and modify postural abnormalities and instruct in home and community integration to attain remaining goals.      []  See Plan of Care  [x]  See progress note/recertification  []  See Discharge Summary         Progress towards goals / Updated goals:  See Recert        PLAN  [x]  Upgrade activities as tolerated     [x]  Continue plan of care  []  Update interventions per flow sheet       []  Discharge due to:_  []  Other:_      Angel Luis Jean, PT 9/21/2021  7:34 AM    Future Appointments   Date Time Provider Kaleigh Yusuf   9/24/2021  8:15 AM Surinder Sommers, PT [facility]   9/28/2021  7:30 AM Ángela Levine, PT [facility]   9/28/2021  9:15 AM Silverio Farmer MD [facility]   10/1/2021  8:15 AM Quita Haines PTA [facility]   11/10/2021  8:45 AM Jeevan Tomas MD [facility] BS AMB

## 2021-09-24 ENCOUNTER — HOSPITAL ENCOUNTER (OUTPATIENT)
Dept: PHYSICAL THERAPY | Age: 81
Discharge: HOME | End: 2021-09-24
Payer: MEDICARE

## 2021-09-24 PROCEDURE — 97140 MANUAL THERAPY 1/> REGIONS: CPT

## 2021-09-24 PROCEDURE — 97110 THERAPEUTIC EXERCISES: CPT

## 2021-09-24 NOTE — PROGRESS NOTES
[facility]  [address] [facility]  [phone] fax    Physical Therapy Discharge Summary    Patient name: Wesley Rios Start of Care: 21   Referral source: Nathaly Rubin MD : 1940   Medical/Treatment Diagnosis: Neck pain [M54.2]  Back pain [M54.9]  Pain in right shoulder [M25.511]  Pain in left shoulder [M25.512]  Payor: [place] MEDICARE / Plan: [place] MEDICARE PART A & B / Product Type: Medicare /  Onset Date:Over the last year       Prior Hospitalization: see medical history Provider#: [id number]   Medications: Verified on Patient Summary List     Comorbidities: Arthritis, HTN   Prior Level of Function: Able to work long hours without an increase in pain      Visits from Start of Care: 14    Missed Visits: 2    Reporting Period :  21 to 21    Summary of Care:  Goal:  Pt will demonstrate understanding/compliance with final HEP in order to allow for continued progression independently following DC. Status at last note/certification: n/a  Status at discharge: met    Pt. Made limited progress with physical therapy so will be D/C at this time. He does have some temporary relief with stretches that he will do at work. Pain continues to worsen with working. He also continues to demonstrates decreased cervical and shoulder AROM with pain at end ranges. He demonstrates good understanding of HEP.      ASSESSMENT/RECOMMENDATIONS:  [x]Discontinue therapy: []Patient has reached or is progressing toward set goals      []Patient is non-compliant or has abdicated      [x]Due to lack of appreciable progress towards set goals    Jorge A Mejia, PT 2021 8:46 AM

## 2021-09-24 NOTE — PROGRESS NOTES
PT DAILY TREATMENT NOTE     Patient Name: Long West  Date:2021  : 1940  [x]  Patient  Verified  Payor: [place] MEDICARE / Plan: [place] MEDICARE PART A & B / Product Type: Medicare /    In time: 8:16  Out time: 8:58  Total Treatment Time (min): 42  Visit #: 1 of 4    Medicare/BCBS Only   Total Timed Codes (min):  42 1:1 Treatment Time:  42       Treatment Area: Neck pain [M54.2]  Back pain [M54.9]  Pain in right shoulder [M25.511]  Pain in left shoulder [M25.512]    SUBJECTIVE  Pain Level (0-10 scale):  2-3/10  Any medication changes, allergies to medications, adverse drug reactions, diagnosis change, or new procedure performed?: [x] No    [] Yes (see summary sheet for update)  Subjective functional status/changes:   [] No changes reported  Pt. Reports he is feeling about the same and he wants to make today his last day. OBJECTIVE      27 min Therapeutic Exercise:  [x] See flow sheet :   Rationale: increase ROM and increase strength to improve the patients ability to increase ease of ADLs    15 min Manual Therapy:  Trigger point release to B UT and cervical paraspinals, cervical PAIVM mobs grade 4, cervical PA mobs grade 4. Thoracic PA mobs grade 4   The manual therapy interventions were performed at a separate and distinct time from the therapeutic activities interventions. Rationale: decrease pain, increase ROM and increase tissue extensibility to increase ease of ADLs          With   [x] TE   [] TA   [] neuro   [] other: Patient Education: [x] Review HEP    [] Progressed/Changed HEP based on:   [] positioning   [] body mechanics   [] transfers   [] heat/ice application    [] other:      Other Objective/Functional Measures:   Pt. Continues to demonstrate decreased cervical AROM  He was educated on udpated HEP and demonstrates good understanding  Increased pain with ER isometrics and pt.  Was educated on performing with less pressure    Pain Level (0-10 scale) post treatment: 2-3/10    ASSESSMENT/Changes in Function:     See D/C note    PLAN  []  Upgrade activities as tolerated     []  Continue plan of care  []  Update interventions per flow sheet       [x]  Discharge due to:_ lack or progress towards goals.    []  Other:_      Haseeb Loco, PT 9/24/2021  7:37 AM    Future Appointments   Date Time Provider Kaleigh Yusuf   9/24/2021  8:15 AM Daphne Wisdom, PT [facility]   9/28/2021  7:30 AM Quyen Antony, PT [facility]   9/28/2021  9:15 AM Sahra Esparza MD [facility] BS AMB   10/1/2021  8:15 AM Liam Parr, PTA [facility]   11/10/2021  8:45 AM Robin Wallace MD [facility] BS AMB

## 2021-09-24 NOTE — PROGRESS NOTES
Physical Therapy Discharge Instructions      [facility] [address]  [phone] fax    Patient: Chapis West  : 1940      Continue Home Exercise Program 1-2 times per day for 4 weeks, then decrease to 3 times per week      Continue with    [] Ice  as needed    [x] Heat           Follow up with MD:     [] Upon completion of therapy     [x] As needed      Recommendations:     [x]   Return to activity with home program    []   Return to activity with the following modifications:       []Post Rehab Program    []Join Independent aquatic program     []Return to/join local gym    Additional Comments: Keep up the great work at home!     Latrell Maguire, PT 2021 8:45 AM

## 2021-09-28 ENCOUNTER — APPOINTMENT (OUTPATIENT)
Dept: PHYSICAL THERAPY | Age: 81
End: 2021-09-28
Payer: MEDICARE

## 2021-09-28 ENCOUNTER — OFFICE VISIT (OUTPATIENT)
Dept: INTERNAL MEDICINE CLINIC | Age: 81
End: 2021-09-28
Payer: MEDICARE

## 2021-09-28 ENCOUNTER — HOSPITAL ENCOUNTER (OUTPATIENT)
Dept: LAB | Age: 81
Discharge: HOME | End: 2021-09-28
Payer: MEDICARE

## 2021-09-28 VITALS
BODY MASS INDEX: 30.1 KG/M2 | HEART RATE: 81 BPM | OXYGEN SATURATION: 96 % | DIASTOLIC BLOOD PRESSURE: 64 MMHG | HEIGHT: 68 IN | SYSTOLIC BLOOD PRESSURE: 128 MMHG | WEIGHT: 198.6 LBS | TEMPERATURE: 97.9 F | RESPIRATION RATE: 16 BRPM

## 2021-09-28 DIAGNOSIS — R60.0 BILATERAL LOWER EXTREMITY EDEMA: ICD-10-CM

## 2021-09-28 DIAGNOSIS — I10 ESSENTIAL HYPERTENSION: ICD-10-CM

## 2021-09-28 DIAGNOSIS — M47.812 FACET ARTHROPATHY, CERVICAL: ICD-10-CM

## 2021-09-28 DIAGNOSIS — G47.33 OSA TREATED WITH BIPAP: ICD-10-CM

## 2021-09-28 DIAGNOSIS — R73.01 IMPAIRED FASTING GLUCOSE: ICD-10-CM

## 2021-09-28 DIAGNOSIS — E78.5 HYPERLIPIDEMIA, UNSPECIFIED HYPERLIPIDEMIA TYPE: ICD-10-CM

## 2021-09-28 DIAGNOSIS — M15.9 PRIMARY OSTEOARTHRITIS INVOLVING MULTIPLE JOINTS: ICD-10-CM

## 2021-09-28 DIAGNOSIS — M11.20 CPDD (CALCIUM PYROPHOSPHATE DEPOSITION DISEASE): ICD-10-CM

## 2021-09-28 DIAGNOSIS — M06.041 RHEUMATOID ARTHRITIS INVOLVING BOTH HANDS WITH NEGATIVE RHEUMATOID FACTOR (HCC): ICD-10-CM

## 2021-09-28 DIAGNOSIS — M06.042 RHEUMATOID ARTHRITIS INVOLVING BOTH HANDS WITH NEGATIVE RHEUMATOID FACTOR (HCC): ICD-10-CM

## 2021-09-28 DIAGNOSIS — E66.09 CLASS 1 OBESITY DUE TO EXCESS CALORIES WITH SERIOUS COMORBIDITY AND BODY MASS INDEX (BMI) OF 30.0 TO 30.9 IN ADULT: ICD-10-CM

## 2021-09-28 DIAGNOSIS — Z23 NEEDS FLU SHOT: ICD-10-CM

## 2021-09-28 DIAGNOSIS — I10 ESSENTIAL HYPERTENSION: Primary | ICD-10-CM

## 2021-09-28 LAB
ANION GAP SERPL CALC-SCNC: 9 MMOL/L (ref 3–18)
BUN SERPL-MCNC: 38 MG/DL (ref 7–18)
BUN/CREAT SERPL: 19 (ref 12–20)
CALCIUM SERPL-MCNC: 9.8 MG/DL (ref 8.5–10.1)
CHLORIDE SERPL-SCNC: 101 MMOL/L (ref 100–111)
CO2 SERPL-SCNC: 32 MMOL/L (ref 21–32)
CREAT SERPL-MCNC: 1.95 MG/DL (ref 0.6–1.3)
GLUCOSE SERPL-MCNC: 85 MG/DL (ref 74–99)
MAGNESIUM SERPL-MCNC: 2.1 MG/DL (ref 1.6–2.6)
POTASSIUM SERPL-SCNC: 3.9 MMOL/L (ref 3.5–5.5)
SODIUM SERPL-SCNC: 142 MMOL/L (ref 136–145)

## 2021-09-28 PROCEDURE — G8752 SYS BP LESS 140: HCPCS | Performed by: INTERNAL MEDICINE

## 2021-09-28 PROCEDURE — G9717 DOC PT DX DEP/BP F/U NT REQ: HCPCS | Performed by: INTERNAL MEDICINE

## 2021-09-28 PROCEDURE — 80048 BASIC METABOLIC PNL TOTAL CA: CPT

## 2021-09-28 PROCEDURE — 1101F PT FALLS ASSESS-DOCD LE1/YR: CPT | Performed by: INTERNAL MEDICINE

## 2021-09-28 PROCEDURE — G8754 DIAS BP LESS 90: HCPCS | Performed by: INTERNAL MEDICINE

## 2021-09-28 PROCEDURE — 90694 VACC AIIV4 NO PRSRV 0.5ML IM: CPT | Performed by: INTERNAL MEDICINE

## 2021-09-28 PROCEDURE — G0463 HOSPITAL OUTPT CLINIC VISIT: HCPCS | Performed by: INTERNAL MEDICINE

## 2021-09-28 PROCEDURE — 99214 OFFICE O/P EST MOD 30 MIN: CPT | Performed by: INTERNAL MEDICINE

## 2021-09-28 PROCEDURE — G8536 NO DOC ELDER MAL SCRN: HCPCS | Performed by: INTERNAL MEDICINE

## 2021-09-28 PROCEDURE — 83735 ASSAY OF MAGNESIUM: CPT

## 2021-09-28 PROCEDURE — G8427 DOCREV CUR MEDS BY ELIG CLIN: HCPCS | Performed by: INTERNAL MEDICINE

## 2021-09-28 PROCEDURE — G8417 CALC BMI ABV UP PARAM F/U: HCPCS | Performed by: INTERNAL MEDICINE

## 2021-09-28 RX ORDER — AMLODIPINE BESYLATE 5 MG/1
5 TABLET ORAL DAILY
Qty: 90 TABLET | Refills: 3 | Status: SHIPPED | OUTPATIENT
Start: 2021-09-28 | End: 2022-09-24

## 2021-09-28 RX ORDER — SERTRALINE HYDROCHLORIDE 50 MG/1
50 TABLET, FILM COATED ORAL DAILY
Qty: 1 TABLET | Refills: 0
Start: 2021-09-28 | End: 2021-10-06

## 2021-09-28 RX ORDER — METOLAZONE 5 MG/1
5 TABLET ORAL
COMMUNITY
Start: 2021-09-16 | End: 2021-10-02 | Stop reason: SINTOL

## 2021-09-28 NOTE — PROGRESS NOTES
1. Have you been to the ER, urgent care clinic or hospitalized since your last visit? NO.     2. Have you seen or consulted any other health care providers outside of the [address] [place] [address] since your last visit (Include any pap smears or colon screening)? NO        Arlen West 1940 male who presents for routine immunizations. Patient denies any symptoms , reactions or allergies that would exclude them from being immunized today. Risks and adverse reactions were discussed and the VIS was given to them. All questions were addressed. Order placed for HD Influenza,  per Verbal Order from  with read back. Patient was observed for 15 min post injection. There were no reactions observed.     Ulysees Reddish, LPN

## 2021-09-28 NOTE — PATIENT INSTRUCTIONS
Vaccine Information Statement    Influenza (Flu) Vaccine (Inactivated or Recombinant): What You Need to Know    Many vaccine information statements are available in [place] and other languages. See www.immunize.org/vis. Hojas de información sobre vacunas están disponibles en español y en muchos otros idiomas. Visite www.immunize.org/vis. 1. Why get vaccinated? Influenza vaccine can prevent influenza (flu). Flu is a contagious disease that spreads around the [facility] every year, usually between October and May. Anyone can get the flu, but it is more dangerous for some people. Infants and young children, people 72 years and older, pregnant people, and people with certain health conditions or a weakened immune system are at greatest risk of flu complications. Pneumonia, bronchitis, sinus infections, and ear infections are examples of flu-related complications. If you have a medical condition, such as heart disease, cancer, or diabetes, flu can make it worse. Flu can cause fever and chills, sore throat, muscle aches, fatigue, cough, headache, and runny or stuffy nose. Some people may have vomiting and diarrhea, though this is more common in children than adults. In an average year, thousands of people in the [facility] die from flu, and many more are hospitalized. Flu vaccine prevents millions of illnesses and flu-related visits to the doctor each year. 2. Influenza vaccines     CDC recommends everyone 6 months and older get vaccinated every flu season. Children 6 months through 6years of age may need 2 doses during a single flu season. Everyone else needs only 1 dose each flu season. It takes about 2 weeks for protection to develop after vaccination. There are many flu viruses, and they are always changing. Each year a new flu vaccine is made to protect against the influenza viruses believed to be likely to cause disease in the upcoming flu season.  Even when the vaccine doesnt exactly match these viruses, it may still provide some protection. Influenza vaccine does not cause flu. Influenza vaccine may be given at the same time as other vaccines. 3. Talk with your health care provider    Tell your vaccination provider if the person getting the vaccine:   Has had an allergic reaction after a previous dose of influenza vaccine, or has any severe, life-threatening allergies    Has ever had Guillain-Barré Syndrome (also called GBS)    In some cases, your health care provider may decide to postpone influenza vaccination until a future visit. Influenza vaccine can be administered at any time during pregnancy. People who are or will be pregnant during influenza season should receive inactivated influenza vaccine. People with minor illnesses, such as a cold, may be vaccinated. People who are moderately or severely ill should usually wait until they recover before getting influenza vaccine. Your health care provider can give you more information. 4. Risks of a vaccine reaction     Soreness, redness, and swelling where the shot is given, fever, muscle aches, and headache can happen after influenza vaccination.  There may be a very small increased risk of Guillain-Barré Syndrome (GBS) after inactivated influenza vaccine (the flu shot). [facility] children who get the flu shot along with pneumococcal vaccine (PCV13) and/or DTaP vaccine at the same time might be slightly more likely to have a seizure caused by fever. Tell your health care provider if a child who is getting flu vaccine has ever had a seizure. People sometimes faint after medical procedures, including vaccination. Tell your provider if you feel dizzy or have vision changes or ringing in the ears. As with any medicine, there is a very remote chance of a vaccine causing a severe allergic reaction, other serious injury, or death. 5. What if there is a serious problem?     An allergic reaction could occur after the vaccinated person leaves the clinic. If you see signs of a severe allergic reaction (hives, swelling of the face and throat, difficulty breathing, a fast heartbeat, dizziness, or weakness), call 9-1-1 and get the person to the nearest hospital.    For other signs that concern you, call your health care provider. Adverse reactions should be reported to the Vaccine Adverse Event Reporting System (VAERS). Your health care provider will usually file this report, or you can do it yourself. Visit the VAERS website at www.vaers. [facility].gov or call 2-867.175.6412. VAERS is only for reporting reactions, and VAERS staff members do not give medical advice. 6. The National Vaccine Injury Compensation Program    The [facility] Vaccine Injury Compensation Program (VICP) is a federal program that was created to compensate people who may have been injured by certain vaccines. Claims regarding alleged injury or death due to vaccination have a time limit for filing, which may be as short as two years. Visit the VICP website at www.[facility].gov/vaccinecompensation or call 6-645.888.1962 to learn about the program and about filing a claim. 7. How can I learn more?  Ask your health care provider.  Call your local or state health department.  Visit the website of the Food and Drug Administration (FDA) for vaccine package inserts and additional information at www.fda.gov/vaccines-blood-biologics/vaccines.  Contact the Centers for Disease Control and Prevention (CDC):  - Call 8-127.381.8868 (1-800-CDC-INFO) or  - Visit CDCs influenza website at www.cdc.gov/flu. Vaccine Information Statement   Inactivated Influenza Vaccine   8/6/2021  [address] [id number]   Department of Health and Human Services  Centers for Disease Control and Prevention    Office Use Only         Heart-Healthy Diet: Care Instructions  Your Care Instructions     A heart-healthy diet has lots of vegetables, fruits, nuts, beans, and whole grains, and is low in salt. It limits foods that are high in saturated fat, such as meats, cheeses, and fried foods. It may be hard to change your diet, but even small changes can lower your risk of heart attack and heart disease. Follow-up care is a key part of your treatment and safety. Be sure to make and go to all appointments, and call your doctor if you are having problems. It's also a good idea to know your test results and keep a list of the medicines you take. How can you care for yourself at home? Watch your portions  · Learn what a serving is. A \"serving\" and a \"portion\" are not always the same thing. Make sure that you are not eating larger portions than are recommended. For example, a serving of pasta is ½ cup. A serving size of meat is 2 to 3 ounces. A 3-ounce serving is about the size of a deck of cards. Measure serving sizes until you are good at [place]" them. Keep in mind that restaurants often serve portions that are 2 or 3 times the size of one serving. · To keep your energy level up and keep you from feeling hungry, eat often but in smaller portions. · Eat only the number of calories you need to stay at a healthy weight. If you need to lose weight, eat fewer calories than your body burns (through exercise and other physical activity). Eat more fruits and vegetables  · Eat a variety of fruit and vegetables every day. Dark green, deep orange, red, or yellow fruits and vegetables are especially good for you. Examples include spinach, carrots, peaches, and berries. · Keep carrots, celery, and other veggies handy for snacks. Buy fruit that is in season and store it where you can see it so that you will be tempted to eat it. · Cook dishes that have a lot of veggies in them, such as stir-fries and soups. Limit saturated and trans fat  · Read food labels, and try to avoid saturated and trans fats. They increase your risk of heart disease. · Use olive or canola oil when you cook.   · Bake, broil, grill, or steam foods instead of frying them. · Choose lean meats instead of high-fat meats such as hot dogs and sausages. Cut off all visible fat when you prepare meat. · Eat fish, skinless poultry, and meat alternatives such as soy products instead of high-fat meats. Soy products, such as tofu, may be especially good for your heart. · Choose low-fat or fat-free milk and dairy products. Eat foods high in fiber  · Eat a variety of grain products every day. Include whole-grain foods that have lots of fiber and nutrients. Examples of whole-grain foods include oats, whole wheat bread, and brown rice. · Buy whole-grain breads and cereals, instead of white bread or pastries. Limit salt and sodium  · Limit how much salt and sodium you eat to help lower your blood pressure. · Taste food before you salt it. Add only a little salt when you think you need it. With time, your taste buds will adjust to less salt. · Eat fewer snack items, fast foods, and other high-salt, processed foods. Check food labels for the amount of sodium in packaged foods. · Choose low-sodium versions of canned goods (such as soups, vegetables, and beans). Limit sugar  · Limit drinks and foods with added sugar. These include candy, desserts, and soda pop. Limit alcohol  · Limit alcohol to no more than 2 drinks a day for men and 1 drink a day for women. Too much alcohol can cause health problems. When should you call for help? Watch closely for changes in your health, and be sure to contact your doctor if:    · You would like help planning heart-healthy meals. Where can you learn more? Go to http://www.edmonds.com/  Enter V137 in the search box to learn more about \"Heart-Healthy Diet: Care Instructions. \"  Current as of: August 22, 2019               Content Version: 12.6  © 5245-9589 [employer].    Care instructions adapted under license by [employer] (which disclaims liability or warranty for this information). If you have questions about a medical condition or this instruction, always ask your healthcare professional. [address] any warranty or liability for your use of this information. Learning About Low-Sodium Foods  What foods are low in sodium? The foods you eat contain nutrients, such as vitamins and minerals. Sodium is a nutrient. Your body needs the right amount to stay healthy and work as it should. You can use the list below to help you make choices about which foods to eat. Fruits  · Fresh, frozen, canned, or dried fruit  Vegetables  · Fresh or frozen vegetables, with no added salt  · Canned vegetables, low-sodium or with no added salt  Grains  · Bagels without salted tops  · Cereal with no added salt  · Corn tortillas  · Crackers with no added salt  · Oatmeal, cooked without salt  · Popcorn with no salt  · Pasta and noodles, cooked without salt  · Rice, cooked without salt  · Unsalted pretzels  Dairy and dairy alternatives  · Butter, unsalted  · Cream cheese  · Ice cream  · Milk  · Soy milk  Meats and other protein foods  · Beans and peas, canned with no salt  · Eggs  · Fresh fish (not smoked)  · Fresh meats (not smoked or cured)  · Nuts and nut butter, prepared without salt  · Poultry, not packaged with sodium solution  · Tofu, unseasoned  · Tuna, canned without salt  Seasonings  · Garlic  · Herbs and spices  · Lemon juice  · Mustard  · Olive oil  · Salt-free seasoning mixes  · Vinegar  Work with your doctor to find out how much of this nutrient you need. Depending on your health, you may need more or less of it in your diet. Where can you learn more? Go to http://www.gray.com/  Enter S460 in the search box to learn more about \"Learning About Low-Sodium Foods. \"  Current as of: August 22, 2019               Content Version: 12.6  © 7184-7604 [employer].    Care instructions adapted under license by [facility] (which disclaims liability or warranty for this information). If you have questions about a medical condition or this instruction, always ask your healthcare professional. [address] any warranty or liability for your use of this information. Low Sodium Diet (2,000 Milligram): Care Instructions  Your Care Instructions     Too much sodium causes your body to hold on to extra water. This can raise your blood pressure and force your heart and kidneys to work harder. In very serious cases, this could cause you to be put in the hospital. It might even be life-threatening. By limiting sodium, you will feel better and lower your risk of serious problems. The most common source of sodium is salt. People get most of the salt in their diet from canned, prepared, and packaged foods. Fast food and restaurant meals also are very high in sodium. Your doctor will probably limit your sodium to less than 2,000 milligrams (mg) a day. This limit counts all the sodium in prepared and packaged foods and any salt you add to your food. Follow-up care is a key part of your treatment and safety. Be sure to make and go to all appointments, and call your doctor if you are having problems. It's also a good idea to know your test results and keep a list of the medicines you take. How can you care for yourself at home? Read food labels  · Read labels on cans and food packages. The labels tell you how much sodium is in each serving. Make sure that you look at the serving size. If you eat more than the serving size, you have eaten more sodium. · Food labels also tell you the Percent Daily Value for sodium. Choose products with low Percent Daily Values for sodium. · Be aware that sodium can come in forms other than salt, including monosodium glutamate (MSG), sodium citrate, and sodium bicarbonate (baking soda). MSG is often added to Asian food.  When you eat out, you can sometimes ask for food without MSG or added salt. Buy low-sodium foods  · Buy foods that are labeled \"unsalted\" (no salt added), \"sodium-free\" (less than 5 mg of sodium per serving), or \"low-sodium\" (less than 140 mg of sodium per serving). Foods labeled \"reduced-sodium\" and \"light sodium\" may still have too much sodium. Be sure to read the label to see how much sodium you are getting. · Buy fresh vegetables, or frozen vegetables without added sauces. Buy low-sodium versions of canned vegetables, soups, and other canned goods. Prepare low-sodium meals  · Cut back on the amount of salt you use in cooking. This will help you adjust to the taste. Do not add salt after cooking. One teaspoon of salt has about 2,300 mg of sodium. · Take the salt shaker off the table. · Flavor your food with garlic, lemon juice, onion, vinegar, herbs, and spices. Do not use soy sauce, lite soy sauce, steak sauce, onion salt, garlic salt, celery salt, mustard, or ketchup on your food. · Use low-sodium salad dressings, sauces, and ketchup. Or make your own salad dressings and sauces without adding salt. · Use less salt (or none) when recipes call for it. You can often use half the salt a recipe calls for without losing flavor. Other foods such as rice, pasta, and grains do not need added salt. · Rinse canned vegetables, and cook them in fresh water. This removes some--but not all--of the salt. · Avoid water that is naturally high in sodium or that has been treated with water softeners, which add sodium. Call your local water company to find out the sodium content of your water supply. If you buy bottled water, read the label and choose a sodium-free brand. Avoid high-sodium foods  · Avoid eating:  ? Smoked, cured, salted, and canned meat, fish, and poultry. ? Ham, puentes, hot dogs, and luncheon meats. ? Regular, hard, and processed cheese and regular peanut butter.   ? Crackers with salted tops, and other salted snack foods such as pretzels, chips, and salted popcorn. ? Frozen prepared meals, unless labeled low-sodium. ? Canned and dried soups, broths, and bouillon, unless labeled sodium-free or low-sodium. ? Canned vegetables, unless labeled sodium-free or low-sodium. ? [place] fries, pizza, tacos, and other fast foods. ? Pickles, olives, ketchup, and other condiments, especially soy sauce, unless labeled sodium-free or low-sodium. Where can you learn more? Go to http://www.gray.com/  Enter V843 in the search box to learn more about \"Low Sodium Diet (2,000 Milligram): Care Instructions. \"  Current as of: August 22, 2019               Content Version: 12.6  © 3745-1543 [employer]. Care instructions adapted under license by [employer] (which disclaims liability or warranty for this information). If you have questions about a medical condition or this instruction, always ask your healthcare professional. [address] any warranty or liability for your use of this information.

## 2021-09-29 ENCOUNTER — TELEPHONE (OUTPATIENT)
Dept: INTERNAL MEDICINE CLINIC | Age: 81
End: 2021-09-29

## 2021-09-29 DIAGNOSIS — I10 ESSENTIAL HYPERTENSION: Primary | ICD-10-CM

## 2021-09-29 DIAGNOSIS — R60.0 BILATERAL LOWER EXTREMITY EDEMA: ICD-10-CM

## 2021-09-29 NOTE — TELEPHONE ENCOUNTER
Hospital Outpatient Visit on 09/28/2021   Component Date Value Ref Range Status    Magnesium 09/28/2021 2.1  1.6 - 2.6 mg/dL Final    Sodium 09/28/2021 142  136 - 145 mmol/L Final    Potassium 09/28/2021 3.9  3.5 - 5.5 mmol/L Final    Chloride 09/28/2021 101  100 - 111 mmol/L Final    CO2 09/28/2021 32  21 - 32 mmol/L Final    Anion gap 09/28/2021 9  3.0 - 18 mmol/L Final    Glucose 09/28/2021 85  74 - 99 mg/dL Final    BUN 09/28/2021 38* 7.0 - 18 MG/DL Final    Creatinine 09/28/2021 1.95* 0.6 - 1.3 MG/DL Final    BUN/Creatinine ratio 09/28/2021 19  12 - 20   Final    GFR est AA 09/28/2021 40* >60 ml/min/1.73m2 Final    GFR est non-AA 09/28/2021 33* >60 ml/min/1.73m2 Final    Comment: (NOTE)  Estimated GFR is calculated using the Modification of Diet in Renal   Disease (MDRD) Study equation, reported for both  Americans   (GFRAA) and non- Americans (GFRNA), and normalized to 1.73m2   body surface area. The physician must decide which value applies to   the patient. The MDRD study equation should only be used in   individuals age 25 or older. It has not been validated for the   following: pregnant women, patients with serious comorbid conditions,   or on certain medications, or persons with extremes of body size,   muscle mass, or nutritional status.  Calcium 09/28/2021 9.8  8.5 - 10.1 MG/DL Final     Reviewed lab results from visit. Evidence of worsening renal function with creatinine 1.95/eGFR 33 and BUN of 38 likely the result of overdiuresis. Called and advised patient that he should discontinue metolazone and increase fluid intake. Will reassess renal function in 1 week. Please schedule patient for a nonfasting lab appointment the week of 10/4/2021.

## 2021-09-30 NOTE — TELEPHONE ENCOUNTER
Appt scheduled for Monday 10/4/21 at 7:50 am. Left msg with appt info and asked him to call back to confirm.

## 2021-10-01 ENCOUNTER — APPOINTMENT (OUTPATIENT)
Dept: PHYSICAL THERAPY | Age: 81
End: 2021-10-01

## 2021-10-02 NOTE — PROGRESS NOTES
HPI:   Papa Banda is a 80y.o. year old male who presents today for a follow-up. He has a history of hypertension, hyperlipidemia, prediabetes, rheumatoid arthritis, osteoarthritis, calcium pyrophosphate deposition disease, BPH, GERD, and depression. He also has a history of COVID-19 infection (0/2683) complicated by severe pneumonia and acute hypoxic respiratory failure. He has completed the Moderna COVID-19 vaccine series. He was last seen on 9/2/2021 and reported worsening lower extremity edema. He admitted to taking torsemide 20 mg daily instead of twice daily. He was instructed to increase torsemide to 20 mg twice daily and use metolazone 1-2 times per week as needed for worsening edema. He returns today and reports that he has noticed improvement in his edema since his last visit. He reports that he has been taking metolazone 5 mg daily in addition to increasing the torsemide to 20 mg twice daily. He has been monitoring his blood pressure at home and reports good control, ranging 120-125/70-75. He also reports that he is not finding physical therapy for his neck, shoulder, and upper back pain to be helpful and he states that he has discussed discontinuing with his physical therapist.  He is otherwise without new complaints and feeling overall well. Summary of prior hospitalizations and medical history:  On 6/22/2020, he noted the onset of weakness, fatigue, and diarrhea. He stated that he continued to work for the entire week despite feeling ill. On 6/27/2020, he developed fever and dyspnea, which worsened throughout the weekend. He presented to [facility] on 6/28/2020, and WBC 4.0 (78 % neutrophils) and chest x-ray showed patchy density in the RUL, right perihilar area, and right base. He was advised to go to the ED, and presented to [facility] ED on 6/29/2020. He was found to be hypoxic with pulse ox at rest 92-93 % and ambulation 89-91% (room air).  Chest x-ray showed bilateral multifocal extremely subtle groundglass opacities. Labs showed WBC 4.4 (80% neutrophils), Hb 14.6/ Hct 42.0, platelets 461, creatinine 0.71/ eGFR >60, AST 53, alk phos 160, lactate 1.16. He was discharged home, and SARS COV-2 positive (6/29/2020) result returned. He presented for a virtual visit on 7/2/2020, and reported that since discharge from the ED, he noted persistent symptoms of weakness, diarrhea, fatigue, and fevers, and prgressive dyspnea. He described poor po intake, and chest pain with inspiration and felt that he was unable to take a deep breath or cough. He was advised to call EMS and return to the ED. Upon arrival by EMS, his oxygenation was noted to be in the low 80's, and required high flow oxygen. Chest x-ray (7/2/2020) showed bilateral increased patchy groundglass airspace opacities, and labs revealed ABG 7.43/34/70 on nonrebreather mask; WBC 7.3 (11% lymphocytes), Hb 14.8/ Hct 42.6, creatinine 0.8/ eGFR>60, ALT 79, AST 76, alk phos 170, albumin 2.7 D-dimer 1.35, ferritin 729, CRP 16.6, , procalcitonin 0.14, lactate 2.8, blood culture negative. He was initially started on Zosyn, but Dr. Regina Miller (ID) was consulted and recommended discontinuing and beginning azithromycin, remdesivir, IV Solumedrol, and lovenox. He was also started on ascorbic acid, melatonin, and zinc. Dr. Rosie Valdez (pulmonary) was consulted and recommended change to high flow nasal cannula and recommended proning to the patient. He improved clinically and inflammatory indices improved. His oxygen was weaned to 5 liters nasal cannula, and he completed 5 day course of remdesivir and azithromycin. He was discharged on 7/9/2020 with an 8 day course of prednisone, 30 day course of Eliquis 2.5 mg bid, and two week course of Vitamin  C and zinc. He was seen for post-hospitalization follow-up on 7/16/2020 and reported some persistent congestion and significant dyspnea on exertion.  He was referred to Dr. Charla Floyd, and she recommended continued oxygen use as needed and stressed need for treatment of obstructive sleep apnea with CPAP. She placed order for him to receive auto CPAP. He was unable to tolerate CPAP due to continued air hunger even with bleeding in of 2 liters nasal cannula. He had a repeat sleep study on 1/29/2021, which showed obstructive sleep apnea with emergence of central apnea and BIPAP found to be most effective. During the study, he was found to have PAC's, PVC's and intermittent Mobitz 1 second degree AV block. He was referred to Dr. Heath Sen and his diuretics were changed from lasix to torsemide and metolazone. However, he stopped taking metolazone after 1 week since began to feel lightheaded and noted low blood pressure readings with SBP 80-90. He underwent an echocardiogram (2/24/2021) showing normal LV size and function (EF 60-65%), mild MR and PI and PAP 23 mmHg; and a pharmacologic nuclear stress test (2/24/2021) which was a normal, low risk study with no TID and calculated EF 62%. He had a 6 minute walk test (3/31/2021) showing no significant O2 desaturation; and PFT's showing normal flows, normal DLCO, and isolated decrease in RV and FRC. He also had a 30 day event monitor (5/2021) which showed rare PVC, rare PAC, and one episode of 20 beat SVT at 155 bpm (asymptomatic). On 8/13/2019, he reported that he had been seeing Dr. Antonina Donnelly for increasing difficulty with right sided sciatica. He reported being treated with a Medrol dose pack, physical therapy, and spinal injections without improvement, and was also prescribed Norco and Tramadol, but he stated that he found them too sedating and of no benefit.  Due to persistent symptoms, he underwent a lumbar MRI (8/5/2019) which showed degenerative changes most notable at L4-L5 resulting in moderate spinal canal stenosis as well as moderate to severe right greater than left foraminal stenoses; advanced facet arthropathy with small facet effusions and suspected small right facet joint ventral synovial cyst; notable degenerative changes also L3-L4; L1 mild anterior compression deformity, chronic appearing; overall general worsening when compared to prior study in 2007. He was having continued significant pain, and was started on gabapentin 100 mg tid. He was referred to Dr. Андрей Bran and she increased his dose of gabapentin to 200 mg tid. She also referred him to Dr. Nicol Bro for evaluation given his lack of response to conservative management. He recommended proceeding with decompression by performing a L4/5 right-sided hemilaminotomy and medial facetectomy, which was performed on 10/23/2019. He developed postop urinary retention and was discharged with a Deleon catheter. He had follow-up with Dr. Sedrick Tran on 10/29/2019 with successful voiding trial. He reports that he noted initial resolution of his right sciatica pain following surgery, but on 10/29/2019 noted abrupt recurrence when getting out of bed. He was evaluated by GOMEZ Doherty on 10/31/2019 and was treated with a Medrol dose pack and restarted on gabapentin 300 mg tid. He reports overall improvement and is no longer requiring gabapentin. On 8/9/2019, he had an episode of waking up gasping for air forcing him to get out of bed and walk around until his breathing normalized. He has been having frequent similar episodes over that last year, but had been resistent to evaluation for sleep apnea. However, he reports that this episode was especially severe. When his symptoms persisted, he was evaluated at [facility], and testing included WBC 5.7, Hb 14.2/ Hct 41.6, creatinine 0.9/ eGFR>60, troponin I x 1 negative, NT-pro BNP 45, EKG sinus rhythm at 83 bpm and no ischemic changes, and chest x-ray without acute changes, but an ill defined 15 mm density in the lateral left mid zone was noted. He received lasix 40 mg IV and was discharged.  He had an echocardiogram (8/26/2019) showing normal LV function (EF 56-60%), no RWMA, unable to estimate RVSP due to inadequate TR, but TV/PV appear normal. He was referred to Dr. Diego Graham for probable sleep apnea, and underwent a home sleep study on 10/25/2019, showing evidence of severe obstructive sleep apnea with AHI 35 and oxygen guy 80%. He was not able to tolerate CPAP equipment as discussed. On 6/20/2018, he suffered an accidental gun shot wound while working resulting in traumatic injury to his left index finger with near loss of the middle phalanx and fracture of the distal phalanx. He was taken to [facility] and initially had irrigation and closure of the lacerations performed. He presented to the [facility] ED on 6/24/2018 with increased pain and swelling, and was started on doxycycline for a wound infection. On 7/7/2018, due to loss of stability and angulation of the index finger, he underwent stabilization with fusion of the proximal and distal phalanx with bone grafting by Dr. Theodore Hernandez. He did well and was started on physical therapy. On 8/1/2018, he presented to [address] [place] [address] for evaluation of increasing erythema, swelling and tenderness with concern for a possible infection. He underwent left hand x-ray (8/1/2018) showing severe soft tissue swelling of the left second finger worrisome for cellulitis, traumatic amputation of the middle phalanx with marked osteopenia and irregularity of the base of the distal phalanx and flattening and irregularity of the head of the proximal phalanx. Unclear if related to prior trauma/surgery or osteomyelitis with osseous destruction and no films available for comparison. He was started empirically on Bactrim and Augmentin for 14 days. On 8/10/2018, he presented for evaluation by GOMEZ Pelayo for complaints of fever, malaise, headache, fatigue, and diarrhea. Lab evaluation showed WBC 17 (90% neutrophils), creatinine 1.34/ eGFR 52, blood culture negative. Stool cultures (8/13/2018) routine, O/P, and C.diff negative.  Bactrim and Augmentin were discontinued on 8/13/2018, and he was started on metronidazole for 10 days for treatment of presumed C.diff with improvement. He was evaluated by Dr. Nataliia Reid on 8/15/2018 and repeat WBC 8.6 (59% neutrophils), ESR 6, and CRP 0.9. He was seen by Dr. Nataliia Reid in follow-up on 8/30/2018 and left index finger wound noted to be significantly improved and no further difficulty with diarrhea. He has a history of hypertension, treated with amlodipine, lisinopril, lasix (+ potassium), and hydralazine was added in 4/2018. He states that his wife has been monitoring his blood pressure intermittently. He denies any chest pain, shortness of breath at rest or with exertion, lightheadedness, or palpitations. He does report bilateral lower extremity swelling that it will increase throughout the day and improve overnight. . In 6/2016, he underwent lower extremity arterial and venous duplex scans, both of which were negative. He also has a history of hyperlipidemia, treated with moderate intensity atorvastatin. He has a history of prediabetes, with HbA1c ranging from 5.9-6.2 since 2012. He denies any polyuria, polydipsia, nocturia, or blurry vision, and has no history of retinopathy, neuropathy, or nephropathy. He has regular eye exams with Dr. Espino. He has a history of bilateral hand pain with morning stiffness for several years, and in 3/2014, he was noted to have a positive anti-CCP antibody level although NIMCO, rheumatoid factor, and ESR were negative. He was referred for evaluation to Dr. Noelle Toth, and was diagnosed with rheumatoid arthritis in 6/2014. He was treated with hydroxychloroquine, which has been partially helpful in controlling his symptoms. Bilateral hand x-rays also showed evidence of primary osteoarthritis with osteophytes present on the second and third MCP joints.  In 1/2017, he was also noted to have evidence of possible chondrocalcinosis on x-ray, and was started on low dose colchicine in addition to hydroxychloroquine for concomitant calcium pyrophosphate deposition disease. He states that since starting on colchicine, he has had significant improvement in his hand pain. He also uses compounding cream and Voltaren gel for pain control. In 1/2019, he was complaining of worsening neck and bilateral shoulder pain (R>L). He stated that he was previously followed by Dr. Jori Roth, and would occasionally receive cortisone injections into his shoulders. He also described neck pain with difficulty turning his head. He denied any upper extremity weakness or paresthesias. He underwent imaging, and bilateral shoulder x-rays (1/10/2019) showed degenerative changes and secondary findings of rotator cuff pathology. Cervical spine x-rays (1/10/2019) showed advanced degenerative changes with multilevel facet arthropathy. He was evaluated by Dr. Warner Denton who gave him a cortisone injection in his right shoulder with some improvement. He also recommended a reverse shoulder replacement, but he remains hesitant to proceed. He was started on Celebrex 200 mg bid in 2/2019, and reports significant improvement. He continues to also use Tylenol as needed for pain. He states that his neck pain seems to have improved to his baseline level. He has a history of symptomatic BPH, with complaints of decreased stream, hesitancy, and dribbling. He has refused treatment with medication. He is followed by Dr. Jo Sparks. He denies any dysuria, gross hematuria, or flank pain. He has a history of GERD, treated with daily omeprazole with good control of symptoms. He had a screening colonoscopy and 8/2015 by Dr. Ranjan Iniguez, showing a 3 mm sessile cecal polyp (pathology: intra-mucosal lymphoid aggregate), two 4 mm sessile polyps in the transverse colon (pathology: serrated adenomas), and a 1 cm lipoma in the transverse colon. Follow-up recommended for five years.  He was having difficulty with rectal bleeding in 1/2018 and returned for evaluation with Dr. Ranjan Iniguez who felt it was most likely secondary to a hemorrhoidal source. She recommended use of Citrucel. He states that the bleeding has improved with initiation of this therapy. He denies any abdominal pain, nausea, vomiting, melena, or change in bowel movements. He has a history of depression and anxiety, which had been well controlled with Paxil although inadvertently stopped. Now on Zoloft with improvement. 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 Dr. Rivera Duncan. 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 Dr. Nic Banegas. Past Surgical History:   Procedure Laterality Date    HX AMPUTATION FINGER Left     index    HX BACK SURGERY  10/23/2019    Right L4-5 hemilaminectomy, medial facetectomy, resection of synovial cyst    HX CARPAL TUNNEL RELEASE Bilateral     HX CATARACT REMOVAL Left 01/2018    HX CATARACT REMOVAL Bilateral 2018    HX COLONOSCOPY      HX HEENT      sinus, tonsillectomy    HX HERNIA REPAIR      HX MOHS PROCEDURES      bilateral     HX ORTHOPAEDIC      lef knee surgery, removed cartilage.  HX ROTATOR CUFF REPAIR Right      Current Outpatient Medications   Medication Sig    amLODIPine (NORVASC) 5 mg tablet Take 1 Tablet by mouth daily.  sertraline (ZOLOFT) 50 mg tablet Take 1 Tablet by mouth daily.  metOLazone (ZAROXOLYN) 5 mg tablet Take 5 mg by mouth every Tuesday and Friday.     celecoxib (CELEBREX) 200 mg capsule Take 1 Capsule by mouth daily as needed for Pain.  torsemide (DEMADEX) 20 mg tablet TAKE 1 TABLET BY MOUTH 2 TIMES A DAY    atorvastatin (LIPITOR) 10 mg tablet TAKE 1 TABLET BY MOUTH ONCE DAILY    tamsulosin (FLOMAX) 0.4 mg capsule TAKE 1 CAPSULE BY MOUTH ONCE DAILY    hydrALAZINE (APRESOLINE) 25 mg tablet Take 1 Tab by mouth two (2) times a day.  lisinopriL (PRINIVIL, ZESTRIL) 40 mg tablet TAKE 1 TABLET BY MOUTH ONCE DAILY    omeprazole (PRILOSEC) 20 mg capsule TAKE 1 CAPSULE BY MOUTH ONCE DAILY    hydrOXYchloroQUINE (PLAQUENIL) 200 mg tablet Take 400 mg by mouth daily.  mineral oil liquid Take 30 mL by mouth daily as needed for Constipation.  cycloSPORINE (RESTASIS) 0.05 % ophthalmic emulsion Administer 1 Drop to both eyes nightly.  colchicine (MITIGARE) 0.6 mg capsule Take 0.6 mg by mouth every other day.  cholecalciferol, vitamin D3, (VITAMIN D3) 2,000 unit tab Take 2,000 Units by mouth daily.  diclofenac (VOLTAREN) 1 % topical gel Apply 4 g to affected area four (4) times daily.  LUMIGAN 0.01 % ophthalmic drops Administer 1 Drop to both eyes nightly.  MULTIVITS/IRON FUM/FA/D3/LYCOP (MULTI FOR HIM PO) Take  by mouth daily. No current facility-administered medications for this visit. Allergies and Intolerances: Allergies   Allergen Reactions    Latex, Natural Rubber Swelling    Adhesive Tape-Silicones Rash and Itching    Aldactone [Spironolactone] Not Reported This Time     Breast tenderness    Codeine Not Reported This Time     \"Drives crazy\"    Tape [Adhesive] Rash    Tetanus Toxoid, Adsorbed Anaphylaxis    Tetanus Vaccines And Toxoid Anaphylaxis     Family History: He has no family history of colon or prostate cancer. Family History   Problem Relation Age of Onset    Cancer Mother     Heart Disease Father     Alcohol abuse Father     Cancer Other      Social History:   He  reports that he has quit smoking.  His smoking use included cigars, pipe, and cigarettes. He has a 30.00 pack-year smoking history. He has quit using smokeless tobacco.  His smokeless tobacco use included chew. He smoked 2 ppd for 50 years, stopping in 1974. He is  with two adult children. 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 He is a employed at [employer] in [place]. Social History     Substance and Sexual Activity   Alcohol Use Yes    Comment: rarely     Immunization History:  Immunization History   Administered Date(s) Administered    (RETIRED) Pneumococcal Vaccine (Unspecified Type) 01/01/2008    Covid-19, MODERNA, Mrna, Lnp-s, Pf, 100mcg/0.5mL 02/01/2021, 03/01/2021    Influenza High Dose Vaccine PF 09/30/2017    Influenza Vaccine ([place] Canine Kidney) PF 12/13/2017, 10/17/2018    Influenza Vaccine (Tri) Adjuvanted (>65 Yrs FLUAD TRI 29542) 09/25/2018, 09/25/2019    Influenza Vaccine (Trivalent) 10/19/2019, 11/18/2020    Influenza Vaccine Split 10/04/2011, 10/16/2012    Influenza Vaccine Whole 01/15/2010    Influenza, Quadrivalent, Adjuvanted (>65 Yrs FLUAD QUAD 50130) 09/10/2020, 09/28/2021    Pneumococcal Conjugate (PCV-13) 01/19/2015    Pneumococcal Polysaccharide (PPSV-23) 01/01/2008    Varicella Virus Vaccine 10/01/2013    Zoster 10/16/2012       Review of Systems:   As above included in HPI. Otherwise 11 point review of systems negative including constitutional, skin, HENT, eyes, respiratory, cardiovascular, gastrointestinal, genitourinary, musculoskeletal, endocrine, hematologic, allergy, and neurologic. Physical:   Visit Vitals  /64 (BP 1 Location: Left arm, BP Patient Position: Sitting)   Pulse 81   Temp 97.9 °F (36.6 °C) (Temporal)   Resp 16   Ht 5' 8\" (1.727 m)   Wt 198 lb 9.6 oz (90.1 kg)   SpO2 96%   BMI 30.20 kg/m²       Exam:   Patient appears in no apparent distress. Affect is appropriate.     HEENT: PERRLA, anicteric, no JVD, adenopathy or thyromegaly. No carotid bruits or radiated murmur. Lungs: clear to auscultation, no wheezes, rhonchi, or rales. Heart: regular rate and rhythm. No murmur, rubs, gallops  Abdomen: soft, nontender, nondistended, normal bowel sounds, no hepatosplenomegaly or masses. Extremities: Trace-1+ edema bilaterally. Review of Data:  Labs:    No visits with results within 4 Week(s) from this visit. Latest known visit with results is:   Hospital Outpatient Visit on 08/24/2021   Component Date Value Ref Range Status    WBC 08/24/2021 6.4  4.6 - 13.2 K/uL Final    RBC 08/24/2021 3.92* 4.35 - 5.65 M/uL Final    HGB 08/24/2021 12.7* 13.0 - 16.0 g/dL Final    HCT 08/24/2021 38.5  36.0 - 48.0 % Final    MCV 08/24/2021 98.2  78.0 - 100.0 FL Final    MCH 08/24/2021 32.4  24.0 - 34.0 PG Final    MCHC 08/24/2021 33.0  31.0 - 37.0 g/dL Final    RDW 08/24/2021 13.2  11.6 - 14.5 % Final    PLATELET 03/60/0788 788  135 - 420 K/uL Final    MPV 08/24/2021 10.3  9.2 - 11.8 FL Final    NEUTROPHILS 08/24/2021 52  40 - 73 % Final    LYMPHOCYTES 08/24/2021 28  21 - 52 % Final    MONOCYTES 08/24/2021 11* 3 - 10 % Final    EOSINOPHILS 08/24/2021 8* 0 - 5 % Final    BASOPHILS 08/24/2021 1  0 - 2 % Final    ABS. NEUTROPHILS 08/24/2021 3.4  1.8 - 8.0 K/UL Final    ABS. LYMPHOCYTES 08/24/2021 1.8  0.9 - 3.6 K/UL Final    ABS. MONOCYTES 08/24/2021 0.7  0.05 - 1.2 K/UL Final    ABS. EOSINOPHILS 08/24/2021 0.5* 0.0 - 0.4 K/UL Final    ABS.  BASOPHILS 08/24/2021 0.0  0.0 - 0.1 K/UL Final    DF 08/24/2021 AUTOMATED    Final    Ferritin 08/24/2021 75  8 - 388 NG/ML Final    Hemoglobin A1c 08/24/2021 5.7* 4.2 - 5.6 % Final    Est. average glucose 08/24/2021 117  mg/dL Final    LIPID PROFILE 08/24/2021        Final    Cholesterol, total 08/24/2021 121  <200 MG/DL Final    Triglyceride 08/24/2021 63  <150 MG/DL Final    HDL Cholesterol 08/24/2021 51  40 - 60 MG/DL Final    LDL, calculated 08/24/2021 57.4  0 - 100 MG/DL Final  VLDL, calculated 08/24/2021 12.6  MG/DL Final    CHOL/HDL Ratio 08/24/2021 2.4  0 - 5.0   Final    Magnesium 08/24/2021 2.0  1.6 - 2.6 mg/dL Final    Sodium 08/24/2021 144  136 - 145 mmol/L Final    Potassium 08/24/2021 3.7  3.5 - 5.5 mmol/L Final    Chloride 08/24/2021 111  100 - 111 mmol/L Final    CO2 08/24/2021 28  21 - 32 mmol/L Final    Anion gap 08/24/2021 5  3.0 - 18 mmol/L Final    Glucose 08/24/2021 111* 74 - 99 mg/dL Final    BUN 08/24/2021 17  7.0 - 18 MG/DL Final    Creatinine 08/24/2021 0.92  0.6 - 1.3 MG/DL Final    BUN/Creatinine ratio 08/24/2021 18  12 - 20   Final    GFR est AA 08/24/2021 >60  >60 ml/min/1.73m2 Final    GFR est non-AA 08/24/2021 >60  >60 ml/min/1.73m2 Final    Calcium 08/24/2021 9.1  8.5 - 10.1 MG/DL Final    Bilirubin, total 08/24/2021 0.7  0.2 - 1.0 MG/DL Final    ALT (SGPT) 08/24/2021 30  16 - 61 U/L Final    AST (SGOT) 08/24/2021 24  10 - 38 U/L Final    Alk. phosphatase 08/24/2021 104  45 - 117 U/L Final    Protein, total 08/24/2021 6.2* 6.4 - 8.2 g/dL Final    Albumin 08/24/2021 3.6  3.4 - 5.0 g/dL Final    Globulin 08/24/2021 2.6  2.0 - 4.0 g/dL Final    A-G Ratio 08/24/2021 1.4  0.8 - 1.7   Final    Vitamin B12 08/24/2021 491  211 - 911 pg/mL Final    Folate 08/24/2021 15.4  3.10 - 17.50 ng/mL Final    Iron 08/24/2021 103  50 - 175 ug/dL Final    TIBC 08/24/2021 304  250 - 450 ug/dL Final    Iron % saturation 08/24/2021 34  20 - 50 % Final       EKG (9/10/2020) sinus rhythm at 76 bpm, normal intervals, no ischemic changes; no significant change from prior in 6/2020 and 10/2019. Health Maintenance:  Screening:    Colorectal: colonoscopy (8/2015) serrated adenomas. Dr. Cy Ryan. Due 8/2020.  Cologuard negative (4/2021)   Depression: on Paxil   DM (HbA1c/FPG): / HbA1c 5.7 (8/2021)   Hepatitis C: negative (5/2021)   Falls: none   DEXA: within normal limits (1/2016)   PSA/MARTÍNEZ: PSA 3.3 (9/2019)    Glaucoma: regular eye exams with Dr. Gerri Canales (last 2021)   Smokin pack year. Distant past.   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    Impaired fasting glucose R73.01    Class 1 obesity due to excess calories with serious comorbidity and body mass index (BMI) of 32.0 to 32.9 in adult E66.09, Z68.32    APC (atrial premature contractions) I49.1    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, sequela ([facility] [address] S68.621S    JANNET treated with BiPAP G47.33    Lumbar facet arthropathy M47.816    Synovial cyst of lumbar facet joint M71.38    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 Lower extremity edema. Chronic problem, but worsened following COVID 19 infection. Evaluated by Dr. Heath Sen and changed from lasix to torsemide and metolazone. Became hypotensive, and patient stopped metolazone on his own. Hydralazine held, but restarted on 2021 due to elevated blood pressure. Underwent pharmacologic nuclear stress test (2021) which was a normal, low risk study. Repeat echocardiogram (2021) similar to prior in 2020 except noted new mild PI.  Edema had improved on torsemide, but patient admitted at last visit in 8/2021 that only taking daily and noting worsening lower extremity edema. Advised to decrease dose of amlodipine to 5 mg daily and increase torsemide dose to 20 mg twice daily. Was instructed to take metolazone 5 mg as needed for worsening edema, but patient reports today that he has been taking it daily since his last visit. Advised to continue torsemide 20 mg twice daily and decrease metolazone to 2 days/week. Will assess renal function and electrolytes today. 2.  Hypertension. Blood pressure remains well controlled on lisinopril 40 mg daily, amlodipine 5 mg daily, hydralazine 25 mg bid, torsemide 20 mg bid, and metolazone 5 mg daily. Renal function has been normal with creatinine 0.92/ eGFR >60, and will reassess today as discussed. Echocardiogram (2/2021) with grade 1 diastolic dysfunction. Continue to follow. 3. Hyperlipidemia. On moderate intensity dose atorvastatin with LDL 57 and HDL 51 (8/2021), indicative of excellent control in this patient. Continue to follow. 4. Prediabetes. Controlled on diet alone with last HbA1c 5.7 in 8/2021. Required sliding scale insulin dosing when hospitalized due to COVID 19 due to elevated blood sugar, likely related to high dose steroids. No evidence of microvascular complications. On Ace-I and statin. Continue follow-up with Dr. Juan Alberto Armenta for annual eye exams. Emphasized importance of lifestyle modifications, including diet, exercise, and weight loss. 5. History of COVID-19 infection with severe pneumonia/ acute hypoxic respiratory failure (6/2020). Overall, appears improved. No longer complaining of chest wall pain with inhalation and difficulty taking deep breaths. Lower extremity edema continues to be a persistent problem, but was also present prior to his COVID-19 infection. Echocardiogram (9/2020) showed no change from 8/2019 with EF 55-60% and mild MR. EKG (9/2020) without change.  Underwent 6 minute walk test (3/31/2021) showing no desaturations, and PFT's (3/31/2021) showing normal flows and DLCO, and isolated decreased RV and FRC, no response to bronchodilator. Completed the Moderna COVID-19 vaccine series. Will continue to monitor. 6. Obstructive sleep apnea, severe. Patient reported in 1/2019 awakening gasping for air several times per week, snoring and daytime drowsiness particularly when driving. Severe episode on 8/9/2019 prompted ED evaluation. EKG without change, troponin negative and NT-pro BNP normal. Echocardiogram (8/26/2019) with normal LV size and function (EF 56-60%), no RWMA, normal valves. Evaluated by Dr. Maggy Hinojosa and completed home sleep study and diagnosed with severe JANNET. Started on auto CPAP after hospitalization for COVID 19 by Dr. Francesco Rojas and supplemented with 2 liters of oxygen. However, despite best efforts, patient continued to describe significant difficulty using due to dyspnea and air hunger, and returned equipment. Underwent in-patient sleep evaluation on 1/29/2021 and found BIPAP to be very effective. Reports now using and finding it to be helpful. 7. Second degree AV block, Mobitz 1. Noted to be intermittent during sleep study and referred to Dr. Aldean Lombard for evaluation. [place] to be likely secondary to untreated sleep apnea. Completed 30 day event monitor (5/5/2021) and no significant findings noted except for asymptomatic 20 beat run of SVT at 155 bpm.  Will continue to monitor. 8. Rheumatoid arthritis. On hydroxychloroquine. Has regular eye exams with Dr. Davida Mcintyre. Difficult to gauge response as appears to have evidence of osteoarthritis and CPPD occurring concurrently, but noted significant improvement since initiated colchicine. Voltaren gel, Celebrex prn, and Tylenol prn for pain control as needed. Followed by Dr. Ruchi Kwan. 9. Primary osteoarthritis. Evident in bilateral hands and knees, bilateral shoulders, and cervical spine. Shoulder pain (R>L).  X-ray with evidence of osteoarthritis and rotator cuff pathology. Evaluated by Dr. Verónica Lux and received cortisone injection to right shoulder with some improvement. Surgery for reverse shoulder replacement recommended, but not wishing to proceed. Now using Celebrex only as needed and supplementing with Tylenol with reasonable control. Currently receiving physical therapy for his neck, back, and shoulder pain, but reports today that not noting any improvement with therapy so discontinuing. Will follow up with Dr. Leslie Wong for further recommendations. 10. CPPD. Colchicine started on 1/19/2017 to help address chondrocalcinosis on x-rays. Now taking every other day with good control. 11. Lumbar degenerative disease with right sciatica. Underwent decompression with L4/5 right-sided hemilaminotomy and medial facetectomy on 10/23/2019 by Dr. Teri Carbajal. Developed urinary retention post-op, but successfully passed voiding trial and has had no further difficulties. He developed recurrence of pain on post op day 6, and treated with Medrol dose pack. Reports no longer requiring gabapentin with no significant pain. Being followed by GOMEZ Venegas as needed. 12. Depression. Prior reasonable control with Paxil and weaned from benzodiazepine use for anxiety and insomnia. After inadvertently stopping Paxil, started on Zoloft and reports currently taking 50 mg daily with good control of symptoms. 13. GERD. Good control of symptoms with omeprazole. No issues today. 14.  Anemia, mild. Colonoscopy 8/2015. Evaluated by Dr. Venessa Del Rio and considered to be hemorrhoidal in source. Known internal and external hemorrhoids. Improved with Citrucel. Was due for routine colonoscopy in 8/2020, but patient unwilling to proceed. Completed Cologuard (4/2021) which was negative. Mild anemia at last visit in 5/2021 with Hb 12.9. Repeat stable with Hb 12.7, and normal iron studies with ferritin 75 and transferrin 34% and normal B12 and folate levels (8/2021). Will continue to monitor. 15. BPH with urinary retention. Does have lower urinary tract symptoms. Developed postop urinary retention after back surgery. On Flomax. Followed previously by Dr. Santiago Tyalor and not wishing to establish with new provider unless problem emerges. 16. Left wrist ganglion cyst. Previously evaluated by Dr. Rosemarie Connell and aspirated. Recurred and reports now larger. Requested referral to another orthopedist and evaluated by PA at Dr. Asya Chung office. Was to be scheduled for surgery, but was dissatisfied with delays and now not wishing to proceed. Will continue to monitor. 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 Managing well and no further issues. 18. 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 Level stable and monitored annually (due 2/2022). 19. Obesity. Lost 25 pounds during COVID 19 illness, but had returned to near baseline. Seven pound weight loss since last visit likely due to diuresis. Emphasized importance of continued healthy eating and improved nutrition. Will readdress at next visit. 20. Health maintenance. Completed Moderna COVID 19 vaccine series. Unable to receive Tdap due to allergy (anaphylaxis to Td). Will address Shingrix vaccine at next visit. Already received the influenza vaccine. Other immunizations up to date. Completed Cologuard (4/2021) testing as discussed. Continue regular eye exams with Dr. uMrray Orta. Vitamin D level has been normal after being treated with ergocalciferol 50,000 U weekly for 12 weeks. Continue maintenance dose 1000 U daily. Medicare wellness visit up to date. Patient understands recommendations and agrees with plan. Follow-up in 8 weeks.         [facility] Outpatient Visit on 09/28/2021   Component Date Value Ref Range Status    Magnesium 09/28/2021 2.1  1.6 - 2.6 mg/dL Final    Sodium 09/28/2021 142  136 - 145 mmol/L Final    Potassium 09/28/2021 3.9  3.5 - 5.5 mmol/L Final    Chloride 09/28/2021 101  100 - 111 mmol/L Final    CO2 09/28/2021 32  21 - 32 mmol/L Final    Anion gap 09/28/2021 9  3.0 - 18 mmol/L Final    Glucose 09/28/2021 85  74 - 99 mg/dL Final    BUN 09/28/2021 38* 7.0 - 18 MG/DL Final    Creatinine 09/28/2021 1.95* 0.6 - 1.3 MG/DL Final    BUN/Creatinine ratio 09/28/2021 19  12 - 20   Final    GFR est AA 09/28/2021 40* >60 ml/min/1.73m2 Final    GFR est non-AA 09/28/2021 33* >60 ml/min/1.73m2 Final    Calcium 09/28/2021 9.8  8.5 - 10.1 MG/DL Final       Called and instructed patient to discontinue metolazone given worsening renal function with creatinine 1.95/eGFR 33 secondary to overdiuresis. Electrolytes normal.  Will reassess renal function in 2 weeks.

## 2021-10-07 ENCOUNTER — HOSPITAL ENCOUNTER (OUTPATIENT)
Dept: LAB | Age: 81
Discharge: HOME | End: 2021-10-07
Payer: MEDICARE

## 2021-10-07 ENCOUNTER — APPOINTMENT (OUTPATIENT)
Dept: INTERNAL MEDICINE CLINIC | Age: 81
End: 2021-10-07

## 2021-10-07 ENCOUNTER — TELEPHONE (OUTPATIENT)
Dept: INTERNAL MEDICINE CLINIC | Age: 81
End: 2021-10-07

## 2021-10-07 DIAGNOSIS — I10 ESSENTIAL HYPERTENSION: Primary | ICD-10-CM

## 2021-10-07 DIAGNOSIS — M06.042 RHEUMATOID ARTHRITIS INVOLVING BOTH HANDS WITH NEGATIVE RHEUMATOID FACTOR (HCC): ICD-10-CM

## 2021-10-07 DIAGNOSIS — I10 ESSENTIAL HYPERTENSION: ICD-10-CM

## 2021-10-07 DIAGNOSIS — R73.01 IMPAIRED FASTING GLUCOSE: ICD-10-CM

## 2021-10-07 DIAGNOSIS — M15.9 PRIMARY OSTEOARTHRITIS INVOLVING MULTIPLE JOINTS: ICD-10-CM

## 2021-10-07 DIAGNOSIS — M06.041 RHEUMATOID ARTHRITIS INVOLVING BOTH HANDS WITH NEGATIVE RHEUMATOID FACTOR (HCC): ICD-10-CM

## 2021-10-07 DIAGNOSIS — E78.5 HYPERLIPIDEMIA, UNSPECIFIED HYPERLIPIDEMIA TYPE: ICD-10-CM

## 2021-10-07 DIAGNOSIS — R60.0 BILATERAL LOWER EXTREMITY EDEMA: ICD-10-CM

## 2021-10-07 LAB
ALBUMIN SERPL-MCNC: 3.7 G/DL (ref 3.4–5)
ALBUMIN/GLOB SERPL: 1.5 {RATIO} (ref 0.8–1.7)
ALP SERPL-CCNC: 84 U/L (ref 45–117)
ALT SERPL-CCNC: 27 U/L (ref 16–61)
ANION GAP SERPL CALC-SCNC: 6 MMOL/L (ref 3–18)
AST SERPL-CCNC: 18 U/L (ref 10–38)
BASOPHILS # BLD: 0.1 K/UL (ref 0–0.1)
BASOPHILS NFR BLD: 1 % (ref 0–2)
BILIRUB SERPL-MCNC: 0.3 MG/DL (ref 0.2–1)
BUN SERPL-MCNC: 41 MG/DL (ref 7–18)
BUN/CREAT SERPL: 25 (ref 12–20)
CALCIUM SERPL-MCNC: 9.3 MG/DL (ref 8.5–10.1)
CHLORIDE SERPL-SCNC: 105 MMOL/L (ref 100–111)
CHOLEST SERPL-MCNC: 120 MG/DL
CO2 SERPL-SCNC: 32 MMOL/L (ref 21–32)
CREAT SERPL-MCNC: 1.67 MG/DL (ref 0.6–1.3)
CREAT UR-MCNC: 18 MG/DL (ref 30–125)
DIFFERENTIAL METHOD BLD: ABNORMAL
EOSINOPHIL # BLD: 0.4 K/UL (ref 0–0.4)
EOSINOPHIL NFR BLD: 7 % (ref 0–5)
ERYTHROCYTE [DISTWIDTH] IN BLOOD BY AUTOMATED COUNT: 12.7 % (ref 11.6–14.5)
EST. AVERAGE GLUCOSE BLD GHB EST-MCNC: 117 MG/DL
GLOBULIN SER CALC-MCNC: 2.4 G/DL (ref 2–4)
GLUCOSE SERPL-MCNC: 154 MG/DL (ref 74–99)
HBA1C MFR BLD: 5.7 % (ref 4.2–5.6)
HCT VFR BLD AUTO: 37.1 % (ref 36–48)
HDLC SERPL-MCNC: 48 MG/DL (ref 40–60)
HDLC SERPL: 2.5 {RATIO} (ref 0–5)
HGB BLD-MCNC: 12.1 G/DL (ref 13–16)
LDLC SERPL CALC-MCNC: 57.2 MG/DL (ref 0–100)
LIPID PROFILE,FLP: NORMAL
LYMPHOCYTES # BLD: 1.6 K/UL (ref 0.9–3.6)
LYMPHOCYTES NFR BLD: 26 % (ref 21–52)
MAGNESIUM SERPL-MCNC: 1.8 MG/DL (ref 1.6–2.6)
MCH RBC QN AUTO: 32.2 PG (ref 24–34)
MCHC RBC AUTO-ENTMCNC: 32.6 G/DL (ref 31–37)
MCV RBC AUTO: 98.7 FL (ref 78–100)
MICROALBUMIN UR-MCNC: <0.5 MG/DL (ref 0–3)
MICROALBUMIN/CREAT UR-RTO: ABNORMAL MG/G (ref 0–30)
MONOCYTES # BLD: 0.6 K/UL (ref 0.05–1.2)
MONOCYTES NFR BLD: 10 % (ref 3–10)
NEUTS SEG # BLD: 3.5 K/UL (ref 1.8–8)
NEUTS SEG NFR BLD: 57 % (ref 40–73)
PLATELET # BLD AUTO: 192 K/UL (ref 135–420)
PMV BLD AUTO: 10.2 FL (ref 9.2–11.8)
POTASSIUM SERPL-SCNC: 3.4 MMOL/L (ref 3.5–5.5)
PROT SERPL-MCNC: 6.1 G/DL (ref 6.4–8.2)
RBC # BLD AUTO: 3.76 M/UL (ref 4.35–5.65)
SODIUM SERPL-SCNC: 143 MMOL/L (ref 136–145)
TRIGL SERPL-MCNC: 74 MG/DL (ref ?–150)
VLDLC SERPL CALC-MCNC: 14.8 MG/DL
WBC # BLD AUTO: 6.2 K/UL (ref 4.6–13.2)

## 2021-10-07 PROCEDURE — 80053 COMPREHEN METABOLIC PANEL: CPT

## 2021-10-07 PROCEDURE — 36415 COLL VENOUS BLD VENIPUNCTURE: CPT

## 2021-10-07 PROCEDURE — 83036 HEMOGLOBIN GLYCOSYLATED A1C: CPT

## 2021-10-07 PROCEDURE — 83735 ASSAY OF MAGNESIUM: CPT

## 2021-10-07 RX ORDER — POTASSIUM CHLORIDE 20 MEQ/1
20 TABLET, EXTENDED RELEASE ORAL 2 TIMES DAILY
Qty: 6 TABLET | Refills: 0 | Status: SHIPPED | OUTPATIENT
Start: 2021-10-07 | End: 2021-10-10

## 2021-10-07 NOTE — TELEPHONE ENCOUNTER
Lab Results   Component Value Date/Time    Sodium 143 10/07/2021 08:20 AM    Potassium 3.4 (L) 10/07/2021 08:20 AM    Chloride 105 10/07/2021 08:20 AM    CO2 32 10/07/2021 08:20 AM    Anion gap 6 10/07/2021 08:20 AM    Glucose 154 (H) 10/07/2021 08:20 AM    BUN 41 (H) 10/07/2021 08:20 AM    Creatinine 1.67 (H) 10/07/2021 08:20 AM    BUN/Creatinine ratio 25 (H) 10/07/2021 08:20 AM    GFR est AA 48 (L) 10/07/2021 08:20 AM    GFR est non-AA 40 (L) 10/07/2021 08:20 AM    Calcium 9.3 10/07/2021 08:20 AM    Bilirubin, total 0.3 10/07/2021 08:20 AM    Alk. phosphatase 84 10/07/2021 08:20 AM    Protein, total 6.1 (L) 10/07/2021 08:20 AM    Albumin 3.7 10/07/2021 08:20 AM    Globulin 2.4 10/07/2021 08:20 AM    A-G Ratio 1.5 10/07/2021 08:20 AM    ALT (SGPT) 27 10/07/2021 08:20 AM    AST (SGOT) 18 10/07/2021 08:20 AM     Magnesium   Date Value Ref Range Status   10/07/2021 1.8 1.6 - 2.6 mg/dL Final   09/28/2021 2.1 1.6 - 2.6 mg/dL Final   08/24/2021 2.0 1.6 - 2.6 mg/dL Final   05/13/2021 2.1 1.6 - 2.6 mg/dL Final   04/16/2021 2.2 1.6 - 2.6 mg/dL Final     Called and discussed results of labs with patient. Informed that renal function has improved although still has not yet returned to baseline. Advised that he should not resume metolazone but continue torsemide 20 mg twice daily. Will replace potassium with 20 mEq twice daily x 3 days. Prescription sent to [facility].

## 2021-10-25 ENCOUNTER — TELEPHONE (OUTPATIENT)
Dept: INTERNAL MEDICINE CLINIC | Age: 81
End: 2021-10-25

## 2021-10-25 NOTE — TELEPHONE ENCOUNTER
Pt wants copy of LEAD, lab result from Feb 2021 mailed to his address. He has to provide it to his employer. I can't print anything so if nurse can print it out, I will mail it to him.

## 2021-11-10 ENCOUNTER — OFFICE VISIT (OUTPATIENT)
Dept: CARDIOLOGY CLINIC | Age: 81
End: 2021-11-10
Payer: MEDICARE

## 2021-11-10 VITALS
HEART RATE: 77 BPM | SYSTOLIC BLOOD PRESSURE: 131 MMHG | WEIGHT: 202 LBS | DIASTOLIC BLOOD PRESSURE: 65 MMHG | BODY MASS INDEX: 30.62 KG/M2 | OXYGEN SATURATION: 97 % | HEIGHT: 68 IN

## 2021-11-10 DIAGNOSIS — I10 ESSENTIAL HYPERTENSION: Primary | ICD-10-CM

## 2021-11-10 DIAGNOSIS — G47.30 SLEEP APNEA, UNSPECIFIED TYPE: ICD-10-CM

## 2021-11-10 DIAGNOSIS — I44.1 SECOND DEGREE HEART BLOCK: ICD-10-CM

## 2021-11-10 DIAGNOSIS — R60.0 BILATERAL LOWER EXTREMITY EDEMA: ICD-10-CM

## 2021-11-10 DIAGNOSIS — I47.1 SVT (SUPRAVENTRICULAR TACHYCARDIA) (HCC): ICD-10-CM

## 2021-11-10 PROCEDURE — 1101F PT FALLS ASSESS-DOCD LE1/YR: CPT | Performed by: INTERNAL MEDICINE

## 2021-11-10 PROCEDURE — G8427 DOCREV CUR MEDS BY ELIG CLIN: HCPCS | Performed by: INTERNAL MEDICINE

## 2021-11-10 PROCEDURE — G8536 NO DOC ELDER MAL SCRN: HCPCS | Performed by: INTERNAL MEDICINE

## 2021-11-10 PROCEDURE — 99214 OFFICE O/P EST MOD 30 MIN: CPT | Performed by: INTERNAL MEDICINE

## 2021-11-10 PROCEDURE — G8752 SYS BP LESS 140: HCPCS | Performed by: INTERNAL MEDICINE

## 2021-11-10 PROCEDURE — G9717 DOC PT DX DEP/BP F/U NT REQ: HCPCS | Performed by: INTERNAL MEDICINE

## 2021-11-10 PROCEDURE — G8754 DIAS BP LESS 90: HCPCS | Performed by: INTERNAL MEDICINE

## 2021-11-10 PROCEDURE — G8417 CALC BMI ABV UP PARAM F/U: HCPCS | Performed by: INTERNAL MEDICINE

## 2021-11-10 NOTE — PROGRESS NOTES
HISTORY OF PRESENT ILLNESS  Ernst Davis is a 80 y.o. male. 2/15/2021  Patient is in today for new patient evaluation he is referred here for evaluation of shortness of breath and edema. Patient has had COVID-19 infection a few months ago since then he has been having shortness of breath feels like he is unable to take deep breath. Has been followed by pulmonary. He is seeing increased edema that is on and off. Denies any chest pain. Denies any orthopnea PND. He has no known cardiac history but arteriosclerosis was reported on CAT scan done a few years ago  3/2021  Patient seen for follow-up. Diagnostic testing results will be discussed    Follow-up  Associated symptoms include shortness of breath. Pertinent negatives include no chest pain, no abdominal pain and no headaches. Review of Systems   Constitutional: Negative for chills and fever. HENT: Negative for nosebleeds. Eyes: Negative for blurred vision and double vision. Respiratory: Positive for shortness of breath. Negative for cough, hemoptysis, sputum production and wheezing. Cardiovascular: Negative for chest pain, palpitations, orthopnea, claudication, leg swelling and PND. Gastrointestinal: Negative for abdominal pain, heartburn, nausea and vomiting. Musculoskeletal: Negative for myalgias. Skin: Negative for rash. Neurological: Negative for dizziness, weakness and headaches. Endo/Heme/Allergies: Does not bruise/bleed easily. 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 Dr. Celso Robins.    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 Dr. Johana Florian. Past Surgical History:   Procedure Laterality Date    HX AMPUTATION FINGER Left     index    HX BACK SURGERY  10/23/2019    Right L4-5 hemilaminectomy, medial facetectomy, resection of synovial cyst    HX CARPAL TUNNEL RELEASE Bilateral     HX CATARACT REMOVAL Left 01/2018    HX CATARACT REMOVAL Bilateral 2018    HX COLONOSCOPY      HX HEENT      sinus, tonsillectomy    HX HERNIA REPAIR      HX MOHS PROCEDURES      bilateral     HX ORTHOPAEDIC      lef knee surgery, removed cartilage.  HX ROTATOR CUFF REPAIR Right        Social History     Tobacco Use    Smoking status: Former Smoker     Packs/day: 2.00     Years: 15.00     Pack years: 30.00     Types: Cigars, Pipe, Cigarettes    Smokeless tobacco: Former User     Types: Chew   Substance Use Topics    Alcohol use: Yes     Comment: rarely       Allergies   Allergen Reactions    Latex, Natural Rubber Swelling    Adhesive Tape-Silicones Rash and Itching    Aldactone [Spironolactone] Not Reported This Time     Breast tenderness    Codeine Not Reported This Time     \"Drives crazy\"    Tape [Adhesive] Rash    Tetanus Toxoid, Adsorbed Anaphylaxis    Tetanus Vaccines And Toxoid Anaphylaxis       Prior to Admission medications    Medication Sig Start Date End Date Taking? Authorizing Provider   omeprazole (PRILOSEC) 20 mg capsule TAKE 1 CAPSULE BY MOUTH ONCE DAILY 10/6/21  Yes Aline Zhang MD   celecoxib (CELEBREX) 200 mg capsule Take 1 Capsule by mouth daily as needed for Pain.  Indications: rheumatoid arthritis 10/6/21  Yes Aline Zhang MD   amLODIPine (NORVASC) 5 mg tablet Take 1 Tablet by mouth daily. 9/28/21  Yes Ailin Vizcaino MD   torsemide (DEMADEX) 20 mg tablet TAKE 1 TABLET BY MOUTH 2 TIMES A DAY 8/18/21  Yes Catalina Stahl MD   atorvastatin (LIPITOR) 10 mg tablet TAKE 1 TABLET BY MOUTH ONCE DAILY 7/8/21  Yes Ailin Vizcaino MD   tamsulosin (FLOMAX) 0.4 mg capsule TAKE 1 CAPSULE BY MOUTH ONCE DAILY 7/4/21  Yes Ailin Vizcaino MD   hydrALAZINE (APRESOLINE) 25 mg tablet Take 1 Tab by mouth two (2) times a day. 5/5/21  Yes Catalina Stahl MD   lisinopriL (PRINIVIL, ZESTRIL) 40 mg tablet TAKE 1 TABLET BY MOUTH ONCE DAILY 3/8/21  Yes Ailin Vizcaino MD   hydrOXYchloroQUINE (PLAQUENIL) 200 mg tablet Take 400 mg by mouth daily. 7/16/20  Yes Provider, Historical   mineral oil liquid Take 30 mL by mouth daily as needed for Constipation. Yes Provider, Historical   cycloSPORINE (RESTASIS) 0.05 % ophthalmic emulsion Administer 1 Drop to both eyes nightly. Yes Provider, Historical   colchicine (MITIGARE) 0.6 mg capsule Take 0.6 mg by mouth every other day. Yes Provider, Historical   cholecalciferol, vitamin D3, (VITAMIN D3) 2,000 unit tab Take 2,000 Units by mouth daily. Yes Provider, Historical   diclofenac (VOLTAREN) 1 % topical gel Apply 4 g to affected area four (4) times daily. Yes Provider, Historical   LUMIGAN 0.01 % ophthalmic drops Administer 1 Drop to both eyes nightly. 5/19/14  Yes Provider, Historical   MULTIVITS/IRON FUM/FA/D3/LYCOP (MULTI FOR HIM PO) Take  by mouth daily. Yes Provider, Historical         Visit Vitals  /65 (BP 1 Location: Left upper arm, BP Patient Position: Sitting, BP Cuff Size: Adult)   Pulse 77   Ht 5' 8\" (1.727 m)   Wt 91.6 kg (202 lb)   SpO2 97%   BMI 30.71 kg/m²         Physical Exam  Constitutional:       Appearance: He is well-developed. HENT:      Head: Normocephalic and atraumatic. Eyes:      Conjunctiva/sclera: Conjunctivae normal.   Neck:      Thyroid: No thyromegaly. Vascular: No JVD.       Trachea: No tracheal deviation. Cardiovascular:      Rate and Rhythm: Normal rate and regular rhythm. Heart sounds: Normal heart sounds. No murmur heard. No friction rub. No gallop. Pulmonary:      Effort: No respiratory distress. Breath sounds: Normal breath sounds. No wheezing or rales. Chest:      Chest wall: No tenderness. Abdominal:      Palpations: Abdomen is soft. Tenderness: There is no abdominal tenderness. Musculoskeletal:      Cervical back: Neck supple. Skin:     General: Skin is warm and dry. Neurological:      Mental Status: He is alert and oriented to person, place, and time. Mr. Carla Way has a reminder for a \"due or due soon\" health maintenance. I have asked that he contact his primary care provider for follow-up on this health maintenance. No flowsheet data found. I have personally reviewed patient's records available from   hospital and other providers and incorporated findings in patient care. Notes, labs, CT scan, chest x-ray, EKG, echo      Chest x-ray9/2020  Very mild peribronchial thickening, similar to prior exam. No new focal  consolidation. Additional findings as discussed. Interpretation Summary 2018    · Left Ventricle: Normal cavity size, wall thickness, systolic function (ejection fraction normal) and diastolic function. Estimated left ventricular ejection fraction is 56 - 60%. Visually measured ejection fraction. No regional wall motion abnormality noted. · Tricuspid regurgitation is inadequate for estimation of right ventricular systolic pressure. Interpretation Summary 9/2020    · LV: Estimated LVEF is 55 - 60%. Visually measured ejection fraction. Normal cavity size, wall thickness and systolic function (ejection fraction normal). Wall motion: normal. Inconclusive left ventricular diastolic function. · MV: Mild mitral valve regurgitation is present. Interpretation Summary 2/2021    · LV: Estimated LVEF is 60 - 65%.  Normal cavity size, wall thickness and systolic function (ejection fraction normal). Wall motion: normal. Mild (grade 1) left ventricular diastolic dysfunction. · LA: Left Atrium volume index is 29.49 mL/m2. · MV: Mitral annular calcification. Mild mitral valve regurgitation is present. · PV: Mild pulmonic valve regurgitation is present. · PA: Pulmonary arterial systolic pressure is 23 mmHg. Procedure Conclusion 2/2021    Nuclear Stress Test    Nuclear Cardiac Spect Rest then Gated Stress study. Harrie Mock was used as the stressing method and agent. (Harrie Mock given via a 10 - 20 sec injection.)   One day myocardial perfusion study. Date: 2/24/2021. Left ventricular perfusion is normal. Myocardial perfusion imaging supports a low risk stress test.   There is no prior study available for comparison. .           5/5/2021  MCOTsinus rhythm rare PAC PVC. No heart blocks. 2 SVT episode. Longest 20 beats at 151 bpm  Assessment         ICD-10-CM ICD-9-CM    1. Essential hypertension  I10 401.9     Severely elevated blood pressure. Needs adjustment of medication   2. Second degree heart block  I44.1 426.13     Stable asymptomatic   3. SVT (supraventricular tachycardia) (HCC)  I47.1 427.89     Stable symptom monitor continue treatment   4. Bilateral lower extremity edema  R60.0 782.3     Stable continue treatment   5. Sleep apnea, unspecified type  G47.30 780.57     On CPAP continue therapy   2/2021  Seen for increased shortness of breath and edema. Significant edema of feet. Change diuretic to Demadex and metolazone follow BMP check echo and nuclear stress test  2020  Seen after recent follow-up. Had occasional lightheadedness low blood pressure today discontinue hydralazine. Monitor blood pressure at home and decide on adjusting other medication. Use of diuretic as improved edema. Continue use as being done. Sleep study showed Mobitz 1 block throughout the study.   Will get event monitor to make sure he does not have significant pauses. These are likely related to his sleep apnea. Okay to use what ever more of CPAP or BiPAP treatment that patient requires from cardiac standpoint. Normal ejection fraction and negative stress test    5/2021  Cardiac status stable blood pressure elevated add hydralazine. No further episode of Mobitz 1 block noted on MCOT. Compliant with CPAP. Episode of SVT 20 beat. Asymptomatic. Will not start any different medication at present. Symptoms of shortness of breath improving  11/2021  Stable cardiac status. Edema stable. Palpitation and symptoms are controlled. Continue treatment    Medications Discontinued During This Encounter   Medication Reason    sertraline (ZOLOFT) 50 mg tablet Not A Current Medication       No orders of the defined types were placed in this encounter. Follow-up and Dispositions    · Return in about 6 months (around 5/10/2022).

## 2021-11-10 NOTE — PROGRESS NOTES
1. Have you been to the ER, urgent care clinic since your last visit? Hospitalized since your last visit? No    2. Have you seen or consulted any other health care providers outside of the [address] [place] [address] since your last visit? Include any pap smears or colon screening.  No

## 2021-12-08 ENCOUNTER — APPOINTMENT (OUTPATIENT)
Dept: INTERNAL MEDICINE CLINIC | Age: 81
End: 2021-12-08

## 2021-12-08 ENCOUNTER — HOSPITAL ENCOUNTER (OUTPATIENT)
Dept: LAB | Age: 81
Discharge: HOME | End: 2021-12-08
Payer: MEDICARE

## 2021-12-08 DIAGNOSIS — M06.041 RHEUMATOID ARTHRITIS INVOLVING BOTH HANDS WITH NEGATIVE RHEUMATOID FACTOR (HCC): ICD-10-CM

## 2021-12-08 DIAGNOSIS — I10 ESSENTIAL HYPERTENSION: ICD-10-CM

## 2021-12-08 DIAGNOSIS — R60.0 BILATERAL LOWER EXTREMITY EDEMA: ICD-10-CM

## 2021-12-08 DIAGNOSIS — R73.01 IMPAIRED FASTING GLUCOSE: ICD-10-CM

## 2021-12-08 DIAGNOSIS — M06.042 RHEUMATOID ARTHRITIS INVOLVING BOTH HANDS WITH NEGATIVE RHEUMATOID FACTOR (HCC): ICD-10-CM

## 2021-12-08 LAB
ALBUMIN SERPL-MCNC: 3.8 G/DL (ref 3.4–5)
ALBUMIN/GLOB SERPL: 1.5 {RATIO} (ref 0.8–1.7)
ALP SERPL-CCNC: 109 U/L (ref 45–117)
ALT SERPL-CCNC: 36 U/L (ref 16–61)
ANION GAP SERPL CALC-SCNC: 5 MMOL/L (ref 3–18)
AST SERPL-CCNC: 23 U/L (ref 10–38)
BASOPHILS # BLD: 0 K/UL (ref 0–0.1)
BASOPHILS NFR BLD: 1 % (ref 0–2)
BILIRUB SERPL-MCNC: 0.5 MG/DL (ref 0.2–1)
BUN SERPL-MCNC: 18 MG/DL (ref 7–18)
BUN/CREAT SERPL: 16 (ref 12–20)
CALCIUM SERPL-MCNC: 8.9 MG/DL (ref 8.5–10.1)
CHLORIDE SERPL-SCNC: 109 MMOL/L (ref 100–111)
CO2 SERPL-SCNC: 29 MMOL/L (ref 21–32)
CREAT SERPL-MCNC: 1.16 MG/DL (ref 0.6–1.3)
CREAT UR-MCNC: <13 MG/DL (ref 30–125)
DIFFERENTIAL METHOD BLD: ABNORMAL
EOSINOPHIL # BLD: 0.4 K/UL (ref 0–0.4)
EOSINOPHIL NFR BLD: 7 % (ref 0–5)
ERYTHROCYTE [DISTWIDTH] IN BLOOD BY AUTOMATED COUNT: 13.7 % (ref 11.6–14.5)
EST. AVERAGE GLUCOSE BLD GHB EST-MCNC: 108 MG/DL
GLOBULIN SER CALC-MCNC: 2.5 G/DL (ref 2–4)
GLUCOSE SERPL-MCNC: 143 MG/DL (ref 74–99)
HBA1C MFR BLD: 5.4 % (ref 4.2–5.6)
HCT VFR BLD AUTO: 41.5 % (ref 36–48)
HGB BLD-MCNC: 13.3 G/DL (ref 13–16)
IMM GRANULOCYTES # BLD AUTO: 0 K/UL (ref 0–0.04)
IMM GRANULOCYTES NFR BLD AUTO: 0 % (ref 0–0.5)
LYMPHOCYTES # BLD: 1.6 K/UL (ref 0.9–3.6)
LYMPHOCYTES NFR BLD: 27 % (ref 21–52)
MAGNESIUM SERPL-MCNC: 2.3 MG/DL (ref 1.6–2.6)
MCH RBC QN AUTO: 31.8 PG (ref 24–34)
MCHC RBC AUTO-ENTMCNC: 32 G/DL (ref 31–37)
MCV RBC AUTO: 99.3 FL (ref 78–100)
MICROALBUMIN UR-MCNC: <0.5 MG/DL (ref 0–3)
MICROALBUMIN/CREAT UR-RTO: ABNORMAL MG/G (ref 0–30)
MONOCYTES # BLD: 0.5 K/UL (ref 0.05–1.2)
MONOCYTES NFR BLD: 9 % (ref 3–10)
NEUTS SEG # BLD: 3.3 K/UL (ref 1.8–8)
NEUTS SEG NFR BLD: 56 % (ref 40–73)
NRBC # BLD: 0 K/UL (ref 0–0.01)
NRBC BLD-RTO: 0 PER 100 WBC
PLATELET # BLD AUTO: 201 K/UL (ref 135–420)
PMV BLD AUTO: 9.9 FL (ref 9.2–11.8)
POTASSIUM SERPL-SCNC: 3.7 MMOL/L (ref 3.5–5.5)
PROT SERPL-MCNC: 6.3 G/DL (ref 6.4–8.2)
RBC # BLD AUTO: 4.18 M/UL (ref 4.35–5.65)
SODIUM SERPL-SCNC: 143 MMOL/L (ref 136–145)
WBC # BLD AUTO: 5.9 K/UL (ref 4.6–13.2)

## 2021-12-08 PROCEDURE — 83036 HEMOGLOBIN GLYCOSYLATED A1C: CPT

## 2021-12-08 PROCEDURE — 83735 ASSAY OF MAGNESIUM: CPT

## 2021-12-08 PROCEDURE — 82043 UR ALBUMIN QUANTITATIVE: CPT

## 2021-12-08 PROCEDURE — 85025 COMPLETE CBC W/AUTO DIFF WBC: CPT

## 2021-12-08 PROCEDURE — 80053 COMPREHEN METABOLIC PANEL: CPT

## 2021-12-14 ENCOUNTER — OFFICE VISIT (OUTPATIENT)
Dept: INTERNAL MEDICINE CLINIC | Age: 81
End: 2021-12-14
Payer: MEDICARE

## 2021-12-14 VITALS
WEIGHT: 206.2 LBS | RESPIRATION RATE: 16 BRPM | OXYGEN SATURATION: 98 % | TEMPERATURE: 98.4 F | HEART RATE: 83 BPM | HEIGHT: 68 IN | BODY MASS INDEX: 31.25 KG/M2 | SYSTOLIC BLOOD PRESSURE: 136 MMHG | DIASTOLIC BLOOD PRESSURE: 70 MMHG

## 2021-12-14 DIAGNOSIS — N40.1 BPH WITH OBSTRUCTION/LOWER URINARY TRACT SYMPTOMS: ICD-10-CM

## 2021-12-14 DIAGNOSIS — R60.0 BILATERAL LOWER EXTREMITY EDEMA: ICD-10-CM

## 2021-12-14 DIAGNOSIS — E66.09 CLASS 1 OBESITY DUE TO EXCESS CALORIES WITH SERIOUS COMORBIDITY AND BODY MASS INDEX (BMI) OF 31.0 TO 31.9 IN ADULT: ICD-10-CM

## 2021-12-14 DIAGNOSIS — F33.40 RECURRENT MAJOR DEPRESSIVE DISORDER, IN REMISSION (HCC): ICD-10-CM

## 2021-12-14 DIAGNOSIS — N13.8 BPH WITH OBSTRUCTION/LOWER URINARY TRACT SYMPTOMS: ICD-10-CM

## 2021-12-14 DIAGNOSIS — M06.041 RHEUMATOID ARTHRITIS INVOLVING BOTH HANDS WITH NEGATIVE RHEUMATOID FACTOR (HCC): ICD-10-CM

## 2021-12-14 DIAGNOSIS — R73.01 IMPAIRED FASTING GLUCOSE: ICD-10-CM

## 2021-12-14 DIAGNOSIS — M15.9 PRIMARY OSTEOARTHRITIS INVOLVING MULTIPLE JOINTS: ICD-10-CM

## 2021-12-14 DIAGNOSIS — G47.33 OSA TREATED WITH BIPAP: ICD-10-CM

## 2021-12-14 DIAGNOSIS — Z86.16 HISTORY OF 2019 NOVEL CORONAVIRUS DISEASE (COVID-19): ICD-10-CM

## 2021-12-14 DIAGNOSIS — I10 PRIMARY HYPERTENSION: Primary | ICD-10-CM

## 2021-12-14 DIAGNOSIS — E55.9 VITAMIN D DEFICIENCY: ICD-10-CM

## 2021-12-14 DIAGNOSIS — E78.5 HYPERLIPIDEMIA, UNSPECIFIED HYPERLIPIDEMIA TYPE: ICD-10-CM

## 2021-12-14 DIAGNOSIS — M11.20 CPDD (CALCIUM PYROPHOSPHATE DEPOSITION DISEASE): ICD-10-CM

## 2021-12-14 DIAGNOSIS — M06.042 RHEUMATOID ARTHRITIS INVOLVING BOTH HANDS WITH NEGATIVE RHEUMATOID FACTOR (HCC): ICD-10-CM

## 2021-12-14 PROCEDURE — G8417 CALC BMI ABV UP PARAM F/U: HCPCS | Performed by: INTERNAL MEDICINE

## 2021-12-14 PROCEDURE — G8427 DOCREV CUR MEDS BY ELIG CLIN: HCPCS | Performed by: INTERNAL MEDICINE

## 2021-12-14 PROCEDURE — G9717 DOC PT DX DEP/BP F/U NT REQ: HCPCS | Performed by: INTERNAL MEDICINE

## 2021-12-14 PROCEDURE — 1101F PT FALLS ASSESS-DOCD LE1/YR: CPT | Performed by: INTERNAL MEDICINE

## 2021-12-14 PROCEDURE — G0463 HOSPITAL OUTPT CLINIC VISIT: HCPCS | Performed by: INTERNAL MEDICINE

## 2021-12-14 PROCEDURE — 99214 OFFICE O/P EST MOD 30 MIN: CPT | Performed by: INTERNAL MEDICINE

## 2021-12-14 PROCEDURE — G8536 NO DOC ELDER MAL SCRN: HCPCS | Performed by: INTERNAL MEDICINE

## 2021-12-14 PROCEDURE — G8752 SYS BP LESS 140: HCPCS | Performed by: INTERNAL MEDICINE

## 2021-12-14 PROCEDURE — G8754 DIAS BP LESS 90: HCPCS | Performed by: INTERNAL MEDICINE

## 2021-12-14 NOTE — PATIENT INSTRUCTIONS
Heart-Healthy Diet: Care Instructions  Your Care Instructions     A heart-healthy diet has lots of vegetables, fruits, nuts, beans, and whole grains, and is low in salt. It limits foods that are high in saturated fat, such as meats, cheeses, and fried foods. It may be hard to change your diet, but even small changes can lower your risk of heart attack and heart disease. Follow-up care is a key part of your treatment and safety. Be sure to make and go to all appointments, and call your doctor if you are having problems. It's also a good idea to know your test results and keep a list of the medicines you take. How can you care for yourself at home? Watch your portions  · Learn what a serving is. A \"serving\" and a \"portion\" are not always the same thing. Make sure that you are not eating larger portions than are recommended. For example, a serving of pasta is ½ cup. A serving size of meat is 2 to 3 ounces. A 3-ounce serving is about the size of a deck of cards. Measure serving sizes until you are good at [place]" them. Keep in mind that restaurants often serve portions that are 2 or 3 times the size of one serving. · To keep your energy level up and keep you from feeling hungry, eat often but in smaller portions. · Eat only the number of calories you need to stay at a healthy weight. If you need to lose weight, eat fewer calories than your body burns (through exercise and other physical activity). Eat more fruits and vegetables  · Eat a variety of fruit and vegetables every day. Dark green, deep orange, red, or yellow fruits and vegetables are especially good for you. Examples include spinach, carrots, peaches, and berries. · Keep carrots, celery, and other veggies handy for snacks. Buy fruit that is in season and store it where you can see it so that you will be tempted to eat it. · Cook dishes that have a lot of veggies in them, such as stir-fries and soups.   Limit saturated and trans fat  · Read food labels, and try to avoid saturated and trans fats. They increase your risk of heart disease. · Use olive or canola oil when you cook. · Bake, broil, grill, or steam foods instead of frying them. · Choose lean meats instead of high-fat meats such as hot dogs and sausages. Cut off all visible fat when you prepare meat. · Eat fish, skinless poultry, and meat alternatives such as soy products instead of high-fat meats. Soy products, such as tofu, may be especially good for your heart. · Choose low-fat or fat-free milk and dairy products. Eat foods high in fiber  · Eat a variety of grain products every day. Include whole-grain foods that have lots of fiber and nutrients. Examples of whole-grain foods include oats, whole wheat bread, and brown rice. · Buy whole-grain breads and cereals, instead of white bread or pastries. Limit salt and sodium  · Limit how much salt and sodium you eat to help lower your blood pressure. · Taste food before you salt it. Add only a little salt when you think you need it. With time, your taste buds will adjust to less salt. · Eat fewer snack items, fast foods, and other high-salt, processed foods. Check food labels for the amount of sodium in packaged foods. · Choose low-sodium versions of canned goods (such as soups, vegetables, and beans). Limit sugar  · Limit drinks and foods with added sugar. These include candy, desserts, and soda pop. Limit alcohol  · Limit alcohol to no more than 2 drinks a day for men and 1 drink a day for women. Too much alcohol can cause health problems. When should you call for help? Watch closely for changes in your health, and be sure to contact your doctor if:    · You would like help planning heart-healthy meals. Where can you learn more? Go to http://www.SlickLogin.com/  Enter V137 in the search box to learn more about \"Heart-Healthy Diet: Care Instructions. \"  Current as of: August 22, 2019               Content Version: 12.6  © 8515-9791 [employer]. Care instructions adapted under license by [employer] (which disclaims liability or warranty for this information). If you have questions about a medical condition or this instruction, always ask your healthcare professional. [address] any warranty or liability for your use of this information. Learning About Low-Sodium Foods  What foods are low in sodium? The foods you eat contain nutrients, such as vitamins and minerals. Sodium is a nutrient. Your body needs the right amount to stay healthy and work as it should. You can use the list below to help you make choices about which foods to eat. Fruits  · Fresh, frozen, canned, or dried fruit  Vegetables  · Fresh or frozen vegetables, with no added salt  · Canned vegetables, low-sodium or with no added salt  Grains  · Bagels without salted tops  · Cereal with no added salt  · Corn tortillas  · Crackers with no added salt  · Oatmeal, cooked without salt  · Popcorn with no salt  · Pasta and noodles, cooked without salt  · Rice, cooked without salt  · Unsalted pretzels  Dairy and dairy alternatives  · Butter, unsalted  · Cream cheese  · Ice cream  · Milk  · Soy milk  Meats and other protein foods  · Beans and peas, canned with no salt  · Eggs  · Fresh fish (not smoked)  · Fresh meats (not smoked or cured)  · Nuts and nut butter, prepared without salt  · Poultry, not packaged with sodium solution  · Tofu, unseasoned  · Tuna, canned without salt  Seasonings  · Garlic  · Herbs and spices  · Lemon juice  · Mustard  · Olive oil  · Salt-free seasoning mixes  · Vinegar  Work with your doctor to find out how much of this nutrient you need. Depending on your health, you may need more or less of it in your diet. Where can you learn more?   Go to http://www.gray.com/  Enter S460 in the search box to learn more about \"Learning About Low-Sodium Foods.\"  Current as of: August 22, 2019               Content Version: 12.6  © 1745-2514 [employer] Care instructions adapted under license by [employer] (which disclaims liability or warranty for this information). If you have questions about a medical condition or this instruction, always ask your healthcare professional. [address] any warranty or liability for your use of this information. Low Sodium Diet (2,000 Milligram): Care Instructions  Your Care Instructions     Too much sodium causes your body to hold on to extra water. This can raise your blood pressure and force your heart and kidneys to work harder. In very serious cases, this could cause you to be put in the hospital. It might even be life-threatening. By limiting sodium, you will feel better and lower your risk of serious problems. The most common source of sodium is salt. People get most of the salt in their diet from canned, prepared, and packaged foods. Fast food and restaurant meals also are very high in sodium. Your doctor will probably limit your sodium to less than 2,000 milligrams (mg) a day. This limit counts all the sodium in prepared and packaged foods and any salt you add to your food. Follow-up care is a key part of your treatment and safety. Be sure to make and go to all appointments, and call your doctor if you are having problems. It's also a good idea to know your test results and keep a list of the medicines you take. How can you care for yourself at home? Read food labels  · Read labels on cans and food packages. The labels tell you how much sodium is in each serving. Make sure that you look at the serving size. If you eat more than the serving size, you have eaten more sodium. · Food labels also tell you the Percent Daily Value for sodium. Choose products with low Percent Daily Values for sodium.   · Be aware that sodium can come in forms other than salt, including monosodium glutamate (MSG), sodium citrate, and sodium bicarbonate (baking soda). MSG is often added to Asian food. When you eat out, you can sometimes ask for food without MSG or added salt. Buy low-sodium foods  · Buy foods that are labeled \"unsalted\" (no salt added), \"sodium-free\" (less than 5 mg of sodium per serving), or \"low-sodium\" (less than 140 mg of sodium per serving). Foods labeled \"reduced-sodium\" and \"light sodium\" may still have too much sodium. Be sure to read the label to see how much sodium you are getting. · Buy fresh vegetables, or frozen vegetables without added sauces. Buy low-sodium versions of canned vegetables, soups, and other canned goods. Prepare low-sodium meals  · Cut back on the amount of salt you use in cooking. This will help you adjust to the taste. Do not add salt after cooking. One teaspoon of salt has about 2,300 mg of sodium. · Take the salt shaker off the table. · Flavor your food with garlic, lemon juice, onion, vinegar, herbs, and spices. Do not use soy sauce, lite soy sauce, steak sauce, onion salt, garlic salt, celery salt, mustard, or ketchup on your food. · Use low-sodium salad dressings, sauces, and ketchup. Or make your own salad dressings and sauces without adding salt. · Use less salt (or none) when recipes call for it. You can often use half the salt a recipe calls for without losing flavor. Other foods such as rice, pasta, and grains do not need added salt. · Rinse canned vegetables, and cook them in fresh water. This removes some--but not all--of the salt. · Avoid water that is naturally high in sodium or that has been treated with water softeners, which add sodium. Call your local water company to find out the sodium content of your water supply. If you buy bottled water, read the label and choose a sodium-free brand. Avoid high-sodium foods  · Avoid eating:  ? Smoked, cured, salted, and canned meat, fish, and poultry.   ? Ham, puentes, hot dogs, and luncheon meats. ? Regular, hard, and processed cheese and regular peanut butter. ? Crackers with salted tops, and other salted snack foods such as pretzels, chips, and salted popcorn. ? Frozen prepared meals, unless labeled low-sodium. ? Canned and dried soups, broths, and bouillon, unless labeled sodium-free or low-sodium. ? Canned vegetables, unless labeled sodium-free or low-sodium. ? [place] fries, pizza, tacos, and other fast foods. ? Pickles, olives, ketchup, and other condiments, especially soy sauce, unless labeled sodium-free or low-sodium. Where can you learn more? Go to http://www.gray.com/  Enter V843 in the search box to learn more about \"Low Sodium Diet (2,000 Milligram): Care Instructions. \"  Current as of: August 22, 2019               Content Version: 12.6  © 1709-3445 [employer]. Care instructions adapted under license by [employer] (which disclaims liability or warranty for this information). If you have questions about a medical condition or this instruction, always ask your healthcare professional. [place] [address] any warranty or liability for your use of this information.

## 2021-12-14 NOTE — PROGRESS NOTES
1. \"Have you been to the ER, urgent care clinic since your last visit? Hospitalized since your last visit? \" No    2. \"Have you seen or consulted any other health care providers outside of the [address] [place] [address] since your last visit? \" No     3. For patients aged 39-70: Has the patient had a colonoscopy / FIT/ Cologuard? NA based on age or sex     If the patient is female:    3. For patients aged 41-77: Has the patient had a mammogram within the past 2 years? NA based on age or sex    11. For patients aged 21-65: Has the patient had a pap smear?  NA based on age or sex

## 2021-12-19 PROBLEM — I47.1 SVT (SUPRAVENTRICULAR TACHYCARDIA) (HCC): Status: RESOLVED | Noted: 2021-05-05 | Resolved: 2021-12-19

## 2021-12-19 PROBLEM — F33.40 RECURRENT MAJOR DEPRESSIVE DISORDER, IN REMISSION (HCC): Status: ACTIVE | Noted: 2020-03-01

## 2021-12-19 NOTE — PROGRESS NOTES
HPI:   Pietro Perkins is a 80y.o. year old male who presents today for a routine visit. He has a history of hypertension, hyperlipidemia, prediabetes, rheumatoid arthritis, osteoarthritis, calcium pyrophosphate deposition disease, BPH, GERD, and depression. He also has a history of COVID-19 infection (3/4011) complicated by severe pneumonia and acute hypoxic respiratory failure. He has completed the Moderna COVID-19 vaccine series and received the Moderna booster dose. He reports that he is doing reasonably well. He admits to compliance with his doses of torsemide and states that he is now taking 20 mg each morning and the second dose in early afternoon with good results. He states that his neck and shoulder pain appear to have improved and are reasonably well-controlled on Tylenol and using Voltaren gel as needed. He is otherwise without new complaints and feeling overall well. Summary of prior hospitalizations and medical history:  On 6/22/2020, he noted the onset of weakness, fatigue, and diarrhea. He stated that he continued to work for the entire week despite feeling ill. On 6/27/2020, he developed fever and dyspnea, which worsened throughout the weekend. He presented to [facility] on 6/28/2020, and WBC 4.0 (78 % neutrophils) and chest x-ray showed patchy density in the RUL, right perihilar area, and right base. He was advised to go to the ED, and presented to [facility] ED on 6/29/2020. He was found to be hypoxic with pulse ox at rest 92-93 % and ambulation 89-91% (room air). Chest x-ray showed bilateral multifocal extremely subtle groundglass opacities. Labs showed WBC 4.4 (80% neutrophils), Hb 14.6/ Hct 42.0, platelets 359, creatinine 0.71/ eGFR >60, AST 53, alk phos 160, lactate 1.16. He was discharged home, and SARS COV-2 positive (6/29/2020) result returned.  He presented for a virtual visit on 7/2/2020, and reported that since discharge from the ED, he noted persistent symptoms of weakness, diarrhea, fatigue, and fevers, and prgressive dyspnea. He described poor po intake, and chest pain with inspiration and felt that he was unable to take a deep breath or cough. He was advised to call EMS and return to the ED. Upon arrival by EMS, his oxygenation was noted to be in the low 80's, and required high flow oxygen. Chest x-ray (7/2/2020) showed bilateral increased patchy groundglass airspace opacities, and labs revealed ABG 7.43/34/70 on nonrebreather mask; WBC 7.3 (11% lymphocytes), Hb 14.8/ Hct 42.6, creatinine 0.8/ eGFR>60, ALT 79, AST 76, alk phos 170, albumin 2.7 D-dimer 1.35, ferritin 729, CRP 16.6, , procalcitonin 0.14, lactate 2.8, blood culture negative. He was initially started on Zosyn, but Dr. eDena Miller (ID) was consulted and recommended discontinuing and beginning azithromycin, remdesivir, IV Solumedrol, and lovenox. He was also started on ascorbic acid, melatonin, and zinc. Dr. Owen Benz (pulmonary) was consulted and recommended change to high flow nasal cannula and recommended proning to the patient. He improved clinically and inflammatory indices improved. His oxygen was weaned to 5 liters nasal cannula, and he completed 5 day course of remdesivir and azithromycin. He was discharged on 7/9/2020 with an 8 day course of prednisone, 30 day course of Eliquis 2.5 mg bid, and two week course of Vitamin  C and zinc. He was seen for post-hospitalization follow-up on 7/16/2020 and reported some persistent congestion and significant dyspnea on exertion. He was referred to Dr. Tawana Smith, and she recommended continued oxygen use as needed and stressed need for treatment of obstructive sleep apnea with CPAP. She placed order for him to receive auto CPAP. He was unable to tolerate CPAP due to continued air hunger even with bleeding in of 2 liters nasal cannula.  He had a repeat sleep study on 1/29/2021, which showed obstructive sleep apnea with emergence of central apnea and BIPAP found to be most effective. During the study, he was found to have PAC's, PVC's and intermittent Mobitz 1 second degree AV block. He was referred to Dr. Timothy Landis and his diuretics were changed from lasix to torsemide and metolazone. However, he stopped taking metolazone after 1 week since began to feel lightheaded and noted low blood pressure readings with SBP 80-90. He underwent an echocardiogram (2/24/2021) showing normal LV size and function (EF 60-65%), mild MR and PI and PAP 23 mmHg; and a pharmacologic nuclear stress test (2/24/2021) which was a normal, low risk study with no TID and calculated EF 62%. He had a 6 minute walk test (3/31/2021) showing no significant O2 desaturation; and PFT's showing normal flows, normal DLCO, and isolated decrease in RV and FRC. He also had a 30 day event monitor (5/2021) which showed rare PVC, rare PAC, and one episode of 20 beat SVT at 155 bpm (asymptomatic). On 8/13/2019, he reported that he had been seeing Dr. Karmen Salazar for increasing difficulty with right sided sciatica. He reported being treated with a Medrol dose pack, physical therapy, and spinal injections without improvement, and was also prescribed Norco and Tramadol, but he stated that he found them too sedating and of no benefit. Due to persistent symptoms, he underwent a lumbar MRI (8/5/2019) which showed degenerative changes most notable at L4-L5 resulting in moderate spinal canal stenosis as well as moderate to severe right greater than left foraminal stenoses; advanced facet arthropathy with small facet effusions and suspected small right facet joint ventral synovial cyst; notable degenerative changes also L3-L4; L1 mild anterior compression deformity, chronic appearing; overall general worsening when compared to prior study in 2007. He was having continued significant pain, and was started on gabapentin 100 mg tid. He was referred to Dr. Lizzette Lora and she increased his dose of gabapentin to 200 mg tid.  She also referred him to Dr. Kuldip De La Rosa for evaluation given his lack of response to conservative management. He recommended proceeding with decompression by performing a L4/5 right-sided hemilaminotomy and medial facetectomy, which was performed on 10/23/2019. He developed postop urinary retention and was discharged with a Deleon catheter. He had follow-up with Dr. James Boyd on 10/29/2019 with successful voiding trial. He reports that he noted initial resolution of his right sciatica pain following surgery, but on 10/29/2019 noted abrupt recurrence when getting out of bed. He was evaluated by GOMEZ Doherty on 10/31/2019 and was treated with a Medrol dose pack and restarted on gabapentin 300 mg tid. He reports overall improvement and is no longer requiring gabapentin. On 8/9/2019, he had an episode of waking up gasping for air forcing him to get out of bed and walk around until his breathing normalized. He has been having frequent similar episodes over that last year, but had been resistent to evaluation for sleep apnea. However, he reports that this episode was especially severe. When his symptoms persisted, he was evaluated at [facility], and testing included WBC 5.7, Hb 14.2/ Hct 41.6, creatinine 0.9/ eGFR>60, troponin I x 1 negative, NT-pro BNP 45, EKG sinus rhythm at 83 bpm and no ischemic changes, and chest x-ray without acute changes, but an ill defined 15 mm density in the lateral left mid zone was noted. He received lasix 40 mg IV and was discharged. He had an echocardiogram (8/26/2019) showing normal LV function (EF 56-60%), no RWMA, unable to estimate RVSP due to inadequate TR, but TV/PV appear normal. He was referred to Dr. Malcolm Jhaveri for probable sleep apnea, and underwent a home sleep study on 10/25/2019, showing evidence of severe obstructive sleep apnea with AHI 35 and oxygen guy 80%. He was not able to tolerate CPAP equipment as discussed.      On 6/20/2018, he suffered an accidental gun shot wound while working resulting in traumatic injury to his left index finger with near loss of the middle phalanx and fracture of the distal phalanx. He was taken to [facility] and initially had irrigation and closure of the lacerations performed. He presented to the [facility] ED on 6/24/2018 with increased pain and swelling, and was started on doxycycline for a wound infection. On 7/7/2018, due to loss of stability and angulation of the index finger, he underwent stabilization with fusion of the proximal and distal phalanx with bone grafting by Dr. Al Larson. He did well and was started on physical therapy. On 8/1/2018, he presented to [address] [place] [address] for evaluation of increasing erythema, swelling and tenderness with concern for a possible infection. He underwent left hand x-ray (8/1/2018) showing severe soft tissue swelling of the left second finger worrisome for cellulitis, traumatic amputation of the middle phalanx with marked osteopenia and irregularity of the base of the distal phalanx and flattening and irregularity of the head of the proximal phalanx. Unclear if related to prior trauma/surgery or osteomyelitis with osseous destruction and no films available for comparison. He was started empirically on Bactrim and Augmentin for 14 days. On 8/10/2018, he presented for evaluation by GOMEZ Mejia for complaints of fever, malaise, headache, fatigue, and diarrhea. Lab evaluation showed WBC 17 (90% neutrophils), creatinine 1.34/ eGFR 52, blood culture negative. Stool cultures (8/13/2018) routine, O/P, and C.diff negative. Bactrim and Augmentin were discontinued on 8/13/2018, and he was started on metronidazole for 10 days for treatment of presumed C.diff with improvement. He was evaluated by Dr. David Chilel on 8/15/2018 and repeat WBC 8.6 (59% neutrophils), ESR 6, and CRP 0.9. He was seen by Dr. David Chilel in follow-up on 8/30/2018 and left index finger wound noted to be significantly improved and no further difficulty with diarrhea.      He has a history of hypertension, treated with amlodipine, lisinopril, lasix (+ potassium), and hydralazine was added in 4/2018. He states that his wife has been monitoring his blood pressure intermittently. He denies any chest pain, shortness of breath at rest or with exertion, lightheadedness, or palpitations. He does report bilateral lower extremity swelling that it will increase throughout the day and improve overnight. . In 6/2016, he underwent lower extremity arterial and venous duplex scans, both of which were negative. He also has a history of hyperlipidemia, treated with moderate intensity atorvastatin. He has a history of prediabetes, with HbA1c ranging from 5.9-6.2 since 2012. He denies any polyuria, polydipsia, nocturia, or blurry vision, and has no history of retinopathy, neuropathy, or nephropathy. He has regular eye exams with Dr. Cindy Chau. He has a history of bilateral hand pain with morning stiffness for several years, and in 3/2014, he was noted to have a positive anti-CCP antibody level although NIMCO, rheumatoid factor, and ESR were negative. He was referred for evaluation to Dr. John Johnson, and was diagnosed with rheumatoid arthritis in 6/2014. He was treated with hydroxychloroquine, which has been partially helpful in controlling his symptoms. Bilateral hand x-rays also showed evidence of primary osteoarthritis with osteophytes present on the second and third MCP joints. In 1/2017, he was also noted to have evidence of possible chondrocalcinosis on x-ray, and was started on low dose colchicine in addition to hydroxychloroquine for concomitant calcium pyrophosphate deposition disease. He states that since starting on colchicine, he has had significant improvement in his hand pain. He also uses compounding cream and Voltaren gel for pain control. In 1/2019, he was complaining of worsening neck and bilateral shoulder pain (R>L).  He stated that he was previously followed by Dr. Evelin Peña, and would occasionally receive cortisone injections into his shoulders. He also described neck pain with difficulty turning his head. He denied any upper extremity weakness or paresthesias. He underwent imaging, and bilateral shoulder x-rays (1/10/2019) showed degenerative changes and secondary findings of rotator cuff pathology. Cervical spine x-rays (1/10/2019) showed advanced degenerative changes with multilevel facet arthropathy. He was evaluated by Dr. Lynette Hatchet who gave him a cortisone injection in his right shoulder with some improvement. He also recommended a reverse shoulder replacement, but he remains hesitant to proceed. He was started on Celebrex 200 mg bid in 2/2019, and reports significant improvement. He continues to also use Tylenol as needed for pain. He states that his neck pain seems to have improved to his baseline level. He has a history of symptomatic BPH, with complaints of decreased stream, hesitancy, and dribbling. He has refused treatment with medication. He is followed by Dr. Sathya Ledesma. He denies any dysuria, gross hematuria, or flank pain. He has a history of GERD, treated with daily omeprazole with good control of symptoms. He had a screening colonoscopy and 8/2015 by Dr. Tierra Garcia, showing a 3 mm sessile cecal polyp (pathology: intra-mucosal lymphoid aggregate), two 4 mm sessile polyps in the transverse colon (pathology: serrated adenomas), and a 1 cm lipoma in the transverse colon. Follow-up recommended for five years. He was having difficulty with rectal bleeding in 1/2018 and returned for evaluation with Dr. Tierra Garcia who felt it was most likely secondary to a hemorrhoidal source. She recommended use of Citrucel. He states that the bleeding has improved with initiation of this therapy. He denies any abdominal pain, nausea, vomiting, melena, or change in bowel movements. He has a history of depression and anxiety, which had been well controlled with Paxil although inadvertently stopped.  Now on Zoloft with improvement. 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 Dr. Luz Burrell. 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 Dr. Merry Stewart. Past Surgical History:   Procedure Laterality Date    HX AMPUTATION FINGER Left     index    HX BACK SURGERY  10/23/2019    Right L4-5 hemilaminectomy, medial facetectomy, resection of synovial cyst    HX CARPAL TUNNEL RELEASE Bilateral     HX CATARACT REMOVAL Left 01/2018    HX CATARACT REMOVAL Bilateral 2018    HX COLONOSCOPY      HX HEENT      sinus, tonsillectomy    HX HERNIA REPAIR      HX MOHS PROCEDURES      bilateral     HX ORTHOPAEDIC      lef knee surgery, removed cartilage.  HX ROTATOR CUFF REPAIR Right      Current Outpatient Medications   Medication Sig    omeprazole (PRILOSEC) 20 mg capsule TAKE 1 CAPSULE BY MOUTH ONCE DAILY    amLODIPine (NORVASC) 5 mg tablet Take 1 Tablet by mouth daily.  torsemide (DEMADEX) 20 mg tablet TAKE 1 TABLET BY MOUTH 2 TIMES A DAY    atorvastatin (LIPITOR) 10 mg tablet TAKE 1 TABLET BY MOUTH ONCE DAILY    tamsulosin (FLOMAX) 0.4 mg capsule TAKE 1 CAPSULE BY MOUTH ONCE DAILY    hydrALAZINE (APRESOLINE) 25 mg tablet Take 1 Tab by mouth two (2) times a day.  lisinopriL (PRINIVIL, ZESTRIL) 40 mg tablet TAKE 1 TABLET BY MOUTH ONCE DAILY    hydrOXYchloroQUINE (PLAQUENIL) 200 mg tablet Take 400 mg by mouth daily.     mineral oil liquid Take 30 mL by mouth daily as needed for Constipation.  cycloSPORINE (RESTASIS) 0.05 % ophthalmic emulsion Administer 1 Drop to both eyes nightly.  colchicine (MITIGARE) 0.6 mg capsule Take 0.6 mg by mouth every other day.  cholecalciferol, vitamin D3, (VITAMIN D3) 2,000 unit tab Take 2,000 Units by mouth daily.  diclofenac (VOLTAREN) 1 % topical gel Apply 4 g to affected area four (4) times daily.  LUMIGAN 0.01 % ophthalmic drops Administer 1 Drop to both eyes nightly.  MULTIVITS/IRON FUM/FA/D3/LYCOP (MULTI FOR HIM PO) Take  by mouth daily. No current facility-administered medications for this visit. Allergies and Intolerances: Allergies   Allergen Reactions    Latex, Natural Rubber Swelling    Adhesive Tape-Silicones Rash and Itching    Aldactone [Spironolactone] Not Reported This Time     Breast tenderness    Codeine Not Reported This Time     \"Drives crazy\"    Tape [Adhesive] Rash    Tetanus Toxoid, Adsorbed Anaphylaxis    Tetanus Vaccines And Toxoid Anaphylaxis     Family History: He has no family history of colon or prostate cancer. Family History   Problem Relation Age of Onset    Cancer Mother     Heart Disease Father     Alcohol abuse Father     Cancer Other      Social History:   He  reports that he has quit smoking. His smoking use included cigars, pipe, and cigarettes. He has a 30.00 pack-year smoking history. He has quit using smokeless tobacco.  His smokeless tobacco use included chew. He smoked 2 ppd for 50 years, stopping in 1974. He is  with two adult children. 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 He is a employed at [employer] in [place].      Social History     Substance and Sexual Activity   Alcohol Use Yes    Comment: rarely     Immunization History:  Immunization History   Administered Date(s) Administered    (RETIRED) Pneumococcal Vaccine (Unspecified Type) 01/01/2008    COVID-19, Moderna Booster, PF, 0.25mL Dose 11/30/2021    COVID-19, Moderna, Primary or Immunocompromised Series, MRNA, PF, 100mcg/0.5mL 02/01/2021, 03/01/2021    Influenza High Dose Vaccine PF 09/30/2017    Influenza Vaccine (Madin Nika Canine Kidney) PF 12/13/2017, 10/17/2018    Influenza Vaccine (Tri) Adjuvanted (>65 Yrs FLUAD TRI [id number]) 09/25/2018, 09/25/2019    Influenza Vaccine (Trivalent) 10/19/2019, 11/18/2020    Influenza Vaccine Split 10/04/2011, 10/16/2012    Influenza Vaccine Whole 01/15/2010    Influenza, Quadrivalent, Adjuvanted (>65 Yrs FLUAD QUAD 70978) 09/10/2020, 09/28/2021    Pneumococcal Conjugate (PCV-13) 01/19/2015    Pneumococcal Polysaccharide (PPSV-23) 01/01/2008    Varicella Virus Vaccine 10/01/2013    Zoster 10/16/2012       Review of Systems:   As above included in HPI. Otherwise 11 point review of systems negative including constitutional, skin, HENT, eyes, respiratory, cardiovascular, gastrointestinal, genitourinary, musculoskeletal, endocrine, hematologic, allergy, and neurologic. Physical:   Visit Vitals  /70 (BP 1 Location: Left arm, BP Patient Position: Sitting)   Pulse 83   Temp 98.4 °F (36.9 °C) (Temporal)   Resp 16   Ht 5' 8\" (1.727 m)   Wt 206 lb 3.2 oz (93.5 kg)   SpO2 98%   BMI 31.35 kg/m²       Exam:   Patient appears in no apparent distress. Affect is appropriate. HEENT: PERRLA, anicteric, no JVD, adenopathy or thyromegaly. No carotid bruits or radiated murmur. Lungs: clear to auscultation, no wheezes, rhonchi, or rales. Heart: regular rate and rhythm. No murmur, rubs, gallops  Abdomen: soft, nontender, nondistended, normal bowel sounds, no hepatosplenomegaly or masses. Extremities: 1+ edema bilaterally.         Review of Data:  Labs:    Hospital Outpatient Visit on 12/08/2021   Component Date Value Ref Range Status    WBC 12/08/2021 5.9  4.6 - 13.2 K/uL Final    RBC 12/08/2021 4.18* 4.35 - 5.65 M/uL Final    HGB 12/08/2021 13.3  13.0 - 16.0 g/dL Final    HCT 12/08/2021 41.5  36.0 - 48.0 % Final    MCV 12/08/2021 99.3  78.0 - 100.0 FL Final    MCH 12/08/2021 31.8  24.0 - 34.0 PG Final    MCHC 12/08/2021 32.0  31.0 - 37.0 g/dL Final    RDW 12/08/2021 13.7  11.6 - 14.5 % Final    PLATELET 67/12/8329 148  135 - 420 K/uL Final    MPV 12/08/2021 9.9  9.2 - 11.8 FL Final    NRBC 12/08/2021 0.0  0  WBC Final    ABSOLUTE NRBC 12/08/2021 0.00  0.00 - 0.01 K/uL Final    NEUTROPHILS 12/08/2021 56  40 - 73 % Final    LYMPHOCYTES 12/08/2021 27  21 - 52 % Final    MONOCYTES 12/08/2021 9  3 - 10 % Final    EOSINOPHILS 12/08/2021 7* 0 - 5 % Final    BASOPHILS 12/08/2021 1  0 - 2 % Final    IMMATURE GRANULOCYTES 12/08/2021 0  0.0 - 0.5 % Final    ABS. NEUTROPHILS 12/08/2021 3.3  1.8 - 8.0 K/UL Final    ABS. LYMPHOCYTES 12/08/2021 1.6  0.9 - 3.6 K/UL Final    ABS. MONOCYTES 12/08/2021 0.5  0.05 - 1.2 K/UL Final    ABS. EOSINOPHILS 12/08/2021 0.4  0.0 - 0.4 K/UL Final    ABS. BASOPHILS 12/08/2021 0.0  0.0 - 0.1 K/UL Final    ABS. IMM. GRANS.  12/08/2021 0.0  0.00 - 0.04 K/UL Final    DF 12/08/2021 AUTOMATED    Final    Hemoglobin A1c 12/08/2021 5.4  4.2 - 5.6 % Final    Est. average glucose 12/08/2021 108  mg/dL Final    Magnesium 12/08/2021 2.3  1.6 - 2.6 mg/dL Final    Sodium 12/08/2021 143  136 - 145 mmol/L Final    Potassium 12/08/2021 3.7  3.5 - 5.5 mmol/L Final    Chloride 12/08/2021 109  100 - 111 mmol/L Final    CO2 12/08/2021 29  21 - 32 mmol/L Final    Anion gap 12/08/2021 5  3.0 - 18 mmol/L Final    Glucose 12/08/2021 143* 74 - 99 mg/dL Final    BUN 12/08/2021 18  7.0 - 18 MG/DL Final    Creatinine 12/08/2021 1.16  0.6 - 1.3 MG/DL Final    BUN/Creatinine ratio 12/08/2021 16  12 - 20   Final    GFR est AA 12/08/2021 >60  >60 ml/min/1.73m2 Final    GFR est non-AA 12/08/2021 >60  >60 ml/min/1.73m2 Final    Calcium 12/08/2021 8.9  8.5 - 10.1 MG/DL Final    Bilirubin, total 12/08/2021 0.5 0.2 - 1.0 MG/DL Final    ALT (SGPT) 2021 36  16 - 61 U/L Final    AST (SGOT) 2021 23  10 - 38 U/L Final    Alk. phosphatase 2021 109  45 - 117 U/L Final    Protein, total 2021 6.3* 6.4 - 8.2 g/dL Final    Albumin 2021 3.8  3.4 - 5.0 g/dL Final    Globulin 2021 2.5  2.0 - 4.0 g/dL Final    A-G Ratio 2021 1.5  0.8 - 1.7   Final    Microalbumin,urine random 2021 <0.50  0 - 3.0 MG/DL Final    Creatinine, urine 2021 <13.00* 30 - 125 mg/dL Final    Microalbumin/Creat ratio (mg/g cre* 2021 Cannot be calculated  0 - 30 mg/g Final       EKG (9/10/2020) sinus rhythm at 76 bpm, normal intervals, no ischemic changes; no significant change from prior in 2020 and 10/2019. Health Maintenance:  Screening:    Colorectal: colonoscopy (2015) serrated adenomas. Dr. Corinna Langston Cologuard negative (2021). Unwilling to proceed with repeat colonoscopy. Depression: on Paxil   DM (HbA1c/FPG): HbA1c 5.4 (2021)   Hepatitis C: negative (2021)   Falls: none   DEXA: within normal limits (2016)   PSA/MARTÍNEZ: PSA 3.3 (2019)    Glaucoma: regular eye exams with Dr. Juan Alberto Armenta (last 2021)   Smokin pack year.  Distant past.   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    Impaired fasting glucose R73.01    Class 1 obesity due to excess calories with serious comorbidity and body mass index (BMI) of 30.0 to 30.9 in adult E66.09, Z68.30    APC (atrial premature contractions) I49.1    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 Lower extremity edema. Chronic problem, but worsened following COVID 19 infection. Evaluated by Dr. Nu Perez and changed from lasix to torsemide and metolazone. Became hypotensive, and patient stopped metolazone on his own. Hydralazine held but restarted on 5/5/2021 due to elevated blood pressure. Underwent pharmacologic nuclear stress test (2/24/2021) which was a normal, low risk study. Repeat echocardiogram (2/24/2021) similar to prior in 9/2020 except noted new mild PI. Edema had improved on torsemide but worsened in 8/2021 and patient admitted to taking torsemide only once daily. Advised to decrease dose of amlodipine to 5 mg daily and increase torsemide dose to 20 mg twice daily. Reports significant improvement today with adjustment, and reports taking torsemide 20 mg every morning and second dose in mid afternoon with good results. Will continue to monitor. 2.  Hypertension. Blood pressure remains well controlled on lisinopril 40 mg daily, amlodipine 5 mg daily, hydralazine 25 mg bid, and torsemide 20 mg bid. Metolazone discontinued due to worsening renal function in 8/2021. Renal function normalized today with creatinine 1.16/ eGFR >60. Echocardiogram (2/2021) with grade 1 diastolic dysfunction. Continue to follow. 3. Hyperlipidemia.  On moderate intensity dose atorvastatin with LDL 57 and HDL 51 (8/2021), indicative of excellent control in this patient. Continue to follow. 4. Prediabetes. Controlled on diet alone with normalization of HbA1c to 5.4 today. Required sliding scale insulin dosing when hospitalized due to COVID 19 due to elevated blood sugar, likely related to high dose steroids. No evidence of microvascular complications. On Ace-I and statin. Continue follow-up with Dr. Dorothea Prabhakar for annual eye exams. Emphasized importance of lifestyle modifications, including diet, exercise, and weight loss. 5. History of COVID-19 infection with severe pneumonia/ acute hypoxic respiratory failure (6/2020). Overall, appears improved. No longer complaining of chest wall pain with inhalation and difficulty taking deep breaths. Lower extremity edema continues to be a persistent problem but was also present prior to his COVID-19 infection. Echocardiogram (9/2020) showed no change from 8/2019 with EF 55-60% and mild MR. EKG (9/2020) without change. Underwent 6 minute walk test (3/31/2021) showing no desaturations, and PFT's (3/31/2021) showing normal flows and DLCO, and isolated decreased RV and FRC, no response to bronchodilator. Completed the Moderna COVID-19 vaccine series and received the Moderna booster dose. Will continue to monitor. 6. Obstructive sleep apnea, severe. Patient reported in 1/2019 awakening gasping for air several times per week, snoring and daytime drowsiness particularly when driving. Severe episode on 8/9/2019 prompted ED evaluation. EKG without change, troponin negative and NT-pro BNP normal. Echocardiogram (8/26/2019) with normal LV size and function (EF 56-60%), no RWMA, normal valves. Evaluated by Dr. Remi Roth and completed home sleep study and diagnosed with severe JANNET. Started on auto CPAP after hospitalization for COVID 19 by Dr. Leodan Phan and supplemented with 2 liters of oxygen.  However, despite best efforts, patient continued to describe significant difficulty using due to dyspnea and air hunger, and returned equipment. Underwent in-patient sleep evaluation on 1/29/2021 and found BIPAP to be very effective. Reports now using approximately 4 hours per night and finding it to be helpful. Will continue to follow. 7. Second degree AV block, Mobitz 1. Noted to be intermittent during sleep study and referred to Dr. Shan Montemayor for evaluation. Felt to be likely secondary to untreated sleep apnea. Completed 30 day event monitor (5/5/2021) and no significant findings noted except for asymptomatic 20 beat run of SVT at 155 bpm.  Will continue to monitor. 8. Rheumatoid arthritis. On hydroxychloroquine 400 mg daily. Has regular eye exams with Dr. Leobardo Marin. Difficult to gauge response as appears to have evidence of osteoarthritis and CPPD occurring concurrently, but noted significant improvement since initiated colchicine. Voltaren gel and Tylenol for pain control as needed. Followed by Dr. Augie Almaguer. 9. Primary osteoarthritis. Evident in bilateral hands and knees, bilateral shoulders, and cervical spine. Shoulder pain (R>L). X-ray with evidence of osteoarthritis and rotator cuff pathology. Evaluated by Dr. Sylvie Carrasco and received cortisone injection to right shoulder with some improvement. Surgery for reverse shoulder replacement recommended, but not wishing to proceed. Reports no longer taking Celebrex for pain due to concern for side effects. Using Tylenol with reasonable control. Reports overall improvement in pain today. Will continue to monitor. 10. CPPD. Colchicine started on 1/19/2017 to help address chondrocalcinosis on x-rays. Now taking every other day with improvement in joint pain. Being monitored by Dr. Augie Almaguer. 11. Lumbar degenerative disease with right sciatica. Underwent decompression with L4/5 right-sided hemilaminotomy and medial facetectomy on 10/23/2019 by Dr. Rodolfo Barajas.  Developed urinary retention post-op, but successfully passed voiding trial and has had no further difficulties. He developed recurrence of pain on post op day 6, and treated with Medrol dose pack. Reports no longer requiring gabapentin with no significant pain. Being followed by GOMEZ Venegas as needed. 12. Depression. Previously treated with Paxil and weaned from benzodiazepine use for control of anxiety and insomnia. After inadvertently stopping Paxil, started on Zoloft and had been well controlled on 50 mg daily. However, reports today that he is no longer taking it and states that he does not feel it is needed. Reports no difficulty with anxiety currently. Will continue to monitor. 13. GERD. Good control of symptoms with omeprazole. Will follow. 14.  Anemia, mild. Colonoscopy 8/2015. Evaluated by Dr. Lindajean Denver and considered to be hemorrhoidal in source. Known internal and external hemorrhoids. Improved with Citrucel. Was due for routine colonoscopy in 8/2020, but unwilling to proceed. Completed Cologuard (4/2021) which was negative. Mild anemia in 5/2021 with Hb 12.9. Repeat stable with Hb 12.7, and normal iron studies with ferritin 75 and transferrin 34% and normal B12 and folate levels (8/2021). Normalized today. Will continue to monitor. 15. BPH with urinary retention. Does have lower urinary tract symptoms. Developed postop urinary retention after back surgery. On Flomax. Followed previously by Dr. Liz Jones and not wishing to establish with new provider unless problem emerges. Reports overall stable today. 16. Left wrist ganglion cyst. Previously evaluated by Dr. Vivek Morel and aspirated. Recurred and reports now larger. Requested referral to another orthopedist and evaluated by PA at Dr. Mami Cline office. Recommended surgery, but was dissatisfied with delays in scheduling and now not wishing to proceed. Continue to monitor. 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 Managing well and no further issues. 18. 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  Ongoing secondary to work as a [employer]. Level stable and monitored annually (due 2/2022). 19. Obesity. Lost 25 pounds during COVID 19 illness, but had returned to near baseline. Weight overall stable over the last 3 months. Emphasized importance of continued healthy eating and improved nutrition. Will readdress at next visit. 20. Health maintenance. Completed Moderna COVID 19 vaccine series and received the Moderna booster dose. Already received the influenza vaccine. Unable to receive Tdap due to allergy (anaphylaxis to Td). Will continue to address Shingrix vaccine. Other immunizations up to date. Completed Cologuard (4/2021) testing as discussed. Continue regular eye exams with Dr. Gerri Canales. Vitamin D level has been normal after being treated with ergocalciferol 50,000 U weekly for 12 weeks. Continue maintenance dose 1000 U daily. Medicare wellness visit up to date. Patient understands recommendations and agrees with plan. Follow-up in 3 months.

## 2022-02-18 RX ORDER — TORSEMIDE 20 MG/1
TABLET ORAL
Qty: 60 TABLET | Refills: 3 | Status: SHIPPED | OUTPATIENT
Start: 2022-02-18 | End: 2022-09-12 | Stop reason: SDUPTHER

## 2022-03-18 PROBLEM — R60.0 BILATERAL LOWER EXTREMITY EDEMA: Status: ACTIVE | Noted: 2020-09-13

## 2022-03-18 PROBLEM — G47.33 OSA TREATED WITH BIPAP: Status: ACTIVE | Noted: 2019-08-17

## 2022-03-19 PROBLEM — R73.01 IMPAIRED FASTING GLUCOSE: Status: ACTIVE | Noted: 2017-02-05

## 2022-03-19 PROBLEM — M50.30 DDD (DEGENERATIVE DISC DISEASE), CERVICAL: Status: ACTIVE | Noted: 2021-01-24

## 2022-03-19 PROBLEM — Z86.16 HISTORY OF 2019 NOVEL CORONAVIRUS DISEASE (COVID-19): Status: ACTIVE | Noted: 2020-07-02

## 2022-03-19 PROBLEM — M47.816 LUMBAR FACET ARTHROPATHY: Status: ACTIVE | Noted: 2019-08-17

## 2022-03-19 PROBLEM — I49.1 APC (ATRIAL PREMATURE CONTRACTIONS): Status: ACTIVE | Noted: 2018-08-15

## 2022-03-19 PROBLEM — I44.1 MOBITZ TYPE 1 SECOND DEGREE ATRIOVENTRICULAR BLOCK: Status: ACTIVE | Noted: 2021-03-19

## 2022-03-20 PROBLEM — M71.38 SYNOVIAL CYST OF LUMBAR FACET JOINT: Status: ACTIVE | Noted: 2019-10-23

## 2022-03-20 PROBLEM — S68.621S: Status: ACTIVE | Noted: 2018-09-30

## 2022-03-20 PROBLEM — M47.812 FACET ARTHROPATHY, CERVICAL: Status: ACTIVE | Noted: 2021-01-24

## 2022-03-20 PROBLEM — E66.09 CLASS 1 OBESITY DUE TO EXCESS CALORIES WITH SERIOUS COMORBIDITY AND BODY MASS INDEX (BMI) OF 31.0 TO 31.9 IN ADULT: Status: ACTIVE | Noted: 2018-02-20

## 2022-03-20 PROBLEM — E66.811 CLASS 1 OBESITY DUE TO EXCESS CALORIES WITH SERIOUS COMORBIDITY AND BODY MASS INDEX (BMI) OF 31.0 TO 31.9 IN ADULT: Status: ACTIVE | Noted: 2018-02-20

## 2022-03-20 PROBLEM — F33.40 RECURRENT MAJOR DEPRESSIVE DISORDER, IN REMISSION (HCC): Status: ACTIVE | Noted: 2020-03-01

## 2022-03-21 RX ORDER — LISINOPRIL 40 MG/1
TABLET ORAL
Qty: 90 TABLET | Refills: 3 | Status: SHIPPED | OUTPATIENT
Start: 2022-03-21

## 2022-03-30 ENCOUNTER — TELEPHONE (OUTPATIENT)
Dept: INTERNAL MEDICINE CLINIC | Age: 82
End: 2022-03-30

## 2022-03-30 ENCOUNTER — APPOINTMENT (OUTPATIENT)
Dept: INTERNAL MEDICINE CLINIC | Age: 82
End: 2022-03-30

## 2022-03-30 ENCOUNTER — HOSPITAL ENCOUNTER (OUTPATIENT)
Dept: LAB | Age: 82
Discharge: HOME | End: 2022-03-30
Payer: MEDICARE

## 2022-03-30 DIAGNOSIS — E78.5 HYPERLIPIDEMIA, UNSPECIFIED HYPERLIPIDEMIA TYPE: ICD-10-CM

## 2022-03-30 DIAGNOSIS — I10 PRIMARY HYPERTENSION: ICD-10-CM

## 2022-03-30 DIAGNOSIS — E55.9 VITAMIN D DEFICIENCY: ICD-10-CM

## 2022-03-30 DIAGNOSIS — R73.01 IMPAIRED FASTING GLUCOSE: ICD-10-CM

## 2022-03-30 DIAGNOSIS — Z77.011 ENCOUNTER FOR OCCUPATIONAL HEALTH EXAMINATION FOR SURVEILLANCE OF EXPOSURE TO LEAD: ICD-10-CM

## 2022-03-30 DIAGNOSIS — Z02.89 ENCOUNTER FOR OCCUPATIONAL HEALTH EXAMINATION FOR SURVEILLANCE OF EXPOSURE TO LEAD: Primary | ICD-10-CM

## 2022-03-30 DIAGNOSIS — R60.0 BILATERAL LOWER EXTREMITY EDEMA: ICD-10-CM

## 2022-03-30 DIAGNOSIS — Z02.89 ENCOUNTER FOR OCCUPATIONAL HEALTH EXAMINATION: Primary | ICD-10-CM

## 2022-03-30 DIAGNOSIS — N13.8 BPH WITH OBSTRUCTION/LOWER URINARY TRACT SYMPTOMS: ICD-10-CM

## 2022-03-30 DIAGNOSIS — M06.041 RHEUMATOID ARTHRITIS INVOLVING BOTH HANDS WITH NEGATIVE RHEUMATOID FACTOR (HCC): ICD-10-CM

## 2022-03-30 DIAGNOSIS — Z02.89 ENCOUNTER FOR OCCUPATIONAL HEALTH EXAMINATION FOR SURVEILLANCE OF EXPOSURE TO LEAD: ICD-10-CM

## 2022-03-30 DIAGNOSIS — M06.042 RHEUMATOID ARTHRITIS INVOLVING BOTH HANDS WITH NEGATIVE RHEUMATOID FACTOR (HCC): ICD-10-CM

## 2022-03-30 DIAGNOSIS — N40.1 BPH WITH OBSTRUCTION/LOWER URINARY TRACT SYMPTOMS: ICD-10-CM

## 2022-03-30 DIAGNOSIS — Z77.011 ENCOUNTER FOR OCCUPATIONAL HEALTH EXAMINATION FOR SURVEILLANCE OF EXPOSURE TO LEAD: Primary | ICD-10-CM

## 2022-03-30 LAB
25(OH)D3 SERPL-MCNC: 52.5 NG/ML (ref 30–100)
ALBUMIN SERPL-MCNC: 4 G/DL (ref 3.4–5)
ALBUMIN/GLOB SERPL: 1.5 {RATIO} (ref 0.8–1.7)
ALP SERPL-CCNC: 97 U/L (ref 45–117)
ALT SERPL-CCNC: 34 U/L (ref 16–61)
ANION GAP SERPL CALC-SCNC: 4 MMOL/L (ref 3–18)
APPEARANCE UR: CLEAR
AST SERPL-CCNC: 19 U/L (ref 10–38)
BACTERIA URNS QL MICRO: ABNORMAL /HPF
BASOPHILS # BLD: 0 K/UL (ref 0–0.1)
BASOPHILS NFR BLD: 1 % (ref 0–2)
BILIRUB SERPL-MCNC: 0.4 MG/DL (ref 0.2–1)
BILIRUB UR QL: NEGATIVE
BUN SERPL-MCNC: 20 MG/DL (ref 7–18)
BUN/CREAT SERPL: 21 (ref 12–20)
CALCIUM SERPL-MCNC: 9.1 MG/DL (ref 8.5–10.1)
CHLORIDE SERPL-SCNC: 111 MMOL/L (ref 100–111)
CHOLEST SERPL-MCNC: 138 MG/DL
CO2 SERPL-SCNC: 28 MMOL/L (ref 21–32)
COLOR UR: YELLOW
CREAT SERPL-MCNC: 0.95 MG/DL (ref 0.6–1.3)
CREAT UR-MCNC: 139 MG/DL (ref 30–125)
DIFFERENTIAL METHOD BLD: ABNORMAL
EOSINOPHIL # BLD: 0.4 K/UL (ref 0–0.4)
EOSINOPHIL NFR BLD: 5 % (ref 0–5)
EPITH CASTS URNS QL MICRO: ABNORMAL /LPF (ref 0–5)
ERYTHROCYTE [DISTWIDTH] IN BLOOD BY AUTOMATED COUNT: 13.5 % (ref 11.6–14.5)
EST. AVERAGE GLUCOSE BLD GHB EST-MCNC: 120 MG/DL
GLOBULIN SER CALC-MCNC: 2.6 G/DL (ref 2–4)
GLUCOSE SERPL-MCNC: 92 MG/DL (ref 74–99)
GLUCOSE UR STRIP.AUTO-MCNC: NEGATIVE MG/DL
HBA1C MFR BLD: 5.8 % (ref 4.2–5.6)
HCT VFR BLD AUTO: 43.6 % (ref 36–48)
HDLC SERPL-MCNC: 59 MG/DL (ref 40–60)
HDLC SERPL: 2.3 {RATIO} (ref 0–5)
HGB BLD-MCNC: 14.1 G/DL (ref 13–16)
HGB UR QL STRIP: NEGATIVE
IMM GRANULOCYTES # BLD AUTO: 0 K/UL (ref 0–0.04)
IMM GRANULOCYTES NFR BLD AUTO: 0 % (ref 0–0.5)
KETONES UR QL STRIP.AUTO: ABNORMAL MG/DL
LDLC SERPL CALC-MCNC: 60 MG/DL (ref 0–100)
LEUKOCYTE ESTERASE UR QL STRIP.AUTO: ABNORMAL
LIPID PROFILE,FLP: NORMAL
LYMPHOCYTES # BLD: 1.5 K/UL (ref 0.9–3.6)
LYMPHOCYTES NFR BLD: 22 % (ref 21–52)
MAGNESIUM SERPL-MCNC: 2.1 MG/DL (ref 1.6–2.6)
MCH RBC QN AUTO: 32.9 PG (ref 24–34)
MCHC RBC AUTO-ENTMCNC: 32.3 G/DL (ref 31–37)
MCV RBC AUTO: 101.6 FL (ref 78–100)
MICROALBUMIN UR-MCNC: 1.08 MG/DL (ref 0–3)
MICROALBUMIN/CREAT UR-RTO: 8 MG/G (ref 0–30)
MONOCYTES # BLD: 0.6 K/UL (ref 0.05–1.2)
MONOCYTES NFR BLD: 8 % (ref 3–10)
NEUTS SEG # BLD: 4.3 K/UL (ref 1.8–8)
NEUTS SEG NFR BLD: 64 % (ref 40–73)
NITRITE UR QL STRIP.AUTO: NEGATIVE
NRBC # BLD: 0 K/UL (ref 0–0.01)
NRBC BLD-RTO: 0 PER 100 WBC
PH UR STRIP: 5.5 [PH] (ref 5–8)
PLATELET # BLD AUTO: 198 K/UL (ref 135–420)
PMV BLD AUTO: 10.2 FL (ref 9.2–11.8)
POTASSIUM SERPL-SCNC: 3.9 MMOL/L (ref 3.5–5.5)
PROT SERPL-MCNC: 6.6 G/DL (ref 6.4–8.2)
PROT UR STRIP-MCNC: NEGATIVE MG/DL
RBC # BLD AUTO: 4.29 M/UL (ref 4.35–5.65)
RBC #/AREA URNS HPF: ABNORMAL /HPF (ref 0–5)
SODIUM SERPL-SCNC: 143 MMOL/L (ref 136–145)
SP GR UR REFRACTOMETRY: 1.02 (ref 1–1.03)
TRIGL SERPL-MCNC: 95 MG/DL (ref ?–150)
TSH SERPL DL<=0.05 MIU/L-ACNC: 1.54 UIU/ML (ref 0.36–3.74)
UROBILINOGEN UR QL STRIP.AUTO: 0.2 EU/DL (ref 0.2–1)
VLDLC SERPL CALC-MCNC: 19 MG/DL
WBC # BLD AUTO: 6.8 K/UL (ref 4.6–13.2)
WBC URNS QL MICRO: ABNORMAL /HPF (ref 0–4)

## 2022-03-30 PROCEDURE — 83036 HEMOGLOBIN GLYCOSYLATED A1C: CPT

## 2022-03-30 PROCEDURE — 36415 COLL VENOUS BLD VENIPUNCTURE: CPT

## 2022-03-30 PROCEDURE — 80061 LIPID PANEL: CPT

## 2022-03-30 PROCEDURE — 81001 URINALYSIS AUTO W/SCOPE: CPT

## 2022-03-30 PROCEDURE — 82043 UR ALBUMIN QUANTITATIVE: CPT

## 2022-03-30 PROCEDURE — 82306 VITAMIN D 25 HYDROXY: CPT

## 2022-03-30 PROCEDURE — 84443 ASSAY THYROID STIM HORMONE: CPT

## 2022-03-30 PROCEDURE — 85025 COMPLETE CBC W/AUTO DIFF WBC: CPT

## 2022-03-30 PROCEDURE — 80053 COMPREHEN METABOLIC PANEL: CPT

## 2022-03-30 PROCEDURE — 83735 ASSAY OF MAGNESIUM: CPT

## 2022-03-30 NOTE — TELEPHONE ENCOUNTER
Please let the patient know that his urinalysis was abnormal on his pre-visit labs, suggestive of a possible infection. Please find out if he is having any symptoms. If so, please ask him  to drop off another specimen for culture. Otherwise, will repeat at visit. Also, the lead level was canceled since it was drawn in the incorrect tube according to the lab. Please see if the patient would want to come in for this to be drawn again or if he would like to get it at his visit. Thanks.

## 2022-03-31 NOTE — TELEPHONE ENCOUNTER
Pt aware of message below and verbalized understanding. Patient is having symptoms of a UTI and will come by the office tomorrow to leave a sample for urine culture and to recheck lead level. No further questions or concerns from pt at this time.

## 2022-04-01 ENCOUNTER — APPOINTMENT (OUTPATIENT)
Dept: INTERNAL MEDICINE CLINIC | Age: 82
End: 2022-04-01

## 2022-04-01 ENCOUNTER — HOSPITAL ENCOUNTER (OUTPATIENT)
Dept: LAB | Age: 82
Discharge: HOME | End: 2022-04-01
Payer: MEDICARE

## 2022-04-01 DIAGNOSIS — R82.90 ABNORMAL URINALYSIS: ICD-10-CM

## 2022-04-01 DIAGNOSIS — R82.90 ABNORMAL URINALYSIS: Primary | ICD-10-CM

## 2022-04-01 DIAGNOSIS — Z02.89 ENCOUNTER FOR OCCUPATIONAL HEALTH EXAMINATION: ICD-10-CM

## 2022-04-01 PROCEDURE — 83655 ASSAY OF LEAD: CPT

## 2022-04-01 PROCEDURE — 87086 URINE CULTURE/COLONY COUNT: CPT

## 2022-04-01 PROCEDURE — 36415 COLL VENOUS BLD VENIPUNCTURE: CPT

## 2022-04-03 LAB
BACTERIA SPEC CULT: NORMAL
SERVICE CMNT-IMP: NORMAL

## 2022-04-05 LAB — LEAD BLD-MCNC: 10 UG/DL (ref 0–4)

## 2022-04-05 NOTE — PROGRESS NOTES
1. \"Have you been to the ER, urgent care clinic since your last visit? Hospitalized since your last visit? \" No    2. \"Have you seen or consulted any other health care providers outside of the [address] [place] [address] since your last visit? \" No     3. For patients aged 39-70: Has the patient had a colonoscopy / FIT/ Cologuard? Yes - no Care Gap present      If the patient is female:    4. For patients aged 41-77: Has the patient had a mammogram within the past 2 years? NA - based on age or sex      11. For patients aged 21-65: Has the patient had a pap smear?  NA - based on age or sex

## 2022-04-06 ENCOUNTER — OFFICE VISIT (OUTPATIENT)
Dept: INTERNAL MEDICINE CLINIC | Age: 82
End: 2022-04-06
Payer: MEDICARE

## 2022-04-06 VITALS
BODY MASS INDEX: 31.22 KG/M2 | SYSTOLIC BLOOD PRESSURE: 136 MMHG | HEIGHT: 68 IN | OXYGEN SATURATION: 97 % | DIASTOLIC BLOOD PRESSURE: 70 MMHG | TEMPERATURE: 97.9 F | HEART RATE: 80 BPM | RESPIRATION RATE: 16 BRPM | WEIGHT: 206 LBS

## 2022-04-06 DIAGNOSIS — M06.041 RHEUMATOID ARTHRITIS INVOLVING BOTH HANDS WITH NEGATIVE RHEUMATOID FACTOR (HCC): ICD-10-CM

## 2022-04-06 DIAGNOSIS — Z00.00 MEDICARE ANNUAL WELLNESS VISIT, SUBSEQUENT: ICD-10-CM

## 2022-04-06 DIAGNOSIS — F33.0 MILD EPISODE OF RECURRENT MAJOR DEPRESSIVE DISORDER (HCC): ICD-10-CM

## 2022-04-06 DIAGNOSIS — Z71.89 ADVANCED DIRECTIVES, COUNSELING/DISCUSSION: ICD-10-CM

## 2022-04-06 DIAGNOSIS — M11.20 CPDD (CALCIUM PYROPHOSPHATE DEPOSITION DISEASE): ICD-10-CM

## 2022-04-06 DIAGNOSIS — R73.03 PREDIABETES: ICD-10-CM

## 2022-04-06 DIAGNOSIS — M06.042 RHEUMATOID ARTHRITIS INVOLVING BOTH HANDS WITH NEGATIVE RHEUMATOID FACTOR (HCC): ICD-10-CM

## 2022-04-06 DIAGNOSIS — G47.33 OSA TREATED WITH BIPAP: ICD-10-CM

## 2022-04-06 DIAGNOSIS — E55.9 VITAMIN D DEFICIENCY: ICD-10-CM

## 2022-04-06 DIAGNOSIS — Z86.16 HISTORY OF 2019 NOVEL CORONAVIRUS DISEASE (COVID-19): ICD-10-CM

## 2022-04-06 DIAGNOSIS — Z13.31 SCREENING FOR DEPRESSION: ICD-10-CM

## 2022-04-06 DIAGNOSIS — M15.9 PRIMARY OSTEOARTHRITIS INVOLVING MULTIPLE JOINTS: ICD-10-CM

## 2022-04-06 DIAGNOSIS — S68.621S: ICD-10-CM

## 2022-04-06 DIAGNOSIS — R73.01 IMPAIRED FASTING GLUCOSE: ICD-10-CM

## 2022-04-06 DIAGNOSIS — I10 PRIMARY HYPERTENSION: Primary | ICD-10-CM

## 2022-04-06 DIAGNOSIS — E78.5 HYPERLIPIDEMIA, UNSPECIFIED HYPERLIPIDEMIA TYPE: ICD-10-CM

## 2022-04-06 DIAGNOSIS — R60.0 BILATERAL LOWER EXTREMITY EDEMA: ICD-10-CM

## 2022-04-06 PROCEDURE — G0439 PPPS, SUBSEQ VISIT: HCPCS | Performed by: INTERNAL MEDICINE

## 2022-04-06 PROCEDURE — G8536 NO DOC ELDER MAL SCRN: HCPCS | Performed by: INTERNAL MEDICINE

## 2022-04-06 PROCEDURE — 1101F PT FALLS ASSESS-DOCD LE1/YR: CPT | Performed by: INTERNAL MEDICINE

## 2022-04-06 PROCEDURE — G8417 CALC BMI ABV UP PARAM F/U: HCPCS | Performed by: INTERNAL MEDICINE

## 2022-04-06 PROCEDURE — G9717 DOC PT DX DEP/BP F/U NT REQ: HCPCS | Performed by: INTERNAL MEDICINE

## 2022-04-06 PROCEDURE — G8754 DIAS BP LESS 90: HCPCS | Performed by: INTERNAL MEDICINE

## 2022-04-06 PROCEDURE — G0463 HOSPITAL OUTPT CLINIC VISIT: HCPCS | Performed by: INTERNAL MEDICINE

## 2022-04-06 PROCEDURE — G8752 SYS BP LESS 140: HCPCS | Performed by: INTERNAL MEDICINE

## 2022-04-06 PROCEDURE — 99215 OFFICE O/P EST HI 40 MIN: CPT | Performed by: INTERNAL MEDICINE

## 2022-04-06 PROCEDURE — 99497 ADVNCD CARE PLAN 30 MIN: CPT | Performed by: INTERNAL MEDICINE

## 2022-04-06 PROCEDURE — G8427 DOCREV CUR MEDS BY ELIG CLIN: HCPCS | Performed by: INTERNAL MEDICINE

## 2022-04-06 RX ORDER — SERTRALINE HYDROCHLORIDE 50 MG/1
50 TABLET, FILM COATED ORAL DAILY
Qty: 90 TABLET | Refills: 2 | Status: SHIPPED | OUTPATIENT
Start: 2022-04-06 | End: 2022-07-21 | Stop reason: ALTCHOICE

## 2022-04-06 NOTE — PROGRESS NOTES
This is the Subsequent Medicare Annual Wellness Exam, performed 12 months or more after the Initial AWV or the last Subsequent AWV    I have reviewed the patient's medical history in detail and updated the computerized patient record. Assessment/Plan   Education and counseling provided:  Are appropriate based on today's review and evaluation  End-of-Life planning (with patient's consent)  Influenza Vaccine  Colorectal cancer screening tests  Cardiovascular screening blood test  Bone mass measurement (DEXA)  Screening for glaucoma  Diabetes screening test  COVID 19 booster dose    1. Primary hypertension  -     CBC WITH AUTOMATED DIFF; Future  -     MAGNESIUM; Future  -     METABOLIC PANEL, COMPREHENSIVE; Future  2. Bilateral lower extremity edema  -     MAGNESIUM; Future  -     METABOLIC PANEL, COMPREHENSIVE; Future  3. 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 ([facility] [address]  4. Primary osteoarthritis involving multiple joints  5. CPDD (calcium pyrophosphate deposition disease)  6. Prediabetes  -     HEMOGLOBIN A1C WITH EAG; Future  -     METABOLIC PANEL, COMPREHENSIVE; Future  -     MICROALBUMIN, UR, RAND W/ MICROALB/CREAT RATIO; Future  7. Impaired fasting glucose  -     HEMOGLOBIN A1C WITH EAG; Future  -     METABOLIC PANEL, COMPREHENSIVE; Future  -     MICROALBUMIN, UR, RAND W/ MICROALB/CREAT RATIO; Future  8. JANNET treated with BiPAP  9. Hyperlipidemia, unspecified hyperlipidemia type  -     LIPID PANEL; Future  -     METABOLIC PANEL, COMPREHENSIVE; Future  10. History of 2019 novel coronavirus disease (COVID-19)  11. Mild episode of recurrent major depressive disorder ([facility] [address]  12. Vitamin D deficiency  -     VITAMIN D, 25 HYDROXY; Future  13. 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, sequela ([facility])  14. Medicare annual wellness visit, subsequent  -     ADVANCE CARE PLANNING FIRST 30 MINS  15.  Advanced directives, counseling/discussion  -     ADVANCE CARE PLANNING FIRST 27 MINS  16. Screening for depression       Depression Risk Factor Screening     3 most recent PHQ Screens 4/6/2022   Little interest or pleasure in doing things Not at all   Feeling down, depressed, irritable, or hopeless Not at all   Total Score PHQ 2 0   Trouble falling or staying asleep, or sleeping too much -   Feeling tired or having little energy -   Poor appetite, weight loss, or overeating -   Feeling bad about yourself - or that you are a failure or have let yourself or your family down -   Trouble concentrating on things such as school, work, reading, or watching TV -   Moving or speaking so slowly that other people could have noticed; or the opposite being so fidgety that others notice -   Thoughts of being better off dead, or hurting yourself in some way -   PHQ 9 Score -   How difficult have these problems made it for you to do your work, take care of your home and get along with others -       Alcohol & Drug Abuse Risk Screen    Do you average more than 1 drink per night or more than 7 drinks a week: No    In the past three months have you have had more than 4 drinks containing alcohol on one occasion: No          Functional Ability and Level of Safety    Hearing: Hearing is good. Activities of Daily Living: The home contains: no safety equipment. Patient does total self care      Ambulation: with no difficulty     Fall Risk:  Fall Risk Assessment, last 12 mths 4/6/2022   Able to walk? Yes   Fall in past 12 months? 0   Do you feel unsteady?  0   Are you worried about falling 0      Abuse Screen:  Patient is not abused       Cognitive Screening    Has your family/caregiver stated any concerns about your memory: no     Cognitive Screening: none performed today    Health Maintenance Due     Health Maintenance Due   Topic Date Due    Shingrix Vaccine Age 49> (1 of 2) Never done       Patient Care Team   Patient Care Team:  Rand Pedraza MD as PCP - General (Internal Medicine)  Rich Case MD as PCP - [address] [facility] Provider  [facility], Pauletta Habermann, MD (Rheumatology)  Jacquelin Grant MD (Gastroenterology)  Barbara Puentes OD (Ophthalmology)  Hilario Olivares MD (Podiatry)  Mi Mckeon MD (Ophthalmology)  Sandro Bucio MD (Orthopedic Surgery)  Maxim Keenan MD (Pulmonary Disease)  Desirae Lin MD (Cardiology)    History     Patient Active Problem List   Diagnosis Code    BPH with obstruction/lower urinary tract symptoms N40.1, N13.8    Hypertension I10    Primary osteoarthritis involving multiple joints M15.9    Hyperlipidemia E78.5    GERD (gastroesophageal reflux disease) K21.9    Prediabetes R73.03    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 ([address] M06.041, M06.042    Vitamin D deficiency E55.9    Colon polyps K63.5    CPDD (calcium pyrophosphate deposition disease) M11.20    Impaired fasting glucose R73.01    Class 1 obesity due to excess calories with serious comorbidity and body mass index (BMI) of 31.0 to 31.9 in adult E66.09, Z68.31    APC (atrial premature contractions) I49.1    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, sequela ([address] S68.621S    JANNET treated with BiPAP G47.33    Lumbar facet arthropathy M47.816    Synovial cyst of lumbar facet joint M71.38    Mild episode of recurrent major depressive disorder ([address] F33.0    History of 2019 novel coronavirus disease (COVID-19) Z86.16    Bilateral lower extremity edema R60.0    Facet arthropathy, cervical M47.812    DDD (degenerative disc disease), cervical M50.30    Mobitz type 1 second degree atrioventricular block I44.1     Past Medical History:   Diagnosis Date    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 Dr. Liliana Banegas.    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 Dr. Stephens Comment. Past Surgical History:   Procedure Laterality Date    HX AMPUTATION FINGER Left     index    HX BACK SURGERY  10/23/2019    Right L4-5 hemilaminectomy, medial facetectomy, resection of synovial cyst    HX CARPAL TUNNEL RELEASE Bilateral     HX CATARACT REMOVAL Left 01/2018    HX CATARACT REMOVAL Bilateral 2018    HX COLONOSCOPY      HX HEENT      sinus, tonsillectomy    HX HERNIA REPAIR      HX MOHS PROCEDURES      bilateral     HX ORTHOPAEDIC      lef knee surgery, removed cartilage.  HX ROTATOR CUFF REPAIR Right      Current Outpatient Medications   Medication Sig Dispense Refill    sertraline (ZOLOFT) 50 mg tablet Take 1 Tablet by mouth daily. 90 Tablet 2    lisinopriL (PRINIVIL, ZESTRIL) 40 mg tablet TAKE 1 TABLET BY MOUTH ONCE DAILY 90 Tablet 3    torsemide (DEMADEX) 20 mg tablet TAKE 1 TABLET BY MOUTH 2 TIMES A DAY 60 Tablet 3    omeprazole (PRILOSEC) 20 mg capsule TAKE 1 CAPSULE BY MOUTH ONCE DAILY 90 Capsule 3    amLODIPine (NORVASC) 5 mg tablet Take 1 Tablet by mouth daily. 90 Tablet 3    atorvastatin (LIPITOR) 10 mg tablet TAKE 1 TABLET BY MOUTH ONCE DAILY 90 Tablet 4    tamsulosin (FLOMAX) 0.4 mg capsule TAKE 1 CAPSULE BY MOUTH ONCE DAILY 90 Capsule 3    hydrALAZINE (APRESOLINE) 25 mg tablet Take 1 Tab by mouth two (2) times a day. 180 Tab 3    hydrOXYchloroQUINE (PLAQUENIL) 200 mg tablet Take 400 mg by mouth daily.  mineral oil liquid Take 30 mL by mouth daily as needed for Constipation.       cycloSPORINE (RESTASIS) 0.05 % ophthalmic emulsion Administer 1 Drop to both eyes nightly.  colchicine (MITIGARE) 0.6 mg capsule Take 0.6 mg by mouth every other day.  cholecalciferol, vitamin D3, (VITAMIN D3) 2,000 unit tab Take 2,000 Units by mouth daily.  diclofenac (VOLTAREN) 1 % topical gel Apply 4 g to affected area four (4) times daily.  LUMIGAN 0.01 % ophthalmic drops Administer 1 Drop to both eyes nightly.  MULTIVITS/IRON FUM/FA/D3/LYCOP (MULTI FOR HIM PO) Take  by mouth daily.        Allergies   Allergen Reactions    Latex, Natural Rubber Swelling    Adhesive Tape-Silicones Rash and Itching    Aldactone [Spironolactone] Not Reported This Time     Breast tenderness    Codeine Not Reported This Time     \"Drives crazy\"    Tape [Adhesive] Rash    Tetanus Toxoid, Adsorbed Anaphylaxis    Tetanus Vaccines And Toxoid Anaphylaxis       Family History   Problem Relation Age of Onset    Cancer Mother     Heart Disease Father     Alcohol abuse Father     Cancer Other      Social History     Tobacco Use    Smoking status: Former Smoker     Packs/day: 2.00     Years: 15.00     Pack years: 30.00     Types: Cigars, Pipe, Cigarettes    Smokeless tobacco: Former User     Types: Chew   Substance Use Topics    Alcohol use: Yes     Comment: rarely         Sandra Vides MD

## 2022-04-06 NOTE — LETTER
4/6/2022 2:38 PM    Mr. Batool Schuler Brett  [address] [address]      LEAD, ADULT, WHOLE BLOOD    Component Ref Range & Units 4/1/22 1419 2/2/21 0751 9/25/18 1447 8/7/17 0730 7/21/16 0857 12/12/13 0000   Lead, blood 0 - 4 ug/dL 10 High   10 High  CM  8 High  CM  10 R, CM  13 R, CM  15 R, CM    Comment: (NOTE)   Testing performed by Inductively coupled plasma/Mass Spectrometry.    **Verified by repeat analysis**   This test was developed and its performance characteristics   determined by [facility]. It has not been cleared or approved   by the Food and Drug Administration.                            Environmental Exposure:                             WHO Recommendation    <20                            Occupational Exposure:                             OSHA Lead Std          40                             MANUEL                    30                                  Detection Limit =  1   Performed At: [address] [id number]   Ricardo Torres MD [id number]                       Sincerely,      Rohini Huerta MD

## 2022-04-06 NOTE — PATIENT INSTRUCTIONS
Heart-Healthy Diet: Care Instructions  Your Care Instructions     A heart-healthy diet has lots of vegetables, fruits, nuts, beans, and whole grains, and is low in salt. It limits foods that are high in saturated fat, such as meats, cheeses, and fried foods. It may be hard to change your diet, but even small changes can lower your risk of heart attack and heart disease. Follow-up care is a key part of your treatment and safety. Be sure to make and go to all appointments, and call your doctor if you are having problems. It's also a good idea to know your test results and keep a list of the medicines you take. How can you care for yourself at home? Watch your portions  · Learn what a serving is. A \"serving\" and a \"portion\" are not always the same thing. Make sure that you are not eating larger portions than are recommended. For example, a serving of pasta is ½ cup. A serving size of meat is 2 to 3 ounces. A 3-ounce serving is about the size of a deck of cards. Measure serving sizes until you are good at [place]" them. Keep in mind that restaurants often serve portions that are 2 or 3 times the size of one serving. · To keep your energy level up and keep you from feeling hungry, eat often but in smaller portions. · Eat only the number of calories you need to stay at a healthy weight. If you need to lose weight, eat fewer calories than your body burns (through exercise and other physical activity). Eat more fruits and vegetables  · Eat a variety of fruit and vegetables every day. Dark green, deep orange, red, or yellow fruits and vegetables are especially good for you. Examples include spinach, carrots, peaches, and berries. · Keep carrots, celery, and other veggies handy for snacks. Buy fruit that is in season and store it where you can see it so that you will be tempted to eat it. · Cook dishes that have a lot of veggies in them, such as stir-fries and soups.   Limit saturated and trans fat  · Read food labels, and try to avoid saturated and trans fats. They increase your risk of heart disease. · Use olive or canola oil when you cook. · Bake, broil, grill, or steam foods instead of frying them. · Choose lean meats instead of high-fat meats such as hot dogs and sausages. Cut off all visible fat when you prepare meat. · Eat fish, skinless poultry, and meat alternatives such as soy products instead of high-fat meats. Soy products, such as tofu, may be especially good for your heart. · Choose low-fat or fat-free milk and dairy products. Eat foods high in fiber  · Eat a variety of grain products every day. Include whole-grain foods that have lots of fiber and nutrients. Examples of whole-grain foods include oats, whole wheat bread, and brown rice. · Buy whole-grain breads and cereals, instead of white bread or pastries. Limit salt and sodium  · Limit how much salt and sodium you eat to help lower your blood pressure. · Taste food before you salt it. Add only a little salt when you think you need it. With time, your taste buds will adjust to less salt. · Eat fewer snack items, fast foods, and other high-salt, processed foods. Check food labels for the amount of sodium in packaged foods. · Choose low-sodium versions of canned goods (such as soups, vegetables, and beans). Limit sugar  · Limit drinks and foods with added sugar. These include candy, desserts, and soda pop. Limit alcohol  · Limit alcohol to no more than 2 drinks a day for men and 1 drink a day for women. Too much alcohol can cause health problems. When should you call for help? Watch closely for changes in your health, and be sure to contact your doctor if:    · You would like help planning heart-healthy meals. Where can you learn more? Go to http://www.[employer].com/  Enter V137 in the search box to learn more about \"Heart-Healthy Diet: Care Instructions. \"  Current as of: August 22, 2019               Content Version: 12.6  © 8836-9827 [employer]. Care instructions adapted under license by [employer] (which disclaims liability or warranty for this information). If you have questions about a medical condition or this instruction, always ask your healthcare professional. [address] any warranty or liability for your use of this information. Learning About Low-Sodium Foods  What foods are low in sodium? The foods you eat contain nutrients, such as vitamins and minerals. Sodium is a nutrient. Your body needs the right amount to stay healthy and work as it should. You can use the list below to help you make choices about which foods to eat. Fruits  · Fresh, frozen, canned, or dried fruit  Vegetables  · Fresh or frozen vegetables, with no added salt  · Canned vegetables, low-sodium or with no added salt  Grains  · Bagels without salted tops  · Cereal with no added salt  · Corn tortillas  · Crackers with no added salt  · Oatmeal, cooked without salt  · Popcorn with no salt  · Pasta and noodles, cooked without salt  · Rice, cooked without salt  · Unsalted pretzels  Dairy and dairy alternatives  · Butter, unsalted  · Cream cheese  · Ice cream  · Milk  · Soy milk  Meats and other protein foods  · Beans and peas, canned with no salt  · Eggs  · Fresh fish (not smoked)  · Fresh meats (not smoked or cured)  · Nuts and nut butter, prepared without salt  · Poultry, not packaged with sodium solution  · Tofu, unseasoned  · Tuna, canned without salt  Seasonings  · Garlic  · Herbs and spices  · Lemon juice  · Mustard  · Olive oil  · Salt-free seasoning mixes  · Vinegar  Work with your doctor to find out how much of this nutrient you need. Depending on your health, you may need more or less of it in your diet. Where can you learn more?   Go to http://www.gray.com/  Enter S460 in the search box to learn more about \"Learning About Low-Sodium Foods.\"  Current as of: August 22, 2019               Content Version: 12.6  © 7711-8330 [employer]. Care instructions adapted under license by [employer] (which disclaims liability or warranty for this information). If you have questions about a medical condition or this instruction, always ask your healthcare professional. [address] any warranty or liability for your use of this information. Low Sodium Diet (2,000 Milligram): Care Instructions  Your Care Instructions     Too much sodium causes your body to hold on to extra water. This can raise your blood pressure and force your heart and kidneys to work harder. In very serious cases, this could cause you to be put in the hospital. It might even be life-threatening. By limiting sodium, you will feel better and lower your risk of serious problems. The most common source of sodium is salt. People get most of the salt in their diet from canned, prepared, and packaged foods. Fast food and restaurant meals also are very high in sodium. Your doctor will probably limit your sodium to less than 2,000 milligrams (mg) a day. This limit counts all the sodium in prepared and packaged foods and any salt you add to your food. Follow-up care is a key part of your treatment and safety. Be sure to make and go to all appointments, and call your doctor if you are having problems. It's also a good idea to know your test results and keep a list of the medicines you take. How can you care for yourself at home? Read food labels  · Read labels on cans and food packages. The labels tell you how much sodium is in each serving. Make sure that you look at the serving size. If you eat more than the serving size, you have eaten more sodium. · Food labels also tell you the Percent Daily Value for sodium. Choose products with low Percent Daily Values for sodium.   · Be aware that sodium can come in forms other than salt, including monosodium glutamate (MSG), sodium citrate, and sodium bicarbonate (baking soda). MSG is often added to Asian food. When you eat out, you can sometimes ask for food without MSG or added salt. Buy low-sodium foods  · Buy foods that are labeled \"unsalted\" (no salt added), \"sodium-free\" (less than 5 mg of sodium per serving), or \"low-sodium\" (less than 140 mg of sodium per serving). Foods labeled \"reduced-sodium\" and \"light sodium\" may still have too much sodium. Be sure to read the label to see how much sodium you are getting. · Buy fresh vegetables, or frozen vegetables without added sauces. Buy low-sodium versions of canned vegetables, soups, and other canned goods. Prepare low-sodium meals  · Cut back on the amount of salt you use in cooking. This will help you adjust to the taste. Do not add salt after cooking. One teaspoon of salt has about 2,300 mg of sodium. · Take the salt shaker off the table. · Flavor your food with garlic, lemon juice, onion, vinegar, herbs, and spices. Do not use soy sauce, lite soy sauce, steak sauce, onion salt, garlic salt, celery salt, mustard, or ketchup on your food. · Use low-sodium salad dressings, sauces, and ketchup. Or make your own salad dressings and sauces without adding salt. · Use less salt (or none) when recipes call for it. You can often use half the salt a recipe calls for without losing flavor. Other foods such as rice, pasta, and grains do not need added salt. · Rinse canned vegetables, and cook them in fresh water. This removes some--but not all--of the salt. · Avoid water that is naturally high in sodium or that has been treated with water softeners, which add sodium. Call your local water company to find out the sodium content of your water supply. If you buy bottled water, read the label and choose a sodium-free brand. Avoid high-sodium foods  · Avoid eating:  ? Smoked, cured, salted, and canned meat, fish, and poultry.   ? Ham, puentes, hot dogs, and luncheon meats. ? Regular, hard, and processed cheese and regular peanut butter. ? Crackers with salted tops, and other salted snack foods such as pretzels, chips, and salted popcorn. ? Frozen prepared meals, unless labeled low-sodium. ? Canned and dried soups, broths, and bouillon, unless labeled sodium-free or low-sodium. ? Canned vegetables, unless labeled sodium-free or low-sodium. ? [place] fries, pizza, tacos, and other fast foods. ? Pickles, olives, ketchup, and other condiments, especially soy sauce, unless labeled sodium-free or low-sodium. Where can you learn more? Go to http://www.gray.com/  Enter V843 in the search box to learn more about \"Low Sodium Diet (2,000 Milligram): Care Instructions. \"  Current as of: August 22, 2019               Content Version: 12.6  © 5920-1359 [employer]. Care instructions adapted under license by [employer] (which disclaims liability or warranty for this information). If you have questions about a medical condition or this instruction, always ask your healthcare professional. [place] [address] any warranty or liability for your use of this information. Learning About Diabetes Food Guidelines  Your Care Instructions     Meal planning is important to manage diabetes. It helps keep your blood sugar at a target level (which you set with your doctor). You don't have to eat special foods. You can eat what your family eats, including sweets once in a while. But you do have to pay attention to how often you eat and how much you eat of certain foods. You may want to work with a dietitian or a certified diabetes educator (CDE) to help you plan meals and snacks. A dietitian or CDE can also help you lose weight if that is one of your goals. What should you know about eating carbs? Managing the amount of carbohydrate (carbs) you eat is an important part of healthy meals when you have diabetes. Carbohydrate is found in many foods. · Learn which foods have carbs. And learn the amounts of carbs in different foods. ? Bread, cereal, pasta, and rice have about 15 grams of carbs in a serving. A serving is 1 slice of bread (1 ounce), ½ cup of cooked cereal, or 1/3 cup of cooked pasta or rice. ? Fruits have 15 grams of carbs in a serving. A serving is 1 small fresh fruit, such as an apple or orange; ½ of a banana; ½ cup of cooked or canned fruit; ½ cup of fruit juice; 1 cup of melon or raspberries; or 2 tablespoons of dried fruit. ? Milk and no-sugar-added yogurt have 15 grams of carbs in a serving. A serving is 1 cup of milk or 2/3 cup of no-sugar-added yogurt. ? Starchy vegetables have 15 grams of carbs in a serving. A serving is ½ cup of mashed potatoes or sweet potato; 1 cup winter squash; ½ of a small baked potato; ½ cup of cooked beans; or ½ cup cooked corn or green peas. · Learn how much carbs to eat each day and at each meal. A dietitian or CDE can teach you how to keep track of the amount of carbs you eat. This is called carbohydrate counting. · If you are not sure how to count carbohydrate grams, use the Plate Method to plan meals. It is a good, quick way to make sure that you have a balanced meal. It also helps you spread carbs throughout the day. ? Divide your plate by types of foods. Put non-starchy vegetables on half the plate, meat or other protein food on one-quarter of the plate, and a grain or starchy vegetable in the final quarter of the plate. To this you can add a small piece of fruit and 1 cup of milk or yogurt, depending on how many carbs you are supposed to eat at a meal.  · Try to eat about the same amount of carbs at each meal. Do not \"save up\" your daily allowance of carbs to eat at one meal.  · Proteins have very little or no carbs per serving. Examples of proteins are beef, chicken, turkey, fish, eggs, tofu, cheese, cottage cheese, and peanut butter.  A serving size of meat is 3 ounces, which is about the size of a deck of cards. Examples of meat substitute serving sizes (equal to 1 ounce of meat) are 1/4 cup of cottage cheese, 1 egg, 1 tablespoon of peanut butter, and ½ cup of tofu. How can you eat out and still eat healthy? · Learn to estimate the serving sizes of foods that have carbohydrate. If you measure food at home, it will be easier to estimate the amount in a serving of restaurant food. · If the meal you order has too much carbohydrate (such as potatoes, corn, or baked beans), ask to have a low-carbohydrate food instead. Ask for a salad or green vegetables. · If you use insulin, check your blood sugar before and after eating out to help you plan how much to eat in the future. · If you eat more carbohydrate at a meal than you had planned, take a walk or do other exercise. This will help lower your blood sugar. What else should you know? · Limit saturated fat, such as the fat from meat and dairy products. This is a healthy choice because people who have diabetes are at higher risk of heart disease. So choose lean cuts of meat and nonfat or low-fat dairy products. Use olive or canola oil instead of butter or shortening when cooking. · Don't skip meals. Your blood sugar may drop too low if you skip meals and take insulin or certain medicines for diabetes. · Check with your doctor before you drink alcohol. Alcohol can cause your blood sugar to drop too low. Alcohol can also cause a bad reaction if you take certain diabetes medicines. Follow-up care is a key part of your treatment and safety. Be sure to make and go to all appointments, and call your doctor if you are having problems. It's also a good idea to know your test results and keep a list of the medicines you take. Where can you learn more? Go to http://www.[employer].com/  Enter I147 in the search box to learn more about \"Learning About Diabetes Food Guidelines. \"  Current as of: December 20, 2019 Content Version: 12.6  © 7304-4168 [employer]. Care instructions adapted under license by [employer] (which disclaims liability or warranty for this information). If you have questions about a medical condition or this instruction, always ask your healthcare professional. [address] any warranty or liability for your use of this information.

## 2022-04-08 NOTE — PROGRESS NOTES
HPI:   Greg Whitney is a [de-identified]y.o. year old male who presents today for a physical exam.  He has a history of hypertension, hyperlipidemia, prediabetes, rheumatoid arthritis, osteoarthritis, calcium pyrophosphate deposition disease, BPH, GERD, and depression. He also has a history of COVID-19 infection (7/2649) complicated by severe pneumonia and acute hypoxic respiratory failure. He has completed the Moderna COVID-19 vaccine series and received the Moderna booster dose. He reports that he is doing reasonably well. He states that his neck and shoulder pain appear to have improved and are reasonably well-controlled on Tylenol and using Voltaren gel as needed. He does describe increased irritability and states that he is having some difficulty controlling anger outbursts at home. He describes that he will often be short tempered with his wife due to her failing memory and states that he realizes he should have more patience when dealing with her. He also reports that he is not using his BiPAP machine regularly. He states that he is only able to tolerated approximately 2 hours per night due to discomfort and has not been wearing it more than that. He has not followed up with pulmonary for adjustments. He is otherwise without new complaints and overall feeling well. Summary of prior hospitalizations and medical history:  On 6/22/2020, he noted the onset of weakness, fatigue, and diarrhea. He stated that he continued to work for the entire week despite feeling ill. On 6/27/2020, he developed fever and dyspnea, which worsened throughout the weekend. He presented to [facility] on 6/28/2020, and WBC 4.0 (78 % neutrophils) and chest x-ray showed patchy density in the RUL, right perihilar area, and right base. He was advised to go to the ED, and presented to [facility] ED on 6/29/2020. He was found to be hypoxic with pulse ox at rest 92-93 % and ambulation 89-91% (room air).  Chest x-ray showed bilateral multifocal extremely subtle groundglass opacities. Labs showed WBC 4.4 (80% neutrophils), Hb 14.6/ Hct 42.0, platelets 424, creatinine 0.71/ eGFR >60, AST 53, alk phos 160, lactate 1.16. He was discharged home, and SARS COV-2 positive (6/29/2020) result returned. He presented for a virtual visit on 7/2/2020, and reported that since discharge from the ED, he noted persistent symptoms of weakness, diarrhea, fatigue, and fevers, and prgressive dyspnea. He described poor po intake, and chest pain with inspiration and felt that he was unable to take a deep breath or cough. He was advised to call EMS and return to the ED. Upon arrival by EMS, his oxygenation was noted to be in the low 80's, and required high flow oxygen. Chest x-ray (7/2/2020) showed bilateral increased patchy groundglass airspace opacities, and labs revealed ABG 7.43/34/70 on nonrebreather mask; WBC 7.3 (11% lymphocytes), Hb 14.8/ Hct 42.6, creatinine 0.8/ eGFR>60, ALT 79, AST 76, alk phos 170, albumin 2.7 D-dimer 1.35, ferritin 729, CRP 16.6, , procalcitonin 0.14, lactate 2.8, blood culture negative. He was initially started on Zosyn, but Dr. Evaristo Miller (ID) was consulted and recommended discontinuing and beginning azithromycin, remdesivir, IV Solumedrol, and lovenox. He was also started on ascorbic acid, melatonin, and zinc. Dr. Chris Nance (pulmonary) was consulted and recommended change to high flow nasal cannula and recommended proning to the patient. He improved clinically and inflammatory indices improved. His oxygen was weaned to 5 liters nasal cannula, and he completed 5 day course of remdesivir and azithromycin. He was discharged on 7/9/2020 with an 8 day course of prednisone, 30 day course of Eliquis 2.5 mg bid, and two week course of Vitamin  C and zinc. He was seen for post-hospitalization follow-up on 7/16/2020 and reported some persistent congestion and significant dyspnea on exertion.  He was referred to Dr. Mohan Angel, and she recommended continued oxygen use as needed and stressed need for treatment of obstructive sleep apnea with CPAP. She placed order for him to receive auto CPAP. He was unable to tolerate CPAP due to continued air hunger even with bleeding in of 2 liters nasal cannula. He had a repeat sleep study on 1/29/2021, which showed obstructive sleep apnea with emergence of central apnea and BIPAP found to be most effective. During the study, he was found to have PAC's, PVC's and intermittent Mobitz 1 second degree AV block. He was referred to Dr. Americo Arellano and his diuretics were changed from lasix to torsemide and metolazone. However, he stopped taking metolazone after 1 week since began to feel lightheaded and noted low blood pressure readings with SBP 80-90. He underwent an echocardiogram (2/24/2021) showing normal LV size and function (EF 60-65%), mild MR and PI and PAP 23 mmHg; and a pharmacologic nuclear stress test (2/24/2021) which was a normal, low risk study with no TID and calculated EF 62%. He had a 6 minute walk test (3/31/2021) showing no significant O2 desaturation; and PFT's showing normal flows, normal DLCO, and isolated decrease in RV and FRC. He also had a 30 day event monitor (5/2021) which showed rare PVC, rare PAC, and one episode of 20 beat SVT at 155 bpm (asymptomatic). On 8/13/2019, he reported that he had been seeing Dr. Ray Dickerson for increasing difficulty with right sided sciatica. He reported being treated with a Medrol dose pack, physical therapy, and spinal injections without improvement, and was also prescribed Norco and Tramadol, but he stated that he found them too sedating and of no benefit.  Due to persistent symptoms, he underwent a lumbar MRI (8/5/2019) which showed degenerative changes most notable at L4-L5 resulting in moderate spinal canal stenosis as well as moderate to severe right greater than left foraminal stenoses; advanced facet arthropathy with small facet effusions and suspected small right facet joint ventral synovial cyst; notable degenerative changes also L3-L4; L1 mild anterior compression deformity, chronic appearing; overall general worsening when compared to prior study in 2007. He was having continued significant pain, and was started on gabapentin 100 mg tid. He was referred to Dr. Margi Vaughn and she increased his dose of gabapentin to 200 mg tid. She also referred him to Dr. Brandy Ibarra for evaluation given his lack of response to conservative management. He recommended proceeding with decompression by performing a L4/5 right-sided hemilaminotomy and medial facetectomy, which was performed on 10/23/2019. He developed postop urinary retention and was discharged with a Deleon catheter. He had follow-up with Dr. Eleazar Burnette on 10/29/2019 with successful voiding trial. He reports that he noted initial resolution of his right sciatica pain following surgery, but on 10/29/2019 noted abrupt recurrence when getting out of bed. He was evaluated by GOMEZ Doherty on 10/31/2019 and was treated with a Medrol dose pack and restarted on gabapentin 300 mg tid. He reports overall improvement and is no longer requiring gabapentin. On 8/9/2019, he had an episode of waking up gasping for air forcing him to get out of bed and walk around until his breathing normalized. He has been having frequent similar episodes over that last year, but had been resistent to evaluation for sleep apnea. However, he reports that this episode was especially severe. When his symptoms persisted, he was evaluated at [facility], and testing included WBC 5.7, Hb 14.2/ Hct 41.6, creatinine 0.9/ eGFR>60, troponin I x 1 negative, NT-pro BNP 45, EKG sinus rhythm at 83 bpm and no ischemic changes, and chest x-ray without acute changes, but an ill defined 15 mm density in the lateral left mid zone was noted. He received lasix 40 mg IV and was discharged.  He had an echocardiogram (8/26/2019) showing normal LV function (EF 56-60%), no RWMA, unable to estimate RVSP due to inadequate TR, but TV/PV appear normal. He was referred to Dr. Bertram Hall for probable sleep apnea, and underwent a home sleep study on 10/25/2019, showing evidence of severe obstructive sleep apnea with AHI 35 and oxygen guy 80%. He was not able to tolerate CPAP equipment as discussed. On 6/20/2018, he suffered an accidental gun shot wound while working resulting in traumatic injury to his left index finger with near loss of the middle phalanx and fracture of the distal phalanx. He was taken to [facility] and initially had irrigation and closure of the lacerations performed. He presented to the [facility] ED on 6/24/2018 with increased pain and swelling, and was started on doxycycline for a wound infection. On 7/7/2018, due to loss of stability and angulation of the index finger, he underwent stabilization with fusion of the proximal and distal phalanx with bone grafting by Dr. Jeff Lopez. He did well and was started on physical therapy. On 8/1/2018, he presented to [address] [place] [address] for evaluation of increasing erythema, swelling and tenderness with concern for a possible infection. He underwent left hand x-ray (8/1/2018) showing severe soft tissue swelling of the left second finger worrisome for cellulitis, traumatic amputation of the middle phalanx with marked osteopenia and irregularity of the base of the distal phalanx and flattening and irregularity of the head of the proximal phalanx. Unclear if related to prior trauma/surgery or osteomyelitis with osseous destruction and no films available for comparison. He was started empirically on Bactrim and Augmentin for 14 days. On 8/10/2018, he presented for evaluation by GOMEZ Jon for complaints of fever, malaise, headache, fatigue, and diarrhea. Lab evaluation showed WBC 17 (90% neutrophils), creatinine 1.34/ eGFR 52, blood culture negative. Stool cultures (8/13/2018) routine, O/P, and C.diff negative.  Bactrim and Augmentin were discontinued on 8/13/2018, and he was started on metronidazole for 10 days for treatment of presumed C.diff with improvement. He was evaluated by Dr. Hira Kim on 8/15/2018 and repeat WBC 8.6 (59% neutrophils), ESR 6, and CRP 0.9. He was seen by Dr. Hira Kim in follow-up on 8/30/2018 and left index finger wound noted to be significantly improved and no further difficulty with diarrhea. He has a history of hypertension, treated with amlodipine, lisinopril, lasix (+ potassium), and hydralazine was added in 4/2018. He states that his wife has been monitoring his blood pressure intermittently. He denies any chest pain, shortness of breath at rest or with exertion, lightheadedness, or palpitations. He does report bilateral lower extremity swelling that it will increase throughout the day and improve overnight. . In 6/2016, he underwent lower extremity arterial and venous duplex scans, both of which were negative. He also has a history of hyperlipidemia, treated with moderate intensity atorvastatin. He has a history of prediabetes, with HbA1c ranging from 5.9-6.2 since 2012. He denies any polyuria, polydipsia, nocturia, or blurry vision, and has no history of retinopathy, neuropathy, or nephropathy. He has regular eye exams with Dr. Sheila Castro. He has a history of bilateral hand pain with morning stiffness for several years, and in 3/2014, he was noted to have a positive anti-CCP antibody level although NIMCO, rheumatoid factor, and ESR were negative. He was referred for evaluation to Dr. Javi Grijalva, and was diagnosed with rheumatoid arthritis in 6/2014. He was treated with hydroxychloroquine, which has been partially helpful in controlling his symptoms. Bilateral hand x-rays also showed evidence of primary osteoarthritis with osteophytes present on the second and third MCP joints.  In 1/2017, he was also noted to have evidence of possible chondrocalcinosis on x-ray, and was started on low dose colchicine in addition to hydroxychloroquine for concomitant calcium pyrophosphate deposition disease. He states that since starting on colchicine, he has had significant improvement in his hand pain. He also uses compounding cream and Voltaren gel for pain control. In 1/2019, he was complaining of worsening neck and bilateral shoulder pain (R>L). He stated that he was previously followed by Dr. Abdelrahman Lopez, and would occasionally receive cortisone injections into his shoulders. He also described neck pain with difficulty turning his head. He denied any upper extremity weakness or paresthesias. He underwent imaging, and bilateral shoulder x-rays (1/10/2019) showed degenerative changes and secondary findings of rotator cuff pathology. Cervical spine x-rays (1/10/2019) showed advanced degenerative changes with multilevel facet arthropathy. He was evaluated by Dr. Janis Montesinos who gave him a cortisone injection in his right shoulder with some improvement. He also recommended a reverse shoulder replacement, but he remains hesitant to proceed. He was started on Celebrex 200 mg bid in 2/2019, and reports significant improvement. He continues to also use Tylenol as needed for pain. He states that his neck pain seems to have improved to his baseline level. He has a history of symptomatic BPH, with complaints of decreased stream, hesitancy, and dribbling. He has refused treatment with medication. He is followed by Dr. Estefany Santos. He denies any dysuria, gross hematuria, or flank pain. He has a history of GERD, treated with daily omeprazole with good control of symptoms. He had a screening colonoscopy and 8/2015 by Dr. Demetria Landis, showing a 3 mm sessile cecal polyp (pathology: intra-mucosal lymphoid aggregate), two 4 mm sessile polyps in the transverse colon (pathology: serrated adenomas), and a 1 cm lipoma in the transverse colon. Follow-up recommended for five years.  He was having difficulty with rectal bleeding in 1/2018 and returned for evaluation with Dr. Demetria Landis who felt it was most likely secondary to a hemorrhoidal source. She recommended use of Citrucel. He states that the bleeding has improved with initiation of this therapy. He denies any abdominal pain, nausea, vomiting, melena, or change in bowel movements. He has a history of depression and anxiety, which had been well controlled with Paxil although inadvertently stopped. Now on Zoloft with improvement. 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 Dr. Estefany Santos. 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 Dr. Shania Herrera. Past Surgical History:   Procedure Laterality Date    HX AMPUTATION FINGER Left     index    HX BACK SURGERY  10/23/2019    Right L4-5 hemilaminectomy, medial facetectomy, resection of synovial cyst    HX CARPAL TUNNEL RELEASE Bilateral     HX CATARACT REMOVAL Left 01/2018    HX CATARACT REMOVAL Bilateral 2018    HX COLONOSCOPY      HX HEENT      sinus, tonsillectomy    HX HERNIA REPAIR      HX MOHS PROCEDURES      bilateral     HX ORTHOPAEDIC      lef knee surgery, removed cartilage.  HX ROTATOR CUFF REPAIR Right      Current Outpatient Medications   Medication Sig    sertraline (ZOLOFT) 50 mg tablet Take 1 Tablet by mouth daily.     lisinopriL (PRINIVIL, ZESTRIL) 40 mg tablet TAKE 1 TABLET BY MOUTH ONCE DAILY    torsemide (DEMADEX) 20 mg tablet TAKE 1 TABLET BY MOUTH 2 TIMES A DAY    omeprazole (PRILOSEC) 20 mg capsule TAKE 1 CAPSULE BY MOUTH ONCE DAILY    amLODIPine (NORVASC) 5 mg tablet Take 1 Tablet by mouth daily.  atorvastatin (LIPITOR) 10 mg tablet TAKE 1 TABLET BY MOUTH ONCE DAILY    tamsulosin (FLOMAX) 0.4 mg capsule TAKE 1 CAPSULE BY MOUTH ONCE DAILY    hydrALAZINE (APRESOLINE) 25 mg tablet Take 1 Tab by mouth two (2) times a day.  hydrOXYchloroQUINE (PLAQUENIL) 200 mg tablet Take 400 mg by mouth daily.  mineral oil liquid Take 30 mL by mouth daily as needed for Constipation.  cycloSPORINE (RESTASIS) 0.05 % ophthalmic emulsion Administer 1 Drop to both eyes nightly.  colchicine (MITIGARE) 0.6 mg capsule Take 0.6 mg by mouth every other day.  cholecalciferol, vitamin D3, (VITAMIN D3) 2,000 unit tab Take 2,000 Units by mouth daily.  diclofenac (VOLTAREN) 1 % topical gel Apply 4 g to affected area four (4) times daily.  LUMIGAN 0.01 % ophthalmic drops Administer 1 Drop to both eyes nightly.  MULTIVITS/IRON FUM/FA/D3/LYCOP (MULTI FOR HIM PO) Take  by mouth daily. No current facility-administered medications for this visit. Allergies and Intolerances: Allergies   Allergen Reactions    Latex, Natural Rubber Swelling    Adhesive Tape-Silicones Rash and Itching    Aldactone [Spironolactone] Not Reported This Time     Breast tenderness    Codeine Not Reported This Time     \"Drives crazy\"    Tape [Adhesive] Rash    Tetanus Toxoid, Adsorbed Anaphylaxis    Tetanus Vaccines And Toxoid Anaphylaxis     Family History: He has no family history of colon or prostate cancer. Family History   Problem Relation Age of Onset    Cancer Mother     Heart Disease Father     Alcohol abuse Father     Cancer Other      Social History:   He  reports that he has quit smoking. His smoking use included cigars, pipe, and cigarettes. He has a 30.00 pack-year smoking history. He has quit using smokeless tobacco.  His smokeless tobacco use included chew.  He smoked 2 ppd for 50 years, stopping in 1974. He is  with two adult children. 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 He is a employed at [employer] in [place]. Social History     Substance and Sexual Activity   Alcohol Use Yes    Comment: rarely     Immunization History:  Immunization History   Administered Date(s) Administered    (RETIRED) Pneumococcal Vaccine (Unspecified Type) 01/01/2008    COVID-19, Moderna Booster, PF, 0.25mL Dose 11/30/2021    COVID-19, Case Mccurdye, Primary or Immunocompromised Series, MRNA, PF, 100mcg/0.5mL 02/01/2021, 03/01/2021    Influenza High Dose Vaccine PF 09/30/2017    Influenza Vaccine (Madin Nika Canine Kidney) PF 12/13/2017, 10/17/2018    Influenza Vaccine (Tri) Adjuvanted (>65 Yrs FLUAD TRI 64644) 09/25/2018, 09/25/2019    Influenza Vaccine (Trivalent) 10/19/2019, 11/18/2020    Influenza Vaccine Split 10/04/2011, 10/16/2012    Influenza Vaccine Whole 01/15/2010    Influenza, Quadrivalent, Adjuvanted (>65 Yrs FLUAD QUAD 66478) 09/10/2020, 09/28/2021    Pneumococcal Conjugate (PCV-13) 01/19/2015    Pneumococcal Polysaccharide (PPSV-23) 01/01/2008    Varicella Virus Vaccine 10/01/2013    Zoster 10/16/2012       Review of Systems:   As above included in HPI. Otherwise 11 point review of systems negative including constitutional, skin, HENT, eyes, respiratory, cardiovascular, gastrointestinal, genitourinary, musculoskeletal, endocrine, hematologic, allergy, and neurologic. Physical:   Visit Vitals  /70 (BP 1 Location: Left arm, BP Patient Position: Sitting)   Pulse 80   Temp 97.9 °F (36.6 °C) (Temporal)   Resp 16   Ht 5' 8\" (1.727 m)   Wt 206 lb (93.4 kg)   SpO2 97%   BMI 31.32 kg/m²       Exam:   Patient appears in no apparent distress. Affect is appropriate.     HEENT --Anicteric sclerae, tympanic membranes normal,  ear canals normal.  PERRL, EOMI, conjunctiva and lids normal.  No cervical lymphadenopathy. No thyromegaly, JVD, or bruits. Carotid pulses 2+ with normal upstroke. Lungs --Clear to auscultation. No wheezing or rales. Heart --Regular rate and rhythm, no murmurs, rubs, gallops, or clicks. Abdomen -- Soft and nontender, no hepatosplenomegaly or masses. Extremities: trace-1+ edema bilaterally. Neuro -- CN 2-12 intact, strength 5/5 with intact soft touch in all extremities  Derm - no obvious abnormalities noted, no rash         Review of Data:  Labs:    Hospital Outpatient Visit on 04/01/2022   Component Date Value Ref Range Status    Lead, blood 04/01/2022 10* 0 - 4 ug/dL Final    Special Requests: 04/01/2022 NO SPECIAL REQUESTS    Final    Culture result: 04/01/2022 No significant growth, <10,000 CFU/mL    Final   Hospital Outpatient Visit on 03/30/2022   Component Date Value Ref Range Status    WBC 03/30/2022 6.8  4.6 - 13.2 K/uL Final    RBC 03/30/2022 4.29* 4.35 - 5.65 M/uL Final    HGB 03/30/2022 14.1  13.0 - 16.0 g/dL Final    HCT 03/30/2022 43.6  36.0 - 48.0 % Final    MCV 03/30/2022 101.6* 78.0 - 100.0 FL Final    MCH 03/30/2022 32.9  24.0 - 34.0 PG Final    MCHC 03/30/2022 32.3  31.0 - 37.0 g/dL Final    RDW 03/30/2022 13.5  11.6 - 14.5 % Final    PLATELET 21/80/5412 071  135 - 420 K/uL Final    MPV 03/30/2022 10.2  9.2 - 11.8 FL Final    NRBC 03/30/2022 0.0  0  WBC Final    ABSOLUTE NRBC 03/30/2022 0.00  0.00 - 0.01 K/uL Final    NEUTROPHILS 03/30/2022 64  40 - 73 % Final    LYMPHOCYTES 03/30/2022 22  21 - 52 % Final    MONOCYTES 03/30/2022 8  3 - 10 % Final    EOSINOPHILS 03/30/2022 5  0 - 5 % Final    BASOPHILS 03/30/2022 1  0 - 2 % Final    IMMATURE GRANULOCYTES 03/30/2022 0  0.0 - 0.5 % Final    ABS. NEUTROPHILS 03/30/2022 4.3  1.8 - 8.0 K/UL Final    ABS. LYMPHOCYTES 03/30/2022 1.5  0.9 - 3.6 K/UL Final    ABS. MONOCYTES 03/30/2022 0.6  0.05 - 1.2 K/UL Final    ABS. EOSINOPHILS 03/30/2022 0.4  0.0 - 0.4 K/UL Final    ABS.  BASOPHILS 03/30/2022 0.0  0.0 - 0.1 K/UL Final    ABS. IMM. GRANS. 03/30/2022 0.0  0.00 - 0.04 K/UL Final    DF 03/30/2022 AUTOMATED    Final    Hemoglobin A1c 03/30/2022 5.8* 4.2 - 5.6 % Final    Est. average glucose 03/30/2022 120  mg/dL Final    LIPID PROFILE 03/30/2022        Final    Cholesterol, total 03/30/2022 138  <200 MG/DL Final    Triglyceride 03/30/2022 95  <150 MG/DL Final    HDL Cholesterol 03/30/2022 59  40 - 60 MG/DL Final    LDL, calculated 03/30/2022 60  0 - 100 MG/DL Final    VLDL, calculated 03/30/2022 19  MG/DL Final    CHOL/HDL Ratio 03/30/2022 2.3  0 - 5.0   Final    Magnesium 03/30/2022 2.1  1.6 - 2.6 mg/dL Final    Sodium 03/30/2022 143  136 - 145 mmol/L Final    Potassium 03/30/2022 3.9  3.5 - 5.5 mmol/L Final    Chloride 03/30/2022 111  100 - 111 mmol/L Final    CO2 03/30/2022 28  21 - 32 mmol/L Final    Anion gap 03/30/2022 4  3.0 - 18 mmol/L Final    Glucose 03/30/2022 92  74 - 99 mg/dL Final    BUN 03/30/2022 20* 7.0 - 18 MG/DL Final    Creatinine 03/30/2022 0.95  0.6 - 1.3 MG/DL Final    BUN/Creatinine ratio 03/30/2022 21* 12 - 20   Final    GFR est AA 03/30/2022 >60  >60 ml/min/1.73m2 Final    GFR est non-AA 03/30/2022 >60  >60 ml/min/1.73m2 Final    Calcium 03/30/2022 9.1  8.5 - 10.1 MG/DL Final    Bilirubin, total 03/30/2022 0.4  0.2 - 1.0 MG/DL Final    ALT (SGPT) 03/30/2022 34  16 - 61 U/L Final    AST (SGOT) 03/30/2022 19  10 - 38 U/L Final    Alk.  phosphatase 03/30/2022 97  45 - 117 U/L Final    Protein, total 03/30/2022 6.6  6.4 - 8.2 g/dL Final    Albumin 03/30/2022 4.0  3.4 - 5.0 g/dL Final    Globulin 03/30/2022 2.6  2.0 - 4.0 g/dL Final    A-G Ratio 03/30/2022 1.5  0.8 - 1.7   Final    Microalbumin,urine random 03/30/2022 1.08  0 - 3.0 MG/DL Final    Creatinine, urine 03/30/2022 139.00* 30 - 125 mg/dL Final    Microalbumin/Creat ratio (mg/g cre* 03/30/2022 8  0 - 30 mg/g Final    Vitamin D 25-Hydroxy 03/30/2022 52.5  30 - 100 ng/mL Final    TSH 2022 1.54  0.36 - 3.74 uIU/mL Final    Color 2022 YELLOW    Final    Appearance 2022 CLEAR    Final    Specific gravity 2022 1.022  1.005 - 1.030   Final    pH (UA) 2022 5.5  5.0 - 8.0   Final    Protein 2022 Negative  NEG mg/dL Final    Glucose 2022 Negative  NEG mg/dL Final    Ketone 2022 TRACE* NEG mg/dL Final    Bilirubin 2022 Negative  NEG   Final    Blood 2022 Negative  NEG   Final    Urobilinogen 2022 0.2  0.2 - 1.0 EU/dL Final    Nitrites 2022 Negative  NEG   Final    Leukocyte Esterase 2022 MODERATE* NEG   Final    WBC 2022 36 to 50  0 - 4 /hpf Final    RBC 2022 0 to 3  0 - 5 /hpf Final    Epithelial cells 2022 FEW  0 - 5 /lpf Final    Bacteria 2022 FEW* NEG /hpf Final       EKG (9/10/2020) sinus rhythm at 76 bpm, normal intervals, no ischemic changes; no significant change from prior in 2020 and 10/2019. Health Maintenance:  Screening:    Colorectal: colonoscopy (2015) serrated adenomas. Dr. Esteban Jackson. Cologuard negative (2021). Unwilling to proceed with repeat colonoscopy. Depression: on Paxil   DM (HbA1c/FPG): HbA1c 5.8 (3/2022)   Hepatitis C: negative (2021)   Falls: none   DEXA: within normal limits (2016)   PSA/MARTÍNEZ: PSA 3.3 (2019)    Glaucoma: regular eye exams with Dr. Megha Reina (last 2021)   Smokin pack year.  Distant past.   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    Impaired fasting glucose R73.01    Class 1 obesity due to excess calories with serious comorbidity and body mass index (BMI) of 31.0 to 31.9 in adult E66.09, Z68.31    APC (atrial premature contractions) I49.1    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 Hypertension. Blood pressure remains well controlled on lisinopril 40 mg daily, amlodipine 5 mg daily, hydralazine 25 mg bid, and torsemide 20 mg bid. Metolazone discontinued due to worsening renal function in 8/2021. Renal function remains normal with creatinine 0.95/ eGFR >60. Echocardiogram (2/2021) with grade 1 diastolic dysfunction. Continue to follow. 2. Lower extremity edema. Chronic problem and worsened following COVID 19 infection. Evaluated by Dr. Saman Gonzalez and changed from lasix to torsemide and metolazone. Became hypotensive and metolazone discontinued. Underwent pharmacologic nuclear stress test (2/24/2021) which was a normal, low risk study. Repeat echocardiogram (2/24/2021) similar to prior in 9/2020 except noted new mild PI. Edema remains significantly improved today on lowered dose of amlodipine at 5 mg daily and torsemide dose at 20 mg twice daily. Reports taking torsemide 20 mg every morning and second dose in mid afternoon with good results. Will continue to monitor. 3. Hyperlipidemia.  On moderate intensity dose atorvastatin with LDL 60 and HDL 59, indicative of excellent control in this patient. Continue to follow. 4. Prediabetes. Controlled on diet alone with mild elevation of HbA1c at 5.8 today. Required sliding scale insulin dosing when hospitalized due to COVID-19 due to elevated blood sugar, likely related to high dose steroids. No evidence of microvascular complications. On Ace-I and statin. Continue follow-up with Dr. Cullen Larsen for annual eye exams. Emphasized importance of lifestyle modifications, including diet, exercise, and weight loss. 5. History of COVID-19 infection with severe pneumonia/ acute hypoxic respiratory failure (6/2020). Overall with resolution of symptoms. Lower extremity edema continues to be a persistent problem but was also present prior to his COVID-19 infection. Echocardiogram (9/2020) showed no change from 8/2019 with EF 55-60% and mild MR. EKG (9/2020) without change. Underwent 6 minute walk test (3/31/2021) showing no desaturations, and PFT's (3/31/2021) showing normal flows and DLCO, and isolated decreased RV and FRC, no response to bronchodilator. Completed the Moderna COVID-19 vaccine series and received the Moderna booster dose. Will continue to monitor. 6. Obstructive sleep apnea, severe. Patient reported in 1/2019 awakening gasping for air several times per week, snoring and daytime drowsiness particularly when driving. Severe episode on 8/9/2019 prompted ED evaluation. EKG without change, troponin negative and NT-pro BNP normal. Echocardiogram (8/26/2019) with normal LV size and function (EF 56-60%), no RWMA, normal valves. Evaluated by Dr. Khloe Childs and completed home sleep study and diagnosed with severe JANNET. Started on auto CPAP after hospitalization for COVID-19 by Dr. Nika Cuellar and supplemented with 2 liters of oxygen. However, despite best efforts, patient continued to describe significant difficulty using due to dyspnea and air hunger, and returned equipment. Underwent in-patient sleep evaluation on 1/29/2021 and found BIPAP to be very effective. However, reports today that still having difficulty tolerating and may use at most 2 hours per night. Not wishing for reevaluation by pulmonary at this time but will consider. Will readdress at next visit. 7. Second degree AV block, Mobitz 1. Noted to be intermittent during sleep study and referred to Dr. Amalia Friend for evaluation. Felt to be likely secondary to untreated sleep apnea. Completed 30 day event monitor (5/5/2021) and no significant findings noted except for asymptomatic 20 beat run of SVT at 155 bpm.  Will continue to monitor. 8. Rheumatoid arthritis. On hydroxychloroquine 400 mg daily. Has regular eye exams with Dr. Kylah Dudley. Difficult to gauge response as appears to have evidence of osteoarthritis and CPPD also causing joint pain, but noted significant improvement since initiated colchicine. Voltaren gel and Tylenol for pain control as needed. Followed by Dr. Felisa Mead. 9. Primary osteoarthritis. Evident in bilateral hands and knees, bilateral shoulders, and cervical spine. Shoulder pain (R>L). X-ray with evidence of osteoarthritis and rotator cuff pathology. Evaluated by Dr. Dinesh Kenny and surgery for reverse shoulder replacement recommended, but did not wish to to proceed. Reports no longer taking Celebrex for pain due to concern for side effects. Using Tylenol with reasonable control and reports overall improvement in pain today. Will continue to monitor. 10. CPPD. Colchicine started on 1/19/2017 to help address chondrocalcinosis on x-rays. Now taking every other day with improvement in joint pain. Being monitored by Dr. Felisa Mead. 11. Lumbar degenerative disease with right sciatica. Underwent decompression with L4/5 right-sided hemilaminotomy and medial facetectomy on 10/23/2019 by Dr. Leeann Graham. Developed urinary retention post-op, but successfully passed voiding trial and has had no further difficulties. He developed recurrence of pain on post op day 6, and treated with Medrol dose pack. Reports no longer requiring gabapentin with no significant pain. Being followed by [facility] Venegas as needed. 12. Depression. Previously treated with Paxil and weaned from benzodiazepine use for control of anxiety and insomnia. Symptoms were well controlled on Zoloft, but patient discontinued in 11/2021 since he felt it was no longer needed. However, reports today increased irritability and difficulty controlling anger outbursts. Will restart Zoloft 50 mg daily and advised to call if no improvement. Will reassess at next visit. 13. GERD. Good control of symptoms with omeprazole. Will follow. 14.  Anemia, mild. Colonoscopy 8/2015. Evaluated by Dr. Ronaldo Neves and considered to be hemorrhoidal in source. Known internal and external hemorrhoids. Improved with Citrucel. Was due for routine colonoscopy in 8/2020, but unwilling to proceed. Completed Cologuard (4/2021) which was negative. Mild anemia in 5/2021 with Hb 12.9. Normal iron studies with ferritin 75 and transferrin 34% and normal B12 and folate levels (8/2021). Normalized today. Will continue to monitor. 15. BPH with urinary retention. Does have lower urinary tract symptoms. Developed postop urinary retention after back surgery. On Flomax. Followed previously by Dr. Christophe Roberson and not wishing to establish with new provider unless problem emerges. Abnormal previsit urinalysis with 36-50 WBCs but urine culture negative. Patient initially reported some symptoms, but states that now is resolved. 16. Left wrist ganglion cyst. Previously evaluated by Dr. Tracey De Jesus and aspirated. Recurred and reports now larger. Requested referral to another orthopedist and evaluated by PA at Dr. Rowena Dodson office. Recommended surgery, but was dissatisfied with delays in scheduling and now not wishing to proceed. Remains unchanged today. 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 Managing well and no further issues. 18. 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 Level stable today at 10 and monitored annually (next due 3/2022). Will provide letter for employer. 19. Obesity. Lost 25 pounds during COVID 19 illness, but had returned to near baseline. Weight overall stable since last visit. Emphasized importance of continued healthy eating and improved nutrition. Will readdress at next visit. 20. Health maintenance. Completed Moderna COVID 19 vaccine series and received the Moderna booster dose. Advised to proceed with a second booster dose given CDC recommendation. Unable to receive Tdap due to allergy (anaphylaxis to Td). Will continue to address Shingrix vaccine. Other immunizations up to date. Completed Cologuard (4/2021) testing as discussed. Continue regular eye exams with Dr. Ghazal Hansen. Vitamin D level remains normal on maintenance dose 1000 U daily. In addition, an annual Medicare wellness visit was done today. Patient understands recommendations and agrees with plan. Follow-up in 3 months. This visit required high complexity medically necessary decision making and management plans. Time spent in preparing for the visit, including review of history, tests done prior to arrival, additional time reviewing clinical data, imaging, outside records and test results:  5  minutes. Time spent in counseling with patient and/or family members regarding care plan: 35 minutes. Time spent in ordering tests, treatments, and referring patient for further care: 10 minutes. Time spent on visit does not include time for documentation.

## 2022-04-11 PROBLEM — F33.0 MILD EPISODE OF RECURRENT MAJOR DEPRESSIVE DISORDER (HCC): Status: ACTIVE | Noted: 2020-03-01

## 2022-05-16 RX ORDER — HYDRALAZINE HYDROCHLORIDE 25 MG/1
TABLET, FILM COATED ORAL
Qty: 180 TABLET | Refills: 3 | Status: SHIPPED | OUTPATIENT
Start: 2022-05-16

## 2022-05-28 DIAGNOSIS — N40.1 BPH ASSOCIATED WITH NOCTURIA: ICD-10-CM

## 2022-05-28 DIAGNOSIS — R35.1 BPH ASSOCIATED WITH NOCTURIA: ICD-10-CM

## 2022-05-29 RX ORDER — TAMSULOSIN HYDROCHLORIDE 0.4 MG/1
CAPSULE ORAL
Qty: 90 CAPSULE | Refills: 3 | Status: SHIPPED | OUTPATIENT
Start: 2022-05-29

## 2022-07-12 ENCOUNTER — APPOINTMENT (OUTPATIENT)
Dept: INTERNAL MEDICINE CLINIC | Age: 82
End: 2022-07-12

## 2022-07-12 ENCOUNTER — HOSPITAL ENCOUNTER (OUTPATIENT)
Dept: LAB | Age: 82
Discharge: HOME | End: 2022-07-12
Payer: MEDICARE

## 2022-07-12 DIAGNOSIS — R60.0 BILATERAL LOWER EXTREMITY EDEMA: ICD-10-CM

## 2022-07-12 DIAGNOSIS — E55.9 VITAMIN D DEFICIENCY: ICD-10-CM

## 2022-07-12 DIAGNOSIS — E78.5 HYPERLIPIDEMIA, UNSPECIFIED HYPERLIPIDEMIA TYPE: ICD-10-CM

## 2022-07-12 DIAGNOSIS — I10 PRIMARY HYPERTENSION: ICD-10-CM

## 2022-07-12 DIAGNOSIS — R73.03 PREDIABETES: ICD-10-CM

## 2022-07-12 DIAGNOSIS — R73.01 IMPAIRED FASTING GLUCOSE: ICD-10-CM

## 2022-07-12 LAB
25(OH)D3 SERPL-MCNC: 73.6 NG/ML (ref 30–100)
ALBUMIN SERPL-MCNC: 4.1 G/DL (ref 3.4–5)
ALBUMIN/GLOB SERPL: 1.7 {RATIO} (ref 0.8–1.7)
ALP SERPL-CCNC: 96 U/L (ref 45–117)
ALT SERPL-CCNC: 29 U/L (ref 16–61)
ANION GAP SERPL CALC-SCNC: 8 MMOL/L (ref 3–18)
AST SERPL-CCNC: 22 U/L (ref 10–38)
BASOPHILS # BLD: 0 K/UL (ref 0–0.1)
BASOPHILS NFR BLD: 1 % (ref 0–2)
BILIRUB SERPL-MCNC: 0.8 MG/DL (ref 0.2–1)
BUN SERPL-MCNC: 17 MG/DL (ref 7–18)
BUN/CREAT SERPL: 15 (ref 12–20)
CALCIUM SERPL-MCNC: 9.3 MG/DL (ref 8.5–10.1)
CHLORIDE SERPL-SCNC: 110 MMOL/L (ref 100–111)
CHOLEST SERPL-MCNC: 126 MG/DL
CO2 SERPL-SCNC: 26 MMOL/L (ref 21–32)
CREAT SERPL-MCNC: 1.12 MG/DL (ref 0.6–1.3)
CREAT UR-MCNC: 152 MG/DL (ref 30–125)
DIFFERENTIAL METHOD BLD: NORMAL
EOSINOPHIL # BLD: 0.4 K/UL (ref 0–0.4)
EOSINOPHIL NFR BLD: 5 % (ref 0–5)
ERYTHROCYTE [DISTWIDTH] IN BLOOD BY AUTOMATED COUNT: 13.2 % (ref 11.6–14.5)
EST. AVERAGE GLUCOSE BLD GHB EST-MCNC: 117 MG/DL
GLOBULIN SER CALC-MCNC: 2.4 G/DL (ref 2–4)
GLUCOSE SERPL-MCNC: 112 MG/DL (ref 74–99)
HBA1C MFR BLD: 5.7 % (ref 4.2–5.6)
HCT VFR BLD AUTO: 45 % (ref 36–48)
HDLC SERPL-MCNC: 55 MG/DL (ref 40–60)
HDLC SERPL: 2.3 {RATIO} (ref 0–5)
HGB BLD-MCNC: 14.6 G/DL (ref 13–16)
IMM GRANULOCYTES # BLD AUTO: 0 K/UL (ref 0–0.04)
IMM GRANULOCYTES NFR BLD AUTO: 0 % (ref 0–0.5)
LDLC SERPL CALC-MCNC: 56.6 MG/DL (ref 0–100)
LIPID PROFILE,FLP: NORMAL
LYMPHOCYTES # BLD: 1.8 K/UL (ref 0.9–3.6)
LYMPHOCYTES NFR BLD: 28 % (ref 21–52)
MAGNESIUM SERPL-MCNC: 2.2 MG/DL (ref 1.6–2.6)
MCH RBC QN AUTO: 32.4 PG (ref 24–34)
MCHC RBC AUTO-ENTMCNC: 32.4 G/DL (ref 31–37)
MCV RBC AUTO: 99.8 FL (ref 78–100)
MICROALBUMIN UR-MCNC: 0.5 MG/DL (ref 0–3)
MICROALBUMIN/CREAT UR-RTO: 3 MG/G (ref 0–30)
MONOCYTES # BLD: 0.7 K/UL (ref 0.05–1.2)
MONOCYTES NFR BLD: 10 % (ref 3–10)
NEUTS SEG # BLD: 3.6 K/UL (ref 1.8–8)
NEUTS SEG NFR BLD: 56 % (ref 40–73)
NRBC # BLD: 0 K/UL (ref 0–0.01)
NRBC BLD-RTO: 0 PER 100 WBC
PLATELET # BLD AUTO: 188 K/UL (ref 135–420)
PMV BLD AUTO: 10.7 FL (ref 9.2–11.8)
POTASSIUM SERPL-SCNC: 4 MMOL/L (ref 3.5–5.5)
PROT SERPL-MCNC: 6.5 G/DL (ref 6.4–8.2)
RBC # BLD AUTO: 4.51 M/UL (ref 4.35–5.65)
SODIUM SERPL-SCNC: 144 MMOL/L (ref 136–145)
TRIGL SERPL-MCNC: 72 MG/DL (ref ?–150)
VLDLC SERPL CALC-MCNC: 14.4 MG/DL
WBC # BLD AUTO: 6.5 K/UL (ref 4.6–13.2)

## 2022-07-12 PROCEDURE — 85025 COMPLETE CBC W/AUTO DIFF WBC: CPT

## 2022-07-12 PROCEDURE — 80061 LIPID PANEL: CPT

## 2022-07-12 PROCEDURE — 80053 COMPREHEN METABOLIC PANEL: CPT

## 2022-07-12 PROCEDURE — 36415 COLL VENOUS BLD VENIPUNCTURE: CPT

## 2022-07-12 PROCEDURE — 83036 HEMOGLOBIN GLYCOSYLATED A1C: CPT

## 2022-07-12 PROCEDURE — 82306 VITAMIN D 25 HYDROXY: CPT

## 2022-07-12 PROCEDURE — 83735 ASSAY OF MAGNESIUM: CPT

## 2022-07-12 PROCEDURE — 82043 UR ALBUMIN QUANTITATIVE: CPT

## 2022-07-21 ENCOUNTER — OFFICE VISIT (OUTPATIENT)
Dept: INTERNAL MEDICINE CLINIC | Age: 82
End: 2022-07-21
Payer: MEDICARE

## 2022-07-21 VITALS
WEIGHT: 203 LBS | RESPIRATION RATE: 16 BRPM | HEART RATE: 84 BPM | DIASTOLIC BLOOD PRESSURE: 84 MMHG | OXYGEN SATURATION: 98 % | HEIGHT: 68 IN | BODY MASS INDEX: 30.77 KG/M2 | SYSTOLIC BLOOD PRESSURE: 132 MMHG | TEMPERATURE: 97.4 F

## 2022-07-21 DIAGNOSIS — R60.0 BILATERAL LOWER EXTREMITY EDEMA: ICD-10-CM

## 2022-07-21 DIAGNOSIS — R73.01 IMPAIRED FASTING GLUCOSE: ICD-10-CM

## 2022-07-21 DIAGNOSIS — E66.09 CLASS 1 OBESITY DUE TO EXCESS CALORIES WITH SERIOUS COMORBIDITY AND BODY MASS INDEX (BMI) OF 30.0 TO 30.9 IN ADULT: ICD-10-CM

## 2022-07-21 DIAGNOSIS — G47.33 OSA TREATED WITH BIPAP: ICD-10-CM

## 2022-07-21 DIAGNOSIS — E55.9 VITAMIN D DEFICIENCY: ICD-10-CM

## 2022-07-21 DIAGNOSIS — E78.5 HYPERLIPIDEMIA, UNSPECIFIED HYPERLIPIDEMIA TYPE: ICD-10-CM

## 2022-07-21 DIAGNOSIS — R73.03 PREDIABETES: ICD-10-CM

## 2022-07-21 DIAGNOSIS — M06.041 RHEUMATOID ARTHRITIS INVOLVING BOTH HANDS WITH NEGATIVE RHEUMATOID FACTOR (HCC): ICD-10-CM

## 2022-07-21 DIAGNOSIS — I10 PRIMARY HYPERTENSION: Primary | ICD-10-CM

## 2022-07-21 DIAGNOSIS — M06.042 RHEUMATOID ARTHRITIS INVOLVING BOTH HANDS WITH NEGATIVE RHEUMATOID FACTOR (HCC): ICD-10-CM

## 2022-07-21 DIAGNOSIS — I44.1 MOBITZ TYPE 1 SECOND DEGREE ATRIOVENTRICULAR BLOCK: ICD-10-CM

## 2022-07-21 DIAGNOSIS — M11.20 CPDD (CALCIUM PYROPHOSPHATE DEPOSITION DISEASE): ICD-10-CM

## 2022-07-21 DIAGNOSIS — M15.9 PRIMARY OSTEOARTHRITIS INVOLVING MULTIPLE JOINTS: ICD-10-CM

## 2022-07-21 DIAGNOSIS — Z86.16 HISTORY OF 2019 NOVEL CORONAVIRUS DISEASE (COVID-19): ICD-10-CM

## 2022-07-21 PROCEDURE — 99214 OFFICE O/P EST MOD 30 MIN: CPT | Performed by: INTERNAL MEDICINE

## 2022-07-21 PROCEDURE — G8754 DIAS BP LESS 90: HCPCS | Performed by: INTERNAL MEDICINE

## 2022-07-21 PROCEDURE — G8417 CALC BMI ABV UP PARAM F/U: HCPCS | Performed by: INTERNAL MEDICINE

## 2022-07-21 PROCEDURE — G8536 NO DOC ELDER MAL SCRN: HCPCS | Performed by: INTERNAL MEDICINE

## 2022-07-21 PROCEDURE — G9717 DOC PT DX DEP/BP F/U NT REQ: HCPCS | Performed by: INTERNAL MEDICINE

## 2022-07-21 PROCEDURE — G8752 SYS BP LESS 140: HCPCS | Performed by: INTERNAL MEDICINE

## 2022-07-21 PROCEDURE — 1123F ACP DISCUSS/DSCN MKR DOCD: CPT | Performed by: INTERNAL MEDICINE

## 2022-07-21 PROCEDURE — 1101F PT FALLS ASSESS-DOCD LE1/YR: CPT | Performed by: INTERNAL MEDICINE

## 2022-07-21 PROCEDURE — G8427 DOCREV CUR MEDS BY ELIG CLIN: HCPCS | Performed by: INTERNAL MEDICINE

## 2022-07-21 PROCEDURE — G0463 HOSPITAL OUTPT CLINIC VISIT: HCPCS | Performed by: INTERNAL MEDICINE

## 2022-07-21 NOTE — PROGRESS NOTES
1. \"Have you been to the ER, urgent care clinic since your last visit? Hospitalized since your last visit? \" No    2. \"Have you seen or consulted any other health care providers outside of the [address] [place] [address] since your last visit? \" No     3. For patients aged 39-70: Has the patient had a colonoscopy / FIT/ Cologuard? NA - based on age      If the patient is female:    4. For patients aged 41-77: Has the patient had a mammogram within the past 2 years? NA - based on age or sex      11. For patients aged 21-65: Has the patient had a pap smear?  NA - based on age or sex

## 2022-07-21 NOTE — PATIENT INSTRUCTIONS
Heart-Healthy Diet: Care Instructions  Your Care Instructions     A heart-healthy diet has lots of vegetables, fruits, nuts, beans, and whole grains, and is low in salt. It limits foods that are high in saturated fat, such as meats, cheeses, and fried foods. It may be hard to change your diet, but even small changes can lower your risk of heart attack and heart disease. Follow-up care is a key part of your treatment and safety. Be sure to make and go to all appointments, and call your doctor if you are having problems. It's also a good idea to know your test results and keep a list of the medicines you take. How can you care for yourself at home? Watch your portions  Learn what a serving is. A \"serving\" and a \"portion\" are not always the same thing. Make sure that you are not eating larger portions than are recommended. For example, a serving of pasta is ½ cup. A serving size of meat is 2 to 3 ounces. A 3-ounce serving is about the size of a deck of cards. Measure serving sizes until you are good at [place]" them. Keep in mind that restaurants often serve portions that are 2 or 3 times the size of one serving. To keep your energy level up and keep you from feeling hungry, eat often but in smaller portions. Eat only the number of calories you need to stay at a healthy weight. If you need to lose weight, eat fewer calories than your body burns (through exercise and other physical activity). Eat more fruits and vegetables  Eat a variety of fruit and vegetables every day. Dark green, deep orange, red, or yellow fruits and vegetables are especially good for you. Examples include spinach, carrots, peaches, and berries. Keep carrots, celery, and other veggies handy for snacks. Buy fruit that is in season and store it where you can see it so that you will be tempted to eat it. Cook dishes that have a lot of veggies in them, such as stir-fries and soups.   Limit saturated and trans fat  Read food labels, and try to avoid saturated and trans fats. They increase your risk of heart disease. Use olive or canola oil when you cook. Bake, broil, grill, or steam foods instead of frying them. Choose lean meats instead of high-fat meats such as hot dogs and sausages. Cut off all visible fat when you prepare meat. Eat fish, skinless poultry, and meat alternatives such as soy products instead of high-fat meats. Soy products, such as tofu, may be especially good for your heart. Choose low-fat or fat-free milk and dairy products. Eat foods high in fiber  Eat a variety of grain products every day. Include whole-grain foods that have lots of fiber and nutrients. Examples of whole-grain foods include oats, whole wheat bread, and brown rice. Buy whole-grain breads and cereals, instead of white bread or pastries. Limit salt and sodium  Limit how much salt and sodium you eat to help lower your blood pressure. Taste food before you salt it. Add only a little salt when you think you need it. With time, your taste buds will adjust to less salt. Eat fewer snack items, fast foods, and other high-salt, processed foods. Check food labels for the amount of sodium in packaged foods. Choose low-sodium versions of canned goods (such as soups, vegetables, and beans). Limit sugar  Limit drinks and foods with added sugar. These include candy, desserts, and soda pop. Limit alcohol  Limit alcohol to no more than 2 drinks a day for men and 1 drink a day for women. Too much alcohol can cause health problems. When should you call for help? Watch closely for changes in your health, and be sure to contact your doctor if:    You would like help planning heart-healthy meals. Where can you learn more? Go to http://www.edmonds.com/  Enter V137 in the search box to learn more about \"Heart-Healthy Diet: Care Instructions. \"  Current as of: August 22, 2019               Content Version: 12.6  © 5986-0217 [employer]. Care instructions adapted under license by [employer] (which disclaims liability or warranty for this information). If you have questions about a medical condition or this instruction, always ask your healthcare professional. [address] any warranty or liability for your use of this information. Learning About Low-Sodium Foods  What foods are low in sodium? The foods you eat contain nutrients, such as vitamins and minerals. Sodium is a nutrient. Your body needs the right amount to stay healthy and work as it should. You can use the list below to help you make choices about which foods to eat. Fruits  Fresh, frozen, canned, or dried fruit  Vegetables  Fresh or frozen vegetables, with no added salt  Canned vegetables, low-sodium or with no added salt  Grains  Bagels without salted tops  Cereal with no added salt  Corn tortillas  Crackers with no added salt  Oatmeal, cooked without salt  Popcorn with no salt  Pasta and noodles, cooked without salt  Rice, cooked without salt  Unsalted pretzels  Dairy and dairy alternatives  Butter, unsalted  Cream cheese  Ice cream  Milk  Soy milk  Meats and other protein foods  Beans and peas, canned with no salt  Eggs  Fresh fish (not smoked)  Fresh meats (not smoked or cured)  Nuts and nut butter, prepared without salt  Poultry, not packaged with sodium solution  Tofu, unseasoned  Tuna, canned without salt  Seasonings  Garlic  Herbs and spices  Lemon juice  Mustard  Olive oil  Salt-free seasoning mixes  Vinegar  Work with your doctor to find out how much of this nutrient you need. Depending on your health, you may need more or less of it in your diet. Where can you learn more? Go to http://www.gray.com/  Enter S460 in the search box to learn more about \"Learning About Low-Sodium Foods. \"  Current as of: August 22, 2019               Content Version: 12.6  © 4020-2084 [employer]. Care instructions adapted under license by [employer] (which disclaims liability or warranty for this information). If you have questions about a medical condition or this instruction, always ask your healthcare professional. [address] any warranty or liability for your use of this information. Low Sodium Diet (2,000 Milligram): Care Instructions  Your Care Instructions     Too much sodium causes your body to hold on to extra water. This can raise your blood pressure and force your heart and kidneys to work harder. In very serious cases, this could cause you to be put in the hospital. It might even be life-threatening. By limiting sodium, you will feel better and lower your risk of serious problems. The most common source of sodium is salt. People get most of the salt in their diet from canned, prepared, and packaged foods. Fast food and restaurant meals also are very high in sodium. Your doctor will probably limit your sodium to less than 2,000 milligrams (mg) a day. This limit counts all the sodium in prepared and packaged foods and any salt you add to your food. Follow-up care is a key part of your treatment and safety. Be sure to make and go to all appointments, and call your doctor if you are having problems. It's also a good idea to know your test results and keep a list of the medicines you take. How can you care for yourself at home? Read food labels  Read labels on cans and food packages. The labels tell you how much sodium is in each serving. Make sure that you look at the serving size. If you eat more than the serving size, you have eaten more sodium. Food labels also tell you the Percent Daily Value for sodium. Choose products with low Percent Daily Values for sodium. Be aware that sodium can come in forms other than salt, including monosodium glutamate (MSG), sodium citrate, and sodium bicarbonate (baking soda). MSG is often added to Asian food.  When you eat out, you can sometimes ask for food without MSG or added salt. Buy low-sodium foods  Buy foods that are labeled \"unsalted\" (no salt added), \"sodium-free\" (less than 5 mg of sodium per serving), or \"low-sodium\" (less than 140 mg of sodium per serving). Foods labeled \"reduced-sodium\" and \"light sodium\" may still have too much sodium. Be sure to read the label to see how much sodium you are getting. Buy fresh vegetables, or frozen vegetables without added sauces. Buy low-sodium versions of canned vegetables, soups, and other canned goods. Prepare low-sodium meals  Cut back on the amount of salt you use in cooking. This will help you adjust to the taste. Do not add salt after cooking. One teaspoon of salt has about 2,300 mg of sodium. Take the salt shaker off the table. Flavor your food with garlic, lemon juice, onion, vinegar, herbs, and spices. Do not use soy sauce, lite soy sauce, steak sauce, onion salt, garlic salt, celery salt, mustard, or ketchup on your food. Use low-sodium salad dressings, sauces, and ketchup. Or make your own salad dressings and sauces without adding salt. Use less salt (or none) when recipes call for it. You can often use half the salt a recipe calls for without losing flavor. Other foods such as rice, pasta, and grains do not need added salt. Rinse canned vegetables, and cook them in fresh water. This removes some--but not all--of the salt. Avoid water that is naturally high in sodium or that has been treated with water softeners, which add sodium. Call your local water company to find out the sodium content of your water supply. If you buy bottled water, read the label and choose a sodium-free brand. Avoid high-sodium foods  Avoid eating:  Smoked, cured, salted, and canned meat, fish, and poultry. Ham, puentes, hot dogs, and luncheon meats. Regular, hard, and processed cheese and regular peanut butter.   Crackers with salted tops, and other salted snack foods such as pretzels, chips, and salted popcorn. Frozen prepared meals, unless labeled low-sodium. Canned and dried soups, broths, and bouillon, unless labeled sodium-free or low-sodium. Canned vegetables, unless labeled sodium-free or low-sodium. [place] fries, pizza, tacos, and other fast foods. Pickles, olives, ketchup, and other condiments, especially soy sauce, unless labeled sodium-free or low-sodium. Where can you learn more? Go to http://www.gray.com/  Enter V843 in the search box to learn more about \"Low Sodium Diet (2,000 Milligram): Care Instructions. \"  Current as of: August 22, 2019               Content Version: 12.6  © 3085-6386 [employer]. Care instructions adapted under license by [employer] (which disclaims liability or warranty for this information). If you have questions about a medical condition or this instruction, always ask your healthcare professional. [place] [address] any warranty or liability for your use of this information. Learning About Diabetes Food Guidelines  Your Care Instructions     Meal planning is important to manage diabetes. It helps keep your blood sugar at a target level (which you set with your doctor). You don't have to eat special foods. You can eat what your family eats, including sweets once in a while. But you do have to pay attention to how often you eat and how much you eat of certain foods. You may want to work with a dietitian or a certified diabetes educator (CDE) to help you plan meals and snacks. A dietitian or CDE can also help you lose weight if that is one of your goals. What should you know about eating carbs? Managing the amount of carbohydrate (carbs) you eat is an important part of healthy meals when you have diabetes. Carbohydrate is found in many foods. Learn which foods have carbs. And learn the amounts of carbs in different foods.   Bread, cereal, pasta, and rice have about 15 grams of carbs in a serving. A serving is 1 slice of bread (1 ounce), ½ cup of cooked cereal, or 1/3 cup of cooked pasta or rice. Fruits have 15 grams of carbs in a serving. A serving is 1 small fresh fruit, such as an apple or orange; ½ of a banana; ½ cup of cooked or canned fruit; ½ cup of fruit juice; 1 cup of melon or raspberries; or 2 tablespoons of dried fruit. Milk and no-sugar-added yogurt have 15 grams of carbs in a serving. A serving is 1 cup of milk or 2/3 cup of no-sugar-added yogurt. Starchy vegetables have 15 grams of carbs in a serving. A serving is ½ cup of mashed potatoes or sweet potato; 1 cup winter squash; ½ of a small baked potato; ½ cup of cooked beans; or ½ cup cooked corn or green peas. Learn how much carbs to eat each day and at each meal. A dietitian or CDE can teach you how to keep track of the amount of carbs you eat. This is called carbohydrate counting. If you are not sure how to count carbohydrate grams, use the Plate Method to plan meals. It is a good, quick way to make sure that you have a balanced meal. It also helps you spread carbs throughout the day. Divide your plate by types of foods. Put non-starchy vegetables on half the plate, meat or other protein food on one-quarter of the plate, and a grain or starchy vegetable in the final quarter of the plate. To this you can add a small piece of fruit and 1 cup of milk or yogurt, depending on how many carbs you are supposed to eat at a meal.  Try to eat about the same amount of carbs at each meal. Do not \"save up\" your daily allowance of carbs to eat at one meal.  Proteins have very little or no carbs per serving. Examples of proteins are beef, chicken, turkey, fish, eggs, tofu, cheese, cottage cheese, and peanut butter. A serving size of meat is 3 ounces, which is about the size of a deck of cards.  Examples of meat substitute serving sizes (equal to 1 ounce of meat) are 1/4 cup of cottage cheese, 1 egg, 1 tablespoon of peanut butter, and ½ cup of tofu. How can you eat out and still eat healthy? Learn to estimate the serving sizes of foods that have carbohydrate. If you measure food at home, it will be easier to estimate the amount in a serving of restaurant food. If the meal you order has too much carbohydrate (such as potatoes, corn, or baked beans), ask to have a low-carbohydrate food instead. Ask for a salad or green vegetables. If you use insulin, check your blood sugar before and after eating out to help you plan how much to eat in the future. If you eat more carbohydrate at a meal than you had planned, take a walk or do other exercise. This will help lower your blood sugar. What else should you know? Limit saturated fat, such as the fat from meat and dairy products. This is a healthy choice because people who have diabetes are at higher risk of heart disease. So choose lean cuts of meat and nonfat or low-fat dairy products. Use olive or canola oil instead of butter or shortening when cooking. Don't skip meals. Your blood sugar may drop too low if you skip meals and take insulin or certain medicines for diabetes. Check with your doctor before you drink alcohol. Alcohol can cause your blood sugar to drop too low. Alcohol can also cause a bad reaction if you take certain diabetes medicines. Follow-up care is a key part of your treatment and safety. Be sure to make and go to all appointments, and call your doctor if you are having problems. It's also a good idea to know your test results and keep a list of the medicines you take. Where can you learn more? Go to http://www.[employer].com/  Enter I147 in the search box to learn more about \"Learning About Diabetes Food Guidelines. \"  Current as of: December 20, 2019               Content Version: 12.6  © 5049-5288 [employer]. Care instructions adapted under license by [employer] (which disclaims liability or warranty for this information). If you have questions about a medical condition or this instruction, always ask your healthcare professional. [place] [address] any warranty or liability for your use of this information.

## 2022-07-24 PROBLEM — F33.9 RECURRENT MAJOR DEPRESSIVE DISORDER (HCC): Status: ACTIVE | Noted: 2020-03-01

## 2022-07-24 PROBLEM — E66.811 CLASS 1 OBESITY DUE TO EXCESS CALORIES WITH SERIOUS COMORBIDITY IN ADULT: Status: ACTIVE | Noted: 2018-02-20

## 2022-07-24 PROBLEM — E66.09 CLASS 1 OBESITY DUE TO EXCESS CALORIES WITH SERIOUS COMORBIDITY IN ADULT: Status: ACTIVE | Noted: 2018-02-20

## 2022-07-25 NOTE — PROGRESS NOTES
HPI:   Elle Santos is a [de-identified]y.o. year old male who presents today for a routine visit. He has a history of hypertension, hyperlipidemia, prediabetes, rheumatoid arthritis, osteoarthritis, calcium pyrophosphate deposition disease, BPH, GERD, and depression. He also has a history of COVID-19 infection (1/7646) complicated by severe pneumonia and acute hypoxic respiratory failure. He has completed the Moderna COVID-19 vaccine series and received two Moderna booster doses. He reports that he is doing reasonably well. He was last seen in 4/2022 and started on Zoloft for increased irritability and anger outbursts. However, he reports today that he discontinue taking it since he did not like the way it made him feel. He states that he feels his symptoms are improved today and does not wish to start any other medication. He also reports that he is not using his BiPAP machine regularly due to discomfort when wearing it. He has not followed up with pulmonary for adjustments. He is otherwise without new complaints and overall feeling well. Summary of prior hospitalizations and medical history:  On 6/22/2020, he noted the onset of weakness, fatigue, and diarrhea. He stated that he continued to work for the entire week despite feeling ill. On 6/27/2020, he developed fever and dyspnea, which worsened throughout the weekend. He presented to [facility] on 6/28/2020, and WBC 4.0 (78 % neutrophils) and chest x-ray showed patchy density in the RUL, right perihilar area, and right base. He was advised to go to the ED, and presented to [facility] ED on 6/29/2020. He was found to be hypoxic with pulse ox at rest 92-93 % and ambulation 89-91% (room air). Chest x-ray showed bilateral multifocal extremely subtle groundglass opacities. Labs showed WBC 4.4 (80% neutrophils), Hb 14.6/ Hct 42.0, platelets 847, creatinine 0.71/ eGFR >60, AST 53, alk phos 160, lactate 1.16.  He was discharged home, and SARS COV-2 positive (6/29/2020) result returned. He presented for a virtual visit on 7/2/2020, and reported that since discharge from the ED, he noted persistent symptoms of weakness, diarrhea, fatigue, and fevers, and prgressive dyspnea. He described poor po intake, and chest pain with inspiration and felt that he was unable to take a deep breath or cough. He was advised to call EMS and return to the ED. Upon arrival by EMS, his oxygenation was noted to be in the low 80's, and required high flow oxygen. Chest x-ray (7/2/2020) showed bilateral increased patchy groundglass airspace opacities, and labs revealed ABG 7.43/34/70 on nonrebreather mask; WBC 7.3 (11% lymphocytes), Hb 14.8/ Hct 42.6, creatinine 0.8/ eGFR>60, ALT 79, AST 76, alk phos 170, albumin 2.7 D-dimer 1.35, ferritin 729, CRP 16.6, , procalcitonin 0.14, lactate 2.8, blood culture negative. He was initially started on Zosyn, but Dr. Prema Miller (ID) was consulted and recommended discontinuing and beginning azithromycin, remdesivir, IV Solumedrol, and lovenox. He was also started on ascorbic acid, melatonin, and zinc. Dr. Bryan Steele (pulmonary) was consulted and recommended change to high flow nasal cannula and recommended proning to the patient. He improved clinically and inflammatory indices improved. His oxygen was weaned to 5 liters nasal cannula, and he completed 5 day course of remdesivir and azithromycin. He was discharged on 7/9/2020 with an 8 day course of prednisone, 30 day course of Eliquis 2.5 mg bid, and two week course of Vitamin  C and zinc. He was seen for post-hospitalization follow-up on 7/16/2020 and reported some persistent congestion and significant dyspnea on exertion. He was referred to Dr. Francesco Rojas, and she recommended continued oxygen use as needed and stressed need for treatment of obstructive sleep apnea with CPAP. She placed order for him to receive auto CPAP.  He was unable to tolerate CPAP due to continued air hunger even with bleeding in of 2 liters nasal cannula. He had a repeat sleep study on 1/29/2021, which showed obstructive sleep apnea with emergence of central apnea and BIPAP found to be most effective. During the study, he was found to have PAC's, PVC's and intermittent Mobitz 1 second degree AV block. He was referred to Dr. Heath Sen and his diuretics were changed from lasix to torsemide and metolazone. However, he stopped taking metolazone after 1 week since began to feel lightheaded and noted low blood pressure readings with SBP 80-90. He underwent an echocardiogram (2/24/2021) showing normal LV size and function (EF 60-65%), mild MR and PI and PAP 23 mmHg; and a pharmacologic nuclear stress test (2/24/2021) which was a normal, low risk study with no TID and calculated EF 62%. He had a 6 minute walk test (3/31/2021) showing no significant O2 desaturation; and PFT's showing normal flows, normal DLCO, and isolated decrease in RV and FRC. He also had a 30 day event monitor (5/2021) which showed rare PVC, rare PAC, and one episode of 20 beat SVT at 155 bpm (asymptomatic). On 8/13/2019, he reported that he had been seeing Dr. Antonina Donnelly for increasing difficulty with right sided sciatica. He reported being treated with a Medrol dose pack, physical therapy, and spinal injections without improvement, and was also prescribed Norco and Tramadol, but he stated that he found them too sedating and of no benefit. Due to persistent symptoms, he underwent a lumbar MRI (8/5/2019) which showed degenerative changes most notable at L4-L5 resulting in moderate spinal canal stenosis as well as moderate to severe right greater than left foraminal stenoses; advanced facet arthropathy with small facet effusions and suspected small right facet joint ventral synovial cyst; notable degenerative changes also L3-L4; L1 mild anterior compression deformity, chronic appearing; overall general worsening when compared to prior study in 2007.  He was having continued significant pain, and was started on gabapentin 100 mg tid. He was referred to Dr. Marcel Asencio and she increased his dose of gabapentin to 200 mg tid. She also referred him to Dr. Antonio Williamson for evaluation given his lack of response to conservative management. He recommended proceeding with decompression by performing a L4/5 right-sided hemilaminotomy and medial facetectomy, which was performed on 10/23/2019. He developed postop urinary retention and was discharged with a Deleon catheter. He had follow-up with Dr. Kuldip Garcia on 10/29/2019 with successful voiding trial. He reports that he noted initial resolution of his right sciatica pain following surgery, but on 10/29/2019 noted abrupt recurrence when getting out of bed. He was evaluated by GOMEZ Doherty on 10/31/2019 and was treated with a Medrol dose pack and restarted on gabapentin 300 mg tid. He reports overall improvement and is no longer requiring gabapentin. On 8/9/2019, he had an episode of waking up gasping for air forcing him to get out of bed and walk around until his breathing normalized. He has been having frequent similar episodes over that last year, but had been resistent to evaluation for sleep apnea. However, he reports that this episode was especially severe. When his symptoms persisted, he was evaluated at [facility], and testing included WBC 5.7, Hb 14.2/ Hct 41.6, creatinine 0.9/ eGFR>60, troponin I x 1 negative, NT-pro BNP 45, EKG sinus rhythm at 83 bpm and no ischemic changes, and chest x-ray without acute changes, but an ill defined 15 mm density in the lateral left mid zone was noted. He received lasix 40 mg IV and was discharged.  He had an echocardiogram (8/26/2019) showing normal LV function (EF 56-60%), no RWMA, unable to estimate RVSP due to inadequate TR, but TV/PV appear normal. He was referred to Dr. Luis Alberto Ferreira for probable sleep apnea, and underwent a home sleep study on 10/25/2019, showing evidence of severe obstructive sleep apnea with AHI 35 and oxygen guy 80%. He was not able to tolerate CPAP equipment as discussed. On 6/20/2018, he suffered an accidental gun shot wound while working resulting in traumatic injury to his left index finger with near loss of the middle phalanx and fracture of the distal phalanx. He was taken to [facility] and initially had irrigation and closure of the lacerations performed. He presented to the [facility] ED on 6/24/2018 with increased pain and swelling, and was started on doxycycline for a wound infection. On 7/7/2018, due to loss of stability and angulation of the index finger, he underwent stabilization with fusion of the proximal and distal phalanx with bone grafting by Dr. Darin Giordano. He did well and was started on physical therapy. On 8/1/2018, he presented to [address] [place] [address] for evaluation of increasing erythema, swelling and tenderness with concern for a possible infection. He underwent left hand x-ray (8/1/2018) showing severe soft tissue swelling of the left second finger worrisome for cellulitis, traumatic amputation of the middle phalanx with marked osteopenia and irregularity of the base of the distal phalanx and flattening and irregularity of the head of the proximal phalanx. Unclear if related to prior trauma/surgery or osteomyelitis with osseous destruction and no films available for comparison. He was started empirically on Bactrim and Augmentin for 14 days. On 8/10/2018, he presented for evaluation by GOMEZ Mahmood for complaints of fever, malaise, headache, fatigue, and diarrhea. Lab evaluation showed WBC 17 (90% neutrophils), creatinine 1.34/ eGFR 52, blood culture negative. Stool cultures (8/13/2018) routine, O/P, and C.diff negative. Bactrim and Augmentin were discontinued on 8/13/2018, and he was started on metronidazole for 10 days for treatment of presumed C.diff with improvement. He was evaluated by Dr. Ryley Baer on 8/15/2018 and repeat WBC 8.6 (59% neutrophils), ESR 6, and CRP 0.9.  He was seen by Dr. Ryley Baer in follow-up on 8/30/2018 and left index finger wound noted to be significantly improved and no further difficulty with diarrhea. He has a history of hypertension, treated with amlodipine, lisinopril, lasix (+ potassium), and hydralazine was added in 4/2018. He states that his wife has been monitoring his blood pressure intermittently. He denies any chest pain, shortness of breath at rest or with exertion, lightheadedness, or palpitations. He does report bilateral lower extremity swelling that it will increase throughout the day and improve overnight. . In 6/2016, he underwent lower extremity arterial and venous duplex scans, both of which were negative. He also has a history of hyperlipidemia, treated with moderate intensity atorvastatin. He has a history of prediabetes, with HbA1c ranging from 5.9-6.2 since 2012. He denies any polyuria, polydipsia, nocturia, or blurry vision, and has no history of retinopathy, neuropathy, or nephropathy. He has regular eye exams with Dr. Joaquin Mac. He has a history of bilateral hand pain with morning stiffness for several years, and in 3/2014, he was noted to have a positive anti-CCP antibody level although NIMCO, rheumatoid factor, and ESR were negative. He was referred for evaluation to Dr. Vanessa Najera, and was diagnosed with rheumatoid arthritis in 6/2014. He was treated with hydroxychloroquine, which has been partially helpful in controlling his symptoms. Bilateral hand x-rays also showed evidence of primary osteoarthritis with osteophytes present on the second and third MCP joints. In 1/2017, he was also noted to have evidence of possible chondrocalcinosis on x-ray, and was started on low dose colchicine in addition to hydroxychloroquine for concomitant calcium pyrophosphate deposition disease. He states that since starting on colchicine, he has had significant improvement in his hand pain. He also uses compounding cream and Voltaren gel for pain control.      In 1/2019, he was complaining of worsening neck and bilateral shoulder pain (R>L). He stated that he was previously followed by Dr. Lidya Gutierrez, and would occasionally receive cortisone injections into his shoulders. He also described neck pain with difficulty turning his head. He denied any upper extremity weakness or paresthesias. He underwent imaging, and bilateral shoulder x-rays (1/10/2019) showed degenerative changes and secondary findings of rotator cuff pathology. Cervical spine x-rays (1/10/2019) showed advanced degenerative changes with multilevel facet arthropathy. He was evaluated by Dr. Verónica Lux who gave him a cortisone injection in his right shoulder with some improvement. He also recommended a reverse shoulder replacement, but he remains hesitant to proceed. He was started on Celebrex 200 mg bid in 2/2019, and reports significant improvement. He continues to also use Tylenol as needed for pain. He states that his neck pain seems to have improved to his baseline level. He has a history of symptomatic BPH, with complaints of decreased stream, hesitancy, and dribbling. He has refused treatment with medication. He is followed by Dr. Zachariah Ro. He denies any dysuria, gross hematuria, or flank pain. He has a history of GERD, treated with daily omeprazole with good control of symptoms. He had a screening colonoscopy and 8/2015 by Dr. Venessa Del Rio, showing a 3 mm sessile cecal polyp (pathology: intra-mucosal lymphoid aggregate), two 4 mm sessile polyps in the transverse colon (pathology: serrated adenomas), and a 1 cm lipoma in the transverse colon. Follow-up recommended for five years. He was having difficulty with rectal bleeding in 1/2018 and returned for evaluation with Dr. Venessa Del Rio who felt it was most likely secondary to a hemorrhoidal source. She recommended use of Citrucel. He states that the bleeding has improved with initiation of this therapy.  He denies any abdominal pain, nausea, vomiting, melena, or change in bowel movements. He has a history of depression and anxiety, which had been well controlled with Paxil although inadvertently stopped. Now on Zoloft with improvement. Past Medical History:   Diagnosis Date    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 Dr. Ana Montalvo. 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 [address] 9/30/2018    Prediabetes     Primary osteoarthritis involving multiple joints 9/6/2011    Rheumatoid arthritis ([facility] [address] 2013    negative RF; elevated anti-CCP. Dr. Johana Florian. Past Surgical History:   Procedure Laterality Date    HX AMPUTATION FINGER Left     index    HX BACK SURGERY  10/23/2019    Right L4-5 hemilaminectomy, medial facetectomy, resection of synovial cyst    HX CARPAL TUNNEL RELEASE Bilateral     HX CATARACT REMOVAL Left 01/2018    HX CATARACT REMOVAL Bilateral 2018    HX COLONOSCOPY      HX HEENT      sinus, tonsillectomy    HX HERNIA REPAIR      HX MOHS PROCEDURES      bilateral     HX ORTHOPAEDIC      lef knee surgery, removed cartilage.     HX ROTATOR CUFF REPAIR Right      Current Outpatient Medications   Medication Sig    atorvastatin (LIPITOR) 10 mg tablet TAKE 1 TABLET BY MOUTH ONCE DAILY    tamsulosin (FLOMAX) 0.4 mg capsule TAKE 1 CAPSULE BY MOUTH ONCE DAILY    hydrALAZINE (APRESOLINE) 25 mg tablet TAKE 1 TABLET BY MOUTH 2 TIMES A DAY (APRESOLINE)    lisinopriL (PRINIVIL, ZESTRIL) 40 mg tablet TAKE 1 TABLET BY MOUTH ONCE DAILY    torsemide (DEMADEX) 20 mg tablet TAKE 1 TABLET BY MOUTH 2 TIMES A DAY    omeprazole (PRILOSEC) 20 mg capsule TAKE 1 CAPSULE BY MOUTH ONCE DAILY    amLODIPine (NORVASC) 5 mg tablet Take 1 Tablet by mouth daily. hydrOXYchloroQUINE (PLAQUENIL) 200 mg tablet Take 400 mg by mouth daily. mineral oil liquid Take 30 mL by mouth daily as needed for Constipation. cycloSPORINE (RESTASIS) 0.05 % dpet Administer 1 Drop to both eyes nightly. colchicine (MITIGARE) 0.6 mg capsule Take 0.6 mg by mouth every other day. cholecalciferol, vitamin D3, 50 mcg (2,000 unit) tab Take 2,000 Units by mouth daily. diclofenac (VOLTAREN) 1 % gel Apply 4 g to affected area four (4) times daily. LUMIGAN 0.01 % ophthalmic drops Administer 1 Drop to both eyes nightly. MULTIVITS/IRON FUM/FA/D3/LYCOP (MULTI FOR HIM PO) Take  by mouth daily. No current facility-administered medications for this visit. Allergies and Intolerances: Allergies   Allergen Reactions    Latex, Natural Rubber Swelling    Adhesive Tape-Silicones Rash and Itching    Aldactone [Spironolactone] Not Reported This Time     Breast tenderness    Codeine Not Reported This Time     \"Drives crazy\"    Tape [Adhesive] Rash    Tetanus Toxoid, Adsorbed Anaphylaxis    Tetanus Vaccines And Toxoid Anaphylaxis     Family History: He has no family history of colon or prostate cancer. Family History   Problem Relation Age of Onset    Cancer Mother     Heart Disease Father     Alcohol abuse Father     Cancer Other      Social History:   He  reports that he has quit smoking. His smoking use included cigars, pipe, and cigarettes. He has a 30.00 pack-year smoking history. He has quit using smokeless tobacco.  His smokeless tobacco use included chew. He smoked 2 ppd for 50 years, stopping in 1974. He is  with two adult children. 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 He is a employed at [employer] in [place].      Social History     Substance and Sexual Activity   Alcohol Use Yes    Comment: rarely     Immunization History:  Immunization History Administered Date(s) Administered    (RETIRED) Pneumococcal Vaccine (Unspecified Type) 01/01/2008    COVID-19, MODERNA BLUE border, Primary or Immunocompromised, (age 18y+), IM, 100 mcg/0.5mL 02/01/2021, 03/01/2021    COVID-19, MODERNA Booster BLUE border, (age 18y+), IM, 50mcg/0.25mL 11/30/2021    Influenza High Dose Vaccine PF 09/30/2017    Influenza Vaccine (Madin Nika Canine Kidney) PF 12/13/2017, 10/17/2018    Influenza Vaccine (Tri) Adjuvanted (>65 Yrs FLUAD TRI [id number]) 09/25/2018, 09/25/2019    Influenza Vaccine (Trivalent) 10/19/2019, 11/18/2020    Influenza Vaccine Split 10/04/2011, 10/16/2012    Influenza Vaccine Whole 01/15/2010    Influenza, Quadrivalent, Adjuvanted (>65 Yrs FLUAD QUAD [id number]) 09/10/2020, 09/28/2021    Pneumococcal Conjugate (PCV-13) 01/19/2015    Pneumococcal Polysaccharide (PPSV-23) 01/01/2008    Varicella Virus Vaccine 10/01/2013    Zoster 10/16/2012       Review of Systems:   As above included in HPI. Otherwise 11 point review of systems negative including constitutional, skin, HENT, eyes, respiratory, cardiovascular, gastrointestinal, genitourinary, musculoskeletal, endocrine, hematologic, allergy, and neurologic. Physical:   Visit Vitals  /84 (BP 1 Location: Left arm, BP Patient Position: Sitting)   Pulse 84   Temp 97.4 °F (36.3 °C) (Temporal)   Resp 16   Ht 5' 8\" (1.727 m)   Wt 203 lb (92.1 kg)   SpO2 98%   BMI 30.87 kg/m²       Exam:     Patient appears in no apparent distress. Affect is appropriate. HEENT: PERRLA, anicteric, no JVD, adenopathy or thyromegaly. No carotid bruits or radiated murmur. Lungs: clear to auscultation, no wheezes, rhonchi, or rales. Heart: regular rate and rhythm. No murmur, rubs, gallops  Abdomen: soft, nontender, nondistended, normal bowel sounds, no hepatosplenomegaly or masses. Extremities: Trace edema.   Wearing compression      Review of Data:  Labs:    Hospital Outpatient Visit on 07/12/2022   Component Date Value Ref Range Status WBC 07/12/2022 6.5  4.6 - 13.2 K/uL Final    RBC 07/12/2022 4.51  4.35 - 5.65 M/uL Final    HGB 07/12/2022 14.6  13.0 - 16.0 g/dL Final    HCT 07/12/2022 45.0  36.0 - 48.0 % Final    MCV 07/12/2022 99.8  78.0 - 100.0 FL Final    MCH 07/12/2022 32.4  24.0 - 34.0 PG Final    MCHC 07/12/2022 32.4  31.0 - 37.0 g/dL Final    RDW 07/12/2022 13.2  11.6 - 14.5 % Final    PLATELET 83/65/6865 439  135 - 420 K/uL Final    MPV 07/12/2022 10.7  9.2 - 11.8 FL Final    NRBC 07/12/2022 0.0  0  WBC Final    ABSOLUTE NRBC 07/12/2022 0.00  0.00 - 0.01 K/uL Final    NEUTROPHILS 07/12/2022 56  40 - 73 % Final    LYMPHOCYTES 07/12/2022 28  21 - 52 % Final    MONOCYTES 07/12/2022 10  3 - 10 % Final    EOSINOPHILS 07/12/2022 5  0 - 5 % Final    BASOPHILS 07/12/2022 1  0 - 2 % Final    IMMATURE GRANULOCYTES 07/12/2022 0  0.0 - 0.5 % Final    ABS. NEUTROPHILS 07/12/2022 3.6  1.8 - 8.0 K/UL Final    ABS. LYMPHOCYTES 07/12/2022 1.8  0.9 - 3.6 K/UL Final    ABS. MONOCYTES 07/12/2022 0.7  0.05 - 1.2 K/UL Final    ABS. EOSINOPHILS 07/12/2022 0.4  0.0 - 0.4 K/UL Final    ABS. BASOPHILS 07/12/2022 0.0  0.0 - 0.1 K/UL Final    ABS. IMM.  GRANS. 07/12/2022 0.0  0.00 - 0.04 K/UL Final    DF 07/12/2022 AUTOMATED    Final    Hemoglobin A1c 07/12/2022 5.7 (A) 4.2 - 5.6 % Final    Est. average glucose 07/12/2022 117  mg/dL Final    LIPID PROFILE 07/12/2022        Final    Cholesterol, total 07/12/2022 126  <200 MG/DL Final    Triglyceride 07/12/2022 72  <150 MG/DL Final    HDL Cholesterol 07/12/2022 55  40 - 60 MG/DL Final    LDL, calculated 07/12/2022 56.6  0 - 100 MG/DL Final    VLDL, calculated 07/12/2022 14.4  MG/DL Final    CHOL/HDL Ratio 07/12/2022 2.3  0 - 5.0   Final    Magnesium 07/12/2022 2.2  1.6 - 2.6 mg/dL Final    Sodium 07/12/2022 144  136 - 145 mmol/L Final    Potassium 07/12/2022 4.0  3.5 - 5.5 mmol/L Final    Chloride 07/12/2022 110  100 - 111 mmol/L Final    CO2 07/12/2022 26  21 - 32 mmol/L Final    Anion gap 07/12/2022 8  3.0 - 18 mmol/L Final    Glucose 2022 112 (A) 74 - 99 mg/dL Final    BUN 2022 17  7.0 - 18 MG/DL Final    Creatinine 2022 1.12  0.6 - 1.3 MG/DL Final    BUN/Creatinine ratio 2022 15  12 - 20   Final    GFR est AA 2022 >60  >60 ml/min/1.73m2 Final    GFR est non-AA 2022 >60  >60 ml/min/1.73m2 Final    Calcium 2022 9.3  8.5 - 10.1 MG/DL Final    Bilirubin, total 2022 0.8  0.2 - 1.0 MG/DL Final    ALT (SGPT) 2022 29  16 - 61 U/L Final    AST (SGOT) 2022 22  10 - 38 U/L Final    Alk. phosphatase 2022 96  45 - 117 U/L Final    Protein, total 2022 6.5  6.4 - 8.2 g/dL Final    Albumin 2022 4.1  3.4 - 5.0 g/dL Final    Globulin 2022 2.4  2.0 - 4.0 g/dL Final    A-G Ratio 2022 1.7  0.8 - 1.7   Final    Microalbumin,urine random 2022 0.50  0 - 3.0 MG/DL Final    Creatinine, urine 2022 152.00 (A) 30 - 125 mg/dL Final    Microalbumin/Creat ratio (mg/g cre* 2022 3  0 - 30 mg/g Final    Vitamin D 25-Hydroxy 2022 73.6  30 - 100 ng/mL Final       Health Maintenance:  Screening:    Colorectal: colonoscopy (2015) serrated adenomas. Dr. Leah Dc. Cologuard negative (2021). Depression: on Paxil   DM (HbA1c/FPG): / HbA1c 5.7 (2022)   Hepatitis C: negative (2021)   Falls: none   DEXA: within normal limits (2016)   PSA/MARTÍNEZ: PSA 3.3 (2019)    Glaucoma: regular eye exams with Dr. Cindy Chau (last 2022)   Smokin pack year.  Distant past.   Vitamin D: 73.6 (2022)   Medicare Wellness: 2022        Impression:  Patient Active Problem List   Diagnosis Code    BPH with obstruction/lower urinary tract symptoms N40.1, N13.8    Hypertension I10    Primary osteoarthritis involving multiple joints M15.9    Hyperlipidemia E78.5    GERD (gastroesophageal reflux disease) K21.9    Prediabetes R73.03    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 ([facility] M06.041, M06.042    Vitamin D deficiency E55.9    Colon polyps K63.5    CPDD (calcium pyrophosphate deposition disease) M11.20    Impaired fasting glucose R73.01    Class 1 obesity due to excess calories with serious comorbidity and body mass index (BMI) of 31.0 to 31.9 in adult E66.09, Z68.31    APC (atrial premature contractions) I49.1    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 Hypertension. Blood pressure remains well controlled on lisinopril 40 mg daily, amlodipine 5 mg daily, hydralazine 25 mg bid, and torsemide 20 mg bid. Metolazone discontinued due to worsening renal function in 8/2021. Renal function remains normal with creatinine 1.12/eGFR >60. Echocardiogram (2/2021) with grade 1 diastolic dysfunction. Continue to follow. 2. Lower extremity edema. Chronic problem and worsened following COVID 19 infection. Evaluated by Dr. Phoebe Grant and changed from lasix to torsemide and metolazone. Became hypotensive and metolazone discontinued. Underwent pharmacologic nuclear stress test (2/24/2021) which was a normal, low risk study. Repeat echocardiogram (2/24/2021) similar to prior in 9/2020 except noted new mild PI. Edema now significantly improved on lowered dose of amlodipine to 5 mg daily and increase in torsemide dose to 20 mg twice daily. Reports taking torsemide 20 mg every morning and second dose in mid afternoon with good results. Will continue to monitor. 3. Hyperlipidemia.  On moderate intensity dose atorvastatin with LDL 56 and HDL 55, indicative of excellent control in this patient. Continue to follow. 4. Prediabetes. Controlled on diet alone with mild elevation of fasting blood sugar at 112 and HbA1c at 5.7 today. Required sliding scale insulin dosing when hospitalized due to COVID-19 due to elevated blood sugar related to treatment with high dose steroids. No evidence of microvascular complications. On Ace-I and statin. Continue follow-up with Dr. Guredep Boyle for annual eye exams. Emphasized importance of lifestyle modifications, including low carbohydrate diet, regular exercise, and weight loss. 5. History of COVID-19 infection with severe pneumonia/ acute hypoxic respiratory failure (6/2020). Overall with resolution of symptoms. Lower extremity edema is a persistent problem but was also present prior to his COVID-19 infection. Echocardiogram (9/2020) showed no change from 8/2019 with EF 55-60% and mild MR. EKG (9/2020) without change. Underwent 6 minute walk test (3/31/2021) showing no desaturations, and PFT's (3/31/2021) showing normal flows and DLCO, and isolated decreased RV and FRC, no response to bronchodilator. Completed the Moderna COVID-19 vaccine series and received two Moderna booster doses. Will continue to monitor. 6. Obstructive sleep apnea, severe. Patient reported in 1/2019 awakening gasping for air several times per week, snoring and daytime drowsiness particularly when driving. Severe episode on 8/9/2019 prompted ED evaluation. EKG without change, troponin negative and NT-pro BNP normal. Echocardiogram (8/26/2019) with normal LV size and function (EF 56-60%), no RWMA, normal valves. Evaluated by Dr. Deuce Ramirez and completed home sleep study and diagnosed with severe JANNET. Started on auto CPAP after hospitalization for COVID-19 by Dr. Tawana Smith and supplemented with 2 liters of oxygen. However, despite best efforts, patient continued to describe significant difficulty using due to dyspnea and air hunger, and returned equipment.  Underwent in-patient sleep evaluation on 1/29/2021 and found BIPAP to be very effective. However, reports today that still having difficulty tolerating and not using very much. Not wishing for reevaluation by pulmonary at this time. Will readdress at next visit. 7. Second degree AV block, Mobitz 1. Noted to be intermittent during sleep study and referred to Dr. Melissa Salazar for evaluation. Felt to be likely secondary to untreated sleep apnea. Completed 30 day event monitor (5/5/2021) and no significant findings noted except for asymptomatic 20 beat run of SVT at 155 bpm.  Will continue to monitor. 8. Rheumatoid arthritis. On hydroxychloroquine 400 mg daily. Has regular eye exams with Dr. Amarilys Chinchilla. Difficult to gauge response as appears to have evidence of osteoarthritis and CPPD also causing joint pain, but noted significant improvement since initiated colchicine. Voltaren gel and Tylenol for pain control as needed. Followed by Dr. Yvan Ruano. 9. Primary osteoarthritis. Evident in bilateral hands and knees, bilateral shoulders, and cervical spine. Shoulder pain (R>L). X-ray with evidence of osteoarthritis and rotator cuff pathology. Evaluated by Dr. Henry Perez and surgery for reverse shoulder replacement recommended, but did not wish to to proceed. Reports no longer taking Celebrex for pain due to concern for side effects. Using Tylenol with reasonable control with overall improvement today. Will continue to monitor. 10. CPPD. Colchicine started on 1/19/2017 to help address chondrocalcinosis on x-rays. Now taking every other day with improvement in joint pain. Being monitored by Dr. Yvan Ruano. 11. Lumbar degenerative disease with right sciatica. Underwent decompression with L4/5 right-sided hemilaminotomy and medial facetectomy on 10/23/2019 by Dr. Josef Cox. Developed urinary retention post-op, but successfully passed voiding trial and has had no further difficulties.  He developed recurrence of pain on post op day 6, and treated with Medrol dose pack with improvement. No longer requiring gabapentin given no significant pain. Being followed by GOMEZ Venegas as needed. 12. Depression. Previously treated with Paxil and weaned from benzodiazepine use for control of anxiety and insomnia. Symptoms were well controlled on Zoloft but discontinued in 11/2021 since he felt it was no longer needed. Reported increased irritability and difficulty controlling anger outbursts at last visit in 4/2022 and restarted on Zoloft 50 mg daily. However, reports today that discontinue taking it since did not like the way he felt on it. Not wishing to initiate any other treatment today. Will continue to address. 13. GERD. Continued good control of symptoms with omeprazole 20 mg daily. 14.  Anemia, mild. Colonoscopy 8/2015. Evaluated by Dr. Liss Ansari and considered to be hemorrhoidal in source. Known internal and external hemorrhoids and improved with Citrucel. Due for routine colonoscopy in 8/2020, but was unwilling to proceed. Completed Cologuard (4/2021) negative. Mild anemia in 5/2021 with Hb 12.9. Normal iron studies with ferritin 75 and transferrin 34% and normal B12 and folate levels (8/2021). Normalized today with Hb 14.6/HCT 45. Will continue to monitor. 15. BPH with urinary retention. Difficulty with lower urinary tract symptoms. Developed postop urinary retention after back surgery. On Flomax. Followed previously by Dr. Rossana Gomez and not wishing to establish with new provider unless problem emerges. Reports stable today. 16. Left wrist ganglion cyst. Previously evaluated by Dr. Osman Andrews and aspirated. Recurred and reports now larger. Requested referral to another orthopedist and evaluated by PA at Dr. Aleah Bhatti office. Recommended surgery, but was dissatisfied with delays in scheduling and now not wishing to proceed. Remains unchanged today. 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  Managing well and no further issues. 18. 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 Level stable at 10 (4/2022) and monitored annually (next due 4/2023). 19. Obesity. Lost 25 pounds during COVID 19 illness, but has now returned to near baseline. Weight overall stable since last visit. Emphasized importance of following a heart healthy diet and regular exercise. Will readdress at next visit. 20. Health maintenance. Completed Moderna COVID 19 vaccine series and received two Moderna booster doses. Requested that he provide date of second booster dose so chart can be updated. Unable to receive Tdap due to allergy (anaphylaxis to Td). Will continue to address Shingrix vaccine. Other immunizations up to date. Completed Cologuard (4/2021) testing as discussed. Continue regular eye exams with Dr. Ayse Calvo. Vitamin D level remains normal on maintenance dose supplement of 1000 U daily. Medicare wellness visit up to date. Patient understands recommendations and agrees with plan. Follow-up in 3 months.

## 2022-08-02 ENCOUNTER — OFFICE VISIT (OUTPATIENT)
Dept: CARDIOLOGY CLINIC | Age: 82
End: 2022-08-02
Payer: MEDICARE

## 2022-08-02 VITALS
HEART RATE: 84 BPM | WEIGHT: 200 LBS | OXYGEN SATURATION: 96 % | DIASTOLIC BLOOD PRESSURE: 65 MMHG | SYSTOLIC BLOOD PRESSURE: 127 MMHG | BODY MASS INDEX: 30.31 KG/M2 | HEIGHT: 68 IN

## 2022-08-02 DIAGNOSIS — I47.1 SVT (SUPRAVENTRICULAR TACHYCARDIA) (HCC): ICD-10-CM

## 2022-08-02 DIAGNOSIS — U09.9 POST-COVID CHRONIC DYSPNEA: ICD-10-CM

## 2022-08-02 DIAGNOSIS — R06.09 POST-COVID CHRONIC DYSPNEA: ICD-10-CM

## 2022-08-02 DIAGNOSIS — I10 ESSENTIAL HYPERTENSION: Primary | ICD-10-CM

## 2022-08-02 DIAGNOSIS — R07.89 CHEST TIGHTNESS: ICD-10-CM

## 2022-08-02 DIAGNOSIS — I44.1 SECOND DEGREE HEART BLOCK: ICD-10-CM

## 2022-08-02 DIAGNOSIS — G47.30 SLEEP APNEA, UNSPECIFIED TYPE: ICD-10-CM

## 2022-08-02 PROCEDURE — G8536 NO DOC ELDER MAL SCRN: HCPCS | Performed by: INTERNAL MEDICINE

## 2022-08-02 PROCEDURE — 1101F PT FALLS ASSESS-DOCD LE1/YR: CPT | Performed by: INTERNAL MEDICINE

## 2022-08-02 PROCEDURE — G9717 DOC PT DX DEP/BP F/U NT REQ: HCPCS | Performed by: INTERNAL MEDICINE

## 2022-08-02 PROCEDURE — G8427 DOCREV CUR MEDS BY ELIG CLIN: HCPCS | Performed by: INTERNAL MEDICINE

## 2022-08-02 PROCEDURE — 1123F ACP DISCUSS/DSCN MKR DOCD: CPT | Performed by: INTERNAL MEDICINE

## 2022-08-02 PROCEDURE — 99214 OFFICE O/P EST MOD 30 MIN: CPT | Performed by: INTERNAL MEDICINE

## 2022-08-02 PROCEDURE — G8754 DIAS BP LESS 90: HCPCS | Performed by: INTERNAL MEDICINE

## 2022-08-02 PROCEDURE — G8417 CALC BMI ABV UP PARAM F/U: HCPCS | Performed by: INTERNAL MEDICINE

## 2022-08-02 PROCEDURE — G8752 SYS BP LESS 140: HCPCS | Performed by: INTERNAL MEDICINE

## 2022-08-02 NOTE — PROGRESS NOTES
1. Have you been to the ER, urgent care clinic since your last visit? Hospitalized since your last visit? No    2. Have you seen or consulted any other health care providers outside of the [address] [place] [address] since your last visit? Include any pap smears or colon screening.       No

## 2022-08-02 NOTE — PROGRESS NOTES
HISTORY OF PRESENT ILLNESS  Yanet Dsouza is a [de-identified] y.o. male. 2/15/2021  Patient is in today for new patient evaluation he is referred here for evaluation of shortness of breath and edema. Patient has had COVID-19 infection a few months ago since then he has been having shortness of breath feels like he is unable to take deep breath. Has been followed by pulmonary. He is seeing increased edema that is on and off. Denies any chest pain. Denies any orthopnea PND. He has no known cardiac history but arteriosclerosis was reported on CAT scan done a few years ago  3/2021  Patient seen for follow-up. Diagnostic testing results will be discussed  8/2022  Patient seen today for follow-up treatment plaints of increased shortness of breath. Also has started developing chest tightness on and off sometimes at rest last for long time almost an hour long last episode. No radiation of pain. No other associated symptoms    Follow-up  Associated symptoms include chest pain and shortness of breath. Pertinent negatives include no abdominal pain and no headaches. Review of Systems   Constitutional:  Negative for chills and fever. HENT:  Negative for nosebleeds. Eyes:  Negative for blurred vision and double vision. Respiratory:  Positive for shortness of breath. Negative for cough, hemoptysis, sputum production and wheezing. Cardiovascular:  Positive for chest pain. Negative for palpitations, orthopnea, claudication, leg swelling and PND. Gastrointestinal:  Negative for abdominal pain, heartburn, nausea and vomiting. Musculoskeletal:  Negative for myalgias. Skin:  Negative for rash. Neurological:  Negative for dizziness, weakness and headaches. Endo/Heme/Allergies:  Does not bruise/bleed easily.    Family History   Problem Relation Age of Onset    Cancer Mother     Heart Disease Father     Alcohol abuse Father     Cancer Other        Past Medical History:   Diagnosis Date    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 Dr. Jacquelin Sears. CPDD (calcium pyrophosphate deposition disease)     Depression     GERD (gastroesophageal reflux disease)     Hyperlipidemia     Hypertension     Lumbar facet arthropathy     Obstructive sleep apnea syndrome 8/17/2019    Sleep study (10/2019) severe JANNET with AHI 35 and oxygen guy 80%    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, sequela ([facility] [address] 9/30/2018    Prediabetes     Primary osteoarthritis involving multiple joints 9/6/2011    Rheumatoid arthritis ([facility] [address] 2013    negative RF; elevated anti-CCP. Dr. Ashraf Speaker. Past Surgical History:   Procedure Laterality Date    HX AMPUTATION FINGER Left     index    HX BACK SURGERY  10/23/2019    Right L4-5 hemilaminectomy, medial facetectomy, resection of synovial cyst    HX CARPAL TUNNEL RELEASE Bilateral     HX CATARACT REMOVAL Left 01/2018    HX CATARACT REMOVAL Bilateral 2018    HX COLONOSCOPY      HX HEENT      sinus, tonsillectomy    HX HERNIA REPAIR      HX MOHS PROCEDURES      bilateral     HX ORTHOPAEDIC      lef knee surgery, removed cartilage. HX ROTATOR CUFF REPAIR Right        Social History     Tobacco Use    Smoking status: Former     Packs/day: 2.00     Years: 15.00     Pack years: 30.00     Types: Cigars, Pipe, Cigarettes    Smokeless tobacco: Former     Types: Chew   Substance Use Topics    Alcohol use: Yes     Comment: rarely       Allergies   Allergen Reactions    Latex, Natural Rubber Swelling    Adhesive Tape-Silicones Rash and Itching    Aldactone [Spironolactone] Not Reported This Time     Breast tenderness    Codeine Not Reported This Time     \"Drives crazy\"    Tape [Adhesive] Rash    Tetanus Toxoid, Adsorbed Anaphylaxis    Tetanus Vaccines And Toxoid Anaphylaxis       Prior to Admission medications    Medication Sig Start Date End Date Taking?  Authorizing Provider   atorvastatin (LIPITOR) 10 mg tablet TAKE 1 TABLET BY MOUTH ONCE DAILY 7/12/22  Yes Janice Caba MD   tamsulosin [facility]) 0.4 mg capsule TAKE 1 CAPSULE BY MOUTH ONCE DAILY 5/29/22  Yes Janice Caba MD   hydrALAZINE (APRESOLINE) 25 mg tablet TAKE 1 TABLET BY MOUTH 2 TIMES A DAY (APRESOLINE) 5/16/22  Yes Yeison Barros MD   lisinopriL (PRINIVIL, ZESTRIL) 40 mg tablet TAKE 1 TABLET BY MOUTH ONCE DAILY 3/21/22  Yes Janice Caba MD   torsemide (DEMADEX) 20 mg tablet TAKE 1 TABLET BY MOUTH 2 TIMES A DAY 2/18/22  Yes iTsh Yeh NP   omeprazole (PRILOSEC) 20 mg capsule TAKE 1 CAPSULE BY MOUTH ONCE DAILY 10/6/21  Yes Janice Caba MD   amLODIPine (NORVASC) 5 mg tablet Take 1 Tablet by mouth daily. 9/28/21  Yes Janice Caba MD   hydrOXYchloroQUINE (PLAQUENIL) 200 mg tablet Take 400 mg by mouth daily. 7/16/20  Yes Provider, Historical   mineral oil liquid Take 30 mL by mouth daily as needed for Constipation. Yes Provider, Historical   cycloSPORINE (RESTASIS) 0.05 % dpet Administer 1 Drop to both eyes nightly. Yes Provider, Historical   colchicine (MITIGARE) 0.6 mg capsule Take 0.6 mg by mouth every other day. Yes Provider, Historical   cholecalciferol, vitamin D3, 50 mcg (2,000 unit) tab Take 2,000 Units by mouth daily. Yes Provider, Historical   diclofenac (VOLTAREN) 1 % gel Apply 4 g to affected area four (4) times daily. Yes Provider, Historical   LUMIGAN 0.01 % ophthalmic drops Administer 1 Drop to both eyes nightly. 5/19/14  Yes Provider, Historical   MULTIVITS/IRON FUM/FA/D3/LYCOP (MULTI FOR HIM PO) Take  by mouth daily. Yes Provider, Historical         Visit Vitals  /65 (BP 1 Location: Left upper arm, BP Patient Position: Sitting, BP Cuff Size: Adult)   Pulse 84   Ht 5' 8\" (1.727 m)   Wt 90.7 kg (200 lb)   SpO2 96%   BMI 30.41 kg/m²           Physical Exam  Constitutional:       Appearance: He is well-developed. HENT:      Head: Normocephalic and atraumatic.    Eyes:      Conjunctiva/sclera: Conjunctivae normal. Neck:      Thyroid: No thyromegaly. Vascular: No JVD. Trachea: No tracheal deviation. Cardiovascular:      Rate and Rhythm: Normal rate and regular rhythm. Heart sounds: Normal heart sounds. No murmur heard. No friction rub. No gallop. Pulmonary:      Effort: No respiratory distress. Breath sounds: Normal breath sounds. No wheezing or rales. Chest:      Chest wall: No tenderness. Abdominal:      Palpations: Abdomen is soft. Tenderness: There is no abdominal tenderness. Musculoskeletal:      Cervical back: Neck supple. Skin:     General: Skin is warm and dry. Neurological:      Mental Status: He is alert and oriented to person, place, and time. Mr. eJm Alvarez has a reminder for a \"due or due soon\" health maintenance. I have asked that he contact his primary care provider for follow-up on this health maintenance. No flowsheet data found. I have personally reviewed patient's records available from   hospital and other providers and incorporated findings in patient care. Notes, labs, CT scan, chest x-ray, EKG, echo      Chest x-ray-9/2020  Very mild peribronchial thickening, similar to prior exam. No new focal  consolidation. Additional findings as discussed. Interpretation Summary 2018    Left Ventricle: Normal cavity size, wall thickness, systolic function (ejection fraction normal) and diastolic function. Estimated left ventricular ejection fraction is 56 - 60%. Visually measured ejection fraction. No regional wall motion abnormality noted. Tricuspid regurgitation is inadequate for estimation of right ventricular systolic pressure. Interpretation Summary 9/2020    LV: Estimated LVEF is 55 - 60%. Visually measured ejection fraction. Normal cavity size, wall thickness and systolic function (ejection fraction normal). Wall motion: normal. Inconclusive left ventricular diastolic function. MV: Mild mitral valve regurgitation is present.       Interpretation Summary 2/2021    LV: Estimated LVEF is 60 - 65%. Normal cavity size, wall thickness and systolic function (ejection fraction normal). Wall motion: normal. Mild (grade 1) left ventricular diastolic dysfunction. LA: Left Atrium volume index is 29.49 mL/m2. MV: Mitral annular calcification. Mild mitral valve regurgitation is present. PV: Mild pulmonic valve regurgitation is present. PA: Pulmonary arterial systolic pressure is 23 mmHg. Procedure Conclusion 2/2021    Nuclear Stress Test    Nuclear Cardiac Spect Rest then Gated Stress study. [place] was used as the stressing method and agent. ([place] given via a 10 - 20 sec injection.)   One day myocardial perfusion study. Date: 2/24/2021. Left ventricular perfusion is normal. Myocardial perfusion imaging supports a low risk stress test.   There is no prior study available for comparison. .           5/5/2021  MCOT-sinus rhythm rare PAC PVC. No heart blocks. 2 SVT episode. Longest 20 beats at 151 bpm  Assessment         ICD-10-CM ICD-9-CM    1. Essential hypertension  I10 401.9 ECHO ADULT COMPLETE      NUCLEAR CARDIAC STRESS TEST      CT CHEST W CONT    Stable continue treatment monitor      2. Second degree heart block  I44.1 426.13     Stable asymptomatic monitor      3. SVT (supraventricular tachycardia) (HCC)  I47.1 427.89     Stable continue treatment      4. Sleep apnea, unspecified type  G47.30 780.57 CT CHEST W CONT    Continue current treatment. Stable monitor follow-up with PCP      5. Chest tightness  R07.89 786.59 ECHO ADULT COMPLETE      NUCLEAR CARDIAC STRESS TEST      CT CHEST W CONT      6. Post-COVID chronic dyspnea  R06.09 786.09 ECHO ADULT COMPLETE    U09.9 139.8 NUCLEAR CARDIAC STRESS TEST      CT CHEST W CONT      2/2021  Seen for increased shortness of breath and edema. Significant edema of feet. Change diuretic to Demadex and metolazone follow BMP check echo and nuclear stress test  2020  Seen after recent follow-up.   Had occasional lightheadedness low blood pressure today discontinue hydralazine. Monitor blood pressure at home and decide on adjusting other medication. Use of diuretic as improved edema. Continue use as being done. Sleep study showed Mobitz 1 block throughout the study. Will get event monitor to make sure he does not have significant pauses. These are likely related to his sleep apnea. Okay to use what ever more of CPAP or BiPAP treatment that patient requires from cardiac standpoint. Normal ejection fraction and negative stress test    5/2021  Cardiac status stable blood pressure elevated add hydralazine. No further episode of Mobitz 1 block noted on MCOT. Compliant with CPAP. Episode of SVT 20 beat. Asymptomatic. Will not start any different medication at present. Symptoms of shortness of breath improving  11/2021  Stable cardiac status. Edema stable. Palpitation and symptoms are controlled. Continue treatment  8/2022  New onset chest tightness and pressure rule out ischemia. Increase shortness of breath with concern of post-COVID issues. Set up for CT scan, echo and nuclear stress test follow-up after that  There are no discontinued medications. Orders Placed This Encounter    CT CHEST W CONT     Standing Status:   Future     Standing Expiration Date:   9/2/2023     Order Specific Question:   STAT Creatinine as indicated     Answer:   Yes    ECHO ADULT COMPLETE     Standing Status:   Future     Standing Expiration Date:   8/2/2023     Order Specific Question:   Contrast Enhancement (Bubble Study, Definity, Optison) may be used if criteria listed in established evidence-based protocol has been identified. Answer:   Yes         Follow-up and Dispositions    Return for F/u after tests, Follow-up with Jose Miguel.

## 2022-08-11 ENCOUNTER — HOSPITAL ENCOUNTER (OUTPATIENT)
Dept: CT IMAGING | Age: 82
Discharge: HOME | End: 2022-08-11
Attending: INTERNAL MEDICINE
Payer: MEDICARE

## 2022-08-11 DIAGNOSIS — R06.09 POST-COVID CHRONIC DYSPNEA: ICD-10-CM

## 2022-08-11 DIAGNOSIS — I10 ESSENTIAL HYPERTENSION: ICD-10-CM

## 2022-08-11 DIAGNOSIS — U09.9 POST-COVID CHRONIC DYSPNEA: ICD-10-CM

## 2022-08-11 DIAGNOSIS — G47.30 SLEEP APNEA, UNSPECIFIED TYPE: ICD-10-CM

## 2022-08-11 DIAGNOSIS — R07.89 CHEST TIGHTNESS: ICD-10-CM

## 2022-08-11 LAB — CREAT UR-MCNC: 0.9 MG/DL (ref 0.6–1.3)

## 2022-08-11 PROCEDURE — 82565 ASSAY OF CREATININE: CPT

## 2022-08-11 PROCEDURE — 71260 CT THORAX DX C+: CPT

## 2022-08-11 PROCEDURE — 74011000636 HC RX REV CODE- 636: Performed by: INTERNAL MEDICINE

## 2022-08-11 RX ADMIN — IOPAMIDOL 80 ML: 612 INJECTION, SOLUTION INTRAVENOUS at 08:26

## 2022-08-16 ENCOUNTER — TELEPHONE (OUTPATIENT)
Dept: CARDIOLOGY CLINIC | Age: 82
End: 2022-08-16

## 2022-08-16 NOTE — TELEPHONE ENCOUNTER
----- Message from Stephan Stewart MD sent at 8/16/2022 12:31 PM EDT -----  Lab/test  reviewed  No significant abnormality

## 2022-08-16 NOTE — TELEPHONE ENCOUNTER
Spoke with patient regarding lab/test per Dr. Seema Carlson. Test reviewed. No significant abnormality. He voices understanding and acceptance of this advice and will call back if any further questions or concerns.

## 2022-09-12 RX ORDER — TORSEMIDE 20 MG/1
20 TABLET ORAL 2 TIMES DAILY
Qty: 180 TABLET | Refills: 3 | Status: SHIPPED | OUTPATIENT
Start: 2022-09-12

## 2022-09-12 NOTE — TELEPHONE ENCOUNTER
This was last given by cardiology and is pending in another encounter to their office. Please sign if appropriate. Last Visit: 7/21/22 with MD Maxwell Jay  Next Appointment: 10/20/22 with MD [place]  Previous Refill Encounter(s): 2/18/22 #60 with 3 refills    Requested Prescriptions     Pending Prescriptions Disp Refills    torsemide (DEMADEX) 20 mg tablet 60 Tablet 3     Sig: Take 1 Tablet by mouth two (2) times a day. For [id number] in place:   Recommendation Provided To:    Intervention Detail: New Rx: 1, reason: Patient Preference  Gap Closed?:   Intervention Accepted By:   Time Spent (min): 5

## 2022-09-15 ENCOUNTER — OFFICE VISIT (OUTPATIENT)
Dept: CARDIOLOGY CLINIC | Age: 82
End: 2022-09-15
Payer: MEDICARE

## 2022-09-15 VITALS
BODY MASS INDEX: 30.46 KG/M2 | WEIGHT: 201 LBS | HEART RATE: 88 BPM | DIASTOLIC BLOOD PRESSURE: 69 MMHG | HEIGHT: 68 IN | SYSTOLIC BLOOD PRESSURE: 125 MMHG | OXYGEN SATURATION: 98 %

## 2022-09-15 DIAGNOSIS — G47.30 SLEEP APNEA, UNSPECIFIED TYPE: ICD-10-CM

## 2022-09-15 DIAGNOSIS — U09.9 POST-COVID CHRONIC DYSPNEA: ICD-10-CM

## 2022-09-15 DIAGNOSIS — I44.1 SECOND DEGREE HEART BLOCK: ICD-10-CM

## 2022-09-15 DIAGNOSIS — R06.09 POST-COVID CHRONIC DYSPNEA: ICD-10-CM

## 2022-09-15 DIAGNOSIS — I47.1 SVT (SUPRAVENTRICULAR TACHYCARDIA) (HCC): ICD-10-CM

## 2022-09-15 DIAGNOSIS — R07.89 CHEST TIGHTNESS: ICD-10-CM

## 2022-09-15 DIAGNOSIS — I10 ESSENTIAL HYPERTENSION: Primary | ICD-10-CM

## 2022-09-15 PROCEDURE — G8754 DIAS BP LESS 90: HCPCS | Performed by: NURSE PRACTITIONER

## 2022-09-15 PROCEDURE — 1123F ACP DISCUSS/DSCN MKR DOCD: CPT | Performed by: NURSE PRACTITIONER

## 2022-09-15 PROCEDURE — G8752 SYS BP LESS 140: HCPCS | Performed by: NURSE PRACTITIONER

## 2022-09-15 PROCEDURE — G8417 CALC BMI ABV UP PARAM F/U: HCPCS | Performed by: NURSE PRACTITIONER

## 2022-09-15 PROCEDURE — G8427 DOCREV CUR MEDS BY ELIG CLIN: HCPCS | Performed by: NURSE PRACTITIONER

## 2022-09-15 PROCEDURE — 1101F PT FALLS ASSESS-DOCD LE1/YR: CPT | Performed by: NURSE PRACTITIONER

## 2022-09-15 PROCEDURE — 99214 OFFICE O/P EST MOD 30 MIN: CPT | Performed by: NURSE PRACTITIONER

## 2022-09-15 PROCEDURE — G8536 NO DOC ELDER MAL SCRN: HCPCS | Performed by: NURSE PRACTITIONER

## 2022-09-15 PROCEDURE — G9717 DOC PT DX DEP/BP F/U NT REQ: HCPCS | Performed by: NURSE PRACTITIONER

## 2022-09-15 NOTE — PROGRESS NOTES
HISTORY OF PRESENT ILLNESS  Yanet Dsouza is a [de-identified] y.o. male. 2/15/2021  Patient is in today for new patient evaluation he is referred here for evaluation of shortness of breath and edema. Patient has had COVID-19 infection a few months ago since then he has been having shortness of breath feels like he is unable to take deep breath. Has been followed by pulmonary. He is seeing increased edema that is on and off. Denies any chest pain. Denies any orthopnea PND. He has no known cardiac history but arteriosclerosis was reported on CAT scan done a few years ago  3/2021  Patient seen for follow-up. Diagnostic testing results will be discussed  8/2022  Patient seen today for follow-up treatment plaints of increased shortness of breath. Also has started developing chest tightness on and off sometimes at rest last for long time almost an hour long last episode. No radiation of pain. No other associated symptoms  9/2022  Patient seen in follow-up for episode of chest tightness. He reports symptoms have resolved. He reports ongoing shortness of breath since COVID-19 infection, which is stable. He denies palpitations. He reports edema has resolved with use of diuretics. He complains of overall fatigue and decreased energy. He reports unable to tolerate CPAP mask and sleeps approximately 4 hours per night. Follow-up  Associated symptoms include shortness of breath. Pertinent negatives include no chest pain, no abdominal pain and no headaches. Review of Systems   Constitutional:  Positive for malaise/fatigue. Negative for chills and fever. HENT:  Negative for nosebleeds. Eyes:  Negative for blurred vision and double vision. Respiratory:  Positive for shortness of breath. Negative for cough, hemoptysis, sputum production and wheezing. Cardiovascular:  Negative for chest pain, palpitations, orthopnea, claudication, leg swelling and PND.    Gastrointestinal:  Negative for abdominal pain, heartburn, nausea and vomiting. Musculoskeletal:  Negative for myalgias. Skin:  Negative for rash. Neurological:  Negative for dizziness, weakness and headaches. Endo/Heme/Allergies:  Does not bruise/bleed easily. Psychiatric/Behavioral:  The patient has insomnia. Family History   Problem Relation Age of Onset    Cancer Mother     Heart Disease Father     Alcohol abuse Father     Cancer Other        Past Medical History:   Diagnosis Date    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 Dr. Rossana Gomez. 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 [place] [address] 9/30/2018    Prediabetes     Primary osteoarthritis involving multiple joints 9/6/2011    Rheumatoid arthritis ([facility] [place] [address] 2013    negative RF; elevated anti-CCP. Dr. Bobo Dean. Past Surgical History:   Procedure Laterality Date    HX AMPUTATION FINGER Left     index    HX BACK SURGERY  10/23/2019    Right L4-5 hemilaminectomy, medial facetectomy, resection of synovial cyst    HX CARPAL TUNNEL RELEASE Bilateral     HX CATARACT REMOVAL Left 01/2018    HX CATARACT REMOVAL Bilateral 2018    HX COLONOSCOPY      HX HEENT      sinus, tonsillectomy    HX HERNIA REPAIR      HX MOHS PROCEDURES      bilateral     HX ORTHOPAEDIC      lef knee surgery, removed cartilage.     HX ROTATOR CUFF REPAIR Right        Social History     Tobacco Use    Smoking status: Former     Packs/day: 2.00     Years: 15.00     Pack years: 30.00     Types: Cigars, Pipe, Cigarettes    Smokeless tobacco: Former     Types: Chew   Substance Use Topics    Alcohol use: Yes     Comment: rarely       Allergies   Allergen Reactions Latex, Natural Rubber Swelling    Adhesive Tape-Silicones Rash and Itching    Aldactone [Spironolactone] Not Reported This Time     Breast tenderness    Codeine Not Reported This Time     \"Drives crazy\"    Tape [Adhesive] Rash    Tetanus Toxoid, Adsorbed Anaphylaxis    Tetanus Vaccines And Toxoid Anaphylaxis       Prior to Admission medications    Medication Sig Start Date End Date Taking? Authorizing Provider   torsemide (DEMADEX) 20 mg tablet Take 1 Tablet by mouth two (2) times a day. 9/12/22  Yes Aline Zhang MD   atorvastatin (LIPITOR) 10 mg tablet TAKE 1 TABLET BY MOUTH ONCE DAILY 7/12/22  Yes Aline Zhang MD   tamsulosin (FLOMAX) 0.4 mg capsule TAKE 1 CAPSULE BY MOUTH ONCE DAILY 5/29/22  Yes Aline Zhang MD   hydrALAZINE (APRESOLINE) 25 mg tablet TAKE 1 TABLET BY MOUTH 2 TIMES A DAY (APRESOLINE) 5/16/22  Yes Sue Farley MD   lisinopriL (PRINIVIL, ZESTRIL) 40 mg tablet TAKE 1 TABLET BY MOUTH ONCE DAILY 3/21/22  Yes Aline Zhang MD   omeprazole (PRILOSEC) 20 mg capsule TAKE 1 CAPSULE BY MOUTH ONCE DAILY 10/6/21  Yes Aline Zhang MD   amLODIPine (NORVASC) 5 mg tablet Take 1 Tablet by mouth daily. 9/28/21  Yes Aline Zhang MD   hydrOXYchloroQUINE (PLAQUENIL) 200 mg tablet Take 400 mg by mouth daily. 7/16/20  Yes Provider, Historical   mineral oil liquid Take 30 mL by mouth daily as needed for Constipation. Yes Provider, Historical   cycloSPORINE (RESTASIS) 0.05 % dpet Administer 1 Drop to both eyes nightly. Yes Provider, Historical   colchicine (MITIGARE) 0.6 mg capsule Take 0.6 mg by mouth every other day. Yes Provider, Historical   cholecalciferol, vitamin D3, 50 mcg (2,000 unit) tab Take 2,000 Units by mouth daily. Yes Provider, Historical   diclofenac (VOLTAREN) 1 % gel Apply 4 g to affected area four (4) times daily. Yes Provider, Historical   LUMIGAN 0.01 % ophthalmic drops Administer 1 Drop to both eyes nightly.  5/19/14  Yes Provider, Historical MULTIVITS/IRON FUM/FA/D3/LYCOP (MULTI FOR HIM PO) Take  by mouth daily. Yes Provider, Historical         Visit Vitals  /69   Pulse 88   Ht 5' 8\" (1.727 m)   Wt 91.2 kg (201 lb)   SpO2 98%   BMI 30.56 kg/m²             Physical Exam  Vitals and nursing note reviewed. Constitutional:       Appearance: He is well-developed. HENT:      Head: Normocephalic and atraumatic. Eyes:      Conjunctiva/sclera: Conjunctivae normal.   Neck:      Thyroid: No thyromegaly. Vascular: No JVD. Trachea: No tracheal deviation. Cardiovascular:      Rate and Rhythm: Normal rate and regular rhythm. Heart sounds: Normal heart sounds. No murmur heard. No friction rub. No gallop. Pulmonary:      Effort: No respiratory distress. Breath sounds: Normal breath sounds. No wheezing or rales. Chest:      Chest wall: No tenderness. Abdominal:      Palpations: Abdomen is soft. Tenderness: There is no abdominal tenderness. Musculoskeletal:      Cervical back: Neck supple. Right lower leg: No edema. Left lower leg: No edema. Skin:     General: Skin is warm and dry. Neurological:      Mental Status: He is alert and oriented to person, place, and time. Mr. Eder Palacios has a reminder for a \"due or due soon\" health maintenance. I have asked that he contact his primary care provider for follow-up on this health maintenance. No flowsheet data found. I have personally reviewed patient's records available from   hospital and other providers and incorporated findings in patient care. Notes, labs, CT scan, chest x-ray, EKG, echo      Chest x-ray-9/2020  Very mild peribronchial thickening, similar to prior exam. No new focal  consolidation. Additional findings as discussed. Interpretation Summary 2018    Left Ventricle: Normal cavity size, wall thickness, systolic function (ejection fraction normal) and diastolic function. Estimated left ventricular ejection fraction is 56 - 60%.  Visually measured ejection fraction. No regional wall motion abnormality noted. Tricuspid regurgitation is inadequate for estimation of right ventricular systolic pressure. Interpretation Summary 9/2020    LV: Estimated LVEF is 55 - 60%. Visually measured ejection fraction. Normal cavity size, wall thickness and systolic function (ejection fraction normal). Wall motion: normal. Inconclusive left ventricular diastolic function. MV: Mild mitral valve regurgitation is present. Interpretation Summary 2/2021    LV: Estimated LVEF is 60 - 65%. Normal cavity size, wall thickness and systolic function (ejection fraction normal). Wall motion: normal. Mild (grade 1) left ventricular diastolic dysfunction. LA: Left Atrium volume index is 29.49 mL/m2. MV: Mitral annular calcification. Mild mitral valve regurgitation is present. PV: Mild pulmonic valve regurgitation is present. PA: Pulmonary arterial systolic pressure is 23 mmHg. Procedure Conclusion 2/2021    Nuclear Stress Test    Nuclear Cardiac Spect Rest then Gated Stress study. Beena Pean was used as the stressing method and agent. (Beena Pean given via a 10 - 20 sec injection.)   One day myocardial perfusion study. Date: 2/24/2021. Left ventricular perfusion is normal. Myocardial perfusion imaging supports a low risk stress test.   There is no prior study available for comparison. .     Chest CT 8/2022  IMPRESSION     No CT finding for an acute process. Stable examination from 2019. Echo 8/17/2022  Interpretation Summary    Left Ventricle: Normal left ventricular systolic function with a visually estimated EF of 55 - 60%. Left ventricle size is normal. Normal wall thickness. Normal wall motion. Normal diastolic function. Echo Findings    Left Ventricle Normal left ventricular systolic function with a visually estimated EF of 55 - 60%. Left ventricle size is normal. Normal wall thickness. Normal wall motion. Normal diastolic function.    Left Atrium Left atrium size is normal. LA Vol Index A/L is 25 mL/m2. Right Ventricle Right ventricle size is normal. Normal wall thickness. Normal systolic function. Right Atrium Right atrium size is normal.   Aortic Valve Valve structure is normal. No regurgitation. No stenosis. Mitral Valve Valve structure is normal. No regurgitation. No stenosis noted. Tricuspid Valve Valve structure is normal. Trace regurgitation. No stenosis noted. Pulmonic Valve Valve structure is normal. Mild regurgitation. No stenosis noted. Pulmonary Artery Pulmonary hypertension not present. Aorta Normal sized aortic root. IVC/Hepatic Veins IVC diameter is less than or equal to 21 mm and decreases greater than 50% during inspiration; therefore the estimated right atrial pressure is normal (~3 mmHg). Pericardium No pericardial effusion. 8/2022  Nuclear stress test  Interpretation Summary    Nuclear Findings: Normal left ventricular systolic function post-stress. LVEF measures 56%. Nuclear Findings: Normal pharmacological myocardial perfusion study. LVEF measures 56%. Nuclear Findings: LVEF measures 56%. LV perfusion is normal.    ECG: Resting ECG demonstrates normal sinus rhythm. ECG: Stress ECG was negative for ischemia. Stress Test: A pharmacological stress test was performed using lexiscan. Hemodynamics are adequate for diagnosis. Blood pressure demonstrated a normal response and heart rate demonstrated a blunted response to stress. The patient's heart rate recovery was normal.    5/5/2021  MCOT-sinus rhythm rare PAC PVC. No heart blocks. 2 SVT episode. Longest 20 beats at 151 bpm  Assessment         ICD-10-CM ICD-9-CM    1. Essential hypertension  I10 401.9     Stable continue treatment monitor      2. SVT (supraventricular tachycardia) (HCC)  I47.1 427.89     Stable continue treatment      3. Second degree heart block  I44.1 426.13     Stable asymptomatic monitor      4.  Sleep apnea, unspecified type  G47.30 780.57     Continue current treatment. Stable monitor follow-up with PCP      5. Post-COVID chronic dyspnea  R06.09 786.09     U09.9 139.8       6. Chest tightness  R07.89 786.59     Normal nuclear stress test      2/2021  Seen for increased shortness of breath and edema. Significant edema of feet. Change diuretic to Demadex and metolazone follow BMP check echo and nuclear stress test  2020  Seen after recent follow-up. Had occasional lightheadedness low blood pressure today discontinue hydralazine. Monitor blood pressure at home and decide on adjusting other medication. Use of diuretic as improved edema. Continue use as being done. Sleep study showed Mobitz 1 block throughout the study. Will get event monitor to make sure he does not have significant pauses. These are likely related to his sleep apnea. Okay to use what ever more of CPAP or BiPAP treatment that patient requires from cardiac standpoint. Normal ejection fraction and negative stress test    5/2021  Cardiac status stable blood pressure elevated add hydralazine. No further episode of Mobitz 1 block noted on MCOT. Compliant with CPAP. Episode of SVT 20 beat. Asymptomatic. Will not start any different medication at present. Symptoms of shortness of breath improving  11/2021  Stable cardiac status. Edema stable. Palpitation and symptoms are controlled. Continue treatment  8/2022  New onset chest tightness and pressure rule out ischemia. Increase shortness of breath with concern of post-COVID issues. Set up for CT scan, echo and nuclear stress test follow-up after that  9/2022  Cardiac status is stable, denies, re-current episodes of chest tightness. He has chronic shortness of breath - advised to follow up with Pulmonary for CPAP and fitting. Testing reviewed and discussed with patient stress test negative for ischemia, echo with normal LV function no valvular pathology. Blood pressure is controlled, continue current medications. Advised to follow up with PCP regarding insomnia. There are no discontinued medications. No orders of the defined types were placed in this encounter. Follow-up and Dispositions    Return in about 6 months (around 3/15/2023) for Follow up with Dr. Dorothy Sanchez.

## 2022-09-15 NOTE — PROGRESS NOTES
1. Have you been to the ER, urgent care clinic since your last visit? Hospitalized since your last visit?     no    2. Have you seen or consulted any other health care providers outside of the [address] [place] [address] since your last visit? Include any pap smears or colon screening.       No

## 2022-09-19 DIAGNOSIS — G47.33 OSA TREATED WITH BIPAP: Primary | ICD-10-CM

## 2022-09-24 RX ORDER — AMLODIPINE BESYLATE 5 MG/1
5 TABLET ORAL DAILY
Qty: 90 TABLET | Refills: 3 | Status: SHIPPED | OUTPATIENT
Start: 2022-09-24

## 2022-10-11 RX ORDER — OMEPRAZOLE 20 MG/1
CAPSULE, DELAYED RELEASE ORAL
Qty: 90 CAPSULE | Refills: 3 | Status: SHIPPED | OUTPATIENT
Start: 2022-10-11

## 2022-10-14 ENCOUNTER — APPOINTMENT (OUTPATIENT)
Dept: INTERNAL MEDICINE CLINIC | Age: 82
End: 2022-10-14

## 2022-10-14 ENCOUNTER — HOSPITAL ENCOUNTER (OUTPATIENT)
Dept: LAB | Age: 82
Discharge: HOME | End: 2022-10-14
Payer: MEDICARE

## 2022-10-14 DIAGNOSIS — I10 PRIMARY HYPERTENSION: ICD-10-CM

## 2022-10-14 DIAGNOSIS — I44.1 MOBITZ TYPE 1 SECOND DEGREE ATRIOVENTRICULAR BLOCK: ICD-10-CM

## 2022-10-14 DIAGNOSIS — R73.01 IMPAIRED FASTING GLUCOSE: ICD-10-CM

## 2022-10-14 DIAGNOSIS — E55.9 VITAMIN D DEFICIENCY: ICD-10-CM

## 2022-10-14 DIAGNOSIS — E78.5 HYPERLIPIDEMIA, UNSPECIFIED HYPERLIPIDEMIA TYPE: ICD-10-CM

## 2022-10-14 DIAGNOSIS — R73.03 PREDIABETES: ICD-10-CM

## 2022-10-14 DIAGNOSIS — R60.0 BILATERAL LOWER EXTREMITY EDEMA: ICD-10-CM

## 2022-10-14 LAB
25(OH)D3 SERPL-MCNC: 68.3 NG/ML (ref 30–100)
ALBUMIN SERPL-MCNC: 4 G/DL (ref 3.4–5)
ALBUMIN/GLOB SERPL: 1.5 {RATIO} (ref 0.8–1.7)
ALP SERPL-CCNC: 98 U/L (ref 45–117)
ALT SERPL-CCNC: 33 U/L (ref 16–61)
ANION GAP SERPL CALC-SCNC: 5 MMOL/L (ref 3–18)
AST SERPL-CCNC: 22 U/L (ref 10–38)
BASOPHILS # BLD: 0 K/UL (ref 0–0.1)
BASOPHILS NFR BLD: 1 % (ref 0–2)
BILIRUB SERPL-MCNC: 0.4 MG/DL (ref 0.2–1)
BUN SERPL-MCNC: 14 MG/DL (ref 7–18)
BUN/CREAT SERPL: 15 (ref 12–20)
CALCIUM SERPL-MCNC: 9.4 MG/DL (ref 8.5–10.1)
CHLORIDE SERPL-SCNC: 107 MMOL/L (ref 100–111)
CHOLEST SERPL-MCNC: 170 MG/DL
CO2 SERPL-SCNC: 29 MMOL/L (ref 21–32)
CREAT SERPL-MCNC: 0.96 MG/DL (ref 0.6–1.3)
CREAT UR-MCNC: 52 MG/DL (ref 30–125)
DIFFERENTIAL METHOD BLD: ABNORMAL
EOSINOPHIL # BLD: 0.4 K/UL (ref 0–0.4)
EOSINOPHIL NFR BLD: 7 % (ref 0–5)
ERYTHROCYTE [DISTWIDTH] IN BLOOD BY AUTOMATED COUNT: 13.5 % (ref 11.6–14.5)
EST. AVERAGE GLUCOSE BLD GHB EST-MCNC: 111 MG/DL
GLOBULIN SER CALC-MCNC: 2.6 G/DL (ref 2–4)
GLUCOSE SERPL-MCNC: 107 MG/DL (ref 74–99)
HBA1C MFR BLD: 5.5 % (ref 4.2–5.6)
HCT VFR BLD AUTO: 44.4 % (ref 36–48)
HDLC SERPL-MCNC: 62 MG/DL (ref 40–60)
HDLC SERPL: 2.7 {RATIO} (ref 0–5)
HGB BLD-MCNC: 14.2 G/DL (ref 13–16)
IMM GRANULOCYTES # BLD AUTO: 0 K/UL (ref 0–0.04)
IMM GRANULOCYTES NFR BLD AUTO: 0 % (ref 0–0.5)
LDLC SERPL CALC-MCNC: 96.4 MG/DL (ref 0–100)
LIPID PROFILE,FLP: ABNORMAL
LYMPHOCYTES # BLD: 1.8 K/UL (ref 0.9–3.6)
LYMPHOCYTES NFR BLD: 30 % (ref 21–52)
MAGNESIUM SERPL-MCNC: 2.2 MG/DL (ref 1.6–2.6)
MCH RBC QN AUTO: 32.3 PG (ref 24–34)
MCHC RBC AUTO-ENTMCNC: 32 G/DL (ref 31–37)
MCV RBC AUTO: 100.9 FL (ref 78–100)
MICROALBUMIN UR-MCNC: 0.52 MG/DL (ref 0–3)
MICROALBUMIN/CREAT UR-RTO: 10 MG/G (ref 0–30)
MONOCYTES # BLD: 0.7 K/UL (ref 0.05–1.2)
MONOCYTES NFR BLD: 11 % (ref 3–10)
NEUTS SEG # BLD: 3.2 K/UL (ref 1.8–8)
NEUTS SEG NFR BLD: 52 % (ref 40–73)
NRBC # BLD: 0 K/UL (ref 0–0.01)
NRBC BLD-RTO: 0 PER 100 WBC
PLATELET # BLD AUTO: 215 K/UL (ref 135–420)
PMV BLD AUTO: 10.3 FL (ref 9.2–11.8)
POTASSIUM SERPL-SCNC: 4.1 MMOL/L (ref 3.5–5.5)
PROT SERPL-MCNC: 6.6 G/DL (ref 6.4–8.2)
RBC # BLD AUTO: 4.4 M/UL (ref 4.35–5.65)
SODIUM SERPL-SCNC: 141 MMOL/L (ref 136–145)
TRIGL SERPL-MCNC: 58 MG/DL (ref ?–150)
VLDLC SERPL CALC-MCNC: 11.6 MG/DL
WBC # BLD AUTO: 6 K/UL (ref 4.6–13.2)

## 2022-10-14 PROCEDURE — 80053 COMPREHEN METABOLIC PANEL: CPT

## 2022-10-14 PROCEDURE — 36415 COLL VENOUS BLD VENIPUNCTURE: CPT

## 2022-10-14 PROCEDURE — 82306 VITAMIN D 25 HYDROXY: CPT

## 2022-10-14 PROCEDURE — 80061 LIPID PANEL: CPT

## 2022-10-14 PROCEDURE — 83036 HEMOGLOBIN GLYCOSYLATED A1C: CPT

## 2022-10-14 PROCEDURE — 85025 COMPLETE CBC W/AUTO DIFF WBC: CPT

## 2022-10-14 PROCEDURE — 82043 UR ALBUMIN QUANTITATIVE: CPT

## 2022-10-14 PROCEDURE — 83735 ASSAY OF MAGNESIUM: CPT

## 2022-10-20 ENCOUNTER — OFFICE VISIT (OUTPATIENT)
Dept: INTERNAL MEDICINE CLINIC | Age: 82
End: 2022-10-20
Payer: MEDICARE

## 2022-10-20 VITALS
DIASTOLIC BLOOD PRESSURE: 78 MMHG | HEIGHT: 68 IN | BODY MASS INDEX: 30.92 KG/M2 | OXYGEN SATURATION: 98 % | WEIGHT: 204 LBS | RESPIRATION RATE: 16 BRPM | HEART RATE: 70 BPM | SYSTOLIC BLOOD PRESSURE: 124 MMHG | TEMPERATURE: 97.2 F

## 2022-10-20 DIAGNOSIS — M11.20 CPDD (CALCIUM PYROPHOSPHATE DEPOSITION DISEASE): ICD-10-CM

## 2022-10-20 DIAGNOSIS — M06.042 RHEUMATOID ARTHRITIS INVOLVING BOTH HANDS WITH NEGATIVE RHEUMATOID FACTOR (HCC): ICD-10-CM

## 2022-10-20 DIAGNOSIS — Z85.828 HISTORY OF NONMELANOMA SKIN CANCER: ICD-10-CM

## 2022-10-20 DIAGNOSIS — I10 PRIMARY HYPERTENSION: Primary | ICD-10-CM

## 2022-10-20 DIAGNOSIS — M15.9 PRIMARY OSTEOARTHRITIS INVOLVING MULTIPLE JOINTS: ICD-10-CM

## 2022-10-20 DIAGNOSIS — R73.03 PREDIABETES: ICD-10-CM

## 2022-10-20 DIAGNOSIS — I44.1 MOBITZ TYPE 1 SECOND DEGREE ATRIOVENTRICULAR BLOCK: ICD-10-CM

## 2022-10-20 DIAGNOSIS — Z23 NEEDS FLU SHOT: ICD-10-CM

## 2022-10-20 DIAGNOSIS — M06.041 RHEUMATOID ARTHRITIS INVOLVING BOTH HANDS WITH NEGATIVE RHEUMATOID FACTOR (HCC): ICD-10-CM

## 2022-10-20 DIAGNOSIS — E78.5 HYPERLIPIDEMIA, UNSPECIFIED HYPERLIPIDEMIA TYPE: ICD-10-CM

## 2022-10-20 DIAGNOSIS — E55.9 VITAMIN D DEFICIENCY: ICD-10-CM

## 2022-10-20 DIAGNOSIS — E66.09 CLASS 1 OBESITY DUE TO EXCESS CALORIES WITH SERIOUS COMORBIDITY AND BODY MASS INDEX (BMI) OF 30.0 TO 30.9 IN ADULT: ICD-10-CM

## 2022-10-20 DIAGNOSIS — Z86.16 HISTORY OF 2019 NOVEL CORONAVIRUS DISEASE (COVID-19): ICD-10-CM

## 2022-10-20 DIAGNOSIS — F33.0 MILD EPISODE OF RECURRENT MAJOR DEPRESSIVE DISORDER (HCC): ICD-10-CM

## 2022-10-20 DIAGNOSIS — G47.33 OSA TREATED WITH BIPAP: ICD-10-CM

## 2022-10-20 PROCEDURE — 1101F PT FALLS ASSESS-DOCD LE1/YR: CPT | Performed by: INTERNAL MEDICINE

## 2022-10-20 PROCEDURE — 1123F ACP DISCUSS/DSCN MKR DOCD: CPT | Performed by: INTERNAL MEDICINE

## 2022-10-20 PROCEDURE — G8754 DIAS BP LESS 90: HCPCS | Performed by: INTERNAL MEDICINE

## 2022-10-20 PROCEDURE — G9717 DOC PT DX DEP/BP F/U NT REQ: HCPCS | Performed by: INTERNAL MEDICINE

## 2022-10-20 PROCEDURE — G8536 NO DOC ELDER MAL SCRN: HCPCS | Performed by: INTERNAL MEDICINE

## 2022-10-20 PROCEDURE — 99214 OFFICE O/P EST MOD 30 MIN: CPT | Performed by: INTERNAL MEDICINE

## 2022-10-20 PROCEDURE — G8417 CALC BMI ABV UP PARAM F/U: HCPCS | Performed by: INTERNAL MEDICINE

## 2022-10-20 PROCEDURE — 90694 VACC AIIV4 NO PRSRV 0.5ML IM: CPT | Performed by: INTERNAL MEDICINE

## 2022-10-20 PROCEDURE — G8752 SYS BP LESS 140: HCPCS | Performed by: INTERNAL MEDICINE

## 2022-10-20 PROCEDURE — G8427 DOCREV CUR MEDS BY ELIG CLIN: HCPCS | Performed by: INTERNAL MEDICINE

## 2022-10-20 PROCEDURE — G0463 HOSPITAL OUTPT CLINIC VISIT: HCPCS | Performed by: INTERNAL MEDICINE

## 2022-10-20 RX ORDER — SERTRALINE HYDROCHLORIDE 50 MG/1
50 TABLET, FILM COATED ORAL DAILY
COMMUNITY

## 2022-10-20 NOTE — PATIENT INSTRUCTIONS
Recommend that you obtain the updated Moderna bivalent COVID 19 vaccine and Shingrix vaccine. Heart-Healthy Diet: Care Instructions  Your Care Instructions     A heart-healthy diet has lots of vegetables, fruits, nuts, beans, and whole grains, and is low in salt. It limits foods that are high in saturated fat, such as meats, cheeses, and fried foods. It may be hard to change your diet, but even small changes can lower your risk of heart attack and heart disease. Follow-up care is a key part of your treatment and safety. Be sure to make and go to all appointments, and call your doctor if you are having problems. It's also a good idea to know your test results and keep a list of the medicines you take. How can you care for yourself at home? Watch your portions  Learn what a serving is. A \"serving\" and a \"portion\" are not always the same thing. Make sure that you are not eating larger portions than are recommended. For example, a serving of pasta is ½ cup. A serving size of meat is 2 to 3 ounces. A 3-ounce serving is about the size of a deck of cards. Measure serving sizes until you are good at [place]" them. Keep in mind that restaurants often serve portions that are 2 or 3 times the size of one serving. To keep your energy level up and keep you from feeling hungry, eat often but in smaller portions. Eat only the number of calories you need to stay at a healthy weight. If you need to lose weight, eat fewer calories than your body burns (through exercise and other physical activity). Eat more fruits and vegetables  Eat a variety of fruit and vegetables every day. Dark green, deep orange, red, or yellow fruits and vegetables are especially good for you. Examples include spinach, carrots, peaches, and berries. Keep carrots, celery, and other veggies handy for snacks. Buy fruit that is in season and store it where you can see it so that you will be tempted to eat it.   Cook dishes that have a lot of veggies in them, such as stir-fries and soups. Limit saturated and trans fat  Read food labels, and try to avoid saturated and trans fats. They increase your risk of heart disease. Use olive or canola oil when you cook. Bake, broil, grill, or steam foods instead of frying them. Choose lean meats instead of high-fat meats such as hot dogs and sausages. Cut off all visible fat when you prepare meat. Eat fish, skinless poultry, and meat alternatives such as soy products instead of high-fat meats. Soy products, such as tofu, may be especially good for your heart. Choose low-fat or fat-free milk and dairy products. Eat foods high in fiber  Eat a variety of grain products every day. Include whole-grain foods that have lots of fiber and nutrients. Examples of whole-grain foods include oats, whole wheat bread, and brown rice. Buy whole-grain breads and cereals, instead of white bread or pastries. Limit salt and sodium  Limit how much salt and sodium you eat to help lower your blood pressure. Taste food before you salt it. Add only a little salt when you think you need it. With time, your taste buds will adjust to less salt. Eat fewer snack items, fast foods, and other high-salt, processed foods. Check food labels for the amount of sodium in packaged foods. Choose low-sodium versions of canned goods (such as soups, vegetables, and beans). Limit sugar  Limit drinks and foods with added sugar. These include candy, desserts, and soda pop. Limit alcohol  Limit alcohol to no more than 2 drinks a day for men and 1 drink a day for women. Too much alcohol can cause health problems. When should you call for help? Watch closely for changes in your health, and be sure to contact your doctor if:    You would like help planning heart-healthy meals. Where can you learn more?   Go to http://www.gray.com/  Enter V137 in the search box to learn more about \"Heart-Healthy Diet: Care Instructions. \"  Current as of: August 22, 2019               Content Version: 12.6  © 3234-8831 [employer], [facility]. Care instructions adapted under license by [employer] (which disclaims liability or warranty for this information). If you have questions about a medical condition or this instruction, always ask your healthcare professional. [address] any warranty or liability for your use of this information. Learning About Low-Sodium Foods  What foods are low in sodium? The foods you eat contain nutrients, such as vitamins and minerals. Sodium is a nutrient. Your body needs the right amount to stay healthy and work as it should. You can use the list below to help you make choices about which foods to eat. Fruits  Fresh, frozen, canned, or dried fruit  Vegetables  Fresh or frozen vegetables, with no added salt  Canned vegetables, low-sodium or with no added salt  Grains  Bagels without salted tops  Cereal with no added salt  Corn tortillas  Crackers with no added salt  Oatmeal, cooked without salt  Popcorn with no salt  Pasta and noodles, cooked without salt  Rice, cooked without salt  Unsalted pretzels  Dairy and dairy alternatives  Butter, unsalted  Cream cheese  Ice cream  Milk  Soy milk  Meats and other protein foods  Beans and peas, canned with no salt  Eggs  Fresh fish (not smoked)  Fresh meats (not smoked or cured)  Nuts and nut butter, prepared without salt  Poultry, not packaged with sodium solution  Tofu, unseasoned  Tuna, canned without salt  Seasonings  Garlic  Herbs and spices  Lemon juice  Mustard  Olive oil  Salt-free seasoning mixes  Vinegar  Work with your doctor to find out how much of this nutrient you need. Depending on your health, you may need more or less of it in your diet. Where can you learn more?   Go to http://www.gray.com/  Enter S460 in the search box to learn more about \"Learning About Low-Sodium Foods.\"  Current as of: August 22, 2019               Content Version: 12.6  © 6711-7690 [employer]. Care instructions adapted under license by [employer] (which disclaims liability or warranty for this information). If you have questions about a medical condition or this instruction, always ask your healthcare professional. [address] any warranty or liability for your use of this information. Low Sodium Diet (2,000 Milligram): Care Instructions  Your Care Instructions     Too much sodium causes your body to hold on to extra water. This can raise your blood pressure and force your heart and kidneys to work harder. In very serious cases, this could cause you to be put in the hospital. It might even be life-threatening. By limiting sodium, you will feel better and lower your risk of serious problems. The most common source of sodium is salt. People get most of the salt in their diet from canned, prepared, and packaged foods. Fast food and restaurant meals also are very high in sodium. Your doctor will probably limit your sodium to less than 2,000 milligrams (mg) a day. This limit counts all the sodium in prepared and packaged foods and any salt you add to your food. Follow-up care is a key part of your treatment and safety. Be sure to make and go to all appointments, and call your doctor if you are having problems. It's also a good idea to know your test results and keep a list of the medicines you take. How can you care for yourself at home? Read food labels  Read labels on cans and food packages. The labels tell you how much sodium is in each serving. Make sure that you look at the serving size. If you eat more than the serving size, you have eaten more sodium. Food labels also tell you the Percent Daily Value for sodium. Choose products with low Percent Daily Values for sodium.   Be aware that sodium can come in forms other than salt, including monosodium glutamate (MSG), sodium citrate, and sodium bicarbonate (baking soda). MSG is often added to Asian food. When you eat out, you can sometimes ask for food without MSG or added salt. Buy low-sodium foods  Buy foods that are labeled \"unsalted\" (no salt added), \"sodium-free\" (less than 5 mg of sodium per serving), or \"low-sodium\" (less than 140 mg of sodium per serving). Foods labeled \"reduced-sodium\" and \"light sodium\" may still have too much sodium. Be sure to read the label to see how much sodium you are getting. Buy fresh vegetables, or frozen vegetables without added sauces. Buy low-sodium versions of canned vegetables, soups, and other canned goods. Prepare low-sodium meals  Cut back on the amount of salt you use in cooking. This will help you adjust to the taste. Do not add salt after cooking. One teaspoon of salt has about 2,300 mg of sodium. Take the salt shaker off the table. Flavor your food with garlic, lemon juice, onion, vinegar, herbs, and spices. Do not use soy sauce, lite soy sauce, steak sauce, onion salt, garlic salt, celery salt, mustard, or ketchup on your food. Use low-sodium salad dressings, sauces, and ketchup. Or make your own salad dressings and sauces without adding salt. Use less salt (or none) when recipes call for it. You can often use half the salt a recipe calls for without losing flavor. Other foods such as rice, pasta, and grains do not need added salt. Rinse canned vegetables, and cook them in fresh water. This removes some--but not all--of the salt. Avoid water that is naturally high in sodium or that has been treated with water softeners, which add sodium. Call your local water company to find out the sodium content of your water supply. If you buy bottled water, read the label and choose a sodium-free brand. Avoid high-sodium foods  Avoid eating:  Smoked, cured, salted, and canned meat, fish, and poultry. Ham, puentes, hot dogs, and luncheon meats.   Regular, hard, and processed cheese and regular peanut butter. Crackers with salted tops, and other salted snack foods such as pretzels, chips, and salted popcorn. Frozen prepared meals, unless labeled low-sodium. Canned and dried soups, broths, and bouillon, unless labeled sodium-free or low-sodium. Canned vegetables, unless labeled sodium-free or low-sodium. [place] fries, pizza, tacos, and other fast foods. Pickles, olives, ketchup, and other condiments, especially soy sauce, unless labeled sodium-free or low-sodium. Where can you learn more? Go to http://www.gray.com/  Enter V843 in the search box to learn more about \"Low Sodium Diet (2,000 Milligram): Care Instructions. \"  Current as of: August 22, 2019               Content Version: 12.6  © 9557-2944 [employer]. Care instructions adapted under license by [employer] (which disclaims liability or warranty for this information). If you have questions about a medical condition or this instruction, always ask your healthcare professional. [place] [address] any warranty or liability for your use of this information. High Cholesterol: Care Instructions  Your Care Instructions     Cholesterol is a type of fat in your blood. It is needed for many body functions, such as making new cells. Cholesterol is made by your body. It also comes from food you eat. High cholesterol means that you have too much of the fat in your blood. This raises your risk of a heart attack and stroke. LDL and HDL are part of your total cholesterol. LDL is the \"bad\" cholesterol. High LDL can raise your risk for heart disease, heart attack, and stroke. HDL is the \"good\" cholesterol. It helps clear bad cholesterol from the body. High HDL is linked with a lower risk of heart disease, heart attack, and stroke. Your cholesterol levels help your doctor find out your risk for having a heart attack or stroke.  You and your doctor can talk about whether you need to lower your risk and what treatment is best for you. A heart-healthy lifestyle along with medicines can help lower your cholesterol and your risk. The way you choose to lower your risk will depend on how high your risk is for heart attack and stroke. It will also depend on how you feel about taking medicines. Follow-up care is a key part of your treatment and safety. Be sure to make and go to all appointments, and call your doctor if you are having problems. It's also a good idea to know your test results and keep a list of the medicines you take. How can you care for yourself at home? Eat a variety of foods every day. Good choices include fruits, vegetables, whole grains (like oatmeal), dried beans and peas, nuts and seeds, soy products (like tofu), and fat-free or low-fat dairy products. Replace butter, margarine, and hydrogenated or partially hydrogenated oils with olive and canola oils. (Canola oil margarine without trans fat is fine.)  Replace red meat with fish, poultry, and soy protein (like tofu). Limit processed and packaged foods like chips, crackers, and cookies. Bake, broil, or steam foods. Don't hirsch them. Be physically active. Get at least 30 minutes of exercise on most days of the week. Walking is a good choice. You also may want to do other activities, such as running, swimming, cycling, or playing tennis or team sports. Stay at a healthy weight or lose weight by making the changes in eating and physical activity listed above. Losing just a small amount of weight, even 5 to 10 pounds, can reduce your risk for having a heart attack or stroke. Do not smoke. When should you call for help? Watch closely for changes in your health, and be sure to contact your doctor if:    You need help making lifestyle changes. You have questions about your medicine. Where can you learn more?   Go to http://www.gray.com/  Enter N669 in the search box to learn more about \"High Cholesterol: Care Instructions. \"  Current as of: December 16, 2019               Content Version: 12.6  © 1125-7287 [employer]. Care instructions adapted under license by [facility] (which disclaims liability or warranty for this information). If you have questions about a medical condition or this instruction, always ask your healthcare professional. [address] any warranty or liability for your use of this information.

## 2022-10-20 NOTE — PROGRESS NOTES
1. \"Have you been to the ER, urgent care clinic since your last visit? Hospitalized since your last visit? \" No    2. \"Have you seen or consulted any other health care providers outside of the [address] [place] [address] since your last visit? \" No     3. For patients aged 39-70: Has the patient had a colonoscopy / FIT/ Cologuard? NA - based on age      If the patient is female:    4. For patients aged 41-77: Has the patient had a mammogram within the past 2 years? NA - based on age or sex      11. For patients aged 21-65: Has the patient had a pap smear? NA - based on age or sex      Geovany Chris 1940 male who presents for routine immunizations. Patient denies any symptoms , reactions or allergies that would exclude them from being immunized today. Risks and adverse reactions were discussed and the VIS was given to them. All questions were addressed. Order placed for HD Influenza,  per Verbal Order from  with read back. Patient was observed for 15 min post injection. There were no reactions observed.     Chi [place]ar[place] LPN

## 2022-10-24 NOTE — PROGRESS NOTES
HPI:   Keyur Naylor is a [de-identified]y.o. year old male who presents today for a routine visit. He has a history of hypertension, hyperlipidemia, prediabetes, rheumatoid arthritis, osteoarthritis, calcium pyrophosphate deposition disease, BPH, GERD, and depression. He also has a history of COVID-19 infection (3/1165) complicated by severe pneumonia and acute hypoxic respiratory failure. He has completed the Moderna COVID-19 vaccine series and received one Moderna booster dose. He reports that he is doing reasonably well. He reports that he underwent evaluation by Dr. Enedina Ambrocio on 8/2/2022 and complained of difficulty with chest tightness occurring at rest.  He underwent CT chest (8/11/2022) which was negative for any acute findings; echocardiogram (8/17/2022) showed normal LV size and function (EF 04-18%), normal diastolic function, and no valvular abnormalities; and pharmacologic nuclear stress test (8/17/2022) which was a normal low risk study without evidence of ischemia or prior infarction and calculated EF 56%. No further evaluation was felt to be needed. He had reported at his last visit that he was not using his BiPAP machine regularly due to discomfort when wearing it, but states today that he did undergo review of equipment and has received a new mask and tubing which he feels is more comfortable. He does report that he is experiencing increased irritability and anger outbursts and admits to no longer taking Zoloft 50 mg daily. He is otherwise without new complaints and overall feeling well. Summary of prior hospitalizations and medical history:  On 6/22/2020, he noted the onset of weakness, fatigue, and diarrhea. He stated that he continued to work for the entire week despite feeling ill. On 6/27/2020, he developed fever and dyspnea, which worsened throughout the weekend.  He presented to [facility] on 6/28/2020, and WBC 4.0 (78 % neutrophils) and chest x-ray showed patchy density in the RUL, right perihilar area, and right base. He was advised to go to the ED, and presented to [facility] ED on 6/29/2020. He was found to be hypoxic with pulse ox at rest 92-93 % and ambulation 89-91% (room air). Chest x-ray showed bilateral multifocal extremely subtle groundglass opacities. Labs showed WBC 4.4 (80% neutrophils), Hb 14.6/ Hct 42.0, platelets 888, creatinine 0.71/ eGFR >60, AST 53, alk phos 160, lactate 1.16. He was discharged home, and SARS COV-2 positive (6/29/2020) result returned. He presented for a virtual visit on 7/2/2020, and reported that since discharge from the ED, he noted persistent symptoms of weakness, diarrhea, fatigue, and fevers, and prgressive dyspnea. He described poor po intake, and chest pain with inspiration and felt that he was unable to take a deep breath or cough. He was advised to call EMS and return to the ED. Upon arrival by EMS, his oxygenation was noted to be in the low 80's, and required high flow oxygen. Chest x-ray (7/2/2020) showed bilateral increased patchy groundglass airspace opacities, and labs revealed ABG 7.43/34/70 on nonrebreather mask; WBC 7.3 (11% lymphocytes), Hb 14.8/ Hct 42.6, creatinine 0.8/ eGFR>60, ALT 79, AST 76, alk phos 170, albumin 2.7 D-dimer 1.35, ferritin 729, CRP 16.6, , procalcitonin 0.14, lactate 2.8, blood culture negative. He was initially started on Zosyn, but Dr. Edan Miller (ID) was consulted and recommended discontinuing and beginning azithromycin, remdesivir, IV Solumedrol, and lovenox. He was also started on ascorbic acid, melatonin, and zinc. Dr. Marisol Farfan (pulmonary) was consulted and recommended change to high flow nasal cannula and recommended proning to the patient. He improved clinically and inflammatory indices improved. His oxygen was weaned to 5 liters nasal cannula, and he completed 5 day course of remdesivir and azithromycin.  He was discharged on 7/9/2020 with an 8 day course of prednisone, 30 day course of Eliquis 2.5 mg bid, and two week course of Vitamin  C and zinc. He was seen for post-hospitalization follow-up on 7/16/2020 and reported some persistent congestion and significant dyspnea on exertion. He was referred to Dr. Shiraz Dave, and she recommended continued oxygen use as needed and stressed need for treatment of obstructive sleep apnea with CPAP. She placed order for him to receive auto CPAP. He was unable to tolerate CPAP due to continued air hunger even with bleeding in of 2 liters nasal cannula. He had a repeat sleep study on 1/29/2021, which showed obstructive sleep apnea with emergence of central apnea and BIPAP found to be most effective. During the study, he was found to have PAC's, PVC's and intermittent Mobitz 1 second degree AV block. He was referred to Dr. Chester Marinelli and his diuretics were changed from lasix to torsemide and metolazone. However, he stopped taking metolazone after 1 week since began to feel lightheaded and noted low blood pressure readings with SBP 80-90. He underwent an echocardiogram (2/24/2021) showing normal LV size and function (EF 60-65%), mild MR and PI and PAP 23 mmHg; and a pharmacologic nuclear stress test (2/24/2021) which was a normal, low risk study with no TID and calculated EF 62%. He had a 6 minute walk test (3/31/2021) showing no significant O2 desaturation; and PFT's showing normal flows, normal DLCO, and isolated decrease in RV and FRC. He also had a 30 day event monitor (5/2021) which showed rare PVC, rare PAC, and one episode of 20 beat SVT at 155 bpm (asymptomatic). On 8/13/2019, he reported that he had been seeing Dr. Anali Marie for increasing difficulty with right sided sciatica. He reported being treated with a Medrol dose pack, physical therapy, and spinal injections without improvement, and was also prescribed Norco and Tramadol, but he stated that he found them too sedating and of no benefit.  Due to persistent symptoms, he underwent a lumbar MRI (8/5/2019) which showed degenerative changes most notable at L4-L5 resulting in moderate spinal canal stenosis as well as moderate to severe right greater than left foraminal stenoses; advanced facet arthropathy with small facet effusions and suspected small right facet joint ventral synovial cyst; notable degenerative changes also L3-L4; L1 mild anterior compression deformity, chronic appearing; overall general worsening when compared to prior study in 2007. He was having continued significant pain, and was started on gabapentin 100 mg tid. He was referred to Dr. Christopher Shipman and she increased his dose of gabapentin to 200 mg tid. She also referred him to Dr. Tommy Raza for evaluation given his lack of response to conservative management. He recommended proceeding with decompression by performing a L4/5 right-sided hemilaminotomy and medial facetectomy, which was performed on 10/23/2019. He developed postop urinary retention and was discharged with a Deleon catheter. He had follow-up with Dr. Lesley Huang on 10/29/2019 with successful voiding trial. He reports that he noted initial resolution of his right sciatica pain following surgery, but on 10/29/2019 noted abrupt recurrence when getting out of bed. He was evaluated by GOMEZ Doherty on 10/31/2019 and was treated with a Medrol dose pack and restarted on gabapentin 300 mg tid. He reports overall improvement and is no longer requiring gabapentin. On 8/9/2019, he had an episode of waking up gasping for air forcing him to get out of bed and walk around until his breathing normalized. He has been having frequent similar episodes over that last year, but had been resistent to evaluation for sleep apnea. However, he reports that this episode was especially severe.  When his symptoms persisted, he was evaluated at [facility], and testing included WBC 5.7, Hb 14.2/ Hct 41.6, creatinine 0.9/ eGFR>60, troponin I x 1 negative, NT-pro BNP 45, EKG sinus rhythm at 83 bpm and no ischemic changes, and chest x-ray without acute changes, but an ill defined 15 mm density in the lateral left mid zone was noted. He received lasix 40 mg IV and was discharged. He had an echocardiogram (8/26/2019) showing normal LV function (EF 56-60%), no RWMA, unable to estimate RVSP due to inadequate TR, but TV/PV appear normal. He was referred to Dr. Gavin Moses for probable sleep apnea, and underwent a home sleep study on 10/25/2019, showing evidence of severe obstructive sleep apnea with AHI 35 and oxygen gyu 80%. He was not able to tolerate CPAP equipment as discussed. On 6/20/2018, he suffered an accidental gun shot wound while working resulting in traumatic injury to his left index finger with near loss of the middle phalanx and fracture of the distal phalanx. He was taken to [facility] and initially had irrigation and closure of the lacerations performed. He presented to the [facility] ED on 6/24/2018 with increased pain and swelling, and was started on doxycycline for a wound infection. On 7/7/2018, due to loss of stability and angulation of the index finger, he underwent stabilization with fusion of the proximal and distal phalanx with bone grafting by Dr. Isak Gonzalez. He did well and was started on physical therapy. On 8/1/2018, he presented to [address] [place] [address] for evaluation of increasing erythema, swelling and tenderness with concern for a possible infection. He underwent left hand x-ray (8/1/2018) showing severe soft tissue swelling of the left second finger worrisome for cellulitis, traumatic amputation of the middle phalanx with marked osteopenia and irregularity of the base of the distal phalanx and flattening and irregularity of the head of the proximal phalanx. Unclear if related to prior trauma/surgery or osteomyelitis with osseous destruction and no films available for comparison. He was started empirically on Bactrim and Augmentin for 14 days.  On 8/10/2018, he presented for evaluation by GOMEZ Saleem for complaints of fever, malaise, headache, fatigue, and diarrhea. Lab evaluation showed WBC 17 (90% neutrophils), creatinine 1.34/ eGFR 52, blood culture negative. Stool cultures (8/13/2018) routine, O/P, and C.diff negative. Bactrim and Augmentin were discontinued on 8/13/2018, and he was started on metronidazole for 10 days for treatment of presumed C.diff with improvement. He was evaluated by Dr. Raiza Bro on 8/15/2018 and repeat WBC 8.6 (59% neutrophils), ESR 6, and CRP 0.9. He was seen by Dr. Raiza Bro in follow-up on 8/30/2018 and left index finger wound noted to be significantly improved and no further difficulty with diarrhea. He has a history of hypertension, treated with amlodipine, lisinopril, lasix (+ potassium), and hydralazine was added in 4/2018. He states that his wife has been monitoring his blood pressure intermittently. He denies any chest pain, shortness of breath at rest or with exertion, lightheadedness, or palpitations. He does report bilateral lower extremity swelling that it will increase throughout the day and improve overnight. . In 6/2016, he underwent lower extremity arterial and venous duplex scans, both of which were negative. He also has a history of hyperlipidemia, treated with moderate intensity atorvastatin. He has a history of prediabetes, with HbA1c ranging from 5.9-6.2 since 2012. He denies any polyuria, polydipsia, nocturia, or blurry vision, and has no history of retinopathy, neuropathy, or nephropathy. He has regular eye exams with Dr. Sadia Skinner. He has a history of bilateral hand pain with morning stiffness for several years, and in 3/2014, he was noted to have a positive anti-CCP antibody level although NIMCO, rheumatoid factor, and ESR were negative. He was referred for evaluation to Dr. Eli Herman, and was diagnosed with rheumatoid arthritis in 6/2014. He was treated with hydroxychloroquine, which has been partially helpful in controlling his symptoms.  Bilateral hand x-rays also showed evidence of primary osteoarthritis with osteophytes present on the second and third MCP joints. In 1/2017, he was also noted to have evidence of possible chondrocalcinosis on x-ray, and was started on low dose colchicine in addition to hydroxychloroquine for concomitant calcium pyrophosphate deposition disease. He states that since starting on colchicine, he has had significant improvement in his hand pain. He also uses compounding cream and Voltaren gel for pain control. In 1/2019, he was complaining of worsening neck and bilateral shoulder pain (R>L). He stated that he was previously followed by Dr. Jose Francisco Bliss, and would occasionally receive cortisone injections into his shoulders. He also described neck pain with difficulty turning his head. He denied any upper extremity weakness or paresthesias. He underwent imaging, and bilateral shoulder x-rays (1/10/2019) showed degenerative changes and secondary findings of rotator cuff pathology. Cervical spine x-rays (1/10/2019) showed advanced degenerative changes with multilevel facet arthropathy. He was evaluated by Dr. Sherry Muñoz who gave him a cortisone injection in his right shoulder with some improvement. He also recommended a reverse shoulder replacement, but he remains hesitant to proceed. He was started on Celebrex 200 mg bid in 2/2019, and reports significant improvement. He continues to also use Tylenol as needed for pain. He states that his neck pain seems to have improved to his baseline level. He has a history of symptomatic BPH, with complaints of decreased stream, hesitancy, and dribbling. He has refused treatment with medication. He is followed by Dr. Debbie Hill. He denies any dysuria, gross hematuria, or flank pain. He has a history of GERD, treated with daily omeprazole with good control of symptoms.  He had a screening colonoscopy and 8/2015 by Dr. Dalton Jones, showing a 3 mm sessile cecal polyp (pathology: intra-mucosal lymphoid aggregate), two 4 mm sessile polyps in the transverse colon (pathology: serrated adenomas), and a 1 cm lipoma in the transverse colon. Follow-up recommended for five years. He was having difficulty with rectal bleeding in 1/2018 and returned for evaluation with Dr. Jm Morales who felt it was most likely secondary to a hemorrhoidal source. She recommended use of Citrucel. He states that the bleeding has improved with initiation of this therapy. He denies any abdominal pain, nausea, vomiting, melena, or change in bowel movements. He has a history of depression and anxiety, which had been well controlled with Paxil although inadvertently stopped. Now on Zoloft with improvement. Past Medical History:   Diagnosis Date    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 Dr. Patty Talbot. 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  Manual Cancer. Past Surgical History:   Procedure Laterality Date    HX AMPUTATION FINGER Left     index    HX BACK SURGERY  10/23/2019    Right L4-5 hemilaminectomy, medial facetectomy, resection of synovial cyst    HX CARPAL TUNNEL RELEASE Bilateral     HX CATARACT REMOVAL Left 01/2018    HX CATARACT REMOVAL Bilateral 2018    HX COLONOSCOPY      HX HEENT      sinus, tonsillectomy    HX HERNIA REPAIR      HX MOHS PROCEDURES      bilateral     HX ORTHOPAEDIC      lef knee surgery, removed cartilage.     HX ROTATOR CUFF REPAIR Right      Current Outpatient Medications   Medication Sig    sertraline (ZOLOFT) 50 mg tablet Take 50 mg by mouth daily. omeprazole (PRILOSEC) 20 mg capsule TAKE 1 CAPSULE BY MOUTH DAILY    amLODIPine (NORVASC) 5 mg tablet TAKE 1 TABLET BY MOUTH DAILY    torsemide (DEMADEX) 20 mg tablet Take 1 Tablet by mouth two (2) times a day. atorvastatin (LIPITOR) 10 mg tablet TAKE 1 TABLET BY MOUTH ONCE DAILY    tamsulosin (FLOMAX) 0.4 mg capsule TAKE 1 CAPSULE BY MOUTH ONCE DAILY    hydrALAZINE (APRESOLINE) 25 mg tablet TAKE 1 TABLET BY MOUTH 2 TIMES A DAY (APRESOLINE)    lisinopriL (PRINIVIL, ZESTRIL) 40 mg tablet TAKE 1 TABLET BY MOUTH ONCE DAILY    hydrOXYchloroQUINE (PLAQUENIL) 200 mg tablet Take 400 mg by mouth daily. mineral oil liquid Take 30 mL by mouth daily as needed for Constipation. cycloSPORINE (RESTASIS) 0.05 % dpet Administer 1 Drop to both eyes nightly. colchicine (MITIGARE) 0.6 mg capsule Take 0.6 mg by mouth every other day. cholecalciferol, vitamin D3, 50 mcg (2,000 unit) tab Take 2,000 Units by mouth daily. diclofenac (VOLTAREN) 1 % gel Apply 4 g to affected area four (4) times daily. LUMIGAN 0.01 % ophthalmic drops Administer 1 Drop to both eyes nightly. MULTIVITS/IRON FUM/FA/D3/LYCOP (MULTI FOR HIM PO) Take  by mouth daily. No current facility-administered medications for this visit. Allergies and Intolerances: Allergies   Allergen Reactions    Latex, Natural Rubber Swelling    Adhesive Tape-Silicones Rash and Itching    Aldactone [Spironolactone] Not Reported This Time     Breast tenderness    Codeine Not Reported This Time     \"Drives crazy\"    Tape [Adhesive] Rash    Tetanus Toxoid, Adsorbed Anaphylaxis    Tetanus Vaccines And Toxoid Anaphylaxis     Family History: He has no family history of colon or prostate cancer. Family History   Problem Relation Age of Onset    Cancer Mother     Heart Disease Father     Alcohol abuse Father     Cancer Other      Social History:   He  reports that he has quit smoking.  His smoking use included cigars, pipe, and cigarettes. He has a 30.00 pack-year smoking history. He has quit using smokeless tobacco.  His smokeless tobacco use included chew. He smoked 2 ppd for 50 years, stopping in 1974. He is  with two adult children. 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 He is a employed at [employer] in [place]. Social History     Substance and Sexual Activity   Alcohol Use Yes    Comment: rarely     Immunization History:  Immunization History   Administered Date(s) Administered    (RETIRED) Pneumococcal Vaccine (Unspecified Type) 01/01/2008    COVID-19, MODERNA BLUE border, Primary or Immunocompromised, (age 18y+), IM, 100 mcg/0.5mL 02/01/2021, 03/01/2021    COVID-19, MODERNA Booster BLUE border, (age 18y+), IM, 50mcg/0.25mL 11/30/2021    Influenza High Dose Vaccine PF 09/30/2017    Influenza Vaccine (Madin Nika Canine Kidney) PF 12/13/2017, 10/17/2018    Influenza Vaccine (Tri) Adjuvanted (>65 Yrs FLUAD TRI 44578) 09/25/2018, 09/25/2019    Influenza Vaccine (Trivalent) 10/19/2019, 11/18/2020    Influenza Vaccine Split 10/04/2011, 10/16/2012    Influenza Vaccine Whole 01/15/2010    Influenza, FLUAD, (age 72 y+), Adjuvanted 09/10/2020, 09/28/2021, 10/20/2022    Pneumococcal Conjugate (PCV-13) 01/19/2015    Pneumococcal Polysaccharide (PPSV-23) 01/01/2008    Varicella Virus Vaccine 10/01/2013    Zoster 10/16/2012       Review of Systems:   As above included in HPI. Otherwise 11 point review of systems negative including constitutional, skin, HENT, eyes, respiratory, cardiovascular, gastrointestinal, genitourinary, musculoskeletal, endocrine, hematologic, allergy, and neurologic.     Physical:   Visit Vitals  /78 (BP 1 Location: Left upper arm, BP Patient Position: Sitting)   Pulse 70   Temp 97.2 °F (36.2 °C) (Temporal)   Resp 16   Ht 5' 8\" (1.727 m)   Wt 204 lb (92.5 kg)   SpO2 98%   BMI 31.02 kg/m²       Exam:     Patient appears in no apparent distress. Affect is appropriate. HEENT: PERRLA, anicteric, no JVD, adenopathy or thyromegaly. No carotid bruits or radiated murmur. Lungs: clear to auscultation, no wheezes, rhonchi, or rales. Heart: regular rate and rhythm. No murmur, rubs, gallops  Abdomen: soft, nontender, nondistended, normal bowel sounds, no hepatosplenomegaly or masses. Extremities: Trace edema. Wearing compression      Review of Data:  Labs:    Hospital Outpatient Visit on 10/14/2022   Component Date Value Ref Range Status    WBC 10/14/2022 6.0  4.6 - 13.2 K/uL Final    RBC 10/14/2022 4.40  4.35 - 5.65 M/uL Final    HGB 10/14/2022 14.2  13.0 - 16.0 g/dL Final    HCT 10/14/2022 44.4  36.0 - 48.0 % Final    MCV 10/14/2022 100.9 (A)  78.0 - 100.0 FL Final    MCH 10/14/2022 32.3  24.0 - 34.0 PG Final    MCHC 10/14/2022 32.0  31.0 - 37.0 g/dL Final    RDW 10/14/2022 13.5  11.6 - 14.5 % Final    PLATELET 02/17/8998 240  135 - 420 K/uL Final    MPV 10/14/2022 10.3  9.2 - 11.8 FL Final    NRBC 10/14/2022 0.0  0  WBC Final    ABSOLUTE NRBC 10/14/2022 0.00  0.00 - 0.01 K/uL Final    NEUTROPHILS 10/14/2022 52  40 - 73 % Final    LYMPHOCYTES 10/14/2022 30  21 - 52 % Final    MONOCYTES 10/14/2022 11 (A)  3 - 10 % Final    EOSINOPHILS 10/14/2022 7 (A)  0 - 5 % Final    BASOPHILS 10/14/2022 1  0 - 2 % Final    IMMATURE GRANULOCYTES 10/14/2022 0  0.0 - 0.5 % Final    ABS. NEUTROPHILS 10/14/2022 3.2  1.8 - 8.0 K/UL Final    ABS. LYMPHOCYTES 10/14/2022 1.8  0.9 - 3.6 K/UL Final    ABS. MONOCYTES 10/14/2022 0.7  0.05 - 1.2 K/UL Final    ABS. EOSINOPHILS 10/14/2022 0.4  0.0 - 0.4 K/UL Final    ABS. BASOPHILS 10/14/2022 0.0  0.0 - 0.1 K/UL Final    ABS. IMM. GRANS.  10/14/2022 0.0  0.00 - 0.04 K/UL Final    DF 10/14/2022 AUTOMATED    Final    Hemoglobin A1c 10/14/2022 5.5  4.2 - 5.6 % Final    Est. average glucose 10/14/2022 111  mg/dL Final    LIPID PROFILE 10/14/2022        Final    Cholesterol, total 10/14/2022 170  <200 MG/DL Final    Triglyceride 10/14/2022 58  <150 MG/DL Final    HDL Cholesterol 10/14/2022 62 (A)  40 - 60 MG/DL Final    LDL, calculated 10/14/2022 96.4  0 - 100 MG/DL Final    VLDL, calculated 10/14/2022 11.6  MG/DL Final    CHOL/HDL Ratio 10/14/2022 2.7  0 - 5.0   Final    Magnesium 10/14/2022 2.2  1.6 - 2.6 mg/dL Final    Sodium 10/14/2022 141  136 - 145 mmol/L Final    Potassium 10/14/2022 4.1  3.5 - 5.5 mmol/L Final    Chloride 10/14/2022 107  100 - 111 mmol/L Final    CO2 10/14/2022 29  21 - 32 mmol/L Final    Anion gap 10/14/2022 5  3.0 - 18 mmol/L Final    Glucose 10/14/2022 107 (A)  74 - 99 mg/dL Final    BUN 10/14/2022 14  7.0 - 18 MG/DL Final    Creatinine 10/14/2022 0.96  0.6 - 1.3 MG/DL Final    BUN/Creatinine ratio 10/14/2022 15  12 - 20   Final    eGFR 10/14/2022 >60  >60 ml/min/1.73m2 Final    Calcium 10/14/2022 9.4  8.5 - 10.1 MG/DL Final    Bilirubin, total 10/14/2022 0.4  0.2 - 1.0 MG/DL Final    ALT (SGPT) 10/14/2022 33  16 - 61 U/L Final    AST (SGOT) 10/14/2022 22  10 - 38 U/L Final    Alk. phosphatase 10/14/2022 98  45 - 117 U/L Final    Protein, total 10/14/2022 6.6  6.4 - 8.2 g/dL Final    Albumin 10/14/2022 4.0  3.4 - 5.0 g/dL Final    Globulin 10/14/2022 2.6  2.0 - 4.0 g/dL Final    A-G Ratio 10/14/2022 1.5  0.8 - 1.7   Final    Microalbumin,urine random 10/14/2022 0.52  0 - 3.0 MG/DL Final    Creatinine, urine random 10/14/2022 52.00  30 - 125 mg/dL Final    Microalbumin/Creat ratio (mg/g cre* 10/14/2022 10  0 - 30 mg/g Final    Vitamin D 25-Hydroxy 10/14/2022 68.3  30 - 100 ng/mL Final       Health Maintenance:  Screening:    Colorectal: colonoscopy (2015) serrated adenomas. Dr. Kylie Cool. Cologuard negative (2021). Depression: on Paxil   DM (HbA1c/FPG): / HbA1c 5.5 (10/2022)   Hepatitis C: negative (2021)   Falls: none   DEXA: within normal limits (2016)   PSA/MARTÍNEZ: PSA 3.3 (2019)    Glaucoma: regular eye exams with Dr. Ari Romero (last 2022)   Smokin pack year. Distant past.   Vitamin D: 68.3 (10/2022)   Medicare Wellness: 4/6/2022        Impression:  Patient Active Problem List   Diagnosis Code    BPH with obstruction/lower urinary tract symptoms N40.1, N13.8    Hypertension I10    Primary osteoarthritis involving multiple joints M15.9    Hyperlipidemia E78.5    GERD (gastroesophageal reflux disease) K21.9    Prediabetes R73.03    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 ([facility] [address] M06.041, M06.042    Vitamin D deficiency E55.9    Colon polyps K63.5    CPDD (calcium pyrophosphate deposition disease) M11.20    Impaired fasting glucose R73.01    Class 1 obesity due to excess calories with serious comorbidity in adult E66.09    APC (atrial premature contractions) I49.1    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 Hypertension. Blood pressure remains well controlled on lisinopril 40 mg daily, amlodipine 5 mg daily, hydralazine 25 mg bid, and torsemide 20 mg bid. Metolazone discontinued due to worsening renal function in 8/2021. Renal function remains normal with creatinine 0.96/eGFR >60. Underwent evaluation for chest tightness to include chest CT scan, echocardiogram, and pharmacologic nuclear stress test in 8/2022 which were essentially normal.  No further evaluation felt to be needed. 2. Lower extremity edema. Chronic problem and worsened following COVID 19 infection. Evaluated by Dr. Anuel Chavez and changed from lasix to torsemide and metolazone.  Became hypotensive and metolazone discontinued. Underwent pharmacologic nuclear stress test (2/24/2021) which was a normal, low risk study. Repeat echocardiogram (2/24/2021) similar to prior in 9/2020 except noted new mild PI. Edema now significantly improved on lowered dose of amlodipine to 5 mg daily and increase in torsemide dose to 20 mg twice daily. Reports taking torsemide 20 mg every morning and second dose in mid afternoon with good results. Will continue to monitor. 3. Hyperlipidemia. On moderate intensity dose atorvastatin with LDL 96 and HDL 62, indicative of excellent control in this patient. Continue to follow. 4. Prediabetes. Controlled on diet alone with mild elevation of fasting blood sugar at 112 and HbA1c at 5.7 in 7/2022. Improved today with fasting blood sugar at 107 but HbA1c decreased to 5.5. Required sliding scale insulin dosing when hospitalized due to COVID-19 due to elevated blood sugar related to treatment with high dose steroids. No evidence of microvascular complications. On Ace-I and statin. Continue follow-up with Dr. Aayush Li for annual eye exams. Emphasized importance of lifestyle modifications, including low carbohydrate diet, regular exercise, and weight loss. 5. History of COVID-19 infection with severe pneumonia/ acute hypoxic respiratory failure (6/2020). Overall with resolution of symptoms. Lower extremity edema is a persistent problem but was also present prior to his COVID-19 infection. Echocardiogram (9/2020) showed no change from 8/2019 with EF 55-60% and mild MR. EKG (9/2020) without change. Underwent 6 minute walk test (3/31/2021) showing no desaturations, and PFT's (3/31/2021) showing normal flows and DLCO, and isolated decreased RV and FRC, no response to bronchodilator. Completed the Moderna COVID-19 vaccine series and received one Moderna booster dose. Will continue to monitor. 6. Obstructive sleep apnea, severe.  Patient reported in 1/2019 awakening gasping for air several times per week, snoring and daytime drowsiness particularly when driving. Severe episode on 8/9/2019 prompted ED evaluation. EKG without change, troponin negative and NT-pro BNP normal. Echocardiogram (8/26/2019) with normal LV size and function (EF 56-60%), no RWMA, normal valves. Evaluated by Dr. Benjie Lopez and completed home sleep study and diagnosed with severe JANNET. Started on auto CPAP after hospitalization for COVID-19 by Dr. Adria Sanches and supplemented with 2 liters of oxygen. However, despite best efforts, patient continued to describe significant difficulty using due to dyspnea and air hunger, and returned equipment. Underwent in-patient sleep evaluation on 1/29/2021 and found BIPAP to be very effective. However, reported at last visit that he was having difficulty tolerating BiPAP and not using very much. Advised follow-up with Dr. Adria Sanches and reports that he has now received new mask and equipment and seems to be tolerating better. Will readdress at next visit. 7. Second degree AV block, Mobitz 1. Noted to be intermittent during sleep study and referred to Dr. Dung Win for evaluation. Felt to be likely secondary to untreated sleep apnea. Completed 30 day event monitor (5/5/2021) and no significant findings noted except for asymptomatic 20 beat run of SVT at 155 bpm.  Will continue to monitor. 8. Rheumatoid arthritis. On hydroxychloroquine 400 mg daily. Has regular eye exams with Dr. Sadia Skinner. Difficult to gauge response as appears to have evidence of osteoarthritis and CPPD also causing joint pain, but noted significant improvement since initiated colchicine. Voltaren gel and Tylenol for pain control as needed. Followed by Dr. Eli Herman. 9. Primary osteoarthritis. Evident in bilateral hands and knees, bilateral shoulders, and cervical spine. Shoulder pain (R>L). X-ray with evidence of osteoarthritis and rotator cuff pathology.  Evaluated by Dr. Queta Temple and surgery for reverse shoulder replacement recommended, but did not wish to to proceed. Reports no longer taking Celebrex for pain due to concern for side effects. Using Tylenol with reasonable control with overall improvement today. Will continue to monitor. 10. CPPD. Colchicine started on 1/19/2017 to help address chondrocalcinosis on x-rays. Now taking every other day with improvement in joint pain. Being monitored by Dr. Castro Manning. 11. Lumbar degenerative disease with right sciatica. Underwent decompression with L4/5 right-sided hemilaminotomy and medial facetectomy on 10/23/2019 by Dr. Franchesca Bell. Developed urinary retention post-op, but successfully passed voiding trial and has had no further difficulties. He developed recurrence of pain on post op day 6, and treated with Medrol dose pack with improvement. No longer requiring gabapentin given no significant pain. Being followed by GOMEZ Venegas as needed. 12. Depression. Previously treated with Paxil and weaned from benzodiazepine use for control of anxiety and insomnia. Symptoms were well controlled on Zoloft but discontinued in 11/2021 since he felt it was no longer needed. Reported increased irritability and difficulty controlling anger outbursts at last visit in 4/2022 and restarted on Zoloft 50 mg daily. However, reported in 7/2022 that he had discontinued taking it. However, admits today that he is having increasing difficulty with irritability and anger outbursts and urged to restart. States that he will restart Zoloft at 50 mg dose. Will readdress at next visit. 13. GERD. Continued good control of symptoms with omeprazole 20 mg daily. 14.  Anemia, mild. Colonoscopy 8/2015. Evaluated by Dr. Susana Bryant and considered to be hemorrhoidal in source. Known internal and external hemorrhoids and improved with Citrucel. Due for routine colonoscopy in 8/2020, but was unwilling to proceed. Completed Cologuard (4/2021) negative. Mild anemia in 5/2021 with Hb 12.9.  Normal iron studies with ferritin 75 and transferrin 34% and normal B12 and folate levels (8/2021). Has normalized and remains normal today with Hb 14.2/HCT 44.4. Will continue to monitor. 15. BPH with urinary retention. Difficulty with lower urinary tract symptoms. Developed postop urinary retention after back surgery. On Flomax. Followed previously by Dr. Debbie Hill and not wishing to establish with new provider unless problem emerges. Reports stable today. 16. Left wrist ganglion cyst. Previously evaluated by Dr. Lakeisha Cho and aspirated. Recurred and reports now larger. Requested referral to another orthopedist and evaluated by PA at Dr. Vimal Lewis office. Recommended surgery, but was dissatisfied with delays in scheduling and now not wishing to proceed. Remains unchanged today. 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 Managing well and no further issues. 18. 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 Level stable at 10 (4/2022) and monitored annually (next due 4/2023). 19. Obesity. Lost 25 pounds during COVID 19 illness, but has now returned to baseline. Weight overall stable since last visit. Emphasized importance of following a heart healthy diet and regular exercise. Will readdress at next visit. 20. Health maintenance. Completed Moderna COVID 19 vaccine series and received one Moderna booster doses. Advised to proceed with updated bivalent booster dose. Will give influenza vaccine today. Unable to receive Tdap due to allergy (anaphylaxis to Td). Will continue to address Shingrix vaccine. Other immunizations up to date. Completed Cologuard (4/2021) testing as discussed. Continue regular eye exams with Dr. Keri Chandra. Vitamin D level remains normal on maintenance dose supplement of 1000 U daily. Medicare wellness visit up to date. Will place referral to Dr. Aida Garcia for screening skin exam as previously followed by Dr. Dyllan Hernández for history of nonmelanoma skin cancers.     Patient understands recommendations and agrees with plan. Follow-up in 3 months. Future orders:    ICD-10-CM ICD-9-CM    1. Primary hypertension  I10 401.9 CBC WITH AUTOMATED DIFF      MAGNESIUM      METABOLIC PANEL, COMPREHENSIVE      URINALYSIS W/MICROSCOPIC      2. Mobitz type 1 second degree atrioventricular block  I44.1 426.13 MAGNESIUM      METABOLIC PANEL, COMPREHENSIVE      3. Hyperlipidemia, unspecified hyperlipidemia type  E78.5 272.4 LIPID PANEL      MAGNESIUM      METABOLIC PANEL, COMPREHENSIVE      4. Prediabetes  R73.03 790.29 HEMOGLOBIN A1C WITH EAG      MAGNESIUM      METABOLIC PANEL, COMPREHENSIVE      MICROALBUMIN, UR, RAND W/ MICROALB/CREAT RATIO      5. JANNET treated with BiPAP  G47.33 327.23       6. 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 (HCC)  M06.041 714.0     M06.042        7. Primary osteoarthritis involving multiple joints  M15.9 715.98       8. CPDD (calcium pyrophosphate deposition disease)  M11.20 275.49      712.20       9. History of 2019 novel coronavirus disease (COVID-19)  Z86.16 V12.09       10. Class 1 obesity due to excess calories with serious comorbidity and body mass index (BMI) of 30.0 to 30.9 in adult  E66.09 278.00     Z68.30 V85.30       11. Mild episode of recurrent major depressive disorder (HCC)  F33.0 296.31       12. Needs flu shot  Z23 V04.81 INFLUENZA, FLUAD, (AGE 65 Y+), IM, PF, 0.5 ML      13.  History of nonmelanoma skin cancer  Z85.828 V10.83 REFERRAL TO DERMATOLOGY      14. Vitamin D deficiency  E55.9 268.9 VITAMIN D, 25 HYDROXY

## 2023-01-18 ENCOUNTER — APPOINTMENT (OUTPATIENT)
Dept: INTERNAL MEDICINE CLINIC | Age: 83
End: 2023-01-18

## 2023-01-18 ENCOUNTER — HOSPITAL ENCOUNTER (OUTPATIENT)
Dept: LAB | Age: 83
Discharge: HOME | End: 2023-01-18
Payer: MEDICARE

## 2023-01-18 DIAGNOSIS — I44.1 MOBITZ TYPE 1 SECOND DEGREE ATRIOVENTRICULAR BLOCK: ICD-10-CM

## 2023-01-18 DIAGNOSIS — E55.9 VITAMIN D DEFICIENCY: ICD-10-CM

## 2023-01-18 DIAGNOSIS — E78.5 HYPERLIPIDEMIA, UNSPECIFIED HYPERLIPIDEMIA TYPE: ICD-10-CM

## 2023-01-18 DIAGNOSIS — R73.03 PREDIABETES: ICD-10-CM

## 2023-01-18 DIAGNOSIS — I10 PRIMARY HYPERTENSION: ICD-10-CM

## 2023-01-18 LAB
25(OH)D3 SERPL-MCNC: 55.7 NG/ML (ref 30–100)
ALBUMIN SERPL-MCNC: 3.9 G/DL (ref 3.4–5)
ALBUMIN/GLOB SERPL: 1.6 (ref 0.8–1.7)
ALP SERPL-CCNC: 101 U/L (ref 45–117)
ALT SERPL-CCNC: 35 U/L (ref 16–61)
ANION GAP SERPL CALC-SCNC: 4 MMOL/L (ref 3–18)
APPEARANCE UR: CLEAR
AST SERPL-CCNC: 20 U/L (ref 10–38)
BACTERIA URNS QL MICRO: NEGATIVE /HPF
BASOPHILS # BLD: 0 K/UL (ref 0–0.1)
BASOPHILS NFR BLD: 1 % (ref 0–2)
BILIRUB SERPL-MCNC: 0.6 MG/DL (ref 0.2–1)
BILIRUB UR QL: NEGATIVE
BUN SERPL-MCNC: 22 MG/DL (ref 7–18)
BUN/CREAT SERPL: 19 (ref 12–20)
CALCIUM SERPL-MCNC: 9.5 MG/DL (ref 8.5–10.1)
CHLORIDE SERPL-SCNC: 107 MMOL/L (ref 100–111)
CHOLEST SERPL-MCNC: 134 MG/DL
CO2 SERPL-SCNC: 30 MMOL/L (ref 21–32)
COLOR UR: YELLOW
CREAT SERPL-MCNC: 1.13 MG/DL (ref 0.6–1.3)
CREAT UR-MCNC: 63 MG/DL (ref 30–125)
DIFFERENTIAL METHOD BLD: ABNORMAL
EOSINOPHIL # BLD: 0.3 K/UL (ref 0–0.4)
EOSINOPHIL NFR BLD: 6 % (ref 0–5)
EPITH CASTS URNS QL MICRO: ABNORMAL /LPF (ref 0–5)
ERYTHROCYTE [DISTWIDTH] IN BLOOD BY AUTOMATED COUNT: 13.2 % (ref 11.6–14.5)
EST. AVERAGE GLUCOSE BLD GHB EST-MCNC: 117 MG/DL
GLOBULIN SER CALC-MCNC: 2.5 G/DL (ref 2–4)
GLUCOSE SERPL-MCNC: 104 MG/DL (ref 74–99)
GLUCOSE UR STRIP.AUTO-MCNC: NEGATIVE MG/DL
HBA1C MFR BLD: 5.7 % (ref 4.2–5.6)
HCT VFR BLD AUTO: 43.8 % (ref 36–48)
HDLC SERPL-MCNC: 52 MG/DL (ref 40–60)
HDLC SERPL: 2.6 (ref 0–5)
HGB BLD-MCNC: 14.5 G/DL (ref 13–16)
HGB UR QL STRIP: NEGATIVE
IMM GRANULOCYTES # BLD AUTO: 0 K/UL (ref 0–0.04)
IMM GRANULOCYTES NFR BLD AUTO: 0 % (ref 0–0.5)
KETONES UR QL STRIP.AUTO: NEGATIVE MG/DL
LDLC SERPL CALC-MCNC: 69.4 MG/DL (ref 0–100)
LEUKOCYTE ESTERASE UR QL STRIP.AUTO: ABNORMAL
LIPID PROFILE,FLP: NORMAL
LYMPHOCYTES # BLD: 1.7 K/UL (ref 0.9–3.6)
LYMPHOCYTES NFR BLD: 28 % (ref 21–52)
MAGNESIUM SERPL-MCNC: 2.1 MG/DL (ref 1.6–2.6)
MCH RBC QN AUTO: 33.3 PG (ref 24–34)
MCHC RBC AUTO-ENTMCNC: 33.1 G/DL (ref 31–37)
MCV RBC AUTO: 100.5 FL (ref 78–100)
MICROALBUMIN UR-MCNC: <0.5 MG/DL (ref 0–3)
MICROALBUMIN/CREAT UR-RTO: NORMAL MG/G (ref 0–30)
MONOCYTES # BLD: 0.6 K/UL (ref 0.05–1.2)
MONOCYTES NFR BLD: 10 % (ref 3–10)
NEUTS SEG # BLD: 3.3 K/UL (ref 1.8–8)
NEUTS SEG NFR BLD: 56 % (ref 40–73)
NITRITE UR QL STRIP.AUTO: NEGATIVE
NRBC # BLD: 0 K/UL (ref 0–0.01)
NRBC BLD-RTO: 0 PER 100 WBC
PH UR STRIP: 6 (ref 5–8)
PLATELET # BLD AUTO: 217 K/UL (ref 135–420)
PMV BLD AUTO: 10.1 FL (ref 9.2–11.8)
POTASSIUM SERPL-SCNC: 4.4 MMOL/L (ref 3.5–5.5)
PROT SERPL-MCNC: 6.4 G/DL (ref 6.4–8.2)
PROT UR STRIP-MCNC: NEGATIVE MG/DL
RBC # BLD AUTO: 4.36 M/UL (ref 4.35–5.65)
RBC #/AREA URNS HPF: NEGATIVE /HPF (ref 0–5)
SODIUM SERPL-SCNC: 141 MMOL/L (ref 136–145)
SP GR UR REFRACTOMETRY: 1.01 (ref 1–1.03)
TRIGL SERPL-MCNC: 63 MG/DL (ref ?–150)
UROBILINOGEN UR QL STRIP.AUTO: 0.2 EU/DL (ref 0.2–1)
VLDLC SERPL CALC-MCNC: 12.6 MG/DL
WBC # BLD AUTO: 5.9 K/UL (ref 4.6–13.2)
WBC URNS QL MICRO: ABNORMAL /HPF (ref 0–4)

## 2023-01-18 PROCEDURE — 82043 UR ALBUMIN QUANTITATIVE: CPT

## 2023-01-18 PROCEDURE — 82306 VITAMIN D 25 HYDROXY: CPT

## 2023-01-18 PROCEDURE — 83036 HEMOGLOBIN GLYCOSYLATED A1C: CPT

## 2023-01-18 PROCEDURE — 83735 ASSAY OF MAGNESIUM: CPT

## 2023-01-18 PROCEDURE — 85025 COMPLETE CBC W/AUTO DIFF WBC: CPT

## 2023-01-18 PROCEDURE — 36415 COLL VENOUS BLD VENIPUNCTURE: CPT

## 2023-01-18 PROCEDURE — 80053 COMPREHEN METABOLIC PANEL: CPT

## 2023-01-18 PROCEDURE — 80061 LIPID PANEL: CPT

## 2023-01-18 PROCEDURE — 81001 URINALYSIS AUTO W/SCOPE: CPT

## 2023-01-26 ENCOUNTER — OFFICE VISIT (OUTPATIENT)
Dept: INTERNAL MEDICINE CLINIC | Age: 83
End: 2023-01-26
Payer: MEDICARE

## 2023-01-26 VITALS
BODY MASS INDEX: 31.07 KG/M2 | HEIGHT: 68 IN | HEART RATE: 75 BPM | RESPIRATION RATE: 16 BRPM | OXYGEN SATURATION: 97 % | SYSTOLIC BLOOD PRESSURE: 134 MMHG | TEMPERATURE: 97.8 F | DIASTOLIC BLOOD PRESSURE: 74 MMHG | WEIGHT: 205 LBS

## 2023-01-26 DIAGNOSIS — M11.20 CPDD (CALCIUM PYROPHOSPHATE DEPOSITION DISEASE): ICD-10-CM

## 2023-01-26 DIAGNOSIS — E66.09 CLASS 1 OBESITY DUE TO EXCESS CALORIES WITH SERIOUS COMORBIDITY AND BODY MASS INDEX (BMI) OF 31.0 TO 31.9 IN ADULT: ICD-10-CM

## 2023-01-26 DIAGNOSIS — M06.041 RHEUMATOID ARTHRITIS INVOLVING BOTH HANDS WITH NEGATIVE RHEUMATOID FACTOR (HCC): ICD-10-CM

## 2023-01-26 DIAGNOSIS — R60.0 BILATERAL LOWER EXTREMITY EDEMA: ICD-10-CM

## 2023-01-26 DIAGNOSIS — E55.9 VITAMIN D DEFICIENCY: ICD-10-CM

## 2023-01-26 DIAGNOSIS — M06.042 RHEUMATOID ARTHRITIS INVOLVING BOTH HANDS WITH NEGATIVE RHEUMATOID FACTOR (HCC): ICD-10-CM

## 2023-01-26 DIAGNOSIS — Z77.011 ENCOUNTER FOR OCCUPATIONAL HEALTH EXAMINATION FOR SURVEILLANCE OF EXPOSURE TO LEAD: ICD-10-CM

## 2023-01-26 DIAGNOSIS — F33.42 RECURRENT MAJOR DEPRESSIVE DISORDER, IN FULL REMISSION (HCC): ICD-10-CM

## 2023-01-26 DIAGNOSIS — G47.33 OSA TREATED WITH BIPAP: ICD-10-CM

## 2023-01-26 DIAGNOSIS — E78.5 HYPERLIPIDEMIA, UNSPECIFIED HYPERLIPIDEMIA TYPE: ICD-10-CM

## 2023-01-26 DIAGNOSIS — S68.621S: ICD-10-CM

## 2023-01-26 DIAGNOSIS — R73.03 PREDIABETES: ICD-10-CM

## 2023-01-26 DIAGNOSIS — R78.71 ABNORMAL LEAD LEVEL IN BLOOD: ICD-10-CM

## 2023-01-26 DIAGNOSIS — M15.9 PRIMARY OSTEOARTHRITIS INVOLVING MULTIPLE JOINTS: ICD-10-CM

## 2023-01-26 DIAGNOSIS — Z02.89 ENCOUNTER FOR OCCUPATIONAL HEALTH EXAMINATION FOR SURVEILLANCE OF EXPOSURE TO LEAD: ICD-10-CM

## 2023-01-26 DIAGNOSIS — I10 PRIMARY HYPERTENSION: Primary | ICD-10-CM

## 2023-01-26 PROCEDURE — G0463 HOSPITAL OUTPT CLINIC VISIT: HCPCS | Performed by: INTERNAL MEDICINE

## 2023-01-26 NOTE — PROGRESS NOTES
1. \"Have you been to the ER, urgent care clinic since your last visit? Hospitalized since your last visit? \" No    2. \"Have you seen or consulted any other health care providers outside of the [address] [place] [address] since your last visit? \" No     3. For patients aged 39-70: Has the patient had a colonoscopy / FIT/ Cologuard? NA - based on age      If the patient is female:    4. For patients aged 41-77: Has the patient had a mammogram within the past 2 years? NA - based on age or sex      11. For patients aged 21-65: Has the patient had a pap smear?  NA - based on age or sex

## 2023-01-26 NOTE — PROGRESS NOTES
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 Negative  NEG mg/dL Final    Bilirubin 2023 Negative  NEG   Final    Blood 2023 Negative  NEG   Final    Urobilinogen 2023 0.2  0.2 - 1.0 EU/dL Final    Nitrites 2023 Negative  NEG   Final    Leukocyte Esterase 2023 TRACE (A)  NEG   Final    WBC 2023 0 to 2  0 - 4 /hpf Final    RBC 2023 Negative  0 - 5 /hpf Final    Epithelial cells 2023 0 to 1  0 - 5 /lpf Final    Bacteria 2023 Negative  NEG /hpf Final       Health Maintenance:   Screening:    Colorectal: colonoscopy (2015) serrated adenomas. Dr. Kristie Mart. Cologuard negative (2021). Depression: on Zoloft   DM (HbA1c/FPG): / HbA1c 5.7 (2023)   Hepatitis C: negative (2021)   Falls: none   DEXA: within normal limits (2016)   PSA/MARTÍNEZ: PSA 3.3 (2019)    Glaucoma: regular eye exams with Dr. Murray Orta (last 2022)   Smokin pack year.  Distant past.   Vitamin D: 55.7 (2023)   Medicare Wellness: 2022        Impression:  Patient Active Problem List   Diagnosis Code    BPH with obstruction/lower urinary tract symptoms N40.1, N13.8    Hypertension I10    Primary osteoarthritis involving multiple joints M15.9    Hyperlipidemia E78.5    GERD (gastroesophageal reflux disease) K21.9    Prediabetes R73.03    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 ([facility] M06.041, K14.967    Vitamin D deficiency E55.9    Colon polyps K63.5    CPDD (calcium pyrophosphate deposition disease) M11.20    Impaired fasting glucose R73.01    Class 1 obesity due to excess calories with serious comorbidity in adult E66.09    APC (atrial premature contractions) I49.1    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 Hypertension. Blood pressure remains well controlled on lisinopril 40 mg daily, amlodipine 5 mg daily, hydralazine 25 mg bid, and torsemide 20 mg bid. Renal function remains normal with creatinine 1.13/eGFR >60. Underwent evaluation for chest tightness to include chest CT scan, echocardiogram, and pharmacologic nuclear stress test in 8/2022 which were essentially normal.  No further evaluation felt to be needed. 2. Lower extremity edema. Chronic problem and worsened following COVID 19 infection. Evaluated by Dr. Lenny Hall and changed from lasix to torsemide and metolazone. Became hypotensive and metolazone discontinued. Underwent pharmacologic nuclear stress test (2/24/2021) which was a normal, low risk study. Echocardiogram (8/17/2022) with normal LV function (EF 55-60%) and normal diastolic function, trace TR and mild PI. Edema now significantly improved on lowered dose of amlodipine to 5 mg daily and increase in torsemide dose to 20 mg twice daily. Will continue to monitor. 3. Hyperlipidemia. On moderate intensity dose atorvastatin with LDL 69 and HDL 52, indicative of excellent control. Will continue to follow. 4. Prediabetes. Controlled on diet alone with mild elevation of fasting blood sugar at 104 and HbA1c at 5.7. Required sliding scale insulin dosing when hospitalized due to COVID-19 due to elevated blood sugar related to treatment with high dose steroids. No evidence of microvascular complications. On Ace-I and statin. Continue follow-up with Dr. Charlene Patino for annual eye exams. Emphasized importance of lifestyle modifications, including low carbohydrate diet, regular exercise, and weight loss. 5. History of COVID-19 infection with severe pneumonia/ acute hypoxic respiratory failure (6/2020). Overall with resolution of symptoms. Lower extremity edema is a persistent problem but was also present prior to his COVID-19 infection. Underwent 6 minute walk test (3/31/2021) showing no desaturations, and PFT's (3/31/2021) showing normal flows and DLCO, and isolated decreased RV and FRC, no response to bronchodilator. Completed the Moderna COVID-19 vaccine series and received one Moderna booster dose. Will continue to monitor. 6. Obstructive sleep apnea, severe. Patient reported in 1/2019 awakening gasping for air several times per week, snoring and daytime drowsiness particularly when driving. Severe episode on 8/9/2019 prompted ED evaluation. EKG without change, troponin negative and NT-pro BNP normal. Echocardiogram (8/26/2019) with normal LV size and function (EF 56-60%), no RWMA, normal valves. Evaluated by Dr. Nguyễn Santoro and completed home sleep study and diagnosed with severe JANNET. Started on auto CPAP after hospitalization for COVID-19 by Dr. Jesús Gorman and supplemented with 2 liters of oxygen. However, despite best efforts, patient continued to describe significant difficulty using due to dyspnea and air hunger, and returned equipment. Underwent in-patient sleep evaluation on 1/29/2021 and found BIPAP to be very effective. However, still having difficulty tolerating BiPAP despite receiving new mask and equipment.   Advised to continue to attempt. Will readdress at next visit. 7. Second degree AV block, Mobitz 1. Noted to be intermittent during sleep study and referred to Dr. Aldean Lombard for evaluation. [place] to be likely secondary to untreated sleep apnea. Completed 30 day event monitor (5/5/2021) and no significant findings noted except for asymptomatic 20 beat run of SVT at 155 bpm.  Will continue to monitor. 8. Rheumatoid arthritis. On hydroxychloroquine 400 mg daily. Has regular eye exams with Dr. Davida Mcintyre. Difficult to gauge response as appears to have evidence of osteoarthritis and CPPD also causing joint pain, but noted significant improvement since initiated colchicine every other day. Voltaren gel and Tylenol for pain control as needed. No longer taking Celebrex. Followed by Dr. Ruchi Kwan. 9. Primary osteoarthritis. Evident in bilateral hands and knees, bilateral shoulders, and cervical spine. Shoulder pain (R>L). X-ray with evidence of osteoarthritis and rotator cuff pathology. Evaluated by Dr. Warner Denton and surgery for reverse shoulder replacement recommended, but did not wish to to proceed. Reports no longer taking Celebrex for pain due to concern for side effects. Using Tylenol with reasonable control with overall improvement today. Will continue to monitor. 10. CPPD. Colchicine started on 1/19/2017 to help address chondrocalcinosis on x-rays. Now taking every other day with improvement in joint pain. Being monitored by Dr. Ruchi Kwan. 11. Lumbar degenerative disease with right sciatica. Underwent decompression with L4/5 right-sided hemilaminotomy and medial facetectomy on 10/23/2019 by Dr. Cora Zavala. Developed urinary retention post-op, but successfully passed voiding trial and has had no further difficulties. He developed recurrence of pain on post op day 6, and treated with Medrol dose pack with improvement. No longer requiring gabapentin given no significant pain. Being followed by GOMEZ Venegas as needed. 12. Depression. Previously treated with Paxil and weaned from benzodiazepine use for control of anxiety and insomnia. Symptoms were well controlled on Zoloft but discontinued in 11/2021 since he felt it was no longer needed. Reported increased irritability and difficulty controlling anger outbursts and restarted on Zoloft 50 mg daily in 10/2022 with improvement. Advised importance of compliance. 13. GERD. Continued good control of symptoms with omeprazole 20 mg daily. 14.  Anemia, mild. Colonoscopy 8/2015. Evaluated by Dr. Florencio Henson and considered to be hemorrhoidal in source. Known internal and external hemorrhoids and improved with Citrucel. Due for routine colonoscopy in 8/2020, but was unwilling to proceed. Completed Cologuard (4/2021) negative. Mild anemia in 5/2021 with Hb 12.9. Normal iron studies with ferritin 75 and transferrin 34% and normal B12 and folate levels (8/2021). Has normalized and remains normal today with Hb 14.5/HCT 43.8. Will continue to monitor. 15. BPH with urinary retention. Difficulty with lower urinary tract symptoms. Developed postop urinary retention after back surgery. On Flomax. Followed previously by Dr. Luz Burrell and not wishing to establish with new provider unless problem emerges. Reports stable today. 16. Left wrist ganglion cyst. Previously evaluated by Dr. Isaías Putnam and aspirated. Recurred and reports now larger. Requested referral to another orthopedist and evaluated by PA at Dr. Laura Gates office. Recommended surgery, but was dissatisfied with delays in scheduling and now not wishing to proceed. Remains unchanged today. 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 Managing well and no further issues. 18. 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 Level stable at 10 (4/2022) and monitored annually (next due 4/2023). 19. History of nonmelanoma skin cancers. Previously followed by Dr. Shanae Yang and referred to Dr. Ritu Salcedo at last visit but not contacted. Provided with contact information today to schedule. Will readdress at next visit. 20. Obesity. Lost 25 pounds during COVID 19 illness, but has now returned to baseline. Weight overall stable since last visit. Emphasized importance of following a heart healthy diet and regular exercise. Discussed attempts at weight loss. Will readdress at next visit. 21. Health maintenance. Completed Moderna COVID 19 vaccine series and received one Moderna booster doses and the updated bivalent booster dose. Already received the influenza vaccine. Unable to receive Tdap due to allergy (anaphylaxis to Td). Completed 1/2 doses of the Shingrix vaccine. Other immunizations up to date. Completed Cologuard (4/2021) testing as discussed. Continue regular eye exams with Dr. Baltazar Gottron. Vitamin D level remains normal on maintenance dose supplement of 1000 U daily. Medicare wellness visit up to date. Patient understands recommendations and agrees with plan. Follow-up in 3 months. Future orders:    ICD-10-CM ICD-9-CM    1. Primary hypertension  I10 401.9 CBC WITH AUTOMATED DIFF      MAGNESIUM      METABOLIC PANEL, COMPREHENSIVE      URINALYSIS W/MICROSCOPIC      2. Hyperlipidemia, unspecified hyperlipidemia type  E78.5 272.4 LIPID PANEL      3. Prediabetes  R73.03 790.29 HEMOGLOBIN A1C WITH EAG      MAGNESIUM      4. JANNET treated with BiPAP  G47.33 327.23       5. 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 (HCC)  M06.041 714.0 MAGNESIUM    M06.042        6. Primary osteoarthritis involving multiple joints  M15.9 715.98       7. CPDD (calcium pyrophosphate deposition disease)  M11.20 275.49 MAGNESIUM     712.20       8. Recurrent major depressive disorder, in full remission ([facility] [address]  F33.42 296.36       9. Bilateral lower extremity edema  R60.0 782.3       10. 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  Vitamin D deficiency  E55.9 268.9 VITAMIN D, 25 HYDROXY      12. Class 1 obesity due to excess calories with serious comorbidity and body mass index (BMI) of 31.0 to 31.9 in adult  E66.09 278.00     Z68.31 V85.31       13. Encounter for occupational health examination for surveillance of exposure to lead  Z02.89 V70.5 LEAD, ADULT, WHOLE BLOOD    Z77.011 V15.86       14.  Abnormal lead level in blood   R78.71 790.6 MAGNESIUM

## 2023-01-26 NOTE — PATIENT INSTRUCTIONS
Heart-Healthy Diet: Care Instructions  Your Care Instructions     A heart-healthy diet has lots of vegetables, fruits, nuts, beans, and whole grains, and is low in salt. It limits foods that are high in saturated fat, such as meats, cheeses, and fried foods. It may be hard to change your diet, but even small changes can lower your risk of heart attack and heart disease. Follow-up care is a key part of your treatment and safety. Be sure to make and go to all appointments, and call your doctor if you are having problems. It's also a good idea to know your test results and keep a list of the medicines you take. How can you care for yourself at home? Watch your portions  Learn what a serving is. A \"serving\" and a \"portion\" are not always the same thing. Make sure that you are not eating larger portions than are recommended. For example, a serving of pasta is ½ cup. A serving size of meat is 2 to 3 ounces. A 3-ounce serving is about the size of a deck of cards. Measure serving sizes until you are good at [place]" them. Keep in mind that restaurants often serve portions that are 2 or 3 times the size of one serving. To keep your energy level up and keep you from feeling hungry, eat often but in smaller portions. Eat only the number of calories you need to stay at a healthy weight. If you need to lose weight, eat fewer calories than your body burns (through exercise and other physical activity). Eat more fruits and vegetables  Eat a variety of fruit and vegetables every day. Dark green, deep orange, red, or yellow fruits and vegetables are especially good for you. Examples include spinach, carrots, peaches, and berries. Keep carrots, celery, and other veggies handy for snacks. Buy fruit that is in season and store it where you can see it so that you will be tempted to eat it. Cook dishes that have a lot of veggies in them, such as stir-fries and soups.   Limit saturated and trans fat  Read food labels, and try to avoid saturated and trans fats. They increase your risk of heart disease. Use olive or canola oil when you cook. Bake, broil, grill, or steam foods instead of frying them. Choose lean meats instead of high-fat meats such as hot dogs and sausages. Cut off all visible fat when you prepare meat. Eat fish, skinless poultry, and meat alternatives such as soy products instead of high-fat meats. Soy products, such as tofu, may be especially good for your heart. Choose low-fat or fat-free milk and dairy products. Eat foods high in fiber  Eat a variety of grain products every day. Include whole-grain foods that have lots of fiber and nutrients. Examples of whole-grain foods include oats, whole wheat bread, and brown rice. Buy whole-grain breads and cereals, instead of white bread or pastries. Limit salt and sodium  Limit how much salt and sodium you eat to help lower your blood pressure. Taste food before you salt it. Add only a little salt when you think you need it. With time, your taste buds will adjust to less salt. Eat fewer snack items, fast foods, and other high-salt, processed foods. Check food labels for the amount of sodium in packaged foods. Choose low-sodium versions of canned goods (such as soups, vegetables, and beans). Limit sugar  Limit drinks and foods with added sugar. These include candy, desserts, and soda pop. Limit alcohol  Limit alcohol to no more than 2 drinks a day for men and 1 drink a day for women. Too much alcohol can cause health problems. When should you call for help? Watch closely for changes in your health, and be sure to contact your doctor if:    You would like help planning heart-healthy meals. Where can you learn more? Go to http://www.edmonds.com/  Enter V137 in the search box to learn more about \"Heart-Healthy Diet: Care Instructions. \"  Current as of: August 22, 2019               Content Version: 12.6  © 3047-8432 [employer], Incorporated. Care instructions adapted under license by [employer] (which disclaims liability or warranty for this information). If you have questions about a medical condition or this instruction, always ask your healthcare professional. [address] any warranty or liability for your use of this information. Learning About Low-Sodium Foods  What foods are low in sodium? The foods you eat contain nutrients, such as vitamins and minerals. Sodium is a nutrient. Your body needs the right amount to stay healthy and work as it should. You can use the list below to help you make choices about which foods to eat. Fruits  Fresh, frozen, canned, or dried fruit  Vegetables  Fresh or frozen vegetables, with no added salt  Canned vegetables, low-sodium or with no added salt  Grains  Bagels without salted tops  Cereal with no added salt  Corn tortillas  Crackers with no added salt  Oatmeal, cooked without salt  Popcorn with no salt  Pasta and noodles, cooked without salt  Rice, cooked without salt  Unsalted pretzels  Dairy and dairy alternatives  Butter, unsalted  Cream cheese  Ice cream  Milk  Soy milk  Meats and other protein foods  Beans and peas, canned with no salt  Eggs  Fresh fish (not smoked)  Fresh meats (not smoked or cured)  Nuts and nut butter, prepared without salt  Poultry, not packaged with sodium solution  Tofu, unseasoned  Tuna, canned without salt  Seasonings  Garlic  Herbs and spices  Lemon juice  Mustard  Olive oil  Salt-free seasoning mixes  Vinegar  Work with your doctor to find out how much of this nutrient you need. Depending on your health, you may need more or less of it in your diet. Where can you learn more? Go to http://www.gray.com/  Enter S460 in the search box to learn more about \"Learning About Low-Sodium Foods. \"  Current as of: August 22, 2019               Content Version: 12.6  © 2462-0145 [employer]. Care instructions adapted under license by [employer] (which disclaims liability or warranty for this information). If you have questions about a medical condition or this instruction, always ask your healthcare professional. [address] any warranty or liability for your use of this information. Low Sodium Diet (2,000 Milligram): Care Instructions  Your Care Instructions     Too much sodium causes your body to hold on to extra water. This can raise your blood pressure and force your heart and kidneys to work harder. In very serious cases, this could cause you to be put in the hospital. It might even be life-threatening. By limiting sodium, you will feel better and lower your risk of serious problems. The most common source of sodium is salt. People get most of the salt in their diet from canned, prepared, and packaged foods. Fast food and restaurant meals also are very high in sodium. Your doctor will probably limit your sodium to less than 2,000 milligrams (mg) a day. This limit counts all the sodium in prepared and packaged foods and any salt you add to your food. Follow-up care is a key part of your treatment and safety. Be sure to make and go to all appointments, and call your doctor if you are having problems. It's also a good idea to know your test results and keep a list of the medicines you take. How can you care for yourself at home? Read food labels  Read labels on cans and food packages. The labels tell you how much sodium is in each serving. Make sure that you look at the serving size. If you eat more than the serving size, you have eaten more sodium. Food labels also tell you the Percent Daily Value for sodium. Choose products with low Percent Daily Values for sodium. Be aware that sodium can come in forms other than salt, including monosodium glutamate (MSG), sodium citrate, and sodium bicarbonate (baking soda). MSG is often added to Asian food.  When you eat out, you can sometimes ask for food without MSG or added salt. Buy low-sodium foods  Buy foods that are labeled \"unsalted\" (no salt added), \"sodium-free\" (less than 5 mg of sodium per serving), or \"low-sodium\" (less than 140 mg of sodium per serving). Foods labeled \"reduced-sodium\" and \"light sodium\" may still have too much sodium. Be sure to read the label to see how much sodium you are getting. Buy fresh vegetables, or frozen vegetables without added sauces. Buy low-sodium versions of canned vegetables, soups, and other canned goods. Prepare low-sodium meals  Cut back on the amount of salt you use in cooking. This will help you adjust to the taste. Do not add salt after cooking. One teaspoon of salt has about 2,300 mg of sodium. Take the salt shaker off the table. Flavor your food with garlic, lemon juice, onion, vinegar, herbs, and spices. Do not use soy sauce, lite soy sauce, steak sauce, onion salt, garlic salt, celery salt, mustard, or ketchup on your food. Use low-sodium salad dressings, sauces, and ketchup. Or make your own salad dressings and sauces without adding salt. Use less salt (or none) when recipes call for it. You can often use half the salt a recipe calls for without losing flavor. Other foods such as rice, pasta, and grains do not need added salt. Rinse canned vegetables, and cook them in fresh water. This removes some--but not all--of the salt. Avoid water that is naturally high in sodium or that has been treated with water softeners, which add sodium. Call your local water company to find out the sodium content of your water supply. If you buy bottled water, read the label and choose a sodium-free brand. Avoid high-sodium foods  Avoid eating:  Smoked, cured, salted, and canned meat, fish, and poultry. Ham, puentes, hot dogs, and luncheon meats. Regular, hard, and processed cheese and regular peanut butter.   Crackers with salted tops, and other salted snack foods such as pretzels, chips, and salted popcorn. Frozen prepared meals, unless labeled low-sodium. Canned and dried soups, broths, and bouillon, unless labeled sodium-free or low-sodium. Canned vegetables, unless labeled sodium-free or low-sodium. [place] fries, pizza, tacos, and other fast foods. Pickles, olives, ketchup, and other condiments, especially soy sauce, unless labeled sodium-free or low-sodium. Where can you learn more? Go to http://www.gray.com/  Enter V843 in the search box to learn more about \"Low Sodium Diet (2,000 Milligram): Care Instructions. \"  Current as of: August 22, 2019               Content Version: 12.6  © 3448-1269 [employer]. Care instructions adapted under license by [employer] (which disclaims liability or warranty for this information). If you have questions about a medical condition or this instruction, always ask your healthcare professional. [place] [address] any warranty or liability for your use of this information. Learning About Diabetes Food Guidelines  Your Care Instructions     Meal planning is important to manage diabetes. It helps keep your blood sugar at a target level (which you set with your doctor). You don't have to eat special foods. You can eat what your family eats, including sweets once in a while. But you do have to pay attention to how often you eat and how much you eat of certain foods. You may want to work with a dietitian or a certified diabetes educator (CDE) to help you plan meals and snacks. A dietitian or CDE can also help you lose weight if that is one of your goals. What should you know about eating carbs? Managing the amount of carbohydrate (carbs) you eat is an important part of healthy meals when you have diabetes. Carbohydrate is found in many foods. Learn which foods have carbs. And learn the amounts of carbs in different foods.   Bread, cereal, pasta, and rice have about 15 grams of carbs in a serving. A serving is 1 slice of bread (1 ounce), ½ cup of cooked cereal, or 1/3 cup of cooked pasta or rice. Fruits have 15 grams of carbs in a serving. A serving is 1 small fresh fruit, such as an apple or orange; ½ of a banana; ½ cup of cooked or canned fruit; ½ cup of fruit juice; 1 cup of melon or raspberries; or 2 tablespoons of dried fruit. Milk and no-sugar-added yogurt have 15 grams of carbs in a serving. A serving is 1 cup of milk or 2/3 cup of no-sugar-added yogurt. Starchy vegetables have 15 grams of carbs in a serving. A serving is ½ cup of mashed potatoes or sweet potato; 1 cup winter squash; ½ of a small baked potato; ½ cup of cooked beans; or ½ cup cooked corn or green peas. Learn how much carbs to eat each day and at each meal. A dietitian or CDE can teach you how to keep track of the amount of carbs you eat. This is called carbohydrate counting. If you are not sure how to count carbohydrate grams, use the Plate Method to plan meals. It is a good, quick way to make sure that you have a balanced meal. It also helps you spread carbs throughout the day. Divide your plate by types of foods. Put non-starchy vegetables on half the plate, meat or other protein food on one-quarter of the plate, and a grain or starchy vegetable in the final quarter of the plate. To this you can add a small piece of fruit and 1 cup of milk or yogurt, depending on how many carbs you are supposed to eat at a meal.  Try to eat about the same amount of carbs at each meal. Do not \"save up\" your daily allowance of carbs to eat at one meal.  Proteins have very little or no carbs per serving. Examples of proteins are beef, chicken, turkey, fish, eggs, tofu, cheese, cottage cheese, and peanut butter. A serving size of meat is 3 ounces, which is about the size of a deck of cards.  Examples of meat substitute serving sizes (equal to 1 ounce of meat) are 1/4 cup of cottage cheese, 1 egg, 1 tablespoon of peanut butter, and ½ cup of tofu. How can you eat out and still eat healthy? Learn to estimate the serving sizes of foods that have carbohydrate. If you measure food at home, it will be easier to estimate the amount in a serving of restaurant food. If the meal you order has too much carbohydrate (such as potatoes, corn, or baked beans), ask to have a low-carbohydrate food instead. Ask for a salad or green vegetables. If you use insulin, check your blood sugar before and after eating out to help you plan how much to eat in the future. If you eat more carbohydrate at a meal than you had planned, take a walk or do other exercise. This will help lower your blood sugar. What else should you know? Limit saturated fat, such as the fat from meat and dairy products. This is a healthy choice because people who have diabetes are at higher risk of heart disease. So choose lean cuts of meat and nonfat or low-fat dairy products. Use olive or canola oil instead of butter or shortening when cooking. Don't skip meals. Your blood sugar may drop too low if you skip meals and take insulin or certain medicines for diabetes. Check with your doctor before you drink alcohol. Alcohol can cause your blood sugar to drop too low. Alcohol can also cause a bad reaction if you take certain diabetes medicines. Follow-up care is a key part of your treatment and safety. Be sure to make and go to all appointments, and call your doctor if you are having problems. It's also a good idea to know your test results and keep a list of the medicines you take. Where can you learn more? Go to http://www.nextsocial.com/  Enter I147 in the search box to learn more about \"Learning About Diabetes Food Guidelines. \"  Current as of: December 20, 2019               Content Version: 12.6  © 0259-7646 [employer]. Care instructions adapted under license by [employer] (which disclaims liability or warranty for this information). If you have questions about a medical condition or this instruction, always ask your healthcare professional. [place] [address] any warranty or liability for your use of this information.

## 2023-02-05 DIAGNOSIS — R73.03 PREDIABETES: Primary | ICD-10-CM

## 2023-02-05 DIAGNOSIS — I10 PRIMARY HYPERTENSION: Primary | ICD-10-CM

## 2023-02-05 DIAGNOSIS — E55.9 VITAMIN D DEFICIENCY: Primary | ICD-10-CM

## 2023-02-05 DIAGNOSIS — E78.5 HYPERLIPIDEMIA, UNSPECIFIED HYPERLIPIDEMIA TYPE: Primary | ICD-10-CM

## 2023-02-07 DIAGNOSIS — I10 PRIMARY HYPERTENSION: Primary | ICD-10-CM

## 2023-02-07 DIAGNOSIS — R73.03 PREDIABETES: ICD-10-CM

## 2023-03-20 RX ORDER — LISINOPRIL 40 MG/1
TABLET ORAL
Qty: 90 TABLET | Refills: 3 | Status: SHIPPED | OUTPATIENT
Start: 2023-03-20

## 2023-03-20 NOTE — TELEPHONE ENCOUNTER
PCP: Maureen Salinas MD    Last appt: [unfilled]  Future Appointments   Date Time Provider Felicia Kumar   3/30/2023  9:00 AM iLa Rodriguez MD [facility] AMB   5/17/2023 11:25 AM IO LAB VISIT [facility]   5/24/2023  8:30 AM Maureen Salinas MD [facility] AMB       Requested Prescriptions     Pending Prescriptions Disp Refills    lisinopril (PRINIVIL;ZESTRIL) 40 MG tablet [Pharmacy Med Name: LISINOPRIL 40 MG TABS 40 Tablet] 90 tablet 3     Sig: TAKE 1 TABLET BY MOUTH ONCE DAILY

## 2023-03-29 ENCOUNTER — TELEPHONE (OUTPATIENT)
Age: 83
End: 2023-03-29

## 2023-03-29 NOTE — TELEPHONE ENCOUNTER
Pt going to cardiology tomorrow. Needs to be sure his med list is updated in the chart. They will need to go over it with him.

## 2023-03-30 ENCOUNTER — OFFICE VISIT (OUTPATIENT)
Age: 83
End: 2023-03-30
Payer: MEDICARE

## 2023-03-30 VITALS
OXYGEN SATURATION: 96 % | WEIGHT: 207 LBS | DIASTOLIC BLOOD PRESSURE: 76 MMHG | SYSTOLIC BLOOD PRESSURE: 142 MMHG | BODY MASS INDEX: 31.37 KG/M2 | HEART RATE: 83 BPM | HEIGHT: 68 IN

## 2023-03-30 DIAGNOSIS — I10 ESSENTIAL (PRIMARY) HYPERTENSION: ICD-10-CM

## 2023-03-30 DIAGNOSIS — G47.30 SLEEP APNEA, UNSPECIFIED TYPE: ICD-10-CM

## 2023-03-30 DIAGNOSIS — R07.89 OTHER CHEST PAIN: ICD-10-CM

## 2023-03-30 DIAGNOSIS — I47.1 SUPRAVENTRICULAR TACHYCARDIA (HCC): Primary | ICD-10-CM

## 2023-03-30 DIAGNOSIS — I44.1 ATRIOVENTRICULAR BLOCK, SECOND DEGREE: ICD-10-CM

## 2023-03-30 PROCEDURE — 99214 OFFICE O/P EST MOD 30 MIN: CPT | Performed by: INTERNAL MEDICINE

## 2023-03-30 PROCEDURE — G8428 CUR MEDS NOT DOCUMENT: HCPCS | Performed by: INTERNAL MEDICINE

## 2023-03-30 PROCEDURE — 3077F SYST BP >= 140 MM HG: CPT | Performed by: INTERNAL MEDICINE

## 2023-03-30 PROCEDURE — G8417 CALC BMI ABV UP PARAM F/U: HCPCS | Performed by: INTERNAL MEDICINE

## 2023-03-30 PROCEDURE — G8484 FLU IMMUNIZE NO ADMIN: HCPCS | Performed by: INTERNAL MEDICINE

## 2023-03-30 PROCEDURE — 3078F DIAST BP <80 MM HG: CPT | Performed by: INTERNAL MEDICINE

## 2023-03-30 PROCEDURE — 1036F TOBACCO NON-USER: CPT | Performed by: INTERNAL MEDICINE

## 2023-03-30 PROCEDURE — 1123F ACP DISCUSS/DSCN MKR DOCD: CPT | Performed by: INTERNAL MEDICINE

## 2023-03-30 RX ORDER — AMLODIPINE BESYLATE 10 MG/1
10 TABLET ORAL DAILY
Qty: 90 TABLET | Refills: 3 | Status: SHIPPED | OUTPATIENT
Start: 2023-03-30

## 2023-05-17 ENCOUNTER — HOSPITAL ENCOUNTER (OUTPATIENT)
Facility: HOSPITAL | Age: 83
Setting detail: SPECIMEN
Discharge: HOME | End: 2023-05-20
Payer: MEDICARE

## 2023-05-17 DIAGNOSIS — E55.9 VITAMIN D DEFICIENCY: ICD-10-CM

## 2023-05-17 DIAGNOSIS — E78.5 HYPERLIPIDEMIA, UNSPECIFIED HYPERLIPIDEMIA TYPE: ICD-10-CM

## 2023-05-17 DIAGNOSIS — R73.03 PREDIABETES: ICD-10-CM

## 2023-05-17 DIAGNOSIS — I10 PRIMARY HYPERTENSION: ICD-10-CM

## 2023-05-17 LAB
25(OH)D3 SERPL-MCNC: 45.1 NG/ML (ref 30–100)
ALBUMIN SERPL-MCNC: 3.8 G/DL (ref 3.4–5)
ALBUMIN/GLOB SERPL: 1.5 (ref 0.8–1.7)
ALP SERPL-CCNC: 103 U/L (ref 45–117)
ALT SERPL-CCNC: 40 U/L (ref 16–61)
ANION GAP SERPL CALC-SCNC: 5 MMOL/L (ref 3–18)
AST SERPL-CCNC: 29 U/L (ref 10–38)
BASOPHILS # BLD: 0 K/UL (ref 0–0.1)
BASOPHILS NFR BLD: 1 % (ref 0–2)
BILIRUB SERPL-MCNC: 0.6 MG/DL (ref 0.2–1)
BUN SERPL-MCNC: 16 MG/DL (ref 7–18)
BUN/CREAT SERPL: 18 (ref 12–20)
CALCIUM SERPL-MCNC: 9.2 MG/DL (ref 8.5–10.1)
CHLORIDE SERPL-SCNC: 112 MMOL/L (ref 100–111)
CHOLEST SERPL-MCNC: 132 MG/DL
CO2 SERPL-SCNC: 27 MMOL/L (ref 21–32)
CREAT SERPL-MCNC: 0.87 MG/DL (ref 0.6–1.3)
DIFFERENTIAL METHOD BLD: ABNORMAL
EOSINOPHIL # BLD: 0.3 K/UL (ref 0–0.4)
EOSINOPHIL NFR BLD: 5 % (ref 0–5)
ERYTHROCYTE [DISTWIDTH] IN BLOOD BY AUTOMATED COUNT: 13.3 % (ref 11.6–14.5)
EST. AVERAGE GLUCOSE BLD GHB EST-MCNC: 108 MG/DL
GLOBULIN SER CALC-MCNC: 2.5 G/DL (ref 2–4)
GLUCOSE SERPL-MCNC: 105 MG/DL (ref 74–99)
HBA1C MFR BLD: 5.4 % (ref 4.2–5.6)
HCT VFR BLD AUTO: 41.3 % (ref 36–48)
HDLC SERPL-MCNC: 61 MG/DL (ref 40–60)
HDLC SERPL: 2.2 (ref 0–5)
HGB BLD-MCNC: 13.6 G/DL (ref 13–16)
IMM GRANULOCYTES # BLD AUTO: 0 K/UL (ref 0–0.04)
IMM GRANULOCYTES NFR BLD AUTO: 0 % (ref 0–0.5)
LDLC SERPL CALC-MCNC: 59 MG/DL (ref 0–100)
LIPID PANEL: ABNORMAL
LYMPHOCYTES # BLD: 1.6 K/UL (ref 0.9–3.6)
LYMPHOCYTES NFR BLD: 26 % (ref 21–52)
MAGNESIUM SERPL-MCNC: 2.2 MG/DL (ref 1.6–2.6)
MCH RBC QN AUTO: 33.1 PG (ref 24–34)
MCHC RBC AUTO-ENTMCNC: 32.9 G/DL (ref 31–37)
MCV RBC AUTO: 100.5 FL (ref 78–100)
MONOCYTES # BLD: 0.8 K/UL (ref 0.05–1.2)
MONOCYTES NFR BLD: 12 % (ref 3–10)
NEUTS SEG # BLD: 3.5 K/UL (ref 1.8–8)
NEUTS SEG NFR BLD: 56 % (ref 40–73)
NRBC # BLD: 0 K/UL (ref 0–0.01)
NRBC BLD-RTO: 0 PER 100 WBC
PLATELET # BLD AUTO: 192 K/UL (ref 135–420)
PMV BLD AUTO: 10.3 FL (ref 9.2–11.8)
POTASSIUM SERPL-SCNC: 3.8 MMOL/L (ref 3.5–5.5)
PROT SERPL-MCNC: 6.3 G/DL (ref 6.4–8.2)
RBC # BLD AUTO: 4.11 M/UL (ref 4.35–5.65)
SODIUM SERPL-SCNC: 144 MMOL/L (ref 136–145)
TRIGL SERPL-MCNC: 60 MG/DL
VLDLC SERPL CALC-MCNC: 12 MG/DL
WBC # BLD AUTO: 6.1 K/UL (ref 4.6–13.2)

## 2023-05-17 PROCEDURE — 83735 ASSAY OF MAGNESIUM: CPT

## 2023-05-17 PROCEDURE — 36415 COLL VENOUS BLD VENIPUNCTURE: CPT

## 2023-05-17 PROCEDURE — 80053 COMPREHEN METABOLIC PANEL: CPT

## 2023-05-17 PROCEDURE — 82306 VITAMIN D 25 HYDROXY: CPT

## 2023-05-17 PROCEDURE — 80061 LIPID PANEL: CPT

## 2023-05-17 PROCEDURE — 83036 HEMOGLOBIN GLYCOSYLATED A1C: CPT

## 2023-05-17 PROCEDURE — 85025 COMPLETE CBC W/AUTO DIFF WBC: CPT

## 2023-05-24 ENCOUNTER — OFFICE VISIT (OUTPATIENT)
Age: 83
End: 2023-05-24

## 2023-05-24 VITALS
TEMPERATURE: 97 F | DIASTOLIC BLOOD PRESSURE: 86 MMHG | HEIGHT: 68 IN | SYSTOLIC BLOOD PRESSURE: 152 MMHG | WEIGHT: 206 LBS | OXYGEN SATURATION: 99 % | RESPIRATION RATE: 16 BRPM | BODY MASS INDEX: 31.22 KG/M2 | HEART RATE: 66 BPM

## 2023-05-24 DIAGNOSIS — M15.9 PRIMARY OSTEOARTHRITIS INVOLVING MULTIPLE JOINTS: ICD-10-CM

## 2023-05-24 DIAGNOSIS — Z00.00 MEDICARE ANNUAL WELLNESS VISIT, SUBSEQUENT: ICD-10-CM

## 2023-05-24 DIAGNOSIS — F33.9 RECURRENT MAJOR DEPRESSIVE DISORDER, REMISSION STATUS UNSPECIFIED (HCC): ICD-10-CM

## 2023-05-24 DIAGNOSIS — R73.03 PREDIABETES: ICD-10-CM

## 2023-05-24 DIAGNOSIS — M11.20 CPDD (CALCIUM PYROPHOSPHATE DEPOSITION DISEASE): ICD-10-CM

## 2023-05-24 DIAGNOSIS — M06.042 RHEUMATOID ARTHRITIS INVOLVING BOTH HANDS WITH NEGATIVE RHEUMATOID FACTOR (HCC): ICD-10-CM

## 2023-05-24 DIAGNOSIS — E55.9 VITAMIN D DEFICIENCY, UNSPECIFIED: ICD-10-CM

## 2023-05-24 DIAGNOSIS — R60.0 BILATERAL LOWER EXTREMITY EDEMA: ICD-10-CM

## 2023-05-24 DIAGNOSIS — E78.00 PURE HYPERCHOLESTEROLEMIA: ICD-10-CM

## 2023-05-24 DIAGNOSIS — Z77.011 CONTACT WITH AND (SUSPECTED) EXPOSURE TO LEAD: ICD-10-CM

## 2023-05-24 DIAGNOSIS — I10 PRIMARY HYPERTENSION: Primary | ICD-10-CM

## 2023-05-24 DIAGNOSIS — Z71.89 ACP (ADVANCE CARE PLANNING): ICD-10-CM

## 2023-05-24 DIAGNOSIS — E66.09 CLASS 1 OBESITY DUE TO EXCESS CALORIES WITH SERIOUS COMORBIDITY AND BODY MASS INDEX (BMI) OF 31.0 TO 31.9 IN ADULT: ICD-10-CM

## 2023-05-24 DIAGNOSIS — M06.041 RHEUMATOID ARTHRITIS INVOLVING BOTH HANDS WITH NEGATIVE RHEUMATOID FACTOR (HCC): ICD-10-CM

## 2023-05-24 DIAGNOSIS — G47.33 OSA TREATED WITH BIPAP: ICD-10-CM

## 2023-05-24 RX ORDER — AMLODIPINE BESYLATE 10 MG/1
10 TABLET ORAL DAILY
Qty: 90 TABLET | Refills: 3 | Status: SHIPPED | OUTPATIENT
Start: 2023-05-24

## 2023-05-24 SDOH — ECONOMIC STABILITY: FOOD INSECURITY: WITHIN THE PAST 12 MONTHS, THE FOOD YOU BOUGHT JUST DIDN'T LAST AND YOU DIDN'T HAVE MONEY TO GET MORE.: NEVER TRUE

## 2023-05-24 SDOH — ECONOMIC STABILITY: FOOD INSECURITY: WITHIN THE PAST 12 MONTHS, YOU WORRIED THAT YOUR FOOD WOULD RUN OUT BEFORE YOU GOT MONEY TO BUY MORE.: NEVER TRUE

## 2023-05-24 SDOH — ECONOMIC STABILITY: INCOME INSECURITY: HOW HARD IS IT FOR YOU TO PAY FOR THE VERY BASICS LIKE FOOD, HOUSING, MEDICAL CARE, AND HEATING?: NOT HARD AT ALL

## 2023-05-24 SDOH — ECONOMIC STABILITY: HOUSING INSECURITY
IN THE LAST 12 MONTHS, WAS THERE A TIME WHEN YOU DID NOT HAVE A STEADY PLACE TO SLEEP OR SLEPT IN A SHELTER (INCLUDING NOW)?: NO

## 2023-05-24 ASSESSMENT — PATIENT HEALTH QUESTIONNAIRE - PHQ9
2. FEELING DOWN, DEPRESSED OR HOPELESS: 0
1. LITTLE INTEREST OR PLEASURE IN DOING THINGS: 0
SUM OF ALL RESPONSES TO PHQ QUESTIONS 1-9: 0
SUM OF ALL RESPONSES TO PHQ9 QUESTIONS 1 & 2: 0
SUM OF ALL RESPONSES TO PHQ QUESTIONS 1-9: 0

## 2023-05-24 ASSESSMENT — LIFESTYLE VARIABLES
HOW OFTEN DO YOU HAVE A DRINK CONTAINING ALCOHOL: 2-3 TIMES A WEEK
HOW MANY STANDARD DRINKS CONTAINING ALCOHOL DO YOU HAVE ON A TYPICAL DAY: 1 OR 2

## 2023-05-24 NOTE — PATIENT INSTRUCTIONS
Please monitor and record your blood pressure daily at least two hours after taking your medications. Please call office if blood pressure is greater than 140/80. Heart-Healthy Diet: Care Instructions  Your Care Instructions     A heart-healthy diet has lots of vegetables, fruits, nuts, beans, and whole grains, and is low in salt. It limits foods that are high in saturated fat, such as meats, cheeses, and fried foods. It may be hard to change your diet, but even small changes can lower your risk of heart attack and heart disease. Follow-up care is a key part of your treatment and safety. Be sure to make and go to all appointments, and call your doctor if you are having problems. It's also a good idea to know your test results and keep a list of the medicines you take. How can you care for yourself at home? Watch your portions  Learn what a serving is. A \"serving\" and a \"portion\" are not always the same thing. Make sure that you are not eating larger portions than are recommended. For example, a serving of pasta is ½ cup. A serving size of meat is 2 to 3 ounces. A 3-ounce serving is about the size of a deck of cards. Measure serving sizes until you are good at [place]" them. Keep in mind that restaurants often serve portions that are 2 or 3 times the size of one serving. To keep your energy level up and keep you from feeling hungry, eat often but in smaller portions. Eat only the number of calories you need to stay at a healthy weight. If you need to lose weight, eat fewer calories than your body burns (through exercise and other physical activity). Eat more fruits and vegetables  Eat a variety of fruit and vegetables every day. Dark green, deep orange, red, or yellow fruits and vegetables are especially good for you. Examples include spinach, carrots, peaches, and berries. Keep carrots, celery, and other veggies handy for snacks.  Buy fruit that is in season and store it where you can see it so that

## 2023-05-26 NOTE — ACP (ADVANCE CARE PLANNING)
Advance Care Planning     Advance Care Planning (ACP) Physician/NP/PA Conversation    Date of Conversation: 5/24/2023  Conducted with: Patient with Decision Making Capacity    Healthcare Decision Maker:      Primary Decision Maker: Gene Gregory    Secondary Decision Maker: Joaquin Rowley - Child - [phone]    Click here to complete Healthcare Decision Makers including selection of the Healthcare Decision Maker Relationship (ie \"Primary\")  Today we confirmed health care decision makers    Care Preferences:    Hospitalization: \"If your health worsens and it becomes clear that your chance of recovery is unlikely, what would be your preference regarding hospitalization? \"  The patient would prefer hospitalization. Ventilation: \"If you were unable to breath on your own and your chance of recovery was unlikely, what would be your preference about the use of a ventilator (breathing machine) if it was available to you? \"  The patient would desire the use of a ventilator. Resuscitation: \"In the event your heart stopped as a result of an underlying serious health condition, would you want attempts made to restart your heart, or would you prefer a natural death? \"  Yes, attempt to resuscitate. treatment goals, benefit/burden of treatment options, ventilation preferences, hospitalization preferences, resuscitation preferences, and end of life care preferences (vegetative state/imminent death)    Conversation Outcomes / Follow-Up Plan:  ACP in process - completing/providing documents-completed documents with an .  Requested copies to be scanned into chart  Reviewed DNR/DNI and patient elects Full Code (Attempt Resuscitation)    Length of Voluntary ACP Conversation in minutes:  16 minutes    Santo Chao MD

## 2023-05-26 NOTE — PROGRESS NOTES
Beryle Spillers presents today for   Chief Complaint   Patient presents with    Medicare AWV    3 Month Follow-Up                 1. \"Have you been to the ER, urgent care clinic since your last visit? Hospitalized since your last visit? \" no    2. \"Have you seen or consulted any other health care providers outside of the [address] [place] [address] since your last visit? \" no     3. For patients aged 39-70: Has the patient had a colonoscopy / FIT/ Cologuard? NA - based on age      If the patient is female:    4. For patients aged 41-77: Has the patient had a mammogram within the past 2 years? NA - based on age or sex      11. For patients aged 21-65: Has the patient had a pap smear?  NA - based on age or sex
Apply 1 drop to eye  Ar Automatic Reconciliation   Cholecalciferol 50 MCG (2000 UT) TABS Take 2,000 Units by mouth daily  Ar Automatic Reconciliation   colchicine (MITIGARE) 0.6 MG capsule Take 0.6 mg by mouth every other day  Ar Automatic Reconciliation   cycloSPORINE (RESTASIS) 0.05 % ophthalmic emulsion Apply 1 drop to eye  Ar Automatic Reconciliation   diclofenac sodium (VOLTAREN) 1 % GEL Apply 4 g topically 4 times daily  Ar Automatic Reconciliation   hydrALAZINE (APRESOLINE) 25 MG tablet TAKE 1 TABLET BY MOUTH 2 TIMES A DAY (APRESOLINE)  Ar Automatic Reconciliation   hydroxychloroquine (PLAQUENIL) 200 MG tablet Take 400 mg by mouth daily  Ar Automatic Reconciliation   omeprazole (PRILOSEC) 20 MG delayed release capsule TAKE 1 CAPSULE BY MOUTH DAILY  Ar Automatic Reconciliation   tamsulosin (FLOMAX) 0.4 MG capsule TAKE 1 CAPSULE BY MOUTH ONCE DAILY  Ar Automatic Reconciliation   torsemide (DEMADEX) 20 MG tablet Take 20 mg by mouth 2 times daily  Ar Automatic Reconciliation       CareTeam (Including outside providers/suppliers regularly involved in providing care):   Patient Care Team:  Brian Diaz MD as PCP - Fani Zelaya MD as PCP - Empaneled Provider     Reviewed and updated this visit:  Tobacco  Allergies  Meds  Problems  Med Hx  Surg Hx  Soc Hx  Fam Hx               Brian Diaz MD
 WBC Final    nRBC 05/17/2023 0.00  0.00 - 0.01 K/uL Final    Neutrophils % 05/17/2023 56  40 - 73 % Final    Lymphocytes % 05/17/2023 26  21 - 52 % Final    Monocytes % 05/17/2023 12 (H)  3 - 10 % Final    Eosinophils % 05/17/2023 5  0 - 5 % Final    Basophils % 05/17/2023 1  0 - 2 % Final    Immature Granulocytes 05/17/2023 0  0.0 - 0.5 % Final    Neutrophils Absolute 05/17/2023 3.5  1.8 - 8.0 K/UL Final    Lymphocytes Absolute 05/17/2023 1.6  0.9 - 3.6 K/UL Final    Monocytes Absolute 05/17/2023 0.8  0.05 - 1.2 K/UL Final    Eosinophils Absolute 05/17/2023 0.3  0.0 - 0.4 K/UL Final    Basophils Absolute 05/17/2023 0.0  0.0 - 0.1 K/UL Final    Absolute Immature Granulocyte 05/17/2023 0.0  0.00 - 0.04 K/UL Final    Differential Type 05/17/2023 AUTOMATED    Final    Hemoglobin A1C 05/17/2023 5.4  4.2 - 5.6 % Final    eAG 05/17/2023 108  mg/dL Final    Vit D, 25-Hydroxy 05/17/2023 45.1  30 - 100 ng/mL Final    Sodium 05/17/2023 144  136 - 145 mmol/L Final    Potassium 05/17/2023 3.8  3.5 - 5.5 mmol/L Final    Chloride 05/17/2023 112 (H)  100 - 111 mmol/L Final    CO2 05/17/2023 27  21 - 32 mmol/L Final    Anion Gap 05/17/2023 5  3.0 - 18 mmol/L Final    Glucose 05/17/2023 105 (H)  74 - 99 mg/dL Final    BUN 05/17/2023 16  7.0 - 18 MG/DL Final    Creatinine 05/17/2023 0.87  0.6 - 1.3 MG/DL Final    Bun/Cre Ratio 05/17/2023 18  12 - 20   Final    Est, Glom Filt Rate 05/17/2023 >60  >60 ml/min/1.73m2 Final    Calcium 05/17/2023 9.2  8.5 - 10.1 MG/DL Final    Total Bilirubin 05/17/2023 0.6  0.2 - 1.0 MG/DL Final    ALT 05/17/2023 40  16 - 61 U/L Final    AST 05/17/2023 29  10 - 38 U/L Final    Alk Phosphatase 05/17/2023 103  45 - 117 U/L Final    Total Protein 05/17/2023 6.3 (L)  6.4 - 8.2 g/dL Final    Albumin 05/17/2023 3.8  3.4 - 5.0 g/dL Final    Globulin 05/17/2023 2.5  2.0 - 4.0 g/dL Final    Albumin/Globulin Ratio 05/17/2023 1.5  0.8 - 1.7   Final    LIPID PANEL 05/17/2023        Final    Cholesterol, Total

## 2023-06-26 RX ORDER — HYDRALAZINE HYDROCHLORIDE 25 MG/1
TABLET, FILM COATED ORAL
Qty: 180 TABLET | Refills: 3 | Status: SHIPPED | OUTPATIENT
Start: 2023-06-26

## 2023-06-27 ENCOUNTER — TELEPHONE (OUTPATIENT)
Age: 83
End: 2023-06-27

## 2023-06-27 RX ORDER — BENZONATATE 100 MG/1
100 CAPSULE ORAL 3 TIMES DAILY PRN
Qty: 30 CAPSULE | Refills: 0 | Status: SHIPPED | OUTPATIENT
Start: 2023-06-27 | End: 2023-07-07

## 2023-07-03 ENCOUNTER — TELEPHONE (OUTPATIENT)
Age: 83
End: 2023-07-03

## 2023-07-03 RX ORDER — ALBUTEROL SULFATE 90 UG/1
2 AEROSOL, METERED RESPIRATORY (INHALATION) EVERY 6 HOURS PRN
Qty: 18 G | Refills: 0 | Status: SHIPPED | OUTPATIENT
Start: 2023-07-03

## 2023-07-03 RX ORDER — DOXYCYCLINE HYCLATE 100 MG
100 TABLET ORAL 2 TIMES DAILY
Qty: 20 TABLET | Refills: 0 | Status: SHIPPED | OUTPATIENT
Start: 2023-07-03 | End: 2023-07-13

## 2023-07-03 NOTE — TELEPHONE ENCOUNTER
Patient states he has been experiencing coughing and congestion for about a week now. Wants to know if there is something the Dr. Barrera Abrams send to the pharmacy to help with this.      Pharmacy:    [address] [facility], [address] [facility] [address] [facility]  [address] - f [phone]

## 2023-07-03 NOTE — TELEPHONE ENCOUNTER
Called and spoke with patient. Had contacted the office on 6/27/2023 reporting difficulty with URI symptoms but nasal drainage and sputum were essentially clear and thought to be improving. Treated symptomatically with Mucinex DM and Tessalon. Now with increasing nasal drainage and cough productive of yellow-green sputum and also describing some mild shortness of breath. Denies any fever or chills. Given prolonged symptoms and change in sputum likely indicative of bacterial superinfection, will treat for acute bacterial sinusitis with antibiotics. Will treat with doxycycline 100 mg twice daily for 10 days. Wwill also prescribe an albuterol inhaler to help with cough and mild shortness of breath. Prescriptions sent to [facility]. Advised to call back if not improving and present to ED for any worsening.

## 2023-07-06 RX ORDER — PREDNISONE 20 MG/1
20 TABLET ORAL 2 TIMES DAILY
Qty: 10 TABLET | Refills: 0 | Status: SHIPPED | OUTPATIENT
Start: 2023-07-06 | End: 2023-07-11

## 2023-07-06 NOTE — TELEPHONE ENCOUNTER
Saw patient today during his wife's office visit and noted to have bilateral vibratory wheezing on lung exam.  Reports not currently using albuterol inhaler but has started doxycycline with notable improvement. Given wheezing and mild dyspnea, will treat with prednisone 40 mg daily for 5 days.

## 2023-07-12 RX ORDER — ATORVASTATIN CALCIUM 10 MG/1
TABLET, FILM COATED ORAL
Qty: 90 TABLET | Refills: 3 | Status: SHIPPED | OUTPATIENT
Start: 2023-07-12

## 2023-07-12 NOTE — TELEPHONE ENCOUNTER
Previous refill per chart    atorvastatin (LIPITOR) 10 MG tablet   7/12/2022     Sig: TAKE 1 TABLET BY MOUTH ONCE DAILY    Class: Historical Med

## 2023-08-23 ENCOUNTER — HOSPITAL ENCOUNTER (OUTPATIENT)
Facility: HOSPITAL | Age: 83
Setting detail: SPECIMEN
Discharge: HOME | End: 2023-08-26
Payer: MEDICARE

## 2023-08-23 DIAGNOSIS — I10 PRIMARY HYPERTENSION: ICD-10-CM

## 2023-08-23 DIAGNOSIS — E78.00 PURE HYPERCHOLESTEROLEMIA: ICD-10-CM

## 2023-08-23 DIAGNOSIS — R73.03 PREDIABETES: ICD-10-CM

## 2023-08-23 DIAGNOSIS — Z77.011 CONTACT WITH AND (SUSPECTED) EXPOSURE TO LEAD: ICD-10-CM

## 2023-08-23 DIAGNOSIS — E55.9 VITAMIN D DEFICIENCY, UNSPECIFIED: ICD-10-CM

## 2023-08-23 LAB
25(OH)D3 SERPL-MCNC: 51.2 NG/ML (ref 30–100)
ALBUMIN SERPL-MCNC: 3.9 G/DL (ref 3.4–5)
ALBUMIN/GLOB SERPL: 1.6 (ref 0.8–1.7)
ALP SERPL-CCNC: 101 U/L (ref 45–117)
ALT SERPL-CCNC: 33 U/L (ref 16–61)
ANION GAP SERPL CALC-SCNC: 6 MMOL/L (ref 3–18)
AST SERPL-CCNC: 22 U/L (ref 10–38)
BASOPHILS # BLD: 0 K/UL (ref 0–0.1)
BASOPHILS NFR BLD: 1 % (ref 0–2)
BILIRUB SERPL-MCNC: 0.6 MG/DL (ref 0.2–1)
BUN SERPL-MCNC: 20 MG/DL (ref 7–18)
BUN/CREAT SERPL: 21 (ref 12–20)
CALCIUM SERPL-MCNC: 9.1 MG/DL (ref 8.5–10.1)
CHLORIDE SERPL-SCNC: 108 MMOL/L (ref 100–111)
CO2 SERPL-SCNC: 28 MMOL/L (ref 21–32)
CREAT SERPL-MCNC: 0.94 MG/DL (ref 0.6–1.3)
DIFFERENTIAL METHOD BLD: ABNORMAL
EOSINOPHIL # BLD: 0.3 K/UL (ref 0–0.4)
EOSINOPHIL NFR BLD: 5 % (ref 0–5)
ERYTHROCYTE [DISTWIDTH] IN BLOOD BY AUTOMATED COUNT: 13.7 % (ref 11.6–14.5)
GLOBULIN SER CALC-MCNC: 2.5 G/DL (ref 2–4)
GLUCOSE SERPL-MCNC: 109 MG/DL (ref 74–99)
HBA1C MFR BLD: 5.4 % (ref 4.2–5.6)
HCT VFR BLD AUTO: 41 % (ref 36–48)
HGB BLD-MCNC: 13.8 G/DL (ref 13–16)
IMM GRANULOCYTES # BLD AUTO: 0 K/UL (ref 0–0.04)
IMM GRANULOCYTES NFR BLD AUTO: 0 % (ref 0–0.5)
LYMPHOCYTES # BLD: 1.4 K/UL (ref 0.9–3.6)
LYMPHOCYTES NFR BLD: 23 % (ref 21–52)
MCH RBC QN AUTO: 33.3 PG (ref 24–34)
MCHC RBC AUTO-ENTMCNC: 33.7 G/DL (ref 31–37)
MCV RBC AUTO: 99 FL (ref 78–100)
MONOCYTES # BLD: 0.6 K/UL (ref 0.05–1.2)
MONOCYTES NFR BLD: 10 % (ref 3–10)
NEUTS SEG # BLD: 3.7 K/UL (ref 1.8–8)
NEUTS SEG NFR BLD: 61 % (ref 40–73)
NRBC # BLD: 0 K/UL (ref 0–0.01)
NRBC BLD-RTO: 0 PER 100 WBC
PLATELET # BLD AUTO: 195 K/UL (ref 135–420)
PMV BLD AUTO: 10.4 FL (ref 9.2–11.8)
POTASSIUM SERPL-SCNC: 3.5 MMOL/L (ref 3.5–5.5)
PROT SERPL-MCNC: 6.4 G/DL (ref 6.4–8.2)
RBC # BLD AUTO: 4.14 M/UL (ref 4.35–5.65)
SODIUM SERPL-SCNC: 142 MMOL/L (ref 136–145)
WBC # BLD AUTO: 6.1 K/UL (ref 4.6–13.2)

## 2023-08-23 PROCEDURE — 84202 ASSAY RBC PROTOPORPHYRIN: CPT

## 2023-08-23 PROCEDURE — 36415 COLL VENOUS BLD VENIPUNCTURE: CPT

## 2023-08-23 PROCEDURE — 80053 COMPREHEN METABOLIC PANEL: CPT

## 2023-08-23 PROCEDURE — 82306 VITAMIN D 25 HYDROXY: CPT

## 2023-08-23 PROCEDURE — 83036 HEMOGLOBIN GLYCOSYLATED A1C: CPT

## 2023-08-23 PROCEDURE — 83655 ASSAY OF LEAD: CPT

## 2023-08-23 PROCEDURE — 85025 COMPLETE CBC W/AUTO DIFF WBC: CPT

## 2023-08-24 LAB
HISPANIC?: NO
LEAD BLD-MCNC: NORMAL UG/DL
RACE: NORMAL
SPECIMEN SOURCE: NORMAL
TEST PURPOSE: NORMAL
ZPP RBC-MCNC: 42 UG/DL (ref 0–99)

## 2023-08-28 LAB
HISPANIC?: NO
LEAD BLD-MCNC: 9.4 UG/DL (ref 0–3.4)
RACE: ABNORMAL
SPECIMEN SOURCE: ABNORMAL
TEST PURPOSE: ABNORMAL
ZPP RBC-MCNC: 42 UG/DL (ref 0–99)

## 2023-08-29 ENCOUNTER — OFFICE VISIT (OUTPATIENT)
Age: 83
End: 2023-08-29
Payer: MEDICARE

## 2023-08-29 VITALS
OXYGEN SATURATION: 99 % | DIASTOLIC BLOOD PRESSURE: 64 MMHG | HEART RATE: 74 BPM | HEIGHT: 68 IN | RESPIRATION RATE: 16 BRPM | WEIGHT: 207 LBS | TEMPERATURE: 98.3 F | BODY MASS INDEX: 31.37 KG/M2 | SYSTOLIC BLOOD PRESSURE: 118 MMHG

## 2023-08-29 DIAGNOSIS — M15.9 PRIMARY OSTEOARTHRITIS INVOLVING MULTIPLE JOINTS: ICD-10-CM

## 2023-08-29 DIAGNOSIS — E55.9 VITAMIN D DEFICIENCY: ICD-10-CM

## 2023-08-29 DIAGNOSIS — L21.9 DERMATITIS, SEBORRHEIC: ICD-10-CM

## 2023-08-29 DIAGNOSIS — R60.0 BILATERAL LOWER EXTREMITY EDEMA: ICD-10-CM

## 2023-08-29 DIAGNOSIS — R73.03 PREDIABETES: ICD-10-CM

## 2023-08-29 DIAGNOSIS — M11.20 CPDD (CALCIUM PYROPHOSPHATE DEPOSITION DISEASE): ICD-10-CM

## 2023-08-29 DIAGNOSIS — E78.00 PURE HYPERCHOLESTEROLEMIA: ICD-10-CM

## 2023-08-29 DIAGNOSIS — E66.09 CLASS 1 OBESITY DUE TO EXCESS CALORIES WITH SERIOUS COMORBIDITY AND BODY MASS INDEX (BMI) OF 31.0 TO 31.9 IN ADULT: ICD-10-CM

## 2023-08-29 DIAGNOSIS — I44.1 MOBITZ TYPE 1 SECOND DEGREE ATRIOVENTRICULAR BLOCK: ICD-10-CM

## 2023-08-29 DIAGNOSIS — G47.33 OBSTRUCTIVE SLEEP APNEA SYNDROME, SEVERE: ICD-10-CM

## 2023-08-29 DIAGNOSIS — M06.041 RHEUMATOID ARTHRITIS INVOLVING BOTH HANDS WITH NEGATIVE RHEUMATOID FACTOR (HCC): ICD-10-CM

## 2023-08-29 DIAGNOSIS — M06.042 RHEUMATOID ARTHRITIS INVOLVING BOTH HANDS WITH NEGATIVE RHEUMATOID FACTOR (HCC): ICD-10-CM

## 2023-08-29 DIAGNOSIS — I10 PRIMARY HYPERTENSION: Primary | ICD-10-CM

## 2023-08-29 PROCEDURE — G8417 CALC BMI ABV UP PARAM F/U: HCPCS | Performed by: INTERNAL MEDICINE

## 2023-08-29 PROCEDURE — 3074F SYST BP LT 130 MM HG: CPT | Performed by: INTERNAL MEDICINE

## 2023-08-29 PROCEDURE — 99214 OFFICE O/P EST MOD 30 MIN: CPT | Performed by: INTERNAL MEDICINE

## 2023-08-29 PROCEDURE — 1036F TOBACCO NON-USER: CPT | Performed by: INTERNAL MEDICINE

## 2023-08-29 PROCEDURE — G8427 DOCREV CUR MEDS BY ELIG CLIN: HCPCS | Performed by: INTERNAL MEDICINE

## 2023-08-29 PROCEDURE — 1123F ACP DISCUSS/DSCN MKR DOCD: CPT | Performed by: INTERNAL MEDICINE

## 2023-08-29 PROCEDURE — 3078F DIAST BP <80 MM HG: CPT | Performed by: INTERNAL MEDICINE

## 2023-08-29 RX ORDER — KETOCONAZOLE 20 MG/G
CREAM TOPICAL
Qty: 30 G | Refills: 2 | Status: SHIPPED | OUTPATIENT
Start: 2023-08-29

## 2023-08-29 ASSESSMENT — PATIENT HEALTH QUESTIONNAIRE - PHQ9
1. LITTLE INTEREST OR PLEASURE IN DOING THINGS: 0
SUM OF ALL RESPONSES TO PHQ9 QUESTIONS 1 & 2: 0
2. FEELING DOWN, DEPRESSED OR HOPELESS: 0
SUM OF ALL RESPONSES TO PHQ QUESTIONS 1-9: 0

## 2023-08-29 NOTE — PROGRESS NOTES
HPI:   Ama Stevenson is a 82y.o. year old male who presents today for a routine visit. He has a history of hypertension, hyperlipidemia, prediabetes, rheumatoid arthritis, osteoarthritis, calcium pyrophosphate deposition disease, BPH, GERD, and depression. He also has a history of COVID-19 infection (2/9411) complicated by severe pneumonia and acute hypoxic respiratory failure. He has completed the Moderna COVID-19 vaccine series and received one Moderna booster dose and the updated bivalent booster dose. He reports today that he is doing reasonably well. He states that he is continuing to work at [employer] but states that he makes his own hours and his schedule is flexible. He reports that his upper respiratory infection in 7/2023 improved after treatment with doxycycline and prednisone. He is no longer having any persistent cough and is not requiring use of the albuterol inhaler. He reports that he has noted some intermittent lower extremity edema, but states that it seems to fluctuate according to his diet. He has attempted to minimize the amount of sodium that he is consuming but acknowledges that it is difficult to control when eating fast food or out in restaurants. He does also report consuming canned tuna fish daily and was not aware that this may be high in sodium. He is not currently using compression stockings. He reports that he is no longer using his BiPAP machine since he states that he did not feel it was of benefit and finds it to be uncomfortable. He does report continued difficulty with insomnia and states that he will sleep approximately 4-5 hours per night. He is continuing to sleep in a recliner and states that he wakes up every few hours but will fall back to sleep if still tired. He completed a screening skin exam with Dr. Lakhwinder Moody on 1/30/2023 and reports that no skin lesions required removal.  He was advised to follow-up in 1 year.   He does report today

## 2023-08-29 NOTE — PROGRESS NOTES
Sona Wade presents today for   Chief Complaint   Patient presents with    Follow-up    Hypertension     3 month follow up       1. \"Have you been to the ER, urgent care clinic since your last visit? Hospitalized since your last visit? \" no    2. \"Have you seen or consulted any other health care providers outside of the [address] [place] [address] since your last visit? \" no     3. For patients aged 43-73: Has the patient had a colonoscopy / FIT/ Cologuard? NA - based on age      If the patient is female:    4. For patients aged 43-66: Has the patient had a mammogram within the past 2 years? NA - based on age or sex      11. For patients aged 21-65: Has the patient had a pap smear?  NA - based on age or sex

## 2023-08-30 RX ORDER — PAROXETINE HYDROCHLORIDE 20 MG/1
TABLET, FILM COATED ORAL
Qty: 30 TABLET | Refills: 11 | OUTPATIENT
Start: 2023-08-30

## 2023-08-30 NOTE — TELEPHONE ENCOUNTER
PCP: Arlie Bence, MD    Per last office visit 08/29/2023 patient is taking Zoloft. Called pharmacy, sent in error, they will note he is on Zoloft, Paxil was d/c in 2019.     Last refill per chart:      Future Appointments   Date Time Provider [facility] [address]   9/14/2023  9:00 AM Irineo Rocha MD [facility] BS AMB   11/9/2023 11:45 AM IO LAB VISIT [facility] BS AMB   11/16/2023  9:00 AM Arlie Bence, MD [facility] BS AMB

## 2023-09-02 PROBLEM — G47.33 OBSTRUCTIVE SLEEP APNEA SYNDROME, SEVERE: Status: ACTIVE | Noted: 2019-08-17

## 2023-09-11 RX ORDER — AMLODIPINE BESYLATE 10 MG/1
10 TABLET ORAL DAILY
Qty: 90 TABLET | Refills: 3 | Status: SHIPPED | OUTPATIENT
Start: 2023-09-11

## 2023-09-14 ENCOUNTER — OFFICE VISIT (OUTPATIENT)
Age: 83
End: 2023-09-14
Payer: MEDICARE

## 2023-09-14 VITALS
OXYGEN SATURATION: 97 % | WEIGHT: 210 LBS | SYSTOLIC BLOOD PRESSURE: 130 MMHG | HEART RATE: 67 BPM | BODY MASS INDEX: 31.83 KG/M2 | HEIGHT: 68 IN | DIASTOLIC BLOOD PRESSURE: 73 MMHG

## 2023-09-14 DIAGNOSIS — G47.30 SLEEP APNEA, UNSPECIFIED TYPE: ICD-10-CM

## 2023-09-14 DIAGNOSIS — I44.1 ATRIOVENTRICULAR BLOCK, SECOND DEGREE: ICD-10-CM

## 2023-09-14 DIAGNOSIS — I10 ESSENTIAL (PRIMARY) HYPERTENSION: ICD-10-CM

## 2023-09-14 DIAGNOSIS — R60.0 LOCALIZED EDEMA: ICD-10-CM

## 2023-09-14 DIAGNOSIS — I47.1 SUPRAVENTRICULAR TACHYCARDIA (HCC): Primary | ICD-10-CM

## 2023-09-14 PROCEDURE — G8427 DOCREV CUR MEDS BY ELIG CLIN: HCPCS | Performed by: INTERNAL MEDICINE

## 2023-09-14 PROCEDURE — G8417 CALC BMI ABV UP PARAM F/U: HCPCS | Performed by: INTERNAL MEDICINE

## 2023-09-14 PROCEDURE — 99214 OFFICE O/P EST MOD 30 MIN: CPT | Performed by: INTERNAL MEDICINE

## 2023-09-14 PROCEDURE — 3075F SYST BP GE 130 - 139MM HG: CPT | Performed by: INTERNAL MEDICINE

## 2023-09-14 PROCEDURE — 1036F TOBACCO NON-USER: CPT | Performed by: INTERNAL MEDICINE

## 2023-09-14 PROCEDURE — 1123F ACP DISCUSS/DSCN MKR DOCD: CPT | Performed by: INTERNAL MEDICINE

## 2023-09-14 PROCEDURE — 3078F DIAST BP <80 MM HG: CPT | Performed by: INTERNAL MEDICINE

## 2023-09-14 NOTE — PROGRESS NOTES
1. Have you been to the ER, urgent care clinic since your last visit? Hospitalized since your last visit?     no      2. Where do you normally have your labs drawn? 3. Do you need any refills today?    no

## 2023-09-14 NOTE — PROGRESS NOTES
HISTORY OF PRESENT ILLNESS  Dara Orellana is a 82 y.o. male. 2/15/2021  Patient is in today for new patient evaluation he is referred here for evaluation of shortness of breath and edema. Patient has had COVID-19 infection a few months ago since then he has been having shortness of breath feels like he is unable to take deep breath. Has been followed by pulmonary. He is seeing increased edema that is on and off. Denies any chest pain. Denies any orthopnea PND. He has no known cardiac history but arteriosclerosis was reported on CAT scan done a few years ago  3/2021  Patient seen for follow-up. Diagnostic testing results will be discussed  8/2022  Patient seen today for follow-up treatment plaints of increased shortness of breath. Also has started developing chest tightness on and off sometimes at rest last for long time almost an hour long last episode. No radiation of pain. No other associated symptoms  9/2022  Patient seen in follow-up for episode of chest tightness. He reports symptoms have resolved. He reports ongoing shortness of breath since COVID-19 infection, which is stable. He denies palpitations. He reports edema has resolved with use of diuretics. He complains of overall fatigue and decreased energy. He reports unable to tolerate CPAP mask and sleeps approximately 4 hours per night. Follow-up  Associated symptoms include shortness of breath. Pertinent negatives include no chest pain, no abdominal pain and no headaches. Review of Systems   Constitutional:  Positive for malaise/fatigue. Negative for chills and fever. HENT:  Negative for nosebleeds. Eyes:  Negative for blurred vision and double vision. Respiratory:  Positive for shortness of breath. Negative for cough, hemoptysis, sputum production and wheezing. Cardiovascular:  Negative for chest pain, palpitations, orthopnea, claudication, leg swelling and PND.    Gastrointestinal:  Negative for abdominal pain,

## 2023-10-03 RX ORDER — OMEPRAZOLE 20 MG/1
CAPSULE, DELAYED RELEASE ORAL
Qty: 90 CAPSULE | Refills: 3 | Status: SHIPPED | OUTPATIENT
Start: 2023-10-03

## 2023-10-12 RX ORDER — TORSEMIDE 20 MG/1
TABLET ORAL
Qty: 180 TABLET | Refills: 3 | Status: SHIPPED | OUTPATIENT
Start: 2023-10-12

## 2023-10-12 NOTE — TELEPHONE ENCOUNTER
PCP: Sugey Soliman MD    LAST REFILL PER CHART:      Disp Refills Start End   torsemide (DEMADEX) 20 mg tablet 180 Tablet 3 9/12/2022    Sig - Route: Take 1 Tablet by mouth two (2) times a day.  - Oral   Sent to pharmacy as: torsemide 20 mg tablet [facility])   E-Prescribing Status: Receipt confirmed by pharmacy (9/12/2022  2:20 PM EDT)    Future Appointments   Date Time Provider [address] [place] [address]   11/9/2023 11:45 AM IO LAB VISIT [facility] BS AMB   11/16/2023  9:00 AM Sugey Soliman MD [facility] BS AMB   4/12/2024  9:15 AM Deysi Freitas MD [facility] BS AMB

## 2023-11-09 ENCOUNTER — HOSPITAL ENCOUNTER (OUTPATIENT)
Facility: HOSPITAL | Age: 83
Setting detail: SPECIMEN
Discharge: HOME | End: 2023-11-09
Payer: MEDICARE

## 2023-11-09 DIAGNOSIS — I10 PRIMARY HYPERTENSION: ICD-10-CM

## 2023-11-09 DIAGNOSIS — R73.03 PREDIABETES: ICD-10-CM

## 2023-11-09 DIAGNOSIS — E78.00 PURE HYPERCHOLESTEROLEMIA: ICD-10-CM

## 2023-11-09 LAB
ALBUMIN SERPL-MCNC: 3.9 G/DL (ref 3.4–5)
ALBUMIN/GLOB SERPL: 1.6 (ref 0.8–1.7)
ALP SERPL-CCNC: 108 U/L (ref 45–117)
ALT SERPL-CCNC: 35 U/L (ref 16–61)
ANION GAP SERPL CALC-SCNC: 5 MMOL/L (ref 3–18)
APPEARANCE UR: CLEAR
AST SERPL-CCNC: 23 U/L (ref 10–38)
BACTERIA URNS QL MICRO: NEGATIVE /HPF
BASOPHILS # BLD: 0 K/UL (ref 0–0.1)
BASOPHILS NFR BLD: 0 % (ref 0–2)
BILIRUB SERPL-MCNC: 0.5 MG/DL (ref 0.2–1)
BILIRUB UR QL: NEGATIVE
BUN SERPL-MCNC: 13 MG/DL (ref 7–18)
BUN/CREAT SERPL: 15 (ref 12–20)
CALCIUM SERPL-MCNC: 9.5 MG/DL (ref 8.5–10.1)
CHLORIDE SERPL-SCNC: 111 MMOL/L (ref 100–111)
CHOLEST SERPL-MCNC: 135 MG/DL
CO2 SERPL-SCNC: 27 MMOL/L (ref 21–32)
COLOR UR: YELLOW
CREAT SERPL-MCNC: 0.86 MG/DL (ref 0.6–1.3)
DIFFERENTIAL METHOD BLD: ABNORMAL
EOSINOPHIL # BLD: 0.3 K/UL (ref 0–0.4)
EOSINOPHIL NFR BLD: 4 % (ref 0–5)
EPITH CASTS URNS QL MICRO: ABNORMAL /LPF (ref 0–5)
ERYTHROCYTE [DISTWIDTH] IN BLOOD BY AUTOMATED COUNT: 13.1 % (ref 11.6–14.5)
EST. AVERAGE GLUCOSE BLD GHB EST-MCNC: 111 MG/DL
GLOBULIN SER CALC-MCNC: 2.5 G/DL (ref 2–4)
GLUCOSE SERPL-MCNC: 104 MG/DL (ref 74–99)
GLUCOSE UR STRIP.AUTO-MCNC: NEGATIVE MG/DL
HBA1C MFR BLD: 5.5 % (ref 4.2–5.6)
HCT VFR BLD AUTO: 44.1 % (ref 36–48)
HDLC SERPL-MCNC: 63 MG/DL (ref 40–60)
HDLC SERPL: 2.1 (ref 0–5)
HGB BLD-MCNC: 14.6 G/DL (ref 13–16)
HGB UR QL STRIP: NEGATIVE
IMM GRANULOCYTES # BLD AUTO: 0 K/UL (ref 0–0.04)
IMM GRANULOCYTES NFR BLD AUTO: 0 % (ref 0–0.5)
KETONES UR QL STRIP.AUTO: NEGATIVE MG/DL
LDLC SERPL CALC-MCNC: 62 MG/DL (ref 0–100)
LEUKOCYTE ESTERASE UR QL STRIP.AUTO: ABNORMAL
LIPID PANEL: ABNORMAL
LYMPHOCYTES # BLD: 1.8 K/UL (ref 0.9–3.6)
LYMPHOCYTES NFR BLD: 27 % (ref 21–52)
MCH RBC QN AUTO: 33.5 PG (ref 24–34)
MCHC RBC AUTO-ENTMCNC: 33.1 G/DL (ref 31–37)
MCV RBC AUTO: 101.1 FL (ref 78–100)
MONOCYTES # BLD: 0.7 K/UL (ref 0.05–1.2)
MONOCYTES NFR BLD: 11 % (ref 3–10)
NEUTS SEG # BLD: 3.8 K/UL (ref 1.8–8)
NEUTS SEG NFR BLD: 58 % (ref 40–73)
NITRITE UR QL STRIP.AUTO: NEGATIVE
NRBC # BLD: 0 K/UL (ref 0–0.01)
NRBC BLD-RTO: 0 PER 100 WBC
PH UR STRIP: 6.5 (ref 5–8)
PLATELET # BLD AUTO: 189 K/UL (ref 135–420)
PMV BLD AUTO: 10.2 FL (ref 9.2–11.8)
POTASSIUM SERPL-SCNC: 3.8 MMOL/L (ref 3.5–5.5)
PROT SERPL-MCNC: 6.4 G/DL (ref 6.4–8.2)
PROT UR STRIP-MCNC: NEGATIVE MG/DL
RBC # BLD AUTO: 4.36 M/UL (ref 4.35–5.65)
RBC #/AREA URNS HPF: ABNORMAL /HPF (ref 0–5)
SODIUM SERPL-SCNC: 143 MMOL/L (ref 136–145)
SP GR UR REFRACTOMETRY: 1.02 (ref 1–1.03)
TRIGL SERPL-MCNC: 50 MG/DL
UROBILINOGEN UR QL STRIP.AUTO: 1 EU/DL (ref 0.2–1)
VLDLC SERPL CALC-MCNC: 10 MG/DL
WBC # BLD AUTO: 6.7 K/UL (ref 4.6–13.2)
WBC URNS QL MICRO: ABNORMAL /HPF (ref 0–4)

## 2023-11-09 PROCEDURE — 80053 COMPREHEN METABOLIC PANEL: CPT

## 2023-11-09 PROCEDURE — 80061 LIPID PANEL: CPT

## 2023-11-09 PROCEDURE — 85025 COMPLETE CBC W/AUTO DIFF WBC: CPT

## 2023-11-09 PROCEDURE — 83036 HEMOGLOBIN GLYCOSYLATED A1C: CPT

## 2023-11-09 PROCEDURE — 81001 URINALYSIS AUTO W/SCOPE: CPT

## 2023-11-09 PROCEDURE — 36415 COLL VENOUS BLD VENIPUNCTURE: CPT

## 2023-11-16 ENCOUNTER — OFFICE VISIT (OUTPATIENT)
Age: 83
End: 2023-11-16

## 2023-11-16 VITALS
DIASTOLIC BLOOD PRESSURE: 58 MMHG | WEIGHT: 209 LBS | SYSTOLIC BLOOD PRESSURE: 146 MMHG | OXYGEN SATURATION: 98 % | HEIGHT: 68 IN | HEART RATE: 69 BPM | RESPIRATION RATE: 16 BRPM | TEMPERATURE: 98.6 F | BODY MASS INDEX: 31.67 KG/M2

## 2023-11-16 DIAGNOSIS — M06.042 RHEUMATOID ARTHRITIS INVOLVING BOTH HANDS WITH NEGATIVE RHEUMATOID FACTOR (HCC): ICD-10-CM

## 2023-11-16 DIAGNOSIS — M15.9 PRIMARY OSTEOARTHRITIS INVOLVING MULTIPLE JOINTS: ICD-10-CM

## 2023-11-16 DIAGNOSIS — M06.041 RHEUMATOID ARTHRITIS INVOLVING BOTH HANDS WITH NEGATIVE RHEUMATOID FACTOR (HCC): ICD-10-CM

## 2023-11-16 DIAGNOSIS — R73.03 PREDIABETES: ICD-10-CM

## 2023-11-16 DIAGNOSIS — E78.00 PURE HYPERCHOLESTEROLEMIA: ICD-10-CM

## 2023-11-16 DIAGNOSIS — E55.9 VITAMIN D DEFICIENCY: ICD-10-CM

## 2023-11-16 DIAGNOSIS — L21.9 DERMATITIS, SEBORRHEIC: ICD-10-CM

## 2023-11-16 DIAGNOSIS — I10 PRIMARY HYPERTENSION: Primary | ICD-10-CM

## 2023-11-16 DIAGNOSIS — M11.20 CPDD (CALCIUM PYROPHOSPHATE DEPOSITION DISEASE): ICD-10-CM

## 2023-11-16 DIAGNOSIS — G47.33 OBSTRUCTIVE SLEEP APNEA SYNDROME, SEVERE: ICD-10-CM

## 2023-11-16 DIAGNOSIS — R60.0 BILATERAL LOWER EXTREMITY EDEMA: ICD-10-CM

## 2023-11-16 DIAGNOSIS — E66.09 CLASS 1 OBESITY DUE TO EXCESS CALORIES WITH SERIOUS COMORBIDITY AND BODY MASS INDEX (BMI) OF 31.0 TO 31.9 IN ADULT: ICD-10-CM

## 2023-11-16 RX ORDER — COLCHICINE 0.6 MG/1
0.6 CAPSULE ORAL EVERY OTHER DAY
Qty: 45 CAPSULE | Refills: 3 | Status: SHIPPED | OUTPATIENT
Start: 2023-11-16

## 2023-11-16 RX ORDER — HYDROXYCHLOROQUINE SULFATE 200 MG/1
400 TABLET, FILM COATED ORAL DAILY
Qty: 180 TABLET | Refills: 3 | Status: SHIPPED | OUTPATIENT
Start: 2023-11-16

## 2023-11-16 NOTE — PROGRESS NOTES
Amrita Nelson presents today for   Chief Complaint   Patient presents with    3 Month Follow-Up       1. \"Have you been to the ER, urgent care clinic since your last visit? Hospitalized since your last visit? \" no    2. \"Have you seen or consulted any other health care providers outside of the [address] [place] [address] since your last visit? \" no     3. For patients aged 43-73: Has the patient had a colonoscopy / FIT/ Cologuard? NA - based on age      If the patient is female:    4. For patients aged 43-66: Has the patient had a mammogram within the past 2 years? NA - based on age or sex      11. For patients aged 21-65: Has the patient had a pap smear?  NA - based on age or sex
at next visit. Discussed recommended vaccines for the fall, including influenza, updated COVID booster, and RSV vaccine. Already received his influenza vaccine. Other immunizations up to date. Completed Cologuard (4/2021) testing as discussed. Continue regular eye exams with Dr. Sonia Lewis. Vitamin D level remains normal on maintenance dose supplement of 1000 U daily. Medicare wellness visit up-to-date. Patient understands recommendations and agrees with plan. Follow-up in 4 months.

## 2024-01-09 RX ORDER — LISINOPRIL 40 MG/1
TABLET ORAL
Qty: 90 TABLET | Refills: 3 | Status: SHIPPED | OUTPATIENT
Start: 2024-01-09

## 2024-01-09 NOTE — TELEPHONE ENCOUNTER
PCP: Montserrat Watson MD    LAST REFILL PER CHART:  Medication:lisinopril (PRINIVIL;ZESTRIL) 40 MG tablet   Ordered On:03/20/2023  Instructions:TAKE 1 TABLET BY MOUTH ONCE DAILY   Dispense:90 tablets  Refills:3      Future Appointments   Date Time Provider Department Center   3/22/2024  8:45 AM [facility] LAB VISIT [facility] BS [facility]   3/28/2024  8:00 AM Montserrat Watson MD [facility]   4/29/2024  1:15 PM Ayaz Monge MD [facility] AMB

## 2024-03-22 ENCOUNTER — HOSPITAL ENCOUNTER (OUTPATIENT)
Facility: HOSPITAL | Age: 84
Setting detail: SPECIMEN
End: 2024-03-22
Payer: MEDICARE

## 2024-03-22 DIAGNOSIS — I10 PRIMARY HYPERTENSION: ICD-10-CM

## 2024-03-22 DIAGNOSIS — E78.00 PURE HYPERCHOLESTEROLEMIA: ICD-10-CM

## 2024-03-22 DIAGNOSIS — E55.9 VITAMIN D DEFICIENCY: ICD-10-CM

## 2024-03-22 DIAGNOSIS — R73.03 PREDIABETES: ICD-10-CM

## 2024-03-22 LAB
25(OH)D3 SERPL-MCNC: 52.1 NG/ML (ref 30–100)
ALBUMIN SERPL-MCNC: 3.8 G/DL (ref 3.4–5)
ALBUMIN/GLOB SERPL: 1.5 (ref 0.8–1.7)
ALP SERPL-CCNC: 103 U/L (ref 45–117)
ALT SERPL-CCNC: 32 U/L (ref 16–61)
ANION GAP SERPL CALC-SCNC: 5 MMOL/L (ref 3–18)
AST SERPL-CCNC: 23 U/L (ref 10–38)
BASOPHILS # BLD: 0 K/UL (ref 0–0.1)
BASOPHILS NFR BLD: 1 % (ref 0–2)
BILIRUB SERPL-MCNC: 0.5 MG/DL (ref 0.2–1)
BUN SERPL-MCNC: 15 MG/DL (ref 7–18)
BUN/CREAT SERPL: 15 (ref 12–20)
CALCIUM SERPL-MCNC: 9.2 MG/DL (ref 8.5–10.1)
CHLORIDE SERPL-SCNC: 109 MMOL/L (ref 100–111)
CHOLEST SERPL-MCNC: 137 MG/DL
CO2 SERPL-SCNC: 29 MMOL/L (ref 21–32)
CREAT SERPL-MCNC: 1 MG/DL (ref 0.6–1.3)
CREAT UR-MCNC: 161 MG/DL (ref 30–125)
DIFFERENTIAL METHOD BLD: ABNORMAL
EOSINOPHIL # BLD: 0.4 K/UL (ref 0–0.4)
EOSINOPHIL NFR BLD: 7 % (ref 0–5)
ERYTHROCYTE [DISTWIDTH] IN BLOOD BY AUTOMATED COUNT: 13.3 % (ref 11.6–14.5)
EST. AVERAGE GLUCOSE BLD GHB EST-MCNC: 114 MG/DL
GLOBULIN SER CALC-MCNC: 2.5 G/DL (ref 2–4)
GLUCOSE SERPL-MCNC: 134 MG/DL (ref 74–99)
HBA1C MFR BLD: 5.6 % (ref 4.2–5.6)
HCT VFR BLD AUTO: 43.6 % (ref 36–48)
HDLC SERPL-MCNC: 60 MG/DL (ref 40–60)
HDLC SERPL: 2.3 (ref 0–5)
HGB BLD-MCNC: 14.1 G/DL (ref 13–16)
IMM GRANULOCYTES # BLD AUTO: 0 K/UL (ref 0–0.04)
IMM GRANULOCYTES NFR BLD AUTO: 0 % (ref 0–0.5)
LDLC SERPL CALC-MCNC: 64.2 MG/DL (ref 0–100)
LIPID PANEL: NORMAL
LYMPHOCYTES # BLD: 1.5 K/UL (ref 0.9–3.6)
LYMPHOCYTES NFR BLD: 22 % (ref 21–52)
MCH RBC QN AUTO: 32.6 PG (ref 24–34)
MCHC RBC AUTO-ENTMCNC: 32.3 G/DL (ref 31–37)
MCV RBC AUTO: 100.7 FL (ref 78–100)
MICROALBUMIN UR-MCNC: 0.97 MG/DL (ref 0–3)
MICROALBUMIN/CREAT UR-RTO: 6 MG/G (ref 0–30)
MONOCYTES # BLD: 0.7 K/UL (ref 0.05–1.2)
MONOCYTES NFR BLD: 10 % (ref 3–10)
NEUTS SEG # BLD: 4.1 K/UL (ref 1.8–8)
NEUTS SEG NFR BLD: 61 % (ref 40–73)
NRBC # BLD: 0 K/UL (ref 0–0.01)
NRBC BLD-RTO: 0 PER 100 WBC
PLATELET # BLD AUTO: 186 K/UL (ref 135–420)
PMV BLD AUTO: 10.7 FL (ref 9.2–11.8)
POTASSIUM SERPL-SCNC: 3.9 MMOL/L (ref 3.5–5.5)
PROT SERPL-MCNC: 6.3 G/DL (ref 6.4–8.2)
RBC # BLD AUTO: 4.33 M/UL (ref 4.35–5.65)
SODIUM SERPL-SCNC: 143 MMOL/L (ref 136–145)
TRIGL SERPL-MCNC: 64 MG/DL
VLDLC SERPL CALC-MCNC: 12.8 MG/DL
WBC # BLD AUTO: 6.8 K/UL (ref 4.6–13.2)

## 2024-03-22 PROCEDURE — 83036 HEMOGLOBIN GLYCOSYLATED A1C: CPT

## 2024-03-22 PROCEDURE — 82043 UR ALBUMIN QUANTITATIVE: CPT

## 2024-03-22 PROCEDURE — 82306 VITAMIN D 25 HYDROXY: CPT

## 2024-03-22 PROCEDURE — 85025 COMPLETE CBC W/AUTO DIFF WBC: CPT

## 2024-03-22 PROCEDURE — 80061 LIPID PANEL: CPT

## 2024-03-22 PROCEDURE — 80053 COMPREHEN METABOLIC PANEL: CPT

## 2024-03-22 PROCEDURE — 82570 ASSAY OF URINE CREATININE: CPT

## 2024-03-22 PROCEDURE — 36415 COLL VENOUS BLD VENIPUNCTURE: CPT

## 2024-03-28 ENCOUNTER — OFFICE VISIT (OUTPATIENT)
Age: 84
End: 2024-03-28

## 2024-03-28 VITALS
HEART RATE: 74 BPM | DIASTOLIC BLOOD PRESSURE: 64 MMHG | RESPIRATION RATE: 16 BRPM | TEMPERATURE: 98.4 F | WEIGHT: 209 LBS | SYSTOLIC BLOOD PRESSURE: 128 MMHG | HEIGHT: 68 IN | BODY MASS INDEX: 31.67 KG/M2 | OXYGEN SATURATION: 96 %

## 2024-03-28 DIAGNOSIS — E55.9 VITAMIN D DEFICIENCY: ICD-10-CM

## 2024-03-28 DIAGNOSIS — M15.9 PRIMARY OSTEOARTHRITIS INVOLVING MULTIPLE JOINTS: ICD-10-CM

## 2024-03-28 DIAGNOSIS — M11.20 CPDD (CALCIUM PYROPHOSPHATE DEPOSITION DISEASE): ICD-10-CM

## 2024-03-28 DIAGNOSIS — M06.042 RHEUMATOID ARTHRITIS INVOLVING BOTH HANDS WITH NEGATIVE RHEUMATOID FACTOR (HCC): ICD-10-CM

## 2024-03-28 DIAGNOSIS — M06.041 RHEUMATOID ARTHRITIS INVOLVING BOTH HANDS WITH NEGATIVE RHEUMATOID FACTOR (HCC): ICD-10-CM

## 2024-03-28 DIAGNOSIS — I10 PRIMARY HYPERTENSION: Primary | ICD-10-CM

## 2024-03-28 DIAGNOSIS — R60.0 BILATERAL LOWER EXTREMITY EDEMA: ICD-10-CM

## 2024-03-28 DIAGNOSIS — F33.42 RECURRENT MAJOR DEPRESSIVE DISORDER, IN FULL REMISSION (HCC): ICD-10-CM

## 2024-03-28 DIAGNOSIS — E66.09 CLASS 1 OBESITY DUE TO EXCESS CALORIES WITH SERIOUS COMORBIDITY AND BODY MASS INDEX (BMI) OF 31.0 TO 31.9 IN ADULT: ICD-10-CM

## 2024-03-28 DIAGNOSIS — S68.621S: ICD-10-CM

## 2024-03-28 DIAGNOSIS — E78.00 PURE HYPERCHOLESTEROLEMIA: ICD-10-CM

## 2024-03-28 DIAGNOSIS — G47.33 OBSTRUCTIVE SLEEP APNEA SYNDROME, SEVERE: ICD-10-CM

## 2024-03-28 DIAGNOSIS — R73.03 PREDIABETES: ICD-10-CM

## 2024-03-28 ASSESSMENT — PATIENT HEALTH QUESTIONNAIRE - PHQ9
7. TROUBLE CONCENTRATING ON THINGS, SUCH AS READING THE NEWSPAPER OR WATCHING TELEVISION: NOT AT ALL
SUM OF ALL RESPONSES TO PHQ QUESTIONS 1-9: 0
1. LITTLE INTEREST OR PLEASURE IN DOING THINGS: NOT AT ALL
8. MOVING OR SPEAKING SO SLOWLY THAT OTHER PEOPLE COULD HAVE NOTICED. OR THE OPPOSITE, BEING SO FIGETY OR RESTLESS THAT YOU HAVE BEEN MOVING AROUND A LOT MORE THAN USUAL: NOT AT ALL
5. POOR APPETITE OR OVEREATING: NOT AT ALL
6. FEELING BAD ABOUT YOURSELF - OR THAT YOU ARE A FAILURE OR HAVE LET YOURSELF OR YOUR FAMILY DOWN: NOT AT ALL
SUM OF ALL RESPONSES TO PHQ QUESTIONS 1-9: 0
SUM OF ALL RESPONSES TO PHQ9 QUESTIONS 1 & 2: 0
SUM OF ALL RESPONSES TO PHQ QUESTIONS 1-9: 0
10. IF YOU CHECKED OFF ANY PROBLEMS, HOW DIFFICULT HAVE THESE PROBLEMS MADE IT FOR YOU TO DO YOUR WORK, TAKE CARE OF THINGS AT HOME, OR GET ALONG WITH OTHER PEOPLE: NOT DIFFICULT AT ALL
2. FEELING DOWN, DEPRESSED OR HOPELESS: NOT AT ALL
3. TROUBLE FALLING OR STAYING ASLEEP: NOT AT ALL
9. THOUGHTS THAT YOU WOULD BE BETTER OFF DEAD, OR OF HURTING YOURSELF: NOT AT ALL
4. FEELING TIRED OR HAVING LITTLE ENERGY: NOT AT ALL
SUM OF ALL RESPONSES TO PHQ QUESTIONS 1-9: 0

## 2024-03-28 NOTE — PROGRESS NOTES
Aydin Gregory presents today for   Chief Complaint   Patient presents with    4 month follow up       \"Have you been to the ER, urgent care clinic since your last visit?  Hospitalized since your last visit?\"    NO    “Have you seen or consulted any other health care providers outside of [facility] since your last visit?”    NO            
03/22/2024 0.00  0.00 - 0.01 K/uL Final    Neutrophils % 03/22/2024 61  40 - 73 % Final    Lymphocytes % 03/22/2024 22  21 - 52 % Final    Monocytes % 03/22/2024 10  3 - 10 % Final    Eosinophils % 03/22/2024 7 (H)  0 - 5 % Final    Basophils % 03/22/2024 1  0 - 2 % Final    Immature Granulocytes 03/22/2024 0  0.0 - 0.5 % Final    Neutrophils Absolute 03/22/2024 4.1  1.8 - 8.0 K/UL Final    Lymphocytes Absolute 03/22/2024 1.5  0.9 - 3.6 K/UL Final    Monocytes Absolute 03/22/2024 0.7  0.05 - 1.2 K/UL Final    Eosinophils Absolute 03/22/2024 0.4  0.0 - 0.4 K/UL Final    Basophils Absolute 03/22/2024 0.0  0.0 - 0.1 K/UL Final    Absolute Immature Granulocyte 03/22/2024 0.0  0.00 - 0.04 K/UL Final    Differential Type 03/22/2024 AUTOMATED    Final    Sodium 03/22/2024 143  136 - 145 mmol/L Final    Potassium 03/22/2024 3.9  3.5 - 5.5 mmol/L Final    Chloride 03/22/2024 109  100 - 111 mmol/L Final    CO2 03/22/2024 29  21 - 32 mmol/L Final    Anion Gap 03/22/2024 5  3.0 - 18 mmol/L Final    Glucose 03/22/2024 134 (H)  74 - 99 mg/dL Final    BUN 03/22/2024 15  7.0 - 18 MG/DL Final    Creatinine 03/22/2024 1.00  0.6 - 1.3 MG/DL Final    Bun/Cre Ratio 03/22/2024 15  12 - 20   Final    Est, Glom Filt Rate 03/22/2024 >60  >60 ml/min/1.73m2 Final    Calcium 03/22/2024 9.2  8.5 - 10.1 MG/DL Final    Total Bilirubin 03/22/2024 0.5  0.2 - 1.0 MG/DL Final    ALT 03/22/2024 32  16 - 61 U/L Final    AST 03/22/2024 23  10 - 38 U/L Final    Alk Phosphatase 03/22/2024 103  45 - 117 U/L Final    Total Protein 03/22/2024 6.3 (L)  6.4 - 8.2 g/dL Final    Albumin 03/22/2024 3.8  3.4 - 5.0 g/dL Final    Globulin 03/22/2024 2.5  2.0 - 4.0 g/dL Final    Albumin/Globulin Ratio 03/22/2024 1.5  0.8 - 1.7   Final    LIPID PANEL 03/22/2024      Final    Cholesterol, Total 03/22/2024 137  <200 MG/DL Final    Triglycerides 03/22/2024 64  <150 MG/DL Final    HDL 03/22/2024 60  40 - 60 MG/DL Final    LDL Calculated 03/22/2024 64.2  0 - 100 MG/DL

## 2024-04-29 ENCOUNTER — OFFICE VISIT (OUTPATIENT)
Age: 84
End: 2024-04-29
Payer: MEDICARE

## 2024-04-29 VITALS
WEIGHT: 209 LBS | BODY MASS INDEX: 31.67 KG/M2 | SYSTOLIC BLOOD PRESSURE: 121 MMHG | HEIGHT: 68 IN | HEART RATE: 44 BPM | DIASTOLIC BLOOD PRESSURE: 58 MMHG

## 2024-04-29 DIAGNOSIS — R42 DIZZINESS: ICD-10-CM

## 2024-04-29 DIAGNOSIS — I10 ESSENTIAL (PRIMARY) HYPERTENSION: ICD-10-CM

## 2024-04-29 DIAGNOSIS — I47.10 SUPRAVENTRICULAR TACHYCARDIA (HCC): Primary | ICD-10-CM

## 2024-04-29 DIAGNOSIS — R00.1 BRADYCARDIA: ICD-10-CM

## 2024-04-29 DIAGNOSIS — I50.32 CHRONIC DIASTOLIC CONGESTIVE HEART FAILURE (HCC): ICD-10-CM

## 2024-04-29 PROCEDURE — G8417 CALC BMI ABV UP PARAM F/U: HCPCS | Performed by: INTERNAL MEDICINE

## 2024-04-29 PROCEDURE — 1123F ACP DISCUSS/DSCN MKR DOCD: CPT | Performed by: INTERNAL MEDICINE

## 2024-04-29 PROCEDURE — G8427 DOCREV CUR MEDS BY ELIG CLIN: HCPCS | Performed by: INTERNAL MEDICINE

## 2024-04-29 PROCEDURE — 1036F TOBACCO NON-USER: CPT | Performed by: INTERNAL MEDICINE

## 2024-04-29 PROCEDURE — 3078F DIAST BP <80 MM HG: CPT | Performed by: INTERNAL MEDICINE

## 2024-04-29 PROCEDURE — 3074F SYST BP LT 130 MM HG: CPT | Performed by: INTERNAL MEDICINE

## 2024-04-29 PROCEDURE — 93000 ELECTROCARDIOGRAM COMPLETE: CPT | Performed by: INTERNAL MEDICINE

## 2024-04-29 PROCEDURE — 99214 OFFICE O/P EST MOD 30 MIN: CPT | Performed by: INTERNAL MEDICINE

## 2024-04-29 RX ORDER — AMLODIPINE BESYLATE 5 MG/1
5 TABLET ORAL DAILY
Qty: 90 TABLET | Refills: 1
Start: 2024-04-29 | End: 2024-05-03 | Stop reason: SDUPTHER

## 2024-04-29 ASSESSMENT — ENCOUNTER SYMPTOMS
GASTROINTESTINAL NEGATIVE: 1
RESPIRATORY NEGATIVE: 1
EYES NEGATIVE: 1

## 2024-04-29 NOTE — PROGRESS NOTES
Aydin Gregory is a 84 y.o. year old male.    Follow-up of hypertension, SVT    2/15/2021  Patient is in today for new patient evaluation he is referred here for evaluation of shortness of breath and edema.  Patient has had COVID-19 infection a few months ago since then he has been having shortness of breath feels like he is unable to take deep breath.  Has been followed by pulmonary.  He is seeing increased edema that is on and off.  Denies any chest pain.  Denies any orthopnea PND.  He has no known cardiac history but arteriosclerosis was reported on CAT scan done a few years ago  3/2021  Patient seen for follow-up.  Diagnostic testing results will be discussed  8/2022  Patient seen today for follow-up treatment plaints of increased shortness of breath.  Also has started developing chest tightness on and off sometimes at rest last for long time almost an hour long last episode.  No radiation of pain.  No other associated symptoms  9/2022  Patient seen in follow-up for episode of chest tightness.  He reports symptoms have resolved.  He reports ongoing shortness of breath since COVID-19 infection, which is stable.  He denies palpitations.  He reports edema has resolved with use of diuretics.  He complains of overall fatigue and decreased energy.  He reports unable to tolerate CPAP mask and sleeps approximately 4 hours per night.  4/29/2024 and denies chest pain shortness of breath.  Gets edema if he takes salty food.  Intermittent dizziness while walking around.  Intermittent palpitations during exertion only.          Review of Systems   Constitutional: Negative.    HENT: Negative.     Eyes: Negative.    Respiratory: Negative.     Cardiovascular:  Positive for palpitations and leg swelling.   Gastrointestinal: Negative.    Endocrine: Negative.    Genitourinary: Negative.    Musculoskeletal: Negative.    Neurological:  Positive for dizziness.   Psychiatric/Behavioral: Negative.     All other systems reviewed

## 2024-04-29 NOTE — PROGRESS NOTES
Patient did not bring medications       1. Have you been to the ER, urgent care clinic since your last visit?  Hospitalized since your last visit?     no      2.  Where do you normally have your labs drawn?       3. Do you need any refills today?   no    4. Which local pharmacy do you use (enter pharmacy)?   [facility]    5. Which mail order pharmacy do you use (enter pharmacy)?        6. Are you here for surgical clearance and if so who will be doing your     procedure/surgery (care team)?   no

## 2024-05-03 DIAGNOSIS — I10 ESSENTIAL (PRIMARY) HYPERTENSION: ICD-10-CM

## 2024-05-03 RX ORDER — AMLODIPINE BESYLATE 5 MG/1
5 TABLET ORAL DAILY
Qty: 90 TABLET | Refills: 1 | Status: SHIPPED | OUTPATIENT
Start: 2024-05-03

## 2024-05-03 NOTE — TELEPHONE ENCOUNTER
Requested Prescriptions     Pending Prescriptions Disp Refills    amLODIPine (NORVASC) 5 MG tablet 90 tablet 1     Sig: Take 1 tablet by mouth daily

## 2024-06-12 NOTE — MR AVS SNAPSHOT
[address] [facility] [address] 
 
 
 [address], [address] [place] [address] [facility] 
[phone] Patient: Selwyn Zacarias MRN: [id number] LDS:[id number] Visit Information Date & Time Provider Department Dept. Phone Encounter #  
 8/10/2018  3:00 PM Loman Phalen, NP [facility] [address] [place] [address] [id number] Follow-up Instructions Return if symptoms worsen or fail to improve. Routing History Follow-up and Disposition History Your Appointments 9/25/2018  1:30 PM  
Office Visit with Noemy Hammond MD  
[facility] Appt Note: pt rescheduled; follow up  
 [address] [facility], [address] [facility] [address]  
  
   
 [address], [address]  
  
    
 7/22/2019  9:00 AM  
ULTRASOUND with Otis So MD  
[facility]) Appt Note: PVR/PSA  
 [address] [place] [address] [address]  
[phone] [address] [facility] [address] Upcoming Health Maintenance Date Due Influenza Age 5 to Adult 8/1/2018 GLAUCOMA SCREENING Q2Y 8/15/2018 MEDICARE YEARLY EXAM 2/21/2019 COLONOSCOPY 8/10/2020 DTaP/Tdap/Td series (2 - Td) 2/1/2027 Allergies as of 8/10/2018  Review Complete On: 8/10/2018 By: Ancelmo Caban Severity Noted Reaction Type Reactions Tetanus Toxoid, Adsorbed High 07/09/2014    Anaphylaxis Adhesive Tape-silicones  28/79/1304    Rash Aldactone [Spironolactone]    Not Reported This Time Breast tenderness Codeine    Not Reported This Time \"Drives crazy\" Tetanus Vaccines And Toxoid    Anaphylaxis Other reaction(s): anaphylaxis/angioedema Tape  [Adhesive]  07/09/2014    Rash Current Immunizations  Reviewed on 1/27/2016 Name Date Influenza High Dose Vaccine PF 9/30/2017 Influenza Vaccine Split 10/16/2012, 10/4/2011 Influenza Vaccine Whole 1/15/2010 Pneumococcal Conjugate (PCV-13) 1/19/2015  8:07 AM  
 Varicella Virus Vaccine 10/1/2013 ZZZ-RETIRED (DO NOT USE) Pneumococcal Vaccine (Unspecified Type) 1/1/2008 Zoster 10/16/2012 Not reviewed this visit You Were Diagnosed With   
  
 Codes Comments Fever, unspecified fever cause    -  Primary ICD-10-CM: R50.9 ICD-9-CM: 780.60 Body aches     ICD-10-CM: R52 ICD-9-CM: 780.96 Nonintractable headache, unspecified chronicity pattern, unspecified headache type     ICD-10-CM: R51 ICD-9-CM: 784.0 Diarrhea, unspecified type     ICD-10-CM: R19.7 ICD-9-CM: 787.91 Neck pain     ICD-10-CM: M54.2 ICD-9-CM: 723.1 Vitals BP Pulse Temp Resp Height(growth percentile) Weight(growth percentile) 104/70 (BP 1 Location: Left arm, BP Patient Position: Sitting) (!) 105 97.9 °F (36.6 °C) (Oral) 14 5' 7\" (1.702 m) 209 lb (94.8 kg) SpO2 BMI Smoking Status 93% 32.73 kg/m2 Former Smoker Vitals History BMI and BSA Data Body Mass Index Body Surface Area 32.73 kg/m 2 2.12 m 2 Preferred Pharmacy Pharmacy Name Phone [address] [place] [address] [phone] Your Updated Medication List  
  
   
This list is accurate as of 8/10/18 11:59 PM.  Always use your most recent med list. amLODIPine 10 mg tablet Commonly known as:  Rondazoe [place] TAKE 1 TABLET BY MOUTH ONCE DAILY  
  
 amoxicillin-clavulanate 875-125 mg per tablet Commonly known as:  AUGMENTIN Take 1 Tab by mouth two (2) times a day for 14 days. aspirin delayed-release 81 mg tablet Take  by mouth daily. atorvastatin 10 mg tablet Commonly known as:  LIPITOR  
TAKE 1 TABLET BY MOUTH ONCE DAILY  
  
 colchicine 0.6 mg capsule Commonly known as:  Mary Reyes Take 0.6 mg by mouth daily. furosemide 40 mg tablet Commonly known as:  LASIX TAKE 1 TABLET BY MOUTH ONCE DAILY [Former] : former [Never] : never hydrALAZINE 25 mg tablet Commonly known as:  APRESOLINE Take 1 Tab by mouth three (3) times daily. hydroxychloroquine 200 mg tablet Commonly known as:  PLAQUENIL Take 200 mg by mouth two (2) times a day. lisinopril 40 mg tablet Commonly known as:  PRINIVIL, ZESTRIL  
TAKE 1 TABLET BY MOUTH ONCE DAILY  
  
 LUMIGAN 0.01 % ophthalmic drops Generic drug:  bimatoprost  
  
 MULTI FOR HIM PO Take  by mouth. omeprazole 20 mg capsule Commonly known as:  PRILOSEC  
TAKE 1 CAPSULE BY MOUTH ONCE DAILY PARoxetine 20 mg tablet Commonly known as:  PAXIL Take 1 Tab by mouth daily. potassium chloride 20 mEq tablet Commonly known as:  K-DUR, KLOR-CON Take 1 Tab by mouth daily. RESTASIS 0.05 % ophthalmic emulsion Generic drug:  cycloSPORINE Administer 1 Drop to both eyes two (2) times a day. trimethoprim-sulfamethoxazole 160-800 mg per tablet Commonly known as:  BACTRIM DS, SEPTRA DS Take 1 Tab by mouth two (2) times a day for 14 days. VITAMIN D3 2,000 unit Tab Generic drug:  cholecalciferol (vitamin D3) Take 2,000 Units by mouth daily. VOLTAREN 1 % Gel Generic drug:  diclofenac Apply 4 g to affected area four (4) times daily. Follow-up Instructions Return if symptoms worsen or fail to improve. To-Do List   
 08/10/2018 Microbiology:  C. DIFFICILE/EPI PCR   
  
 08/10/2018 Microbiology:  CULTURE, STOOL   
  
 08/10/2018 Microbiology:  OVA & PARASITES, STOOL Introducing [facility]! Sepideh Torres introduces [facility] patient portal. Now you can access parts of your medical record, email your doctor's office, and request medication refills online. 1. In your internet browser, go to [email or web address] 2. Click on the First Time User? Click Here link in the Sign In box. You will see the New Member Sign Up page. 3. Enter your [employer] Access Code exactly as it appears below. You will not need to use this code after youve completed the sign-up process. If you do not sign up before the expiration date, you must request a new code. · [employer] Access Code: [email or web address] Expires: 9/27/2018  8:41 AM 
 
4. Enter the last four digits of your Social Security Number (xxxx) and Date of Birth (mm/dd/yyyy) as indicated and click Submit. You will be taken to the next sign-up page. 5. Create a [employer] ID. This will be your [employer] login ID and cannot be changed, so think of one that is secure and easy to remember. 6. Create a [employer] password. You can change your password at any time. 7. Enter your Password Reset Question and Answer. This can be used at a later time if you forget your password. 8. Enter your e-mail address. You will receive e-mail notification when new information is available in [address]. 9. Click Sign Up. You can now view and download portions of your medical record. 10. Click the Download Summary menu link to download a portable copy of your medical information. If you have questions, please visit the Frequently Asked Questions section of the [employer] website. Remember, [employer] is NOT to be used for urgent needs. For medical emergencies, dial 911. Now available from your iPhone and Android! Please provide this summary of care documentation to your next provider. Your primary care clinician is listed as Noemy Hammond. If you have any questions after today's visit, please call [phone]. [YearQuit] : 2017 [TextBox_4] : ~age~yo man and former <smoker> referred by PMD for further evaluation of intermittent wheezing.  SH: Former smoker, quit 17yrs ago

## 2024-07-26 ENCOUNTER — HOSPITAL ENCOUNTER (OUTPATIENT)
Facility: HOSPITAL | Age: 84
Setting detail: SPECIMEN
End: 2024-07-26
Payer: MEDICARE

## 2024-07-26 DIAGNOSIS — E55.9 VITAMIN D DEFICIENCY: ICD-10-CM

## 2024-07-26 DIAGNOSIS — R73.03 PREDIABETES: ICD-10-CM

## 2024-07-26 DIAGNOSIS — I10 PRIMARY HYPERTENSION: ICD-10-CM

## 2024-07-26 DIAGNOSIS — E78.00 PURE HYPERCHOLESTEROLEMIA: ICD-10-CM

## 2024-07-26 LAB
25(OH)D3 SERPL-MCNC: 59.6 NG/ML (ref 30–100)
ALBUMIN SERPL-MCNC: 4.1 G/DL (ref 3.4–5)
ALBUMIN/GLOB SERPL: 1.9 (ref 0.8–1.7)
ALP SERPL-CCNC: 114 U/L (ref 45–117)
ALT SERPL-CCNC: 36 U/L (ref 16–61)
ANION GAP SERPL CALC-SCNC: 3 MMOL/L (ref 3–18)
APPEARANCE UR: CLEAR
AST SERPL-CCNC: 26 U/L (ref 10–38)
BASOPHILS # BLD: 0 K/UL (ref 0–0.1)
BASOPHILS NFR BLD: 1 % (ref 0–2)
BILIRUB SERPL-MCNC: 0.5 MG/DL (ref 0.2–1)
BILIRUB UR QL: NEGATIVE
BUN SERPL-MCNC: 15 MG/DL (ref 7–18)
BUN/CREAT SERPL: 17 (ref 12–20)
CALCIUM SERPL-MCNC: 9.9 MG/DL (ref 8.5–10.1)
CHLORIDE SERPL-SCNC: 108 MMOL/L (ref 100–111)
CHOLEST SERPL-MCNC: 135 MG/DL
CO2 SERPL-SCNC: 31 MMOL/L (ref 21–32)
COLOR UR: YELLOW
CREAT SERPL-MCNC: 0.88 MG/DL (ref 0.6–1.3)
CREAT UR-MCNC: 111 MG/DL (ref 30–125)
DIFFERENTIAL METHOD BLD: ABNORMAL
EOSINOPHIL # BLD: 0.4 K/UL (ref 0–0.4)
EOSINOPHIL NFR BLD: 7 % (ref 0–5)
EPITH CASTS URNS QL MICRO: ABNORMAL /LPF (ref 0–5)
ERYTHROCYTE [DISTWIDTH] IN BLOOD BY AUTOMATED COUNT: 13.5 % (ref 11.6–14.5)
EST. AVERAGE GLUCOSE BLD GHB EST-MCNC: 114 MG/DL
GLOBULIN SER CALC-MCNC: 2.2 G/DL (ref 2–4)
GLUCOSE SERPL-MCNC: 113 MG/DL (ref 74–99)
GLUCOSE UR STRIP.AUTO-MCNC: NEGATIVE MG/DL
HBA1C MFR BLD: 5.6 % (ref 4.2–5.6)
HCT VFR BLD AUTO: 46.5 % (ref 36–48)
HDLC SERPL-MCNC: 61 MG/DL (ref 40–60)
HDLC SERPL: 2.2 (ref 0–5)
HGB BLD-MCNC: 15 G/DL (ref 13–16)
HGB UR QL STRIP: NEGATIVE
IMM GRANULOCYTES # BLD AUTO: 0 K/UL (ref 0–0.04)
IMM GRANULOCYTES NFR BLD AUTO: 0 % (ref 0–0.5)
KETONES UR QL STRIP.AUTO: NEGATIVE MG/DL
LDLC SERPL CALC-MCNC: 60.2 MG/DL (ref 0–100)
LEUKOCYTE ESTERASE UR QL STRIP.AUTO: ABNORMAL
LIPID PANEL: ABNORMAL
LYMPHOCYTES # BLD: 1.4 K/UL (ref 0.9–3.6)
LYMPHOCYTES NFR BLD: 25 % (ref 21–52)
MAGNESIUM SERPL-MCNC: 2.3 MG/DL (ref 1.6–2.6)
MCH RBC QN AUTO: 32.5 PG (ref 24–34)
MCHC RBC AUTO-ENTMCNC: 32.3 G/DL (ref 31–37)
MCV RBC AUTO: 100.9 FL (ref 78–100)
MICROALBUMIN UR-MCNC: 0.76 MG/DL (ref 0–3)
MICROALBUMIN/CREAT UR-RTO: 7 MG/G (ref 0–30)
MONOCYTES # BLD: 0.5 K/UL (ref 0.05–1.2)
MONOCYTES NFR BLD: 10 % (ref 3–10)
MUCOUS THREADS URNS QL MICRO: ABNORMAL /LPF
NEUTS SEG # BLD: 3.1 K/UL (ref 1.8–8)
NEUTS SEG NFR BLD: 58 % (ref 40–73)
NITRITE UR QL STRIP.AUTO: NEGATIVE
NRBC # BLD: 0 K/UL (ref 0–0.01)
NRBC BLD-RTO: 0 PER 100 WBC
PH UR STRIP: 6.5 (ref 5–8)
PLATELET # BLD AUTO: 198 K/UL (ref 135–420)
PMV BLD AUTO: 10.6 FL (ref 9.2–11.8)
POTASSIUM SERPL-SCNC: 4.6 MMOL/L (ref 3.5–5.5)
PROT SERPL-MCNC: 6.3 G/DL (ref 6.4–8.2)
PROT UR STRIP-MCNC: NEGATIVE MG/DL
RBC # BLD AUTO: 4.61 M/UL (ref 4.35–5.65)
RBC #/AREA URNS HPF: ABNORMAL /HPF (ref 0–5)
SODIUM SERPL-SCNC: 142 MMOL/L (ref 136–145)
SP GR UR REFRACTOMETRY: 1.02 (ref 1–1.03)
TRIGL SERPL-MCNC: 69 MG/DL
UROBILINOGEN UR QL STRIP.AUTO: 1 EU/DL (ref 0.2–1)
VLDLC SERPL CALC-MCNC: 13.8 MG/DL
WBC # BLD AUTO: 5.4 K/UL (ref 4.6–13.2)
WBC URNS QL MICRO: ABNORMAL /HPF (ref 0–4)

## 2024-07-26 PROCEDURE — 80053 COMPREHEN METABOLIC PANEL: CPT

## 2024-07-26 PROCEDURE — 80061 LIPID PANEL: CPT

## 2024-07-26 PROCEDURE — 85025 COMPLETE CBC W/AUTO DIFF WBC: CPT

## 2024-07-26 PROCEDURE — 83036 HEMOGLOBIN GLYCOSYLATED A1C: CPT

## 2024-07-26 PROCEDURE — 36415 COLL VENOUS BLD VENIPUNCTURE: CPT

## 2024-07-26 PROCEDURE — 82570 ASSAY OF URINE CREATININE: CPT

## 2024-07-26 PROCEDURE — 82306 VITAMIN D 25 HYDROXY: CPT

## 2024-07-26 PROCEDURE — 81001 URINALYSIS AUTO W/SCOPE: CPT

## 2024-07-26 PROCEDURE — 82043 UR ALBUMIN QUANTITATIVE: CPT

## 2024-07-26 PROCEDURE — 83735 ASSAY OF MAGNESIUM: CPT

## 2024-08-01 ENCOUNTER — TELEPHONE (OUTPATIENT)
Facility: CLINIC | Age: 84
End: 2024-08-01

## 2024-08-01 NOTE — TELEPHONE ENCOUNTER
----- Message from Chema Mcguire sent at 8/1/2024 10:58 AM EDT -----  Regarding: E[facility] Appointment Request  E[facility] Appointment Request    Patient needs appointment for ECC Appointment Type: Annual Visit.    Patient Requested Dates(s): any day   Patient Requested Time: any time   Provider Name: Montserrat Watson MD     Reason for Appointment Request: Established Patient - No appointments available during search  -patient supposed to see Dr. Watson on August 1 but missed it, would like to reschedule.   --------------------------------------------------------------------------------------------------------------------------    Relationship to Patient: Self     Call Back Information: OK to leave message on voicemail  Preferred Call Back Number: Phone [phone]

## 2024-08-08 ENCOUNTER — OFFICE VISIT (OUTPATIENT)
Facility: CLINIC | Age: 84
End: 2024-08-08

## 2024-08-08 VITALS
HEART RATE: 71 BPM | HEIGHT: 68 IN | BODY MASS INDEX: 31.83 KG/M2 | DIASTOLIC BLOOD PRESSURE: 78 MMHG | OXYGEN SATURATION: 96 % | WEIGHT: 210 LBS | SYSTOLIC BLOOD PRESSURE: 138 MMHG | RESPIRATION RATE: 16 BRPM | TEMPERATURE: 98.7 F

## 2024-08-08 DIAGNOSIS — G47.33 OBSTRUCTIVE SLEEP APNEA SYNDROME, SEVERE: ICD-10-CM

## 2024-08-08 DIAGNOSIS — I10 PRIMARY HYPERTENSION: Primary | ICD-10-CM

## 2024-08-08 DIAGNOSIS — R60.0 BILATERAL LOWER EXTREMITY EDEMA: ICD-10-CM

## 2024-08-08 DIAGNOSIS — M06.041 RHEUMATOID ARTHRITIS INVOLVING BOTH HANDS WITH NEGATIVE RHEUMATOID FACTOR (HCC): ICD-10-CM

## 2024-08-08 DIAGNOSIS — Z00.00 MEDICARE ANNUAL WELLNESS VISIT, SUBSEQUENT: ICD-10-CM

## 2024-08-08 DIAGNOSIS — M06.042 RHEUMATOID ARTHRITIS INVOLVING BOTH HANDS WITH NEGATIVE RHEUMATOID FACTOR (HCC): ICD-10-CM

## 2024-08-08 DIAGNOSIS — E55.9 VITAMIN D DEFICIENCY: ICD-10-CM

## 2024-08-08 DIAGNOSIS — M15.9 PRIMARY OSTEOARTHRITIS INVOLVING MULTIPLE JOINTS: ICD-10-CM

## 2024-08-08 DIAGNOSIS — R73.03 PREDIABETES: ICD-10-CM

## 2024-08-08 DIAGNOSIS — Z71.89 ACP (ADVANCE CARE PLANNING): ICD-10-CM

## 2024-08-08 DIAGNOSIS — Z77.011 CONTACT WITH AND (SUSPECTED) EXPOSURE TO LEAD: ICD-10-CM

## 2024-08-08 DIAGNOSIS — E66.09 CLASS 1 OBESITY DUE TO EXCESS CALORIES WITH SERIOUS COMORBIDITY AND BODY MASS INDEX (BMI) OF 31.0 TO 31.9 IN ADULT: ICD-10-CM

## 2024-08-08 DIAGNOSIS — F33.42 RECURRENT MAJOR DEPRESSIVE DISORDER, IN FULL REMISSION (HCC): ICD-10-CM

## 2024-08-08 DIAGNOSIS — M11.20 CPDD (CALCIUM PYROPHOSPHATE DEPOSITION DISEASE): ICD-10-CM

## 2024-08-08 DIAGNOSIS — E78.00 PURE HYPERCHOLESTEROLEMIA: ICD-10-CM

## 2024-08-08 RX ORDER — HYDRALAZINE HYDROCHLORIDE 50 MG/1
50 TABLET, FILM COATED ORAL 2 TIMES DAILY
Qty: 180 TABLET | Refills: 3 | Status: SHIPPED | OUTPATIENT
Start: 2024-08-08

## 2024-08-08 SDOH — ECONOMIC STABILITY: FOOD INSECURITY: WITHIN THE PAST 12 MONTHS, YOU WORRIED THAT YOUR FOOD WOULD RUN OUT BEFORE YOU GOT MONEY TO BUY MORE.: NEVER TRUE

## 2024-08-08 SDOH — ECONOMIC STABILITY: FOOD INSECURITY: WITHIN THE PAST 12 MONTHS, THE FOOD YOU BOUGHT JUST DIDN'T LAST AND YOU DIDN'T HAVE MONEY TO GET MORE.: NEVER TRUE

## 2024-08-08 SDOH — ECONOMIC STABILITY: INCOME INSECURITY: HOW HARD IS IT FOR YOU TO PAY FOR THE VERY BASICS LIKE FOOD, HOUSING, MEDICAL CARE, AND HEATING?: NOT HARD AT ALL

## 2024-08-08 ASSESSMENT — PATIENT HEALTH QUESTIONNAIRE - PHQ9
7. TROUBLE CONCENTRATING ON THINGS, SUCH AS READING THE NEWSPAPER OR WATCHING TELEVISION: NOT AT ALL
8. MOVING OR SPEAKING SO SLOWLY THAT OTHER PEOPLE COULD HAVE NOTICED. OR THE OPPOSITE, BEING SO FIGETY OR RESTLESS THAT YOU HAVE BEEN MOVING AROUND A LOT MORE THAN USUAL: NOT AT ALL
6. FEELING BAD ABOUT YOURSELF - OR THAT YOU ARE A FAILURE OR HAVE LET YOURSELF OR YOUR FAMILY DOWN: NOT AT ALL
SUM OF ALL RESPONSES TO PHQ QUESTIONS 1-9: 0
5. POOR APPETITE OR OVEREATING: NOT AT ALL
SUM OF ALL RESPONSES TO PHQ QUESTIONS 1-9: 0
9. THOUGHTS THAT YOU WOULD BE BETTER OFF DEAD, OR OF HURTING YOURSELF: NOT AT ALL
SUM OF ALL RESPONSES TO PHQ QUESTIONS 1-9: 0
4. FEELING TIRED OR HAVING LITTLE ENERGY: NOT AT ALL
SUM OF ALL RESPONSES TO PHQ9 QUESTIONS 1 & 2: 0
SUM OF ALL RESPONSES TO PHQ QUESTIONS 1-9: 0
1. LITTLE INTEREST OR PLEASURE IN DOING THINGS: NOT AT ALL
2. FEELING DOWN, DEPRESSED OR HOPELESS: NOT AT ALL
10. IF YOU CHECKED OFF ANY PROBLEMS, HOW DIFFICULT HAVE THESE PROBLEMS MADE IT FOR YOU TO DO YOUR WORK, TAKE CARE OF THINGS AT HOME, OR GET ALONG WITH OTHER PEOPLE: NOT DIFFICULT AT ALL
3. TROUBLE FALLING OR STAYING ASLEEP: NOT AT ALL

## 2024-08-08 ASSESSMENT — LIFESTYLE VARIABLES
HOW OFTEN DO YOU HAVE A DRINK CONTAINING ALCOHOL: NEVER
HOW MANY STANDARD DRINKS CONTAINING ALCOHOL DO YOU HAVE ON A TYPICAL DAY: PATIENT DOES NOT DRINK

## 2024-08-08 NOTE — PATIENT INSTRUCTIONS
[id number] [employer].   Care instructions adapted under license by [employer]. If you have questions about a medical condition or this instruction, always ask your healthcare professional. [employer] disclaims any warranty or liability for your use of this information.           Starting a Weight Loss Plan: Care Instructions  Overview     It can be a challenge to lose weight. But your doctor can help you make a weight-loss plan that meets your needs.  You don't have to make a lot of big changes at once. A better idea might be to focus on small changes and stick with them. When those changes become habit, you can add a few more changes.  Some people find it helpful to take an exercise or nutrition class. If you have questions, ask your doctor about seeing a registered dietitian or an exercise specialist. You might also think about joining a weight-loss support group.  If you're not ready to make changes right now, try to pick a date in the future. Then make an appointment with your doctor to talk about when and how you'll get started with a plan.  Follow-up care is a key part of your treatment and safety. Be sure to make and go to all appointments, and call your doctor if you are having problems. It's also a good idea to know your test results and keep a list of the medicines you take.  How can you care for yourself at home?  Set realistic goals. Many people expect to lose much more weight than is likely. A weight loss of 5% to 10% of your body weight may be enough to improve your health.  Get family and friends involved to provide support. Talk to them about why you are trying to lose weight, and ask them to help. They can help by participating in exercise and having meals with you, even if they may be eating something different.  Find what works best for you. If you do not have time or do not like to cook, a program that offers meal replacement bars or shakes may be better for you. Or

## 2024-08-08 NOTE — PROGRESS NOTES
Aydin Gregory presents today for   Chief Complaint   Patient presents with    Medicare AWV       \"Have you been to the ER, urgent care clinic since your last visit?  Hospitalized since your last visit?\"    NO    “Have you seen or consulted any other health care providers outside of [facility] since your last visit?”    NO

## 2024-08-08 NOTE — PROGRESS NOTES
HPI:   Aydin Gregory is a 84 y.o. year old male who presents today for a routine follow-up visit.  He has a history of hypertension, hyperlipidemia, prediabetes, rheumatoid arthritis, osteoarthritis, calcium pyrophosphate deposition disease, BPH, GERD, and depression. He also has a history of COVID-19 infection (6/2020) complicated by severe pneumonia and acute hypoxic respiratory failure.  He reports today that he is doing reasonably well.  He states that he is continuing to work at [employer] and states that he is continuing to make his own hours on a flexible schedule.      He reports that he had a follow-up visit with Dr. Aviles on 7/11/2024 and discussed increasing difficulty with bilateral hand pain.  He states that he was advised to continue on colchicine 0.6 mg every other day and hydroxychloroquine 400 mg daily.  He was prescribed a prednisone Dosepak to see if it would be effective in controlling his pain, but he did not note any significant improvement.  He states that he was referred to Dr. Cohen of orthopedics to evaluate his bilateral hand pain which seems to be most severe involving his first MCP joints.  He is continuing to use Tylenol as needed.    He established care with Dr. Monge on 4/29/2024 and complained of difficulty with some lightheadedness and imbalance.  He was instructed to decrease his dose of amlodipine to 5 mg daily.  He states that he has not been monitoring his blood pressure at home since the decrease.  EKG obtained at the visit showed sinus rhythm at 93 bpm with frequent PACs.  A 7-day event monitor was obtained (6/19/2024) showing predominant rhythm as sinus with average heart rate 77 bpm (range  bpm), SVE burden 4.3% with longest SVE event recorded for 3 hours and 35 minutes (likely sinus tachycardia ranging 110-113 bpm), and 3 beat run of PAT at 182 bpm which was asymptomatic; 0.1% burden of PVCs; eight patient triggered events corresponded to sinus rhythm at

## 2024-08-08 NOTE — PROGRESS NOTES
Medicare Annual Wellness Visit    Aydin Gregory is here for Medicare AWV    Assessment & Plan   Primary hypertension  -     Comprehensive Metabolic Panel; Future  -     Magnesium; Future  Bilateral lower extremity edema  Pure hypercholesterolemia  -     Comprehensive Metabolic Panel; Future  -     Lipid Panel; Future  Prediabetes  -     Hemoglobin A1C; Future  -     Microalbumin / Creatinine Urine Ratio; Future  Obstructive sleep apnea syndrome, severe  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 (HCC)  CPDD (calcium pyrophosphate deposition disease)  Primary osteoarthritis involving multiple joints  Recurrent major depressive disorder, in full remission (HCC)  Vitamin D deficiency  Contact with and (suspected) exposure to lead  -     Lead Profile; Future  Class 1 obesity due to excess calories with serious comorbidity and body mass index (BMI) of 31.0 to 31.9 in adult  Medicare annual wellness visit, subsequent  ACP (advance care planning)  Recommendations for Preventive Services Due: see orders and patient instructions/AVS.  Recommended screening schedule for the next 5-10 years is provided to the patient in written form: see Patient Instructions/AVS.     Return in about 3 months (around 11/8/2024), or if symptoms worsen or fail to improve.     Subjective   See office progress note for details.      Patient's complete Health Risk Assessment and screening values have been reviewed and are found in Flowsheets. The following problems were reviewed today and where indicated follow up appointments were made and/or referrals ordered.    Positive Risk Factor Screenings with Interventions:       Cognitive:   Clock Drawing Test (CDT): (!) Abnormal  Words recalled: 2 Words Recalled  Total Score: (!) 2  Total Score Interpretation: Abnormal Mini-Cog  Interventions:  Patient feels his decrease is normal for age and does not feel he has any issues.              Abnormal BMI (obese):  Body

## 2024-08-08 NOTE — ACP (ADVANCE CARE PLANNING)
Advance Care Planning     Advance Care Planning (ACP) Physician/NP/PA Conversation    Date of Conversation: 8/8/2024  Conducted with: Patient with Decision Making Capacity    Healthcare Decision Maker:      Primary Decision Maker: Gnee Gregory    Secondary Decision Maker: Brandon Gregory - [phone]    Click here to complete Healthcare Decision Makers including selection of the Healthcare Decision Maker Relationship (ie \"Primary\")  Today we confirmed healthcare decision makers as his 2 sons since his wife has dementia.    Care Preferences:    Hospitalization:  \"If your health worsens and it becomes clear that your chance of recovery is unlikely, what would be your preference regarding hospitalization?\"  The patient would prefer hospitalization.    Ventilation:  \"If you were unable to breath on your own and your chance of recovery was unlikely, what would be your preference about the use of a ventilator (breathing machine) if it was available to you?\"  The patient would desire the use of a ventilator.    Resuscitation:  \"In the event your heart stopped as a result of an underlying serious health condition, would you want attempts made to restart your heart, or would you prefer a natural death?\"  Yes, attempt to resuscitate.    treatment goals, benefit/burden of treatment options, ventilation preferences, hospitalization preferences, resuscitation preferences, and end of life care preferences (vegetative state/imminent death)    Conversation Outcomes / Follow-Up Plan:  ACP in process - completing/providing documents. Patient has an advanced directive completed previously with an .  Requested that he provide copy to office to scan into chart.    Reviewed DNR/DNI and patient elects Full Code (Attempt Resuscitation)    Length of Voluntary ACP Conversation in minutes:  16 minutes    Montserrat Watson MD

## 2024-08-15 RX ORDER — TAMSULOSIN HYDROCHLORIDE 0.4 MG/1
CAPSULE ORAL
Qty: 90 CAPSULE | Refills: 3 | Status: SHIPPED | OUTPATIENT
Start: 2024-08-15

## 2024-08-20 ENCOUNTER — OFFICE VISIT (OUTPATIENT)
Age: 84
End: 2024-08-20
Payer: MEDICARE

## 2024-08-20 VITALS
WEIGHT: 210 LBS | SYSTOLIC BLOOD PRESSURE: 127 MMHG | DIASTOLIC BLOOD PRESSURE: 64 MMHG | OXYGEN SATURATION: 96 % | BODY MASS INDEX: 31.93 KG/M2 | HEART RATE: 74 BPM

## 2024-08-20 DIAGNOSIS — R42 DIZZINESS: Primary | ICD-10-CM

## 2024-08-20 DIAGNOSIS — G47.30 SLEEP APNEA, UNSPECIFIED TYPE: ICD-10-CM

## 2024-08-20 DIAGNOSIS — I10 ESSENTIAL (PRIMARY) HYPERTENSION: ICD-10-CM

## 2024-08-20 DIAGNOSIS — I50.32 CHRONIC DIASTOLIC CONGESTIVE HEART FAILURE (HCC): ICD-10-CM

## 2024-08-20 PROCEDURE — 99214 OFFICE O/P EST MOD 30 MIN: CPT | Performed by: INTERNAL MEDICINE

## 2024-08-20 PROCEDURE — G8417 CALC BMI ABV UP PARAM F/U: HCPCS | Performed by: INTERNAL MEDICINE

## 2024-08-20 PROCEDURE — G8427 DOCREV CUR MEDS BY ELIG CLIN: HCPCS | Performed by: INTERNAL MEDICINE

## 2024-08-20 PROCEDURE — 1036F TOBACCO NON-USER: CPT | Performed by: INTERNAL MEDICINE

## 2024-08-20 PROCEDURE — 1123F ACP DISCUSS/DSCN MKR DOCD: CPT | Performed by: INTERNAL MEDICINE

## 2024-08-20 PROCEDURE — 3078F DIAST BP <80 MM HG: CPT | Performed by: INTERNAL MEDICINE

## 2024-08-20 PROCEDURE — 3074F SYST BP LT 130 MM HG: CPT | Performed by: INTERNAL MEDICINE

## 2024-08-20 RX ORDER — AMLODIPINE BESYLATE 2.5 MG/1
2.5 TABLET ORAL DAILY
Qty: 90 TABLET | Refills: 1 | Status: SHIPPED | OUTPATIENT
Start: 2024-08-20

## 2024-08-20 ASSESSMENT — ENCOUNTER SYMPTOMS
RESPIRATORY NEGATIVE: 1
EYES NEGATIVE: 1
GASTROINTESTINAL NEGATIVE: 1

## 2024-08-20 NOTE — PROGRESS NOTES
1. Have you been to the ER, urgent care clinic since your last visit?  Hospitalized since your last visit?     no      2.  Where do you normally have your labs drawn?       3. Do you need any refills today?   no    4. Which local pharmacy do you use (enter pharmacy)?   [facility]     5. Which mail order pharmacy do you use (enter pharmacy)?        6. Are you here for surgical clearance and if so who will be doing your     procedure/surgery (care team)?   no

## 2024-08-20 NOTE — PROGRESS NOTES
Aydin Gregory is a 84 y.o. year old male.    Follow-up of hypertension, SVT    2/15/2021  Patient is in today for new patient evaluation he is referred here for evaluation of shortness of breath and edema.  Patient has had COVID-19 infection a few months ago since then he has been having shortness of breath feels like he is unable to take deep breath.  Has been followed by pulmonary.  He is seeing increased edema that is on and off.  Denies any chest pain.  Denies any orthopnea PND.  He has no known cardiac history but arteriosclerosis was reported on CAT scan done a few years ago  3/2021  Patient seen for follow-up.  Diagnostic testing results will be discussed  8/2022  Patient seen today for follow-up treatment plaints of increased shortness of breath.  Also has started developing chest tightness on and off sometimes at rest last for long time almost an hour long last episode.  No radiation of pain.  No other associated symptoms  9/2022  Patient seen in follow-up for episode of chest tightness.  He reports symptoms have resolved.  He reports ongoing shortness of breath since COVID-19 infection, which is stable.  He denies palpitations.  He reports edema has resolved with use of diuretics.  He complains of overall fatigue and decreased energy.  He reports unable to tolerate CPAP mask and sleeps approximately 4 hours per night.  4/29/2024 and denies chest pain shortness of breath.  Gets edema if he takes salty food.  Intermittent dizziness while walking around.  Intermittent palpitations during exertion only.  8/20/2024.  No significant chest pain shortness of breath.  Intermittent dizziness and occasional palpitations continue.  Informed me today that he drinks 1 energy drink a day.  12 a edema when he takes excessive salt intake.          Review of Systems   Constitutional: Negative.    HENT: Negative.     Eyes: Negative.    Respiratory: Negative.     Cardiovascular:  Positive for palpitations and leg

## 2024-10-03 RX ORDER — ATORVASTATIN CALCIUM 10 MG/1
TABLET, FILM COATED ORAL
Qty: 90 TABLET | Refills: 3 | Status: SHIPPED | OUTPATIENT
Start: 2024-10-03

## 2024-10-04 ENCOUNTER — TELEPHONE (OUTPATIENT)
Age: 84
End: 2024-10-04

## 2024-10-22 ENCOUNTER — TELEPHONE (OUTPATIENT)
Facility: CLINIC | Age: 84
End: 2024-10-22

## 2024-10-22 ENCOUNTER — HOSPITAL ENCOUNTER (OUTPATIENT)
Facility: HOSPITAL | Age: 84
Setting detail: SPECIMEN
Discharge: HOME | End: 2024-10-25
Payer: MEDICARE

## 2024-10-22 DIAGNOSIS — I10 PRIMARY HYPERTENSION: ICD-10-CM

## 2024-10-22 DIAGNOSIS — R73.03 PREDIABETES: ICD-10-CM

## 2024-10-22 DIAGNOSIS — E78.00 PURE HYPERCHOLESTEROLEMIA: ICD-10-CM

## 2024-10-22 DIAGNOSIS — Z77.011 CONTACT WITH AND (SUSPECTED) EXPOSURE TO LEAD: ICD-10-CM

## 2024-10-22 LAB
ALBUMIN SERPL-MCNC: 4 G/DL (ref 3.4–5)
ALBUMIN/GLOB SERPL: 1.7 (ref 0.8–1.7)
ALP SERPL-CCNC: 106 U/L (ref 45–117)
ALT SERPL-CCNC: 41 U/L (ref 16–61)
ANION GAP SERPL CALC-SCNC: 7 MMOL/L (ref 3–18)
AST SERPL-CCNC: 26 U/L (ref 10–38)
BILIRUB SERPL-MCNC: 0.6 MG/DL (ref 0.2–1)
BUN SERPL-MCNC: 16 MG/DL (ref 7–18)
BUN/CREAT SERPL: 19 (ref 12–20)
CALCIUM SERPL-MCNC: 10 MG/DL (ref 8.5–10.1)
CHLORIDE SERPL-SCNC: 109 MMOL/L (ref 100–111)
CHOLEST SERPL-MCNC: 126 MG/DL
CO2 SERPL-SCNC: 28 MMOL/L (ref 21–32)
CREAT SERPL-MCNC: 0.84 MG/DL (ref 0.6–1.3)
CREAT UR-MCNC: 75 MG/DL (ref 30–125)
EST. AVERAGE GLUCOSE BLD GHB EST-MCNC: 111 MG/DL
GLOBULIN SER CALC-MCNC: 2.3 G/DL (ref 2–4)
GLUCOSE SERPL-MCNC: 111 MG/DL (ref 74–99)
HBA1C MFR BLD: 5.5 % (ref 4.2–5.6)
HDLC SERPL-MCNC: 62 MG/DL (ref 40–60)
HDLC SERPL: 2 (ref 0–5)
LDLC SERPL CALC-MCNC: 53.6 MG/DL (ref 0–100)
LIPID PANEL: ABNORMAL
MAGNESIUM SERPL-MCNC: 2.1 MG/DL (ref 1.6–2.6)
MICROALBUMIN UR-MCNC: <0.5 MG/DL (ref 0–3)
MICROALBUMIN/CREAT UR-RTO: NORMAL MG/G (ref 0–30)
POTASSIUM SERPL-SCNC: 4.7 MMOL/L (ref 3.5–5.5)
PROT SERPL-MCNC: 6.3 G/DL (ref 6.4–8.2)
SODIUM SERPL-SCNC: 144 MMOL/L (ref 136–145)
TRIGL SERPL-MCNC: 52 MG/DL
VLDLC SERPL CALC-MCNC: 10.4 MG/DL

## 2024-10-22 PROCEDURE — 83036 HEMOGLOBIN GLYCOSYLATED A1C: CPT

## 2024-10-22 PROCEDURE — 83735 ASSAY OF MAGNESIUM: CPT

## 2024-10-22 PROCEDURE — 82043 UR ALBUMIN QUANTITATIVE: CPT

## 2024-10-22 PROCEDURE — 84202 ASSAY RBC PROTOPORPHYRIN: CPT

## 2024-10-22 PROCEDURE — 80061 LIPID PANEL: CPT

## 2024-10-22 PROCEDURE — 80053 COMPREHEN METABOLIC PANEL: CPT

## 2024-10-22 PROCEDURE — 83655 ASSAY OF LEAD: CPT

## 2024-10-22 PROCEDURE — 36415 COLL VENOUS BLD VENIPUNCTURE: CPT

## 2024-10-22 PROCEDURE — 82570 ASSAY OF URINE CREATININE: CPT

## 2024-10-22 NOTE — TELEPHONE ENCOUNTER
Pt called states he has been having severe pain in    the middle part of his back and sometimes between his shoulders , he states its a shooting pain that he said \"takes him to his knees\" please advise      [phone]

## 2024-10-22 NOTE — TELEPHONE ENCOUNTER
Called patient, states hurting back while attempting to lift wife. Scheduled for tomorrow at 9:45.

## 2024-10-23 ENCOUNTER — OFFICE VISIT (OUTPATIENT)
Facility: CLINIC | Age: 84
End: 2024-10-23

## 2024-10-23 ENCOUNTER — HOSPITAL ENCOUNTER (OUTPATIENT)
Facility: HOSPITAL | Age: 84
Discharge: HOME | End: 2024-10-26
Payer: MEDICARE

## 2024-10-23 VITALS
HEIGHT: 68 IN | SYSTOLIC BLOOD PRESSURE: 142 MMHG | BODY MASS INDEX: 30.92 KG/M2 | TEMPERATURE: 98.5 F | DIASTOLIC BLOOD PRESSURE: 68 MMHG | WEIGHT: 204 LBS | RESPIRATION RATE: 16 BRPM | HEART RATE: 73 BPM | OXYGEN SATURATION: 98 %

## 2024-10-23 DIAGNOSIS — M54.6 ACUTE BILATERAL THORACIC BACK PAIN: ICD-10-CM

## 2024-10-23 DIAGNOSIS — E66.811 CLASS 1 OBESITY DUE TO EXCESS CALORIES WITH SERIOUS COMORBIDITY AND BODY MASS INDEX (BMI) OF 31.0 TO 31.9 IN ADULT: ICD-10-CM

## 2024-10-23 DIAGNOSIS — M54.6 ACUTE BILATERAL THORACIC BACK PAIN: Primary | ICD-10-CM

## 2024-10-23 DIAGNOSIS — R73.03 PREDIABETES: ICD-10-CM

## 2024-10-23 DIAGNOSIS — M06.041 RHEUMATOID ARTHRITIS INVOLVING BOTH HANDS WITH NEGATIVE RHEUMATOID FACTOR (HCC): ICD-10-CM

## 2024-10-23 DIAGNOSIS — E66.09 CLASS 1 OBESITY DUE TO EXCESS CALORIES WITH SERIOUS COMORBIDITY AND BODY MASS INDEX (BMI) OF 31.0 TO 31.9 IN ADULT: ICD-10-CM

## 2024-10-23 DIAGNOSIS — E78.00 PURE HYPERCHOLESTEROLEMIA: ICD-10-CM

## 2024-10-23 DIAGNOSIS — M15.0 PRIMARY OSTEOARTHRITIS INVOLVING MULTIPLE JOINTS: ICD-10-CM

## 2024-10-23 DIAGNOSIS — M06.042 RHEUMATOID ARTHRITIS INVOLVING BOTH HANDS WITH NEGATIVE RHEUMATOID FACTOR (HCC): ICD-10-CM

## 2024-10-23 DIAGNOSIS — I10 ESSENTIAL (PRIMARY) HYPERTENSION: ICD-10-CM

## 2024-10-23 DIAGNOSIS — M11.20 CPDD (CALCIUM PYROPHOSPHATE DEPOSITION DISEASE): ICD-10-CM

## 2024-10-23 DIAGNOSIS — R60.0 BILATERAL LOWER EXTREMITY EDEMA: ICD-10-CM

## 2024-10-23 PROCEDURE — 72072 X-RAY EXAM THORAC SPINE 3VWS: CPT

## 2024-10-23 RX ORDER — AMLODIPINE BESYLATE 2.5 MG/1
5 TABLET ORAL DAILY
Qty: 1 TABLET | Refills: 0 | Status: SHIPPED | OUTPATIENT
Start: 2024-10-23

## 2024-10-23 RX ORDER — TIZANIDINE 2 MG/1
2 TABLET ORAL 3 TIMES DAILY PRN
Qty: 30 TABLET | Refills: 0 | Status: SHIPPED | OUTPATIENT
Start: 2024-10-23

## 2024-10-23 NOTE — PROGRESS NOTES
Aydin Gregory presents today for   Chief Complaint   Patient presents with    Back Pain       \"Have you been to the ER, urgent care clinic since your last visit?  Hospitalized since your last visit?\"    NO    “Have you seen or consulted any other health care providers outside of [facility] since your last visit?”    NO            
to Dr. MOO Ennis and his diuretics were changed from lasix to torsemide and metolazone. However, he stopped taking metolazone after 1 week since began to feel lightheaded and noted low blood pressure readings with SBP 80-90. He underwent an echocardiogram (2/24/2021) showing normal LV size and function (EF 60-65%), mild MR and PI and PAP 23 mmHg; and a pharmacologic nuclear stress test (2/24/2021) which was a normal, low risk study with no TID and calculated EF 62%. He had a 6 minute walk test (3/31/2021) showing no significant O2 desaturation; and PFT's showing normal flows, normal DLCO, and isolated decrease in RV and FRC. He also had a 30 day event monitor (5/2021) which showed rare PVC, rare PAC, and one episode of 20 beat SVT at 155 bpm (asymptomatic).      On 8/13/2019, he reported that he had been seeing Dr. Cedilol for increasing difficulty with right sided sciatica. He reported being treated with a Medrol dose pack, physical therapy, and spinal injections without improvement, and was also prescribed Norco and Tramadol, but he stated that he found them too sedating and of no benefit. Due to persistent symptoms, he underwent a lumbar MRI (8/5/2019) which showed degenerative changes most notable at L4-L5 resulting in moderate spinal canal stenosis as well as moderate to severe right greater than left foraminal stenoses; advanced facet arthropathy with small facet effusions and suspected small right facet joint ventral synovial cyst; notable degenerative changes also L3-L4; L1 mild anterior compression deformity, chronic appearing; overall general worsening when compared to prior study in 2007. He was having continued significant pain, and was started on gabapentin 100 mg tid. He was referred to Dr. Worrell and she increased his dose of gabapentin to 200 mg tid. She also referred him to Dr. Aguirre for evaluation given his lack of response to conservative management. He recommended proceeding with decompression by

## 2024-10-24 LAB
HISPANIC?: NO
LEAD BLD-MCNC: 8.2 UG/DL (ref 0–3.4)
RACE: ABNORMAL
SPECIMEN SOURCE: ABNORMAL
TEST PURPOSE: ABNORMAL
ZPP RBC-MCNC: 37 UG/DL (ref 0–99)

## 2024-10-30 RX ORDER — TIZANIDINE 2 MG/1
2 TABLET ORAL 3 TIMES DAILY PRN
Qty: 30 TABLET | Refills: 0 | OUTPATIENT
Start: 2024-10-30

## 2024-11-19 ENCOUNTER — OFFICE VISIT (OUTPATIENT)
Facility: CLINIC | Age: 84
End: 2024-11-19

## 2024-11-19 VITALS
DIASTOLIC BLOOD PRESSURE: 62 MMHG | SYSTOLIC BLOOD PRESSURE: 126 MMHG | OXYGEN SATURATION: 94 % | TEMPERATURE: 98.7 F | HEIGHT: 68 IN | RESPIRATION RATE: 16 BRPM | WEIGHT: 204 LBS | BODY MASS INDEX: 30.92 KG/M2 | HEART RATE: 91 BPM

## 2024-11-19 DIAGNOSIS — I10 ESSENTIAL (PRIMARY) HYPERTENSION: Primary | ICD-10-CM

## 2024-11-19 DIAGNOSIS — E66.09 CLASS 1 OBESITY DUE TO EXCESS CALORIES WITH SERIOUS COMORBIDITY AND BODY MASS INDEX (BMI) OF 31.0 TO 31.9 IN ADULT: ICD-10-CM

## 2024-11-19 DIAGNOSIS — G47.33 OBSTRUCTIVE SLEEP APNEA SYNDROME, SEVERE: ICD-10-CM

## 2024-11-19 DIAGNOSIS — M06.041 RHEUMATOID ARTHRITIS INVOLVING BOTH HANDS WITH NEGATIVE RHEUMATOID FACTOR (HCC): ICD-10-CM

## 2024-11-19 DIAGNOSIS — E66.811 CLASS 1 OBESITY DUE TO EXCESS CALORIES WITH SERIOUS COMORBIDITY AND BODY MASS INDEX (BMI) OF 31.0 TO 31.9 IN ADULT: ICD-10-CM

## 2024-11-19 DIAGNOSIS — M54.6 ACUTE BILATERAL THORACIC BACK PAIN: ICD-10-CM

## 2024-11-19 DIAGNOSIS — M11.20 CPDD (CALCIUM PYROPHOSPHATE DEPOSITION DISEASE): ICD-10-CM

## 2024-11-19 DIAGNOSIS — R73.03 PREDIABETES: ICD-10-CM

## 2024-11-19 DIAGNOSIS — M06.042 RHEUMATOID ARTHRITIS INVOLVING BOTH HANDS WITH NEGATIVE RHEUMATOID FACTOR (HCC): ICD-10-CM

## 2024-11-19 DIAGNOSIS — E78.00 PURE HYPERCHOLESTEROLEMIA: ICD-10-CM

## 2024-11-19 DIAGNOSIS — M15.0 PRIMARY OSTEOARTHRITIS INVOLVING MULTIPLE JOINTS: ICD-10-CM

## 2024-11-19 DIAGNOSIS — R60.0 BILATERAL LOWER EXTREMITY EDEMA: ICD-10-CM

## 2024-11-19 NOTE — PROGRESS NOTES
Aydin Gregory presents today for   Chief Complaint   Patient presents with    Follow-up       \"Have you been to the ER, urgent care clinic since your last visit?  Hospitalized since your last visit?\"    NO    “Have you seen or consulted any other health care providers outside of [facility] since your last visit?”    NO            
severe ZAIN. Started on auto CPAP after hospitalization for COVID-19 by Dr. EDWIN Ennis and supplemented with 2 liters of oxygen. However, despite best efforts, did ongoing difficulty with dyspnea and air hunger with equipment use. Underwent in-patient sleep evaluation on 1/29/2021 and found BIPAP to be very effective.  However, reported difficulty tolerating despite receiving new mask and equipment and no longer using.  Discussed referral to Dr. Mcneil, but not wishing reevaluation.    7. Second degree AV block, Mobitz 1. Noted to be intermittent during sleep study and referred to Dr. Barbara Ennis for evaluation. Felt to be likely secondary to untreated sleep apnea. Completed 30 day event monitor (5/5/2021) and no significant findings noted except for asymptomatic 20 beat run of SVT at 155 bpm.  Repeat event monitor (6/19/2024) showed predominant rhythm is sinus with SVE burden 4.3%. Will continue to monitor.  8. Rheumatoid arthritis. On hydroxychloroquine 400 mg daily and colchicine 0.6 every other day. Also with evidence of osteoarthritis and CPPD contributing to joint pain. Referred to Dr. Cohen to evaluate ongoing bilateral hand pain on 8/27/2024, and right hand x-ray showed significant osteoarthritis involving the MCP and DIP joints of the first and second fingers as well as the IP joint of the right thumb. Received a cortisone injection and advised to continue using Voltaren gel and Tylenol for pain control as needed.  Continuing to follow with Dr. Aviles.   9. Primary osteoarthritis.  Evident in bilateral hands and knees, bilateral shoulders, and cervical spine. Shoulder pain (R>L). X-ray with evidence of osteoarthritis and rotator cuff pathology. Evaluated by Dr. Earzo and surgery for reverse shoulder replacement recommended, but did not wish to to proceed.  Reported no longer taking Celebrex for pain due to concern for side effects.  Bilateral hand pain due to osteoarthritis as discussed.  Using Tylenol

## 2025-01-18 DIAGNOSIS — I10 ESSENTIAL (PRIMARY) HYPERTENSION: ICD-10-CM

## 2025-01-20 ENCOUNTER — TELEPHONE (OUTPATIENT)
Age: 85
End: 2025-01-20

## 2025-01-20 RX ORDER — AMLODIPINE BESYLATE 5 MG/1
5 TABLET ORAL DAILY
Qty: 90 TABLET | Refills: 1 | Status: SHIPPED | OUTPATIENT
Start: 2025-01-20

## 2025-01-20 RX ORDER — AMLODIPINE BESYLATE 5 MG/1
5 TABLET ORAL DAILY
COMMUNITY
End: 2025-01-20 | Stop reason: SDUPTHER

## 2025-01-20 NOTE — TELEPHONE ENCOUNTER
Patient confirmed he's taking Norvasc 5mg daily.    Per Dr. Monge on 10/04/24 patient instructed to increase amlodipine to 5mg daily after review of BP log.

## 2025-02-05 RX ORDER — HYDRALAZINE HYDROCHLORIDE 50 MG/1
50 TABLET, FILM COATED ORAL 2 TIMES DAILY
Qty: 180 TABLET | Refills: 3 | Status: SHIPPED | OUTPATIENT
Start: 2025-02-05

## 2025-02-10 DIAGNOSIS — I10 ESSENTIAL (PRIMARY) HYPERTENSION: ICD-10-CM

## 2025-02-10 RX ORDER — AMLODIPINE BESYLATE 2.5 MG/1
2.5 TABLET ORAL DAILY
Qty: 90 TABLET | Refills: 2 | OUTPATIENT
Start: 2025-02-10

## 2025-03-21 ENCOUNTER — HOSPITAL ENCOUNTER (OUTPATIENT)
Facility: HOSPITAL | Age: 85
Setting detail: SPECIMEN
Discharge: HOME | End: 2025-03-24
Payer: MEDICARE

## 2025-03-21 ENCOUNTER — TELEPHONE (OUTPATIENT)
Facility: CLINIC | Age: 85
End: 2025-03-21

## 2025-03-21 DIAGNOSIS — I10 ESSENTIAL (PRIMARY) HYPERTENSION: ICD-10-CM

## 2025-03-21 DIAGNOSIS — E78.00 PURE HYPERCHOLESTEROLEMIA: ICD-10-CM

## 2025-03-21 DIAGNOSIS — R73.03 PREDIABETES: ICD-10-CM

## 2025-03-21 LAB
ALBUMIN SERPL-MCNC: 3.8 G/DL (ref 3.4–5)
ALBUMIN/GLOB SERPL: 1.5 (ref 0.8–1.7)
ALP SERPL-CCNC: 110 U/L (ref 45–117)
ALT SERPL-CCNC: 34 U/L (ref 16–61)
ANION GAP SERPL CALC-SCNC: 5 MMOL/L (ref 3–18)
AST SERPL-CCNC: 28 U/L (ref 10–38)
BASOPHILS # BLD: 0.03 K/UL (ref 0–0.1)
BASOPHILS NFR BLD: 0.5 % (ref 0–2)
BILIRUB SERPL-MCNC: 0.5 MG/DL (ref 0.2–1)
BUN SERPL-MCNC: 13 MG/DL (ref 7–18)
BUN/CREAT SERPL: 15 (ref 12–20)
CALCIUM SERPL-MCNC: 9.1 MG/DL (ref 8.5–10.1)
CHLORIDE SERPL-SCNC: 111 MMOL/L (ref 100–111)
CHOLEST SERPL-MCNC: 124 MG/DL
CO2 SERPL-SCNC: 25 MMOL/L (ref 21–32)
CREAT SERPL-MCNC: 0.89 MG/DL (ref 0.6–1.3)
CREAT UR-MCNC: 130 MG/DL (ref 30–125)
DIFFERENTIAL METHOD BLD: ABNORMAL
EOSINOPHIL # BLD: 0.22 K/UL (ref 0–0.4)
EOSINOPHIL NFR BLD: 3.8 % (ref 0–5)
ERYTHROCYTE [DISTWIDTH] IN BLOOD BY AUTOMATED COUNT: 13.4 % (ref 11.6–14.5)
EST. AVERAGE GLUCOSE BLD GHB EST-MCNC: 117 MG/DL
GLOBULIN SER CALC-MCNC: 2.6 G/DL (ref 2–4)
GLUCOSE SERPL-MCNC: 104 MG/DL (ref 74–99)
HBA1C MFR BLD: 5.7 % (ref 4.2–5.6)
HCT VFR BLD AUTO: 45.6 % (ref 36–48)
HDLC SERPL-MCNC: 60 MG/DL (ref 40–60)
HDLC SERPL: 2.1 (ref 0–5)
HGB BLD-MCNC: 14.8 G/DL (ref 13–16)
IMM GRANULOCYTES # BLD AUTO: 0.01 K/UL (ref 0–0.04)
IMM GRANULOCYTES NFR BLD AUTO: 0.2 % (ref 0–0.5)
LDLC SERPL CALC-MCNC: 44.2 MG/DL (ref 0–100)
LIPID PANEL: NORMAL
LYMPHOCYTES # BLD: 1.45 K/UL (ref 0.9–3.6)
LYMPHOCYTES NFR BLD: 25.2 % (ref 21–52)
MAGNESIUM SERPL-MCNC: 1.9 MG/DL (ref 1.6–2.6)
MCH RBC QN AUTO: 33.7 PG (ref 24–34)
MCHC RBC AUTO-ENTMCNC: 32.5 G/DL (ref 31–37)
MCV RBC AUTO: 103.9 FL (ref 78–100)
MICROALBUMIN UR-MCNC: 1.53 MG/DL (ref 0–3)
MICROALBUMIN/CREAT UR-RTO: 12 MG/G (ref 0–30)
MONOCYTES # BLD: 0.55 K/UL (ref 0.05–1.2)
MONOCYTES NFR BLD: 9.6 % (ref 3–10)
NEUTS SEG # BLD: 3.49 K/UL (ref 1.8–8)
NEUTS SEG NFR BLD: 60.7 % (ref 40–73)
NRBC # BLD: 0 K/UL (ref 0–0.01)
NRBC BLD-RTO: 0 PER 100 WBC
PLATELET # BLD AUTO: 200 K/UL (ref 135–420)
PMV BLD AUTO: 10.4 FL (ref 9.2–11.8)
POTASSIUM SERPL-SCNC: 4 MMOL/L (ref 3.5–5.5)
PROT SERPL-MCNC: 6.4 G/DL (ref 6.4–8.2)
RBC # BLD AUTO: 4.39 M/UL (ref 4.35–5.65)
SODIUM SERPL-SCNC: 141 MMOL/L (ref 136–145)
TRIGL SERPL-MCNC: 99 MG/DL
VLDLC SERPL CALC-MCNC: 19.8 MG/DL
WBC # BLD AUTO: 5.8 K/UL (ref 4.6–13.2)

## 2025-03-21 PROCEDURE — 83036 HEMOGLOBIN GLYCOSYLATED A1C: CPT

## 2025-03-21 PROCEDURE — 80061 LIPID PANEL: CPT

## 2025-03-21 PROCEDURE — 80053 COMPREHEN METABOLIC PANEL: CPT

## 2025-03-21 PROCEDURE — 82570 ASSAY OF URINE CREATININE: CPT

## 2025-03-21 PROCEDURE — 83735 ASSAY OF MAGNESIUM: CPT

## 2025-03-21 PROCEDURE — 36415 COLL VENOUS BLD VENIPUNCTURE: CPT

## 2025-03-21 PROCEDURE — 85025 COMPLETE CBC W/AUTO DIFF WBC: CPT

## 2025-03-21 PROCEDURE — 82043 UR ALBUMIN QUANTITATIVE: CPT

## 2025-03-21 NOTE — TELEPHONE ENCOUNTER
Patient has appointment with Dr. Watson on 03/27/2025. There are lab orders that need to be completed prior to appointment. Please call patient and see if he is having them done elsewhere if not please schedule lab appointment.

## 2025-03-27 ENCOUNTER — OFFICE VISIT (OUTPATIENT)
Facility: CLINIC | Age: 85
End: 2025-03-27

## 2025-03-27 VITALS
TEMPERATURE: 98.7 F | SYSTOLIC BLOOD PRESSURE: 134 MMHG | BODY MASS INDEX: 31.37 KG/M2 | RESPIRATION RATE: 14 BRPM | HEIGHT: 68 IN | HEART RATE: 82 BPM | DIASTOLIC BLOOD PRESSURE: 66 MMHG | WEIGHT: 207 LBS | OXYGEN SATURATION: 96 %

## 2025-03-27 DIAGNOSIS — G47.33 OBSTRUCTIVE SLEEP APNEA SYNDROME, SEVERE: ICD-10-CM

## 2025-03-27 DIAGNOSIS — E66.09 CLASS 1 OBESITY DUE TO EXCESS CALORIES WITH SERIOUS COMORBIDITY AND BODY MASS INDEX (BMI) OF 31.0 TO 31.9 IN ADULT: ICD-10-CM

## 2025-03-27 DIAGNOSIS — I10 ESSENTIAL (PRIMARY) HYPERTENSION: Primary | ICD-10-CM

## 2025-03-27 DIAGNOSIS — I50.32 CHRONIC DIASTOLIC CONGESTIVE HEART FAILURE (HCC): ICD-10-CM

## 2025-03-27 DIAGNOSIS — M15.0 PRIMARY OSTEOARTHRITIS INVOLVING MULTIPLE JOINTS: ICD-10-CM

## 2025-03-27 DIAGNOSIS — E66.811 CLASS 1 OBESITY DUE TO EXCESS CALORIES WITH SERIOUS COMORBIDITY AND BODY MASS INDEX (BMI) OF 31.0 TO 31.9 IN ADULT: ICD-10-CM

## 2025-03-27 DIAGNOSIS — R60.0 BILATERAL LOWER EXTREMITY EDEMA: ICD-10-CM

## 2025-03-27 DIAGNOSIS — M06.042 RHEUMATOID ARTHRITIS INVOLVING BOTH HANDS WITH NEGATIVE RHEUMATOID FACTOR (HCC): ICD-10-CM

## 2025-03-27 DIAGNOSIS — E78.00 PURE HYPERCHOLESTEROLEMIA: ICD-10-CM

## 2025-03-27 DIAGNOSIS — R20.2 PARESTHESIA OF BOTH FEET: ICD-10-CM

## 2025-03-27 DIAGNOSIS — M06.041 RHEUMATOID ARTHRITIS INVOLVING BOTH HANDS WITH NEGATIVE RHEUMATOID FACTOR (HCC): ICD-10-CM

## 2025-03-27 DIAGNOSIS — R73.03 PREDIABETES: ICD-10-CM

## 2025-03-27 DIAGNOSIS — Z23 NEED FOR PROPHYLACTIC VACCINATION AGAINST STREPTOCOCCUS PNEUMONIAE (PNEUMOCOCCUS): ICD-10-CM

## 2025-03-27 DIAGNOSIS — H91.90 DECREASED HEARING, UNSPECIFIED LATERALITY: ICD-10-CM

## 2025-03-27 DIAGNOSIS — M11.20 CPDD (CALCIUM PYROPHOSPHATE DEPOSITION DISEASE): ICD-10-CM

## 2025-03-27 RX ORDER — TORSEMIDE 20 MG/1
20 TABLET ORAL 2 TIMES DAILY
Qty: 180 TABLET | Refills: 3 | Status: SHIPPED | OUTPATIENT
Start: 2025-03-27

## 2025-03-27 SDOH — ECONOMIC STABILITY: FOOD INSECURITY: WITHIN THE PAST 12 MONTHS, THE FOOD YOU BOUGHT JUST DIDN'T LAST AND YOU DIDN'T HAVE MONEY TO GET MORE.: NEVER TRUE

## 2025-03-27 SDOH — ECONOMIC STABILITY: FOOD INSECURITY: WITHIN THE PAST 12 MONTHS, YOU WORRIED THAT YOUR FOOD WOULD RUN OUT BEFORE YOU GOT MONEY TO BUY MORE.: NEVER TRUE

## 2025-03-27 ASSESSMENT — PATIENT HEALTH QUESTIONNAIRE - PHQ9
SUM OF ALL RESPONSES TO PHQ QUESTIONS 1-9: 0
5. POOR APPETITE OR OVEREATING: NOT AT ALL
7. TROUBLE CONCENTRATING ON THINGS, SUCH AS READING THE NEWSPAPER OR WATCHING TELEVISION: NOT AT ALL
2. FEELING DOWN, DEPRESSED OR HOPELESS: NOT AT ALL
SUM OF ALL RESPONSES TO PHQ QUESTIONS 1-9: 0
6. FEELING BAD ABOUT YOURSELF - OR THAT YOU ARE A FAILURE OR HAVE LET YOURSELF OR YOUR FAMILY DOWN: NOT AT ALL
1. LITTLE INTEREST OR PLEASURE IN DOING THINGS: NOT AT ALL
8. MOVING OR SPEAKING SO SLOWLY THAT OTHER PEOPLE COULD HAVE NOTICED. OR THE OPPOSITE, BEING SO FIGETY OR RESTLESS THAT YOU HAVE BEEN MOVING AROUND A LOT MORE THAN USUAL: NOT AT ALL
SUM OF ALL RESPONSES TO PHQ QUESTIONS 1-9: 0
3. TROUBLE FALLING OR STAYING ASLEEP: NOT AT ALL
4. FEELING TIRED OR HAVING LITTLE ENERGY: NOT AT ALL
10. IF YOU CHECKED OFF ANY PROBLEMS, HOW DIFFICULT HAVE THESE PROBLEMS MADE IT FOR YOU TO DO YOUR WORK, TAKE CARE OF THINGS AT HOME, OR GET ALONG WITH OTHER PEOPLE: NOT DIFFICULT AT ALL
SUM OF ALL RESPONSES TO PHQ QUESTIONS 1-9: 0
9. THOUGHTS THAT YOU WOULD BE BETTER OFF DEAD, OR OF HURTING YOURSELF: NOT AT ALL

## 2025-03-27 NOTE — PATIENT INSTRUCTIONS
vegetables, with no added salt  Canned vegetables, low-sodium or with no added salt  Grains  Bagels without salted tops  Cereal with no added salt  Corn tortillas  Crackers with no added salt  Oatmeal, cooked without salt  Popcorn with no salt  Pasta and noodles, cooked without salt  Rice, cooked without salt  Unsalted pretzels  Dairy and dairy alternatives  Butter, unsalted  Cream cheese  Ice cream  Milk  Soy milk  Meats and other protein foods  Beans and peas, canned with no salt  Eggs  Fresh fish (not smoked)  Fresh meats (not smoked or cured)  Nuts and nut butter, prepared without salt  Poultry, not packaged with sodium solution  Tofu, unseasoned  Tuna, canned without salt  Seasonings  Garlic  Herbs and spices  Lemon juice  Mustard  Olive oil  Salt-free seasoning mixes  Vinegar  Work with your doctor to find out how much of this nutrient you need. Depending on your health, you may need more or less of it in your diet.  Where can you learn more?  Go to https://www.RooT.net/"[employer]"onnections  Enter S460 in the search box to learn more about \"Learning About Low-Sodium Foods.\"  Current as of: August 22, 2019               Content Version: 12.6  © 5553-0680 [employer].   Care instructions adapted under license by [employer] (which disclaims liability or warranty for this information). If you have questions about a medical condition or this instruction, always ask your healthcare professional. [employer] disclaims any warranty or liability for your use of this information.      Low Sodium Diet (2,000 Milligram): Care Instructions  Your Care Instructions     Too much sodium causes your body to hold on to extra water. This can raise your blood pressure and force your heart and kidneys to work harder. In very serious cases, this could cause you to be put in the hospital. It might even be life-threatening. By limiting sodium, you will feel better and lower your risk of

## 2025-03-27 NOTE — PROGRESS NOTES
Aydin Gregory presents today for   Chief Complaint   Patient presents with    Follow-up       \"Have you been to the ER, urgent care clinic since your last visit?  Hospitalized since your last visit?\"    NO    “Have you seen or consulted any other health care providers outside of [facility] since your last visit?”    NO            
nuclear stress test (2/24/2021) normal, low risk study. Echocardiogram (8/17/2022) with normal LV function (EF 55-60%) and normal diastolic function, trace TR and mild PI. Edema improved when compliant with torsemide 20 mg twice daily and following a low-sodium diet. Established care with Dr. Monge on 4/29/2024 and continuing to follow every 6 months with next visit scheduled on 7/8/2025.  3. Hyperlipidemia. On moderate intensity dose atorvastatin with LDL 44 and HDL 60, indicative of excellent control. Will continue to follow.  4. Prediabetes. Controlled on diet alone with stable HbA1c at 5.7 today with fasting blood glucose 104.  Reports significant intake of carbohydrates and sugar and urged to minimize. No prior evidence of microvascular complications, but describing bilateral foot paresthesias today.  May be related to elevated blood sugar, but will assess B12 level and gammopathy panel at next visit.. On Ace-I and statin. Continue follow-up with Dr. Bustillo for annual eye exams. Emphasized importance of lifestyle modifications, including heart healthy low carbohydrate diet, regular exercise, and weight loss.   5. History of COVID-19 infection with severe pneumonia/ acute hypoxic respiratory failure (6/2020).  Overall with resolution of symptoms without sequela. Lower extremity edema has been a persistent problem but was also present prior to his COVID-19 infection.  Significantly improved today as discussed with low-sodium diet. Underwent 6 minute walk test (3/31/2021) showing no desaturations, and PFT's (3/31/2021) showing normal flows and DLCO, and isolated decreased RV and FRC, no response to bronchodilator.  Will continue to monitor.  6. Obstructive sleep apnea, severe. Reported awakening gasping for air several times per week, snoring and daytime drowsiness particularly when driving (1/2019). Severe episode on 8/9/2019 prompted ED evaluation. EKG without change, troponin negative and NT-pro BNP normal.

## 2025-03-30 PROBLEM — I50.32 CHRONIC DIASTOLIC CONGESTIVE HEART FAILURE (HCC): Status: ACTIVE | Noted: 2025-03-30

## 2025-03-31 NOTE — TELEPHONE ENCOUNTER
PCP: Montserrat Watson MD    LAST OFFICE VISIT: 03/27/2025    LAST REFILL PER CHART:  Medication:lisinopril (PRINIVIL;ZESTRIL) 40 MG tablet   Ordered On:01/09/2024  Instructions:TAKE 1 TABLET BY MOUTH ONCE DAILY   Dispense:90 tablets  Refills:3      Future Appointments   Date Time Provider Department Center   7/8/2025  1:30 PM Ayaz Monge MD [facility] BS [facility]   7/25/2025  9:15 AM [facility] LAB VISIT [facility] DEP   7/31/2025 10:00 AM Montserrat Watson MD [facility] DEP

## 2025-04-01 RX ORDER — LISINOPRIL 40 MG/1
40 TABLET ORAL DAILY
Qty: 90 TABLET | Refills: 4 | Status: SHIPPED | OUTPATIENT
Start: 2025-04-01

## 2025-04-26 DIAGNOSIS — I10 ESSENTIAL (PRIMARY) HYPERTENSION: ICD-10-CM

## 2025-04-28 RX ORDER — AMLODIPINE BESYLATE 5 MG/1
5 TABLET ORAL DAILY
Qty: 90 TABLET | Refills: 2 | Status: SHIPPED | OUTPATIENT
Start: 2025-04-28

## 2025-05-08 RX ORDER — HYDRALAZINE HYDROCHLORIDE 50 MG/1
50 TABLET, FILM COATED ORAL 2 TIMES DAILY
Qty: 180 TABLET | Refills: 4 | OUTPATIENT
Start: 2025-05-08

## 2025-06-09 RX ORDER — HYDRALAZINE HYDROCHLORIDE 50 MG/1
50 TABLET, FILM COATED ORAL 2 TIMES DAILY
Qty: 180 TABLET | Refills: 3 | Status: SHIPPED | OUTPATIENT
Start: 2025-06-09

## 2025-06-09 NOTE — TELEPHONE ENCOUNTER
PCP: Montserrat Watson MD    LAST OFFICE VISIT: 03/27/2025    LAST REFILL PER CHART:  Medication:hydrALAZINE (APRESOLINE) 50 MG tablet   Ordered On:02/05/2025  Instructions:Take 1 tablet by mouth in the morning and at bedtime   Dispense:180 tablets  Refills:3    Future Appointments   Date Time Provider Department Center   7/8/2025  1:30 PM Ayaz Monge MD [facility]   7/25/2025  9:15 AM [facility] LAB VISIT [facility] DEP   7/31/2025 10:00 AM Montserrat Watson MD [facility] DEP

## 2025-07-08 ENCOUNTER — OFFICE VISIT (OUTPATIENT)
Age: 85
End: 2025-07-08
Payer: MEDICARE

## 2025-07-08 VITALS
OXYGEN SATURATION: 96 % | DIASTOLIC BLOOD PRESSURE: 57 MMHG | BODY MASS INDEX: 30.52 KG/M2 | HEART RATE: 77 BPM | HEIGHT: 68 IN | SYSTOLIC BLOOD PRESSURE: 100 MMHG | WEIGHT: 201.4 LBS

## 2025-07-08 DIAGNOSIS — R42 DIZZINESS: ICD-10-CM

## 2025-07-08 DIAGNOSIS — I47.10 SUPRAVENTRICULAR TACHYCARDIA: ICD-10-CM

## 2025-07-08 DIAGNOSIS — E78.00 PURE HYPERCHOLESTEROLEMIA: ICD-10-CM

## 2025-07-08 DIAGNOSIS — I10 ESSENTIAL (PRIMARY) HYPERTENSION: ICD-10-CM

## 2025-07-08 DIAGNOSIS — R00.1 BRADYCARDIA: ICD-10-CM

## 2025-07-08 DIAGNOSIS — I44.1 ATRIOVENTRICULAR BLOCK, SECOND DEGREE: ICD-10-CM

## 2025-07-08 DIAGNOSIS — I50.32 CHRONIC DIASTOLIC CONGESTIVE HEART FAILURE (HCC): Primary | ICD-10-CM

## 2025-07-08 PROCEDURE — 1036F TOBACCO NON-USER: CPT | Performed by: INTERNAL MEDICINE

## 2025-07-08 PROCEDURE — 99214 OFFICE O/P EST MOD 30 MIN: CPT | Performed by: INTERNAL MEDICINE

## 2025-07-08 PROCEDURE — G8427 DOCREV CUR MEDS BY ELIG CLIN: HCPCS | Performed by: INTERNAL MEDICINE

## 2025-07-08 PROCEDURE — G8417 CALC BMI ABV UP PARAM F/U: HCPCS | Performed by: INTERNAL MEDICINE

## 2025-07-08 PROCEDURE — 1159F MED LIST DOCD IN RCRD: CPT | Performed by: INTERNAL MEDICINE

## 2025-07-08 PROCEDURE — 1123F ACP DISCUSS/DSCN MKR DOCD: CPT | Performed by: INTERNAL MEDICINE

## 2025-07-08 PROCEDURE — 3078F DIAST BP <80 MM HG: CPT | Performed by: INTERNAL MEDICINE

## 2025-07-08 PROCEDURE — 1160F RVW MEDS BY RX/DR IN RCRD: CPT | Performed by: INTERNAL MEDICINE

## 2025-07-08 PROCEDURE — 1126F AMNT PAIN NOTED NONE PRSNT: CPT | Performed by: INTERNAL MEDICINE

## 2025-07-08 PROCEDURE — 3074F SYST BP LT 130 MM HG: CPT | Performed by: INTERNAL MEDICINE

## 2025-07-08 PROCEDURE — 93000 ELECTROCARDIOGRAM COMPLETE: CPT | Performed by: INTERNAL MEDICINE

## 2025-07-08 RX ORDER — TERBINAFINE HYDROCHLORIDE 250 MG/1
250 TABLET ORAL DAILY
COMMUNITY
Start: 2025-05-02

## 2025-07-08 ASSESSMENT — ENCOUNTER SYMPTOMS
RESPIRATORY NEGATIVE: 1
GASTROINTESTINAL NEGATIVE: 1
EYES NEGATIVE: 1

## 2025-07-08 NOTE — PROGRESS NOTES
1. Have you been to the ER, urgent care clinic since your last visit?  Hospitalized since your last visit? No     2.  Where do you normally have your labs drawn?   PCP office     3. Do you need any refills today?   no    4. Which local pharmacy do you use (enter pharmacy)?   [facility]     5. Which mail order pharmacy do you use (enter pharmacy)?   N/A     6. Are you here for surgical clearance and if so who will be doing your     procedure/surgery (care team)?   no        
Value Date    VLDL 19.8 03/21/2025    VLDL 10.4 10/22/2024    VLDL 13.8 07/26/2024    VLDL 12.8 03/22/2024    VLDL 10 11/09/2023    VLDL 12 05/17/2023    VLDL 12.6 01/18/2023    VLDL 11.6 10/14/2022       Pertinent laboratory and test data reviewed and discussed with patient.        2/2021  Seen for increased shortness of breath and edema.  Significant edema of feet.  Change diuretic to Demadex and metolazone follow BMP check echo and nuclear stress test  2020  Seen after recent follow-up.  Had occasional lightheadedness low blood pressure today discontinue hydralazine.  Monitor blood pressure at home and decide on adjusting other medication.  Use of diuretic as improved edema.  Continue use as being done.  Sleep study showed Mobitz 1 block throughout the study.  Will get event monitor to make sure he does not have significant pauses.  These are likely related to his sleep apnea.  Okay to use what ever more of CPAP or BiPAP treatment that patient requires from cardiac standpoint.  Normal ejection fraction and negative stress test    5/2021  Cardiac status stable blood pressure elevated add hydralazine.  No further episode of Mobitz 1 block noted on MCOT.  Compliant with CPAP.  Episode of SVT 20 beat.  Asymptomatic.  Will not start any different medication at present.  Symptoms of shortness of breath improving  11/2021  Stable cardiac status.  Edema stable.  Palpitation and symptoms are controlled.  Continue treatment  8/2022  New onset chest tightness and pressure rule out ischemia.  Increase shortness of breath with concern of post-COVID issues.  Set up for CT scan, echo and nuclear stress test follow-up after that  9/2022  Cardiac status is stable, denies, re-current episodes of chest tightness.  He has chronic shortness of breath - advised to follow up with Pulmonary for CPAP and fitting.  Testing reviewed and discussed with patient stress test negative for ischemia, echo with normal LV function no valvular

## 2025-07-14 ENCOUNTER — HOSPITAL ENCOUNTER (EMERGENCY)
Facility: HOSPITAL | Age: 85
Discharge: HOME | End: 2025-07-14
Attending: EMERGENCY MEDICINE
Payer: MEDICARE

## 2025-07-14 VITALS
HEART RATE: 64 BPM | BODY MASS INDEX: 32.14 KG/M2 | WEIGHT: 200 LBS | RESPIRATION RATE: 20 BRPM | SYSTOLIC BLOOD PRESSURE: 155 MMHG | HEIGHT: 66 IN | OXYGEN SATURATION: 97 % | DIASTOLIC BLOOD PRESSURE: 73 MMHG | TEMPERATURE: 98 F

## 2025-07-14 DIAGNOSIS — R33.9 URINARY RETENTION: Primary | ICD-10-CM

## 2025-07-14 LAB
ALBUMIN SERPL-MCNC: 4.2 G/DL (ref 3.4–5)
ALBUMIN/GLOB SERPL: 1.8 (ref 0.8–1.7)
ALP SERPL-CCNC: 105 U/L (ref 45–117)
ALT SERPL-CCNC: 32 U/L (ref 10–50)
ANION GAP SERPL CALC-SCNC: 13 MMOL/L (ref 3–18)
APPEARANCE UR: CLEAR
AST SERPL-CCNC: 30 U/L (ref 10–38)
BACTERIA URNS QL MICRO: NEGATIVE /HPF
BASOPHILS # BLD: 0.03 K/UL (ref 0–0.1)
BASOPHILS NFR BLD: 0.3 % (ref 0–2)
BILIRUB SERPL-MCNC: 0.7 MG/DL (ref 0.2–1)
BILIRUB UR QL: NEGATIVE
BUN SERPL-MCNC: 18 MG/DL (ref 6–23)
BUN/CREAT SERPL: 18 (ref 12–20)
CALCIUM SERPL-MCNC: 9.9 MG/DL (ref 8.5–10.1)
CHLORIDE SERPL-SCNC: 104 MMOL/L (ref 98–107)
CO2 SERPL-SCNC: 26 MMOL/L (ref 21–32)
COLOR UR: YELLOW
CREAT SERPL-MCNC: 0.98 MG/DL (ref 0.6–1.3)
DIFFERENTIAL METHOD BLD: ABNORMAL
EOSINOPHIL # BLD: 0.05 K/UL (ref 0–0.4)
EOSINOPHIL NFR BLD: 0.5 % (ref 0–5)
ERYTHROCYTE [DISTWIDTH] IN BLOOD BY AUTOMATED COUNT: 12.9 % (ref 11.6–14.5)
GLOBULIN SER CALC-MCNC: 2.4 G/DL (ref 2–4)
GLUCOSE SERPL-MCNC: 131 MG/DL (ref 74–108)
GLUCOSE UR STRIP.AUTO-MCNC: NEGATIVE MG/DL
HCT VFR BLD AUTO: 44.1 % (ref 36–48)
HGB BLD-MCNC: 14.8 G/DL (ref 13–16)
HGB UR QL STRIP: ABNORMAL
IMM GRANULOCYTES # BLD AUTO: 0.02 K/UL (ref 0–0.04)
IMM GRANULOCYTES NFR BLD AUTO: 0.2 % (ref 0–0.5)
KETONES UR QL STRIP.AUTO: NEGATIVE MG/DL
LEUKOCYTE ESTERASE UR QL STRIP.AUTO: NEGATIVE
LYMPHOCYTES # BLD: 0.58 K/UL (ref 0.9–3.6)
LYMPHOCYTES NFR BLD: 6 % (ref 21–52)
MCH RBC QN AUTO: 33.3 PG (ref 24–34)
MCHC RBC AUTO-ENTMCNC: 33.6 G/DL (ref 31–37)
MCV RBC AUTO: 99.1 FL (ref 78–100)
MONOCYTES # BLD: 0.52 K/UL (ref 0.05–1.2)
MONOCYTES NFR BLD: 5.3 % (ref 3–10)
NEUTS SEG # BLD: 8.53 K/UL (ref 1.8–8)
NEUTS SEG NFR BLD: 87.7 % (ref 40–73)
NITRITE UR QL STRIP.AUTO: NEGATIVE
NRBC # BLD: 0 K/UL (ref 0–0.01)
NRBC BLD-RTO: 0 PER 100 WBC
PH UR STRIP: 6 (ref 5–8)
PLATELET # BLD AUTO: 155 K/UL (ref 135–420)
PMV BLD AUTO: 9.9 FL (ref 9.2–11.8)
POTASSIUM SERPL-SCNC: 3.2 MMOL/L (ref 3.5–5.5)
PROT SERPL-MCNC: 6.6 G/DL (ref 6.4–8.2)
PROT UR STRIP-MCNC: NEGATIVE MG/DL
RBC # BLD AUTO: 4.45 M/UL (ref 4.35–5.65)
RBC #/AREA URNS HPF: NORMAL /HPF (ref 0–5)
SODIUM SERPL-SCNC: 143 MMOL/L (ref 136–145)
SP GR UR REFRACTOMETRY: 1.01 (ref 1–1.03)
UROBILINOGEN UR QL STRIP.AUTO: 0.2 EU/DL (ref 0.2–1)
WBC # BLD AUTO: 9.7 K/UL (ref 4.6–13.2)
WBC URNS QL MICRO: NORMAL /HPF (ref 0–5)

## 2025-07-14 PROCEDURE — 6370000000 HC RX 637 (ALT 250 FOR IP): Performed by: EMERGENCY MEDICINE

## 2025-07-14 PROCEDURE — 85025 COMPLETE CBC W/AUTO DIFF WBC: CPT

## 2025-07-14 PROCEDURE — 87086 URINE CULTURE/COLONY COUNT: CPT

## 2025-07-14 PROCEDURE — 81001 URINALYSIS AUTO W/SCOPE: CPT

## 2025-07-14 PROCEDURE — 80053 COMPREHEN METABOLIC PANEL: CPT

## 2025-07-14 PROCEDURE — 99283 EMERGENCY DEPT VISIT LOW MDM: CPT

## 2025-07-14 PROCEDURE — 94761 N-INVAS EAR/PLS OXIMETRY MLT: CPT

## 2025-07-14 PROCEDURE — 51702 INSERT TEMP BLADDER CATH: CPT

## 2025-07-14 RX ORDER — TAMSULOSIN HYDROCHLORIDE 0.4 MG/1
0.4 CAPSULE ORAL DAILY
Qty: 30 CAPSULE | Refills: 0 | Status: SHIPPED | OUTPATIENT
Start: 2025-07-14

## 2025-07-14 RX ADMIN — POTASSIUM BICARBONATE 40 MEQ: 782 TABLET, EFFERVESCENT ORAL at 10:15

## 2025-07-14 ASSESSMENT — PAIN DESCRIPTION - DESCRIPTORS: DESCRIPTORS: ACHING;SHARP

## 2025-07-14 ASSESSMENT — PAIN DESCRIPTION - LOCATION: LOCATION: ABDOMEN

## 2025-07-14 ASSESSMENT — PAIN SCALES - GENERAL: PAINLEVEL_OUTOF10: 10

## 2025-07-14 ASSESSMENT — PAIN - FUNCTIONAL ASSESSMENT: PAIN_FUNCTIONAL_ASSESSMENT: 0-10

## 2025-07-14 NOTE — ED TRIAGE NOTES
Per medic pt from home with c/o unable to urinate; begin @midnight; has hx of difficulty urinating; pt states that he has meds for issue but doesn't have any. B/P 180/98 high. Did not take b/p meds; pt ambulatory; Aox4; RA    Hx - HTN     Pt placed on monitor.    \

## 2025-07-14 NOTE — ED PROVIDER NOTES
first physician to care for this patient and the primary physician on record for this patient.    8:05 AM        Diagnosis and Disposition     CLINICAL IMPRESSION:  No diagnosis found.     Medication List        ASK your doctor about these medications      albuterol sulfate  (90 Base) MCG/ACT inhaler  Commonly known as: Ventolin HFA  Inhale 2 puffs into the lungs every 6 hours as needed for Shortness of Breath     atorvastatin 10 MG tablet  Commonly known as: LIPITOR  TAKE 1 TABLET BY MOUTH ONCE DAILY     Cholecalciferol 50 MCG (2000 UT) Tabs     colchicine 0.6 MG capsule  Commonly known as: MITIGARE  Take 1 capsule by mouth every other day     cycloSPORINE 0.05 % ophthalmic emulsion  Commonly known as: RESTASIS     diclofenac sodium 1 % Gel  Commonly known as: VOLTAREN     hydrALAZINE 50 MG tablet  Commonly known as: APRESOLINE  TAKE 1 TABLET BY MOUTH IN THE MORNING AND AT BEDTIME     hydroxychloroquine 200 MG tablet  Commonly known as: PLAQUENIL  Take 2 tablets by mouth daily     ketoconazole 2 % cream  Commonly known as: NIZORAL  Apply topically daily to rash on face for 4 weeks or until clear.  Continue to use as needed for recurrence.     lisinopril 40 MG tablet  Commonly known as: PRINIVIL;ZESTRIL  TAKE 1 TABLET BY MOUTH ONCE DAILY     Lumigan 0.01 % Soln ophthalmic drops  Generic drug: bimatoprost     omeprazole 20 MG delayed release capsule  Commonly known as: PRILOSEC  TAKE 1 CAPSULE BY MOUTH DAILY     sertraline 50 MG tablet  Commonly known as: ZOLOFT  Take 1 tablet by mouth daily     tamsulosin 0.4 MG capsule  Commonly known as: FLOMAX  TAKE 1 CAPSULE BY MOUTH ONCE DAILY     terbinafine 250 MG tablet  Commonly known as: LAMISIL     torsemide 20 MG tablet  Commonly known as: DEMADEX  Take 1 tablet by mouth 2 times daily              Disposition: home     Patient condition at time of disposition: Stable     DISCHARGE NOTE:   Pt has been reexamined. Patient has no new complaints, changes, or physical

## 2025-07-15 LAB
BACTERIA SPEC CULT: NORMAL
SERVICE CMNT-IMP: NORMAL

## 2025-07-24 ENCOUNTER — OFFICE VISIT (OUTPATIENT)
Facility: CLINIC | Age: 85
End: 2025-07-24

## 2025-07-24 ENCOUNTER — TELEPHONE (OUTPATIENT)
Age: 85
End: 2025-07-24

## 2025-07-24 ENCOUNTER — HOSPITAL ENCOUNTER (OUTPATIENT)
Facility: HOSPITAL | Age: 85
Setting detail: SPECIMEN
Discharge: HOME | End: 2025-07-27
Payer: MEDICARE

## 2025-07-24 VITALS
OXYGEN SATURATION: 96 % | HEART RATE: 71 BPM | TEMPERATURE: 98.4 F | DIASTOLIC BLOOD PRESSURE: 58 MMHG | BODY MASS INDEX: 31.82 KG/M2 | WEIGHT: 198 LBS | HEIGHT: 66 IN | SYSTOLIC BLOOD PRESSURE: 124 MMHG | RESPIRATION RATE: 16 BRPM

## 2025-07-24 DIAGNOSIS — I10 ESSENTIAL (PRIMARY) HYPERTENSION: ICD-10-CM

## 2025-07-24 DIAGNOSIS — Z09 HOSPITAL DISCHARGE FOLLOW-UP: Primary | ICD-10-CM

## 2025-07-24 DIAGNOSIS — E78.00 PURE HYPERCHOLESTEROLEMIA: ICD-10-CM

## 2025-07-24 DIAGNOSIS — R33.8 URINARY RETENTION DUE TO BENIGN PROSTATIC HYPERPLASIA: ICD-10-CM

## 2025-07-24 DIAGNOSIS — K62.5 RECTAL BLEEDING: ICD-10-CM

## 2025-07-24 DIAGNOSIS — R10.2 PELVIC PAIN: ICD-10-CM

## 2025-07-24 DIAGNOSIS — R73.03 PREDIABETES: ICD-10-CM

## 2025-07-24 DIAGNOSIS — R30.0 DYSURIA: ICD-10-CM

## 2025-07-24 DIAGNOSIS — Z97.8 FOLEY CATHETER IN PLACE: ICD-10-CM

## 2025-07-24 DIAGNOSIS — I50.32 CHRONIC DIASTOLIC CONGESTIVE HEART FAILURE (HCC): ICD-10-CM

## 2025-07-24 DIAGNOSIS — E66.811 CLASS 1 OBESITY DUE TO EXCESS CALORIES WITH SERIOUS COMORBIDITY AND BODY MASS INDEX (BMI) OF 31.0 TO 31.9 IN ADULT: ICD-10-CM

## 2025-07-24 DIAGNOSIS — R20.2 PARESTHESIA OF BOTH FEET: ICD-10-CM

## 2025-07-24 DIAGNOSIS — E66.09 CLASS 1 OBESITY DUE TO EXCESS CALORIES WITH SERIOUS COMORBIDITY AND BODY MASS INDEX (BMI) OF 31.0 TO 31.9 IN ADULT: ICD-10-CM

## 2025-07-24 DIAGNOSIS — N40.1 URINARY RETENTION DUE TO BENIGN PROSTATIC HYPERPLASIA: ICD-10-CM

## 2025-07-24 DIAGNOSIS — R31.0 GROSS HEMATURIA: ICD-10-CM

## 2025-07-24 DIAGNOSIS — K62.89 RECTAL PAIN: ICD-10-CM

## 2025-07-24 LAB
ALBUMIN SERPL-MCNC: 3.7 G/DL (ref 3.4–5)
ALBUMIN/GLOB SERPL: 1.7 (ref 0.8–1.7)
ALP SERPL-CCNC: 117 U/L (ref 45–117)
ALT SERPL-CCNC: 24 U/L (ref 10–50)
ANION GAP SERPL CALC-SCNC: 12 MMOL/L (ref 3–18)
AST SERPL-CCNC: 24 U/L (ref 10–38)
BASOPHILS # BLD: 0.03 K/UL (ref 0–0.1)
BASOPHILS NFR BLD: 0.4 % (ref 0–2)
BILIRUB SERPL-MCNC: 0.5 MG/DL (ref 0.2–1)
BILIRUBIN, URINE, POC: NEGATIVE
BLOOD URINE, POC: ABNORMAL
BUN SERPL-MCNC: 12 MG/DL (ref 6–23)
BUN/CREAT SERPL: 14 (ref 12–20)
CALCIUM SERPL-MCNC: 10.1 MG/DL (ref 8.5–10.1)
CHLORIDE SERPL-SCNC: 108 MMOL/L (ref 98–107)
CHOLEST SERPL-MCNC: 128 MG/DL
CO2 SERPL-SCNC: 26 MMOL/L (ref 21–32)
CREAT SERPL-MCNC: 0.91 MG/DL (ref 0.6–1.3)
CREAT UR-MCNC: 209 MG/DL (ref 30–125)
DIFFERENTIAL METHOD BLD: ABNORMAL
EOSINOPHIL # BLD: 0.26 K/UL (ref 0–0.4)
EOSINOPHIL NFR BLD: 3.7 % (ref 0–5)
ERYTHROCYTE [DISTWIDTH] IN BLOOD BY AUTOMATED COUNT: 13 % (ref 11.6–14.5)
EST. AVERAGE GLUCOSE BLD GHB EST-MCNC: 115 MG/DL
GLOBULIN SER CALC-MCNC: 2.2 G/DL (ref 2–4)
GLUCOSE SERPL-MCNC: 118 MG/DL (ref 74–108)
GLUCOSE URINE, POC: NEGATIVE
HBA1C MFR BLD: 5.6 % (ref 4.2–5.6)
HCT VFR BLD AUTO: 41.7 % (ref 36–48)
HDLC SERPL-MCNC: 48 MG/DL (ref 40–60)
HDLC SERPL: 2.7 (ref 0–5)
HGB BLD-MCNC: 13.3 G/DL (ref 13–16)
IMM GRANULOCYTES # BLD AUTO: 0.03 K/UL (ref 0–0.04)
IMM GRANULOCYTES NFR BLD AUTO: 0.4 % (ref 0–0.5)
KETONES, URINE, POC: NEGATIVE
LDLC SERPL CALC-MCNC: 69 MG/DL (ref 0–100)
LEUKOCYTE ESTERASE, URINE, POC: ABNORMAL
LYMPHOCYTES # BLD: 1.39 K/UL (ref 0.9–3.6)
LYMPHOCYTES NFR BLD: 19.5 % (ref 21–52)
MAGNESIUM SERPL-MCNC: 2 MG/DL (ref 1.6–2.6)
MCH RBC QN AUTO: 33.3 PG (ref 24–34)
MCHC RBC AUTO-ENTMCNC: 31.9 G/DL (ref 31–37)
MCV RBC AUTO: 104.5 FL (ref 78–100)
MICROALBUMIN UR-MCNC: 122 MG/DL (ref 0–3)
MICROALBUMIN/CREAT UR-RTO: 584 MG/G (ref 0–30)
MONOCYTES # BLD: 0.62 K/UL (ref 0.05–1.2)
MONOCYTES NFR BLD: 8.7 % (ref 3–10)
NEUTS SEG # BLD: 4.79 K/UL (ref 1.8–8)
NEUTS SEG NFR BLD: 67.3 % (ref 40–73)
NITRITE, URINE, POC: NEGATIVE
NRBC # BLD: 0 K/UL (ref 0–0.01)
NRBC BLD-RTO: 0 PER 100 WBC
PH, URINE, POC: 6 (ref 4.6–8)
PLATELET # BLD AUTO: 200 K/UL (ref 135–420)
PMV BLD AUTO: 10.8 FL (ref 9.2–11.8)
POTASSIUM SERPL-SCNC: 4.1 MMOL/L (ref 3.5–5.5)
PROT SERPL-MCNC: 6 G/DL (ref 6.4–8.2)
PROTEIN,URINE, POC: ABNORMAL
RBC # BLD AUTO: 3.99 M/UL (ref 4.35–5.65)
SODIUM SERPL-SCNC: 146 MMOL/L (ref 136–145)
SPECIFIC GRAVITY, URINE, POC: 1.03 (ref 1–1.03)
TRIGL SERPL-MCNC: 55 MG/DL (ref 0–150)
URINALYSIS CLARITY, POC: ABNORMAL
URINALYSIS COLOR, POC: ABNORMAL
UROBILINOGEN, POC: ABNORMAL
VIT B12 SERPL-MCNC: 393 PG/ML (ref 211–911)
VLDLC SERPL CALC-MCNC: 11 MG/DL
WBC # BLD AUTO: 7.1 K/UL (ref 4.6–13.2)

## 2025-07-24 PROCEDURE — 87186 SC STD MICRODIL/AGAR DIL: CPT

## 2025-07-24 PROCEDURE — 83036 HEMOGLOBIN GLYCOSYLATED A1C: CPT

## 2025-07-24 PROCEDURE — 87086 URINE CULTURE/COLONY COUNT: CPT

## 2025-07-24 PROCEDURE — 80053 COMPREHEN METABOLIC PANEL: CPT

## 2025-07-24 PROCEDURE — 83735 ASSAY OF MAGNESIUM: CPT

## 2025-07-24 PROCEDURE — 82570 ASSAY OF URINE CREATININE: CPT

## 2025-07-24 PROCEDURE — 85025 COMPLETE CBC W/AUTO DIFF WBC: CPT

## 2025-07-24 PROCEDURE — 82784 ASSAY IGA/IGD/IGG/IGM EACH: CPT

## 2025-07-24 PROCEDURE — 84165 PROTEIN E-PHORESIS SERUM: CPT

## 2025-07-24 PROCEDURE — 82043 UR ALBUMIN QUANTITATIVE: CPT

## 2025-07-24 PROCEDURE — 80061 LIPID PANEL: CPT

## 2025-07-24 PROCEDURE — 87088 URINE BACTERIA CULTURE: CPT

## 2025-07-24 PROCEDURE — 36415 COLL VENOUS BLD VENIPUNCTURE: CPT

## 2025-07-24 PROCEDURE — 86334 IMMUNOFIX E-PHORESIS SERUM: CPT

## 2025-07-24 PROCEDURE — 82607 VITAMIN B-12: CPT

## 2025-07-24 PROCEDURE — 83521 IG LIGHT CHAINS FREE EACH: CPT

## 2025-07-24 RX ORDER — HYDROCORTISONE 25 MG/G
CREAM TOPICAL 2 TIMES DAILY
Qty: 28 G | Refills: 0 | Status: SHIPPED | OUTPATIENT
Start: 2025-07-24

## 2025-07-24 RX ORDER — SULFAMETHOXAZOLE AND TRIMETHOPRIM 800; 160 MG/1; MG/1
1 TABLET ORAL EVERY 12 HOURS
Qty: 14 TABLET | Refills: 0 | Status: SHIPPED | OUTPATIENT
Start: 2025-07-24 | End: 2025-07-31

## 2025-07-24 NOTE — PROGRESS NOTES
Aydin Gregory presents today for   Chief Complaint   Patient presents with    Follow-Up from Hospital       \"Have you been to the ER, urgent care clinic since your last visit?  Hospitalized since your last visit?\"    07/14/2025  [facility]  Urinary retention    “Have you seen or consulted any other health care providers outside of [facility] since your last visit?”    NO            
report significant improvement.  He denies any fever, chills, abdominal pain, or scrotal pain, but does report urethral burning and discomfort around the catheter.  He states that he is not scheduled with urology since he has not been contacted for an appointment after his ED visit.    He does report a history of 100-pack-year smoking although stopped in 1974.  He states that he also worked in the [employer] and was exposed to asbestos.  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       Other recent issues:  He completed a follow-up visit with Dr. Aviles on 1/14/2025 and was advised to continue on colchicine 0.6 mg every other day and hydroxychloroquine 400 mg daily. He reports ongoing issues with bilateral hand pain but states that it has been relatively stable.    He completed a follow-up visit with Dr. Monge on 7/8/2025 and due to worsening of his lower extremity edema, amlodipine was discontinued.  He was advised to continue monitoring his blood pressure at home.  He discusses that his weight has decreased 9 pounds since his last visit and he states that this is due to dietary changes with elimination of sweets, diet soda, and sweet tea.  He also has decreased his alcohol consumption and is now drinking only nonalcoholic beer.  He reports that he was previously consuming approximately 1 beer nightly.    He is otherwise without new complaints.        Summary of prior hospitalizations and medical history:  On 10/23/2024, he reported severe bilateral mid back pain which developed after attempting to help his wife get up off the floor after a fall.  He described severe intermittent back spasms with certain movements, but denied any radiation of pain down his bilateral legs or to the mid abdomen.  He noted some improvement with application of heat and Tylenol.  A thoracic spine x-ray (10/23/2024) showed multilevel mild degenerative disc disease at T5/6 and T6/7 which had worsened from prior

## 2025-07-26 LAB
BACTERIA SPEC CULT: ABNORMAL
CC UR VC: ABNORMAL
SERVICE CMNT-IMP: ABNORMAL

## 2025-07-28 ENCOUNTER — RESULTS FOLLOW-UP (OUTPATIENT)
Facility: CLINIC | Age: 85
End: 2025-07-28

## 2025-07-28 LAB
ALBUMIN SERPL ELPH-MCNC: 3.7 G/DL (ref 2.9–4.4)
ALBUMIN/GLOB SERPL: 1.7 (ref 0.7–1.7)
ALPHA1 GLOB SERPL ELPH-MCNC: 0.2 G/DL (ref 0–0.4)
ALPHA2 GLOB SERPL ELPH-MCNC: 0.8 G/DL (ref 0.4–1)
B-GLOBULIN SERPL ELPH-MCNC: 0.9 G/DL (ref 0.7–1.3)
BACTERIA SPEC CULT: ABNORMAL
BACTERIA SPEC CULT: ABNORMAL
CC UR VC: ABNORMAL
GAMMA GLOB SERPL ELPH-MCNC: 0.5 G/DL (ref 0.4–1.8)
GLOBULIN SER-MCNC: 2.3 G/DL (ref 2.2–3.9)
IGA SERPL-MCNC: 175 MG/DL (ref 61–437)
IGG SERPL-MCNC: 598 MG/DL (ref 603–1613)
IGM SERPL-MCNC: 42 MG/DL (ref 15–143)
INTERPRETATION SERPL IEP-IMP: ABNORMAL
KAPPA LC FREE SER-MCNC: 18.8 MG/L (ref 3.3–19.4)
KAPPA LC FREE/LAMBDA FREE SER: 1.07 (ref 0.26–1.65)
LAMBDA LC FREE SERPL-MCNC: 17.6 MG/L (ref 5.7–26.3)
M PROTEIN SERPL ELPH-MCNC: ABNORMAL G/DL
PROT SERPL-MCNC: 6 G/DL (ref 6–8.5)
SERVICE CMNT-IMP: ABNORMAL

## 2025-07-28 RX ORDER — CIPROFLOXACIN 500 MG/1
500 TABLET, FILM COATED ORAL 2 TIMES DAILY
Qty: 14 TABLET | Refills: 0 | Status: SHIPPED | OUTPATIENT
Start: 2025-07-28 | End: 2025-08-04

## 2025-07-28 NOTE — TELEPHONE ENCOUNTER
Final urine culture results show infection due to Pseudomonas aeruginosa and Klebsiella pneumoniae.  Patient treated with Bactrim based upon abnormal urinalysis, but will not address Pseudomonas.  Will change antibiotics to ciprofloxacin 500 mg twice daily for 7 days as will address both organisms.  New prescription sent to [facility] pharmacy.    Called and discussed with patient and aware of change in therapy with discontinuation of Bactrim and initiation of ciprofloxacin.

## 2025-07-31 ENCOUNTER — OFFICE VISIT (OUTPATIENT)
Facility: CLINIC | Age: 85
End: 2025-07-31

## 2025-07-31 VITALS
OXYGEN SATURATION: 96 % | HEIGHT: 66 IN | RESPIRATION RATE: 16 BRPM | WEIGHT: 199 LBS | DIASTOLIC BLOOD PRESSURE: 72 MMHG | BODY MASS INDEX: 31.98 KG/M2 | HEART RATE: 75 BPM | SYSTOLIC BLOOD PRESSURE: 136 MMHG | TEMPERATURE: 98.2 F

## 2025-07-31 DIAGNOSIS — K62.5 RECTAL BLEEDING: ICD-10-CM

## 2025-07-31 DIAGNOSIS — R60.0 BILATERAL LOWER EXTREMITY EDEMA: ICD-10-CM

## 2025-07-31 DIAGNOSIS — R33.8 URINARY RETENTION DUE TO BENIGN PROSTATIC HYPERPLASIA: Primary | ICD-10-CM

## 2025-07-31 DIAGNOSIS — M15.0 PRIMARY OSTEOARTHRITIS INVOLVING MULTIPLE JOINTS: ICD-10-CM

## 2025-07-31 DIAGNOSIS — N40.1 URINARY RETENTION DUE TO BENIGN PROSTATIC HYPERPLASIA: Primary | ICD-10-CM

## 2025-07-31 DIAGNOSIS — I50.32 CHRONIC DIASTOLIC CONGESTIVE HEART FAILURE (HCC): ICD-10-CM

## 2025-07-31 DIAGNOSIS — M06.041 RHEUMATOID ARTHRITIS INVOLVING BOTH HANDS WITH NEGATIVE RHEUMATOID FACTOR (HCC): ICD-10-CM

## 2025-07-31 DIAGNOSIS — E66.811 CLASS 1 OBESITY DUE TO EXCESS CALORIES WITH SERIOUS COMORBIDITY AND BODY MASS INDEX (BMI) OF 32.0 TO 32.9 IN ADULT: ICD-10-CM

## 2025-07-31 DIAGNOSIS — E55.9 VITAMIN D DEFICIENCY: ICD-10-CM

## 2025-07-31 DIAGNOSIS — E66.09 CLASS 1 OBESITY DUE TO EXCESS CALORIES WITH SERIOUS COMORBIDITY AND BODY MASS INDEX (BMI) OF 32.0 TO 32.9 IN ADULT: ICD-10-CM

## 2025-07-31 DIAGNOSIS — K62.89 RECTAL PAIN: ICD-10-CM

## 2025-07-31 DIAGNOSIS — G47.33 OBSTRUCTIVE SLEEP APNEA SYNDROME, SEVERE: ICD-10-CM

## 2025-07-31 DIAGNOSIS — N39.0 URINARY TRACT INFECTION ASSOCIATED WITH INDWELLING URETHRAL CATHETER, SUBSEQUENT ENCOUNTER: ICD-10-CM

## 2025-07-31 DIAGNOSIS — T83.511D URINARY TRACT INFECTION ASSOCIATED WITH INDWELLING URETHRAL CATHETER, SUBSEQUENT ENCOUNTER: ICD-10-CM

## 2025-07-31 DIAGNOSIS — F33.42 RECURRENT MAJOR DEPRESSIVE DISORDER, IN FULL REMISSION: ICD-10-CM

## 2025-07-31 DIAGNOSIS — E78.00 PURE HYPERCHOLESTEROLEMIA: ICD-10-CM

## 2025-07-31 DIAGNOSIS — I10 ESSENTIAL (PRIMARY) HYPERTENSION: ICD-10-CM

## 2025-07-31 DIAGNOSIS — M06.042 RHEUMATOID ARTHRITIS INVOLVING BOTH HANDS WITH NEGATIVE RHEUMATOID FACTOR (HCC): ICD-10-CM

## 2025-07-31 DIAGNOSIS — R73.03 PREDIABETES: ICD-10-CM

## 2025-07-31 NOTE — PROGRESS NOTES
Aydin Gregory presents today for   Chief Complaint   Patient presents with    Follow-up       \"Have you been to the ER, urgent care clinic since your last visit?  Hospitalized since your last visit?\"    NO    “Have you seen or consulted any other health care providers outside of [facility] since your last visit?”    NO            
Immature Granulocytes Absolute 07/24/2025 0.03  0.00 - 0.04 K/UL Final    Differential Type 07/24/2025 AUTOMATED    Final    Sodium 07/24/2025 146 (H)  136 - 145 mmol/L Final    Potassium 07/24/2025 4.1  3.5 - 5.5 mmol/L Final    Chloride 07/24/2025 108 (H)  98 - 107 mmol/L Final    CO2 07/24/2025 26  21 - 32 mmol/L Final    Anion Gap 07/24/2025 12  3.0 - 18.0 mmol/L Final    Glucose 07/24/2025 118 (H)  74 - 108 mg/dL Final    BUN 07/24/2025 12  6 - 23 MG/DL Final    Creatinine 07/24/2025 0.91  0.6 - 1.3 MG/DL Final    BUN/Creatinine Ratio 07/24/2025 14  12 - 20   Final    Est, Glom Filt Rate 07/24/2025 83  >60 ml/min/1.73m2 Final    Calcium 07/24/2025 10.1  8.5 - 10.1 MG/DL Final    Total Bilirubin 07/24/2025 0.5  0.2 - 1.0 MG/DL Final    ALT 07/24/2025 24  10 - 50 U/L Final    AST 07/24/2025 24  10 - 38 U/L Final    Alk Phosphatase 07/24/2025 117  45 - 117 U/L Final    Total Protein 07/24/2025 6.0 (L)  6.4 - 8.2 g/dL Final    Albumin 07/24/2025 3.7  3.4 - 5.0 g/dL Final    Globulin 07/24/2025 2.2  2.0 - 4.0 g/dL Final    Albumin/Globulin Ratio 07/24/2025 1.7  0.8 - 1.7   Final    Cholesterol, Total 07/24/2025 128  MG/DL Final    Triglycerides 07/24/2025 55  0 - 150 MG/DL Final    HDL 07/24/2025 48  40 - 60 MG/DL Final    LDL Cholesterol 07/24/2025 69  0 - 100 MG/DL Final    VLDL Cholesterol Calculated 07/24/2025 11  MG/DL Final    Chol/HDL Ratio 07/24/2025 2.7  0 - 5.0   Final    Hemoglobin A1C 07/24/2025 5.6  4.2 - 5.6 % Final    Estimated Avg Glucose 07/24/2025 115  mg/dL Final    Magnesium 07/24/2025 2.0  1.6 - 2.6 mg/dL Final    Albumin Urine 07/24/2025 122.00 (H)  0.0 - 3.0 MG/DL Final    Creatinine, Ur 07/24/2025 209.00 (H)  30 - 125 mg/dL Final    Albumin/Creatinine Ratio 07/24/2025 584 (H)  0 - 30 mg/g Final    Special Requests 07/24/2025 NO SPECIAL REQUESTS    Final    [place] count 07/24/2025     Final                    Value:>100,000  COLONIES/mL      Culture 07/24/2025 PSEUDOMONAS AERUGINOSA (A)

## 2025-08-06 ENCOUNTER — TELEPHONE (OUTPATIENT)
Facility: CLINIC | Age: 85
End: 2025-08-06

## 2025-08-06 DIAGNOSIS — R30.0 DYSURIA: Primary | ICD-10-CM

## 2025-08-12 ENCOUNTER — HOSPITAL ENCOUNTER (OUTPATIENT)
Facility: HOSPITAL | Age: 85
Setting detail: SPECIMEN
Discharge: HOME | End: 2025-08-15
Payer: MEDICARE

## 2025-08-12 ENCOUNTER — TELEPHONE (OUTPATIENT)
Facility: CLINIC | Age: 85
End: 2025-08-12

## 2025-08-12 DIAGNOSIS — R30.0 DYSURIA: ICD-10-CM

## 2025-08-12 LAB
APPEARANCE UR: CLEAR
BACTERIA URNS QL MICRO: ABNORMAL /HPF
BILIRUB UR QL: NEGATIVE
COLOR UR: YELLOW
EPITH CASTS URNS QL MICRO: ABNORMAL /LPF (ref 0–5)
GLUCOSE UR STRIP.AUTO-MCNC: NEGATIVE MG/DL
HGB UR QL STRIP: NEGATIVE
KETONES UR QL STRIP.AUTO: NEGATIVE MG/DL
LEUKOCYTE ESTERASE UR QL STRIP.AUTO: ABNORMAL
NITRITE UR QL STRIP.AUTO: NEGATIVE
PH UR STRIP: 6 (ref 5–8)
PROT UR STRIP-MCNC: NEGATIVE MG/DL
RBC #/AREA URNS HPF: ABNORMAL /HPF (ref 0–5)
SP GR UR REFRACTOMETRY: 1.01 (ref 1–1.03)
UROBILINOGEN UR QL STRIP.AUTO: 0.2 EU/DL (ref 0.2–1)
WBC URNS QL MICRO: ABNORMAL /HPF (ref 0–5)

## 2025-08-12 PROCEDURE — 87086 URINE CULTURE/COLONY COUNT: CPT

## 2025-08-12 PROCEDURE — 81001 URINALYSIS AUTO W/SCOPE: CPT

## 2025-08-12 RX ORDER — HYDROCORTISONE 25 MG/G
CREAM TOPICAL 2 TIMES DAILY
Qty: 28 G | Refills: 1 | Status: SHIPPED | OUTPATIENT
Start: 2025-08-12

## 2025-08-13 LAB
BACTERIA SPEC CULT: NORMAL
SERVICE CMNT-IMP: NORMAL

## 2025-08-14 ENCOUNTER — RESULTS FOLLOW-UP (OUTPATIENT)
Facility: CLINIC | Age: 85
End: 2025-08-14

## 2025-08-21 ENCOUNTER — OFFICE VISIT (OUTPATIENT)
Age: 85
End: 2025-08-21
Payer: MEDICARE

## 2025-08-21 VITALS
WEIGHT: 194 LBS | OXYGEN SATURATION: 98 % | BODY MASS INDEX: 31.18 KG/M2 | HEART RATE: 91 BPM | DIASTOLIC BLOOD PRESSURE: 76 MMHG | TEMPERATURE: 97.5 F | HEIGHT: 66 IN | SYSTOLIC BLOOD PRESSURE: 134 MMHG | RESPIRATION RATE: 16 BRPM

## 2025-08-21 DIAGNOSIS — K64.2 GRADE III HEMORRHOIDS: Primary | ICD-10-CM

## 2025-08-21 PROCEDURE — 46600 DIAGNOSTIC ANOSCOPY SPX: CPT | Performed by: COLON & RECTAL SURGERY

## 2025-08-21 PROCEDURE — 99203 OFFICE O/P NEW LOW 30 MIN: CPT | Performed by: COLON & RECTAL SURGERY

## 2025-08-21 PROCEDURE — G8417 CALC BMI ABV UP PARAM F/U: HCPCS | Performed by: COLON & RECTAL SURGERY

## 2025-08-21 PROCEDURE — 1036F TOBACCO NON-USER: CPT | Performed by: COLON & RECTAL SURGERY

## 2025-08-21 PROCEDURE — 3075F SYST BP GE 130 - 139MM HG: CPT | Performed by: COLON & RECTAL SURGERY

## 2025-08-21 PROCEDURE — 3078F DIAST BP <80 MM HG: CPT | Performed by: COLON & RECTAL SURGERY

## 2025-08-21 PROCEDURE — 1123F ACP DISCUSS/DSCN MKR DOCD: CPT | Performed by: COLON & RECTAL SURGERY

## 2025-08-21 PROCEDURE — G8428 CUR MEDS NOT DOCUMENT: HCPCS | Performed by: COLON & RECTAL SURGERY

## (undated) DEVICE — REM POLYHESIVE ADULT PATIENT RETURN ELECTRODE: Brand: VALLEYLAB

## (undated) DEVICE — NEEDLE HYPO 22GA L1.5IN BLK S STL HUB POLYPR SHLD REG BVL

## (undated) DEVICE — WRAP COMPR BK

## (undated) DEVICE — WATERPROOF, BACTERIA PROOF DRESSING WITH ABSORBENT SEE THROUGH PAD: Brand: OPSITE POST-OP VISIBLE 15X10CM CTN 20

## (undated) DEVICE — STERILE POLYISOPRENE POWDER-FREE SURGICAL GLOVES: Brand: PROTEXIS

## (undated) DEVICE — GEL BAG FOR WRAPS --

## (undated) DEVICE — FLEX ADVANTAGE 3000CC: Brand: FLEX ADVANTAGE

## (undated) DEVICE — (D)PREP SKN CHLRAPRP APPL 26ML -- CONVERT TO ITEM 371833

## (undated) DEVICE — SUTURE VCRL SZ 1 L18IN ABSRB VLT CT-1 L36MM 1/2 CIR J741D

## (undated) DEVICE — SOLUTION IV 1000ML 0.9% SOD CHL

## (undated) DEVICE — WAX SURG 2.5GM HEMSTAT BNE BEESWAX PARAFFIN ISO PALMITATE

## (undated) DEVICE — Device

## (undated) DEVICE — 3.0MM PRECISION NEURO (MATCH HEAD)

## (undated) DEVICE — KIT POS W/ FOAM ARM CRADL SHEARGUARD CHST PD CVR FOR SPNL

## (undated) DEVICE — INTENDED FOR TISSUE SEPARATION, AND OTHER PROCEDURES THAT REQUIRE A SHARP SURGICAL BLADE TO PUNCTURE OR CUT.: Brand: BARD-PARKER SAFETY BLADES SIZE 15, STERILE

## (undated) DEVICE — STOCKING COMPR L L29-31IN REG 19MMHG ANK 9-10IN CALF

## (undated) DEVICE — BLANKET WRM AD W50XL85.8IN PACU FULL BODY FORC AIR

## (undated) DEVICE — WATERPROOF, BACTERIA PROOF DRESSING WITH ABSORBENT SEE THROUGH PAD: Brand: OPSITE POST-OP VISIBLE 10X8CM CTN 20

## (undated) DEVICE — SUTURE VCRL SZ 2-0 L18IN ABSRB VLT CT-1 L36MM 1/2 CIR J739D

## (undated) DEVICE — GARMENT,MEDLINE,DVT,INT,CALF,MED, GEN2: Brand: MEDLINE

## (undated) DEVICE — SUTURE MCRYL SZ 3-0 L27IN ABSRB UD L24MM PS-1 3/8 CIR PRIM Y936H

## (undated) DEVICE — SYR 10ML LUER LOK 1/5ML GRAD --

## (undated) DEVICE — KIT CLN UP BON SECOURS MARYV

## (undated) DEVICE — INTENDED FOR TISSUE SEPARATION, AND OTHER PROCEDURES THAT REQUIRE A SHARP SURGICAL BLADE TO PUNCTURE OR CUT.: Brand: BARD-PARKER SAFETY BLADES SIZE 20, STERILE

## (undated) DEVICE — SPIROMETER INCENT 2500ML W ONE W VLV